# Patient Record
Sex: MALE | Race: AMERICAN INDIAN OR ALASKA NATIVE | NOT HISPANIC OR LATINO | Employment: OTHER | URBAN - METROPOLITAN AREA
[De-identification: names, ages, dates, MRNs, and addresses within clinical notes are randomized per-mention and may not be internally consistent; named-entity substitution may affect disease eponyms.]

---

## 2019-05-15 ENCOUNTER — COMMUNICATION - HEALTHEAST (OUTPATIENT)
Dept: PULMONOLOGY | Facility: OTHER | Age: 64
End: 2019-05-15

## 2019-05-15 ENCOUNTER — AMBULATORY - HEALTHEAST (OUTPATIENT)
Dept: PULMONOLOGY | Facility: OTHER | Age: 64
End: 2019-05-15

## 2019-05-15 DIAGNOSIS — J44.9 COPD (CHRONIC OBSTRUCTIVE PULMONARY DISEASE) (H): ICD-10-CM

## 2019-05-22 ENCOUNTER — OFFICE VISIT - HEALTHEAST (OUTPATIENT)
Dept: FAMILY MEDICINE | Facility: CLINIC | Age: 64
End: 2019-05-22

## 2019-05-22 ENCOUNTER — COMMUNICATION - HEALTHEAST (OUTPATIENT)
Dept: FAMILY MEDICINE | Facility: CLINIC | Age: 64
End: 2019-05-22

## 2019-05-22 DIAGNOSIS — J44.1 COPD EXACERBATION (H): ICD-10-CM

## 2019-05-22 DIAGNOSIS — I10 ESSENTIAL HYPERTENSION: ICD-10-CM

## 2019-05-22 DIAGNOSIS — Z71.6 ENCOUNTER FOR SMOKING CESSATION COUNSELING: ICD-10-CM

## 2019-05-22 DIAGNOSIS — G40.802 OTHER EPILEPSY WITHOUT STATUS EPILEPTICUS, NOT INTRACTABLE (H): ICD-10-CM

## 2019-05-22 DIAGNOSIS — F32.A DEPRESSION: ICD-10-CM

## 2019-05-22 DIAGNOSIS — G62.9 NEUROPATHY: ICD-10-CM

## 2019-05-22 ASSESSMENT — MIFFLIN-ST. JEOR: SCORE: 1889.7

## 2019-06-06 ENCOUNTER — OFFICE VISIT - HEALTHEAST (OUTPATIENT)
Dept: FAMILY MEDICINE | Facility: CLINIC | Age: 64
End: 2019-06-06

## 2019-06-06 DIAGNOSIS — Z72.0 TOBACCO ABUSE: ICD-10-CM

## 2019-06-06 DIAGNOSIS — F32.1 CURRENT MODERATE EPISODE OF MAJOR DEPRESSIVE DISORDER WITHOUT PRIOR EPISODE (H): ICD-10-CM

## 2019-06-06 DIAGNOSIS — G25.81 RESTLESS LEGS SYNDROME (RLS): ICD-10-CM

## 2019-06-06 DIAGNOSIS — J44.1 COPD EXACERBATION (H): ICD-10-CM

## 2019-06-06 DIAGNOSIS — I10 ESSENTIAL HYPERTENSION: ICD-10-CM

## 2019-06-06 DIAGNOSIS — G62.9 NEUROPATHY: ICD-10-CM

## 2019-06-06 ASSESSMENT — MIFFLIN-ST. JEOR: SCORE: 1845.76

## 2019-06-27 ENCOUNTER — COMMUNICATION - HEALTHEAST (OUTPATIENT)
Dept: FAMILY MEDICINE | Facility: CLINIC | Age: 64
End: 2019-06-27

## 2019-06-27 DIAGNOSIS — G62.9 NEUROPATHY: ICD-10-CM

## 2019-06-28 ENCOUNTER — OFFICE VISIT - HEALTHEAST (OUTPATIENT)
Dept: FAMILY MEDICINE | Facility: CLINIC | Age: 64
End: 2019-06-28

## 2019-06-28 DIAGNOSIS — F32.1 CURRENT MODERATE EPISODE OF MAJOR DEPRESSIVE DISORDER WITHOUT PRIOR EPISODE (H): ICD-10-CM

## 2019-06-28 DIAGNOSIS — J44.1 COPD EXACERBATION (H): ICD-10-CM

## 2019-06-28 DIAGNOSIS — Z71.6 ENCOUNTER FOR SMOKING CESSATION COUNSELING: ICD-10-CM

## 2019-06-28 DIAGNOSIS — M51.379 DEGENERATION OF LUMBAR OR LUMBOSACRAL INTERVERTEBRAL DISC: ICD-10-CM

## 2019-06-28 DIAGNOSIS — G62.9 NEUROPATHY: ICD-10-CM

## 2019-06-28 DIAGNOSIS — I10 ESSENTIAL HYPERTENSION: ICD-10-CM

## 2019-06-28 DIAGNOSIS — F32.A DEPRESSION: ICD-10-CM

## 2019-06-28 ASSESSMENT — MIFFLIN-ST. JEOR: SCORE: 1863.05

## 2019-07-01 ENCOUNTER — RECORDS - HEALTHEAST (OUTPATIENT)
Dept: PULMONOLOGY | Facility: OTHER | Age: 64
End: 2019-07-01

## 2019-07-01 ENCOUNTER — OFFICE VISIT - HEALTHEAST (OUTPATIENT)
Dept: PULMONOLOGY | Facility: OTHER | Age: 64
End: 2019-07-01

## 2019-07-01 ENCOUNTER — RECORDS - HEALTHEAST (OUTPATIENT)
Dept: ADMINISTRATIVE | Facility: OTHER | Age: 64
End: 2019-07-01

## 2019-07-01 DIAGNOSIS — J44.9 CHRONIC OBSTRUCTIVE PULMONARY DISEASE, UNSPECIFIED (H): ICD-10-CM

## 2019-07-01 DIAGNOSIS — Z12.2 ENCOUNTER FOR SCREENING FOR LUNG CANCER: ICD-10-CM

## 2019-07-01 DIAGNOSIS — J44.9 COPD, SEVERE (H): ICD-10-CM

## 2019-07-01 DIAGNOSIS — F17.201 TOBACCO DEPENDENCE IN REMISSION: ICD-10-CM

## 2019-07-01 ASSESSMENT — MIFFLIN-ST. JEOR: SCORE: 1866.74

## 2019-07-10 ENCOUNTER — COMMUNICATION - HEALTHEAST (OUTPATIENT)
Dept: FAMILY MEDICINE | Facility: CLINIC | Age: 64
End: 2019-07-10

## 2019-07-11 ENCOUNTER — HOSPITAL ENCOUNTER (OUTPATIENT)
Dept: CT IMAGING | Facility: HOSPITAL | Age: 64
Discharge: HOME | End: 2019-07-11
Attending: INTERNAL MEDICINE

## 2019-07-11 ENCOUNTER — COMMUNICATION - HEALTHEAST (OUTPATIENT)
Dept: PULMONOLOGY | Facility: OTHER | Age: 64
End: 2019-07-11

## 2019-07-11 DIAGNOSIS — F17.201 TOBACCO DEPENDENCE IN REMISSION: ICD-10-CM

## 2019-07-11 DIAGNOSIS — Z12.2 ENCOUNTER FOR SCREENING FOR LUNG CANCER: ICD-10-CM

## 2019-07-11 DIAGNOSIS — J44.9 COPD, SEVERE (H): ICD-10-CM

## 2019-07-13 ENCOUNTER — COMMUNICATION - HEALTHEAST (OUTPATIENT)
Dept: PULMONOLOGY | Facility: OTHER | Age: 64
End: 2019-07-13

## 2019-07-15 ENCOUNTER — COMMUNICATION - HEALTHEAST (OUTPATIENT)
Dept: PULMONOLOGY | Facility: OTHER | Age: 64
End: 2019-07-15

## 2019-07-15 DIAGNOSIS — R91.1 SOLITARY PULMONARY NODULE: ICD-10-CM

## 2019-07-22 ENCOUNTER — COMMUNICATION - HEALTHEAST (OUTPATIENT)
Dept: PULMONOLOGY | Facility: OTHER | Age: 64
End: 2019-07-22

## 2019-07-23 ENCOUNTER — AMBULATORY - HEALTHEAST (OUTPATIENT)
Dept: PULMONOLOGY | Facility: OTHER | Age: 64
End: 2019-07-23

## 2019-07-26 ENCOUNTER — AMBULATORY - HEALTHEAST (OUTPATIENT)
Dept: PULMONOLOGY | Facility: OTHER | Age: 64
End: 2019-07-26

## 2019-07-26 ENCOUNTER — OFFICE VISIT - HEALTHEAST (OUTPATIENT)
Dept: FAMILY MEDICINE | Facility: CLINIC | Age: 64
End: 2019-07-26

## 2019-07-26 ENCOUNTER — COMMUNICATION - HEALTHEAST (OUTPATIENT)
Dept: PULMONOLOGY | Facility: OTHER | Age: 64
End: 2019-07-26

## 2019-07-26 DIAGNOSIS — I10 ESSENTIAL HYPERTENSION: ICD-10-CM

## 2019-07-26 DIAGNOSIS — D18.09 HEMANGIOMA OF OTHER SITES: ICD-10-CM

## 2019-07-26 DIAGNOSIS — I25.10 CARDIOVASCULAR DISEASE: ICD-10-CM

## 2019-07-26 DIAGNOSIS — K76.0 HEPATIC STEATOSIS: ICD-10-CM

## 2019-07-26 DIAGNOSIS — Z72.0 NICOTINE ABUSE: ICD-10-CM

## 2019-07-26 DIAGNOSIS — J44.9 COPD, SEVERE (H): ICD-10-CM

## 2019-07-26 DIAGNOSIS — J44.1 COPD EXACERBATION (H): ICD-10-CM

## 2019-07-26 DIAGNOSIS — F32.1 CURRENT MODERATE EPISODE OF MAJOR DEPRESSIVE DISORDER WITHOUT PRIOR EPISODE (H): ICD-10-CM

## 2019-07-26 DIAGNOSIS — J44.9 COPD (CHRONIC OBSTRUCTIVE PULMONARY DISEASE) (H): ICD-10-CM

## 2019-07-26 DIAGNOSIS — F51.01 PRIMARY INSOMNIA: ICD-10-CM

## 2019-07-26 ASSESSMENT — MIFFLIN-ST. JEOR: SCORE: 1867.59

## 2019-08-01 ENCOUNTER — HOSPITAL ENCOUNTER (OUTPATIENT)
Dept: PET IMAGING | Facility: HOSPITAL | Age: 64
Discharge: HOME | End: 2019-08-01
Attending: INTERNAL MEDICINE

## 2019-08-01 DIAGNOSIS — R91.1 SOLITARY PULMONARY NODULE: ICD-10-CM

## 2019-08-01 LAB — GLUCOSE BLDC GLUCOMTR-MCNC: 99 MG/DL (ref 70–139)

## 2019-08-01 ASSESSMENT — MIFFLIN-ST. JEOR: SCORE: 1884.88

## 2019-08-02 ENCOUNTER — COMMUNICATION - HEALTHEAST (OUTPATIENT)
Dept: PULMONOLOGY | Facility: OTHER | Age: 64
End: 2019-08-02

## 2019-08-02 DIAGNOSIS — R91.1 SOLITARY PULMONARY NODULE: ICD-10-CM

## 2019-08-02 DIAGNOSIS — Z12.11 SPECIAL SCREENING FOR MALIGNANT NEOPLASMS, COLON: ICD-10-CM

## 2019-08-02 DIAGNOSIS — K62.1 RECTAL POLYP: ICD-10-CM

## 2019-08-08 ENCOUNTER — COMMUNICATION - HEALTHEAST (OUTPATIENT)
Dept: PULMONOLOGY | Facility: OTHER | Age: 64
End: 2019-08-08

## 2019-09-04 ENCOUNTER — RECORDS - HEALTHEAST (OUTPATIENT)
Dept: ADMINISTRATIVE | Facility: OTHER | Age: 64
End: 2019-09-04

## 2019-09-05 ENCOUNTER — RECORDS - HEALTHEAST (OUTPATIENT)
Dept: ADMINISTRATIVE | Facility: OTHER | Age: 64
End: 2019-09-05

## 2019-09-06 ENCOUNTER — RECORDS - HEALTHEAST (OUTPATIENT)
Dept: ADMINISTRATIVE | Facility: OTHER | Age: 64
End: 2019-09-06

## 2019-09-25 ENCOUNTER — OFFICE VISIT - HEALTHEAST (OUTPATIENT)
Dept: PULMONOLOGY | Facility: OTHER | Age: 64
End: 2019-09-25

## 2019-09-25 DIAGNOSIS — F17.200 TOBACCO DEPENDENCE SYNDROME: ICD-10-CM

## 2019-09-25 ASSESSMENT — MIFFLIN-ST. JEOR: SCORE: 1893.95

## 2019-10-06 ENCOUNTER — COMMUNICATION - HEALTHEAST (OUTPATIENT)
Dept: FAMILY MEDICINE | Facility: CLINIC | Age: 64
End: 2019-10-06

## 2019-10-06 DIAGNOSIS — Z71.6 ENCOUNTER FOR SMOKING CESSATION COUNSELING: ICD-10-CM

## 2019-10-06 DIAGNOSIS — F32.A DEPRESSION: ICD-10-CM

## 2019-11-06 ENCOUNTER — COMMUNICATION - HEALTHEAST (OUTPATIENT)
Dept: PULMONOLOGY | Facility: OTHER | Age: 64
End: 2019-11-06

## 2019-11-06 ENCOUNTER — COMMUNICATION - HEALTHEAST (OUTPATIENT)
Dept: TELEHEALTH | Facility: CLINIC | Age: 64
End: 2019-11-06

## 2019-11-06 ENCOUNTER — HOSPITAL ENCOUNTER (OUTPATIENT)
Dept: CT IMAGING | Facility: CLINIC | Age: 64
Discharge: HOME | End: 2019-11-06
Attending: INTERNAL MEDICINE

## 2019-11-06 DIAGNOSIS — R91.1 SOLITARY PULMONARY NODULE: ICD-10-CM

## 2019-11-07 ENCOUNTER — COMMUNICATION - HEALTHEAST (OUTPATIENT)
Dept: FAMILY MEDICINE | Facility: CLINIC | Age: 64
End: 2019-11-07

## 2019-11-07 DIAGNOSIS — I10 ESSENTIAL HYPERTENSION: ICD-10-CM

## 2019-11-08 ENCOUNTER — OFFICE VISIT - HEALTHEAST (OUTPATIENT)
Dept: PULMONOLOGY | Facility: OTHER | Age: 64
End: 2019-11-08

## 2019-11-08 DIAGNOSIS — F17.200 TOBACCO DEPENDENCE SYNDROME: ICD-10-CM

## 2019-11-08 DIAGNOSIS — R91.1 SOLITARY PULMONARY NODULE: ICD-10-CM

## 2019-11-08 DIAGNOSIS — J44.9 CHRONIC OBSTRUCTIVE PULMONARY DISEASE, UNSPECIFIED COPD TYPE (H): ICD-10-CM

## 2019-11-08 ASSESSMENT — MIFFLIN-ST. JEOR: SCORE: 1898.49

## 2019-11-15 ENCOUNTER — COMMUNICATION - HEALTHEAST (OUTPATIENT)
Dept: FAMILY MEDICINE | Facility: CLINIC | Age: 64
End: 2019-11-15

## 2019-11-15 DIAGNOSIS — G62.9 NEUROPATHY: ICD-10-CM

## 2019-12-08 ENCOUNTER — COMMUNICATION - HEALTHEAST (OUTPATIENT)
Dept: FAMILY MEDICINE | Facility: CLINIC | Age: 64
End: 2019-12-08

## 2019-12-08 DIAGNOSIS — F32.A DEPRESSION: ICD-10-CM

## 2019-12-11 ENCOUNTER — COMMUNICATION - HEALTHEAST (OUTPATIENT)
Dept: FAMILY MEDICINE | Facility: CLINIC | Age: 64
End: 2019-12-11

## 2019-12-16 ENCOUNTER — COMMUNICATION - HEALTHEAST (OUTPATIENT)
Dept: SCHEDULING | Facility: CLINIC | Age: 64
End: 2019-12-16

## 2019-12-17 ENCOUNTER — DOCUMENTATION ONLY (OUTPATIENT)
Dept: OTHER | Facility: CLINIC | Age: 64
End: 2019-12-17

## 2019-12-17 ENCOUNTER — COMMUNICATION - HEALTHEAST (OUTPATIENT)
Dept: FAMILY MEDICINE | Facility: CLINIC | Age: 64
End: 2019-12-17

## 2019-12-17 ENCOUNTER — AMBULATORY - HEALTHEAST (OUTPATIENT)
Dept: OTHER | Facility: CLINIC | Age: 64
End: 2019-12-17

## 2019-12-19 ENCOUNTER — COMMUNICATION - HEALTHEAST (OUTPATIENT)
Dept: FAMILY MEDICINE | Facility: CLINIC | Age: 64
End: 2019-12-19

## 2019-12-20 ENCOUNTER — COMMUNICATION - HEALTHEAST (OUTPATIENT)
Dept: FAMILY MEDICINE | Facility: CLINIC | Age: 64
End: 2019-12-20

## 2019-12-23 ENCOUNTER — AMBULATORY - HEALTHEAST (OUTPATIENT)
Dept: CARE COORDINATION | Facility: CLINIC | Age: 64
End: 2019-12-23

## 2019-12-23 ENCOUNTER — COMMUNICATION - HEALTHEAST (OUTPATIENT)
Dept: CARE COORDINATION | Facility: CLINIC | Age: 64
End: 2019-12-23

## 2019-12-23 DIAGNOSIS — J44.1 CHRONIC OBSTRUCTIVE PULMONARY DISEASE WITH ACUTE EXACERBATION (H): ICD-10-CM

## 2019-12-23 DIAGNOSIS — I48.91 ATRIAL FIBRILLATION WITH RAPID VENTRICULAR RESPONSE (H): ICD-10-CM

## 2019-12-23 DIAGNOSIS — J96.00 ACUTE RESPIRATORY FAILURE (H): ICD-10-CM

## 2019-12-24 ENCOUNTER — COMMUNICATION - HEALTHEAST (OUTPATIENT)
Dept: NURSING | Facility: CLINIC | Age: 64
End: 2019-12-24

## 2019-12-26 ENCOUNTER — COMMUNICATION - HEALTHEAST (OUTPATIENT)
Dept: NURSING | Facility: CLINIC | Age: 64
End: 2019-12-26

## 2019-12-26 ENCOUNTER — HOME CARE/HOSPICE - HEALTHEAST (OUTPATIENT)
Dept: HOME HEALTH SERVICES | Facility: HOME HEALTH | Age: 64
End: 2019-12-26

## 2019-12-27 ENCOUNTER — COMMUNICATION - HEALTHEAST (OUTPATIENT)
Dept: FAMILY MEDICINE | Facility: CLINIC | Age: 64
End: 2019-12-27

## 2019-12-28 ENCOUNTER — COMMUNICATION - HEALTHEAST (OUTPATIENT)
Dept: RESPIRATORY THERAPY | Facility: CLINIC | Age: 64
End: 2019-12-28

## 2019-12-28 ENCOUNTER — COMMUNICATION - HEALTHEAST (OUTPATIENT)
Dept: FAMILY MEDICINE | Facility: CLINIC | Age: 64
End: 2019-12-28

## 2019-12-28 DIAGNOSIS — J44.1 COPD EXACERBATION (H): ICD-10-CM

## 2019-12-30 ENCOUNTER — OFFICE VISIT - HEALTHEAST (OUTPATIENT)
Dept: CARDIOLOGY | Facility: CLINIC | Age: 64
End: 2019-12-30

## 2019-12-30 ENCOUNTER — COMMUNICATION - HEALTHEAST (OUTPATIENT)
Dept: NURSING | Facility: CLINIC | Age: 64
End: 2019-12-30

## 2019-12-30 DIAGNOSIS — I48.19 PERSISTENT ATRIAL FIBRILLATION WITH RAPID VENTRICULAR RESPONSE (H): ICD-10-CM

## 2019-12-30 DIAGNOSIS — I10 ESSENTIAL HYPERTENSION: ICD-10-CM

## 2019-12-30 DIAGNOSIS — F41.9 ANXIETY: ICD-10-CM

## 2019-12-30 DIAGNOSIS — A49.2 HAEMOPHILUS INFLUENZAE INFECTION: ICD-10-CM

## 2019-12-30 DIAGNOSIS — J44.1 COPD EXACERBATION (H): ICD-10-CM

## 2019-12-30 ASSESSMENT — MIFFLIN-ST. JEOR: SCORE: 1921.17

## 2019-12-31 ENCOUNTER — COMMUNICATION - HEALTHEAST (OUTPATIENT)
Dept: CARE COORDINATION | Facility: CLINIC | Age: 64
End: 2019-12-31

## 2019-12-31 ENCOUNTER — COMMUNICATION - HEALTHEAST (OUTPATIENT)
Dept: CARDIOLOGY | Facility: CLINIC | Age: 64
End: 2019-12-31

## 2019-12-31 ENCOUNTER — AMBULATORY - HEALTHEAST (OUTPATIENT)
Dept: CARDIOLOGY | Facility: CLINIC | Age: 64
End: 2019-12-31

## 2019-12-31 DIAGNOSIS — Z79.899 LONG TERM USE OF DRUG: ICD-10-CM

## 2020-01-02 ENCOUNTER — OFFICE VISIT - HEALTHEAST (OUTPATIENT)
Dept: FAMILY MEDICINE | Facility: CLINIC | Age: 65
End: 2020-01-02

## 2020-01-02 ENCOUNTER — COMMUNICATION - HEALTHEAST (OUTPATIENT)
Dept: FAMILY MEDICINE | Facility: CLINIC | Age: 65
End: 2020-01-02

## 2020-01-02 DIAGNOSIS — E87.5 HYPERKALEMIA: ICD-10-CM

## 2020-01-02 DIAGNOSIS — J96.01 ACUTE RESPIRATORY FAILURE WITH HYPOXIA (H): ICD-10-CM

## 2020-01-02 DIAGNOSIS — F51.01 PRIMARY INSOMNIA: ICD-10-CM

## 2020-01-02 DIAGNOSIS — F32.0 CURRENT MILD EPISODE OF MAJOR DEPRESSIVE DISORDER WITHOUT PRIOR EPISODE (H): ICD-10-CM

## 2020-01-02 DIAGNOSIS — I48.19 PERSISTENT ATRIAL FIBRILLATION WITH RAPID VENTRICULAR RESPONSE (H): ICD-10-CM

## 2020-01-02 DIAGNOSIS — F41.9 ANXIETY: ICD-10-CM

## 2020-01-02 DIAGNOSIS — L30.9 DERMATITIS: ICD-10-CM

## 2020-01-02 DIAGNOSIS — J44.1 CHRONIC OBSTRUCTIVE PULMONARY DISEASE WITH ACUTE EXACERBATION (H): ICD-10-CM

## 2020-01-02 DIAGNOSIS — I10 ESSENTIAL HYPERTENSION: ICD-10-CM

## 2020-01-02 LAB
ANION GAP SERPL CALCULATED.3IONS-SCNC: 11 MMOL/L (ref 5–18)
BUN SERPL-MCNC: 17 MG/DL (ref 8–22)
CALCIUM SERPL-MCNC: 8 MG/DL (ref 8.5–10.5)
CHLORIDE BLD-SCNC: 109 MMOL/L (ref 98–107)
CO2 SERPL-SCNC: 23 MMOL/L (ref 22–31)
CREAT SERPL-MCNC: 1.15 MG/DL (ref 0.7–1.3)
GFR SERPL CREATININE-BSD FRML MDRD: >60 ML/MIN/1.73M2
GLUCOSE BLD-MCNC: 97 MG/DL (ref 70–125)
POTASSIUM BLD-SCNC: 4 MMOL/L (ref 3.5–5)
SODIUM SERPL-SCNC: 143 MMOL/L (ref 136–145)

## 2020-01-02 ASSESSMENT — PATIENT HEALTH QUESTIONNAIRE - PHQ9: SUM OF ALL RESPONSES TO PHQ QUESTIONS 1-9: 14

## 2020-01-02 ASSESSMENT — MIFFLIN-ST. JEOR: SCORE: 1890.68

## 2020-01-03 ENCOUNTER — RECORDS - HEALTHEAST (OUTPATIENT)
Dept: ADMINISTRATIVE | Facility: OTHER | Age: 65
End: 2020-01-03

## 2020-01-04 ENCOUNTER — COMMUNICATION - HEALTHEAST (OUTPATIENT)
Dept: FAMILY MEDICINE | Facility: CLINIC | Age: 65
End: 2020-01-04

## 2020-01-06 ENCOUNTER — AMBULATORY - HEALTHEAST (OUTPATIENT)
Dept: CARDIOLOGY | Facility: CLINIC | Age: 65
End: 2020-01-06

## 2020-01-06 ENCOUNTER — COMMUNICATION - HEALTHEAST (OUTPATIENT)
Dept: FAMILY MEDICINE | Facility: CLINIC | Age: 65
End: 2020-01-06

## 2020-01-07 ENCOUNTER — COMMUNICATION - HEALTHEAST (OUTPATIENT)
Dept: FAMILY MEDICINE | Facility: CLINIC | Age: 65
End: 2020-01-07

## 2020-01-07 ENCOUNTER — COMMUNICATION - HEALTHEAST (OUTPATIENT)
Dept: RESPIRATORY THERAPY | Facility: CLINIC | Age: 65
End: 2020-01-07

## 2020-01-09 ENCOUNTER — RECORDS - HEALTHEAST (OUTPATIENT)
Dept: ADMINISTRATIVE | Facility: OTHER | Age: 65
End: 2020-01-09

## 2020-01-10 ENCOUNTER — HOSPITAL ENCOUNTER (OUTPATIENT)
Dept: CARDIOLOGY | Facility: CLINIC | Age: 65
Discharge: HOME | End: 2020-01-10
Attending: INTERNAL MEDICINE | Admitting: INTERNAL MEDICINE

## 2020-01-10 DIAGNOSIS — I48.19 PERSISTENT ATRIAL FIBRILLATION WITH RAPID VENTRICULAR RESPONSE (H): ICD-10-CM

## 2020-01-10 LAB
ATRIAL RATE - MUSE: 61 BPM
DIASTOLIC BLOOD PRESSURE - MUSE: NORMAL
INTERPRETATION ECG - MUSE: NORMAL
P AXIS - MUSE: 26 DEGREES
PR INTERVAL - MUSE: 128 MS
QRS DURATION - MUSE: 88 MS
QT - MUSE: 448 MS
QTC - MUSE: 450 MS
R AXIS - MUSE: 62 DEGREES
SYSTOLIC BLOOD PRESSURE - MUSE: NORMAL
T AXIS - MUSE: 70 DEGREES
VENTRICULAR RATE- MUSE: 61 BPM

## 2020-01-10 ASSESSMENT — MIFFLIN-ST. JEOR: SCORE: 1868.15

## 2020-01-14 ENCOUNTER — COMMUNICATION - HEALTHEAST (OUTPATIENT)
Dept: FAMILY MEDICINE | Facility: CLINIC | Age: 65
End: 2020-01-14

## 2020-01-15 ENCOUNTER — COMMUNICATION - HEALTHEAST (OUTPATIENT)
Dept: SCHEDULING | Facility: CLINIC | Age: 65
End: 2020-01-15

## 2020-01-18 ENCOUNTER — COMMUNICATION - HEALTHEAST (OUTPATIENT)
Dept: FAMILY MEDICINE | Facility: CLINIC | Age: 65
End: 2020-01-18

## 2020-01-18 DIAGNOSIS — I48.91 ATRIAL FIBRILLATION WITH RAPID VENTRICULAR RESPONSE (H): ICD-10-CM

## 2020-01-23 ENCOUNTER — COMMUNICATION - HEALTHEAST (OUTPATIENT)
Dept: FAMILY MEDICINE | Facility: CLINIC | Age: 65
End: 2020-01-23

## 2020-01-23 ENCOUNTER — OFFICE VISIT - HEALTHEAST (OUTPATIENT)
Dept: FAMILY MEDICINE | Facility: CLINIC | Age: 65
End: 2020-01-23

## 2020-01-23 DIAGNOSIS — J44.1 COPD EXACERBATION (H): ICD-10-CM

## 2020-01-23 DIAGNOSIS — F51.01 PRIMARY INSOMNIA: ICD-10-CM

## 2020-01-23 DIAGNOSIS — I48.91 ATRIAL FIBRILLATION WITH RAPID VENTRICULAR RESPONSE (H): ICD-10-CM

## 2020-01-23 DIAGNOSIS — F41.9 ANXIETY: ICD-10-CM

## 2020-01-23 DIAGNOSIS — G62.9 NEUROPATHY: ICD-10-CM

## 2020-01-23 ASSESSMENT — MIFFLIN-ST. JEOR: SCORE: 1878.49

## 2020-01-29 ENCOUNTER — RECORDS - HEALTHEAST (OUTPATIENT)
Dept: ADMINISTRATIVE | Facility: OTHER | Age: 65
End: 2020-01-29

## 2020-01-30 ENCOUNTER — RECORDS - HEALTHEAST (OUTPATIENT)
Dept: ADMINISTRATIVE | Facility: OTHER | Age: 65
End: 2020-01-30

## 2020-02-05 ENCOUNTER — AMBULATORY - HEALTHEAST (OUTPATIENT)
Dept: CARDIOLOGY | Facility: CLINIC | Age: 65
End: 2020-02-05

## 2020-02-05 ENCOUNTER — OFFICE VISIT - HEALTHEAST (OUTPATIENT)
Dept: CARDIOLOGY | Facility: CLINIC | Age: 65
End: 2020-02-05

## 2020-02-05 DIAGNOSIS — J44.9 CHRONIC OBSTRUCTIVE PULMONARY DISEASE, UNSPECIFIED COPD TYPE (H): ICD-10-CM

## 2020-02-05 DIAGNOSIS — I10 ESSENTIAL HYPERTENSION: ICD-10-CM

## 2020-02-05 DIAGNOSIS — Z79.899 LONG TERM USE OF DRUG: ICD-10-CM

## 2020-02-05 DIAGNOSIS — I48.0 PAROXYSMAL ATRIAL FIBRILLATION (H): ICD-10-CM

## 2020-02-05 ASSESSMENT — MIFFLIN-ST. JEOR: SCORE: 1875.81

## 2020-02-06 ENCOUNTER — COMMUNICATION - HEALTHEAST (OUTPATIENT)
Dept: FAMILY MEDICINE | Facility: CLINIC | Age: 65
End: 2020-02-06

## 2020-02-07 ENCOUNTER — OFFICE VISIT - HEALTHEAST (OUTPATIENT)
Dept: PULMONOLOGY | Facility: OTHER | Age: 65
End: 2020-02-07

## 2020-02-07 ENCOUNTER — COMMUNICATION - HEALTHEAST (OUTPATIENT)
Dept: PULMONOLOGY | Facility: OTHER | Age: 65
End: 2020-02-07

## 2020-02-07 DIAGNOSIS — F17.200 TOBACCO DEPENDENCE SYNDROME: ICD-10-CM

## 2020-02-07 DIAGNOSIS — J44.9 CHRONIC OBSTRUCTIVE PULMONARY DISEASE, UNSPECIFIED COPD TYPE (H): ICD-10-CM

## 2020-02-07 DIAGNOSIS — R91.1 SOLITARY PULMONARY NODULE: ICD-10-CM

## 2020-02-07 ASSESSMENT — MIFFLIN-ST. JEOR: SCORE: 1871.27

## 2020-02-11 ENCOUNTER — RECORDS - HEALTHEAST (OUTPATIENT)
Dept: ADMINISTRATIVE | Facility: OTHER | Age: 65
End: 2020-02-11

## 2020-02-12 ENCOUNTER — COMMUNICATION - HEALTHEAST (OUTPATIENT)
Dept: FAMILY MEDICINE | Facility: CLINIC | Age: 65
End: 2020-02-12

## 2020-02-17 ENCOUNTER — COMMUNICATION - HEALTHEAST (OUTPATIENT)
Dept: PULMONOLOGY | Facility: OTHER | Age: 65
End: 2020-02-17

## 2020-02-20 ENCOUNTER — COMMUNICATION - HEALTHEAST (OUTPATIENT)
Dept: FAMILY MEDICINE | Facility: CLINIC | Age: 65
End: 2020-02-20

## 2020-02-20 DIAGNOSIS — I48.91 ATRIAL FIBRILLATION WITH RAPID VENTRICULAR RESPONSE (H): ICD-10-CM

## 2020-02-24 RX ORDER — DILTIAZEM HYDROCHLORIDE 240 MG/1
240 CAPSULE, COATED, EXTENDED RELEASE ORAL DAILY
Qty: 30 CAPSULE | Refills: 11 | Status: SHIPPED | OUTPATIENT
Start: 2020-02-24 | End: 2021-12-23

## 2020-03-03 ENCOUNTER — COMMUNICATION - HEALTHEAST (OUTPATIENT)
Dept: FAMILY MEDICINE | Facility: CLINIC | Age: 65
End: 2020-03-03

## 2020-03-03 ENCOUNTER — AMBULATORY - HEALTHEAST (OUTPATIENT)
Dept: FAMILY MEDICINE | Facility: CLINIC | Age: 65
End: 2020-03-03

## 2020-03-03 DIAGNOSIS — F51.01 PRIMARY INSOMNIA: ICD-10-CM

## 2020-03-03 DIAGNOSIS — F41.9 ANXIETY: ICD-10-CM

## 2020-03-03 DIAGNOSIS — J44.1 COPD EXACERBATION (H): ICD-10-CM

## 2020-03-03 DIAGNOSIS — J44.9 CHRONIC OBSTRUCTIVE PULMONARY DISEASE, UNSPECIFIED COPD TYPE (H): ICD-10-CM

## 2020-03-03 DIAGNOSIS — G62.9 NEUROPATHY: ICD-10-CM

## 2020-03-04 ENCOUNTER — COMMUNICATION - HEALTHEAST (OUTPATIENT)
Dept: FAMILY MEDICINE | Facility: CLINIC | Age: 65
End: 2020-03-04

## 2020-03-04 DIAGNOSIS — J44.9 CHRONIC OBSTRUCTIVE PULMONARY DISEASE, UNSPECIFIED COPD TYPE (H): ICD-10-CM

## 2020-03-10 ENCOUNTER — AMBULATORY - HEALTHEAST (OUTPATIENT)
Dept: FAMILY MEDICINE | Facility: CLINIC | Age: 65
End: 2020-03-10

## 2020-03-10 ENCOUNTER — COMMUNICATION - HEALTHEAST (OUTPATIENT)
Dept: SCHEDULING | Facility: CLINIC | Age: 65
End: 2020-03-10

## 2020-03-10 DIAGNOSIS — Z20.828 EXPOSURE TO INFLUENZA: ICD-10-CM

## 2020-03-17 ENCOUNTER — COMMUNICATION - HEALTHEAST (OUTPATIENT)
Dept: FAMILY MEDICINE | Facility: CLINIC | Age: 65
End: 2020-03-17

## 2020-03-17 DIAGNOSIS — F32.A DEPRESSION: ICD-10-CM

## 2020-03-19 RX ORDER — CITALOPRAM HYDROBROMIDE 20 MG/1
TABLET ORAL
Qty: 90 TABLET | Refills: 3 | Status: SHIPPED | OUTPATIENT
Start: 2020-03-19 | End: 2021-11-30

## 2020-03-20 ENCOUNTER — COMMUNICATION - HEALTHEAST (OUTPATIENT)
Dept: FAMILY MEDICINE | Facility: CLINIC | Age: 65
End: 2020-03-20

## 2020-03-20 DIAGNOSIS — F41.9 ANXIETY: ICD-10-CM

## 2020-03-20 DIAGNOSIS — F51.01 PRIMARY INSOMNIA: ICD-10-CM

## 2020-03-23 ENCOUNTER — COMMUNICATION - HEALTHEAST (OUTPATIENT)
Dept: PHARMACY | Facility: CLINIC | Age: 65
End: 2020-03-23

## 2020-03-23 ENCOUNTER — COMMUNICATION - HEALTHEAST (OUTPATIENT)
Dept: CARE COORDINATION | Facility: CLINIC | Age: 65
End: 2020-03-23

## 2020-03-24 ENCOUNTER — COMMUNICATION - HEALTHEAST (OUTPATIENT)
Dept: FAMILY MEDICINE | Facility: CLINIC | Age: 65
End: 2020-03-24

## 2020-03-25 ENCOUNTER — COMMUNICATION - HEALTHEAST (OUTPATIENT)
Dept: FAMILY MEDICINE | Facility: CLINIC | Age: 65
End: 2020-03-25

## 2020-03-26 ENCOUNTER — AMBULATORY - HEALTHEAST (OUTPATIENT)
Dept: FAMILY MEDICINE | Facility: CLINIC | Age: 65
End: 2020-03-26

## 2020-03-28 ENCOUNTER — COMMUNICATION - HEALTHEAST (OUTPATIENT)
Dept: EMERGENCY MEDICINE | Facility: CLINIC | Age: 65
End: 2020-03-28

## 2020-04-13 ENCOUNTER — COMMUNICATION - HEALTHEAST (OUTPATIENT)
Dept: FAMILY MEDICINE | Facility: CLINIC | Age: 65
End: 2020-04-13

## 2020-04-14 ENCOUNTER — OFFICE VISIT - HEALTHEAST (OUTPATIENT)
Dept: FAMILY MEDICINE | Facility: CLINIC | Age: 65
End: 2020-04-14

## 2020-04-14 DIAGNOSIS — G89.29 CHRONIC BILATERAL LOW BACK PAIN WITHOUT SCIATICA: ICD-10-CM

## 2020-04-14 DIAGNOSIS — F51.01 PRIMARY INSOMNIA: ICD-10-CM

## 2020-04-14 DIAGNOSIS — I10 ESSENTIAL HYPERTENSION: ICD-10-CM

## 2020-04-14 DIAGNOSIS — G25.81 RESTLESS LEGS SYNDROME (RLS): ICD-10-CM

## 2020-04-14 DIAGNOSIS — G62.9 NEUROPATHY: ICD-10-CM

## 2020-04-14 DIAGNOSIS — M54.50 CHRONIC BILATERAL LOW BACK PAIN WITHOUT SCIATICA: ICD-10-CM

## 2020-04-14 DIAGNOSIS — F32.A DEPRESSION: ICD-10-CM

## 2020-04-14 DIAGNOSIS — Z71.6 ENCOUNTER FOR SMOKING CESSATION COUNSELING: ICD-10-CM

## 2020-04-14 DIAGNOSIS — F41.9 ANXIETY: ICD-10-CM

## 2020-04-14 RX ORDER — BUPROPION HYDROCHLORIDE 300 MG/1
300 TABLET ORAL
Qty: 30 TABLET | Refills: 12 | Status: SHIPPED | OUTPATIENT
Start: 2020-04-14 | End: 2021-11-30

## 2020-04-14 RX ORDER — TIOTROPIUM BROMIDE INHALATION SPRAY 3.12 UG/1
SPRAY, METERED RESPIRATORY (INHALATION)
Status: SHIPPED | COMMUNITY
Start: 2020-03-18 | End: 2021-12-23

## 2020-04-16 ENCOUNTER — COMMUNICATION - HEALTHEAST (OUTPATIENT)
Dept: FAMILY MEDICINE | Facility: CLINIC | Age: 65
End: 2020-04-16

## 2020-04-27 ENCOUNTER — COMMUNICATION - HEALTHEAST (OUTPATIENT)
Dept: FAMILY MEDICINE | Facility: CLINIC | Age: 65
End: 2020-04-27

## 2020-04-30 ENCOUNTER — COMMUNICATION - HEALTHEAST (OUTPATIENT)
Dept: ADMINISTRATIVE | Facility: CLINIC | Age: 65
End: 2020-04-30

## 2020-05-05 ENCOUNTER — COMMUNICATION - HEALTHEAST (OUTPATIENT)
Dept: PULMONOLOGY | Facility: OTHER | Age: 65
End: 2020-05-05

## 2020-05-07 ENCOUNTER — COMMUNICATION - HEALTHEAST (OUTPATIENT)
Dept: PULMONOLOGY | Facility: OTHER | Age: 65
End: 2020-05-07

## 2020-05-07 ENCOUNTER — OFFICE VISIT - HEALTHEAST (OUTPATIENT)
Dept: PULMONOLOGY | Facility: OTHER | Age: 65
End: 2020-05-07

## 2020-05-07 DIAGNOSIS — F17.200 TOBACCO DEPENDENCE SYNDROME: ICD-10-CM

## 2020-05-07 DIAGNOSIS — J44.9 CHRONIC OBSTRUCTIVE PULMONARY DISEASE, UNSPECIFIED COPD TYPE (H): ICD-10-CM

## 2020-05-07 DIAGNOSIS — J44.1 COPD EXACERBATION (H): ICD-10-CM

## 2020-05-07 RX ORDER — AZITHROMYCIN 250 MG/1
TABLET, FILM COATED ORAL
Qty: 12 TABLET | Refills: 11 | Status: SHIPPED | OUTPATIENT
Start: 2020-05-07 | End: 2021-12-23

## 2020-05-07 RX ORDER — DOXYCYCLINE 100 MG/1
CAPSULE ORAL
Qty: 10 CAPSULE | Refills: 11 | Status: SHIPPED | OUTPATIENT
Start: 2020-05-07 | End: 2021-12-23

## 2020-05-07 RX ORDER — LEVALBUTEROL INHALATION SOLUTION 1.25 MG/3ML
1.25 SOLUTION RESPIRATORY (INHALATION) EVERY 6 HOURS PRN
Qty: 360 ML | Refills: 3 | Status: SHIPPED | OUTPATIENT
Start: 2020-05-07 | End: 2021-11-30

## 2020-05-07 RX ORDER — NICOTINE 10 MG/ML
SPRAY, METERED NASAL
Qty: 10 ML | Refills: 11 | Status: SHIPPED | OUTPATIENT
Start: 2020-05-07 | End: 2021-12-23

## 2020-05-07 ASSESSMENT — MIFFLIN-ST. JEOR: SCORE: 1871.27

## 2020-05-11 ENCOUNTER — COMMUNICATION - HEALTHEAST (OUTPATIENT)
Dept: PULMONOLOGY | Facility: OTHER | Age: 65
End: 2020-05-11

## 2020-05-20 ENCOUNTER — COMMUNICATION - HEALTHEAST (OUTPATIENT)
Dept: FAMILY MEDICINE | Facility: CLINIC | Age: 65
End: 2020-05-20

## 2020-05-20 ENCOUNTER — RECORDS - HEALTHEAST (OUTPATIENT)
Dept: ADMINISTRATIVE | Facility: OTHER | Age: 65
End: 2020-05-20

## 2020-05-21 ENCOUNTER — OFFICE VISIT - HEALTHEAST (OUTPATIENT)
Dept: FAMILY MEDICINE | Facility: CLINIC | Age: 65
End: 2020-05-21

## 2020-05-21 ENCOUNTER — COMMUNICATION - HEALTHEAST (OUTPATIENT)
Dept: FAMILY MEDICINE | Facility: CLINIC | Age: 65
End: 2020-05-21

## 2020-05-21 DIAGNOSIS — Z72.0 TOBACCO ABUSE: ICD-10-CM

## 2020-05-21 DIAGNOSIS — R06.02 SOB (SHORTNESS OF BREATH): ICD-10-CM

## 2020-05-21 DIAGNOSIS — J44.1 CHRONIC OBSTRUCTIVE PULMONARY DISEASE WITH ACUTE EXACERBATION (H): ICD-10-CM

## 2020-05-21 DIAGNOSIS — I48.19 PERSISTENT ATRIAL FIBRILLATION (H): ICD-10-CM

## 2020-05-21 DIAGNOSIS — F41.9 ANXIETY: ICD-10-CM

## 2020-05-21 DIAGNOSIS — I10 ESSENTIAL HYPERTENSION: ICD-10-CM

## 2020-05-21 DIAGNOSIS — M51.379 DEGENERATION OF LUMBAR OR LUMBOSACRAL INTERVERTEBRAL DISC: ICD-10-CM

## 2020-05-26 ENCOUNTER — COMMUNICATION - HEALTHEAST (OUTPATIENT)
Dept: PULMONOLOGY | Facility: OTHER | Age: 65
End: 2020-05-26

## 2020-05-27 ENCOUNTER — AMBULATORY - HEALTHEAST (OUTPATIENT)
Dept: CARDIOLOGY | Facility: CLINIC | Age: 65
End: 2020-05-27

## 2020-05-28 ENCOUNTER — COMMUNICATION - HEALTHEAST (OUTPATIENT)
Dept: FAMILY MEDICINE | Facility: CLINIC | Age: 65
End: 2020-05-28

## 2020-05-28 DIAGNOSIS — F32.2 CURRENT SEVERE EPISODE OF MAJOR DEPRESSIVE DISORDER WITHOUT PSYCHOTIC FEATURES WITHOUT PRIOR EPISODE (H): ICD-10-CM

## 2020-06-03 ENCOUNTER — COMMUNICATION - HEALTHEAST (OUTPATIENT)
Dept: FAMILY MEDICINE | Facility: CLINIC | Age: 65
End: 2020-06-03

## 2020-06-03 DIAGNOSIS — F51.01 PRIMARY INSOMNIA: ICD-10-CM

## 2020-06-03 DIAGNOSIS — F41.9 ANXIETY: ICD-10-CM

## 2020-06-10 ENCOUNTER — RECORDS - HEALTHEAST (OUTPATIENT)
Dept: ADMINISTRATIVE | Facility: OTHER | Age: 65
End: 2020-06-10

## 2020-06-25 ENCOUNTER — COMMUNICATION - HEALTHEAST (OUTPATIENT)
Dept: SCHEDULING | Facility: CLINIC | Age: 65
End: 2020-06-25

## 2020-07-10 ENCOUNTER — COMMUNICATION - HEALTHEAST (OUTPATIENT)
Dept: FAMILY MEDICINE | Facility: CLINIC | Age: 65
End: 2020-07-10

## 2020-07-10 DIAGNOSIS — F41.9 ANXIETY: ICD-10-CM

## 2020-07-10 DIAGNOSIS — F51.01 PRIMARY INSOMNIA: ICD-10-CM

## 2020-07-13 ENCOUNTER — COMMUNICATION - HEALTHEAST (OUTPATIENT)
Dept: FAMILY MEDICINE | Facility: CLINIC | Age: 65
End: 2020-07-13

## 2020-07-13 DIAGNOSIS — I25.10 CARDIOVASCULAR DISEASE: ICD-10-CM

## 2020-07-14 ENCOUNTER — RECORDS - HEALTHEAST (OUTPATIENT)
Dept: ADMINISTRATIVE | Facility: OTHER | Age: 65
End: 2020-07-14

## 2020-07-22 ENCOUNTER — OFFICE VISIT - HEALTHEAST (OUTPATIENT)
Dept: FAMILY MEDICINE | Facility: CLINIC | Age: 65
End: 2020-07-22

## 2020-07-22 DIAGNOSIS — J44.1 COPD EXACERBATION (H): ICD-10-CM

## 2020-07-22 DIAGNOSIS — M54.40 CHRONIC LOW BACK PAIN WITH SCIATICA, SCIATICA LATERALITY UNSPECIFIED, UNSPECIFIED BACK PAIN LATERALITY: ICD-10-CM

## 2020-07-22 DIAGNOSIS — F51.01 PRIMARY INSOMNIA: ICD-10-CM

## 2020-07-22 DIAGNOSIS — G25.81 RESTLESS LEGS SYNDROME (RLS): ICD-10-CM

## 2020-07-22 DIAGNOSIS — G89.29 CHRONIC LOW BACK PAIN WITH SCIATICA, SCIATICA LATERALITY UNSPECIFIED, UNSPECIFIED BACK PAIN LATERALITY: ICD-10-CM

## 2020-07-22 DIAGNOSIS — G62.9 NEUROPATHY: ICD-10-CM

## 2020-07-22 DIAGNOSIS — F41.9 ANXIETY: ICD-10-CM

## 2020-07-22 RX ORDER — GABAPENTIN 300 MG/1
CAPSULE ORAL
Qty: 540 CAPSULE | Refills: 2 | Status: SHIPPED | OUTPATIENT
Start: 2020-07-22 | End: 2021-12-23

## 2020-08-05 ENCOUNTER — COMMUNICATION - HEALTHEAST (OUTPATIENT)
Dept: FAMILY MEDICINE | Facility: CLINIC | Age: 65
End: 2020-08-05

## 2020-08-07 ENCOUNTER — HOSPITAL ENCOUNTER (OUTPATIENT)
Dept: CT IMAGING | Facility: CLINIC | Age: 65
Discharge: HOME | End: 2020-08-07
Attending: INTERNAL MEDICINE

## 2020-08-07 DIAGNOSIS — J44.1 COPD EXACERBATION (H): ICD-10-CM

## 2020-08-11 ENCOUNTER — OFFICE VISIT - HEALTHEAST (OUTPATIENT)
Dept: PULMONOLOGY | Facility: OTHER | Age: 65
End: 2020-08-11

## 2020-08-11 DIAGNOSIS — J44.1 COPD WITH ACUTE EXACERBATION (H): ICD-10-CM

## 2020-08-11 ASSESSMENT — MIFFLIN-ST. JEOR: SCORE: 1871.27

## 2020-08-17 ENCOUNTER — AMBULATORY - HEALTHEAST (OUTPATIENT)
Dept: OTHER | Facility: CLINIC | Age: 65
End: 2020-08-17

## 2020-08-21 ENCOUNTER — RECORDS - HEALTHEAST (OUTPATIENT)
Dept: ADMINISTRATIVE | Facility: OTHER | Age: 65
End: 2020-08-21

## 2020-08-24 ENCOUNTER — COMMUNICATION - HEALTHEAST (OUTPATIENT)
Dept: FAMILY MEDICINE | Facility: CLINIC | Age: 65
End: 2020-08-24

## 2020-08-24 DIAGNOSIS — F41.9 ANXIETY: ICD-10-CM

## 2020-08-24 DIAGNOSIS — F51.01 PRIMARY INSOMNIA: ICD-10-CM

## 2020-09-02 ENCOUNTER — RECORDS - HEALTHEAST (OUTPATIENT)
Dept: ADMINISTRATIVE | Facility: OTHER | Age: 65
End: 2020-09-02

## 2020-09-03 ENCOUNTER — COMMUNICATION - HEALTHEAST (OUTPATIENT)
Dept: PULMONOLOGY | Facility: OTHER | Age: 65
End: 2020-09-03

## 2020-09-17 ENCOUNTER — COMMUNICATION - HEALTHEAST (OUTPATIENT)
Dept: PULMONOLOGY | Facility: OTHER | Age: 65
End: 2020-09-17

## 2020-09-17 DIAGNOSIS — J44.9 CHRONIC OBSTRUCTIVE PULMONARY DISEASE, UNSPECIFIED COPD TYPE (H): ICD-10-CM

## 2020-09-17 RX ORDER — TIOTROPIUM BROMIDE AND OLODATEROL 3.124; 2.736 UG/1; UG/1
SPRAY, METERED RESPIRATORY (INHALATION)
Qty: 4 G | Refills: 11 | Status: SHIPPED | OUTPATIENT
Start: 2020-09-17 | End: 2022-02-09

## 2020-09-22 ENCOUNTER — COMMUNICATION - HEALTHEAST (OUTPATIENT)
Dept: CARDIOLOGY | Facility: CLINIC | Age: 65
End: 2020-09-22

## 2020-09-22 DIAGNOSIS — I48.19 PERSISTENT ATRIAL FIBRILLATION WITH RAPID VENTRICULAR RESPONSE (H): ICD-10-CM

## 2020-09-23 RX ORDER — AMIODARONE HYDROCHLORIDE 200 MG/1
TABLET ORAL
Qty: 90 TABLET | Refills: 0 | Status: SHIPPED | OUTPATIENT
Start: 2020-09-23 | End: 2021-12-23

## 2020-09-30 ENCOUNTER — COMMUNICATION - HEALTHEAST (OUTPATIENT)
Dept: FAMILY MEDICINE | Facility: CLINIC | Age: 65
End: 2020-09-30

## 2020-09-30 DIAGNOSIS — F41.9 ANXIETY: ICD-10-CM

## 2020-09-30 DIAGNOSIS — F51.01 PRIMARY INSOMNIA: ICD-10-CM

## 2020-10-02 ENCOUNTER — COMMUNICATION - HEALTHEAST (OUTPATIENT)
Dept: PULMONOLOGY | Facility: OTHER | Age: 65
End: 2020-10-02

## 2020-10-02 ENCOUNTER — OFFICE VISIT - HEALTHEAST (OUTPATIENT)
Dept: PULMONOLOGY | Facility: OTHER | Age: 65
End: 2020-10-02

## 2020-10-02 DIAGNOSIS — J44.1 COPD WITH ACUTE EXACERBATION (H): ICD-10-CM

## 2020-10-02 DIAGNOSIS — J44.1 COPD EXACERBATION (H): ICD-10-CM

## 2020-10-05 ENCOUNTER — COMMUNICATION - HEALTHEAST (OUTPATIENT)
Dept: PULMONOLOGY | Facility: OTHER | Age: 65
End: 2020-10-05

## 2020-10-07 ENCOUNTER — COMMUNICATION - HEALTHEAST (OUTPATIENT)
Dept: PULMONOLOGY | Facility: OTHER | Age: 65
End: 2020-10-07

## 2020-10-07 ENCOUNTER — AMBULATORY - HEALTHEAST (OUTPATIENT)
Dept: PULMONOLOGY | Facility: OTHER | Age: 65
End: 2020-10-07

## 2020-10-07 DIAGNOSIS — J44.1 COPD WITH ACUTE EXACERBATION (H): ICD-10-CM

## 2020-10-07 RX ORDER — BUDESONIDE 180 UG/1
2 AEROSOL, POWDER RESPIRATORY (INHALATION) 2 TIMES DAILY
Qty: 3 INHALER | Refills: 3 | Status: SHIPPED | OUTPATIENT
Start: 2020-10-07 | End: 2021-12-23

## 2020-10-07 RX ORDER — PREDNISONE 10 MG/1
TABLET ORAL
Qty: 40 TABLET | Refills: 5 | Status: SHIPPED | OUTPATIENT
Start: 2020-10-07 | End: 2021-11-24

## 2020-10-08 ENCOUNTER — COMMUNICATION - HEALTHEAST (OUTPATIENT)
Dept: PULMONOLOGY | Facility: OTHER | Age: 65
End: 2020-10-08

## 2020-10-26 ENCOUNTER — COMMUNICATION - HEALTHEAST (OUTPATIENT)
Dept: FAMILY MEDICINE | Facility: CLINIC | Age: 65
End: 2020-10-26

## 2020-10-26 DIAGNOSIS — I25.10 CARDIOVASCULAR DISEASE: ICD-10-CM

## 2020-10-27 ENCOUNTER — OFFICE VISIT - HEALTHEAST (OUTPATIENT)
Dept: PULMONOLOGY | Facility: OTHER | Age: 65
End: 2020-10-27

## 2020-10-27 DIAGNOSIS — J44.1 COPD EXACERBATION (H): ICD-10-CM

## 2020-10-27 DIAGNOSIS — J44.9 CHRONIC OBSTRUCTIVE PULMONARY DISEASE, UNSPECIFIED COPD TYPE (H): ICD-10-CM

## 2020-10-27 DIAGNOSIS — R06.00 DYSPNEA, UNSPECIFIED TYPE: ICD-10-CM

## 2020-10-27 RX ORDER — LISINOPRIL 40 MG/1
TABLET ORAL
Qty: 90 TABLET | Refills: 1 | Status: SHIPPED | OUTPATIENT
Start: 2020-10-27 | End: 2021-11-30

## 2020-10-28 ENCOUNTER — COMMUNICATION - HEALTHEAST (OUTPATIENT)
Dept: PULMONOLOGY | Facility: OTHER | Age: 65
End: 2020-10-28

## 2020-10-28 DIAGNOSIS — J44.9 CHRONIC OBSTRUCTIVE PULMONARY DISEASE, UNSPECIFIED COPD TYPE (H): ICD-10-CM

## 2020-10-28 DIAGNOSIS — R06.00 DYSPNEA, UNSPECIFIED TYPE: ICD-10-CM

## 2020-10-28 RX ORDER — MORPHINE SULFATE 100 MG/5ML
2.5-5 SOLUTION ORAL
Qty: 30 ML | Refills: 0 | Status: SHIPPED | OUTPATIENT
Start: 2020-10-28 | End: 2021-12-23

## 2020-11-05 ENCOUNTER — AMBULATORY - HEALTHEAST (OUTPATIENT)
Dept: OTHER | Facility: CLINIC | Age: 65
End: 2020-11-05

## 2020-11-05 ENCOUNTER — DOCUMENTATION ONLY (OUTPATIENT)
Dept: OTHER | Facility: CLINIC | Age: 65
End: 2020-11-05

## 2020-11-30 ENCOUNTER — AMBULATORY - HEALTHEAST (OUTPATIENT)
Dept: PULMONOLOGY | Facility: OTHER | Age: 65
End: 2020-11-30

## 2020-11-30 ENCOUNTER — COMMUNICATION - HEALTHEAST (OUTPATIENT)
Dept: FAMILY MEDICINE | Facility: CLINIC | Age: 65
End: 2020-11-30

## 2020-11-30 DIAGNOSIS — F51.01 PRIMARY INSOMNIA: ICD-10-CM

## 2020-11-30 DIAGNOSIS — J44.1 COPD EXACERBATION (H): ICD-10-CM

## 2020-11-30 DIAGNOSIS — J96.01 ACUTE RESPIRATORY FAILURE WITH HYPOXIA (H): ICD-10-CM

## 2020-11-30 DIAGNOSIS — F41.9 ANXIETY: ICD-10-CM

## 2020-11-30 RX ORDER — LEVALBUTEROL TARTRATE 45 UG/1
2 AEROSOL, METERED ORAL EVERY 4 HOURS PRN
Qty: 1 INHALER | Refills: 6 | Status: SHIPPED | OUTPATIENT
Start: 2020-11-30 | End: 2021-11-04

## 2020-11-30 RX ORDER — CLONAZEPAM 1 MG/1
1 TABLET ORAL
Qty: 30 TABLET | Refills: 0 | Status: SHIPPED | OUTPATIENT
Start: 2020-11-30 | End: 2021-12-23

## 2020-12-21 ENCOUNTER — AMBULATORY - HEALTHEAST (OUTPATIENT)
Dept: CARDIOLOGY | Facility: CLINIC | Age: 65
End: 2020-12-21

## 2020-12-21 DIAGNOSIS — Z79.899 LONG TERM USE OF DRUG: ICD-10-CM

## 2021-01-18 ENCOUNTER — COMMUNICATION - HEALTHEAST (OUTPATIENT)
Dept: ADMINISTRATIVE | Facility: CLINIC | Age: 66
End: 2021-01-18

## 2021-03-02 ENCOUNTER — AMBULATORY - HEALTHEAST (OUTPATIENT)
Dept: CARDIOLOGY | Facility: CLINIC | Age: 66
End: 2021-03-02

## 2021-05-27 ASSESSMENT — PATIENT HEALTH QUESTIONNAIRE - PHQ9: SUM OF ALL RESPONSES TO PHQ QUESTIONS 1-9: 14

## 2021-05-29 NOTE — PROGRESS NOTES
ASSESSMENT AND PLAN:  1. COPD exacerbation (H)  Patient has significant shortness of breath and exercise intolerance with moderate activity.  Cough is more prevalent at night.  The spacer did not make much of a difference I am transitioning him from Dulera to nebulized corticosteroid.  - budesonide (PULMICORT) 0.5 mg/2 mL nebulizer solution; Take 2 mL (0.5 mg total) by nebulization 2 (two) times a day.  Dispense: 100 mL; Refill: 3    2. Essential hypertension    Blood pressures elevated continue lisinopril add amlodipine to his regimen.  - amLODIPine (NORVASC) 5 MG tablet; Take 1 tablet (5 mg total) by mouth daily.  Dispense: 30 tablet; Refill: 11    3. Neuropathy (H)    Continues to have leg pain with numbness increasing gabapentin to 900 mg/day    4. Restless Legs Syndrome    No nocturnal symptoms noted    5. Current moderate episode of major depressive disorder without prior episode (H)    Continue Wellbutrin with Celexa      7.  Tobacco use    He needed to stop Wellbutrin because of perceived side effects which were not present currently no change in chewing of tobacco yet      No orders of the defined types were placed in this encounter.    There are no discontinued medications.    Return in about 3 weeks (around 6/27/2019).    CHIEF COMPLAINT:  Chief Complaint   Patient presents with     Follow-up     COPD       HISTORY OF PRESENT ILLNESS:  Ki is a 64 y.o. male with ASCVD, COPD, hypertension, depression, epilepsy, lumbar disc degeneration, neuropathy, restless leg syndrome, and chewing tobacco use, who presents to the clinic today for follow up on COPD exacerbation. I last saw him on 05/22/2019 at which time Wellbutrin had been started for depression and tobacco use. Wellbutrin made him very fatigued, and he slept for days. His fatigue improved with stopping Wellbutrin. The patient then restarted it a few days ago as he was starting to feel sad and depressed again, and has not had recurrence of fatigue  since.     In terms of his COPD exacerbation, his breathing has improved though he still has a bit of a rattle. There has not been much of a difference with adding a spacer to his inhalers. Ki is still having dyspnea with minimal exertion such as doing the dishes or walking a short distance. He is still having some productive coughing that interferes with his sleep. His sputum is greenish. The patient is getting 3-4 hours of sleep at night. He is not having much dizziness. Ki is having some anxiety related to his shortness of breath, particularly in the late afternoon and evening. He is unsure if he has any environmental allergies. His next Pulmonology consult is scheduled for .    The patient is currently taking gabapentin 200 mg three times a day, and is wondering if he can increase this dose for better management of his back/joint pain and neuropathy.    Of note, his blood pressure is elevated today.     REVIEW OF SYSTEMS:   General: Significant fatigue after starting Wellbutrin.  Respiratory: Cough and wheezing with green sputum, dyspnea on exertion.   Musculoskeletal: Back and joint pain.  Neuro: Neuropathy. No dizziness.   Psychiatric: Depression and anxiety.   All other systems are negative.    PFSH:  He is . He is accompanied by his daughter today. Reviewed as below.     TOBACCO USE:  Social History     Tobacco Use   Smoking Status Former Smoker     Last attempt to quit: 5/15/2018     Years since quittin.0   Smokeless Tobacco Current User       VITALS:  Vitals:    19 1122   BP: (!) 130/92   Pulse: 83   Resp: 16   Temp: 98.9  F (37.2  C)   TempSrc: Oral   SpO2: 93%   Weight: (!) 224 lb 6 oz (101.8 kg)   Height: 6' (1.829 m)     Wt Readings from Last 3 Encounters:   19 (!) 224 lb 6 oz (101.8 kg)   19 (!) 234 lb 1 oz (106.2 kg)   19 210 lb (95.3 kg)     Body mass index is 30.43 kg/m .      PHYSICAL EXAM:  General: Alert, cooperative, no distress, appears stated  age  HEENT: Normocephalic, without obvious abnormality, atraumatic, moist mucous membranes  Eyes: PERRL, conjunctiva/cornea clear, EOM's intact  Lungs: Bibasilar and biapical expiratory wheezing, respirations unlabored  Heart: Regular rate and rhythm, S1 and S2 normal, no murmur, rub, or gallop  Neurologic:  A & O x 3.  No tremor, no focal findings.  Normal gait.     DATA REVIEWED:  Additional History from Old Records Summarized (2): none  Decision to Obtain Records (1): none  Radiology Tests Summarized or Ordered (1): none  Labs Reviewed or Ordered (1): none  Medicine Test Summarized or Ordered (1): none  Independent Review of EKG or X-RAY(2 each): none      Yessica FUENTES, am scribing for and in the presence of, Dr. Lerma.    IDr. Lerma, personally performed the services described in this documentation, as scribed by Yessica Mckeon in my presence, and it is both accurate and complete.      MEDICATIONS:  Current Outpatient Medications   Medication Sig Dispense Refill     albuterol (PROVENTIL HFA;VENTOLIN HFA) 90 mcg/actuation inhaler Inhale 2 puffs every 6 (six) hours as needed for wheezing.             albuterol (PROVENTIL) 2.5 mg /3 mL (0.083 %) nebulizer solution Take 3 mL (2.5 mg total) by nebulization 4 (four) times a day. Mix with ipratropium 30 vial 4     amLODIPine (NORVASC) 5 MG tablet Take 1 tablet (5 mg total) by mouth daily. 30 tablet 11     budesonide (PULMICORT) 0.5 mg/2 mL nebulizer solution Take 2 mL (0.5 mg total) by nebulization 2 (two) times a day. 100 mL 3     buPROPion (WELLBUTRIN XL) 150 MG 24 hr tablet Take 1 tablet (150 mg total) by mouth every morning. 30 tablet 2     citalopram (CELEXA) 20 MG tablet Take 1 tablet (20 mg total) by mouth daily. 30 tablet 4     cyanocobalamin 1,000 mcg/mL injection Inject 1,000 mcg into the shoulder, thigh, or buttocks every 28 days.       doxycycline (MONODOX) 100 MG capsule Take 1 capsule (100 mg total) by mouth 2 (two) times a day. Avoid sun 12 capsule 0      gabapentin (NEURONTIN) 100 MG capsule Take 200 mg by mouth 3 (three) times a day. 60 capsule 1     ipratropium (ATROVENT) 0.02 % nebulizer solution Take 2.5 mL (0.5 mg total) by nebulization every 6 (six) hours. Mix with albuterol 120 mL 5     lisinopril (PRINIVIL,ZESTRIL) 40 MG tablet Take 40 mg by mouth daily.              mometasone-formoterol (DULERA) 200-5 mcg/actuation HFAA inhaler Inhale 2 puffs 2 (two) times a day. 1 Inhaler 12     predniSONE (DELTASONE) 20 MG tablet Take 20 mg by mouth daily 40mg 2 days 30mg 2 days, 20mg 2 days 10mg 2 days. 10 tablet 0     No current facility-administered medications for this visit.        Total Data Points: 0    Please note that this clinical encounter uses voice recognition software, there may be typographical errors present
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
## 2021-05-29 NOTE — TELEPHONE ENCOUNTER
Needing medical records fax number to fax a GEOFF, pt is establishing care with us. I called but there was no answer so I left a vm for them to call back with the fax number. GEOFF is at [employer]'s desk.

## 2021-05-30 NOTE — TELEPHONE ENCOUNTER
Refill Approved    Rx renewed per Medication Renewal Policy. Medication was last renewed on 6/6/19.    Last office visit 6/6/19    Johnny Francis, [facility] Triage/Med Refill 6/27/2019     Requested Prescriptions   Pending Prescriptions Disp Refills     gabapentin (NEURONTIN) 100 MG capsule [Pharmacy Med Name: Gabapentin Oral Capsule 100 MG] 90 capsule 0     Sig: TAKE THREE CAPSULES BY MOUTH THREE TIMES DAILY       Gabapentin/Levetiracetam/Tiagabine Refill Protocol  Passed - 6/27/2019  4:01 PM        Passed - PCP or prescribing provider visit in past 12 months or next 3 months     Last office visit with prescriber/PCP: 6/6/2019 Ricky Lerma MD OR same dept: 6/6/2019 Ricky Lerma MD OR same specialty: 6/6/2019 Ricky Lerma MD  Last physical: Visit date not found Last MTM visit: Visit date not found   Next visit within 3 mo: Visit date not found  Next physical within 3 mo: Visit date not found  Prescriber OR PCP: Ricky Lerma MD  Last diagnosis associated with med order: 1. Neuropathy  - gabapentin (NEURONTIN) 100 MG capsule [Pharmacy Med Name: Gabapentin Oral Capsule 100 MG]; TAKE THREE CAPSULES BY MOUTH THREE TIMES DAILY   Dispense: 90 capsule; Refill: 0    If protocol passes may refill for 12 months if within 3 months of last provider visit (or a total of 15 months).

## 2021-05-30 NOTE — PROGRESS NOTES
ASSESSMENT AND PLAN:  1. Essential hypertension  Pressure well controlled he is trying to control his use of chewing tobacco    2. Arteriosclerotic Cardiovascular Disease (ASCVD)  Chest pain not noted    3. Hepatic steatosis  Ultrasound report reviewed from [facility] shows hepatic steatosis with early cirrhotic changes.  Patient will return for hepatitis C treatment    4. Hemangioma of other sites    Is a large hemangioma affecting his left hand this is been present since the age of 9 he had numerous corrective procedures his specialist in [facility] suggested that he have the hand amputated.  I will obtain collateral information in case he would like a second opinion    5. Primary insomnia  He sleeps very poorly family history of sleep apnea present sleep study ordered  - Ambulatory referral to Sleep Medicine        6.  COPD exacerbation  Reviewed notes from Dr. Rivera.  He sent the patient for a low-dose CT scan.  On that CT abnormality was noted in the form of a 2 cm opacity in the right middle lobe which could potentially represent an infectious/inflammatory response PET scan has been ordered in 3 months      7.  Nicotine abuse continue Wellbutrin at current dose he is chewing approximately half of what he was chewing before.  He states he is using the medication daily    8.  Depression continue Celexa and Wellbutrin at current doses PHQ 9 reviewed  Orders Placed This Encounter   Procedures     Ambulatory referral to Sleep Medicine     Referral Priority:   Routine     Referral Type:   Consultation     Referral Reason:   Evaluation and Treatment     Requested Specialty:   Sleep Medicine     Number of Visits Requested:   1     There are no discontinued medications.    No follow-ups on file.    CHIEF COMPLAINT:  Chief Complaint   Patient presents with     Depression     COPD       HISTORY OF PRESENT ILLNESS:  Ki is a 64 y.o. male with ASCVD, COPD, hypertension, depression, epilepsy,  lumbar disc degeneration, neuropathy, restless leg syndrome, and chewing tobacco use,who presents to the clinic today with his son for follow up on depression and COPD. He had initial consult with Dr. Rivera in Pulmonology on 07/01/2019, and low dose CT screening for lung cancer was ordered. This showed a 2 cm opacity in the peripheral right mid to lower lung, infectious versus inflammatory process in a region of bronchiolectasis and scarring. Malignancy was not excluded. Recommend follow-up chest CT in 3 months. Dr. Rivera had ordered PET-CT which the patient did not follow through. He has follow up with Pulmonology in September. His breathing has not been bad. Ki still becomes fatigued and short of breath with walking. He continues to chew tobacco though has decreased by a quarter amount since starting Wellbutrin. Pulmonology had discussed risks of being on that medication give his history of seizure disorder. The patient does not think that nicotine gum is helping much.     His sleep continues to be poor. Ki often goes to bed at 5am and wakes up at 9am. His mind tends to race at night. He is a very light sleeper, and does not get much deep sleep. Klonopin worked well in the past. Trazodone made him too groggy. His son notes that he wheezes a lot though does not necessarily snore significantly. Of note, his PHQ-9 score today is 7, stating things are somewhat difficult.    He had ultrasound of his liver on 04/18/2019 at [facility] which showed hepatic steatosis and evidence of early cirrhosis.    Ki reports history of a significant hemangioma in his left hand. He has had several surgeries in the past for this, but it persistently continues to grow and is now burrowing into the bones of his hand. The patient has always had to be careful of placing too much weight on his left hand due to the brittle bones. He follows with a provider in [place] whom he has seen since he was a child. It was recommended  that Ki undergo amputation of his left hand which he is not ready to pursue at this time. His case was presented at a conference with the general consensus to proceed with amputation of his left hand. He also had a second opinion at [place] was basically told they also could not help save his hand. The patient is now experiencing limited range of motion of his left second finger, and it can lock into place requiring manual assistance from his right hand to get it back to normal.     REVIEW OF SYSTEMS:   ENT: No significant snoring.  Respiratory: Wheezing.   Musculoskeletal: Hemangioma of left hand affect range of motion of second left finger.  Neuro: Insomnia.  Psychiatric: Depression.  All other systems are negative.    PFSH:  He is accompanied by his son today. He has cut down a quarter on his chewing tobacco use. He has not smoked in a year. Reviewed as below.     TOBACCO USE:  Social History     Tobacco Use   Smoking Status Former Smoker     Last attempt to quit: 5/15/2018     Years since quittin.1   Smokeless Tobacco Current User       VITALS:  Vitals:    19 0959   BP: 128/88   Patient Site: Right Arm   Patient Position: Sitting   Cuff Size: Adult Regular   Pulse: 84   Temp: 98.4  F (36.9  C)   TempSrc: Oral   SpO2: 93%   Weight: (!) 229 lb 3 oz (104 kg)   Height: 6' (1.829 m)     Wt Readings from Last 3 Encounters:   19 (!) 229 lb 3 oz (104 kg)   19 (!) 229 lb (103.9 kg)   19 (!) 228 lb 3 oz (103.5 kg)     Body mass index is 31.08 kg/m .    QUALITY MEASURES:  The following are part of a depression follow up plan for the patient:  mental health care assessment, mental health care management and patient follow-up to return when and if necessary      PHYSICAL EXAM:  General: Alert, cooperative, no distress, appears stated age  HEENT: Normocephalic, without obvious abnormality, atraumatic, moist mucous membranes  Eyes: PERRL, conjunctiva/cornea clear, EOM's intact  Lungs: Inspiratory  and expiratory wheezing noted in left base. Otherwise clear to auscultation bilaterally, respirations unlabored  Heart: Regular rate and rhythm, S1 and S2 normal, no murmur, rub, or gallop  Abdomen: Soft, non tender, bowel sounds active all four quadrants, no masses, no organomegaly.  Extremities: Massive hemangioma noted at the first web space up to his wrist in his left hand with deformity of first two fingers.   Neurologic:  A & O x 3.  No tremor, no focal findings.  Normal gait.     DATA REVIEWED:  Additional History from Old Records Summarized (2): Reviewed Dr. Rivera's Pulmonology note from 07/01/2019 regarding initial consult for COPD.  Decision to Obtain Records (1): Reviewed 04/18/2019 ultrasound of the liver from [facility] which showed hepatic steatosis and evidence of early cirrhosis.  Radiology Tests Summarized or Ordered (1): 07/01/2019 CT low dose chest lung screening showed a 2 cm opacity in the peripheral right mid to lower lung.   Labs Reviewed or Ordered (1): none  Medicine Test Summarized or Ordered (1): none  Independent Review of EKG or X-RAY(2 each): none      I, Yessica Mckeon, am scribing for and in the presence of, Dr. Lerma.    I, Dr. Lerma, personally performed the services described in this documentation, as scribed by Yessica Mckeon in my presence, and it is both accurate and complete.      MEDICATIONS:  Current Outpatient Medications   Medication Sig Dispense Refill     albuterol (PROAIR HFA;PROVENTIL HFA;VENTOLIN HFA) 90 mcg/actuation inhaler Inhale 2 puffs every 6 (six) hours as needed for wheezing. 1 Inhaler 12     albuterol (PROVENTIL) 2.5 mg /3 mL (0.083 %) nebulizer solution Take 3 mL (2.5 mg total) by nebulization 4 (four) times a day. Mix with ipratropium 30 vial 4     amLODIPine (NORVASC) 5 MG tablet Take 1 tablet (5 mg total) by mouth daily. 30 tablet 11     buPROPion (WELLBUTRIN XL) 150 MG 24 hr tablet Take 1 tablet (150 mg total) by mouth every morning. 30 tablet 2      citalopram (CELEXA) 20 MG tablet Take 1 tablet (20 mg total) by mouth daily. 30 tablet 4     cyanocobalamin 1,000 mcg/mL injection Inject 1,000 mcg into the shoulder, thigh, or buttocks every 28 days.       fluticasone-umeclidinium-vilanterol (TRELEGY ELLIPTA) 100-62.5-25 mcg DsDv inhaler Inhale 1 Inhalation daily. 30 each 11     gabapentin (NEURONTIN) 300 MG capsule Take 1 capsule (300 mg total) by mouth 4 (four) times a day. 120 capsule 4     lisinopril (PRINIVIL,ZESTRIL) 40 MG tablet Take 1 tablet (40 mg total) by mouth daily. 30 tablet 3     nicotine polacrilex (NICORETTE) 2 mg gum Apply 1 each (2 mg total) to the mouth or throat as needed for smoking cessation. 50 each 11     predniSONE (DELTASONE) 10 mg tablet In case of flare-up, take 4 tabs daily x 3 days, then 3 tabs daily x 3 days, then 2 tabs daily x 3 days, then 1 tab daily x 3 days.. 30 tablet 5     azithromycin (ZITHROMAX) 250 MG tablet In case of flare-up, take two tabs on day 1, then one tab daily for 4 days. 6 tablet 5     No current facility-administered medications for this visit.        Total Data Points: 4    Please note that this clinical encounter uses voice recognition software, there may be typographical errors present

## 2021-05-30 NOTE — TELEPHONE ENCOUNTER
Pulmonary Telephone Note    Low-dose screening CT shows a 2-cm opacity in the mid-right lung. Could be inflammatory, though cannot rule out a neoplastic process. Could consider PET-CT vs re-imaging in 3 months. Also some bronchial wall thickening consistent with chronic bronchitis, mild main pulmonary artery enlargement, not surprising in the context of his COPD. Called the patient and reached voicemail. Left a brief message with the clinic phone number and a message that I would try him again at another time.    Luis Rivera MD  Pulmonary and Critical Care Medicine  [facility]  Cell [phone]  Office [phone]  Pager [phone]

## 2021-05-30 NOTE — PROGRESS NOTES
Pulmonary Clinic Outpatient Consultation    Assessment and Plan:   64 year old male former smoker with a history of chewing tobacco dependence, CAD, COPD, TIA, HTN, atrial fibrillation, C-spine surgery, depression, RLS, seizure disorder, lumbar disc disease, and hemangiomas requiring surgical removal, presenting for evaluation.    COPD: GOLD group D. Has had four hospitalizations for COPD, the last in May 2019, and multiple exacerbations requiring steroids and antibiotics. CAT score today is 28 (4,4,5,5,5,3,1,1). FEV1 1.24 L (34%), DLCO 55%. Walking one block leads to dyspnea, and over the last year feels more limited in his ability to stay active. Minimal occasional dry cough. Smoked up to 2 ppd for 40 years, and quit one year ago; continues to use chewing tobacco. Tried varenicline in the past but had bad nightmares; currently on bupropion (though caution needed as he apparently has a seizure disorder). He is interested in nicotine gum. Discussed simplifying his regimen with triple-therapy once daily. Discussed pulmonary rehabilitation. Discussed low-dose CT for lung cancer screening. He is interested in all of the above.    Plan:  - nicotine gum for tobacco craving; encouraged him to quit using chewing tobacco  - start fluticasone furoate-umeclidinium-vilanterol 100-62.5-25 mcg one inhalation daily; rinse/gargle/spit water after use  - albuterol HFA or nebulized as needed  - prednisone 12-day taper and azithromycin 5-day course to have on hand in case of exacerbations; encouraged him to call us if he starts these  - fingerstick test for A1AT level and genotype; I will call him with the results  - referral to pulmonary rehabilitation at [facility]  - low-dose chest CT for lung cancer screening*  - need to clarify PMHx in follow-up, as the EMR has CABG in his PSHx but he has no sternotomy wires on CXR  - follow up in 3 months  - encouraged him to call any time with questions or concerning  symptoms    I appreciate the opportunity to participate in the care of Mr. Pederson. Please feel free to contact me at any time.    CCx: COPD, tobacco dependence    HPI: 64 year old male former smoker with a history of chewing tobacco dependence, CAD, COPD, TIA, HTN, atrial fibrillation, C-spine surgery, depression, RLS, seizure disorder, lumbar disc disease, and hemangiomas requiring surgical removal, presenting for evaluation. GOLD group D. Has had four hospitalizations for COPD, the last in May 2019, and multiple exacerbations requiring steroids and antibiotics. CAT score today is 28 (4,4,5,5,5,3,1,1). FEV1 1.24 L (34%), DLCO 55%. Walking one block leads to dyspnea, and over the last year feels more limited in his ability to stay active. Minimal occasional dry cough. Smoked up to 2 ppd for 40 years, and quit one year ago; continues to use chewing tobacco. Tried varenicline in the past but had bad nightmares; currently on bupropion (though caution needed as he apparently has a seizure disorder). He is interested in nicotine gum. Discussed simplifying his regimen with triple-therapy once daily. Discussed pulmonary rehabilitation. Discussed low-dose CT for lung cancer screening. He is interested in all of the above.    ROS:  A 12-system review was obtained and was negative with the exception of the symptoms endorsed in the history of present illness.    PMH:  Former smoker  Chewing tobacco dependence  COPD  TIA  CAD  HTN  AF  C-spine surgery  MDD  RLS  siezure disorder  Lumbar disc disease  Hemangiomas requiring resection    PSH:  Past Surgical History:   Procedure Laterality Date     CERVICAL FUSION      C6 and C7     CORONARY ARTERY BYPASS GRAFT         Allergies:  Allergies   Allergen Reactions     Wellbutrin [Bupropion Hcl]      Patient states medication makes him feel fatigue       Family HX:  No family history on file.    Social Hx:  Social History     Socioeconomic History     Marital status:       Spouse name: Not on file     Number of children: Not on file     Years of education: Not on file     Highest education level: Not on file   Occupational History     Not on file   Social Needs     Financial resource strain: Not on file     Food insecurity:     Worry: Not on file     Inability: Not on file     Transportation needs:     Medical: Not on file     Non-medical: Not on file   Tobacco Use     Smoking status: Former Smoker     Last attempt to quit: 5/15/2018     Years since quittin.1     Smokeless tobacco: Current User   Substance and Sexual Activity     Alcohol use: Not Currently     Drug use: Not Currently     Sexual activity: Not on file   Lifestyle     Physical activity:     Days per week: Not on file     Minutes per session: Not on file     Stress: Not on file   Relationships     Social connections:     Talks on phone: Not on file     Gets together: Not on file     Attends Jainism service: Not on file     Active member of club or organization: Not on file     Attends meetings of clubs or organizations: Not on file     Relationship status: Not on file     Intimate partner violence:     Fear of current or ex partner: Not on file     Emotionally abused: Not on file     Physically abused: Not on file     Forced sexual activity: Not on file   Other Topics Concern     Not on file   Social History Narrative     Not on file       Current Meds:  Current Outpatient Medications   Medication Sig Dispense Refill     albuterol (PROAIR HFA;PROVENTIL HFA;VENTOLIN HFA) 90 mcg/actuation inhaler Inhale 2 puffs every 6 (six) hours as needed for wheezing. 1 Inhaler 12     albuterol (PROVENTIL) 2.5 mg /3 mL (0.083 %) nebulizer solution Take 3 mL (2.5 mg total) by nebulization 4 (four) times a day. Mix with ipratropium 30 vial 4     amLODIPine (NORVASC) 5 MG tablet Take 1 tablet (5 mg total) by mouth daily. 30 tablet 11     buPROPion (WELLBUTRIN XL) 150 MG 24 hr tablet Take 1 tablet (150 mg total) by mouth every morning.  30 tablet 2     citalopram (CELEXA) 20 MG tablet Take 1 tablet (20 mg total) by mouth daily. 30 tablet 4     cyanocobalamin 1,000 mcg/mL injection Inject 1,000 mcg into the shoulder, thigh, or buttocks every 28 days.       gabapentin (NEURONTIN) 300 MG capsule Take 1 capsule (300 mg total) by mouth 4 (four) times a day. 120 capsule 4     lisinopril (PRINIVIL,ZESTRIL) 40 MG tablet Take 1 tablet (40 mg total) by mouth daily. 30 tablet 3     azithromycin (ZITHROMAX) 250 MG tablet In case of flare-up, take two tabs on day 1, then one tab daily for 4 days. 6 tablet 5     fluticasone-umeclidinium-vilanterol (TRELEGY ELLIPTA) 100-62.5-25 mcg DsDv inhaler Inhale 1 Inhalation daily. 30 each 11     nicotine polacrilex (NICORETTE) 2 mg gum Apply 1 each (2 mg total) to the mouth or throat as needed for smoking cessation. 50 each 11     predniSONE (DELTASONE) 10 mg tablet In case of flare-up, take 4 tabs daily x 3 days, then 3 tabs daily x 3 days, then 2 tabs daily x 3 days, then 1 tab daily x 3 days.. 30 tablet 5     No current facility-administered medications for this visit.        Physical Exam:  /80   Pulse 93   Resp 12   Ht 6' (1.829 m)   Wt (!) 229 lb (103.9 kg)   SpO2 95%   BMI 31.06 kg/m    Gen: alert, oriented, no distress  HEENT: nasal turbinates are unremarkable, no oropharyngeal lesions, no cervical or supraclavicular lymphadenopathy  CV: tachy, regular, no M/G/R  Resp: diminished bilaterally with prolonged expiratory phase  Abd: soft, nontender, no palpable organomegaly  Skin: no apparent rashes  Ext: no cyanosis, clubbing or edema  Neuro: alert, nonfocal    Labs:  reviewed    Imaging studies:  CXR (May 2019):  - images directly reviewed  - clear lungs with likely hyperlucency but no or minimal hyperinflation    PFT's (June 2019):  FEV1/FVC is 0.46 and is reduced.  FEV1 is 1.24 L (34% predicted) and is reduced.  FVC is 2.69 L (57% predicted) and reduced.  There was improvement in spirometry after a  single inhaled dose of bronchodilator.  TLC is 9.11 L (120% predicted) and is normal.  RV is 7.05 L (277% predicted) and is increased.  DLCO is 55% predicted and is reduced when it is corrected for hemoglobin.    Luis Rivera MD  [facility]  Cell [phone]  Office [phone]  Pager [phone]    *Lung Cancer Screening pre-scan counseling Visit    The patient fits the risk profile of patients who benefit from this screening:  -The patient is >55 years old and <80 years old  -The patient has an 80 pack year history (over 30)  -The patient has smoked within the past 15 years  -The patient has no medical comorbidity severe enough that it would cause mortality prior to mortality due to the lung cancer attempting to be detected.    Discussion with patient regarding the harms associated with LDCT screening include false-negative and false-positive results, incidental findings, overdiagnosis, and radiation exposure were reviewed at length.   The patient understands that pursuing this screening test may result in a biopsy that was not necessary. It may also produced added stress over a nodule that is likely not cancer.    Of 100 patients who get screening, 25 will have a positive scan. Of those 25, only 1 will have cancer.  Overdiagnosis is estimated at 10% of patients-- they would not have been detected in the patient's lifetime without screening. Less than 1% of patients likely had death related to radiation exposure increase.   Average low-dose CT associated with 0.61 to 1.5 mSv. Annual background radiation exposure in the [place] averages 2.4 mS; mammogram is 0.7mSv.    The benefits are reduction in risk of death from lung cancer. The number needed to treat is 320 (for every 320 patients who undergo screening, 1 patient will have a benefit in mortality from early detection from the screening).    Undergoing this screening implies willingness to pursue further potentially invasive testing to  discover potential cancer.    All questions were answered.    The patient was counseled regarding smoking cessation and its risk for lung cancer.    The patient s results will be followed in our pulmonary registry and will be recalled based on the findings of the CT scan.

## 2021-05-30 NOTE — TELEPHONE ENCOUNTER
Pulmonary Telephone Note    Called Mr. Pederson and again reached voicemail. I did not leave a repeat voicemail message. Will try him again at another time. Please see my telephone note from 7/11/19 for additional details.    Luis Rivera MD  Pulmonary and Critical Care Medicine  [facility]  Cell [phone]  Office [phone]  Pager [phone]

## 2021-05-30 NOTE — PROGRESS NOTES
Pulmonary Documentation    A1AT genotype is normal (MM).    PET-CT scheduled today not completed. I called radiology at [facility] and the reason is that the patient did not arrive for the test.    I will be seeing him back in follow-up in September.    Luis Rivera MD  Pulmonary and Critical Care Medicine  [facility]  Cell [phone]  Office [phone]  Pager [phone]

## 2021-05-30 NOTE — TELEPHONE ENCOUNTER
Central PA team  [phone]  Pool: HE PA MED ([id number])          PA has been initiated.       PA form completed and faxed insurance via Cover My Meds     Key:  GUSTABO CHRISTOPHER (Vyas: [id number])        Medication:  Trelegy Ellipta 100-62.5-25MCG/INH aerosol powder      Insurance:  [place] [employer]ICA[employer]        Response will be received via fax and may take up to 5-10 business days depending on plan

## 2021-05-30 NOTE — TELEPHONE ENCOUNTER
FYI - Status Update  Who is Calling: Child  Update: Caller stated the patient is out.   Okay to leave a detailed message?:  No return call needed

## 2021-05-30 NOTE — TELEPHONE ENCOUNTER
Called Ki to let him know the new inhaler Dr. Rivera ordered for him ( Trelegy ) could not be approved per his insurance company. Orders placed for Stiolto and Symbicort per Dr. Dodge. Instructed to rinse his mouth well after using. And to call me with any questions or concerns.  Instructions left per a message with a call back number.

## 2021-05-30 NOTE — TELEPHONE ENCOUNTER
Prior Authorization Request  Who s requesting:  Pharmacy  Pharmacy Name and Location:  [facility] [place]  Medication Name: Trelegy ellipta  Insurance Plan: Medicaid  Insurance Member ID Number:  [id number]  Informed patient that prior authorizations can take up to 10 business days for response:   Yes  Okay to leave a detailed message: Yes.  Has tried Spiriva , Pulmicort nebs and Atrovent.

## 2021-05-30 NOTE — TELEPHONE ENCOUNTER
Pulmonary Telephone Note    Reached Mr. Pederson to discuss his screening chest CT result. Given the size of this lesion, we have decided to proceed with PET-CT rather than ongoing surveillance. Will plan PET-CT and I will call him with the result. Encouraged him to call any time with questions or concerning symptoms.    Luis Rivera MD  Pulmonary and Critical Care Medicine  [facility]  Cell [phone]  Office [phone]  Pager [phone]

## 2021-05-30 NOTE — PROGRESS NOTES
ASSESSMENT AND PLAN:  1. Current moderate episode of major depressive disorder without prior episode (H)  Continue current doses of Celexa and Wellbutrin patient is doing well    2. Essential hypertension      - lisinopril (PRINIVIL,ZESTRIL) 40 MG tablet; Take 1 tablet (40 mg total) by mouth daily.  Dispense: 30 tablet; Refill: 3    3. Depression      - citalopram (CELEXA) 20 MG tablet; Take 1 tablet (20 mg total) by mouth daily.  Dispense: 30 tablet; Refill: 4  - buPROPion (WELLBUTRIN XL) 150 MG 24 hr tablet; Take 1 tablet (150 mg total) by mouth every morning.  Dispense: 30 tablet; Refill: 2    4. Encounter for smoking cessation counseling    Patient is no longer smoking  - buPROPion (WELLBUTRIN XL) 150 MG 24 hr tablet; Take 1 tablet (150 mg total) by mouth every morning.  Dispense: 30 tablet; Refill: 2    5. COPD exacerbation (H)    Slight improvement in breathing, patient still has no increase in exercise tolerance seen pulmonology in approximately 3 days.  - budesonide (PULMICORT) 0.5 mg/2 mL nebulizer solution; Take 2 mL (0.5 mg total) by nebulization 2 (two) times a day.  Dispense: 100 mL; Refill: 3    6. Lumbar Disc Degeneration    Reviewed past notes including MRI scan.  Patient has been given gabapentin for chronic pain control previously was on MS Contin and Vicodin increasing gabapentin dose to 1200 mg/day    7. Neuropathy    - gabapentin (NEURONTIN) 300 MG capsule; Take 1 capsule (300 mg total) by mouth 4 (four) times a day.  Dispense: 120 capsule; Refill: 4            No orders of the defined types were placed in this encounter.    Medications Discontinued During This Encounter   Medication Reason     lisinopril (PRINIVIL,ZESTRIL) 40 MG tablet Reorder     citalopram (CELEXA) 20 MG tablet Reorder     buPROPion (WELLBUTRIN XL) 150 MG 24 hr tablet Reorder     budesonide (PULMICORT) 0.5 mg/2 mL nebulizer solution Reorder     gabapentin (NEURONTIN) 100 MG capsule Reorder       Return in about 3 months  (around 9/28/2019).    CHIEF COMPLAINT:  Chief Complaint   Patient presents with     COPD     Depression       HISTORY OF PRESENT ILLNESS:  Ki is a 64 y.o. male with ASCVD, COPD, hypertension, depression, epilepsy, lumbar disc degeneration, neuropathy, restless leg syndrome, and chewing tobacco use, who presents to the clinic today for follow up on COPD and depression. His breathing has improved with adding budesonide nebs twice a day, and is not having to use his albuterol as much. However, he still has a rattling in his chest. The patient is also unable to to walk much of a distance due to not being able to get enough air in. He still has a productive cough. Ki does note some seasonal allergies, typically in the spring and fall. His Pulmonology consult is scheduled in 3 days.    He continues on Wellbutrin with recent addition Celexa, and does feel more relaxed with the new medication.    His low back pain is still bothersome despite taking gabapentin. The patient recently tweaked his back while trying to get a pot out from the cupboard. Going to sitting from standing exacerbates the pain. He does not tolerate sleeping on his back. Ki reports getting only 3-4 hours of sleep per night for the past 20 years secondary to his back pain. He was previously evaluated by [facility] for his back pain, and they were thinking about surgery at the time. MRI of the lumbar spine in 2011 showed disc osteophyte complex causing moderate central canal stenosis without neural foraminal narrowing at L2-L4. At L5-S1, there was disc desiccation with disc space loss, endplate sclerotic changes, facet hypertrophic changes, disc protrusion along with a posterior osteophyte extending to both lateral recesses and having mass effect on the nerve roots, significant central canal stenosis and significant bilateral neural foraminal narrowing.    REVIEW OF SYSTEMS:   Respiratory: Wheezing, productive cough, and dyspnea on exertion.    Musculoskeletal: Low back pain.  Psychiatric: Improved mood.  All other systems are negative.    PFSH:  He is . He is accompanied by his daughter today. Reviewed as below.     TOBACCO USE:  Social History     Tobacco Use   Smoking Status Former Smoker     Last attempt to quit: 5/15/2018     Years since quittin.1   Smokeless Tobacco Current User       VITALS:  Vitals:    19 1157   BP: 124/82   Pulse: 80   Resp: 20   Temp: 98.4  F (36.9  C)   TempSrc: Oral   SpO2: 96%   Weight: (!) 228 lb 3 oz (103.5 kg)   Height: 6' (1.829 m)     Wt Readings from Last 3 Encounters:   19 (!) 228 lb 3 oz (103.5 kg)   19 (!) 224 lb 6 oz (101.8 kg)   19 (!) 234 lb 1 oz (106.2 kg)     Body mass index is 30.95 kg/m .    QUALITY MEASURES:  The following are part of a depression follow up plan for the patient:  mental health care assessment, mental health care management and patient follow-up to return when and if necessary      PHYSICAL EXAM:  General: Alert, cooperative, no distress, appears stated age  HEENT: Normocephalic, without obvious abnormality, atraumatic, moist mucous membranes  Eyes: PERRL, conjunctiva/cornea clear, EOM's intact  Lungs: Inspiratory and expiratory wheezing, air movement noted through pulmonary fields, respirations unlabored  Heart: Regular rate and rhythm, S1 and S2 normal, no murmur, rub, or gallop  Neurologic:  A & O x 3.  No tremor, no focal findings. Normal gait.   Psychiatric: Normal affect. Good eye contact. Adequately groomed.    DATA REVIEWED:  Additional History from Old Records Summarized (2): none  Decision to Obtain Records (1): Reviewed Dr. Levine's Josy's 2012 note regarding management of chronic low back pain with spinal stenosis.  Radiology Tests Summarized or Ordered (1): Reviewed MRI of lumbar spine 2011 which showed disc osteophyte complex causing moderate central canal stenosis without neural foraminal narrowing at L2-L4. At L5-S1, there was  disc desiccation with disc space loss, endplate sclerotic changes, facet hypertrophic changes, disc protrusion along with a posterior osteophyte extending to both lateral recesses and having mass effect on the nerve roots, significant central canal stenosis and significant bilateral neural foraminal narrowing.  Labs Reviewed or Ordered (1): none  Medicine Test Summarized or Ordered (1): none  Independent Review of EKG or X-RAY(2 each): none    The visit lasted a total of 25 minutes face to face with the patient. Over 50% of the time was spent counseling and educating the patient about COPD, depression, and low back pain.     I, Yessica Mckeon, am scribing for and in the presence of, Dr. Lerma.    I, Dr. Lerma, personally performed the services described in this documentation, as scribed by Yessica Mckeon in my presence, and it is both accurate and complete.      MEDICATIONS:  Current Outpatient Medications   Medication Sig Dispense Refill     gabapentin (NEURONTIN) 300 MG capsule Take 1 capsule (300 mg total) by mouth 4 (four) times a day. 120 capsule 4     albuterol (PROAIR HFA;PROVENTIL HFA;VENTOLIN HFA) 90 mcg/actuation inhaler Inhale 2 puffs every 6 (six) hours as needed for wheezing. 1 Inhaler 12     albuterol (PROVENTIL) 2.5 mg /3 mL (0.083 %) nebulizer solution Take 3 mL (2.5 mg total) by nebulization 4 (four) times a day. Mix with ipratropium 30 vial 4     amLODIPine (NORVASC) 5 MG tablet Take 1 tablet (5 mg total) by mouth daily. 30 tablet 11     budesonide (PULMICORT) 0.5 mg/2 mL nebulizer solution Take 2 mL (0.5 mg total) by nebulization 2 (two) times a day. 100 mL 3     buPROPion (WELLBUTRIN XL) 150 MG 24 hr tablet Take 1 tablet (150 mg total) by mouth every morning. 30 tablet 2     citalopram (CELEXA) 20 MG tablet Take 1 tablet (20 mg total) by mouth daily. 30 tablet 4     cyanocobalamin 1,000 mcg/mL injection Inject 1,000 mcg into the shoulder, thigh, or buttocks every 28 days.       doxycycline (MONODOX)  100 MG capsule Take 1 capsule (100 mg total) by mouth 2 (two) times a day. Avoid sun 12 capsule 0     ipratropium (ATROVENT) 0.02 % nebulizer solution Take 2.5 mL (0.5 mg total) by nebulization every 6 (six) hours. Mix with albuterol 120 mL 5     lisinopril (PRINIVIL,ZESTRIL) 40 MG tablet Take 1 tablet (40 mg total) by mouth daily. 30 tablet 3     mometasone-formoterol (DULERA) 200-5 mcg/actuation HFAA inhaler Inhale 2 puffs 2 (two) times a day. 1 Inhaler 12     predniSONE (DELTASONE) 20 MG tablet Take 20 mg by mouth daily 40mg 2 days 30mg 2 days, 20mg 2 days 10mg 2 days. 10 tablet 0     No current facility-administered medications for this visit.        Total Data Points: 2    Please note that this clinical encounter uses voice recognition software, there may be typographical errors present

## 2021-05-30 NOTE — TELEPHONE ENCOUNTER
Pt is overdue since 2007 for colon screening.  Pt has apt with PCP for 7/26/19.  Please have PCP discuss if pt is willing for colon screening.  Pt is having some significant respiratory issues at the present time.     What does PCP think about colon screening?  Thanks.

## 2021-05-30 NOTE — PATIENT INSTRUCTIONS - HE
It was good to see you in clinic today. This is what we discussed:    1. We will start Trelegy one inhalation daily. Rinse, gargle, and spit water after use. This has an inhaled steroid and two long-acting bronchodilators.  2. Use the albuterol inhaler or nebulizer as needed.  3. We will get you into pulmonary rehabilitation in [place].  4. We will get a low-dose chest CT for screening and I will call you with the result.  5. We will check a blood test looking for a rare cause of emphysema called alpha-1 antitrypsin deficiency and I will call you with the result.  6. If you have worsening shortness of breath or cough, take the prednisone and azithromycin as prescribed and give us a call.  7. Use the nicotine gum for tobacco craving.  8. I will see you in about 3 months.  9. Call any time with questions or concerning symptoms.    Luis Rivera MD  Pulmonary and Critical Care Medicine  [facility]  Office [phone]

## 2021-05-31 NOTE — TELEPHONE ENCOUNTER
Pulmonary Telephone Note    Called Mr. Pederson with his PET-CT result. I reviewed the images in detail, as well as the interpretation. The right lung peripheral opacity has decreased in size with mild FDG-avidity. This suggests an inflammatory etiology, and we will plan on repeat surveillance CT in 3 months. There is also a focus of marked FDG avidity in the rectum. He has never had a colonoscopy. I advised that we refer him to [facility] for colonoscopy. He is in agreement with the above plan.    Luis Rivera MD  Pulmonary and Critical Care Medicine  [facility]  Cell [phone]  Office [phone]  Pager [phone]

## 2021-05-31 NOTE — TELEPHONE ENCOUNTER
Daughter called for results of his PET scan. That was done yesterday. Message sent to Dr. Rivera to please call her.

## 2021-06-01 ENCOUNTER — RECORDS - HEALTHEAST (OUTPATIENT)
Dept: ADMINISTRATIVE | Facility: CLINIC | Age: 66
End: 2021-06-01

## 2021-06-01 NOTE — PROGRESS NOTES
Pulmonary Clinic Follow-up Visit    Assessment and Plan:   64 year old male with a history of tobacco smoking in remission, IVDU in remission, ongoing chewing tobacco dependence, hepatitis C without cirrhosis, COPD, TIA, HTN, atrial fibrillation, C-spine surgery, depression, RLS, seizure disorder, lumbar disc disease, and hemangiomas requiring surgical removal, presenting for follow-up.     COPD, pulmonary nodule: GOLD group D. Has had four hospitalizations for COPD, the last in May 2019, and multiple exacerbations requiring steroids and antibiotics. A1AT genotype normal at MM. Started his action plan with prednisone and azithromycin last week for increased cough and dyspnea, improving. Started tiotropium-olodaterol respimat and budesonide flexhaler in July, and he feels significant improvement in his breathing; able to carry his granddaughter which he could not do before. Has not needed to use albuterol HFA or nebulized. CAT score today is 27 (2,2,4,5,3,3,4,4), similar to 28 in early July. Continues to chew tobacco; tried the nicotine 2-mg gum but it is not strong enough for his cravings. No cigarettes for a year. 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 The PET-CT we did for pulmonary nodule showed decreased size of RLL nodule but minimally FDG-avid; planning repeat chest CT in November. PET also showed increased rectal uptake, and he had colonoscopy with multiple adenomas and serrated adenomas; has planned follow-up colonoscopy one year later.     Plan:  - will increase nicotine gum from 2-mg to 4-mg gum; encouraged ongoing efforts to quit chewing tobacco  - continue tiotropium-olodaterol respimat 2.5-2.5 mcg two inhalations daily  - continue budesonide flexhaler 180 mcg one inhalation two times a day; rinse/gargle/spit water after use  - albuterol HFA or nebulized as needed  - action plan: prednisone 12-day taper and azithromycin 5-day course to have on hand in case  of exacerbations; he is completing a course of this now  - repeat chest CT in November and follow-up with me at that time  - administered influenza vaccine today in clinic  - up to date on pneumococcal vaccination  - encouraged him to call any time with questions or concerning symptoms     I appreciate the opportunity to participate in the care of Mr. Pederson. Please feel free to contact me at any time.     CCx: COPD, pulmonary nodule    HPI: 64 year old male with a history of tobacco smoking in remission, IVDU in remission, ongoing chewing tobacco dependence, hepatitis C without cirrhosis, COPD, TIA, HTN, atrial fibrillation, C-spine surgery, depression, RLS, seizure disorder, lumbar disc disease, and hemangiomas requiring surgical removal, presenting for follow-up. GOLD group D. Has had four hospitalizations for COPD, the last in May 2019, and multiple exacerbations requiring steroids and antibiotics. A1AT genotype normal at . Started his action plan with prednisone and azithromycin last week for increased cough and dyspnea, improving. Started tiotropium-olodaterol respimat and budesonide flexhaler in July, and he feels significant improvement in his breathing; able to carry his granddaughter which he could not do before. Has not needed to use albuterol HFA or nebulized. CAT score today is 27 (2,2,4,5,3,3,4,4), similar to 28 in early July. Continues to chew tobacco; tried the nicotine 2-mg gum but it is not strong enough for his cravings. No cigarettes for a year. 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 The PET-CT we did for pulmonary nodule showed decreased size of RLL nodule but minimally FDG-avid; planning repeat chest CT in November. PET also showed increased rectal uptake, and he had colonoscopy with multiple adenomas and serrated adenomas; has planned follow-up colonoscopy one year later.    ROS:  A 12-system review was obtained and was negative with the  exception of the symptoms endorsed in the history of present illness.    PMH:  Former smoker  Hepatitis C without cirrhosis  IVDU in remission  Chewing tobacco dependence  COPD  TIA  HTN  AF  C-spine surgery  MDD  RLS  siezure disorder  Lumbar disc disease  Hemangiomas requiring resection    PSH:  Past Surgical History:   Procedure Laterality Date     CERVICAL FUSION      C6 and C7       Allergies:  No Known Allergies    Family HX:  No family history on file.    Social Hx:  Social History     Socioeconomic History     Marital status:      Spouse name: Not on file     Number of children: Not on file     Years of education: Not on file     Highest education level: Not on file   Occupational History     Not on file   Social Needs     Financial resource strain: Not on file     Food insecurity:     Worry: Not on file     Inability: Not on file     Transportation needs:     Medical: Not on file     Non-medical: Not on file   Tobacco Use     Smoking status: Former Smoker     Packs/day: 0.00     Last attempt to quit: 5/15/2018     Years since quittin.3     Smokeless tobacco: Current User   Substance and Sexual Activity     Alcohol use: Not Currently     Drug use: Not Currently     Sexual activity: Not on file   Lifestyle     Physical activity:     Days per week: Not on file     Minutes per session: Not on file     Stress: Not on file   Relationships     Social connections:     Talks on phone: Not on file     Gets together: Not on file     Attends Nondenominational service: Not on file     Active member of club or organization: Not on file     Attends meetings of clubs or organizations: Not on file     Relationship status: Not on file     Intimate partner violence:     Fear of current or ex partner: Not on file     Emotionally abused: Not on file     Physically abused: Not on file     Forced sexual activity: Not on file   Other Topics Concern     Not on file   Social History Narrative     Not on file       Current  Meds:  Current Outpatient Medications   Medication Sig Dispense Refill     albuterol (PROAIR HFA;PROVENTIL HFA;VENTOLIN HFA) 90 mcg/actuation inhaler Inhale 2 puffs every 6 (six) hours as needed for wheezing. 1 Inhaler 12     albuterol (PROVENTIL) 2.5 mg /3 mL (0.083 %) nebulizer solution Take 3 mL (2.5 mg total) by nebulization 4 (four) times a day. Mix with ipratropium 30 vial 4     amLODIPine (NORVASC) 5 MG tablet Take 1 tablet (5 mg total) by mouth daily. 30 tablet 11     azithromycin (ZITHROMAX) 250 MG tablet In case of flare-up, take two tabs on day 1, then one tab daily for 4 days. 6 tablet 5     buPROPion (WELLBUTRIN XL) 150 MG 24 hr tablet Take 1 tablet (150 mg total) by mouth every morning. 30 tablet 2     citalopram (CELEXA) 20 MG tablet Take 1 tablet (20 mg total) by mouth daily. 30 tablet 4     cyanocobalamin 1,000 mcg/mL injection Inject 1,000 mcg into the shoulder, thigh, or buttocks every 28 days.       gabapentin (NEURONTIN) 300 MG capsule Take 1 capsule (300 mg total) by mouth 4 (four) times a day. 120 capsule 4     lisinopril (PRINIVIL,ZESTRIL) 40 MG tablet Take 1 tablet (40 mg total) by mouth daily. 30 tablet 3     predniSONE (DELTASONE) 10 mg tablet In case of flare-up, take 4 tabs daily x 3 days, then 3 tabs daily x 3 days, then 2 tabs daily x 3 days, then 1 tab daily x 3 days.. 30 tablet 5     budesonide (PULMICORT FLEXHALER) 180 mcg/actuation inhaler Inhale 1 puff 2 (two) times a day. 1 each 6     nicotine polacrilex (NICORETTE) 4 MG gum Apply 1 each (4 mg total) to the mouth or throat as needed for smoking cessation. 100 each 11     tiotropium-olodaterol (STIOLTO RESPIMAT) 2.5-2.5 mcg/actuation Mist inhaler Inhale 2 Inhalation daily. 4 g 6     No current facility-administered medications for this visit.        Physical Exam:  /82   Pulse 87   Resp 12   Ht 6' (1.829 m)   Wt (!) 235 lb (106.6 kg)   SpO2 95%   BMI 31.87 kg/m    Gen: alert, oriented, no distress  HEENT: nasal  turbinates are unremarkable, no oropharyngeal lesions, no cervical or supraclavicular lymphadenopathy  CV: RRR, no M/G/R  Resp: moderately diminished with prolonged expiratory phase  Abd: soft, nontender, no palpable organomegaly  Skin: left hand extensive hemangioma, no other rashes  Ext: no cyanosis, clubbing or edema  Neuro: alert, nonfocal    Labs:  A1AT genotype MM    Imaging studies:  Low-dose screening chest CT (7/11/19):  - images directly reviewed, formal interpretation follows:  FINDINGS:  LUNGS AND PLEURA: There is diffuse bronchial wall thickening. There is debris in the airways. There is no significant bronchiectasis. There is a confluent opacity in the lateral right mid to lower lung centered in the region of the right major fissure.   There is mild architectural distortion in this region. The opacity is 19 mm x 20 mm and is adjacent to an old healed right rib fracture (series 3 image 168).      MEDIASTINUM: The heart is normal in size. Normal esophagus. No thoracic lymphadenopathy. The main pulmonary artery is 32 mm in diameter.      CORONARY ARTERY CALCIFICATION: Mild.     LIMITED UPPER ABDOMEN: Negative.     MUSCULOSKELETAL: There is an old healed fracture of the right lateral seventh rib.      IMPRESSION:   CONCLUSION:  1.  A 2 cm opacity in the peripheral right mid to lower lung could represent an infectious or inflammatory process in a region of bronchiolectasis and scarring. Malignancy is not excluded. Recommend follow-up chest CT in 3 months.  2.  Bronchial wall thickening and mucous plugging can be seen with bronchitis.  3.  The main pulmonary artery is minimally enlarged, which could be seen with pulmonary hypertension.     RADIOLOGIST RECOMMENDATION: Recommend followup chest LDCT in 1 month.     LungRADS CATEGORY: 4B: Suspicious.     SIGNIFICANT INCIDENTAL FINDINGS: Negative.    PET-CT (8/1/19):  - images directly reviewed, formal interpretation follows:  FINDINGS: 1.0 x 0.7 cm markedly FDG  avid (SUVmax 11.4) soft tissue attenuation structure in the left anterolateral wall of the low rectum about 7 cm above the anal verge, significantly exceeding the remainder of the colon, which is essentially non-FDG   avid.     1.8 x 0.9 cm minimally FDG avid (SUVmax 2.2) pulmonary opacity in the right lower lobe lateral basilar segment has decreased from 2.0 x 1.9 cm on 07/11/2019. FDG activity in the remainder of the body is normal.     Mucous in the trachea. Cholelithiasis. Moderate calcified atherosclerosis. Mildly enlarged prostate. Pelvic phleboliths. Sigmoid diverticulosis. C6-C7 interspinous pacer. Mild degenerative change throughout the spine.     IMPRESSION:   CONCLUSION:  1.  Incidental focus of marked FDG activity in the rectum is somewhat nonspecific, but adenomatous polyps and rectal cancer can have this appearance at PET CT. Recommend flexible sigmoidoscopy.  2.  Minimally FDG avid right lower lobe pulmonary opacity has decreased in size slightly, suggesting an infectious or inflammatory etiology, though CT follow-up to complete resolution or scarring is recommended, beginning 2-3 months from now.     PFT's (June 2019):  FEV1/FVC is 0.46 and is reduced.  FEV1 is 1.24 L (34% predicted) and is reduced.  FVC is 2.69 L (57% predicted) and reduced.  There was improvement in spirometry after a single inhaled dose of bronchodilator.  TLC is 9.11 L (120% predicted) and is normal.  RV is 7.05 L (277% predicted) and is increased.  DLCO is 55% predicted and is reduced when it is corrected for hemoglobin.    Luis Rivera MD  Pulmonary and Critical Care Medicine  [facility]  Cell [phone]  Office [phone]  Pager [phone]

## 2021-06-01 NOTE — PATIENT INSTRUCTIONS - HE
It was good to see you in clinic today. This is what we discussed:    1. Continue Stiolto two inhalations daily.  2. Continue Pulmicort one inhalation twice daily. Rinse, gargle, and spit water after use.  3. Use albuterol inhaler if needed.  4. If you have worse cough or shortness of breath, start the prednisone and azithromycin (Z-vannesa).  5. You received your flu shot today in clinic.  6. I will see you in November after your chest CT.  7. Call any time with questions or concerning symptoms.    Luis Rivera MD  Pulmonary and Critical Care Medicine  [facility]  Office [phone]

## 2021-06-02 VITALS — BODY MASS INDEX: 30.39 KG/M2 | HEIGHT: 72 IN | WEIGHT: 224.38 LBS

## 2021-06-02 VITALS — WEIGHT: 234.06 LBS | HEIGHT: 72 IN | BODY MASS INDEX: 31.7 KG/M2

## 2021-06-02 NOTE — TELEPHONE ENCOUNTER
Refill Approved    Rx renewed per Medication Renewal Policy. Medication was last renewed on 6/28/19.    Erika Lo, [facility] Triage/Med Refill 10/8/2019     Requested Prescriptions   Pending Prescriptions Disp Refills     buPROPion (WELLBUTRIN XL) 150 MG 24 hr tablet [Pharmacy Med Name: buPROPion HCl ER (XL) Oral Tablet Extended Release 24 Hour 150 MG] 30 tablet 1     Sig: Take 1 tablet (150 mg total) by mouth every morning.       Tricyclics/Misc Antidepressant/Antianxiety Meds Refill Protocol Passed - 10/6/2019 11:58 AM        Passed - PCP or prescribing provider visit in last year     Last office visit with prescriber/PCP: 7/26/2019 Ricky Lerma MD OR same dept: 7/26/2019 Ricky Lerma MD OR same specialty: 7/26/2019 Ricky Lerma MD  Last physical: Visit date not found Last MTM visit: Visit date not found   Next visit within 3 mo: Visit date not found  Next physical within 3 mo: Visit date not found  Prescriber OR PCP: Ricky Lerma MD  Last diagnosis associated with med order: 1. Depression  - buPROPion (WELLBUTRIN XL) 150 MG 24 hr tablet [Pharmacy Med Name: buPROPion HCl ER (XL) Oral Tablet Extended Release 24 Hour 150 MG]; Take 1 tablet (150 mg total) by mouth every morning.  Dispense: 30 tablet; Refill: 1    2. Encounter for smoking cessation counseling  - buPROPion (WELLBUTRIN XL) 150 MG 24 hr tablet [Pharmacy Med Name: buPROPion HCl ER (XL) Oral Tablet Extended Release 24 Hour 150 MG]; Take 1 tablet (150 mg total) by mouth every morning.  Dispense: 30 tablet; Refill: 1    If protocol passes may refill for 12 months if within 3 months of last provider visit (or a total of 15 months).

## 2021-06-03 VITALS
HEART RATE: 84 BPM | HEIGHT: 72 IN | DIASTOLIC BLOOD PRESSURE: 94 MMHG | SYSTOLIC BLOOD PRESSURE: 118 MMHG | WEIGHT: 241 LBS | BODY MASS INDEX: 32.64 KG/M2 | RESPIRATION RATE: 20 BRPM

## 2021-06-03 VITALS
OXYGEN SATURATION: 97 % | SYSTOLIC BLOOD PRESSURE: 124 MMHG | BODY MASS INDEX: 31.73 KG/M2 | WEIGHT: 234.25 LBS | TEMPERATURE: 98.3 F | HEART RATE: 62 BPM | HEIGHT: 72 IN | DIASTOLIC BLOOD PRESSURE: 64 MMHG

## 2021-06-03 VITALS
HEART RATE: 99 BPM | RESPIRATION RATE: 12 BRPM | OXYGEN SATURATION: 93 % | WEIGHT: 236 LBS | SYSTOLIC BLOOD PRESSURE: 138 MMHG | DIASTOLIC BLOOD PRESSURE: 82 MMHG | HEIGHT: 72 IN | BODY MASS INDEX: 31.97 KG/M2

## 2021-06-03 VITALS — WEIGHT: 229.19 LBS | HEIGHT: 72 IN | BODY MASS INDEX: 31.04 KG/M2

## 2021-06-03 VITALS — HEIGHT: 72 IN | WEIGHT: 233 LBS | BODY MASS INDEX: 31.56 KG/M2

## 2021-06-03 VITALS
HEART RATE: 87 BPM | HEIGHT: 72 IN | OXYGEN SATURATION: 95 % | RESPIRATION RATE: 12 BRPM | SYSTOLIC BLOOD PRESSURE: 132 MMHG | DIASTOLIC BLOOD PRESSURE: 82 MMHG | WEIGHT: 235 LBS | BODY MASS INDEX: 31.83 KG/M2

## 2021-06-03 VITALS — WEIGHT: 228.19 LBS | BODY MASS INDEX: 30.91 KG/M2 | HEIGHT: 72 IN

## 2021-06-03 VITALS — BODY MASS INDEX: 31.02 KG/M2 | WEIGHT: 229 LBS | HEIGHT: 72 IN

## 2021-06-03 NOTE — TELEPHONE ENCOUNTER
Refill Approved    Rx renewed per Medication Renewal Policy. Medication was last renewed on 6/28/19.    Mel Baker, [facility] Triage/Med Refill 11/16/2019     Requested Prescriptions   Pending Prescriptions Disp Refills     gabapentin (NEURONTIN) 300 MG capsule 120 capsule 4     Sig: Take 1 capsule (300 mg total) by mouth 4 (four) times a day.       Gabapentin/Levetiracetam/Tiagabine Refill Protocol  Passed - 11/15/2019 11:31 AM        Passed - PCP or prescribing provider visit in past 12 months or next 3 months     Last office visit with prescriber/PCP: 7/26/2019 Ricky Lerma MD OR same dept: 7/26/2019 Ricky Lerma MD OR same specialty: 7/26/2019 Ricky Lerma MD  Last physical: Visit date not found Last MTM visit: Visit date not found   Next visit within 3 mo: Visit date not found  Next physical within 3 mo: Visit date not found  Prescriber OR PCP: Ricky Lerma MD  Last diagnosis associated with med order: 1. Neuropathy  - gabapentin (NEURONTIN) 300 MG capsule; Take 1 capsule (300 mg total) by mouth 4 (four) times a day.  Dispense: 120 capsule; Refill: 4    If protocol passes may refill for 12 months if within 3 months of last provider visit (or a total of 15 months).

## 2021-06-03 NOTE — TELEPHONE ENCOUNTER
Refill Approved    Rx renewed per Medication Renewal Policy. Medication was last renewed on 6/28/19.    Erika Lo, [facility] Triage/Med Refill 11/8/2019     Requested Prescriptions   Pending Prescriptions Disp Refills     lisinopril (PRINIVIL,ZESTRIL) 40 MG tablet [Pharmacy Med Name: Lisinopril Oral Tablet 40 MG] 30 tablet 2     Sig: Take 1 tablet (40 mg total) by mouth daily.       Ace Inhibitors Refill Protocol Passed - 11/7/2019  1:48 PM        Passed - PCP or prescribing provider visit in past 12 months       Last office visit with prescriber/PCP: 7/26/2019 Ricky Lerma MD OR same dept: 7/26/2019 Ricky Lerma MD OR same specialty: 7/26/2019 Ricky Lerma MD  Last physical: Visit date not found Last MTM visit: Visit date not found   Next visit within 3 mo: Visit date not found  Next physical within 3 mo: Visit date not found  Prescriber OR PCP: Ricky Lerma MD  Last diagnosis associated with med order: 1. Essential hypertension  - lisinopril (PRINIVIL,ZESTRIL) 40 MG tablet [Pharmacy Med Name: Lisinopril Oral Tablet 40 MG]; Take 1 tablet (40 mg total) by mouth daily.  Dispense: 30 tablet; Refill: 2    If protocol passes may refill for 12 months if within 3 months of last provider visit (or a total of 15 months).             Passed - Serum Potassium in past 12 months     Lab Results   Component Value Date    Potassium 4.7 05/15/2019             Passed - Blood pressure filed in past 12 months     BP Readings from Last 1 Encounters:   09/25/19 132/82             Passed - Serum Creatinine in past 12 months     Creatinine   Date Value Ref Range Status   05/15/2019 1.04 0.70 - 1.30 mg/dL Final

## 2021-06-03 NOTE — PATIENT INSTRUCTIONS - HE
It was good to see you in clinic today. This is what we discussed:    1. We will get you into pulmonary rehabilitation in [place].  2. Continue Stiolto two inhalations daily.  3. Continue Pulmicort one inhalation twice daily. Rinse, gargle, and spit water after use.  4. Continue albuterol inhaler or nebulizer as needed.  5. Try the nicotine inhaler and quit the chewing tobacco.  6. If you are not improving over the next 1-2 days, consider going to urgent care or the emergency department.  7. You can repeat the prednisone and Z-vannesa if you needed.  8. I will see you in 3 months or sooner if needed.  9. Call any time with questions or concerning symptoms.    Luis Rivera MD  Pulmonary and Critical Care Medicine  [facility]  Office [phone]

## 2021-06-03 NOTE — PROGRESS NOTES
Pulmonary Clinic Follow-up Visit    Assessment and Plan:   64 year old male with a history of tobacco smoking in remission, IVDU in remission, ongoing chewing tobacco dependence, hepatitis C without cirrhosis, COPD, TIA, HTN, atrial fibrillation, C-spine surgery, depression, RLS, seizure disorder, lumbar disc disease, and hemangiomas requiring surgical removal, presenting for follow-up.     COPD, pulmonary nodule: GOLD group D. FEV1 1.24 L (34% predicted) and DLCO 55% predicted. Has had four hospitalizations for COPD, the last in May 2019, and multiple exacerbations requiring steroids and antibiotics. A1AT genotype normal at . Started his action plan with prednisone and azithromycin yesterday for increasing dyspnea and chest tightness, too early to see improvement. No new muscle aches. Minimal sputum, no fevers or chills. Received influenza vaccine at last visit. Overall has felt clinical improvement with initiation of LAMA-LABA and ICS until this latest exacerbation. He is interested in pursuing pulmonary rehabilitation in [place]. Continues to chew tobacco and the nicotine gum was not helpful; he is interested in trying nicotine inhaler. Chest CT from November 6 shows that the RLL nodular opacity continues to decrease in size; it was minimally FDG-avid in early August 2019; planning repeat chest CT in November. PET also showed increased rectal uptake, and he had colonoscopy with multiple adenomas and serrated adenomas for which he is following up.     Plan:  - stop nicotine gum and start nicotine inhaler; encouraged ongoing efforts to quit chewing tobacco  - continue tiotropium-olodaterol respimat 2.5-2.5 mcg two inhalations daily  - continue budesonide flexhaler 180 mcg one inhalation two times a day; rinse/gargle/spit water after use  - albuterol HFA or nebulized as needed  - action plan: prednisone 12-day taper and azithromycin 5-day course to have on hand in case of exacerbations; he started this  yesterday  - referral to pulmonary rehabilitation at [facility] in [place]  - final repeat chest CT in July 2020  - annaul influenza vaccination; received for this season  - up to date on pneumococcal vaccination  - follow up in 3 months  - encouraged him to call any time with questions or concerning symptoms     I appreciate the opportunity to participate in the care of Mr. Pederson. Please feel free to contact me at any time.     CCx: COPD, pulmonary nodule    HPI: 64 year old male with a history of tobacco smoking in remission, IVDU in remission, ongoing chewing tobacco dependence, hepatitis C without cirrhosis, COPD, TIA, HTN, atrial fibrillation, C-spine surgery, depression, RLS, seizure disorder, lumbar disc disease, and hemangiomas requiring surgical removal, presenting for follow-up. GOLD group D. FEV1 1.24 L (34% predicted) and DLCO 55% predicted. Has had four hospitalizations for COPD, the last in May 2019, and multiple exacerbations requiring steroids and antibiotics. A1AT genotype normal at . Started his action plan with prednisone and azithromycin yesterday for increasing dyspnea and chest tightness, too early to see improvement. No new muscle aches. Minimal sputum, no fevers or chills. Received influenza vaccine at last visit. Overall has felt clinical improvement with initiation of LAMA-LABA and ICS until this latest exacerbation. He is interested in pursuing pulmonary rehabilitation in [place]. Continues to chew tobacco and the nicotine gum was not helpful; he is interested in trying nicotine inhaler. Chest CT from November 6 shows that the RLL nodular opacity continues to decrease in size; it was minimally FDG-avid in early August 2019; planning repeat chest CT in November. PET also showed increased rectal uptake, and he had colonoscopy with multiple adenomas and serrated adenomas for which he is following up.    ROS:  A 12-system review was obtained and was negative with the exception  of the symptoms endorsed in the history of present illness.    PMH:  Former smoker  Hepatitis C without cirrhosis  IVDU in remission  Chewing tobacco dependence  COPD  TIA  HTN  AF  C-spine surgery  MDD  RLS  seizure disorder  Lumbar disc disease  Hemangiomas requiring resection    PSH:  Past Surgical History:   Procedure Laterality Date     CERVICAL FUSION      C6 and C7       Allergies:  No Known Allergies    Family HX:  No family history on file.    Social Hx:  Social History     Socioeconomic History     Marital status:      Spouse name: Not on file     Number of children: Not on file     Years of education: Not on file     Highest education level: Not on file   Occupational History     Not on file   Social Needs     Financial resource strain: Not on file     Food insecurity:     Worry: Not on file     Inability: Not on file     Transportation needs:     Medical: Not on file     Non-medical: Not on file   Tobacco Use     Smoking status: Former Smoker     Packs/day: 0.00     Last attempt to quit: 5/15/2018     Years since quittin.4     Smokeless tobacco: Current User   Substance and Sexual Activity     Alcohol use: Not Currently     Drug use: Not Currently     Sexual activity: Not on file   Lifestyle     Physical activity:     Days per week: Not on file     Minutes per session: Not on file     Stress: Not on file   Relationships     Social connections:     Talks on phone: Not on file     Gets together: Not on file     Attends Orthodox service: Not on file     Active member of club or organization: Not on file     Attends meetings of clubs or organizations: Not on file     Relationship status: Not on file     Intimate partner violence:     Fear of current or ex partner: Not on file     Emotionally abused: Not on file     Physically abused: Not on file     Forced sexual activity: Not on file   Other Topics Concern     Not on file   Social History Narrative     Not on file       Current Meds:  Current  Outpatient Medications   Medication Sig Dispense Refill     albuterol (PROAIR HFA;PROVENTIL HFA;VENTOLIN HFA) 90 mcg/actuation inhaler Inhale 2 puffs every 6 (six) hours as needed for wheezing. 1 Inhaler 12     albuterol (PROVENTIL) 2.5 mg /3 mL (0.083 %) nebulizer solution Take 3 mL (2.5 mg total) by nebulization 4 (four) times a day. Mix with ipratropium 30 vial 4     amLODIPine (NORVASC) 5 MG tablet Take 1 tablet (5 mg total) by mouth daily. 30 tablet 11     azithromycin (ZITHROMAX) 250 MG tablet In case of flare-up, take two tabs on day 1, then one tab daily for 4 days. 6 tablet 5     buPROPion (WELLBUTRIN XL) 150 MG 24 hr tablet Take 1 tablet (150 mg total) by mouth every morning. 90 tablet 2     citalopram (CELEXA) 20 MG tablet Take 1 tablet (20 mg total) by mouth daily. 30 tablet 4     cyanocobalamin 1,000 mcg/mL injection Inject 1,000 mcg into the shoulder, thigh, or buttocks every 28 days.       gabapentin (NEURONTIN) 300 MG capsule Take 1 capsule (300 mg total) by mouth 4 (four) times a day. 120 capsule 4     lisinopril (PRINIVIL,ZESTRIL) 40 MG tablet Take 1 tablet (40 mg total) by mouth daily. 90 tablet 1     predniSONE (DELTASONE) 10 mg tablet In case of flare-up, take 4 tabs daily x 3 days, then 3 tabs daily x 3 days, then 2 tabs daily x 3 days, then 1 tab daily x 3 days.. 30 tablet 5     budesonide (PULMICORT FLEXHALER) 180 mcg/actuation inhaler Inhale 1 puff 2 (two) times a day. 1 each 6     nicotine (NICOTROL) 10 mg inhaler Inhale 1 puff as needed for smoking cessation. 168 Cartridge 11     tiotropium-olodaterol (STIOLTO RESPIMAT) 2.5-2.5 mcg/actuation Mist inhaler Inhale 2 Inhalation daily. 4 g 6     No current facility-administered medications for this visit.        Physical Exam:  /82   Pulse 99   Resp 12   Ht 6' (1.829 m)   Wt (!) 236 lb (107 kg)   SpO2 93%   BMI 32.01 kg/m    Gen: alert, oriented, no distress  HEENT: nasal turbinates are unremarkable, no oropharyngeal lesions, no  cervical or supraclavicular lymphadenopathy  CV: tachy, regular, no M/G/R  Resp: moderately diminished with prolonged expiratory phase  Abd: soft, nontender, no palpable organomegaly  Skin: no apparent rashes  Ext: no cyanosis, clubbing or edema  Neuro: alert, nonfocal    Labs:  A1AT genotype MM     Imaging studies:  Low-dose screening chest CT (7/11/19):  - images directly reviewed, formal interpretation follows:  FINDINGS:  LUNGS AND PLEURA: There is diffuse bronchial wall thickening. There is debris in the airways. There is no significant bronchiectasis. There is a confluent opacity in the lateral right mid to lower lung centered in the region of the right major fissure.   There is mild architectural distortion in this region. The opacity is 19 mm x 20 mm and is adjacent to an old healed right rib fracture (series 3 image 168).      MEDIASTINUM: The heart is normal in size. Normal esophagus. No thoracic lymphadenopathy. The main pulmonary artery is 32 mm in diameter.      CORONARY ARTERY CALCIFICATION: Mild.     LIMITED UPPER ABDOMEN: Negative.     MUSCULOSKELETAL: There is an old healed fracture of the right lateral seventh rib.      IMPRESSION:   CONCLUSION:  1.  A 2 cm opacity in the peripheral right mid to lower lung could represent an infectious or inflammatory process in a region of bronchiolectasis and scarring. Malignancy is not excluded. Recommend follow-up chest CT in 3 months.  2.  Bronchial wall thickening and mucous plugging can be seen with bronchitis.  3.  The main pulmonary artery is minimally enlarged, which could be seen with pulmonary hypertension.     RADIOLOGIST RECOMMENDATION: Recommend followup chest LDCT in 1 month.     LungRADS CATEGORY: 4B: Suspicious.     SIGNIFICANT INCIDENTAL FINDINGS: Negative.     PET-CT (8/1/19):  - images directly reviewed, formal interpretation follows:  FINDINGS: 1.0 x 0.7 cm markedly FDG avid (SUVmax 11.4) soft tissue attenuation structure in the left  anterolateral wall of the low rectum about 7 cm above the anal verge, significantly exceeding the remainder of the colon, which is essentially non-FDG   avid.     1.8 x 0.9 cm minimally FDG avid (SUVmax 2.2) pulmonary opacity in the right lower lobe lateral basilar segment has decreased from 2.0 x 1.9 cm on 07/11/2019. FDG activity in the remainder of the body is normal.     Mucous in the trachea. Cholelithiasis. Moderate calcified atherosclerosis. Mildly enlarged prostate. Pelvic phleboliths. Sigmoid diverticulosis. C6-C7 interspinous pacer. Mild degenerative change throughout the spine.     IMPRESSION:   CONCLUSION:  1.  Incidental focus of marked FDG activity in the rectum is somewhat nonspecific, but adenomatous polyps and rectal cancer can have this appearance at PET CT. Recommend flexible sigmoidoscopy.  2.  Minimally FDG avid right lower lobe pulmonary opacity has decreased in size slightly, suggesting an infectious or inflammatory etiology, though CT follow-up to complete resolution or scarring is recommended, beginning 2-3 months from now.    CT chest (11/6/19):  - images directly reviewed, formal interpretation follows:  FINDINGS:   LUNGS AND PLEURA: There has been slight decrease in both size and the density of the triangular pleural-based opacity lateral right lower lobe on image 111; now measuring 1.5 x 1.5 cm, previously measuring 2 x 1.9 cm and being more solid in appearance.   This appeared inflammatory on the recent PET/CT and inflammatory focus or site of scarring/rounded atelectasis still favored.  Tiny benign triangular nodule along right minor fissure. No new worrisome nodule.     Mild inflammatory bronchial wall thickening present at lung bases similar to previous exam. There is mucus debris layering dependently within the mainstem bronchi and trachea.     MEDIASTINUM/AXILLAE: No lymphadenopathy. No thoracic aortic aneurysm.     UPPER ABDOMEN: Cholelithiasis.     MUSCULOSKELETAL:  Unremarkable.     IMPRESSION:   1.  Slight decrease size and density of the triangular pleural-based opacity lateral right lower lobe most consistent with evolving peripheral scar. Suggest a follow-up exam July 2020 to document long-term stability.  2.  Inflammatory bronchial wall thickening and endoluminal mucous debris present.     PFT's (June 2019):  FEV1/FVC is 0.46 and is reduced.  FEV1 is 1.24 L (34% predicted) and is reduced.  FVC is 2.69 L (57% predicted) and reduced.  There was improvement in spirometry after a single inhaled dose of bronchodilator.  TLC is 9.11 L (120% predicted) and is normal.  RV is 7.05 L (277% predicted) and is increased.  DLCO is 55% predicted and is reduced when it is corrected for hemoglobin.    Luis Rivera MD  Pulmonary and Critical Care Medicine  [facility]  Cell [phone]  Office [phone]  Pager [phone]

## 2021-06-03 NOTE — TELEPHONE ENCOUNTER
Called patient has appointment on 11/5 with Miguel and was suppose to have CT done before. Patient canceled CT. Called to see why and try and get it r/s before appointment on Friday 11/5.  [facility] like CT [facility] Friday. [facility]

## 2021-06-04 VITALS
HEART RATE: 84 BPM | SYSTOLIC BLOOD PRESSURE: 138 MMHG | RESPIRATION RATE: 16 BRPM | WEIGHT: 231 LBS | BODY MASS INDEX: 31.29 KG/M2 | DIASTOLIC BLOOD PRESSURE: 80 MMHG | HEIGHT: 72 IN

## 2021-06-04 VITALS — HEIGHT: 72 IN | BODY MASS INDEX: 31.06 KG/M2 | WEIGHT: 229.31 LBS

## 2021-06-04 VITALS
SYSTOLIC BLOOD PRESSURE: 138 MMHG | HEART RATE: 94 BPM | HEIGHT: 72 IN | BODY MASS INDEX: 31.15 KG/M2 | OXYGEN SATURATION: 91 % | WEIGHT: 230 LBS | DIASTOLIC BLOOD PRESSURE: 88 MMHG

## 2021-06-04 VITALS — HEIGHT: 72 IN | WEIGHT: 230 LBS | BODY MASS INDEX: 31.15 KG/M2

## 2021-06-04 VITALS
HEIGHT: 72 IN | WEIGHT: 231.56 LBS | SYSTOLIC BLOOD PRESSURE: 140 MMHG | OXYGEN SATURATION: 92 % | HEART RATE: 80 BPM | DIASTOLIC BLOOD PRESSURE: 96 MMHG | BODY MASS INDEX: 31.36 KG/M2 | TEMPERATURE: 98.4 F | RESPIRATION RATE: 20 BRPM

## 2021-06-04 VITALS — BODY MASS INDEX: 31.15 KG/M2 | HEIGHT: 72 IN | WEIGHT: 230 LBS

## 2021-06-04 NOTE — PATIENT INSTRUCTIONS - HE
Ki Pederson,    It was a pleasure to see you today at the [facility].     My recommendations after this visit include:    Maria Fernanda will call you with scheduling cardioversion.   [phone]    Take only 25 mg of metoprolol morning of procedure.    BRADY Aguirre [facility]  Electrophysiology  [phone]

## 2021-06-04 NOTE — TELEPHONE ENCOUNTER
Refill Approved    Rx renewed per Medication Renewal Policy. Medication was last renewed on 6/28/19.    Melania Rios, [facility] Triage/Med Refill 12/8/2019     Requested Prescriptions   Pending Prescriptions Disp Refills     citalopram (CELEXA) 20 MG tablet [Pharmacy Med Name: Citalopram Hydrobromide Oral Tablet 20 MG] 30 tablet 3     Sig: Take 1 tablet (20 mg total) by mouth daily.       SSRI Refill Protocol  Passed - 12/8/2019  9:02 AM        Passed - PCP or prescribing provider visit in last year     Last office visit with prescriber/PCP: 7/26/2019 Ricky Lerma MD OR same dept: 7/26/2019 Ricky Lerma MD OR same specialty: 7/26/2019 Ricky Lerma MD  Last physical: Visit date not found Last MTM visit: Visit date not found   Next visit within 3 mo: Visit date not found  Next physical within 3 mo: Visit date not found  Prescriber OR PCP: Ricky Lerma MD  Last diagnosis associated with med order: 1. Depression  - citalopram (CELEXA) 20 MG tablet [Pharmacy Med Name: Citalopram Hydrobromide Oral Tablet 20 MG]; Take 1 tablet (20 mg total) by mouth daily.  Dispense: 30 tablet; Refill: 3    If protocol passes may refill for 12 months if within 3 months of last provider visit (or a total of 15 months).

## 2021-06-04 NOTE — TELEPHONE ENCOUNTER
Return call to patients son, Katlyn.  He would like FMLA paperwork filled out to cover his lost work due to his Father's multiple hospital admissions.  Explained that Primary care would be a more appropriate avenue to fill out paper work.  Pt is following up with PMD on 1-2, requested that he ask PMD to fill out [place] paperwork.  He states understanding.

## 2021-06-04 NOTE — TELEPHONE ENCOUNTER
Who is calling:  Daughter     Reason for Call:   Jean-Pierre called : Relayed msg in regards to approval of home care orders and inhaler needing PA. Daughter would like a call back and or leave more details regarding PA process .   Thank You     Date of last appointment with primary care:   01/02/20    Okay to leave a detailed message: Yes

## 2021-06-04 NOTE — TELEPHONE ENCOUNTER
Tried calling patient about sputum came back with 4+ Haemophilus influenzae. Doxy prescribed on discharge will not cover this. Patient did not answer his phone, I called daughter Ethan instead. She states they did not  doxycycline anyway. I will send Augmentin to his [facility] pharmacy. Daughter agreed to relay the message and will make sure he gets this medication.    Maggy Cardona MD  [facility]

## 2021-06-04 NOTE — PROGRESS NOTES
"TCM DISCHARGE FOLLOW UP CALL    Discharge Date:  12/19/2019  Reason for hospital stay (discharge diagnosis)::  Acute respiratory failure   Are you feeling better, the same or worse since your discharge?:  Patient is feeling the same (Gets very SOB even standing up, \"still struggling a little bit\" but nebs/inhalers help. Having intermittent palpitations. Denies CP. Concerns of forgetfulness, new, and \"cloudy, foggy thinking.\" Questions if med reaction.)  Do you feel like you have a plan in the event of a health emergency?: Yes (Daughter, Ethan, ER, 911)    As part of your discharge plan, were  home care services ordered for you?: No    Did you receive any new medications, or was there a change to your medications?: Yes    Are you taking those medications, or do you have any established regiment?:  Daughter states she manages her father's medications.  Finished the 3 days of amiodarone 200 mg three times a day and started 200 mg two times a day today.  States no change in his bupropion or citalopram other than the time to take it; he had been taking it around 1pm and that worked well for him, they switched him to take both in AM.  States he's been taking his Pulmicort inhaler 1 puff two times a day, Stiolto Respimat 2 puffs daily, Xopenex nebs every 6 hours PRN, and his Symbicort inhaler twice a day PRN.  They are still waiting on the PA to go through on his Xopenex rescue inhaler.  RN advised continuing inhalers as prescribed, and instead of taking Symbicort inhaler PRN, take it scheduled two times a day for the next few days, opposite times from his Pulmicort inhaler which he takes morning & night, to see if that helps control his breathing a little better.  Can also try taking his Xopenex neb before his Pulmicort or Symbicort inhalers.  Call pharmacy to see if PA has gone through yet, or where in the process it's at.  Daughter verbalized understanding and agrees w/plan.  Do you have any follow up visits " scheduled with your PCP or Specialist?:  Yes, with Specialist  Who are you seeing and when is it scheduled?:  [facility] 12/30/19, Cardiologist 2/2/5/19; Pulmonologist 2/7/19  I'm glad to hear you're doing well and we want you to continue to do well. Your PCP would like to see you for a follow-up visit. Can we help set that up for your today?: Yes    (RN) Patient was assisted in making appointment and/or given number to [facility].  If there are immediate concerns, In Basket messge route to the PCP with a summary of concern.:  RN assisted patient in scheduling appt (INF scheduled w/Dr. Lerma 1/2/20)  RN NOTES::  CCC referral placed.      Melania Laguna RN Clinic Care Coordinator

## 2021-06-04 NOTE — PROGRESS NOTES
Hospital discharge follow up call to pt, no answer.  Left VM message reminding pt of appt on 1/2. RN will attempt call back at another time.

## 2021-06-04 NOTE — PROGRESS NOTES
Hospital Follow-up Visit:    Assessment/Plan:     1. Acute respiratory failure with hypoxia (H)  Patient no longer has a rescue inhaler.  He was told not to take albuterol and Xopenex inhalers have been denied will attempt to do a PA he needs an exception waived because he cannot use another medication because of his A. fib.  - levalbuterol (XOPENEX HFA) 45 mcg/actuation inhaler; Inhale 2 puffs every 4 (four) hours as needed for wheezing. Has atrial fibrillation  Cannot take albuterol  Dispense: 1 Inhaler; Refill: 6    2. Persistent atrial fibrillation with rapid ventricular response    On amiodarone still in A. fib scheduled for cardioversion next week    3. Chronic obstructive pulmonary disease with acute exacerbation (H)    On prednisone taper medication is causing him to have difficulty with sleep also his attitude has changed because the medication makes him act irrationally no anger outbursts noted.  - Basic Metabolic Panel    4. Anxiety    Anxiety is increased because she is been unable to sleep I will try Klonopin for only 1 month he is taking the medication at bedtime only  - clonazePAM (KLONOPIN) 0.5 MG tablet; Take 1 tablet (0.5 mg total) by mouth at bedtime as needed for anxiety.  Dispense: 30 tablet; Refill: 0    5. Essential hypertension    Blood pressure well controlled repeating BMP.  - Basic Metabolic Panel    6. Primary insomnia    He has tried trazodone, Seroquel in the past which have had side effects because he had difficulty sleeping because of breathing issues prescribed Klonopin for short-term only I will remove his Xanax from his medication list.  - clonazePAM (KLONOPIN) 0.5 MG tablet; Take 1 tablet (0.5 mg total) by mouth at bedtime as needed for anxiety.  Dispense: 30 tablet; Refill: 0    7. Dermatitis    He developed a rash on his torso which is slowly spreading and is highly pruritic given you a high potency corticosteroid to apply locally to the area will be a drug reaction.  -  clobetasol (TEMOVATE) 0.05 % ointment; Apply to affected area twice daily  Dispense: 30 g; Refill: 3      8.  Hyperkalemia    Potassium of 5.5 noted discharge.  No muscle ache which is increasing noted rechecking a BMP today.   9.  Depression   Patient denies any suicidal ideation he has had multiple medication changes he feels fatigued however he has good family support.  PHQ 9 reviewed.  Subjective:     Ki Pederson is a 64 y.o. male with ASCVD, COPD with acute respiratory failure, newly diagnosed atrial fibrillation with RVR, hypertension, depression, anxiety, insomnia, epilepsy, lumbar disc degeneration, neuropathy, restless leg syndrome, and chewing tobacco use,who presents for a hospital discharge follow up. He had three separate hospitalizations in December. The patient was initially admitted to [facility] from 12/09/2019 to 12/13/2019 for COPD exacerbation and new diagnosis of atrial fibrillation with rapid ventricular response per EKG workup. His breathing improved with oral steroids. His albuterol was changed to Xopenex in the setting of atrial fibrillation with rapid ventricular response. His atrial fibrillation stabilized on diltiazem and metoprolol, and Xarelto was started for anticoagulation. His amlodipine and lisinopril were stopped. Echo showed normal LVEF of 68%.    He was then admitted to [facility] from 12/16/2019 to 12/19/2019 for worsening shortness of breath and increase lower extremity edema. Ki was found to be in acute hypoxic respiratory failure and atrial fibrillation with rapid ventricular response. His respiratory status improved with BiPAP and steroids. His started on short-term amiodarone drip for rate control and transitioned to oral amiodarone. Digoxin was discontinued to avoid QT prolongation. Xarelto was continued. Cardioversion was recommended. His bilateral lower extremity edema was attributed to CHF. Bilateral leg ultrasounds were negative for DVT. Lasix was  continued.    He then presented to [facility] ED via EMS on 12/26/2019 for worsening shortness of breath and was found to be hypoxic. The patient was transferred for admission to [facility] from 12/26/2019 to 12/27/2019. He was placed on steroids and antibiotic course. Xopenex and Stiolto were continued while Symbicort was stopped. Oral steroid taper was started and oral antibiotic course was given on discharge. In regards to his atrial fibrillation with rapid ventricular response, home medications were continued, and cardioversion was again recommended.     Ki reports to be doing fair at this time, and his daughter feels that he is just getting by. His cardioversion scheduled for 01/10/2020. His next visit with Pulmonology is scheduled for 02/07/2020. He continues to have wheezing and shortness of breath. The patient continues on oral steroid taper and oral antibiotic course. He is eating okay. His insurance will not cover his Pulmicort due to the way his prescription was written.     His sleep has been very poor, secondary to hallucinations, steroids, and restless legs. Ki has felt flies and wood ticks on his skin that kept him up all night, and thinks he sees structures moving. He realizes these are hallucinations and notifies his daughter when they occur. His most recent hallucinations were a night or two ago. The patient was having symptoms during his hospitalizations, and reportedly was given Haldol and ativan. Cardiology gave him some Xanax though he was unable to fill as it was XR. [facility] ultimately did fill it though Ki did not  it up as he was feeling particularly distressed that day and got into conflict with the pharmacist regarding insurance coverage. He is on Requip for restless legs which does help with his symptoms though does not help with sleep. His PHQ-9 score today is 14, stating things are somewhat difficult.    The patient is having some issues with balance, and is  "receiving home physical therapy.    He reports onset of itchy rash on his chest that started during his second hospitalization that has since spread. They did mention it at the hospital. Ki and his daughter do not remember exactly what they were told though felt that the hospital staff did not think it was serious.    He reports that his gabapentin does not seem as effective anymore for pain in his neck, down his back, and down his bilateral legs. The patient is taking 300 mg four times a day.     Hospital/Nursing Home/IP Rehab Facility: [facility]  Date of Admission: 12/26/2019   Date of Discharge:12/27/2019  Reason(s) for Admission: COPD exacerbation            Do you have any problems taking your medication regularly?  None       Have you had any changes in your medication since discharge? None       Have you had any difficulty following your discharge or treatment plan?  No    Summary of hospitalization:  Hospital discharge summary reviewed  Diagnostic Tests/Treatments reviewed.  Follow up needed: Cardioversion on 01/10/2020, Cardiology follow up 02/05/2020, Pulmonology   Other Healthcare Providers Involved in Patient's Care: Patient Care Team:  Ricky Lerma MD as PCP - General (Family Medicine)  Ricky Lerma MD as Assigned PCP      Update since discharge: no change   Information from family, SNF, care coordination: Daughter helped provide history today     Post Discharge Medication Reconciliation: discharge medications reconciled, continue medications without change  Plan of care communicated with: patient and family    Objective:     Vitals:    01/02/20 0949   BP: 124/64   Pulse: 62   Temp: 98.3  F (36.8  C)   TempSrc: Oral   SpO2: 97%   Weight: (!) 234 lb 4 oz (106.3 kg)   Height: 6' 0.01\" (1.829 m)         Physical Exam:  General: Alert, cooperative, no distress, appears stated age.  Head: Normocephalic, without obvious abnormality, atraumatic.  Eyes: PERRL, conjunctiva/cornea clear, EOM's " intact  Ears: Normal TM's and external ear canals, both ears.  Nose: Nares normal, septum midline, mucosa normal, no drainage or sinus tenderness.  Throat: Lips, mucosa, and tongue normal; teeth and gums normal.  Neck: Supple, no cervical lymph node enlargement  Lungs: Expiratory biapical wheezing, bibasilar coarse rhonchi, respirations unlabored.  Heart: Regular rate and rhythm, S1 and S2 normal, no murmur, rub, or gallop.  Neurologic:  A & O x 3.  No tremor, no focal findings.  Normal gait.   Psychiatric: Normal affect, good eye contact, well-groomed  Skin: No rash or suspicious lesions noted on exposed skin, non-diaphoretic       Coding guidelines for this visit:  Type of Medical   Decision Making Face-to-Face Visit       within 7 Days of discharge Face-to-Face Visit        within 14 days of discharge   Moderate Complexity 19309 [id number]   High Complexity 59984 25603     DATA REVIEWED:  Additional History from Old Records Summarized (2): Reviewed [facility] 12/27/2019, 12/19/2019, and 12/13/2019 discharge summaries regarding admission for COPD exacerbation with acute respiratory failure with hypoxia and atrial fibrillation with rapid ventricular response. Reviewed [facility] 12/26/2019 note regarding evaluation for COPD exacerbation.  Decision to Obtain Records (1): none  Radiology Tests Summarized or Ordered (1): 12/26/2019 chest x-rays showed heterogenous opacities throughout the bilateral lungs with cardiomediastinal silhouette mildly enlarged.   Labs Reviewed or Ordered (1): Hospital labs reviewed. Labs ordered.  Medicine Test Summarized or Ordered (1): 12/09/2019 EKG showed atrial fibrillation with rapid ventricular response. 12/10/2019 echo showed normal LVEF of 68%.   Independent Review of EKG or X-RAY (2 each): none    Total Points: 5    Total time was 36 minutes, greater than 50% counseling and coordinating care regarding the above issues.    I, Yessica Mckeon, am scribing for and in the presence of,   Florentin.    I, Dr. Lerma, personally performed the services described in this documentation, as scribed by Yessica Mckeon in my presence, and it is both accurate and complete.     Please note that this clinical encounter uses voice recognition software, there may be typographical errors present.    Electronically signed by Chase 01/02/20 1221 pm

## 2021-06-04 NOTE — PROGRESS NOTES
Scheduled Follow Up Call: Attempt 1 Community Health Worker called and left a message for the patient. If the patient is returning my call, please transfer the patient to [facility] at ext. [phone].

## 2021-06-04 NOTE — TELEPHONE ENCOUNTER
Return call from patients son Katlyn, he states that pt's PMD would not fill out paperwork for him and he is not sure what he should do.    Reviewed with Ximena Smalls and she states ok to fill out paperwork for recent hospitalizations and upcoming cardioversion.'    Reviewed fax number with Katlyn and will complete paperwork and return to his employer.

## 2021-06-04 NOTE — PROGRESS NOTES
Second attempt to reach patient for hospital discharge follow up.  No answer.  Left VM message reminding pt of appt today. RN has made two unsuccessful attempts to reach patient.   Encounter closed.

## 2021-06-04 NOTE — TELEPHONE ENCOUNTER
CMT called daughter and explained PA process.       levalbuterol (XOPENEX) 1.25 mg/3 mL nebulizer solution 90 mL 0 12/27/2019  No   Sig - Route: Take 3 mL (1.25 mg total) by nebulization every 6 (six) hours as needed for wheezing. Take every 6 hours for 2-3 days, and when you are feeling better can change back to every 6 hours as needed - Nebulization   Class: Historical Med     Needs PA.

## 2021-06-04 NOTE — TELEPHONE ENCOUNTER
Does PCP understand he does NOT have any 30 minute INF - only can DB for pt to be seen before scheduled apt?  Is this ok, is what CMT is asking.  Thanks.

## 2021-06-04 NOTE — TELEPHONE ENCOUNTER
"Call from daughter       CC:  Hallucinations - not had those before       > Call from daughter     > Reports that dad recently discharged from the hospital     Now at home, he is c/o \"Hallucinations and difficulty breathing\" - visual hallucinations reported        > She also mentions that his legs may be swelling     > He does not want to go to the ED   > Is conversant but reports the visual hallucinations         A/P:   >  Hallucinations are something that would need to be addressed in the ED         Dion Ann RN   Triage and Medication Refills        Reason for Disposition    Seeing or hearing or feeling things that are not there (i.e., auditory, visual, or tactile hallucinations)    Protocols used: CONFUSION - DELIRIUM-A-OH      "

## 2021-06-04 NOTE — TELEPHONE ENCOUNTER
FYI - Status Update  Who is Calling: Child  Update: Patient is in hospital at [facility] for AFIB, was admitted two days ago. Thanks.  Okay to leave a detailed message?:  No return call needed

## 2021-06-04 NOTE — TELEPHONE ENCOUNTER
Received MTM referral from MONICA     Patient was not reachable after several attempts, will route to MTM Pharmacist/Provider as an FYI. Left MTM scheduling information on patients voicemail.    Thank you for the referral,    See [facility] Pharmacy Coordinator

## 2021-06-04 NOTE — TELEPHONE ENCOUNTER
New Appointment Needed  What is the reason for the visit:    Inpatient/ED Follow Up Appt Request  At what hospital or facility were you seen?: [facility]   What is the reason you were seen?: acute Respiratory failure   What date were you admitted?: date: 12.16.19  What date were you discharged?: date: 12.19.19  What was the recommended timeframe for your follow up appointment?: 3 to 5 days   Provider Preference: Any available  How soon do you need to be seen?: within the time frame  Waitlist offered?: No  Okay to leave a detailed message:  Yes

## 2021-06-04 NOTE — PROGRESS NOTES
1955  Home:[phone] (home) Cell:[phone] (mobile)  Emergency Contact: Ethan Pederson     +++Important patient information for CSC/Cath Lab staff : PT IS ON XARELTO,PT HAS COPD, PT NEW ON AMIO+++    [facility] EP Cath Lab Procedure Order     Cardioversion:  Cardioversion     PT IS ON XARELTO AND NO MISSED DOSES    Diagnosis:  AF  Anticipated Case Duration:  Standard  Scheduling Needs/Timeframe:  PT IS SET FOR FRIDAY 1/10/2020 AT 12 WITH KALINA VERA CNP     Current Device: None None  Device Company/Device Rep Needed for Procedure: None    Anesthesia:  General-CV Only  Research Protocol:  No    [facility] EP Cath Lab Prep   Ordering Provider: Ximena Smalls  Ordering Date: 12/30/2019  Orders Status: Intial order placed and Order set placed    H&P:  Compled by XIMENA SMALLS CNP on 12/30/2019 if scheduled within 30 days, pt to schedule with PMD if procedure outside of this timeframe  PCP: Ricky Lerma MD, [phone]    Pre-op Labs: N/A for procedure    Medical Records Pertinent for Procedure:  N/A    Patient Education:    ALL INSTRUCTIONS REVIEWED WITH IVANIA HEREDIA   PT HAS A  FOR PROCEDURE THAT WILL STAY WITH HIM AND BE PRESENT FOR DISCHARGE AND POST CV 24 HE MONITORING   PT INSTRUCTED TO HOLD ANY VITAMINS, MINERALS, CALCIUM, IRON OR SUPPLEMENTS THE MORNING OF CV  PT INSTRUCTED NO GUM CHEWING, MINTS, OR CANDY THE MORNING OF CV  PT INSTRUCTED TO LEAVE JEWELRY AT HOME  PT INSTRUCTED TO BATHE OR SHOWER BEFORE COMING IN  PT INSTRUCTED PER XIMENA SMALLS CNP TO DECREASE HIS METOPROLOL TO 25 MG THE MORNING OF CV  PT HAS COPD  PT IS NEW ON AMIODARONE  PT IS ON XARELTO  PT IS TO HAVE 3 WEEK FOLLOW UP WITH XIMENA AND  HAS BEEN NOTIFIED.          Teach with Patient: Completed via phone on 1/6/2020    Risks Reviewed:     Cardioversion    >90% acute success rate, <10% failure to convert or   reverts shortly after cardioversion.    <1% embolic event of (CVA, pulmonary embolism, or   1. other  site).    75% risk for superficial burn.  Risks associated with general anesthesia will be addressed by the Anesthesiology Department    Pre-Procedure Instructions that were Reviewed with Patient:  NPO after midnight, Remove all jewelry prior to coming in for procedure, Shower prior to arrival, Transportation arrangements needed s/p procedure, Post-procedure follow up process and Sedation plan/orders    Pre-Procedure Medication Instructions:  Instructions given to pt regarding anticoagulants: Xarelto- instructed to continue anticoagulation uninterrupted through their procedure  Instructions given to pt regarding antiarrhythmic medication: Amiodarone; Pt instructed to continue medication prior to procedure  Instructions for medication, other than anticoagulants/antiarrhythmics listed above, given to pt: to take all morning medications with small sips of water, with the exception of OTC supplements and MVI    Allergies   Allergen Reactions     Symbicort [Budesonide-Formoterol] Rash       Current Outpatient Medications:      ALPRAZolam (XANAX XR) 1 MG 24 hr tablet, Take 1 tablet (1 mg total) by mouth 2 (two) times a day as needed., Disp: 10 tablet, Rfl: 0     amiodarone (PACERONE) 200 MG tablet, Take 200 mg by mouth 2 (two) times a day., Disp: , Rfl:      budesonide (PULMICORT FLEXHALER) 180 mcg/actuation inhaler, Inhale 1 puff 2 (two) times a day., Disp: , Rfl:      buPROPion (WELLBUTRIN XL) 150 MG 24 hr tablet, Take 1 tablet (150 mg total) by mouth Daily after lunch., Disp: , Rfl:      citalopram (CELEXA) 20 MG tablet, Take 1 tablet (20 mg total) by mouth Daily after lunch., Disp: , Rfl:      clobetasol (TEMOVATE) 0.05 % ointment, Apply to affected area twice daily, Disp: 30 g, Rfl: 3     clonazePAM (KLONOPIN) 0.5 MG tablet, Take 1 tablet (0.5 mg total) by mouth at bedtime as needed for anxiety., Disp: 30 tablet, Rfl: 0     diltiazem (CARDIZEM CD) 240 MG 24 hr capsule, Take 1 capsule (240 mg total) by mouth daily.,  Disp: 30 capsule, Rfl: 0     gabapentin (NEURONTIN) 300 MG capsule, Take 1 capsule (300 mg total) by mouth 4 (four) times a day., Disp: 360 capsule, Rfl: 2     levalbuterol (XOPENEX HFA) 45 mcg/actuation inhaler, Inhale 2 puffs every 4 (four) hours as needed for wheezing. Has atrial fibrillation  Cannot take albuterol, Disp: 1 Inhaler, Rfl: 6     levalbuterol (XOPENEX) 1.25 mg/3 mL nebulizer solution, Take 3 mL (1.25 mg total) by nebulization every 6 (six) hours as needed for wheezing. Take every 6 hours for 2-3 days, and when you are feeling better can change back to every 6 hours as needed, Disp: 90 mL, Rfl: 0     metoprolol tartrate 75 mg Tab, Take 75 mg by mouth 2 (two) times a day., Disp: 180 tablet, Rfl: 0     predniSONE (DELTASONE) 20 MG tablet, Take 40 mg by mouth daily for 5 days, THEN 20 mg daily for 5 days, THEN 10 mg daily for 5 days., Disp: 17.5 tablet, Rfl: 0     rivaroxaban (XARELTO) 20 mg tablet, Take 1 tablet (20 mg total) by mouth daily with supper., Disp: 30 tablet, Rfl: 0     rOPINIRole (REQUIP) 0.25 MG tablet, Take 1 tablet (0.25 mg total) by mouth at bedtime as needed (for restless legs. take 1-3 hours before bed)., Disp: 30 tablet, Rfl: 0     tiotropium-olodaterol (STIOLTO RESPIMAT) 2.5-2.5 mcg/actuation Mist inhaler, Inhale 2 Inhalation daily., Disp: , Rfl:     Documentation Date:1/6/2020 11:39 AM  Mike Guerra LPN

## 2021-06-04 NOTE — TELEPHONE ENCOUNTER
FYI - Status Update  Who is Calling: Child  Update: calling on status of getting an INF visit set up for her dad/patient. Please send a message through patient's [employer], as daughter checks this daily. Patient can come in anytime, and daughter would like for him to come in sooner within the time frame because he also needs medication refills, unknown which ones.   Okay to leave a detailed message?:  Yes, please message through [employer] as per daughter for some reason her phone number does not accept [employer] phone calls.

## 2021-06-04 NOTE — TELEPHONE ENCOUNTER
----- Message from Mey Heath sent at 12/31/2019 12:36 PM CST -----  Regarding: incoming fax info  General phone call:    Caller: Katlyn (son)    Primary cardiologist: Ximena Smalls    Detailed reason for call: Katlyn states he faxed [facility] papers over to the [phone] fax number, but he forgot to include his name (that is, who fax was from). Please call back if questions.     Best phone number: [phone] (Katlyn)    Best time to contact: Any    Ok to leave a detailed message? Yes    Device? No

## 2021-06-04 NOTE — TELEPHONE ENCOUNTER
Central PA team  [phone]  Pool: HE PA MED ([id number])          PA has been initiated.       PA form completed and faxed insurance via Cover My Meds     Key:  [id number]     Medication:  LEVALBUTEROL INHALER 45MCG/ACT    Insurance:  [employer]        Response will be received via fax and may take up to 5-10 business days depending on plan

## 2021-06-04 NOTE — PATIENT INSTRUCTIONS - HE
COPD with acute hypoxic respiratory failure:     Finish steroid taper and oral antibiotics.    Continue current medications.    Use Xopenex nebs as needed for shortness of breath or wheezing.    Prescription was sent for Xopenex inhaler.     Dr. Lerma will work on prescription for Pulmicort.    Follow up with Pulmonology as planned.    Atrial fibrillation with rapid ventricular response:     Cardioversion is scheduled for 01/10/2020.    Continue current medications.    Follow up with Cardiology as planned.    Anxiety with insomnia:     Start Klonopin 0.5 mg, take 1 tablet at bedtime as needed for anxiety.    Hypertension:     BMP lab ordered.    Hyperkalemia:     Potassium will be rechecked today.     Neck/back/leg pain:     Continue current dose of gabapentin.    Given recent multiple medication changes, we will hold off on any medication changes until his next follow up visit.    Dermatitis:     This is likely an allergic reaction to a medication.     Prescription sent for clobetasol ointment, apply twice a day to affected areas.    Follow up:     Return to see Dr. Lerma in 3 weeks.      Follow up with Pulmonology as scheduled on 02/07/2020.    Follow up with Cardiology as scheduled on 02/05/2020.

## 2021-06-04 NOTE — TELEPHONE ENCOUNTER
Please advise below the levalbuterol neb soln is in Jean-Pierre's PA request but the patient message states the inhaler is what needs a PA.  Submitting PA for Inhaler, not the neb soln.

## 2021-06-04 NOTE — TELEPHONE ENCOUNTER
Pt can come at any time, but per your schedule - you only have a couple of SD and no 30 minutes openings.  Would PCP like pt to be DB for INF 30 minutes?  Thanks.

## 2021-06-04 NOTE — TELEPHONE ENCOUNTER
Orders being requested: Skilled nursing 1 time a week for 4 weeks and 1 time a week every other week for 4 weeks  Reason service is needed/diagnosis: COPD   When are orders needed by: as soon as possible   Where to send Orders: Phone:  [phone]  Okay to leave detailed message?  Yes

## 2021-06-04 NOTE — PROGRESS NOTES
Pt seen by Ximena 12/30 new on amio on Xarelto CV ordered no device  Spoke to son Katlyn pt is pre booked for 1/10 no missed doses start date 12/13  Plan teach 1/6 they are aware and have my direct number for contact

## 2021-06-04 NOTE — PROGRESS NOTES
Potential [facility] Enrollment Outreach Call: Attempt 2    Community Health Worker called and CHW was unable to leave a message due to the phone number not working.  If the patient is returning my call, please transfer the patient to [facility] at ext. [phone].       Patient has been mailed a unreachable letter and was provided with CHW contact information if they are interested in accessing Clinic Care Coordination.      Order for Care Management has been closed, no further outreach will be done at this time and patient can be re-referred.

## 2021-06-04 NOTE — PROGRESS NOTES
Per chart review patient is currently admitted at [facility].CHW will outreach on Monday 12/30/2019.

## 2021-06-05 NOTE — PROGRESS NOTES
Pt is new on amiodarone and is set for CV Friday 1/10/2020  Pt's son Katlyn  was called for CV teach and at that time reviewed amio protocol  Pt has baseline labs, will get next lab draw at post CV follow up  Pt had PFT's in 7/19  Reviewed sunscreen usage   Reviewed to call if any cough, shortness of breath, rash, tremors, visual issues  They have my direct number for contact and understands and agrees to plan

## 2021-06-05 NOTE — PLAN OF CARE
Problem: Irregular heart rhythm  Goal: Heart rate is controlled  or atrial fibrillation is converted to sinus rhythm  Outcome: Progressing     Problem: Potential for hemodynamic instability  Goal: Cardiac output is adequate  Outcome: Progressing     Problem: Risk for clot formation with irregular heart rhythm  Goal: Patient remains free of clots in atrium  Outcome: Progressing   ECG confirmed Sinus rhythm.  Mary RUSSELL NP notified.

## 2021-06-05 NOTE — TELEPHONE ENCOUNTER
Prior Authorization Request  Who s requesting:  Pharmacy  Pharmacy Name and Location: [facility]  Medication Name: Nicotrol NS 10mg/ml solution  Insurance Plan: Medicaid  Insurance Member ID Number:  [id number]  CoverMyMeds Key: [id number]  Informed patient that prior authorizations can take up to 10 business days for response:   No  Okay to leave a detailed message: No

## 2021-06-05 NOTE — PROCEDURES
Procedures    Cardioversion canceled.  Patient arrived in sinus rhythm.  Ki Pederson is a 64-year-old gentleman with a history of COPD, CAD, labile hypertension, and atrial fibrillation.  He had recurrence of atrial fibrillation with rapid ventricular response in December 2019 for which he was started on Xarelto for stroke prophylaxis.  He was hospitalized again at which point he was started on amiodarone.  Was hospitalized the third time and diagnosed with Haemophilus influenza and started on antibiotics.  He has a [id number] score of 4 for CVA, CAD, and hypertension.  Continue Xarelto 20 mg daily with a full meal for stroke prophylaxis.  He presented today for cardioversion in sinus rhythm.  EKG was personally reviewed, shows sinus rhythm at 61 bpm, QT/QTc interval measures 448/450 ms.  Continue amiodarone 200 mg daily, though this is not a good option long-term.  Discontinue metoprolol.  Continue other medications as listed.  Follow-up in clinic with Dr. Boyer on 2/5/2020.

## 2021-06-05 NOTE — TELEPHONE ENCOUNTER
Orders being requested:     1. Skilled Nursing      Increase visits 2 x's a week x's  6 weeks    2.  - Evaul    Reason service is needed/diagnosis: Increase Respiratory difficulties, family is requesting this to keep him out of the hospital.    When are orders needed by:   ASACORTNEY    Where to send Orders:   Phone:  [phone]     Okay to leave detailed message?    Yes    Please call with verbal orders.

## 2021-06-05 NOTE — PROGRESS NOTES
ASSESSMENT AND PLAN:  1. Atrial fibrillation with rapid ventricular response (H)  Patient is in sinus rhythm.  Refilled Xarelto he has not been able to  his diltiazem because of a pharmacy shortage.  Taking amiodarone  - rivaroxaban ANTICOAGULANT (XARELTO) 20 mg tablet; Take 1 tablet (20 mg total) by mouth daily with supper.  Dispense: 30 tablet; Refill: 12    2. Anxiety  He is taking Lexapro, taking Klonopin I like him to titrate the dose of medication down he will take 1 tablet every alternate day.  - clonazePAM (KLONOPIN) 0.5 MG tablet; Take 1 tablet (0.5 mg total) by mouth at bedtime as needed for anxiety.  Dispense: 30 tablet; Refill: 0    3. Primary insomnia    - clonazePAM (KLONOPIN) 0.5 MG tablet; Take 1 tablet (0.5 mg total) by mouth at bedtime as needed for anxiety.  Dispense: 30 tablet; Refill: 0    4. Neuropathy  Continue gabapentin at current dose  - gabapentin (NEURONTIN) 300 MG capsule; Take 1 capsule (300 mg total) by mouth 4 (four) times a day.  Dispense: 360 capsule; Refill: 2    5. COPD exacerbation (H)  He did improve dramatically with the amoxicillin and given a supplemental prescription in case his symptoms recur currently he is wheezing but has no shortness of breath chest tightness or fatigue.  - amoxicillin (AMOXIL) 875 MG tablet; Take 1 tablet (875 mg total) by mouth 2 (two) times a day for 10 days.  Dispense: 20 tablet; Refill: 0            No orders of the defined types were placed in this encounter.    Medications Discontinued During This Encounter   Medication Reason     rivaroxaban ANTICOAGULANT (XARELTO) 20 mg tablet Reorder     clonazePAM (KLONOPIN) 0.5 MG tablet Reorder     gabapentin (NEURONTIN) 300 MG capsule Reorder       Return in about 2 months (around 3/23/2020) for Recheck.    CHIEF COMPLAINT:  Chief Complaint   Patient presents with     COPD       HISTORY OF PRESENT ILLNESS:  Ki is a 64 y.o. male with ASCVD, COPD with acute respiratory failure, newly diagnosed  "atrial fibrillation with RVR, hypertension, depression, anxiety, insomnia, epilepsy, lumbar disc degeneration, neuropathy, restless leg syndrome, and chewing tobacco use,who presents to the clinic today for follow up on COPD. Ki is present with his daughter today. He started having productive cough with shortness of breath recently. The patient took amoxicillin for about a week that his daughter had on hand with significant improvement in his breathing. There has been no abdominal pain, nausea, emesis, or diarrhea.    They are wondering which of his medications are for blood pressure control. His blood pressure is elevated today though Ki notably has been out of diltiazem for several days now. His pharmacy was unable to fill it until tomorrow due to shortage, and they refused to send it to another pharmacy. He sometimes gets shaky but otherwise denies any dizziness. The patient is taking his amiodarone faithfully.    They are wondering how long he needs to stay on his Xarelto. His cardioversion on 2020 was cancelled as Ki arrived in normal sinus rhythm.    REVIEW OF SYSTEMS:   Respiratory: Improved cough and wheezing.  GI: No abdominal pain, nausea, emesis, or diarrhea.  Neuro: Shakiness but no dizziness.   All other 10 point review of systems are negative.    PFSH:  He is accompanied by his daughter today. He has not been smoking. Reviewed as below.     TOBACCO USE:  Social History     Tobacco Use   Smoking Status Former Smoker     Packs/day: 0.00     Last attempt to quit: 5/15/2018     Years since quittin.6   Smokeless Tobacco Current User       VITALS:  Vitals:    20 1047   BP: (!) 148/98   Patient Site: Right Arm   Patient Position: Sitting   Cuff Size: Adult Regular   Pulse: 80   Resp: 20   Temp: 98.4  F (36.9  C)   TempSrc: Oral   SpO2: 92%   Weight: (!) 231 lb 9 oz (105 kg)   Height: 6' 0.01\" (1.829 m)     Wt Readings from Last 3 Encounters:   20 (!) 231 lb 9 oz (105 kg) "   01/10/20 (!) 229 lb 5 oz (104 kg)   01/02/20 (!) 234 lb 4 oz (106.3 kg)     Body mass index is 31.4 kg/m .    PHYSICAL EXAM:  General: Alert, cooperative, no distress, appears stated age  Head: Normocephalic, without obvious abnormality, atraumatic, moist mucous membranes  Eyes: PERRL, conjunctiva/cornea clear, EOM's intact  Neck: Supple, no cervical lymph node enlargement   Lungs: Biapical inspiratory and expiratory wheezing, bases clear to auscultation bilaterally, respirations unlabored  Heart: Regular rate and rhythm, S1 and S2 normal, no murmur, rub, or gallop  Neurologic:  A & O x 3.  No tremor, no focal findings.  Normal gait.   Psychiatric: Normal affect, good eye contact, adequately groomed  Skin: No rash or suspicious lesions noted on exposed skin, non-diaphoretic    DATA REVIEWED:  Additional History from Old Records Summarized (2): none  Decision to Obtain Records (1): none  Radiology Tests Summarized or Ordered (1): none  Labs Reviewed or Ordered (1): none  Medicine Test Summarized or Ordered (1): none  Independent Review of EKG or X-RAY(2 each): none    Total time was 25 minutes, greater than 50% counseling and coordinating care regarding the above issues.     I, Yessica Mckeon, am scribing for and in the presence of, Dr. Lerma.    I, Dr. Lerma, personally performed the services described in this documentation, as scribed by Yessica Mckeon in my presence, and it is both accurate and complete.      MEDICATIONS:  Current Outpatient Medications   Medication Sig Dispense Refill     ALPRAZolam (XANAX XR) 1 MG 24 hr tablet Take 1 tablet (1 mg total) by mouth 2 (two) times a day as needed. 10 tablet 0     amiodarone (PACERONE) 200 MG tablet Take 1 tablet (200 mg total) by mouth daily. 90 tablet 1     amoxicillin (AMOXIL) 875 MG tablet Take 1 tablet (875 mg total) by mouth 2 (two) times a day for 10 days. 20 tablet 0     budesonide (PULMICORT FLEXHALER) 180 mcg/actuation inhaler Inhale 1 puff 2 (two) times a day.        buPROPion (WELLBUTRIN XL) 150 MG 24 hr tablet Take 1 tablet (150 mg total) by mouth Daily after lunch.       citalopram (CELEXA) 20 MG tablet Take 1 tablet (20 mg total) by mouth Daily after lunch.       clobetasol (TEMOVATE) 0.05 % ointment Apply to affected area twice daily 30 g 3     clonazePAM (KLONOPIN) 0.5 MG tablet Take 1 tablet (0.5 mg total) by mouth at bedtime as needed for anxiety. 30 tablet 0     diltiazem (CARDIZEM CD) 240 MG 24 hr capsule Take 1 capsule (240 mg total) by mouth daily. 30 capsule 0     gabapentin (NEURONTIN) 300 MG capsule Take 1 capsule (300 mg total) by mouth 4 (four) times a day. 360 capsule 2     levalbuterol (XOPENEX HFA) 45 mcg/actuation inhaler Inhale 2 puffs every 4 (four) hours as needed for wheezing. Has atrial fibrillation  Cannot take albuterol 1 Inhaler 6     levalbuterol (XOPENEX) 1.25 mg/3 mL nebulizer solution Take 3 mL (1.25 mg total) by nebulization every 6 (six) hours as needed for wheezing. Take every 6 hours for 2-3 days, and when you are feeling better can change back to every 6 hours as needed 90 mL 0     rivaroxaban ANTICOAGULANT (XARELTO) 20 mg tablet Take 1 tablet (20 mg total) by mouth daily with supper. 30 tablet 12     rOPINIRole (REQUIP) 0.25 MG tablet Take 1 tablet (0.25 mg total) by mouth at bedtime as needed (for restless legs. take 1-3 hours before bed). 30 tablet 0     tiotropium-olodaterol (STIOLTO RESPIMAT) 2.5-2.5 mcg/actuation Mist inhaler Inhale 2 Inhalation daily.       No current facility-administered medications for this visit.        Total Data Points: 0    Please note that this clinical encounter uses voice recognition software, there may be typographical errors present

## 2021-06-05 NOTE — PATIENT INSTRUCTIONS - HE
It was good to see you in clinic today. This is what we discussed:    1. Continue Stiolto 2 inhalations once daily.  2. Continue Pulmicort one inhalation twice daily. Rinse, gargle, and spit water after use.  3. Use the levalbuterol (Xopenex) inhaler or nebulized as needed.  4. Let me know if you are interested in pulmonary rehabilitation in [place].  5. We will do a walking test to see if you need oxygen with activity.  6. I will contact [facility] to look into a BiPAP device to help with your breathing.  7. Try the nicotine nasal spray and work on quitting chewing tobacco. You can do it!  8. We will check in with a chest CT in about 3 months.  9. Call any time with questions or concerning symptoms.    Luis Rivera MD  Pulmonary and Critical Care Medicine  [facility]  Office [phone]

## 2021-06-05 NOTE — PATIENT INSTRUCTIONS - HE
COPD with recent acute exacerbation:     Standing prescription given for amoxicillin (AMOXIL) 875 MG tablet; Take 1 tablet (875 mg total) by mouth 2 (two) times a day for 10 days.      Continue current medications.    Avoid exposure to sick individuals.    Follow up with Pulmonology as planned.     Atrial fibrillation with rapid ventricular response, currently in normal sinus rhythm:     Continue current medications.    Reviewed plan to stay on Xarelto for at least 1 year. This was refilled.    Follow up with Cardiology as planned.     Anxiety with insomnia:     Prescription given for Klonopin. Take 1 tablet at bedtime every alternate day.     Hypertension:     Resume diltiazem.      Neck/back/leg pain:     Continue current dose of gabapentin. This was refilled.     Follow up:     Return to see Dr. Lerma in 2 months.      Follow up with Pulmonology as scheduled on 02/07/2020.    Follow up with Cardiology as scheduled on 02/05/2020.

## 2021-06-05 NOTE — TELEPHONE ENCOUNTER
Medication Request  Medication name:   Ordered Dose: 40 mg Route: Intravenous Frequency: DAILY   Administration Dose: 40 mg Stability: 48 Hours     Scheduled Start Date/Time: 12/26/19 0900 End Date/Time: 12/27/19 1324 First Dose: As Scheduled      Order Status: Discontinued   Ordering User: Shoaib Phillips II, MD Ordering Date/Time: Thu Dec 26, 2019 0549   Ordering Provider: Shoaib Phillips II, MD Authorizing Provider: Shoaib Phillips II, MD   D/C User: Discharge Provider, Automatic D/C Date/Time: Fri Dec 27, 2019 1324   D/C Reason: Patient Discharge D/C Verified By: Not Verified         Order part of Order Set:  COPD EXACERBATION ORDERS HE    (Last 24 hours)     [facility]ed [facility]acy: [facility]  Reason for request: Patient is having an exacerbation, a prescription for an antibiotic was called in by not the Prednisone, that the patient takes with the anitbiotic .  Daughter is asking if Dr Lerma would sent in please. This daughter Kenyetta was not on the consent list.  Took information from her and will forward.  When did you use medication last?:  Recently I hospital and was taking  Patient offered appointment:  No  Okay to leave a detailed message: yes

## 2021-06-05 NOTE — PROGRESS NOTES
Oxygen saturation walk test    Patient oxygen saturation on RA at rest is 89%.  Oxygen saturation while ambulating 300ft on RA is 92%.

## 2021-06-05 NOTE — TELEPHONE ENCOUNTER
Pt was to have a cardioversion completed but it was canceled as pt was in NSR.  Pt was placed on Xarelto for a stroke the end of 2019.  Pt has apt with PCP for Thursday.  Will queue up meds in the event covering provider wishes to fill.  Thanks.

## 2021-06-05 NOTE — PROGRESS NOTES
Discharge home.  Verbalized understanding stop Metoprolol and decrease Amiodarone to daily.  No questions.  Follow up with Dr Boyer next month.

## 2021-06-05 NOTE — PROGRESS NOTES
Pulmonary Clinic Follow-up Visit    Assessment and Plan:   65 year old male with a history of tobacco smoking in remission, IVDU in remission, ongoing chewing tobacco dependence, hepatitis C without cirrhosis, COPD, colon polyps, TIA, HTN, atrial fibrillation with RVR, C-spine surgery, depression, RLS, seizure disorder, lumbar disc disease, atrial fibrillation with RVR in the and hemangiomas requiring surgical removal, presenting for follow-up.     COPD, pulmonary nodule, chewing tobacco dependence: GOLD group D. FEV1 1.24 L (34% predicted) and DLCO 55% predicted. A1AT genotype normal at MM. Since I saw him in clinic in November, he was admitted to the hospital 3 times in December for AECOPD and AF with RVR (the latter a new diagnosis), started on rate control and anticoagulation. During one of his hospitalizations he was diagnosed with Haemophilus influenzae pneumonia. He continues to cough occasionally with minimal phlegm. Dyspnea is improving, worsens with exposure to kitchen fumes, for example. He was changed to levalbuterol in the hospital given his new diagnosis and subsequent recurrence of AF with RVR. Unfortunately, though he remains abstinent from smoking cigarettes, he continues to use chewing tobacco, though he is interested in quitting; nicotine patch, gum, and inhaler were ineffective but he wants to try the nicotine nasal spray. Chest CT from November 2019 showed that the RLL nodular opacity continued to decrease in size; it was minimally FDG-avid in early August 2019.     Plan:  - start nicotine nasal spray and quit using chewing tobacco  - continue tiotropium-olodaterol respimat 2.5-2.5 mcg two inhalations daily  - continue budesonide flexhaler 180 mcg one inhalation two times a day; rinse/gargle/spit water after use  - levalbuterol HFA or nebulized as needed  - he is not currently interested in pulmonary rehabilitation but will review the brochure for the [facility]  - action  plan: prednisone 12-day taper and azithromycin 5-day course to have on hand in case of exacerbations  - annaul influenza vaccination; received for this season  - up to date on pneumococcal vaccination  - follow up in 3 months with a final surveillance chest CT  - encouraged him to call any time with questions or concerning symptoms     I appreciate the opportunity to participate in the care of Mr. Pederson. Please feel free to contact me at any time.    Luis Rivera MD  Pulmonary and Critical Care Medicine  [facility]  Cell [phone]  Office [phone]  Pager [phone]    CCx: COPD, pulmonary nodule, chewing tobacco dependence    HPI: 65 year old male with a history of tobacco smoking in remission, IVDU in remission, ongoing chewing tobacco dependence, hepatitis C without cirrhosis, COPD, TIA, HTN, atrial fibrillation with RVR, C-spine surgery, depression, RLS, seizure disorder, lumbar disc disease, atrial fibrillation with RVR in the and hemangiomas requiring surgical removal, presenting for follow-up. 65 year old male with a history of tobacco smoking in remission, IVDU in remission, ongoing chewing tobacco dependence, hepatitis C without cirrhosis, COPD, TIA, HTN, atrial fibrillation with RVR, C-spine surgery, depression, RLS, seizure disorder, lumbar disc disease, atrial fibrillation with RVR in the and hemangiomas requiring surgical removal, presenting for follow-up.     COPD, pulmonary nodule, chewing tobacco dependence: GOLD group D. FEV1 1.24 L (34% predicted) and DLCO 55% predicted. A1AT genotype normal at MM. Since I saw him in clinic in November, he was admitted to the hospital 3 times in December for AECOPD and AF with RVR (the latter a new diagnosis), started on rate control and anticoagulation. During one of his hospitalizations he was diagnosed with Haemophilus influenzae pneumonia. He continues to cough occasionally with minimal phlegm. Dyspnea is improving, worsens with  exposure to kitchen fumes, for example. He was changed to levalbuterol in the hospital given his new diagnosis and subsequent recurrence of AF with RVR. Unfortunately, though he remains abstinent from smoking cigarettes, he continues to use chewing tobacco, though he is interested in quitting; nicotine patch, gum, and inhaler were ineffective but he wants to try the nicotine nasal spray. Chest CT from 2019 showed that the RLL nodular opacity continued to decrease in size; it was minimally FDG-avid in early 2019.    ROS:  A 12-system review was obtained and was negative with the exception of the symptoms endorsed in the history of present illness.    PMH:  Former smoker  Hepatitis C without cirrhosis  IVDU in remission  Chewing tobacco dependence  COPD  TIA  HTN  AF  C-spine surgery  MDD  RLS  seizure disorder  Lumbar disc disease  Hemangiomas requiring resection  Colon polyps  AF with RVR    PSH:  Past Surgical History:   Procedure Laterality Date     CERVICAL FUSION      C6 and C7       Allergies:  Allergies   Allergen Reactions     Symbicort [Budesonide-Formoterol] Rash       Family HX:  No family history on file.    Social Hx:  Social History     Socioeconomic History     Marital status:      Spouse name: Not on file     Number of children: Not on file     Years of education: Not on file     Highest education level: Not on file   Occupational History     Not on file   Social Needs     Financial resource strain: Not on file     Food insecurity:     Worry: Not on file     Inability: Not on file     Transportation needs:     Medical: Not on file     Non-medical: Not on file   Tobacco Use     Smoking status: Former Smoker     Packs/day: 0.00     Last attempt to quit: 5/15/2018     Years since quittin.7     Smokeless tobacco: Current User   Substance and Sexual Activity     Alcohol use: Not Currently     Drug use: Not Currently     Sexual activity: Not on file   Lifestyle     Physical  activity:     Days per week: Not on file     Minutes per session: Not on file     Stress: Not on file   Relationships     Social connections:     Talks on phone: Not on file     Gets together: Not on file     Attends [employer] service: Not on file     Active member of club or organization: Not on file     Attends meetings of clubs or organizations: Not on file     Relationship status: Not on file     Intimate partner violence:     Fear of current or ex partner: Not on file     Emotionally abused: Not on file     Physically abused: Not on file     Forced sexual activity: Not on file   Other Topics Concern     Not on file   Social History Narrative     Not on file       Current Meds:  Current Outpatient Medications   Medication Sig Dispense Refill     ALPRAZolam (XANAX XR) 1 MG 24 hr tablet Take 1 tablet (1 mg total) by mouth 2 (two) times a day as needed. 10 tablet 0     amiodarone (PACERONE) 200 MG tablet Take 1 tablet (200 mg total) by mouth daily. 90 tablet 1     budesonide (PULMICORT FLEXHALER) 180 mcg/actuation inhaler Inhale 1 puff 2 (two) times a day.       buPROPion (WELLBUTRIN XL) 150 MG 24 hr tablet Take 1 tablet (150 mg total) by mouth Daily after lunch.       citalopram (CELEXA) 20 MG tablet Take 1 tablet (20 mg total) by mouth Daily after lunch.       clobetasol (TEMOVATE) 0.05 % ointment Apply to affected area twice daily 30 g 3     clonazePAM (KLONOPIN) 0.5 MG tablet Take 1 tablet (0.5 mg total) by mouth at bedtime as needed for anxiety. 30 tablet 0     diltiazem (CARDIZEM CD) 240 MG 24 hr capsule Take 1 capsule (240 mg total) by mouth daily. 30 capsule 0     gabapentin (NEURONTIN) 300 MG capsule Take 1 capsule (300 mg total) by mouth 4 (four) times a day. 360 capsule 2     levalbuterol (XOPENEX HFA) 45 mcg/actuation inhaler Inhale 2 puffs every 4 (four) hours as needed for wheezing. Has atrial fibrillation  Cannot take albuterol 1 Inhaler 6     levalbuterol (XOPENEX) 1.25 mg/3 mL nebulizer solution  Take 3 mL (1.25 mg total) by nebulization every 6 (six) hours as needed for wheezing. Take every 6 hours for 2-3 days, and when you are feeling better can change back to every 6 hours as needed 90 mL 0     rivaroxaban ANTICOAGULANT (XARELTO) 20 mg tablet Take 1 tablet (20 mg total) by mouth daily with supper. 30 tablet 12     rOPINIRole (REQUIP) 0.25 MG tablet Take 1 tablet (0.25 mg total) by mouth at bedtime as needed (for restless legs. take 1-3 hours before bed). 30 tablet 0     tiotropium-olodaterol (STIOLTO RESPIMAT) 2.5-2.5 mcg/actuation Mist inhaler Inhale 2 Inhalation daily. 4 g 11     azithromycin (ZITHROMAX) 250 MG tablet I case of flare-up, take 2 pills on day 1, then 1 pill daily for 4 days. 6 tablet 11     nicotine (NICOTROL) 10 mg/mL Spry 2 sprays in each nostril every hour as needed for craving 40 mL 11     predniSONE (DELTASONE) 10 mg tablet In case of flare-up, take 4 pills daily x 3 days, then 3 pills daily x 3 days, then 2 pills daily x 3 days, then 1 pill daily x 3 days.. 30 tablet 11     No current facility-administered medications for this visit.        Physical Exam:  /88   Pulse 94   Ht 6' (1.829 m)   Wt (!) 230 lb (104.3 kg)   SpO2 91%   BMI 31.19 kg/m    Gen: alert, oriented, no distress  HEENT: nasal turbinates are unremarkable, no oropharyngeal lesions, no cervical or supraclavicular lymphadenopathy  CV: RRR, no M/G/R  Resp: CTAB, no focal crackles or wheezes  Abd: soft, nontender, no palpable organomegaly  Skin: no apparent rashes  Ext: no cyanosis, clubbing or edema  Neuro: alert, nonfocal    Labs:  reviewed    Labs:  A1AT genotype MM     Imaging studies:  Low-dose screening chest CT (7/11/19):  - images directly reviewed, formal interpretation follows:  FINDINGS:  LUNGS AND PLEURA: There is diffuse bronchial wall thickening. There is debris in the airways. There is no significant bronchiectasis. There is a confluent opacity in the lateral right mid to lower lung centered in  the region of the right major fissure.   There is mild architectural distortion in this region. The opacity is 19 mm x 20 mm and is adjacent to an old healed right rib fracture (series 3 image 168).      MEDIASTINUM: The heart is normal in size. Normal esophagus. No thoracic lymphadenopathy. The main pulmonary artery is 32 mm in diameter.      CORONARY ARTERY CALCIFICATION: Mild.     LIMITED UPPER ABDOMEN: Negative.     MUSCULOSKELETAL: There is an old healed fracture of the right lateral seventh rib.      IMPRESSION:   CONCLUSION:  1.  A 2 cm opacity in the peripheral right mid to lower lung could represent an infectious or inflammatory process in a region of bronchiolectasis and scarring. Malignancy is not excluded. Recommend follow-up chest CT in 3 months.  2.  Bronchial wall thickening and mucous plugging can be seen with bronchitis.  3.  The main pulmonary artery is minimally enlarged, which could be seen with pulmonary hypertension.     RADIOLOGIST RECOMMENDATION: Recommend followup chest LDCT in 1 month.     LungRADS CATEGORY: 4B: Suspicious.     SIGNIFICANT INCIDENTAL FINDINGS: Negative.     PET-CT (8/1/19):  - images directly reviewed, formal interpretation follows:  FINDINGS: 1.0 x 0.7 cm markedly FDG avid (SUVmax 11.4) soft tissue attenuation structure in the left anterolateral wall of the low rectum about 7 cm above the anal verge, significantly exceeding the remainder of the colon, which is essentially non-FDG   avid.     1.8 x 0.9 cm minimally FDG avid (SUVmax 2.2) pulmonary opacity in the right lower lobe lateral basilar segment has decreased from 2.0 x 1.9 cm on 07/11/2019. FDG activity in the remainder of the body is normal.     Mucous in the trachea. Cholelithiasis. Moderate calcified atherosclerosis. Mildly enlarged prostate. Pelvic phleboliths. Sigmoid diverticulosis. C6-C7 interspinous pacer. Mild degenerative change throughout the spine.     IMPRESSION:   CONCLUSION:  1.  Incidental focus of  marked FDG activity in the rectum is somewhat nonspecific, but adenomatous polyps and rectal cancer can have this appearance at PET CT. Recommend flexible sigmoidoscopy.  2.  Minimally FDG avid right lower lobe pulmonary opacity has decreased in size slightly, suggesting an infectious or inflammatory etiology, though CT follow-up to complete resolution or scarring is recommended, beginning 2-3 months from now.     CT chest (11/6/19):  - images directly reviewed, formal interpretation follows:  FINDINGS:   LUNGS AND PLEURA: There has been slight decrease in both size and the density of the triangular pleural-based opacity lateral right lower lobe on image 111; now measuring 1.5 x 1.5 cm, previously measuring 2 x 1.9 cm and being more solid in appearance.   This appeared inflammatory on the recent PET/CT and inflammatory focus or site of scarring/rounded atelectasis still favored.  Tiny benign triangular nodule along right minor fissure. No new worrisome nodule.     Mild inflammatory bronchial wall thickening present at lung bases similar to previous exam. There is mucus debris layering dependently within the mainstem bronchi and trachea.     MEDIASTINUM/AXILLAE: No lymphadenopathy. No thoracic aortic aneurysm.     UPPER ABDOMEN: Cholelithiasis.     MUSCULOSKELETAL: Unremarkable.     IMPRESSION:   1.  Slight decrease size and density of the triangular pleural-based opacity lateral right lower lobe most consistent with evolving peripheral scar. Suggest a follow-up exam July 2020 to document long-term stability.  2.  Inflammatory bronchial wall thickening and endoluminal mucous debris present.    TTE (December 2019):    Technical Quality: limited visualization    Left ventricle ejection fraction is normal. The calculated left ventricular ejection fraction is 68%.    Normal right ventricular size and systolic function.    No hemodynamically significant valvular heart abnormalities.    No previous study for  comparison.     PFT's (June 2019):  FEV1/FVC is 0.46 and is reduced.  FEV1 is 1.24 L (34% predicted) and is reduced.  FVC is 2.69 L (57% predicted) and reduced.  There was improvement in spirometry after a single inhaled dose of bronchodilator.  TLC is 9.11 L (120% predicted) and is normal.  RV is 7.05 L (277% predicted) and is increased.  DLCO is 55% predicted and is reduced when it is corrected for hemoglobin.

## 2021-06-06 NOTE — TELEPHONE ENCOUNTER
MEDICATION APPEAL INITIATED     Faxed over letter of medical necessity, form for reconsideration, chart notes and original denial letter to [employer] [phone].

## 2021-06-06 NOTE — TELEPHONE ENCOUNTER
CMT called and left detailed message to let patient and daughter know per their request that Doxycycline and Levalbuterol was sent to [facility] as requested.

## 2021-06-06 NOTE — TELEPHONE ENCOUNTER
NOEMÍ - Status Update  Who is Calling: KRISTI Gilmore  Update: She would like to inform Dr. Lerma that the patients blood pressure is running very high.  01-30-20   161/104  02-06-20   166/108  02-10-20    172-104  Okay to leave a detailed message?:  Yes

## 2021-06-06 NOTE — TELEPHONE ENCOUNTER
Symptom    Describe your symptoms:   shortness of breath, Hx of COPD    Any pain: no    New/Ongoing: Ongoing     How long have you been having symptoms: many Year(s)    Have you been seen for this:  No     Appointment offered?: Patient declines     Triage offered?: Yes, declined     Home remedies tried: Neb, Meds    Requested Pharmacy:   [id number] [address] [place] [address]     Okay to leave a detailed message?   Yes    Requesting antibiotic.    Spoke with patient daughter who was harsh and demanding.

## 2021-06-06 NOTE — TELEPHONE ENCOUNTER
MELANIE unable to reach the pharmacy.     MELANIE left message for patient to call. When he does, please advise him per Dr. Lerma's note. Thanks.

## 2021-06-06 NOTE — TELEPHONE ENCOUNTER
Controlled Substance Refill Request  Medication Name:   Requested Prescriptions     Pending Prescriptions Disp Refills     clonazePAM (KLONOPIN) 0.5 MG tablet 30 tablet 0     Sig: Take 1 tablet (0.5 mg total) by mouth at bedtime as needed for anxiety.     Date Last Fill: 01/23/2020  Requested Pharmacy: [facility] #[id number] [facility] [address] [place] [address]  Submit electronically to pharmacy  Controlled Substance Agreement on file:   Encounter-Level CSA Scan Date:    There are no encounter-level csa scan date.        Last office visit:  02/07/2020

## 2021-06-06 NOTE — TELEPHONE ENCOUNTER
Central PA team  [phone]  Pool: HE PA MED ([id number])          PA has been initiated.       PA form completed and faxed insurance via Cover My Meds     Key:  CoverMyMeds Key: [id number]      Medication:  Nicotrol NS 10MG/ML solution      Insurance:  [place] MEDICAID        Response will be received via fax and may take up to 5-10 business days depending on plan

## 2021-06-06 NOTE — TELEPHONE ENCOUNTER
Refill Request  Did you contact pharmacy: No  Medication name:   Requested Prescriptions     Pending Prescriptions Disp Refills     predniSONE (DELTASONE) 10 mg tablet 30 tablet 11     Sig: In case of flare-up, take 4 pills daily x 3 days, then 3 pills daily x 3 days, then 2 pills daily x 3 days, then 1 pill daily x 3 days..     Who prescribed the medication: Ricky Lerma MD   Requested Pharmacy: [facility]  Is patient out of medication: n/a  Patient notified refills processed in 3 business days:  no  Okay to leave a detailed message: yes

## 2021-06-06 NOTE — TELEPHONE ENCOUNTER
Called saige Potts, to relay med was sent on 3/10 to [facility] in [place]. Thanks.

## 2021-06-06 NOTE — TELEPHONE ENCOUNTER
Refill Approved    Rx renewed per Medication Renewal Policy. Medication was last renewed on 01/20/20    Tamela Rangel, [facility] Triage/Med Refill 2/24/2020     Requested Prescriptions   Pending Prescriptions Disp Refills     diltiazem (CARDIZEM CD) 240 MG 24 hr capsule 30 capsule 0     Sig: Take 1 capsule (240 mg total) by mouth daily.       Calcium-Channel Blockers Protocol Passed - 2/20/2020  3:04 PM        Passed - PCP or prescribing provider visit in past 12 months or next 3 months     Last office visit with prescriber/PCP: Visit date not found OR same dept: 1/23/2020 Ricky Lerma MD OR same specialty: 1/23/2020 Ricky Lerma MD  Last physical: Visit date not found Last MTM visit: Visit date not found   Next visit within 3 mo: Visit date not found  Next physical within 3 mo: Visit date not found  Prescriber OR PCP: Dipak Otoole MD  Last diagnosis associated with med order: 1. Atrial fibrillation with rapid ventricular response (H)  - diltiazem (CARDIZEM CD) 240 MG 24 hr capsule; Take 1 capsule (240 mg total) by mouth daily.  Dispense: 30 capsule; Refill: 0    If protocol passes may refill for 12 months if within 3 months of last provider visit (or a total of 15 months).             Passed - Blood pressure filed in past 12 months     BP Readings from Last 1 Encounters:   02/07/20 138/88

## 2021-06-06 NOTE — TELEPHONE ENCOUNTER
PCP sent this med yesterday for concerns of shortness of breath. Can PCP speak to question below. Thanks.

## 2021-06-06 NOTE — TELEPHONE ENCOUNTER
Refill Approved    Rx renewed per Medication Renewal Policy. Medication was last renewed on 12/27/19.    Gabapentin filled 1/23/20 360/2    Erika Lo, [facility] Triage/Med Refill 3/3/2020     Requested Prescriptions   Pending Prescriptions Disp Refills     gabapentin (NEURONTIN) 300 MG capsule 360 capsule 2     Sig: Take 1 capsule (300 mg total) by mouth 4 (four) times a day.       Gabapentin/Levetiracetam/Tiagabine Refill Protocol  Passed - 3/3/2020  3:54 PM        Passed - PCP or prescribing provider visit in past 12 months or next 3 months     Last office visit with prescriber/PCP: 1/23/2020 Ricky Lerma MD OR same dept: 1/23/2020 Ricky Lerma MD OR same specialty: 1/23/2020 Ricky Lerma MD  Last physical: Visit date not found Last MTM visit: Visit date not found   Next visit within 3 mo: Visit date not found  Next physical within 3 mo: Visit date not found  Prescriber OR PCP: Ricky Lerma MD  Last diagnosis associated with med order: 1. Neuropathy  - gabapentin (NEURONTIN) 300 MG capsule; Take 1 capsule (300 mg total) by mouth 4 (four) times a day.  Dispense: 360 capsule; Refill: 2    2. COPD exacerbation (H)  - levalbuterol (XOPENEX) 1.25 mg/3 mL nebulizer solution; Take 3 mL (1.25 mg total) by nebulization every 6 (six) hours as needed for wheezing.  Dispense: 90 mL; Refill: 0    If protocol passes may refill for 12 months if within 3 months of last provider visit (or a total of 15 months).             levalbuterol (XOPENEX) 1.25 mg/3 mL nebulizer solution 90 mL 0     Sig: Take 3 mL (1.25 mg total) by nebulization every 6 (six) hours as needed for wheezing.       Albuterol/Levalbuterol Refill Protocol Passed - 3/3/2020  3:54 PM        Passed - PCP or prescribing provider visit in last year     Last office visit with prescriber/PCP: 1/23/2020 Ricky Lerma MD OR same dept: 1/23/2020 Ricky Lerma MD OR same specialty: 1/23/2020 Ricky Lerma MD Last physical: Visit date not found       Next appt within 3  mo: Visit date not found  Next physical within 3 mo: Visit date not found  Prescriber OR PCP: Ricky Lerma MD  Last diagnosis associated with med order: 1. Neuropathy  - gabapentin (NEURONTIN) 300 MG capsule; Take 1 capsule (300 mg total) by mouth 4 (four) times a day.  Dispense: 360 capsule; Refill: 2    2. COPD exacerbation (H)  - levalbuterol (XOPENEX) 1.25 mg/3 mL nebulizer solution; Take 3 mL (1.25 mg total) by nebulization every 6 (six) hours as needed for wheezing.  Dispense: 90 mL; Refill: 0    If protocol passes may refill for 6 months if within 3 months of last provider visit (or a total of 9 months). If patient requesting >1 inhaler per month refill x 6 months and have patient make appointment with provider.

## 2021-06-06 NOTE — TELEPHONE ENCOUNTER
2. Anxiety  He is taking Lexapro, taking Klonopin I like him to titrate the dose of medication down he will take 1 tablet every alternate day.  - clonazePAM (KLONOPIN) 0.5 MG tablet; Take 1 tablet (0.5 mg total) by mouth at bedtime as needed for anxiety.  Dispense: 30 tablet; Refill: 0

## 2021-06-06 NOTE — TELEPHONE ENCOUNTER
Medication Question or Clarification  Who is calling: pharmacy   What medication are you calling about (include dose and sig)?:    Disp Refills Start End    doxycycline (VIBRA-TABS) 100 MG tablet 20 tablet 0 3/3/2020 3/13/2020    Sig - Route: Take 1 tablet (100 mg total) by mouth 2 (two) times a day for 10 days. - Oral    Sent to pharmacy as: doxycycline hyclate 100 mg tablet (VIBRA-TABS)    E-Prescribing Status: Receipt confirmed by pharmacy (3/3/2020  4:03 PM CST)        Who prescribed the medication?: Ricky Lerma MD   What is your question/concern?: caller had questions on regards to the medication. Caller stated that they were expecting amoxicillin. Caller stated they had questions to if patient was suppose to take this with the Zpak as well.     [facility]  Okay to leave a detailed message?: Yes

## 2021-06-06 NOTE — TELEPHONE ENCOUNTER
Central PA team  [phone]  Pool: HE PA MED ([id number])          PA has been initiated.       PA form completed and faxed insurance via Cover My Meds     Key:   JOSE) - MLQ     Medication:  Levalbuterol HCl 1.25MG/3ML nebulizer solution    Insurance:  [place] Medicaid         Response will be received via fax and may take up to 5-10 business days depending on plan

## 2021-06-06 NOTE — TELEPHONE ENCOUNTER
Prior Authorization Request  Who s requesting:  Pharmacy  Pharmacy Name and Location: [facility]  Medication Name:    Disp Refills Start End    levalbuterol (XOPENEX) 1.25 mg/3 mL nebulizer solution 90 mL 3 3/3/2020     Sig - Route: Take 3 mL (1.25 mg total) by nebulization every 6 (six) hours as needed for wheezing. - Nebulization    Sent to pharmacy as: levalbuteroL 1.25 mg/3 mL solution for nebulization (XOPENEX)    E-Prescribing Status: Receipt confirmed by pharmacy (3/3/2020  4:06 PM CST)        Insurance Plan: [facility]   Insurance Member ID Number:  [id number]  CoverMyMeds Key: N/A  Informed patient that prior authorizations can take up to 10 business days for response:   No  Okay to leave a detailed message: Yes

## 2021-06-06 NOTE — TELEPHONE ENCOUNTER
Caller is pt's son (Katlyn).    Calling re pt's definite exposure to influenza.  Pt lives with daughter and grand-daughter.  Granddaughter was diagnosed with Influenza A yesterday.    No upper resp symptoms whatsoever for current pt.    Pt is very high risk.  Numerous medical conditions:  COPD  ASCVC  Hep C  Epilepsy  Pt also spent time in ICU two months ago for bacterial haemophilus influenzae.    Son's REQUEST:  Son requests prophylactic dose of Tamiflu, please, transmitted to pharmacy verified in chart.  Due to pt's severely high risk status, provider review is requested for Tamiflu dosing decision.  Thank you-    Son (Katlyn) requests a callback when Tamiflu Rx has been transmitted.  [phone].    Duyen Delacruz RN  Care Connection Triage     Reason for Disposition    Influenza EXPOSURE within last 72 hours (3 days) and exposed person is HIGH RISK (e.g., age > 64 years, pregnant, HIV+, chronic medical condition)    Protocols used: INFLUENZA EXPOSURE-A-OH

## 2021-06-07 NOTE — TELEPHONE ENCOUNTER
Prior Authorization Request  Who s requesting:  Pharmacy  Pharmacy Name and Location: [id number]  Medication Name: Levalbuterol HCI 1.25 mg/3 ml nebulizer solution  Insurance Plan: [place] Medicaid  Insurance Member ID Number:  [id number]  CoverMyMeds Key: [id number]  Informed patient that prior authorizations can take up to 10 business days for response:   N/A - electronic request   Okay to leave a detailed message: N/A - electronic request

## 2021-06-07 NOTE — PROGRESS NOTES
Second attempt to reach patient for hospital discharge follow up.  No answer.  Left VM message reminding pt of appt today and tele-visit tomorrow. RN has made two unsuccessful attempts to reach patient.   Encounter closed.

## 2021-06-07 NOTE — TELEPHONE ENCOUNTER
Unable to reach patient.  I need to verify which day and time works better for the patient for his appt.  He is scheduled on the 25th and the 26th of March.  Left  so patient could call back and confirm above. Once patient calls back and confirms please route back to me.

## 2021-06-07 NOTE — TELEPHONE ENCOUNTER
Central PA team  [phone]  Pool: HE PA MED ([id number])          PA has been initiated.       PA form completed and faxed insurance via Cover My Meds     Key:  [id number]     Medication:  Levalbuterol HCI 1.25 mg/3 ml nebulizer solution    [place]        Response will be received via fax and may take up to 5-10 business days depending on plan

## 2021-06-07 NOTE — TELEPHONE ENCOUNTER
Received MTM referral from transition of care     Patient was not reachable after several attempts, will route to MTM Pharmacist/Provider as an FYI. Left MTM scheduling information on patients voicemail.    Thank you for the referral,    Liliana Daigle, MTM Coordinator

## 2021-06-07 NOTE — TELEPHONE ENCOUNTER
Controlled Substance Refill Request  Medication Name:   Requested Prescriptions     Pending Prescriptions Disp Refills     clonazePAM (KLONOPIN) 0.5 MG tablet 30 tablet 0     Sig: Take 1 tablet (0.5 mg total) by mouth at bedtime as needed for anxiety.     Date Last Fill: 3/3/20  [facility]ed [facility]acy: [facility]  Submit electronically to pharmacy  Controlled Substance Agreement on file:   Encounter-Level CSA Scan Date:    There are no encounter-level csa scan date.        Last office visit:  1/23/20

## 2021-06-07 NOTE — TELEPHONE ENCOUNTER
Called pt and left message to reschedule telephone apt.  With PCP.     Can PCP write for the Albuterol - is this for both neb solution AND inhaler?  Pt was on neb solution.  Thanks.

## 2021-06-07 NOTE — PROGRESS NOTES
Hospital discharge follow up call to pt, call wouldn't connect after two tries.  RN will attempt call back at another time.

## 2021-06-07 NOTE — TELEPHONE ENCOUNTER
Coronavirus (COVID-19) Notification    Reason for call  Notify of Negative COVID-19 lab result, assess symptoms,  review [facility] recommendations    Lab Result   Lab test 2019-nCoV rRt-PCR  Oropharyngeal AND/OR nasopharyngeal swabs were NEGATIVE for 2019-nCoV RNA    We have been unable to reach Patient by phone at this time to notify of their Negative COVID-19 result.  Unable to leave a voicemail message requesting a call back between 8A to 5P to [facility] at [phone].  (Weekends, this line is available from 10A to 6:30P)       [RN/LPN Name]  Kade Jones RN  Customer [facility] - [facility]  Emergency Dept Lab Result RN  Ph# [phone]'

## 2021-06-07 NOTE — PROGRESS NOTES
"Ki Pederson is a 65 y.o. male who is being evaluated via a billable telephone visit.      The patient has been notified of following:     \"This telephone visit will be conducted via a call between you and your physician/provider. We have found that certain health care needs can be provided without the need for a physical exam.  This service lets us provide the care you need with a short phone conversation.  If a prescription is necessary we can send it directly to your pharmacy.  If lab work is needed we can place an order for that and you can then stop by our lab to have the test done at a later time.    Telephone visits are billed at different rates depending on your insurance coverage. During this emergency period, for some insurers they may be billed the same as an in-person visit.  Please reach out to your insurance provider with any questions.    If during the course of the call the physician/provider feels a telephone visit is not appropriate, you will not be charged for this service.\"    Patient has given verbal consent to a Telephone visit? Yes    Patient would like to receive their AVS by AVS Preference: Mail a copy.    Additional provider notes:   65 year old male with a history of tobacco smoking in remission, IVDU in remission, ongoing chewing tobacco dependence, hepatitis C without cirrhosis, COPD, colon polyps, TIA, HTN, atrial fibrillation with RVR, C-spine surgery, depression, RLS, seizure disorder, lumbar disc disease, atrial fibrillation with RVR in the and     He missed the last telephone visit and has been confined to his home. He was last admitted for copd excerabation about 30 days  Ago.  He reports that he feels that depression  And anxiety are getting worse and he cannot meet with his family. He gets tired if plays with his grandchildren. No chest pain or wheezing noted. His restless leg and chronic back are responding to the gabapentin    Assessment/Plan:    1. Depression  Increasing " to 300 mg daily followup in1 month advised  Continue  celexa  At current dose  - buPROPion (WELLBUTRIN XL) 300 MG 24 hr tablet; Take 1 tablet (300 mg total) by mouth Daily after lunch.  Dispense: 30 tablet; Refill: 12    2. Encounter for smoking cessation counseling    - buPROPion (WELLBUTRIN XL) 300 MG 24 hr tablet; Take 1 tablet (300 mg total) by mouth Daily after lunch.  Dispense: 30 tablet; Refill: 12    3. Neuropathy  Increasing dose to 1500 mg daily    - gabapentin (NEURONTIN) 300 MG capsule; Take 2 capsules  In the morning and night and I in the afternoon  Dispense: 450 capsule; Refill: 2    4. Anxiety  He is  Anxious during the day, increasing dose, addictive potential medication discussed in detail.panic attacks are noted once every 2-3 days.  - clonazePAM (KLONOPIN) 0.5 MG tablet; Take 1 tablet (0.5 mg total) by mouth 2 (two) times a day as needed for anxiety.  Dispense: 60 tablet; Refill: 0    5. Primary insomnia    - clonazePAM (KLONOPIN) 0.5 MG tablet; Take 1 tablet (0.5 mg total) by mouth 2 (two) times a day as needed for anxiety.  Dispense: 60 tablet; Refill: 0    6. Chronic bilateral low back pain without sciatica  Gabapentin dose increased    7. Restless Legs Syndrome  Removed requip from list    8. Essential hypertension  Started on ace , pressures are reducing.    1:30-2.01 pm    Phone call duration:  31 minutes    Ricky Lerma MD

## 2021-06-07 NOTE — PROGRESS NOTES
Called patient's number with no response, left message at son's number. Patient has not called back from prior messages sent earlier in the week to confirm telephone visit.

## 2021-06-07 NOTE — TELEPHONE ENCOUNTER
I called.  His daughter, Nelsy, stated that BP was 154/103 today during nurse visit, and he is feeling OK.  He has appt with Dr. Lerma 5/21/20.  Continue current medications.  marti

## 2021-06-08 NOTE — TELEPHONE ENCOUNTER
Central PA team  [phone]  Pool: HE PA MED ([id number])          PA has been initiated.       PA form completed and faxed insurance via Cover My Meds     Key:  [id number]     Medication:  Nicotine inhaler    Insurance:  [employer]        Response will be received via fax and may take up to 5-10 business days depending on plan

## 2021-06-08 NOTE — TELEPHONE ENCOUNTER
Prior Authorization Request  Who s requesting:  Pharmacy  Pharmacy Name and Location: [facility] pharmacy,  Medication Name: levalbuterol nebulzer solution  Insurance Plan: Medicaid [place]  Insurance Member ID Number:  [id number]  CoverMyMeds Key: [id number]  Informed patient that prior authorizations can take up to 10 business days for response:   No  Okay to leave a detailed message: No         Patient has been on this since Jan 2020 due to afib with RVR.

## 2021-06-08 NOTE — PROGRESS NOTES
"Ki Pederson is a 65 y.o. male who is being evaluated via a billable video visit.      The patient has been notified of following:     \"This video visit will be conducted via a call between you and your physician/provider. We have found that certain health care needs can be provided without the need for an in-person physical exam.  This service lets us provide the care you need with a video conversation.  If a prescription is necessary we can send it directly to your pharmacy.  If lab work is needed we can place an order for that and you can then stop by our lab to have the test done at a later time.    Video visits are billed at different rates depending on your insurance coverage. Please reach out to your insurance provider with any questions.    If during the course of the call the physician/provider feels a video visit is not appropriate, you will not be charged for this service.\"    Patient has given verbal consent to a Video visit? Yes    Patient would like to receive their AVS by AVS Preference: Shelly.    Patient would like the video invitation sent by: Text to cell phone: [phone]      Video Start Time: 1140    Video-Visit Details    Type of service:  Video Visit    Video End Time (time video stopped): 1206  Originating Location (pt. Location): Home    Distant Location (provider location):  [facility] LUNG [place]     Platform used for Video Visit: [facility]    Pulmonary Clinic Virtual Follow-up Visit    Assessment and Plan:   65 year old male with a history of tobacco smoking in remission, IVDU in remission, ongoing chewing tobacco dependence, hepatitis C without cirrhosis, COPD, colon polyps, TIA, HTN, atrial fibrillation with RVR, C-spine surgery, depression, RLS, seizure disorder, lumbar disc disease, atrial fibrillation with RVR in the and hemangiomas requiring surgical removal, presenting for virtual follow-up.     Chronic obstructive pulmonary disease, pulmonary nodule, chewing tobacco " dependence: Picking up granddaughter causes a lot of dyspnea. Walking leads to dyspnea. Previously declined pulmonary rehab in [place] due to transportation issues but now states he will have transportation when pandemic restrictions are lifted. Minimal cough, generally not productive. Needed azithromycin and prednisone about 4 times the last 3 months, including the episode that led to hospitalization for a night in March. Has GOLD group D COPD. FEV1 1.24 L (34% predicted) and DLCO 55% predicted. A1AT genotype normal at MM. Remains abstinent from smoking cigarettes, but continues to chew tobacco. His insurance did not cover the nicotine nasal spray; will try to prescribe again and get prior authorization; nicotine patch, gum, and inhaler were ineffective in the past. Chest CT from November 2019 showed that the RLL nodular opacity continued to decrease in size; it was minimally FDG-avid in early August 2019; we had planned on a surveillance CT prior to this visit, but it was not performed due to pandemic restrictions on non-urgent imaging. With his frequent exacerbations, we discussed the option of starting roflumilast or thirce-weekly azithromycin; after discussing risks and benefits, including the potential for hearing loss, tinnitus, bacterial antibiotic resistance, GI upset, and arrhythmia, he would like to try thrice-weekly azithromycin.    Plan:   - try to get nicotine nasal spray covered by insurance  - advised chewing tobacco cessation  - start azithromycin 250 mg MWF  - continue tiotropium-olodaterol respimat 2.5-2.5 mcg two inhalations daily  - continue budesonide flexhaler 180 mcg one inhalation two times a day; rinse/gargle/spit water after use  - levalbuterol HFA or nebulized as needed  - enroll in pulmonary rehab in [place] when they reopen; referral ordered   - action plan: prednisone 12-day taper and doxycycline 5-day course to have on hand in case of exacerbations  - annual influenza  vaccination; received for this season  - up to date on pneumococcal vaccination  - follow up in 3 months with a final surveillance chest CT (delayed due to pandemic restrictions on non-urgent imaging)  - encouraged him to call any time with questions or concerning symptoms    Luis Rivera MD  Pulmonary and Critical Care Medicine  [facility]  Cell [phone]  Office [phone]  Pager [phone]    CCx: chronic obstructive pulmonary disease, pulmonary nodule, chewing tobacco dependence    HPI: 65 year old male with a history of tobacco smoking in remission, IVDU in remission, ongoing chewing tobacco dependence, hepatitis C without cirrhosis, COPD, colon polyps, TIA, HTN, atrial fibrillation with RVR, C-spine surgery, depression, RLS, seizure disorder, lumbar disc disease, atrial fibrillation with RVR in the and hemangiomas requiring surgical removal, presenting for follow-up. Picking up granddaughter causes a lot of dyspnea. Walking leads to dyspnea. Previously declined pulmonary rehab in [place] due to transportation issues but now states he will have transportation when pandemic restrictions are lifted. Minimal cough, generally not productive. Needed azithromycin and prednisone about 4 times the last 3 months, including the episode that led to hospitalization for a night in March. Has GOLD group D COPD. FEV1 1.24 L (34% predicted) and DLCO 55% predicted. A1AT genotype normal at MM. Remains abstinent from smoking cigarettes, but continues to chew tobacco. His insurance did not cover the nicotine nasal spray; will try to prescribe again and get prior authorization; nicotine patch, gum, and inhaler were ineffective in the past. Chest CT from November 2019 showed that the RLL nodular opacity continued to decrease in size; it was minimally FDG-avid in early August 2019; we had planned on a surveillance CT prior to this visit, but it was not performed due to pandemic restrictions on  non-urgent imaging. With his frequent exacerbations, we discussed the option of starting roflumilast or thirce-weekly azithromycin; after discussing risks and benefits, including the potential for hearing loss, tinnitus, bacterial antibiotic resistance, GI upset, and arrhythmia, he would like to try thrice-weekly azithromycin.    ROS:  A 12-system review was obtained and was negative with the exception of the symptoms endorsed in the history of present illness.    PMH:  Former smoker  Hepatitis C without cirrhosis  IVDU in remission  Chewing tobacco dependence  COPD  TIA  HTN  C-spine surgery  MDD  RLS  seizure disorder  Lumbar disc disease  Hemangiomas requiring resection  Colon polyps  AF with RVR    PSH:  Past Surgical History:   Procedure Laterality Date     CERVICAL FUSION      C6 and C7       Allergies:  Allergies   Allergen Reactions     Symbicort [Budesonide-Formoterol] Rash       Family HX:  No family history on file.    Social Hx:  Social History     Socioeconomic History     Marital status:      Spouse name: Not on file     Number of children: Not on file     Years of education: Not on file     Highest education level: Not on file   Occupational History     Not on file   Social Needs     Financial resource strain: Not on file     Food insecurity     Worry: Not on file     Inability: Not on file     Transportation needs     Medical: Not on file     Non-medical: Not on file   Tobacco Use     Smoking status: Former Smoker     Packs/day: 0.00     Last attempt to quit: 5/15/2018     Years since quittin.9     Smokeless tobacco: Current User   Substance and Sexual Activity     Alcohol use: Not Currently     Drug use: Not Currently     Sexual activity: Not on file   Lifestyle     Physical activity     Days per week: Not on file     Minutes per session: Not on file     Stress: Not on file   Relationships     Social connections     Talks on phone: Not on file     Gets together: Not on file     Attends  Orthodoxy service: Not on file     Active member of club or organization: Not on file     Attends meetings of clubs or organizations: Not on file     Relationship status: Not on file     Intimate partner violence     Fear of current or ex partner: Not on file     Emotionally abused: Not on file     Physically abused: Not on file     Forced sexual activity: Not on file   Other Topics Concern     Not on file   Social History Narrative     Not on file       Current Meds:  Current Outpatient Medications   Medication Sig Dispense Refill     acetaminophen (TYLENOL) 500 MG tablet Take 1-2 tablets (500-1,000 mg total) by mouth every 4 (four) hours as needed.  0     albuterol (PROAIR HFA;PROVENTIL HFA;VENTOLIN HFA) 90 mcg/actuation inhaler Inhale 2 puffs every 6 (six) hours as needed for wheezing.       albuterol (PROVENTIL) 2.5 mg /3 mL (0.083 %) nebulizer solution Take 2.5 mg by nebulization every 6 (six) hours as needed for wheezing.       amiodarone (PACERONE) 200 MG tablet Take 1 tablet (200 mg total) by mouth daily. 90 tablet 1     azithromycin (ZITHROMAX) 250 MG tablet I case of flare-up, take 2 pills on day 1, then 1 pill daily for 4 days. 6 tablet 11     budesonide (PULMICORT FLEXHALER) 180 mcg/actuation inhaler Inhale 1 puff 2 (two) times a day.       buPROPion (WELLBUTRIN XL) 300 MG 24 hr tablet Take 1 tablet (300 mg total) by mouth Daily after lunch. 30 tablet 12     citalopram (CELEXA) 20 MG tablet TAKE 1 TABLET (20 MG TOTAL) BY MOUTH DAILY. 90 tablet 3     clonazePAM (KLONOPIN) 0.5 MG tablet Take 1 tablet (0.5 mg total) by mouth 2 (two) times a day as needed for anxiety. 60 tablet 0     diltiazem (CARDIZEM CD) 240 MG 24 hr capsule Take 1 capsule (240 mg total) by mouth daily. 30 capsule 11     gabapentin (NEURONTIN) 300 MG capsule Take 2 capsules  In the morning and night and I in the afternoon 450 capsule 2     lisinopriL (PRINIVIL,ZESTRIL) 40 MG tablet Take 40 mg by mouth daily.       predniSONE (DELTASONE)  10 mg tablet In case of flare-up, take 4 pills daily x 3 days, then 3 pills daily x 3 days, then 2 pills daily x 3 days, then 1 pill daily x 3 days.       rivaroxaban ANTICOAGULANT (XARELTO) 20 mg tablet Take 1 tablet (20 mg total) by mouth daily with supper. 30 tablet 12     SPIRIVA RESPIMAT 2.5 mcg/actuation Mist INHALE TWO PUFFS BY MOUTH DAILY       tiotropium-olodaterol (STIOLTO RESPIMAT) 2.5-2.5 mcg/actuation Mist inhaler Inhale 2 Inhalation daily. 4 g 11     doxycycline (MONODOX) 100 MG capsule        levalbuterol (XOPENEX HFA) 45 mcg/actuation inhaler Inhale 2 puffs every 4 (four) hours as needed for wheezing. Has atrial fibrillation  Cannot take albuterol 1 Inhaler 6     No current facility-administered medications for this visit.        Physical Exam:  No exam due to telephone visit    Labs:  A1AT genotype MM     Imaging studies:  Low-dose screening chest CT (July 2019):  - images directly reviewed, formal interpretation follows:  FINDINGS:  LUNGS AND PLEURA: There is diffuse bronchial wall thickening. There is debris in the airways. There is no significant bronchiectasis. There is a confluent opacity in the lateral right mid to lower lung centered in the region of the right major fissure.   There is mild architectural distortion in this region. The opacity is 19 mm x 20 mm and is adjacent to an old healed right rib fracture (series 3 image 168).      MEDIASTINUM: The heart is normal in size. Normal esophagus. No thoracic lymphadenopathy. The main pulmonary artery is 32 mm in diameter.      CORONARY ARTERY CALCIFICATION: Mild.     LIMITED UPPER ABDOMEN: Negative.     MUSCULOSKELETAL: There is an old healed fracture of the right lateral seventh rib.      IMPRESSION:   CONCLUSION:  1.  A 2 cm opacity in the peripheral right mid to lower lung could represent an infectious or inflammatory process in a region of bronchiolectasis and scarring. Malignancy is not excluded. Recommend follow-up chest CT in 3  months.  2.  Bronchial wall thickening and mucous plugging can be seen with bronchitis.  3.  The main pulmonary artery is minimally enlarged, which could be seen with pulmonary hypertension.     RADIOLOGIST RECOMMENDATION: Recommend followup chest LDCT in 1 month.     LungRADS CATEGORY: 4B: Suspicious.     SIGNIFICANT INCIDENTAL FINDINGS: Negative.     PET-CT (August 2019):  - images directly reviewed, formal interpretation follows:  FINDINGS: 1.0 x 0.7 cm markedly FDG avid (SUVmax 11.4) soft tissue attenuation structure in the left anterolateral wall of the low rectum about 7 cm above the anal verge, significantly exceeding the remainder of the colon, which is essentially non-FDG   avid.     1.8 x 0.9 cm minimally FDG avid (SUVmax 2.2) pulmonary opacity in the right lower lobe lateral basilar segment has decreased from 2.0 x 1.9 cm on 07/11/2019. FDG activity in the remainder of the body is normal.     Mucous in the trachea. Cholelithiasis. Moderate calcified atherosclerosis. Mildly enlarged prostate. Pelvic phleboliths. Sigmoid diverticulosis. C6-C7 interspinous pacer. Mild degenerative change throughout the spine.     IMPRESSION:   CONCLUSION:  1.  Incidental focus of marked FDG activity in the rectum is somewhat nonspecific, but adenomatous polyps and rectal cancer can have this appearance at PET CT. Recommend flexible sigmoidoscopy.  2.  Minimally FDG avid right lower lobe pulmonary opacity has decreased in size slightly, suggesting an infectious or inflammatory etiology, though CT follow-up to complete resolution or scarring is recommended, beginning 2-3 months from now.     CT chest (November 2019):  - images directly reviewed, formal interpretation follows:  FINDINGS:   LUNGS AND PLEURA: There has been slight decrease in both size and the density of the triangular pleural-based opacity lateral right lower lobe on image 111; now measuring 1.5 x 1.5 cm, previously measuring 2 x 1.9 cm and being more solid in  appearance.   This appeared inflammatory on the recent PET/CT and inflammatory focus or site of scarring/rounded atelectasis still favored.  Tiny benign triangular nodule along right minor fissure. No new worrisome nodule.     Mild inflammatory bronchial wall thickening present at lung bases similar to previous exam. There is mucus debris layering dependently within the mainstem bronchi and trachea.     MEDIASTINUM/AXILLAE: No lymphadenopathy. No thoracic aortic aneurysm.     UPPER ABDOMEN: Cholelithiasis.     MUSCULOSKELETAL: Unremarkable.     IMPRESSION:   1.  Slight decrease size and density of the triangular pleural-based opacity lateral right lower lobe most consistent with evolving peripheral scar. Suggest a follow-up exam July 2020 to document long-term stability.  2.  Inflammatory bronchial wall thickening and endoluminal mucous debris present.     TTE (December 2019):    Technical Quality: limited visualization    Left ventricle ejection fraction is normal. The calculated left ventricular ejection fraction is 68%.    Normal right ventricular size and systolic function.    No hemodynamically significant valvular heart abnormalities.    No previous study for comparison.     CXR (March 2020):  - images directly reviewed, formal interpretation follows:  IMPRESSION:   No change. Slight interstitial prominence stable, no new pneumonia. Heart size and pulmonary vessels normal.    PFT's (July 2019):  FEV1/FVC is 0.46 and is reduced.  FEV1 is 1.24 L (34% predicted) and is reduced.  FVC is 2.69 L (57% predicted) and reduced.  There was improvement in spirometry after a single inhaled dose of bronchodilator.  TLC is 9.11 L (120% predicted) and is normal.  RV is 7.05 L (277% predicted) and is increased.  DLCO is 55% predicted and is reduced when it is corrected for hemoglobin.

## 2021-06-08 NOTE — PROGRESS NOTES
Pt is on amiodarone letter sent for labs no response  Pt was called and phone numbers listed are discontinued

## 2021-06-08 NOTE — TELEPHONE ENCOUNTER
Referral Request  Type of referral: Psychology   Who s requesting: Homecare: Percy Nurse from [facility]   Why the request: Per caller patient expressed sundeep concerns about his mental illness.   Have you been seen for this request: N/A  Does patient have a preference on a group/provider? No  Okay to leave a detailed message?  No

## 2021-06-08 NOTE — TELEPHONE ENCOUNTER
Patient has video visit scheduled for 5/21.  Need to verify that he has [employer] Touch[employer] jeyson installed on phone or will be at computer with audio and video capabilities.    Left voice message requesting call back to clinic at [phone].

## 2021-06-08 NOTE — PROGRESS NOTES
"Ki Pederson is a 65 y.o. male who is being evaluated via a billable video visit.      The patient has been notified of following:     \"This video visit will be conducted via a call between you and your physician/provider. We have found that certain health care needs can be provided without the need for an in-person physical exam.  This service lets us provide the care you need with a video conversation.  If a prescription is necessary we can send it directly to your pharmacy.  If lab work is needed we can place an order for that and you can then stop by our lab to have the test done at a later time.    Video visits are billed at different rates depending on your insurance coverage. Please reach out to your insurance provider with any questions.    If during the course of the call the physician/provider feels a video visit is not appropriate, you will not be charged for this service.\"    Patient has given verbal consent to a Video visit? Yes    Patient would like to receive their AVS by AVS Preference: Mail a copy.    Patient would like the video invitation sent by: Text to cell phone: [phone]    Will anyone else be joining your video visit? No        Video Start Time: 950    Additional provider notes:     65-year-old male with history of atrial fibrillation with RVR, anxiety, COPD, hypertension, lumbar disc degeneration, nicotine abuse, restless leg syndrome, major depression, here today to follow-up with the video visit.    Patient reports his been doing better since finishing a course of prednisone prescribed by Dr. Rivera his pulmonologist.  He now understands that he can request medications from the pharmacy in the form of antibiotics if his breathing becomes worse.  He notes increased exercise tolerance he still wheezing at night but feels no shortness of breath at this time.    Currently patient denies any headache or weakness.  His sleep is been well maintained, his appetite is improved.  He feels " that the lisinopril has helped with his blood pressure.  He did receive a notice to have labs drawn because he takes amiodarone and will need to come into the clinic to have his TSH and liver function tests evaluated.  He states that the Klonopin is helping with his anxiety and restless leg and has been sleeping well.  He notes no wheezing at home.  He is noting no chest pain or dizziness at home.      1. SOB (shortness of breath) breathing has improved with the use of prednisone.    2. Persistent atrial fibrillation on amiodarone will come into the clinic to have his lab test no chest pain or palpitations noted    3. Tobacco abuse he has stopped chewing tobacco.    4. Lumbar Disc Degeneration back pain is still present however he notes no new falls.    5. Essential hypertension currently with the diltiazem and the lisinopril his blood pressures been well controlled at home his systolic blood pressures run in the 120s to 140s.    6. Anxiety continue Klonopin at current dose sleeping well no nightmares.    7. Chronic obstructive pulmonary disease with acute exacerbation (H) reviewed notes by pulmonology.  He will be following up with Dr. Rivera in approximately 3 months.          Video-Visit Details    Type of service:  Video Visit    Video End Time (time video stopped):   Originating Location (pt. Location): Home    Distant Location (provider location):  [facility] MEDICINE/OB      Platform used for Video Visit: Doximity    : Time spent today's visit 25 minutes  Ricky Lerma MD

## 2021-06-08 NOTE — TELEPHONE ENCOUNTER
Prior Authorization Request  Who s requesting:  Pharmacy  Pharmacy Name and Location: [facility]  Medication Name: nicotrol 10mg inhaler  Insurance Plan: Medicaid  Insurance Member ID Number:  [id number]  CoverMyMeds Key: [id number]  Informed patient that prior authorizations can take up to 10 business days for response:   No  Okay to leave a detailed message: No

## 2021-06-08 NOTE — TELEPHONE ENCOUNTER
Central PA team  [phone]  Pool: HE PA MED ([id number])          PA has been initiated.       PA form completed and faxed insurance via Cover My Meds     Key:  [id number]    Medication:  LEVALBUTEROL NEB SOL    Insurance:  [employer]        Response will be received via fax and may take up to 5-10 business days depending on plan

## 2021-06-09 NOTE — PROGRESS NOTES
"Ki Pederson is a 65 y.o. male who is being evaluated via a billable telephone visit.      The patient has been notified of following:     \"This telephone visit will be conducted via a call between you and your physician/provider. We have found that certain health care needs can be provided without the need for a physical exam.  This service lets us provide the care you need with a short phone conversation.  If a prescription is necessary we can send it directly to your pharmacy.  If lab work is needed we can place an order for that and you can then stop by our lab to have the test done at a later time.    Telephone visits are billed at different rates depending on your insurance coverage. During this emergency period, for some insurers they may be billed the same as an in-person visit.  Please reach out to your insurance provider with any questions.    If during the course of the call the physician/provider feels a telephone visit is not appropriate, you will not be charged for this service.\"    Patient has given verbal consent to a Telephone visit? Yes    What phone number would you like to be contacted at? [phone]    Patient would like to receive their AVS by AVS Preference: Mail a copy.    Additional provider notes:   65-year-old male following up today for COPD, chronic lower back pain, anxiety, restless leg syndrome, neuropathy and insomnia.  Just finished his course of prednisone.  He reports that he is feeling short of breath again but no chest tightness or wheezing is been noted.  He states that he would like to increase the dose of his gabapentin because it helps his restless leg syndrome and this will help his back pain he says is been difficult for him to move around.  He also says because of increased family pressure he has been having difficulty sleeping his son was recently released from the hospital on his still very sick he has been unable to help family members because of his fatigue " because of poor sleep.  He has been taking all his medications faithfully.  He states he has not been going outside and trying to avoid any exposure to coronavirus    Assessment/Plan:  1. COPD exacerbation (H)  He just finished a course of prednisone I will not renew this medication I will see him back in clinic in approximately 3 weeks.    2. Anxiety    I have not placed him on Ativan as he wishes however I have increased his Klonopin dose to 1 mg from 0.5 mg  - clonazePAM (KLONOPIN) 1 MG tablet; Take 1 tablet (1 mg total) by mouth at bedtime as needed for anxiety.  Dispense: 30 tablet; Refill: 0    3. Chronic low back pain with sciatica, sciatica laterality unspecified, unspecified back pain laterality  I will increase his gabapentin dose he is tolerated the medication without any side effects    4. Restless Legs Syndrome  Symptoms are controlled at this time.    5. Primary insomnia  I will reevaluate him in clinic and see how his sleep is after the medications been altered  - clonazePAM (KLONOPIN) 1 MG tablet; Take 1 tablet (1 mg total) by mouth at bedtime as needed for anxiety.  Dispense: 30 tablet; Refill: 0    6. Neuropathy  Side effects discussed in detail with the patient  - gabapentin (NEURONTIN) 300 MG capsule; Take 2 capsules  In the morning and night and 2 in the afternoon  Dispense: 540 capsule; Refill: 2    Phone start time 3:01 PM  Phone and time 3:26 PM    Phone call duration:  25 minutes    Chase NARAYAN

## 2021-06-09 NOTE — TELEPHONE ENCOUNTER
Assessment/Plan:     1. Depression  Increasing to 300 mg daily followup in1 month advised  Continue  celexa  At current dose  - buPROPion (WELLBUTRIN XL) 300 MG 24 hr tablet; Take 1 tablet (300 mg total) by mouth Daily after lunch.  Dispense: 30 tablet; Refill: 12     2. Encounter for smoking cessation counseling     - buPROPion (WELLBUTRIN XL) 300 MG 24 hr tablet; Take 1 tablet (300 mg total) by mouth Daily after lunch.  Dispense: 30 tablet; Refill: 12     3. Neuropathy  Increasing dose to 1500 mg daily     - gabapentin (NEURONTIN) 300 MG capsule; Take 2 capsules  In the morning and night and I in the afternoon  Dispense: 450 capsule; Refill: 2     4. Anxiety  He is  Anxious during the day, increasing dose, addictive potential medication discussed in detail.panic attacks are noted once every 2-3 days.  - clonazePAM (KLONOPIN) 0.5 MG tablet; Take 1 tablet (0.5 mg total) by mouth 2 (two) times a day as needed for anxiety.  Dispense: 60 tablet; Refill: 0     5. Primary insomnia     - clonazePAM (KLONOPIN) 0.5 MG tablet; Take 1 tablet (0.5 mg total) by mouth 2 (two) times a day as needed for anxiety.  Dispense: 60 tablet; Refill: 0     6. Chronic bilateral low back pain without sciatica  Gabapentin dose increased     7. Restless Legs Syndrome  Removed requip from list     8. Essential hypertension  Started on ace , pressures are reducing.

## 2021-06-09 NOTE — TELEPHONE ENCOUNTER
FYI - Status Update  Who is Calling: Home Care  Update: Patient is discharged from services effective 6/22/20, as patient has missed multiple visits, and staff feels that patient has met his goals.  The patient is traveling up to his cabin, and is out and about.  Okay to leave a detailed message?:  No return call needed

## 2021-06-09 NOTE — TELEPHONE ENCOUNTER
Controlled Substance Refill Request  Medication Name:   Requested Prescriptions     Pending Prescriptions Disp Refills     clonazePAM (KLONOPIN) 0.5 MG tablet [Pharmacy Med Name: clonazePAM Oral Tablet 0.5 MG] 60 tablet 0     Sig: TAKE ONE TABLET BY MOUTH TWICE DAILY AS NEEDED FOR ANXIETY     Date Last Fill: 6/4/20  [facility]ed [facility]acy: [facility]  Submit electronically to pharmacy  Controlled Substance Agreement on file:   Encounter-Level CSA Scan Date:    There are no encounter-level csa scan date.        Last office visit:  5/21/20

## 2021-06-10 NOTE — PROGRESS NOTES
NIV referral:    Received NIV referral from Dr Luis Rivera. Made phone call to pt multiple times with the phone numbers listed on file, but it could not be completed and phone line isn't going through either. Routed this issue to Dr Rivera because pt's current insurance information ([employer]) listed in Epic is showing no eligibility. Will need to get most current insurance information from pt first in regards to the NIV device prior to moving forward with this NIV referral. Per my conversation with Dr Rivera, he will review this with the pt during his next appointment visit.           Gee Boothe RRT  [facility]  Office: [phone]  Direct: [phone]  Fax: [phone]

## 2021-06-10 NOTE — PROGRESS NOTES
"Ki Pederson is a 65 y.o. male who is being evaluated via a billable video visit.      The patient has been notified of following:     \"This video visit will be conducted via a call between you and your physician/provider. We have found that certain health care needs can be provided without the need for an in-person physical exam.  This service lets us provide the care you need with a video conversation.  If a prescription is necessary we can send it directly to your pharmacy.  If lab work is needed we can place an order for that and you can then stop by our lab to have the test done at a later time.    Video visits are billed at different rates depending on your insurance coverage. Please reach out to your insurance provider with any questions.    If during the course of the call the physician/provider feels a video visit is not appropriate, you will not be charged for this service.\"    Patient has given verbal consent to a Video visit? Yes  How would you like to obtain your AVS? AVS Preference: MyChart.  If dropped by the video visit, the video invitation should be sent to: Text to cell phone: [phone]  Will anyone else be joining your video visit? Sheyanne - daughter    Video Start Time: 1420    Video-Visit Details    Type of service:  Video Visit    Video End Time (time video stopped): 1440  Originating Location (pt. Location): Home    Distant Location (provider location):  [facility]     Platform used for Video Visit: [facility]    Pulmonary Clinic Follow-up Visit    Assessment and Plan:   65 year old male with a history of tobacco smoking in remission, IVDU in remission, ongoing chewing tobacco dependence, hepatitis C without cirrhosis, COPD, colon polyps, TIA, HTN, atrial fibrillation with RVR, C-spine surgery, depression, RLS, seizure disorder, lumbar disc disease, atrial fibrillation with RVR in the and hemangiomas requiring surgical removal, presenting for follow-up.     Chronic obstructive " pulmonary disease, pulmonary nodule, chewing tobacco dependence:   Over the last couple of days, back of throat feels dry, coughing a lot. Dry cough. Some sweats last night, did not check temperature. Harder to catch his breath. Does not want pulmonary rehab due to pandemic risk, which is reasonable. Napping a lot during the day. Used prednisone and doxycycline just recently; going to try it again. Went out of town for a weekend; everyone is negative. 100 pack-years of smoking but quit smoking. Still chewing tobacco; struggling to quit. Nicotine replacement was not covered by insurance. Bad experience with varenicline. He is on bupropion. Multiple hospitalizations for respiratory failure in recent years. I feel he would benefit from noninvasive ventilation at night and as needed during the day; the patient is also quite interested in this. He previously had significant hypercapnia, especially when he was living in [place]. At baseline, has GOLD group D COPD. FEV1 1.24 L (34% predicted) and DLCO 55% predicted. A1AT genotype normal at MM. Chest CT 8/7 showed stable RLL triangular opacity; likely scar tissue. With his frequent exacerbations, we discussed the option of starting roflumilast or thirce-weekly azithromycin and decided to start this at last visit.    Plan:  - I contacted respiratory therapy at [facility] to qualify him for noninvasive ventilation at night and as needed during the day  - again advised him to quit chewing tobacco  - continue tiotropium-olodaterol respimat 2.5-2.5 mcg two inhalations daily  - continue budesonide flexhaler 180 mcg one inhalation two times a day; rinse/gargle/spit water after use  - continue azithromycin 250 mg MWF  - levalbuterol HFA or nebulized as needed  - he is understandably reluctant to pursue pulmonary rehab at this time in the midst of the pandemic  - action plan: prednisone 12-day taper and doxycycline 5-day course to have on hand in case of  exacerbations; he is starting this again now  - annual influenza vaccination  - up to date on pneumococcal vaccination  - with stable nodule on chest CT, will revert back to annual low-dose screening CTs starting August 2021  - follow up in 6 weeks  - encouraged him to call any time with questions or concerning symptoms    Luis Rivera MD  Pulmonary and Critical Care Medicine  [facility]  Cell [phone]  Office [phone]  Pager [phone]    CCx: chronic obstructive pulmonary disease, pulmonary nodule, chewing tobacco dependence    HPI: 65 year old male with a history of tobacco smoking in remission, IVDU in remission, ongoing chewing tobacco dependence, hepatitis C without cirrhosis, COPD, colon polyps, TIA, HTN, atrial fibrillation with RVR, C-spine surgery, depression, RLS, seizure disorder, lumbar disc disease, atrial fibrillation with RVR in the and hemangiomas requiring surgical removal, presenting for follow-up. Over the last couple of days, back of throat feels dry, coughing a lot. Dry cough. Some sweats last night, did not check temperature. Harder to catch his breath. Does not want pulmonary rehab due to pandemic risk, which is reasonable. Napping a lot during the day. Used prednisone and doxycycline just recently; going to try it again. Went out of town for a weekend; everyone is negative. 100 pack-years of smoking but quit smoking. Still chewing tobacco; struggling to quit. Nicotine replacement was not covered by insurance. Bad experience with varenicline. He is on bupropion. Multiple hospitalizations for respiratory failure in recent years. I feel he would benefit from noninvasive ventilation at night and as needed during the day; the patient is also quite interested in this. He previously had significant hypercapnia, especially when he was living in [place]. At baseline, has GOLD group D COPD. FEV1 1.24 L (34% predicted) and DLCO 55% predicted. A1AT genotype normal at  MM. Chest CT  showed stable RLL triangular opacity; likely scar tissue. With his frequent exacerbations, we discussed the option of starting roflumilast or thirce-weekly azithromycin and decided to start this at last visit.    ROS:  A 12-system review was obtained and was negative with the exception of the symptoms endorsed in the history of present illness.    PMH:  Former smoker  Hepatitis C without cirrhosis  IVDU in remission  Chewing tobacco dependence  COPD  TIA  HTN  AF  C-spine surgery  MDD  RLS  seizure disorder  Lumbar disc disease  Hemangiomas requiring resection  Colon polyps  AF with RVR    PSH:  Past Surgical History:   Procedure Laterality Date     CERVICAL FUSION      C6 and C7       Allergies:  Allergies   Allergen Reactions     Symbicort [Budesonide-Formoterol] Rash       Family HX:  No family history on file.    Social Hx:  Social History     Socioeconomic History     Marital status:      Spouse name: Not on file     Number of children: Not on file     Years of education: Not on file     Highest education level: Not on file   Occupational History     Not on file   Social Needs     Financial resource strain: Not on file     Food insecurity     Worry: Not on file     Inability: Not on file     Transportation needs     Medical: Not on file     Non-medical: Not on file   Tobacco Use     Smoking status: Former Smoker     Packs/day: 0.00     Last attempt to quit: 5/15/2018     Years since quittin.2     Smokeless tobacco: Current User     Tobacco comment: No passive exposure   Substance and Sexual Activity     Alcohol use: Not Currently     Drug use: Not Currently     Sexual activity: Not on file   Lifestyle     Physical activity     Days per week: Not on file     Minutes per session: Not on file     Stress: Not on file   Relationships     Social connections     Talks on phone: Not on file     Gets together: Not on file     Attends Sabianist service: Not on file     Active member of club  or organization: Not on file     Attends meetings of clubs or organizations: Not on file     Relationship status: Not on file     Intimate partner violence     Fear of current or ex partner: Not on file     Emotionally abused: Not on file     Physically abused: Not on file     Forced sexual activity: Not on file   Other Topics Concern     Not on file   Social History Narrative     Not on file       Current Meds:  Current Outpatient Medications   Medication Sig Dispense Refill     acetaminophen (TYLENOL) 500 MG tablet Take 1-2 tablets (500-1,000 mg total) by mouth every 4 (four) hours as needed.  0     albuterol (PROAIR HFA;PROVENTIL HFA;VENTOLIN HFA) 90 mcg/actuation inhaler Inhale 2 puffs every 6 (six) hours as needed for wheezing.       amiodarone (PACERONE) 200 MG tablet Take 1 tablet (200 mg total) by mouth daily. 90 tablet 1     azithromycin (ZITHROMAX) 250 MG tablet Take one tablet on Monday, Wednesday, and Friday. 12 tablet 11     budesonide (PULMICORT FLEXHALER) 180 mcg/actuation inhaler Inhale 1 puff 2 (two) times a day.       buPROPion (WELLBUTRIN XL) 300 MG 24 hr tablet Take 1 tablet (300 mg total) by mouth Daily after lunch. 30 tablet 12     citalopram (CELEXA) 20 MG tablet TAKE 1 TABLET (20 MG TOTAL) BY MOUTH DAILY. 90 tablet 3     clonazePAM (KLONOPIN) 1 MG tablet Take 1 tablet (1 mg total) by mouth at bedtime as needed for anxiety. 30 tablet 0     diltiazem (CARDIZEM CD) 240 MG 24 hr capsule Take 1 capsule (240 mg total) by mouth daily. 30 capsule 11     doxycycline (MONODOX) 100 MG capsule In case of flare-up, take one capsule twice daily for 5 days. 10 capsule 11     gabapentin (NEURONTIN) 300 MG capsule Take 2 capsules  In the morning and night and 2 in the afternoon 540 capsule 2     levalbuterol (XOPENEX HFA) 45 mcg/actuation inhaler Inhale 2 puffs every 4 (four) hours as needed for wheezing. Has atrial fibrillation  Cannot take albuterol 1 Inhaler 6     levalbuterol (XOPENEX) 1.25 mg/3 mL  nebulizer solution Take 3 mL (1.25 mg total) by nebulization every 6 (six) hours as needed for wheezing. 360 mL 3     lisinopriL (PRINIVIL,ZESTRIL) 40 MG tablet TAKE 1 TABLET BY MOUTH DAILY. 90 tablet 0     nicotine (NICOTROL NS) 10 mg/mL Spry One spray in each nostril as needed for tobacco craving 10 mL 11     predniSONE (DELTASONE) 10 mg tablet In case of flare-up, take 4 pills daily x 3 days, then 3 pills daily x 3 days, then 2 pills daily x 3 days, then 1 pill daily x 3 days.       rivaroxaban ANTICOAGULANT (XARELTO) 20 mg tablet Take 1 tablet (20 mg total) by mouth daily with supper. 30 tablet 12     SPIRIVA RESPIMAT 2.5 mcg/actuation Mist INHALE TWO PUFFS BY MOUTH DAILY       tiotropium-olodaterol (STIOLTO RESPIMAT) 2.5-2.5 mcg/actuation Mist inhaler Inhale 2 Inhalation daily. 4 g 11     No current facility-administered medications for this visit.        Physical Exam:  no exam due to virtual visit    Labs:  A1AT genotype MM     Imaging studies:  Low-dose screening chest CT (July 2019):  - images directly reviewed, formal interpretation follows:  FINDINGS:  LUNGS AND PLEURA: There is diffuse bronchial wall thickening. There is debris in the airways. There is no significant bronchiectasis. There is a confluent opacity in the lateral right mid to lower lung centered in the region of the right major fissure.   There is mild architectural distortion in this region. The opacity is 19 mm x 20 mm and is adjacent to an old healed right rib fracture (series 3 image 168).      MEDIASTINUM: The heart is normal in size. Normal esophagus. No thoracic lymphadenopathy. The main pulmonary artery is 32 mm in diameter.      CORONARY ARTERY CALCIFICATION: Mild.     LIMITED UPPER ABDOMEN: Negative.     MUSCULOSKELETAL: There is an old healed fracture of the right lateral seventh rib.      IMPRESSION:   CONCLUSION:  1.  A 2 cm opacity in the peripheral right mid to lower lung could represent an infectious or inflammatory process  in a region of bronchiolectasis and scarring. Malignancy is not excluded. Recommend follow-up chest CT in 3 months.  2.  Bronchial wall thickening and mucous plugging can be seen with bronchitis.  3.  The main pulmonary artery is minimally enlarged, which could be seen with pulmonary hypertension.     RADIOLOGIST RECOMMENDATION: Recommend followup chest LDCT in 1 month.     LungRADS CATEGORY: 4B: Suspicious.     SIGNIFICANT INCIDENTAL FINDINGS: Negative.     PET-CT (August 2019):  - images directly reviewed, formal interpretation follows:  FINDINGS: 1.0 x 0.7 cm markedly FDG avid (SUVmax 11.4) soft tissue attenuation structure in the left anterolateral wall of the low rectum about 7 cm above the anal verge, significantly exceeding the remainder of the colon, which is essentially non-FDG   avid.     1.8 x 0.9 cm minimally FDG avid (SUVmax 2.2) pulmonary opacity in the right lower lobe lateral basilar segment has decreased from 2.0 x 1.9 cm on 07/11/2019. FDG activity in the remainder of the body is normal.     Mucous in the trachea. Cholelithiasis. Moderate calcified atherosclerosis. Mildly enlarged prostate. Pelvic phleboliths. Sigmoid diverticulosis. C6-C7 interspinous pacer. Mild degenerative change throughout the spine.     IMPRESSION:   CONCLUSION:  1.  Incidental focus of marked FDG activity in the rectum is somewhat nonspecific, but adenomatous polyps and rectal cancer can have this appearance at PET CT. Recommend flexible sigmoidoscopy.  2.  Minimally FDG avid right lower lobe pulmonary opacity has decreased in size slightly, suggesting an infectious or inflammatory etiology, though CT follow-up to complete resolution or scarring is recommended, beginning 2-3 months from now.     CT chest (November 2019):  - images directly reviewed, formal interpretation follows:  FINDINGS:   LUNGS AND PLEURA: There has been slight decrease in both size and the density of the triangular pleural-based opacity lateral right  lower lobe on image 111; now measuring 1.5 x 1.5 cm, previously measuring 2 x 1.9 cm and being more solid in appearance.   This appeared inflammatory on the recent PET/CT and inflammatory focus or site of scarring/rounded atelectasis still favored.  Tiny benign triangular nodule along right minor fissure. No new worrisome nodule.     Mild inflammatory bronchial wall thickening present at lung bases similar to previous exam. There is mucus debris layering dependently within the mainstem bronchi and trachea.     MEDIASTINUM/AXILLAE: No lymphadenopathy. No thoracic aortic aneurysm.     UPPER ABDOMEN: Cholelithiasis.     MUSCULOSKELETAL: Unremarkable.     IMPRESSION:   1.  Slight decrease size and density of the triangular pleural-based opacity lateral right lower lobe most consistent with evolving peripheral scar. Suggest a follow-up exam July 2020 to document long-term stability.  2.  Inflammatory bronchial wall thickening and endoluminal mucous debris present.    CT chest (August 2020):  - images directly reviewed, formal interpretation follows:  FINDINGS:   LUNGS AND PLEURA: There is diffuse bronchial wall thickening. No significant bronchiectasis. There is a small amount of debris in the airways. A triangular groundglass and more confluent opacity with architectural distortion in the lateral basilar right   lower lobe adjacent to the major fissure has not changed in configuration or size at approximately 1.5 cm x 1.5 cm. There is mild centrilobular emphysema. A 2 mm right upper lobe pulmonary nodule has not changed in retrospect (series 3 image 77).      MEDIASTINUM/AXILLAE: Normal size of the heart. No lymphadenopathy. There is mild coronary artery calcification.      UPPER ABDOMEN: Hepatic steatosis.     MUSCULOSKELETAL: Unremarkable.     IMPRESSION:   1.  A triangular opacity in the right midlung has not changed from the prior study and has the appearance of scarring. No dedicated follow-up is required.   2.   Hepatic steatosis.     TTE (December 2019):    Technical Quality: limited visualization    Left ventricle ejection fraction is normal. The calculated left ventricular ejection fraction is 68%.    Normal right ventricular size and systolic function.    No hemodynamically significant valvular heart abnormalities.    No previous study for comparison.     PFT's (July 2019):  FEV1/FVC is 0.46 and is reduced.  FEV1 is 1.24 L (34% predicted) and is reduced.  FVC is 2.69 L (57% predicted) and reduced.  There was improvement in spirometry after a single inhaled dose of bronchodilator.  TLC is 9.11 L (120% predicted) and is normal.  RV is 7.05 L (277% predicted) and is increased.  DLCO is 55% predicted and is reduced when it is corrected for hemoglobin.

## 2021-06-10 NOTE — TELEPHONE ENCOUNTER
RN cannot approve Refill Request    RN can NOT refill this medication med is not covered by policy/route to provider. Last office visit: 1/23/2020 Ricky Lerma MD Last Physical: Visit date not found Last MTM visit: Visit date not found Last visit same specialty: 1/23/2020 Ricky Lerma MD.  Next visit within 3 mo: Visit date not found  Next physical within 3 mo: Visit date not found      Tamela Rangel, [facility] Triage/Med Refill 8/26/2020    Requested Prescriptions   Pending Prescriptions Disp Refills     clonazePAM (KLONOPIN) 1 MG tablet [Pharmacy Med Name: clonazePAM Oral Tablet 1 MG] 30 tablet 0     Sig: Take 1 tablet (1 mg total) by mouth at bedtime as needed for anxiety.       Controlled Substances Refill Protocol Failed - 8/24/2020  1:01 PM        Failed - Route all Controlled Substance Requests to Provider        Failed - Patient has controlled substance agreement in past 12 months     Encounter-Level CSA Scan Date:    There are no encounter-level csa scan date.               Passed - Visit with PCP or prescribing provider visit in past 12 months      Last office visit with prescriber/PCP: 1/23/2020 Ricky Lerma MD OR same dept: 1/23/2020 Ricky Lerma MD OR same specialty: 1/23/2020 Ricky Lerma MD Last physical: Visit date not found Last MTM visit: Visit date not found    Next visit within 3 mo: Visit date not found  Next physical within 3 mo: Visit date not found  Prescriber OR PCP: Ricky Lerma MD  Last diagnosis associated with med order: 1. Anxiety  - clonazePAM (KLONOPIN) 1 MG tablet [Pharmacy Med Name: clonazePAM Oral Tablet 1 MG]; Take 1 tablet (1 mg total) by mouth at bedtime as needed for anxiety.  Dispense: 30 tablet; Refill: 0    2. Primary insomnia  - clonazePAM (KLONOPIN) 1 MG tablet [Pharmacy Med Name: clonazePAM Oral Tablet 1 MG]; Take 1 tablet (1 mg total) by mouth at bedtime as needed for anxiety.  Dispense: 30 tablet; Refill: 0

## 2021-06-11 NOTE — TELEPHONE ENCOUNTER
RN cannot approve Refill Request    RN can NOT refill this medication Protocol failed and NO refill given. Last office visit: Visit date not found Last Physical: Visit date not found Last MTM visit: Visit date not found Last visit same specialty: 2/5/2020 Lakshmi Boyer MD.  Next visit within 3 mo: Visit date not found  Next physical within 3 mo: Visit date not found      Mary Lou Vallejo, [facility] Triage/Med Refill 9/23/2020    Requested Prescriptions   Pending Prescriptions Disp Refills     amiodarone (PACERONE) 200 MG tablet [Pharmacy Med Name: Amiodarone HCl Oral Tablet 200 MG] 90 tablet 0     Sig: TAKE ONE TABLET BY MOUTH ONE TIME DAILY       There is no refill protocol information for this order

## 2021-06-12 NOTE — TELEPHONE ENCOUNTER
Pulmonary Telephone Note    Ki called with questions. He has severe air hunger and anxiety. Prednisone not helping like it used to. Cough with phlegm throughout the day. He is asking about medication for anxiety, stress, air hunger.    1. Planning to have him see pulmonary palliative care later this month.  2. Increase prednisone taper from 12 to 16 days for exacerbations.  3. Increase budesonide 180 mcg from one inhalation two times a day to two inhalations two times a day; rinse/gargle/spit water after use.  4. Continue other medications.  5. I will see him in mid-November  6. Depending on his symptoms and goals of care, could consider readdressing NIV, possible morphine for air hunger, possible benzodiazepines (if he is really wanting a comfort focus, would need to discuss whether he wants hospitalization, whether he would benefit from hospice enrollment).    Luis Rivera MD  Pulmonary and Critical Care Medicine  [facility]  Cell [phone]  Office [phone]  Pager [phone]

## 2021-06-12 NOTE — PROGRESS NOTES
"Ki Pederson is a 65 y.o. male who is being evaluated via a billable video visit.      The patient has been notified of following:     \"This video visit will be conducted via a call between you and your physician/provider. We have found that certain health care needs can be provided without the need for an in-person physical exam.  This service lets us provide the care you need with a video conversation.  If a prescription is necessary we can send it directly to your pharmacy.  If lab work is needed we can place an order for that and you can then stop by our lab to have the test done at a later time.    Video visits are billed at different rates depending on your insurance coverage. Please reach out to your insurance provider with any questions.    If during the course of the call the physician/provider feels a video visit is not appropriate, you will not be charged for this service.\"    Patient has given verbal consent to a Video visit? Yes  How would you like to obtain your AVS? AVS Preference: MyChart.  If dropped by the video visit, the video invitation should be sent to:  Email:  [email or web address]  Will anyone else be joining your video visit? Yes daughter        Video Start Time: 1310    Video-Visit Details    Type of service:  Video Visit    Video End Time (time video stopped): 1330  Originating Location (pt. Location): Home    Distant Location (provider location):  [facility]   Platform used for Video Visit: [facility]    Pulmonary Clinic Follow-up Visit    Assessment and Plan:   65 year old male with a history of tobacco smoking in remission, IVDU in remission, ongoing chewing tobacco dependence, hepatitis C without cirrhosis, COPD, colon polyps, TIA, HTN, atrial fibrillation with RVR, C-spine surgery, depression, RLS, seizure disorder, lumbar disc disease, atrial fibrillation with RVR in the and hemangiomas requiring surgical removal, presenting for follow-up.     Chronic " obstructive pulmonary disease, pulmonary nodule, chewing tobacco dependence:   He is having more shortness of breath and cough with phlegm production. Started prednisone and doxycycline. Having significant anxiety from shortness of breath. Mostly staying at home, though a 1-year-old and 2-year-old in the house are keeping him busy with activity. We had discussed nocturnal NIV given the severity of his COPD and previous history of recurrent hypercapnic respiratory failure, especially when he was living in [place], but there are issues with the phone numbers on file and the RT from [facility] was unable to reach him; we have also had problems in the pulmonary clinic as use of an office phone leads to problems reaching the contact numbers. We are having to use a cell phone to get through.    Plan:  - [facility] to discuss [facility]IV, but the only way I have been able to get through to the contact numbers listed is with a cell phone  - referral to pulmonary palliative care to address significant dyspnea-associated anxiety  - chewing tobacco cessation very important; did not readdress this today  - continue tiotropium-olodaterol respimat 2.5-2.5 mcg two inhalations daily  - continue budesonide flexhaler 180 mcg one inhalation two times a day; rinse/gargle/spit water after use  - continue azithromycin 250 mg MWF  - levalbuterol HFA or nebulized as needed  - consider pulmonary rehab in the near future  - action plan: prednisone 12-day taper and doxycycline 5-day course to have on hand in case of exacerbations; he has needed this quite frequently  - annual influenza vaccination; he plans to receive this soon  - up to date on pneumococcal vaccination  - with stable nodule on chest CT, will revert back to annual low-dose screening CTs starting August 2021  - follow up in 3 months  - encouraged him to call any time with questions or concerning symptoms    Luis Rivera MD  Pulmonary and  Critical Care Medicine  [facility]  Cell [phone]  Office [phone]  Pager [phone]    CCx: chronic obstructive pulmonary disease, pulmonary nodule, chewing tobacco dependence    HPI: 65 year old male with a history of tobacco smoking in remission, IVDU in remission, ongoing chewing tobacco dependence, hepatitis C without cirrhosis, COPD, colon polyps, TIA, HTN, atrial fibrillation with RVR, C-spine surgery, depression, RLS, seizure disorder, lumbar disc disease, atrial fibrillation with RVR in the and hemangiomas requiring surgical removal, presenting for follow-up. He is having more shortness of breath and cough with phlegm production. Started prednisone and doxycycline. Having significant anxiety from shortness of breath. Mostly staying at home, though a 1-year-old and 2-year-old in the house are keeping him busy with activity. We had discussed nocturnal NIV given the severity of his COPD and previous history of recurrent hypercapnic respiratory failure, especially when he was living in [place], but there are issues with the phone numbers on file and the RT from [facility] was unable to reach him; we have also had problems in the pulmonary clinic as use of an office phone leads to problems reaching the contact numbers. We are having to use a cell phone to get through.    ROS:  A 12-system review was obtained and was negative with the exception of the symptoms endorsed in the history of present illness.    PMH:  Former smoker  Hepatitis C without cirrhosis  IVDU in remission  Chewing tobacco dependence  COPD  TIA  HTN  AF  C-spine surgery  MDD  RLS  seizure disorder  Lumbar disc disease  Hemangiomas requiring resection  Colon polyps  AF with RVR    PSH:  Past Surgical History:   Procedure Laterality Date     CERVICAL FUSION      C6 and C7       Allergies:  Allergies   Allergen Reactions     Symbicort [Budesonide-Formoterol] Rash       Family HX:  No family history on  file.    Social Hx:  Social History     Socioeconomic History     Marital status:      Spouse name: Not on file     Number of children: Not on file     Years of education: Not on file     Highest education level: Not on file   Occupational History     Not on file   Social Needs     Financial resource strain: Not on file     Food insecurity     Worry: Not on file     Inability: Not on file     Transportation needs     Medical: Not on file     Non-medical: Not on file   Tobacco Use     Smoking status: Former Smoker     Packs/day: 0.00     Quit date: 5/15/2018     Years since quittin.3     Smokeless tobacco: Current User     Tobacco comment: No passive exposure   Substance and Sexual Activity     Alcohol use: Not Currently     Drug use: Not Currently     Sexual activity: Not on file   Lifestyle     Physical activity     Days per week: Not on file     Minutes per session: Not on file     Stress: Not on file   Relationships     Social connections     Talks on phone: Not on file     Gets together: Not on file     Attends [employer] service: Not on file     Active member of club or organization: Not on file     Attends meetings of clubs or organizations: Not on file     Relationship status: Not on file     Intimate partner violence     Fear of current or ex partner: Not on file     Emotionally abused: Not on file     Physically abused: Not on file     Forced sexual activity: Not on file   Other Topics Concern     Not on file   Social History Narrative     Not on file       Current Meds:  Current Outpatient Medications   Medication Sig Dispense Refill     acetaminophen (TYLENOL) 500 MG tablet Take 1-2 tablets (500-1,000 mg total) by mouth every 4 (four) hours as needed.  0     albuterol (PROAIR HFA;PROVENTIL HFA;VENTOLIN HFA) 90 mcg/actuation inhaler Inhale 2 puffs every 6 (six) hours as needed for wheezing.       amiodarone (PACERONE) 200 MG tablet TAKE ONE TABLET BY MOUTH ONE TIME DAILY  90 tablet 0      azithromycin (ZITHROMAX) 250 MG tablet Take one tablet on Monday, Wednesday, and Friday. 12 tablet 11     budesonide (PULMICORT FLEXHALER) 180 mcg/actuation inhaler Inhale 1 puff 2 (two) times a day.       buPROPion (WELLBUTRIN XL) 300 MG 24 hr tablet Take 1 tablet (300 mg total) by mouth Daily after lunch. 30 tablet 12     citalopram (CELEXA) 20 MG tablet TAKE 1 TABLET (20 MG TOTAL) BY MOUTH DAILY. 90 tablet 3     clonazePAM (KLONOPIN) 1 MG tablet Take 1 tablet (1 mg total) by mouth at bedtime as needed for anxiety. 30 tablet 0     diltiazem (CARDIZEM CD) 240 MG 24 hr capsule Take 1 capsule (240 mg total) by mouth daily. 30 capsule 11     doxycycline (MONODOX) 100 MG capsule In case of flare-up, take one capsule twice daily for 5 days. 10 capsule 11     doxycycline (VIBRAMYCIN) 100 MG capsule Take 1 capsule (100 mg total) by mouth 2 (two) times a day for 5 days. 10 capsule 0     gabapentin (NEURONTIN) 300 MG capsule Take 2 capsules  In the morning and night and 2 in the afternoon 540 capsule 2     levalbuterol (XOPENEX HFA) 45 mcg/actuation inhaler Inhale 2 puffs every 4 (four) hours as needed for wheezing. Has atrial fibrillation  Cannot take albuterol 1 Inhaler 6     levalbuterol (XOPENEX) 1.25 mg/3 mL nebulizer solution Take 3 mL (1.25 mg total) by nebulization every 6 (six) hours as needed for wheezing. 360 mL 3     lisinopriL (PRINIVIL,ZESTRIL) 40 MG tablet TAKE 1 TABLET BY MOUTH DAILY. 90 tablet 0     nicotine (NICOTROL NS) 10 mg/mL Spry One spray in each nostril as needed for tobacco craving 10 mL 11     predniSONE (DELTASONE) 10 mg tablet In case of flare-up, take 4 pills daily x 3 days, then 3 pills daily x 3 days, then 2 pills daily x 3 days, then 1 pill daily x 3 days.       predniSONE (DELTASONE) 10 mg tablet Take 4 tabs daily x 3 days, THEN 3 tabs daily x 3 days, THEN 2 tabs daily x 3 days, THEN 1 tab daily x 3 days. 30 tablet 0     rivaroxaban ANTICOAGULANT (XARELTO) 20 mg tablet Take 1 tablet (20 mg  total) by mouth daily with supper. 30 tablet 12     SPIRIVA RESPIMAT 2.5 mcg/actuation Mist INHALE TWO PUFFS BY MOUTH DAILY       STIOLTO RESPIMAT 2.5-2.5 mcg/actuation Mist inhaler INHALE 2 INHALATIONS DAILY. 4 g 11     No current facility-administered medications for this visit.        Physical Exam:  no exam due to virtual visit    Labs:  A1AT genotype MM     Imaging studies:  Low-dose screening chest CT (July 2019):  - images directly reviewed, formal interpretation follows:  FINDINGS:  LUNGS AND PLEURA: There is diffuse bronchial wall thickening. There is debris in the airways. There is no significant bronchiectasis. There is a confluent opacity in the lateral right mid to lower lung centered in the region of the right major fissure.   There is mild architectural distortion in this region. The opacity is 19 mm x 20 mm and is adjacent to an old healed right rib fracture (series 3 image 168).      MEDIASTINUM: The heart is normal in size. Normal esophagus. No thoracic lymphadenopathy. The main pulmonary artery is 32 mm in diameter.      CORONARY ARTERY CALCIFICATION: Mild.     LIMITED UPPER ABDOMEN: Negative.     MUSCULOSKELETAL: There is an old healed fracture of the right lateral seventh rib.      IMPRESSION:   CONCLUSION:  1.  A 2 cm opacity in the peripheral right mid to lower lung could represent an infectious or inflammatory process in a region of bronchiolectasis and scarring. Malignancy is not excluded. Recommend follow-up chest CT in 3 months.  2.  Bronchial wall thickening and mucous plugging can be seen with bronchitis.  3.  The main pulmonary artery is minimally enlarged, which could be seen with pulmonary hypertension.     RADIOLOGIST RECOMMENDATION: Recommend followup chest LDCT in 1 month.     LungRADS CATEGORY: 4B: Suspicious.     SIGNIFICANT INCIDENTAL FINDINGS: Negative.     PET-CT (August 2019):  - images directly reviewed, formal interpretation follows:  FINDINGS: 1.0 x 0.7 cm markedly FDG  avid (SUVmax 11.4) soft tissue attenuation structure in the left anterolateral wall of the low rectum about 7 cm above the anal verge, significantly exceeding the remainder of the colon, which is essentially non-FDG   avid.     1.8 x 0.9 cm minimally FDG avid (SUVmax 2.2) pulmonary opacity in the right lower lobe lateral basilar segment has decreased from 2.0 x 1.9 cm on 07/11/2019. FDG activity in the remainder of the body is normal.     Mucous in the trachea. Cholelithiasis. Moderate calcified atherosclerosis. Mildly enlarged prostate. Pelvic phleboliths. Sigmoid diverticulosis. C6-C7 interspinous pacer. Mild degenerative change throughout the spine.     IMPRESSION:   CONCLUSION:  1.  Incidental focus of marked FDG activity in the rectum is somewhat nonspecific, but adenomatous polyps and rectal cancer can have this appearance at PET CT. Recommend flexible sigmoidoscopy.  2.  Minimally FDG avid right lower lobe pulmonary opacity has decreased in size slightly, suggesting an infectious or inflammatory etiology, though CT follow-up to complete resolution or scarring is recommended, beginning 2-3 months from now.     CT chest (November 2019):  - images directly reviewed, formal interpretation follows:  FINDINGS:   LUNGS AND PLEURA: There has been slight decrease in both size and the density of the triangular pleural-based opacity lateral right lower lobe on image 111; now measuring 1.5 x 1.5 cm, previously measuring 2 x 1.9 cm and being more solid in appearance.   This appeared inflammatory on the recent PET/CT and inflammatory focus or site of scarring/rounded atelectasis still favored.  Tiny benign triangular nodule along right minor fissure. No new worrisome nodule.     Mild inflammatory bronchial wall thickening present at lung bases similar to previous exam. There is mucus debris layering dependently within the mainstem bronchi and trachea.     MEDIASTINUM/AXILLAE: No lymphadenopathy. No thoracic aortic  aneurysm.     UPPER ABDOMEN: Cholelithiasis.     MUSCULOSKELETAL: Unremarkable.     IMPRESSION:   1.  Slight decrease size and density of the triangular pleural-based opacity lateral right lower lobe most consistent with evolving peripheral scar. Suggest a follow-up exam July 2020 to document long-term stability.  2.  Inflammatory bronchial wall thickening and endoluminal mucous debris present.     CT chest (August 2020):  - images directly reviewed, formal interpretation follows:  FINDINGS:   LUNGS AND PLEURA: There is diffuse bronchial wall thickening. No significant bronchiectasis. There is a small amount of debris in the airways. A triangular groundglass and more confluent opacity with architectural distortion in the lateral basilar right   lower lobe adjacent to the major fissure has not changed in configuration or size at approximately 1.5 cm x 1.5 cm. There is mild centrilobular emphysema. A 2 mm right upper lobe pulmonary nodule has not changed in retrospect (series 3 image 77).      MEDIASTINUM/AXILLAE: Normal size of the heart. No lymphadenopathy. There is mild coronary artery calcification.      UPPER ABDOMEN: Hepatic steatosis.     MUSCULOSKELETAL: Unremarkable.     IMPRESSION:   1.  A triangular opacity in the right midlung has not changed from the prior study and has the appearance of scarring. No dedicated follow-up is required.   2.  Hepatic steatosis.      TTE (December 2019):  - Technical Quality: limited visualization  - Left ventricle ejection fraction is normal. The calculated left ventricular ejection fraction is 68%.  - Normal right ventricular size and systolic function.  - No hemodynamically significant valvular heart abnormalities.  - No previous study for comparison.     PFT (July 2019):  FEV1/FVC is 0.46 and is reduced.  FEV1 is 1.24 L (34% predicted) and is reduced.  FVC is 2.69 L (57% predicted) and reduced.  There was improvement in spirometry after a single inhaled dose of  bronchodilator.  TLC is 9.11 L (120% predicted) and is normal.  RV is 7.05 L (277% predicted) and is increased.  DLCO is 55% predicted and is reduced when it is corrected for hemoglobin.

## 2021-06-12 NOTE — TELEPHONE ENCOUNTER
Pt daughter calling stating that [facility] would not fill the morphine for the pt. I called the pharmacy, they wanted to confirm the dose and also said that they do not keep the medication in stock. The pharmacy staff said that they would not be able to get the medication in until Monday, however informed me to try [facility] as they can order same day. I called [facility], they do not have the medication in stock, however can get it in tomorrow. I will have MENDY Yi send a new RX then contact the pt daughter.   Yes

## 2021-06-12 NOTE — PROGRESS NOTES
"Palliative Care Outpatient Clinic   Ki Pederson is a 65 y.o. male who is being evaluated via a billable video visit.      The patient has been notified of following:     \"This video visit will be conducted via a call between you and your physician/provider. We have found that certain health care needs can be provided without the need for an in-person physical exam.  This service lets us provide the care you need with a video conversation.  If a prescription is necessary we can send it directly to your pharmacy.  If lab work is needed we can place an order for that and you can then stop by our lab to have the test done at a later time.    Video visits are billed at different rates depending on your insurance coverage. Please reach out to your insurance provider with any questions.    If during the course of the call the physician/provider feels a video visit is not appropriate, you will not be charged for this service.\"    Patient has given verbal consent to a Video visit? Yes    Video Start Time: 1100      Video-Visit Details    Type of service:  Video Visit    Video End Time (time video stopped): 1135  Originating Location (pt. Location): Home    Distant Location (provider location):  [facility]     Platform used for Video Visit: [employer]    (This note was transcribed using voice recognition software. While I review and edit the transcription, I may miss errors, and the software sometimes does unexpected capitalizations and formatting that I miss. Please let me know of any serious mistranscriptions and I will addend this note.)     History of Present Illness: 65 year old male with a history of tobacco smoking in remission, IVDU in remission, ongoing chewing tobacco dependence, hepatitis C without cirrhosis, COPD, colon polyps, TIA, HTN, atrial fibrillation with RVR, C-spine surgery, depression, RLS, seizure disorder, lumbar disc disease, atrial fibrillation with RVR  and hemangiomas " requiring surgical removal. Presents to palliative care clinic today for initial consult. Referred by his pulmonologist Dr. Benedicto Rivera.   CC: dyspnea  Subjective: Dyspnea- severe, at rest he is shortness of breath and with exertion he is gasping for air. Denies feeling like anything-nebs or prednisone is really helpful. Hasn't ever tried opioids for air hunger. Did try his pm klonopin during day to see if it would help but only put him to sleep so he isn't taking beyond bedtime dose. Open to trying morphine to help with dsypnea.  ROS: Remainder of 12 pt ROS is negative except as above  PE: There were no vitals filed for this visit.   Wt Readings from Last 3 Encounters:   08/11/20 (!) 230 lb (104.3 kg)   05/07/20 (!) 230 lb (104.3 kg)   03/19/20 (!) 232 lb 8 oz (105.5 kg)      General: NAD, sitting up in chair with dtr Ethan at side.   Respiratory: RA, some mildly labored breathing at rest with talking  Neuro: AO x 3  Psych: calm, cooperative    Data reviewed:   I reviewed recent labs and imaging, my comments:   Lab Results   Component Value Date    CREATININE 0.99 03/19/2020     SH: Lives with dtr Ethan in her home. Ethan has 2 children (1 and 2 years old). Son Katlyn lives in [facility]. 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 . Enjoys wood working and making canes. Retired [employer]. Quit smoking 2017.     No family history on file.   Past Medical History:   Diagnosis Date     Anxiety      Atrial fibrillation (H)      COPD (chronic obstructive pulmonary disease) (H)      Hemangioma     massive hemangioma; left hand     Hypertension      Myocardial infarction (H)      TIA (transient ischemic attack)       Past Surgical History:   Procedure Laterality Date     CERVICAL FUSION      C6 and C7      Allergies   Allergen Reactions     Symbicort [Budesonide-Formoterol] Rash     Medications: I have reviewed the patient's medication profile.   Impression & Recommendations:   Advance care planning: Has HCD. DNR/DNI. Ethan is  primary agent and Katlyn is secondary. States he is at a place where is just wanting to be comfortable and for his breathing to feel better. Feels like he is breathing out of a straw all day long. Hopeful to not need O2 24/7. States due to his dyspnea it has been hard to even be able to hold his grandchildren and due to the wood dust hard to do his wood work. Has recently completed home care with OT/PT and nursing without improvement. Feels Dr. Rivera (pulmonary) is limited with treatment options for his advanced  COPD. States he just finished a steroid burst which use to help, but this last round no longer was effective. Talked about pulmonary rehab which he is not wanting to do. Educated about hospice as well for which him and [facility] are both receptive to. State they would like to have [facility] hospice if possible. Informed them of merge to [facility]. Referral placed. Advised if they don't get call in 24 hours to notify writer.     Dyspnea- Will start morphine 2.5-5 mg Q 3 hours prn. Recommend O2 in home as well to have prn. Will defer to hospice for ongoing management.     Anxiety-secondary to dyspnea. C/w klonpin q hs. Advised not to take during day with morphine. Treat dyspnea to help with anxiety. Cont to monitor.     Total face to face time 35 minutes. Total time spent on charting and meeting with patient was 60minutes. Greater than 50% was spent in counseling with the patient on discussions of understanding of illness, goals of care, and review of their pertinent symptoms. Dtr Ethan present as well.     F/u in prn, will not need to follow if enrolled in hospice which is the plan    Thank you for involving us in the patient's care.     Kassidy Earl NP-C, [facility] / Palliative Medicine / Text me via [facility] - search for 'Rajat'- then click the pager icon  or call Ananth Perez (Provider ) Monday-Friday [phone].

## 2021-06-12 NOTE — TELEPHONE ENCOUNTER
----- Message from Luis Rivera MD sent at 10/7/2020 11:03 AM CDT -----  Please have this patient see me in follow-up (virtual or in-person) in mid-late November.    Thanks,  Benedicto

## 2021-06-12 NOTE — TELEPHONE ENCOUNTER
I called and informed the pt daughter that Anna will have the medication in late morning tomorrow and that the RX has been sent.

## 2021-06-12 NOTE — PROGRESS NOTES
Palliative Care did a video visit with pt and dtr.SOB is the biggest struggle..  SOB is effecting pt's ability to live quality life; play with his grandchildren and do his woodworking.  Pt has not done pulm rehab and today is not interested In dong it. Pt has had  HC;  OT, PT  Post hospital stays. Pt understands per his MD his condition has changed and they are limited in the options available.  Pt lives with his dtr and she manages pts meds, meals and care for the home/ apts..   Pt's son/ Katlyn lives in [place] is also very involved.  Both his dtr and son are in the medical field.  Education on Hospice was provided  Both pt and dtr. Pt is in agreement to try Hospice.  Pt has  A HCD in place his dtr and son are his agents. pt is a DNR.  Pt appears alert and orientated able to voice his own needs. Good family support in place.  Pt is realistic on his condition and his goal is quality of life.   Pt an dtr have Palliative number if questions arise.    Anitra TRIPP

## 2021-06-12 NOTE — TELEPHONE ENCOUNTER
Refill Approved    Rx renewed per Medication Renewal Policy. Medication was last renewed on 7/14/20.    Erika Lo, [facility] Triage/Med Refill 10/27/2020     Requested Prescriptions   Pending Prescriptions Disp Refills     lisinopriL (PRINIVIL,ZESTRIL) 40 MG tablet [Pharmacy Med Name: Lisinopril Oral Tablet 40 MG] 90 tablet 0     Sig: TAKE 1 TABLET BY MOUTH DAILY.       Ace Inhibitors Refill Protocol Passed - 10/26/2020  5:15 PM        Passed - PCP or prescribing provider visit in past 12 months       Last office visit with prescriber/PCP: 1/23/2020 Ricky Lerma MD OR same dept: 1/23/2020 Ricky Lerma MD OR same specialty: 1/23/2020 Ricky Lerma MD  Last physical: Visit date not found Last MTM visit: Visit date not found   Next visit within 3 mo: Visit date not found  Next physical within 3 mo: Visit date not found  Prescriber OR PCP: Ricky Lerma MD  Last diagnosis associated with med order: 1. Arteriosclerotic Cardiovascular Disease (ASCVD)  - lisinopriL (PRINIVIL,ZESTRIL) 40 MG tablet [Pharmacy Med Name: Lisinopril Oral Tablet 40 MG]; TAKE 1 TABLET BY MOUTH DAILY.  Dispense: 90 tablet; Refill: 0    If protocol passes may refill for 12 months if within 3 months of last provider visit (or a total of 15 months).             Passed - Serum Potassium in past 12 months     Lab Results   Component Value Date    Potassium 4.3 03/19/2020             Passed - Blood pressure filed in past 12 months     BP Readings from Last 1 Encounters:   03/19/20 125/79             Passed - Serum Creatinine in past 12 months     Creatinine   Date Value Ref Range Status   03/19/2020 0.99 0.70 - 1.30 mg/dL Final

## 2021-06-12 NOTE — TELEPHONE ENCOUNTER
Controlled Substance Refill Request  Medication Name:   Requested Prescriptions     Pending Prescriptions Disp Refills     clonazePAM (KLONOPIN) 1 MG tablet [Pharmacy Med Name: clonazePAM Oral Tablet 1 MG] 30 tablet 0     Sig: Take 1 tablet by mouth at bedtime as needed for anxiety.     Date Last Fill: 8/27/20  [facility]ed [facility]acy: [facility]  Submit electronically to pharmacy  Controlled Substance Agreement on file:   Encounter-Level CSA Scan Date:    There are no encounter-level csa scan date.        Last office visit:  Virtual visit 7/22/20

## 2021-06-13 NOTE — TELEPHONE ENCOUNTER
Refill Request  Did you contact pharmacy: No  Medication name:   Requested Prescriptions     Pending Prescriptions Disp Refills     clonazePAM (KLONOPIN) 1 MG tablet 30 tablet 0     Sig: Take 1 tablet (1 mg total) by mouth at bedtime as needed for anxiety.     Who prescribed the medication: Ricky Lerma MD    [facility]ed [facility]acy[facility]  Is patient out of medication: Yes  Patient notified refills processed in 3 business days:  yes  Okay to leave a detailed message: yes

## 2021-06-15 NOTE — PROGRESS NOTES
Pt is on our amio list and letters have been sent for labs no response  Pt is getting care elsewhere  Pt taken off our database

## 2021-06-16 PROBLEM — F41.9 ANXIETY: Status: ACTIVE | Noted: 2019-12-30

## 2021-06-16 PROBLEM — A49.2 HAEMOPHILUS INFLUENZAE INFECTION: Status: ACTIVE | Noted: 2019-12-30

## 2021-06-16 PROBLEM — J10.1 INFLUENZA A: Status: ACTIVE | Noted: 2020-03-19

## 2021-06-16 PROBLEM — R06.02 SOB (SHORTNESS OF BREATH): Status: ACTIVE | Noted: 2019-12-16

## 2021-06-16 PROBLEM — J18.9 PNEUMONIA DUE TO INFECTIOUS ORGANISM: Status: ACTIVE | Noted: 2017-02-09

## 2021-06-16 PROBLEM — J96.00 ACUTE RESPIRATORY FAILURE (H): Status: ACTIVE | Noted: 2019-12-16

## 2021-06-16 PROBLEM — J44.1 CHRONIC OBSTRUCTIVE PULMONARY DISEASE WITH ACUTE EXACERBATION (H): Status: ACTIVE | Noted: 2019-05-15

## 2021-06-16 PROBLEM — Z72.0 NICOTINE ABUSE: Status: ACTIVE | Noted: 2019-07-26

## 2021-06-16 PROBLEM — B18.2 CHRONIC HEPATITIS C WITHOUT HEPATIC COMA (H): Status: ACTIVE | Noted: 2017-02-08

## 2021-06-16 NOTE — TELEPHONE ENCOUNTER
Telephone Encounter by Jean-Pierre Caban CMA at 3/4/2020  9:46 AM     Author: Jean-Pierre Caban CMA Service: -- Author Type: Certified Medical Assistant    Filed: 3/4/2020  9:49 AM Encounter Date: 3/4/2020 Status: Signed    : Jean-Pierre Caban CMA (Certified Medical Assistant)       Ricky Lerma MD Kumar, Anil Care Team Pool 29 minutes ago (9:17 AM)      Please take the doxycycline as directed, this should be the only antibiotic he should take this twice daily for 10 days thank you    Routing comment

## 2021-06-16 NOTE — TELEPHONE ENCOUNTER
Telephone Encounter by Jacinda Parker at 2/24/2020  3:24 PM     Author: Jacinda Parker Service: -- Author Type: --    Filed: 2/24/2020  3:49 PM Encounter Date: 2/7/2020 Status: Addendum    : Jacinda Parker    Related Notes: Original Note by Jacinda Parker filed at 2/24/2020  3:48 PM       MEDICATION APPEAL  DENIED    Denial Rational: Reconsideration denied. Nicotine gum and patches have been noted, but are the same chemical structure. Bupropion has been noted. There are no paid claims for Chantix. Submit pharmacy claims records indicating fill history of Chantix.      Appeal Information: The second level appeals are handled by the provider and clinic. Please let [facility] PA MED ([id number]) if you need any of the determination letters if the provider chooses to pursue a second level appeal. The Central PA Team just hands the initial Prior Authorization and First Level Appeals.

## 2021-06-16 NOTE — TELEPHONE ENCOUNTER
Telephone Encounter by Ba Boothe CMA at 1/16/2020  1:44 PM     Author: Ba Boothe CMA Service: -- Author Type: Certified Medical Assistant    Filed: 1/16/2020  1:49 PM Encounter Date: 1/15/2020 Status: Signed    : Ba Boothe CMA (Certified Medical Assistant)       Who is calling:  Ethan patient's daughter  Reason for Call:  Caller stated she wants a call back from Ricky Lerma MD to talk about the patient's symptoms. Caller stated Ewelina is not her sister but her mother, and she does not want the mother calling about her father. Caller was made aware that Ewelina called [facility] and [facility] did not call her. Caller stated she wants to talk to Ricky Lerma MD about the patient's symptoms. Caller declined to state anything further. Caller was informed of Ricky Lerma MD's routing message below. Message below was routed to [facility] but [facility] does not do outbound calls. Please have Ricky Lerma MD call the daughter back.    Caller was transferred to scheduling.  Date of last appointment with primary care: 1/2/20  Okay to leave a detailed message: No  [phone]      Ricky Lerma MD   to Prema EVANS/Radha GRANT LPN            1/15/20 2:18 PM   Is a patient having breathing problems?  Another antibiotic may cause side effects the patient did not tolerate the prednisone is he wheezing?

## 2021-06-16 NOTE — TELEPHONE ENCOUNTER
Telephone Encounter by Jacinda Parker at 2/17/2020  1:15 PM     Author: Jacinda Parker Service: -- Author Type: --    Filed: 2/17/2020  1:19 PM Encounter Date: 2/7/2020 Status: Signed    : Jacinda Parker       PRIOR AUTHORIZATION DENIED    Denial Rational: Requires trial of at least two preferred chemically unique drugs within the same drugs class.              Appeal Information: If the provider would like to appeal this denial, please provide a letter of medical necessity and once it has been completed and placed in the patient's chart, notify the Central PA Team ([facility] MED [id number]) and the appeal can be initiated on behalf of the patient and provider.  Please also include any therapies that the patient has tried and their outcomes.

## 2021-06-16 NOTE — TELEPHONE ENCOUNTER
Telephone Encounter by Yaz Reynoso at 1/6/2020  2:06 PM     Author: Yaz Reynoso Service: -- Author Type: --    Filed: 1/6/2020  2:07 PM Encounter Date: 1/6/2020 Status: Signed    : Yaz Reynoso APPROVED:    Approval start date: 1/1/2020  Approval end date:  12/25/2020    Pharmacy has been notified of approval and will contact patient when medication is ready for pickup.

## 2021-06-16 NOTE — TELEPHONE ENCOUNTER
Telephone Encounter by Jeannette Levin at 3/9/2020 12:27 PM     Author: Jeannette Levin Service: -- Author Type: --    Filed: 3/9/2020 12:31 PM Encounter Date: 3/4/2020 Status: Signed    : Jeannette Levin       PRIOR AUTHORIZATION DENIED    Denial Rational: Patient must try and fail METAPROTERENOL SYRUP (ORAL) OR SEREVENT (INHALATION).  History of intolerance and contraindication to albuterol was noted on PA request.               Appeal Information: If provider would like to appeal please provide a letter of medical necessity stating why formulary alternatives would not be clinically appropriate for the patient and route back to the PA team.

## 2021-06-16 NOTE — TELEPHONE ENCOUNTER
Telephone Encounter by Jacinda Parker at 7/25/2019 11:22 AM     Author: Jcainda Parker Service: -- Author Type: --    Filed: 7/25/2019 11:26 AM Encounter Date: 7/22/2019 Status: Signed    : Jacinda Parker       PRIOR AUTHORIZATION DENIED    Denial Rational: Approval requires that the patient's needs to have tried and failed 2 preferred dual combination therapies. The preferred therapies are: Stiolto, Advair, Dulera, Symbicort.          Appeal Information: If the provider would like to appeal this denial, please provide a letter of medical necessity and once it has been completed and placed in the patient's chart, notify the Central PA Team ([facility] MED [id number]) and the appeal can be initiated on behalf of the patient and provider.  Please also include any therapies that the patient has tried and their outcomes.

## 2021-06-16 NOTE — TELEPHONE ENCOUNTER
Telephone Encounter by Jacinda Parker at 2/20/2020 10:53 AM     Author: Jacinda Parker Service: -- Author Type: --    Filed: 2/20/2020 10:54 AM Encounter Date: 2/7/2020 Status: Signed    : Jacinda Parker       Sent in additional information [place] Medicaid after fax was received:        Sent in additional notes about other therapies tried. Will await determination.

## 2021-06-16 NOTE — TELEPHONE ENCOUNTER
Telephone Encounter by Jacinda Parker at 3/30/2020  6:10 AM     Author: Jacinda Parker Service: -- Author Type: --    Filed: 3/30/2020  6:13 AM Encounter Date: 3/25/2020 Status: Signed    : Jacinda Parker       PRIOR AUTHORIZATION DENIED    Denial Rational: The patient needs to have tried and failed 2 chemically unique drugs on the preferred drug list.            Appeal Information: If the provider would like to appeal this denial, please provide a letter of medical necessity and once it has been completed and placed in the patient's chart, notify the Central PA Team ([facility] MED [id number]) and the appeal can be initiated on behalf of the patient and provider.  Please also include any therapies that the patient has tried and their outcomes.

## 2021-06-17 NOTE — TELEPHONE ENCOUNTER
Telephone Encounter by Yaz Reynoso at 5/11/2020 10:32 AM     Author: Yaz Reynoso Service: -- Author Type: --    Filed: 5/11/2020 10:32 AM Encounter Date: 5/11/2020 Status: Signed    : Yaz Reynoso APPROVED:    Approval start date: 5/1/2020  Approval end date:  4/25/2021    Pharmacy has been notified of approval and will contact patient when medication is ready for pickup.

## 2021-06-17 NOTE — TELEPHONE ENCOUNTER
Telephone Encounter by Yaz Reynoso at 5/26/2020  4:38 PM     Author: aYz Reynoso Service: -- Author Type: --    Filed: 5/26/2020  4:39 PM Encounter Date: 5/26/2020 Status: Signed    : Yaz Reynoso       Additional information sent to insurance, re-iterating patient has already tried failed Chantix.  Sent notes again.

## 2021-06-17 NOTE — TELEPHONE ENCOUNTER
Telephone Encounter by Yaz Reynoso at 5/27/2020  4:03 PM     Author: Yaz Reynoso Service: -- Author Type: --    Filed: 5/27/2020  4:13 PM Encounter Date: 5/26/2020 Status: Addendum    : Yaz Reynoso    Related Notes: Original Note by Yaz Reynoso filed at 5/27/2020  4:11 PM       PRIOR AUTHORIZATION DENIED    Denial Rational: Insurance cannot find a pharmacy paid claim for Chantix which is required. Clinic notes were sent in stating patient has tried Chantix but was still denied.         Appeal Information: This medication was denied. If physician would like to appeal because patient has contraindication or allergy to covered medication please write letter of medical necessity and route back to PA team to initiate.  If no further action is needed please close encounter thank you.

## 2021-06-19 ENCOUNTER — HEALTH MAINTENANCE LETTER (OUTPATIENT)
Age: 66
End: 2021-06-19

## 2021-06-19 NOTE — LETTER
Letter by Luis Rivera MD at      Author: Luis Rivera MD Service: -- Author Type: --    Filed:  Encounter Date: 11/8/2019 Status: Signed         Ricky Lerma MD  [address] [place] [address]                                  November 8, 2019    Patient: Ki Pederson   MR Number: [id number]   YOB: 1955   Date of Visit: 11/8/2019     Dear Dr. Florentin MD:    I saw Ki Pederson today at the [facility]. Below are the relevant portions of my assessment and plan of care.    If you have questions, please do not hesitate to call me. I look forward to following Ki along with you.    Sincerely,        Luis Rivera MD          CC  No Recipients  Luis Rivera MD  11/8/2019  4:29 PM  Incomplete  Pulmonary Clinic Follow-up Visit    Assessment and Plan:   64 year old male with a history of tobacco smoking in remission, IVDU in remission, ongoing chewing tobacco dependence, hepatitis C without cirrhosis, COPD, TIA, HTN, atrial fibrillation, C-spine surgery, depression, RLS, seizure disorder, lumbar disc disease, and hemangiomas requiring surgical removal, presenting for follow-up.     COPD, pulmonary nodule: GOLD group D. FEV1 1.24 L (34% predicted) and DLCO 55% predicted. Has had four hospitalizations for COPD, the last in May 2019, and multiple exacerbations requiring steroids and antibiotics. A1AT genotype normal at . Started his action plan with prednisone and azithromycin yesterday for increasing dyspnea and chest tightness, too early to see improvement. No new muscle aches. Minimal sputum, no fevers or chills. Received influenza vaccine at last visit. Overall has felt clinical improvement with initiation of LAMA-LABA and ICS until this latest exacerbation. He is interested in pursuing pulmonary rehabilitation in [place]. Continues to chew tobacco and the nicotine gum was not helpful; he is interested in trying nicotine inhaler. Chest CT from  November 6 shows that the RLL nodular opacity continues to decrease in size; it was minimally FDG-avid in early August 2019; planning repeat chest CT in November. PET also showed increased rectal uptake, and he had colonoscopy with multiple adenomas and serrated adenomas for which he is following up.     Plan:  -  stop nicotine gum and start nicotine inhaler; encouraged ongoing efforts to quit chewing tobacco  - continue tiotropium-olodaterol respimat 2.5-2.5 mcg two inhalations daily  - continue budesonide flexhaler 180 mcg one inhalation two times a day; rinse/gargle/spit water after use  - albuterol HFA or nebulized as needed  - action plan: prednisone 12-day taper and azithromycin 5-day course to have on hand in case of exacerbations; he started this yesterday  - referral to pulmonary rehabilitation at [facility]  - final repeat chest CT in July 2020  - annaul influenza vaccination; received for this season  - up to date on pneumococcal vaccination  - follow up in 3 months  - encouraged him to call any time with questions or concerning symptoms     I appreciate the opportunity to participate in the care of Mr. Pederson. Please feel free to contact me at any time.     CCx: COPD, pulmonary nodule    HPI: 64 year old male with a history of tobacco smoking in remission, IVDU in remission, ongoing chewing tobacco dependence, hepatitis C without cirrhosis, COPD, TIA, HTN, atrial fibrillation, C-spine surgery, depression, RLS, seizure disorder, lumbar disc disease, and hemangiomas requiring surgical removal, presenting for follow-up. GOLD group D. FEV1 1.24 L (34% predicted) and DLCO 55% predicted. Has had four hospitalizations for COPD, the last in May 2019, and multiple exacerbations requiring steroids and antibiotics. A1AT genotype normal at . Started his action plan with prednisone and azithromycin yesterday for increasing dyspnea and chest tightness, too early to see improvement. No new  muscle aches. Minimal sputum, no fevers or chills. Received influenza vaccine at last visit. Overall has felt clinical improvement with initiation of LAMA-LABA and ICS until this latest exacerbation. He is interested in pursuing pulmonary rehabilitation in [place]. Continues to chew tobacco and the nicotine gum was not helpful; he is interested in trying nicotine inhaler. Chest CT from  shows that the RLL nodular opacity continues to decrease in size; it was minimally FDG-avid in early 2019; planning repeat chest CT in November. PET also showed increased rectal uptake, and he had colonoscopy with multiple adenomas and serrated adenomas for which he is following up.    ROS:  A 12-system review was obtained and was negative with the exception of the symptoms endorsed in the history of present illness.    PMH:  Former smoker  Hepatitis C without cirrhosis  IVDU in remission  Chewing tobacco dependence  COPD  TIA  HTN  AF  C-spine surgery  MDD  RLS  seizure disorder  Lumbar disc disease  Hemangiomas requiring resection    PSH:  Past Surgical History:   Procedure Laterality Date   ? CERVICAL FUSION      C6 and C7       Allergies:  No Known Allergies    Family HX:  No family history on file.    Social Hx:  Social History     Socioeconomic History   ? Marital status:      Spouse name: Not on file   ? Number of children: Not on file   ? Years of education: Not on file   ? Highest education level: Not on file   Occupational History   ? Not on file   Social Needs   ? Financial resource strain: Not on file   ? Food insecurity:     Worry: Not on file     Inability: Not on file   ? Transportation needs:     Medical: Not on file     Non-medical: Not on file   Tobacco Use   ? Smoking status: Former Smoker     Packs/day: 0.00     Last attempt to quit: 5/15/2018     Years since quittin.4   ? Smokeless tobacco: Current User   Substance and Sexual Activity   ? Alcohol use: Not Currently   ? Drug use:  Not Currently   ? Sexual activity: Not on file   Lifestyle   ? Physical activity:     Days per week: Not on file     Minutes per session: Not on file   ? Stress: Not on file   Relationships   ? Social connections:     Talks on phone: Not on file     Gets together: Not on file     Attends [employer] service: Not on file     Active member of club or organization: Not on file     Attends meetings of clubs or organizations: Not on file     Relationship status: Not on file   ? Intimate partner violence:     Fear of current or ex partner: Not on file     Emotionally abused: Not on file     Physically abused: Not on file     Forced sexual activity: Not on file   Other Topics Concern   ? Not on file   Social History Narrative   ? Not on file       Current Meds:  Current Outpatient Medications   Medication Sig Dispense Refill   ? albuterol (PROAIR HFA;PROVENTIL HFA;VENTOLIN HFA) 90 mcg/actuation inhaler Inhale 2 puffs every 6 (six) hours as needed for wheezing. 1 Inhaler 12   ? albuterol (PROVENTIL) 2.5 mg /3 mL (0.083 %) nebulizer solution Take 3 mL (2.5 mg total) by nebulization 4 (four) times a day. Mix with ipratropium 30 vial 4   ? amLODIPine (NORVASC) 5 MG tablet Take 1 tablet (5 mg total) by mouth daily. 30 tablet 11   ? azithromycin (ZITHROMAX) 250 MG tablet In case of flare-up, take two tabs on day 1, then one tab daily for 4 days. 6 tablet 5   ? buPROPion (WELLBUTRIN XL) 150 MG 24 hr tablet Take 1 tablet (150 mg total) by mouth every morning. 90 tablet 2   ? citalopram (CELEXA) 20 MG tablet Take 1 tablet (20 mg total) by mouth daily. 30 tablet 4   ? cyanocobalamin 1,000 mcg/mL injection Inject 1,000 mcg into the shoulder, thigh, or buttocks every 28 days.     ? gabapentin (NEURONTIN) 300 MG capsule Take 1 capsule (300 mg total) by mouth 4 (four) times a day. 120 capsule 4   ? lisinopril (PRINIVIL,ZESTRIL) 40 MG tablet Take 1 tablet (40 mg total) by mouth daily. 90 tablet 1   ? predniSONE (DELTASONE) 10 mg tablet In  case of flare-up, take 4 tabs daily x 3 days, then 3 tabs daily x 3 days, then 2 tabs daily x 3 days, then 1 tab daily x 3 days.. 30 tablet 5   ? budesonide (PULMICORT FLEXHALER) 180 mcg/actuation inhaler Inhale 1 puff 2 (two) times a day. 1 each 6   ? nicotine (NICOTROL) 10 mg inhaler Inhale 1 puff as needed for smoking cessation. 168 Cartridge 11   ? tiotropium-olodaterol (STIOLTO RESPIMAT) 2.5-2.5 mcg/actuation Mist inhaler Inhale 2 Inhalation daily. 4 g 6     No current facility-administered medications for this visit.        Physical Exam:  /82   Pulse 99   Resp 12   Ht 6' (1.829 m)   Wt (!) 236 lb (107 kg)   SpO2 93%   BMI 32.01 kg/m     Gen: alert, oriented, no distress  HEENT: nasal turbinates are unremarkable, no oropharyngeal lesions, no cervical or supraclavicular lymphadenopathy  CV: tachy, regular, no M/G/R  Resp: moderately diminished with prolonged expiratory phase  Abd: soft, nontender, no palpable organomegaly  Skin: no apparent rashes  Ext: no cyanosis, clubbing or edema  Neuro: alert, nonfocal    Labs:  A1AT genotype MM     Imaging studies:  Low-dose screening chest CT (7/11/19):  - images directly reviewed, formal interpretation follows:  FINDINGS:  LUNGS AND PLEURA: There is diffuse bronchial wall thickening. There is debris in the airways. There is no significant bronchiectasis. There is a confluent opacity in the lateral right mid to lower lung centered in the region of the right major fissure.   There is mild architectural distortion in this region. The opacity is 19 mm x 20 mm and is adjacent to an old healed right rib fracture (series 3 image 168).      MEDIASTINUM: The heart is normal in size. Normal esophagus. No thoracic lymphadenopathy. The main pulmonary artery is 32 mm in diameter.      CORONARY ARTERY CALCIFICATION: Mild.     LIMITED UPPER ABDOMEN: Negative.     MUSCULOSKELETAL: There is an old healed fracture of the right lateral seventh rib.      IMPRESSION:    CONCLUSION:  1.  A 2 cm opacity in the peripheral right mid to lower lung could represent an infectious or inflammatory process in a region of bronchiolectasis and scarring. Malignancy is not excluded. Recommend follow-up chest CT in 3 months.  2.  Bronchial wall thickening and mucous plugging can be seen with bronchitis.  3.  The main pulmonary artery is minimally enlarged, which could be seen with pulmonary hypertension.     RADIOLOGIST RECOMMENDATION: Recommend followup chest LDCT in 1 month.     LungRADS CATEGORY: 4B: Suspicious.     SIGNIFICANT INCIDENTAL FINDINGS: Negative.     PET-CT (8/1/19):  - images directly reviewed, formal interpretation follows:  FINDINGS: 1.0 x 0.7 cm markedly FDG avid (SUVmax 11.4) soft tissue attenuation structure in the left anterolateral wall of the low rectum about 7 cm above the anal verge, significantly exceeding the remainder of the colon, which is essentially non-FDG   avid.     1.8 x 0.9 cm minimally FDG avid (SUVmax 2.2) pulmonary opacity in the right lower lobe lateral basilar segment has decreased from 2.0 x 1.9 cm on 07/11/2019. FDG activity in the remainder of the body is normal.     Mucous in the trachea. Cholelithiasis. Moderate calcified atherosclerosis. Mildly enlarged prostate. Pelvic phleboliths. Sigmoid diverticulosis. C6-C7 interspinous pacer. Mild degenerative change throughout the spine.     IMPRESSION:   CONCLUSION:  1.  Incidental focus of marked FDG activity in the rectum is somewhat nonspecific, but adenomatous polyps and rectal cancer can have this appearance at PET CT. Recommend flexible sigmoidoscopy.  2.  Minimally FDG avid right lower lobe pulmonary opacity has decreased in size slightly, suggesting an infectious or inflammatory etiology, though CT follow-up to complete resolution or scarring is recommended, beginning 2-3 months from now.    CT chest (11/6/19):  - images directly reviewed, formal interpretation follows:  FINDINGS:   LUNGS AND  PLEURA: There has been slight decrease in both size and the density of the triangular pleural-based opacity lateral right lower lobe on image 111; now measuring 1.5 x 1.5 cm, previously measuring 2 x 1.9 cm and being more solid in appearance.   This appeared inflammatory on the recent PET/CT and inflammatory focus or site of scarring/rounded atelectasis still favored.  Tiny benign triangular nodule along right minor fissure. No new worrisome nodule.     Mild inflammatory bronchial wall thickening present at lung bases similar to previous exam. There is mucus debris layering dependently within the mainstem bronchi and trachea.     MEDIASTINUM/AXILLAE: No lymphadenopathy. No thoracic aortic aneurysm.     UPPER ABDOMEN: Cholelithiasis.     MUSCULOSKELETAL: Unremarkable.     IMPRESSION:   1.  Slight decrease size and density of the triangular pleural-based opacity lateral right lower lobe most consistent with evolving peripheral scar. Suggest a follow-up exam July 2020 to document long-term stability.  2.  Inflammatory bronchial wall thickening and endoluminal mucous debris present.     PFT's (June 2019):  FEV1/FVC is 0.46 and is reduced.  FEV1 is 1.24 L (34% predicted) and is reduced.  FVC is 2.69 L (57% predicted) and reduced.  There was improvement in spirometry after a single inhaled dose of bronchodilator.  TLC is 9.11 L (120% predicted) and is normal.  RV is 7.05 L (277% predicted) and is increased.  DLCO is 55% predicted and is reduced when it is corrected for hemoglobin.    Luis Rivera MD  Pulmonary and Critical Care Medicine  [facility]  Cell [phone]  Office [phone]  Pager [phone]

## 2021-06-19 NOTE — LETTER
Letter by Luis Rivera MD at      Author: Luis Rivera MD Service: -- Author Type: --    Filed:  Encounter Date: 2019 Status: (Other)       2019    Dear Dr. Lerma,    See below for documentation of a telephone conversation that I had today with Ki Pederson ( 1955).  Please feel free to call me at any time if needed.    Pulmonary Telephone Note    Called Mr. Pederson with his PET-CT result. I reviewed the images in detail, as well as the interpretation. The right lung peripheral opacity has decreased in size with mild FDG-avidity. This suggests an inflammatory etiology, and we will plan on repeat surveillance CT in 3 months. There is also a focus of marked FDG avidity in the rectum. He has never had a colonoscopy. I advised that we refer him to [facility] for colonoscopy. He is in agreement with the above plan.    Sincerely,    Luis Rivera MD  Pulmonary and Critical Care Medicine  [facility]  Cell [phone]  Office [phone]  Pager [phone]

## 2021-06-19 NOTE — LETTER
Letter by Luis Rivera MD at      Author: Luis Rivera MD Service: -- Author Type: --    Filed:  Encounter Date: 7/1/2019 Status: (Other)         Ricky Lerma MD  [address] [place] [address]                                  July 2, 2019    Patient: Ki Pederson   MR Number: [id number]   YOB: 1955   Date of Visit: 7/1/2019     Dear Dr. Florentin MD:    I saw Ki Pederson on the above date at the [facility]. Below are the relevant portions of my assessment and plan of care.    If you have questions, please do not hesitate to call me. I look forward to following iK along with you.    Sincerely,        Luis Rivera MD          CC  No Recipients  Luis Rivera MD  7/2/2019  2:18 PM  Sign at close encounter  Pulmonary Clinic Outpatient Consultation    Assessment and Plan:   64 year old male former smoker with a history of chewing tobacco dependence, CAD, COPD, TIA, HTN, atrial fibrillation, C-spine surgery, depression, RLS, seizure disorder, lumbar disc disease, and hemangiomas requiring surgical removal, presenting for evaluation.    COPD: GOLD group D. Has had four hospitalizations for COPD, the last in May 2019, and multiple exacerbations requiring steroids and antibiotics. CAT score today is 28 (4,4,5,5,5,3,1,1). FEV1 1.24 L (34%), DLCO 55%. Walking one block leads to dyspnea, and over the last year feels more limited in his ability to stay active. Minimal occasional dry cough. Smoked up to 2 ppd for 40 years, and quit one year ago; continues to use chewing tobacco. Tried varenicline in the past but had bad nightmares; currently on bupropion (though caution needed as he apparently has a seizure disorder). He is interested in nicotine gum. Discussed simplifying his regimen with triple-therapy once daily. Discussed pulmonary rehabilitation. Discussed low-dose CT for lung cancer screening. He is interested in all of the above.    Plan:  - nicotine gum for  tobacco craving; encouraged him to quit using chewing tobacco  - start fluticasone furoate-umeclidinium-vilanterol 100-62.5-25 mcg one inhalation daily; rinse/gargle/spit water after use  - albuterol HFA or nebulized as needed  - prednisone 12-day taper and azithromycin 5-day course to have on hand in case of exacerbations; encouraged him to call us if he starts these  - fingerstick test for A1AT level and genotype; I will call him with the results  - referral to pulmonary rehabilitation at [facility]  - low-dose chest CT for lung cancer screening*  - need to clarify PMHx in follow-up, as the EMR has CABG in his PSHx but he has no sternotomy wires on CXR  - follow up in 3 months  - encouraged him to call any time with questions or concerning symptoms    I appreciate the opportunity to participate in the care of Mr. Pederson. Please feel free to contact me at any time.    CCx: COPD, tobacco dependence    HPI: 64 year old male former smoker with a history of chewing tobacco dependence, CAD, COPD, TIA, HTN, atrial fibrillation, C-spine surgery, depression, RLS, seizure disorder, lumbar disc disease, and hemangiomas requiring surgical removal, presenting for evaluation. GOLD group D. Has had four hospitalizations for COPD, the last in May 2019, and multiple exacerbations requiring steroids and antibiotics. CAT score today is 28 (4,4,5,5,5,3,1,1). FEV1 1.24 L (34%), DLCO 55%. Walking one block leads to dyspnea, and over the last year feels more limited in his ability to stay active. Minimal occasional dry cough. Smoked up to 2 ppd for 40 years, and quit one year ago; continues to use chewing tobacco. Tried varenicline in the past but had bad nightmares; currently on bupropion (though caution needed as he apparently has a seizure disorder). He is interested in nicotine gum. Discussed simplifying his regimen with triple-therapy once daily. Discussed pulmonary rehabilitation. Discussed low-dose CT for  lung cancer screening. He is interested in all of the above.    ROS:  A 12-system review was obtained and was negative with the exception of the symptoms endorsed in the history of present illness.    PMH:  Former smoker  Chewing tobacco dependence  COPD  TIA  CAD  HTN  AF  C-spine surgery  MDD  RLS  siezure disorder  Lumbar disc disease  Hemangiomas requiring resection    PSH:  Past Surgical History:   Procedure Laterality Date   ? CERVICAL FUSION      C6 and C7   ? CORONARY ARTERY BYPASS GRAFT         Allergies:  Allergies   Allergen Reactions   ? Wellbutrin [Bupropion Hcl]      Patient states medication makes him feel fatigue       Family HX:  No family history on file.    Social Hx:  Social History     Socioeconomic History   ? Marital status:      Spouse name: Not on file   ? Number of children: Not on file   ? Years of education: Not on file   ? Highest education level: Not on file   Occupational History   ? Not on file   Social Needs   ? Financial resource strain: Not on file   ? Food insecurity:     Worry: Not on file     Inability: Not on file   ? Transportation needs:     Medical: Not on file     Non-medical: Not on file   Tobacco Use   ? Smoking status: Former Smoker     Last attempt to quit: 5/15/2018     Years since quittin.1   ? Smokeless tobacco: Current User   Substance and Sexual Activity   ? Alcohol use: Not Currently   ? Drug use: Not Currently   ? Sexual activity: Not on file   Lifestyle   ? Physical activity:     Days per week: Not on file     Minutes per session: Not on file   ? Stress: Not on file   Relationships   ? Social connections:     Talks on phone: Not on file     Gets together: Not on file     Attends Zoroastrianism service: Not on file     Active member of club or organization: Not on file     Attends meetings of clubs or organizations: Not on file     Relationship status: Not on file   ? Intimate partner violence:     Fear of current or ex partner: Not on file     Emotionally  abused: Not on file     Physically abused: Not on file     Forced sexual activity: Not on file   Other Topics Concern   ? Not on file   Social History Narrative   ? Not on file       Current Meds:  Current Outpatient Medications   Medication Sig Dispense Refill   ? albuterol (PROAIR HFA;PROVENTIL HFA;VENTOLIN HFA) 90 mcg/actuation inhaler Inhale 2 puffs every 6 (six) hours as needed for wheezing. 1 Inhaler 12   ? albuterol (PROVENTIL) 2.5 mg /3 mL (0.083 %) nebulizer solution Take 3 mL (2.5 mg total) by nebulization 4 (four) times a day. Mix with ipratropium 30 vial 4   ? amLODIPine (NORVASC) 5 MG tablet Take 1 tablet (5 mg total) by mouth daily. 30 tablet 11   ? buPROPion (WELLBUTRIN XL) 150 MG 24 hr tablet Take 1 tablet (150 mg total) by mouth every morning. 30 tablet 2   ? citalopram (CELEXA) 20 MG tablet Take 1 tablet (20 mg total) by mouth daily. 30 tablet 4   ? cyanocobalamin 1,000 mcg/mL injection Inject 1,000 mcg into the shoulder, thigh, or buttocks every 28 days.     ? gabapentin (NEURONTIN) 300 MG capsule Take 1 capsule (300 mg total) by mouth 4 (four) times a day. 120 capsule 4   ? lisinopril (PRINIVIL,ZESTRIL) 40 MG tablet Take 1 tablet (40 mg total) by mouth daily. 30 tablet 3   ? azithromycin (ZITHROMAX) 250 MG tablet In case of flare-up, take two tabs on day 1, then one tab daily for 4 days. 6 tablet 5   ? fluticasone-umeclidinium-vilanterol (TRELEGY ELLIPTA) 100-62.5-25 mcg DsDv inhaler Inhale 1 Inhalation daily. 30 each 11   ? nicotine polacrilex (NICORETTE) 2 mg gum Apply 1 each (2 mg total) to the mouth or throat as needed for smoking cessation. 50 each 11   ? predniSONE (DELTASONE) 10 mg tablet In case of flare-up, take 4 tabs daily x 3 days, then 3 tabs daily x 3 days, then 2 tabs daily x 3 days, then 1 tab daily x 3 days.. 30 tablet 5     No current facility-administered medications for this visit.        Physical Exam:  /80   Pulse 93   Resp 12   Ht 6' (1.829 m)   Wt (!) 229 lb  (103.9 kg)   SpO2 95%   BMI 31.06 kg/m     Gen: alert, oriented, no distress  HEENT: nasal turbinates are unremarkable, no oropharyngeal lesions, no cervical or supraclavicular lymphadenopathy  CV: tachy, regular, no M/G/R  Resp: diminished bilaterally with prolonged expiratory phase  Abd: soft, nontender, no palpable organomegaly  Skin: no apparent rashes  Ext: no cyanosis, clubbing or edema  Neuro: alert, nonfocal    Labs:  reviewed    Imaging studies:  CXR (May 2019):  - images directly reviewed  - clear lungs with likely hyperlucency but no or minimal hyperinflation    PFT's (June 2019):  FEV1/FVC is 0.46 and is reduced.  FEV1 is 1.24 L (34% predicted) and is reduced.  FVC is 2.69 L (57% predicted) and reduced.  There was improvement in spirometry after a single inhaled dose of bronchodilator.  TLC is 9.11 L (120% predicted) and is normal.  RV is 7.05 L (277% predicted) and is increased.  DLCO is 55% predicted and is reduced when it is corrected for hemoglobin.    Luis Rivera MD  [facility]  Cell [phone]  Office [phone]  Pager [phone]    *Lung Cancer Screening pre-scan counseling Visit    The patient fits the risk profile of patients who benefit from this screening:  -The patient is >55 years old and <80 years old  -The patient has an 80 pack year history (over 30)  -The patient has smoked within the past 15 years  -The patient has no medical comorbidity severe enough that it would cause mortality prior to mortality due to the lung cancer attempting to be detected.    Discussion with patient regarding the harms associated with LDCT screening include false-negative and false-positive results, incidental findings, overdiagnosis, and radiation exposure were reviewed at length.   The patient understands that pursuing this screening test may result in a biopsy that was not necessary. It may also produced added stress over a nodule that is likely not cancer.    Of 100 patients who get  screening, 25 will have a positive scan. Of those 25, only 1 will have cancer.  Overdiagnosis is estimated at 10% of patients-- they would not have been detected in the patient's lifetime without screening. Less than 1% of patients likely had death related to radiation exposure increase.   Average low-dose CT associated with 0.61 to 1.5 mSv. Annual background radiation exposure in the [place] averages 2.4 mS; mammogram is 0.7mSv.    The benefits are reduction in risk of death from lung cancer. The number needed to treat is 320 (for every 320 patients who undergo screening, 1 patient will have a benefit in mortality from early detection from the screening).    Undergoing this screening implies willingness to pursue further potentially invasive testing to discover potential cancer.    All questions were answered.    The patient was counseled regarding smoking cessation and its risk for lung cancer.    The patients results will be followed in our pulmonary registry and will be recalled based on the findings of the CT scan.

## 2021-06-19 NOTE — LETTER
Letter by Luis Rivera MD at      Author: Luis Rivera MD Service: -- Author Type: --    Filed:  Encounter Date: 7/15/2019 Status: (Other)       15 July 2019    Dear Dr. Lerma,    See below for documentation of a telephone conversation that I had today with Ki Pederson ( 1955).  Please feel free to call me at any time if needed.    Pulmonary Telephone Note    Reached Mr. Pederson to discuss his screening chest CT result. Given the size of this lesion, we have decided to proceed with PET-CT rather than ongoing surveillance. Will plan PET-CT and I will call him with the result. Encouraged him to call any time with questions or concerning symptoms.    Sincerely,    Luis Rivera MD  Pulmonary and Critical Care Medicine  [facility]  Cell [phone]  Office [phone]  Pager [phone]

## 2021-06-19 NOTE — LETTER
Letter by Luis Rivera MD at      Author: Luis Rivera MD Service: -- Author Type: --    Filed:  Encounter Date: 9/25/2019 Status: Signed         Ricky Lerma MD  [address] [place] [address]                                  September 25, 2019    Patient: Ki Pederson   MR Number: [id number]   YOB: 1955   Date of Visit: 9/25/2019     Dear Dr. Florentin MD:    I saw Ki Pederson today at the [facility]. Below are the relevant portions of my assessment and plan of care.    If you have questions, please do not hesitate to call me. I look forward to following Ki along with you.    Sincerely,        Luis Rivera MD          CC  No Recipients  Luis Rivera MD  9/25/2019  3:59 PM  Sign when Signing Visit  Pulmonary Clinic Follow-up Visit    Assessment and Plan:   64 year old male with a history of tobacco smoking in remission, IVDU in remission, ongoing chewing tobacco dependence, hepatitis C without cirrhosis, COPD, TIA, HTN, atrial fibrillation, C-spine surgery, depression, RLS, seizure disorder, lumbar disc disease, and hemangiomas requiring surgical removal, presenting for follow-up.     COPD, pulmonary nodule: GOLD group D. Has had four hospitalizations for COPD, the last in May 2019, and multiple exacerbations requiring steroids and antibiotics. A1AT genotype normal at . Started his action plan with prednisone and azithromycin last week for increased cough and dyspnea, improving. Started tiotropium-olodaterol respimat and budesonide flexhaler in July, and he feels significant improvement in his breathing; able to carry his granddaughter which he could not do before. Has not needed to use albuterol HFA or nebulized. CAT score today is 27 (2,2,4,5,3,3,4,4), similar to 28 in early July. Continues to chew tobacco; tried the nicotine 2-mg gum but it is not strong enough for his cravings. No cigarettes for a year. 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 The PET-CT we did for pulmonary nodule showed decreased size of RLL nodule but minimally FDG-avid; planning repeat chest CT in November. PET also showed increased rectal uptake, and he had colonoscopy with multiple adenomas and serrated adenomas; has planned follow-up colonoscopy one year later.     Plan:  - will increase nicotine gum from 2-mg to 4-mg gum; encouraged ongoing efforts to quit chewing tobacco  - continue tiotropium-olodaterol respimat 2.5-2.5 mcg two inhalations daily  - continue budesonide flexhaler 180 mcg one inhalation two times a day; rinse/gargle/spit water after use  - albuterol HFA or nebulized as needed  - action plan: prednisone 12-day taper and azithromycin 5-day course to have on hand in case of exacerbations; he is completing a course of this now  - repeat chest CT in November and follow-up with me at that time  - administered influenza vaccine today in clinic  - up to date on pneumococcal vaccination  - encouraged him to call any time with questions or concerning symptoms     I appreciate the opportunity to participate in the care of Mr. Pederson. Please feel free to contact me at any time.     CCx: COPD,  pulmonary nodule    HPI: 64 year old male with a history of tobacco smoking in remission, IVDU in remission, ongoing chewing tobacco dependence, hepatitis C without cirrhosis, COPD, TIA, HTN, atrial fibrillation, C-spine surgery, depression, RLS, seizure disorder, lumbar disc disease, and hemangiomas requiring surgical removal, presenting for follow-up. GOLD group D. Has had four hospitalizations for COPD, the last in May 2019, and multiple exacerbations requiring steroids and antibiotics. A1AT genotype normal at MM. Started his action plan with prednisone and azithromycin last week for increased cough and dyspnea, improving. Started tiotropium-olodaterol respimat and budesonide flexhaler in July, and he feels significant improvement in his  breathing; able to carry his granddaughter which he could not do before. Has not needed to use albuterol HFA or nebulized. CAT score today is 27 (2,2,4,5,3,3,4,4), similar to 28 in early July. Continues to chew tobacco; tried the nicotine 2-mg gum but it is not strong enough for his cravings. No cigarettes for a year. 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 The PET-CT we did for pulmonary nodule showed decreased size of RLL nodule but minimally FDG-avid; planning repeat chest CT in November. PET also showed increased rectal uptake, and he had colonoscopy with multiple adenomas and serrated adenomas; has planned follow-up colonoscopy one year later.    ROS:  A 12-system review was obtained and was negative with the exception of the symptoms endorsed in the history of present illness.    PMH:  Former smoker  Hepatitis C without cirrhosis  IVDU in remission  Chewing tobacco dependence  COPD  TIA  HTN  AF  C-spine surgery  MDD  RLS  siezure disorder  Lumbar disc disease  Hemangiomas requiring resection    PSH:  Past Surgical History:   Procedure Laterality Date   ? CERVICAL FUSION      C6 and C7       Allergies:  No Known Allergies    Family HX:  No family history on file.    Social Hx:  Social History     Socioeconomic History   ? Marital status:      Spouse name: Not on file   ? Number of children: Not on file   ? Years of education: Not on file   ? Highest education level: Not on file   Occupational History   ? Not on file   Social Needs   ? Financial resource strain: Not on file   ? Food insecurity:     Worry: Not on file     Inability: Not on file   ? Transportation needs:     Medical: Not on file     Non-medical: Not on file   Tobacco Use   ? Smoking status: Former Smoker     Packs/day: 0.00     Last attempt to quit: 5/15/2018     Years since quittin.3   ? Smokeless tobacco: Current User   Substance and Sexual Activity   ? Alcohol use: Not Currently   ?  Drug use: Not Currently   ? Sexual activity: Not on file   Lifestyle   ? Physical activity:     Days per week: Not on file     Minutes per session: Not on file   ? Stress: Not on file   Relationships   ? Social connections:     Talks on phone: Not on file     Gets together: Not on file     Attends Jewish service: Not on file     Active member of club or organization: Not on file     Attends meetings of clubs or organizations: Not on file     Relationship status: Not on file   ? Intimate partner violence:     Fear of current or ex partner: Not on file     Emotionally abused: Not on file     Physically abused: Not on file     Forced sexual activity: Not on file   Other Topics Concern   ? Not on file   Social History Narrative   ? Not on file       Current Meds:  Current Outpatient Medications   Medication Sig Dispense Refill   ? albuterol (PROAIR HFA;PROVENTIL HFA;VENTOLIN HFA) 90 mcg/actuation inhaler Inhale 2 puffs every 6 (six) hours as needed for wheezing. 1 Inhaler 12   ? albuterol (PROVENTIL) 2.5 mg /3 mL (0.083 %) nebulizer solution Take 3 mL (2.5 mg total) by nebulization 4 (four) times a day. Mix with ipratropium 30 vial 4   ? amLODIPine (NORVASC) 5 MG tablet Take 1 tablet (5 mg total) by mouth daily. 30 tablet 11   ? azithromycin (ZITHROMAX) 250 MG tablet In case of flare-up, take two tabs on day 1, then one tab daily for 4 days. 6 tablet 5   ? buPROPion (WELLBUTRIN XL) 150 MG 24 hr tablet Take 1 tablet (150 mg total) by mouth every morning. 30 tablet 2   ? citalopram (CELEXA) 20 MG tablet Take 1 tablet (20 mg total) by mouth daily. 30 tablet 4   ? cyanocobalamin 1,000 mcg/mL injection Inject 1,000 mcg into the shoulder, thigh, or buttocks every 28 days.     ? gabapentin (NEURONTIN) 300 MG capsule Take 1 capsule (300 mg total) by mouth 4 (four) times a day. 120 capsule 4   ? lisinopril (PRINIVIL,ZESTRIL) 40 MG tablet Take 1 tablet (40 mg total) by mouth daily. 30 tablet 3   ? predniSONE (DELTASONE) 10 mg  tablet In case of flare-up, take 4 tabs daily x 3 days, then 3 tabs daily x 3 days, then 2 tabs daily x 3 days, then 1 tab daily x 3 days.. 30 tablet 5   ? budesonide (PULMICORT FLEXHALER) 180 mcg/actuation inhaler Inhale 1 puff 2 (two) times a day. 1 each 6   ? nicotine polacrilex (NICORETTE) 4 MG gum Apply 1 each (4 mg total) to the mouth or throat as needed for smoking cessation. 100 each 11   ? tiotropium-olodaterol (STIOLTO RESPIMAT) 2.5-2.5 mcg/actuation Mist inhaler Inhale 2 Inhalation daily. 4 g 6     No current facility-administered medications for this visit.        Physical Exam:  /82   Pulse 87   Resp 12   Ht 6' (1.829 m)   Wt (!) 235 lb (106.6 kg)   SpO2 95%   BMI 31.87 kg/m     Gen: alert, oriented, no distress  HEENT: nasal turbinates are unremarkable, no oropharyngeal lesions, no cervical or supraclavicular lymphadenopathy  CV: RRR, no M/G/R  Resp: moderately diminished with prolonged expiratory phase  Abd: soft, nontender, no palpable organomegaly  Skin: left hand extensive hemangioma, no other rashes  Ext: no cyanosis, clubbing or edema  Neuro: alert, nonfocal    Labs:  A1AT genotype MM    Imaging studies:  Low-dose screening chest CT (7/11/19):  - images directly reviewed, formal interpretation follows:  FINDINGS:  LUNGS AND PLEURA: There is diffuse bronchial wall thickening. There is debris in the airways. There is no significant bronchiectasis. There is a confluent opacity in the lateral right mid to lower lung centered in the region of the right major fissure.   There is mild architectural distortion in this region. The opacity is 19 mm x 20 mm and is adjacent to an old healed right rib fracture (series 3 image 168).      MEDIASTINUM: The heart is normal in size. Normal esophagus. No thoracic lymphadenopathy. The main pulmonary artery is 32 mm in diameter.      CORONARY ARTERY CALCIFICATION: Mild.     LIMITED UPPER ABDOMEN: Negative.     MUSCULOSKELETAL: There is an old healed  fracture of the right lateral seventh rib.      IMPRESSION:   CONCLUSION:  1.  A 2 cm opacity in the peripheral right mid to lower lung could represent an infectious or inflammatory process in a region of bronchiolectasis and scarring. Malignancy is not excluded. Recommend follow-up chest CT in 3 months.  2.  Bronchial wall thickening and mucous plugging can be seen with bronchitis.  3.  The main pulmonary artery is minimally enlarged, which could be seen with pulmonary hypertension.     RADIOLOGIST RECOMMENDATION: Recommend followup chest LDCT in 1 month.     LungRADS CATEGORY: 4B: Suspicious.     SIGNIFICANT INCIDENTAL FINDINGS: Negative.    PET-CT (8/1/19):  - images directly reviewed, formal interpretation follows:  FINDINGS: 1.0 x 0.7 cm markedly FDG avid (SUVmax 11.4) soft tissue attenuation structure in the left anterolateral wall of the low rectum about 7 cm above the anal verge, significantly exceeding the remainder of the colon, which is essentially non-FDG   avid.     1.8 x 0.9 cm minimally FDG avid (SUVmax 2.2) pulmonary opacity in the right lower lobe lateral basilar segment has decreased from 2.0 x 1.9 cm on 07/11/2019. FDG activity in the remainder of the body is normal.     Mucous in the trachea. Cholelithiasis. Moderate calcified atherosclerosis. Mildly enlarged prostate. Pelvic phleboliths. Sigmoid diverticulosis. C6-C7 interspinous pacer. Mild degenerative change throughout the spine.     IMPRESSION:   CONCLUSION:  1.  Incidental focus of marked FDG activity in the rectum is somewhat nonspecific, but adenomatous polyps and rectal cancer can have this appearance at PET CT. Recommend flexible sigmoidoscopy.  2.  Minimally FDG avid right lower lobe pulmonary opacity has decreased in size slightly, suggesting an infectious or inflammatory etiology, though CT follow-up to complete resolution or scarring is recommended, beginning 2-3 months from now.     PFT's (June 2019):  FEV1/FVC is 0.46 and is  reduced.  FEV1 is 1.24 L (34% predicted) and is reduced.  FVC is 2.69 L (57% predicted) and reduced.  There was improvement in spirometry after a single inhaled dose of bronchodilator.  TLC is 9.11 L (120% predicted) and is normal.  RV is 7.05 L (277% predicted) and is increased.  DLCO is 55% predicted and is reduced when it is corrected for hemoglobin.    Luis Rivera MD  Pulmonary and Critical Care Medicine  [facility]  Cell [phone]  Office [phone]  Pager [phone]

## 2021-06-19 NOTE — LETTER
Letter by Luis Rivera MD at      Author: Luis Rivera MD Service: -- Author Type: --    Filed:  Encounter Date: 5/15/2019 Status: (Other)         Ki Pederson  [address] [place] [address]    May 15, 2019    Dear Mr. Pederson,    Welcome to [facility]! Your appointment information is below.   Please bring the following to your appointment:    Insurance Card, so we may scan it for our records    Drivers license or valid ID, so we may scan it for our records    Co-pay (as applicable per your insurance plan)    A current list of your medications including over the counter products such as vitamins and supplements    Your medical records including copies of X-Ray films if you are transferring your care from another clinic.  If you do not have your records, please fill out the release of information form and we will request those records.     Provider: Luis Rivera MD  Appointment Date:  Monday, July 1, 2019  Arrival Time:  3:00 PFT,  4:00 dr appt.    Location: [address]        [facility], [address]    **Please allow adequate time for your commute and parking. If you are more than 10 minutes late, you may be asked to reschedule.     If you need to cancel or reschedule your appointment, please notify us at least 24 hours prior to your appointment time so we are able to make this time available for another patient.    Thank you for choosing the [facility] for your health care needs. If you have any questions, please do not hesitate to contact us at any time at   [phone]. We look forward to caring for you.     Sincerely,     [facility] staff        40 y/o f with pmhx / surg hx appendectomy, endometriosis, and adhesions which complicated her recent preg of child born c section on April 24th at 33 weeks, with placenta previa. today patient presents with worsening abd pain, drainage from surgical wound and passing clots vaginally. she called her ob and was recommended to come to ED for eval. symptoms associated with vomiting/chills and foul smelling vaginal discharge. Denies urinary symptoms, change in bowel movements, cp, sob, headaches, vision changes, no other complaints.  new born is currently in NICU and patient is breastfeeding.   Gen.  mild discomfort from pain. no acute resp distress  HEENT:  perrl eomi   Lungs:  b/l BS. no retractions  CVS: S1S2   Abd;  soft, no guarding no distention. + ttp diffusely, greater over surgical scar. dressing with yellow / brown discharge in dressing. no active bleeding.   Ext: no pitting edema  Neuro: aaox3  MSK:  strength 5/5 b/l upper and lower ext.

## 2021-06-20 NOTE — LETTER
Letter by Ricky Lerma MD at      Author: Ricky Lerma MD Service: -- Author Type: --    Filed:  Encounter Date: 1/2/2020 Status: Signed       My COPD Action Plan     Name: Ki Pederson    YOB: 1955   Date: 1/2/2020    My doctor: Ricky Lerma MD   My clinic: [facility]     [address] [place] [id number]  [phone]  My Controller Medicine: Levalbuterol (Xopenex)   Dose:      My Rescue Medicine: Levalbuterol (Xopenex) inhaler   Dose:      My Flare Up Medicine: Prednisone   Dose:   My COPD Severity: Severe = FeV1 < 30%-49%      Use of Oxygen: Oxygen Not Prescribed      Make sure you've had your pneumonia   vaccines.          GREEN ZONE       Doing well today      Usual level of activity and exercise    Usual amount of cough and mucus    No shortness of breath    Usual level of health (thinking clearly, sleeping well, feel like eating) Actions:      Take daily medicines    Use oxygen as prescribed    Follow regular exercise and diet plan    Avoid cigarette smoke and other irritants that harm the lungs              YELLOW ZONE             Having a bad day or flare up      Short of breath more than usual    A lot more sputum (mucus) than usual    Sputum looks yellow, green, tan, brown or bloody    More coughing or wheezing    Fever or chills    Less energy; trouble completing activities    Trouble thinking or focusing    Using quick relief inhaler or nebulizer more often    Poor sleep; symptoms wake me up    Do not feel like eating Actions:      Get plenty of rest    Take daily medicines    Use quick relief inhaler every 4 hours    If you use oxygen, call you doctor to see if you should adjust your oxygen    Do breathing exercises or other things to help you relax    Let a loved one, friend or neighbor know you are feeling worse    Call your care team if you have 2 or more symptoms.  Start taking steroids or antibiotics if directed by your care team              RED  ZONE           Need medical care now      Severe shortness of breath (feel you can't breathe)    Fever, chills    Not enough breath to do any activity    Trouble coughing up mucus, walking or talking    Blood in mucus    Frequent coughing   Rescue medicines are not working    Not able to sleep because of breathing    Feel confused or drowsy    Chest pain    Actions:      Call your health care team.  If you cannot reach your care team, call 911 or go to the emergency room.           Annual Reminders:  Meet with Care Team, Flu Shot every Fall  Pharmacy:   [id number] [address] [place] [id number]  Phone: [phone] Fax: [phone]

## 2021-06-20 NOTE — LETTER
Letter by Thais Mccurdy CHW at      Author: Thais Mccurdy CHW Service: -- Author Type: --    Filed:  Encounter Date: 12/30/2019 Status: Signed       Dear Ki,                                                                         You were recently referred to the [facility]'s Clinic Care Coordination service.  This is a service offered through your Primary Care Clinic which can help you access resources, services in regard to your health and well-being goals. The clinic Community Health Worker has placed two calls to you to discuss the nature of this service and to offer enrollment.  If you are interested in learning more about Clinic Care Coordination, please call your Primary Care Clinic's Community Health Worker, Thais, at [phone].                                                    Sincerely,                                                                              VIRGIL Dubon                                                                                          Clinic Care Coordination                                                  [facility]

## 2021-06-20 NOTE — LETTER
Letter by Ricky Lerma MD at      Author: Ricky Lerma MD Service: -- Author Type: --    Filed:  Encounter Date: 1/2/2020 Status: Signed                    My Depression Action Plan  Name: Ki Pederson   Date of Birth 1955  Date: 1/2/2020    My Doctor: Ricky Lerma MD   My Clinic: [facility]   [address] [place] [id number]  [phone]          GREEN    ZONE   Good Control    What it looks like:     Things are going generally well. You have normal ups and downs. You may even feel depressed from time to time, but bad moods usually last less than a day.   What you need to do:  1. Continue to care for yourself (see self care plan)  2. Check your depression survival kit and update it as needed  3. Follow your physicians recommendations including any medication.  4. Do not stop taking medication unless you consult with your physician first.           YELLOW         ZONE Getting Worse    What it looks like:     Depression is starting to interfere with your life.     It may be hard to get out of bed; you may be starting to isolate yourself from others.    Symptoms of depression are starting to last most all day and this has happened for several days.     You may have suicidal thoughts but they are not constant.   What you need to do:     1. Call your care team, your response to treatment will improve if you keep your care team informed of your progress. Yellow periods are signs an adjustment may need to be made.     2. Continue your self-care, even if you have to fake it!    3. Talk to someone in your support network    4. Open up your depression survival kit           RED    ZONE Medical Alert - Get Help    What it looks like:     Depression is seriously interfering with your life.     You may experience these or other symptoms: You cant get out of bed most days, cant work or engage in other necessary activities, you have trouble taking care of basic hygiene, or basic  responsibilities, thoughts of suicide or death that will not go away, self-injurious behavior.     What you need to do:  1. Call your care team and request a same-day appointment. If they are not available (weekends or after hours) call your local crisis line, emergency room or 911.            Depression Self Care Plan / Survival Kit    Self-Care for Depression  Heres the deal. Your body and mind are really not as separate as most people think.  What you do and think affects how you feel and how you feel influences what you do and think. This means if you do things that people who feel good do, it will help you feel better.  Sometimes this is all it takes.  There is also a place for medication and therapy depending on how severe your depression is, so be sure to consult with your medical provider and/ or Behavioral Health Consultant if your symptoms are worsening or not improving.     In order to better manage my stress, I will:    Exercise  Get some form of exercise, every day. This will help reduce pain and release endorphins, the feel good chemicals in your brain. This is almost as good as taking antidepressants!  This is not the same as joining a gym and then never going! (they count on that by the way?) It can be as simple as just going for a walk or doing some gardening, anything that will get you moving.      Hygiene   Maintain good hygiene (Get out of bed in the morning, Make your bed, Brush your teeth, Take a shower, and Get dressed like you were going to work, even if you are unemployed).  If your clothes don't fit try to get ones that do.    Diet  I will strive to eat foods that are good for me, drink plenty of water, and avoid excessive sugar, caffeine, alcohol, and other mood-altering substances.  Some foods that are helpful in depression are: complex carbohydrates, B vitamins, flaxseed, fish or fish oil, fresh fruits and vegetables.    Psychotherapy  I agree to participate in Individual Therapy (if  recommended).    Medication  If prescribed medications, I agree to take them.  Missing doses can result in serious side effects.  I understand that drinking alcohol, or other illicit drug use, may cause potential side effects.  I will not stop my medication abruptly without first discussing it with my provider.    Staying Connected With Others  I will stay in touch with my friends, family members, and my primary care provider/team.    Use your imagination  Be creative.  We all have a creative side; it doesnt matter if its oil painting, sand castles, or mud pies! This will also kick up the endorphins.    Witness Beauty  (AKA stop and smell the roses) Take a look outside, even in mid-winter. Notice colors, textures. Watch the squirrels and birds.     Service to others  Be of service to others.  There is always someone else in need.  By helping others we can get out of ourselves and remember the really important things.  This also provides opportunities for practicing all the other parts of the program.    Humor  Laugh and be silly!  Adjust your TV habits for less news and crime-drama and more comedy.    Control your stress  Try breathing deep, massage therapy, biofeedback, and meditation. Find time to relax each day.     My support system    Clinic Contact:  Phone number:    Contact 1:  Phone number:    Contact 2:  Phone number:    [place]/:  Phone number:    Therapist:  Phone number:    [facility] crisis center:    Phone number:    Other community support:  Phone number:

## 2021-06-20 NOTE — LETTER
Letter by Luis Rivera MD at      Author: Luis Rivera MD Service: -- Author Type: --    Filed:  Encounter Date: 2/7/2020 Status: (Other)         Ricky Lerma MD  [address] [place] [address]                                  February 8, 2020    Patient: Ki Pederson   MR Number: [id number]   YOB: 1955   Date of Visit: 2/7/2020     Dear Dr. Florentin MD:    I saw Ki Pederson on the above date at the [facility]. Below are the relevant portions of my assessment and plan of care.    If you have questions, please do not hesitate to call me. I look forward to following Ki along with you.    Sincerely,        Luis Rivera MD          CC  No Recipients  Luis Rivera MD  2/8/2020  8:29 PM  Sign when Signing Visit  Pulmonary Clinic Follow-up Visit    Assessment and Plan:   65 year old male with a history of tobacco smoking in remission, IVDU in remission, ongoing chewing tobacco dependence, hepatitis C without cirrhosis, COPD, colon polyps, TIA, HTN, atrial fibrillation with RVR, C-spine surgery, depression, RLS, seizure disorder, lumbar disc disease, atrial fibrillation with RVR in the and hemangiomas requiring surgical removal, presenting for follow-up.     COPD, pulmonary nodule, chewing tobacco dependence: GOLD group D. FEV1 1.24 L (34% predicted) and DLCO 55% predicted. A1AT genotype normal at MM. Since I saw him in clinic in November, he was admitted to the hospital 3 times in December for AECOPD and AF with RVR (the latter a new diagnosis), started on rate control and anticoagulation. During one of his hospitalizations he was diagnosed with Haemophilus influenzae pneumonia. He continues to cough occasionally with minimal phlegm. Dyspnea is improving, worsens with exposure to kitchen fumes, for example. He was changed to levalbuterol in the hospital given his new diagnosis and subsequent recurrence of AF with RVR. Unfortunately, though he remains  abstinent from smoking cigarettes, he continues to use chewing tobacco, though he is interested in quitting; nicotine patch, gum, and inhaler were ineffective but he wants to try the nicotine nasal spray. Chest CT from November 2019 showed that the RLL nodular opacity continued to decrease in size; it was minimally FDG-avid in early August 2019.     Plan:  - start nicotine nasal spray and quit using chewing tobacco  - continue tiotropium-olodaterol respimat 2.5-2.5 mcg two inhalations daily  - continue budesonide flexhaler 180 mcg one inhalation two times a day; rinse/gargle/spit water after use  - levalbuterol HFA or nebulized as needed  - he is not currently interested in pulmonary rehabilitation but will review the brochure for the [place] program  - action plan: prednisone 12-day taper and azithromycin 5-day course to have on hand in case of exacerbations  - annaul influenza vaccination; received for this season  - up to date on pneumococcal vaccination  - follow up in 3 months with a final surveillance chest CT  - encouraged him to call any time with questions or concerning symptoms     I appreciate the opportunity to participate in the care of Mr. Pederson. Please feel free to contact me at any time.    Luis Rivera MD  Pulmonary and Critical Care Medicine  [facility]  Cell [phone]  Office [phone]  Pager [phone]    CCx: COPD, pulmonary nodule, chewing tobacco dependence    HPI: 65 year old male with a history of tobacco smoking in remission, IVDU in remission, ongoing chewing tobacco dependence, hepatitis C without cirrhosis, COPD, TIA, HTN, atrial fibrillation with RVR, C-spine surgery, depression, RLS, seizure disorder, lumbar disc disease, atrial fibrillation with RVR in the and hemangiomas requiring surgical removal, presenting for follow-up. 65 year old male with a history of tobacco smoking in remission, IVDU in remission, ongoing chewing tobacco  dependence, hepatitis C without cirrhosis, COPD, TIA, HTN, atrial fibrillation with RVR, C-spine surgery, depression, RLS, seizure disorder, lumbar disc disease, atrial fibrillation with RVR in the and hemangiomas requiring surgical removal, presenting for follow-up.     COPD, pulmonary nodule, chewing tobacco dependence: GOLD group D. FEV1 1.24 L (34% predicted) and DLCO 55% predicted. A1AT genotype normal at MM. Since I saw him in clinic in November, he was admitted to the hospital 3 times in December for AECOPD and AF with RVR (the latter a new diagnosis), started on rate control and anticoagulation. During one of his hospitalizations he was diagnosed with Haemophilus influenzae pneumonia. He continues to cough occasionally with minimal phlegm. Dyspnea is improving, worsens with exposure to kitchen fumes, for example. He was changed to levalbuterol in the hospital given his new diagnosis and subsequent recurrence of AF with RVR. Unfortunately, though he remains abstinent from smoking cigarettes, he continues to use chewing tobacco, though he is interested in quitting; nicotine patch, gum, and inhaler were ineffective but he wants to try the nicotine nasal spray. Chest CT from November 2019 showed that the RLL nodular opacity continued to decrease in size; it was minimally FDG-avid in early August 2019.    ROS:  A 12-system review was obtained and was negative with the exception of the symptoms endorsed in the history of present illness.    PMH:  Former smoker  Hepatitis C without cirrhosis  IVDU in remission  Chewing tobacco dependence  COPD  TIA  HTN  AF  C-spine surgery  MDD  RLS  seizure disorder  Lumbar disc disease  Hemangiomas requiring resection  Colon polyps  AF with RVR    PSH:  Past Surgical History:   Procedure Laterality Date   ? CERVICAL FUSION      C6 and C7       Allergies:  Allergies   Allergen Reactions   ? Symbicort [Budesonide-Formoterol] Rash       Family HX:  No family history on  file.    Social Hx:  Social History     Socioeconomic History   ? Marital status:      Spouse name: Not on file   ? Number of children: Not on file   ? Years of education: Not on file   ? Highest education level: Not on file   Occupational History   ? Not on file   Social Needs   ? Financial resource strain: Not on file   ? Food insecurity:     Worry: Not on file     Inability: Not on file   ? Transportation needs:     Medical: Not on file     Non-medical: Not on file   Tobacco Use   ? Smoking status: Former Smoker     Packs/day: 0.00     Last attempt to quit: 5/15/2018     Years since quittin.7   ? Smokeless tobacco: Current User   Substance and Sexual Activity   ? Alcohol use: Not Currently   ? Drug use: Not Currently   ? Sexual activity: Not on file   Lifestyle   ? Physical activity:     Days per week: Not on file     Minutes per session: Not on file   ? Stress: Not on file   Relationships   ? Social connections:     Talks on phone: Not on file     Gets together: Not on file     Attends [employer] service: Not on file     Active member of club or organization: Not on file     Attends meetings of clubs or organizations: Not on file     Relationship status: Not on file   ? Intimate partner violence:     Fear of current or ex partner: Not on file     Emotionally abused: Not on file     Physically abused: Not on file     Forced sexual activity: Not on file   Other Topics Concern   ? Not on file   Social History Narrative   ? Not on file       Current Meds:  Current Outpatient Medications   Medication Sig Dispense Refill   ? ALPRAZolam (XANAX XR) 1 MG 24 hr tablet Take 1 tablet (1 mg total) by mouth 2 (two) times a day as needed. 10 tablet 0   ? amiodarone (PACERONE) 200 MG tablet Take 1 tablet (200 mg total) by mouth daily. 90 tablet 1   ? budesonide (PULMICORT FLEXHALER) 180 mcg/actuation inhaler Inhale 1 puff 2 (two) times a day.     ? buPROPion (WELLBUTRIN XL) 150 MG 24 hr tablet Take 1 tablet (150 mg  total) by mouth Daily after lunch.     ? citalopram (CELEXA) 20 MG tablet Take 1 tablet (20 mg total) by mouth Daily after lunch.     ? clobetasol (TEMOVATE) 0.05 % ointment Apply to affected area twice daily 30 g 3   ? clonazePAM (KLONOPIN) 0.5 MG tablet Take 1 tablet (0.5 mg total) by mouth at bedtime as needed for anxiety. 30 tablet 0   ? diltiazem (CARDIZEM CD) 240 MG 24 hr capsule Take 1 capsule (240 mg total) by mouth daily. 30 capsule 0   ? gabapentin (NEURONTIN) 300 MG capsule Take 1 capsule (300 mg total) by mouth 4 (four) times a day. 360 capsule 2   ? levalbuterol (XOPENEX HFA) 45 mcg/actuation inhaler Inhale 2 puffs every 4 (four) hours as needed for wheezing. Has atrial fibrillation  Cannot take albuterol 1 Inhaler 6   ? levalbuterol (XOPENEX) 1.25 mg/3 mL nebulizer solution Take 3 mL (1.25 mg total) by nebulization every 6 (six) hours as needed for wheezing. Take every 6 hours for 2-3 days, and when you are feeling better can change back to every 6 hours as needed 90 mL 0   ? rivaroxaban ANTICOAGULANT (XARELTO) 20 mg tablet Take 1 tablet (20 mg total) by mouth daily with supper. 30 tablet 12   ? rOPINIRole (REQUIP) 0.25 MG tablet Take 1 tablet (0.25 mg total) by mouth at bedtime as needed (for restless legs. take 1-3 hours before bed). 30 tablet 0   ? tiotropium-olodaterol (STIOLTO RESPIMAT) 2.5-2.5 mcg/actuation Mist inhaler Inhale 2 Inhalation daily. 4 g 11   ? azithromycin (ZITHROMAX) 250 MG tablet I case of flare-up, take 2 pills on day 1, then 1 pill daily for 4 days. 6 tablet 11   ? nicotine (NICOTROL) 10 mg/mL Spry 2 sprays in each nostril every hour as needed for craving 40 mL 11   ? predniSONE (DELTASONE) 10 mg tablet In case of flare-up, take 4 pills daily x 3 days, then 3 pills daily x 3 days, then 2 pills daily x 3 days, then 1 pill daily x 3 days.. 30 tablet 11     No current facility-administered medications for this visit.        Physical Exam:  /88   Pulse 94   Ht 6' (1.829 m)    Wt (!) 230 lb (104.3 kg)   SpO2 91%   BMI 31.19 kg/m     Gen: alert, oriented, no distress  HEENT: nasal turbinates are unremarkable, no oropharyngeal lesions, no cervical or supraclavicular lymphadenopathy  CV: RRR, no M/G/R  Resp: CTAB, no focal crackles or wheezes  Abd: soft, nontender, no palpable organomegaly  Skin: no apparent rashes  Ext: no cyanosis, clubbing or edema  Neuro: alert, nonfocal    Labs:  reviewed    Labs:  A1AT genotype MM     Imaging studies:  Low-dose screening chest CT (7/11/19):  - images directly reviewed, formal interpretation follows:  FINDINGS:  LUNGS AND PLEURA: There is diffuse bronchial wall thickening. There is debris in the airways. There is no significant bronchiectasis. There is a confluent opacity in the lateral right mid to lower lung centered in the region of the right major fissure.   There is mild architectural distortion in this region. The opacity is 19 mm x 20 mm and is adjacent to an old healed right rib fracture (series 3 image 168).      MEDIASTINUM: The heart is normal in size. Normal esophagus. No thoracic lymphadenopathy. The main pulmonary artery is 32 mm in diameter.      CORONARY ARTERY CALCIFICATION: Mild.     LIMITED UPPER ABDOMEN: Negative.     MUSCULOSKELETAL: There is an old healed fracture of the right lateral seventh rib.      IMPRESSION:   CONCLUSION:  1.  A 2 cm opacity in the peripheral right mid to lower lung could represent an infectious or inflammatory process in a region of bronchiolectasis and scarring. Malignancy is not excluded. Recommend follow-up chest CT in 3 months.  2.  Bronchial wall thickening and mucous plugging can be seen with bronchitis.  3.  The main pulmonary artery is minimally enlarged, which could be seen with pulmonary hypertension.     RADIOLOGIST RECOMMENDATION: Recommend followup chest LDCT in 1 month.     LungRADS CATEGORY: 4B: Suspicious.     SIGNIFICANT INCIDENTAL FINDINGS: Negative.     PET-CT (8/1/19):  - images  directly reviewed, formal interpretation follows:  FINDINGS: 1.0 x 0.7 cm markedly FDG avid (SUVmax 11.4) soft tissue attenuation structure in the left anterolateral wall of the low rectum about 7 cm above the anal verge, significantly exceeding the remainder of the colon, which is essentially non-FDG   avid.     1.8 x 0.9 cm minimally FDG avid (SUVmax 2.2) pulmonary opacity in the right lower lobe lateral basilar segment has decreased from 2.0 x 1.9 cm on 07/11/2019. FDG activity in the remainder of the body is normal.     Mucous in the trachea. Cholelithiasis. Moderate calcified atherosclerosis. Mildly enlarged prostate. Pelvic phleboliths. Sigmoid diverticulosis. C6-C7 interspinous pacer. Mild degenerative change throughout the spine.     IMPRESSION:   CONCLUSION:  1.  Incidental focus of marked FDG activity in the rectum is somewhat nonspecific, but adenomatous polyps and rectal cancer can have this appearance at PET CT. Recommend flexible sigmoidoscopy.  2.  Minimally FDG avid right lower lobe pulmonary opacity has decreased in size slightly, suggesting an infectious or inflammatory etiology, though CT follow-up to complete resolution or scarring is recommended, beginning 2-3 months from now.     CT chest (11/6/19):  - images directly reviewed, formal interpretation follows:  FINDINGS:   LUNGS AND PLEURA: There has been slight decrease in both size and the density of the triangular pleural-based opacity lateral right lower lobe on image 111; now measuring 1.5 x 1.5 cm, previously measuring 2 x 1.9 cm and being more solid in appearance.   This appeared inflammatory on the recent PET/CT and inflammatory focus or site of scarring/rounded atelectasis still favored.  Tiny benign triangular nodule along right minor fissure. No new worrisome nodule.     Mild inflammatory bronchial wall thickening present at lung bases similar to previous exam. There is mucus debris layering dependently within the mainstem bronchi and  trachea.     MEDIASTINUM/AXILLAE: No lymphadenopathy. No thoracic aortic aneurysm.     UPPER ABDOMEN: Cholelithiasis.     MUSCULOSKELETAL: Unremarkable.     IMPRESSION:   1.  Slight decrease size and density of the triangular pleural-based opacity lateral right lower lobe most consistent with evolving peripheral scar. Suggest a follow-up exam July 2020 to document long-term stability.  2.  Inflammatory bronchial wall thickening and endoluminal mucous debris present.    TTE (December 2019):    Technical Quality: limited visualization    Left ventricle ejection fraction is normal. The calculated left ventricular ejection fraction is 68%.    Normal right ventricular size and systolic function.    No hemodynamically significant valvular heart abnormalities.    No previous study for comparison.     PFT's (June 2019):  FEV1/FVC is 0.46 and is reduced.  FEV1 is 1.24 L (34% predicted) and is reduced.  FVC is 2.69 L (57% predicted) and reduced.  There was improvement in spirometry after a single inhaled dose of bronchodilator.  TLC is 9.11 L (120% predicted) and is normal.  RV is 7.05 L (277% predicted) and is increased.  DLCO is 55% predicted and is reduced when it is corrected for hemoglobin.      Sophia Culp, [facility]  2/7/2020  2:23 PM  Sign when Signing Visit  Oxygen saturation walk test    Patient oxygen saturation on RA at rest is 89%.  Oxygen saturation while ambulating 300ft on RA is 92%.

## 2021-06-20 NOTE — LETTER
"Letter by Luis Rivera MD at      Author: Luis Rivera MD Service: -- Author Type: --    Filed:  Encounter Date: 2020 Status: (Other)       2020    Dear Dr. Lerma,    See below for documentation of a virtual visit that I had with Ki Pederson ( 1955). Please feel free to call me at any time if needed.    Sincerely,    Luis Rivera MD  Pulmonary and Critical Care Medicine  [facility]  Cell [phone]  Office [phone]  Pager [phone]    Ki Pederson is a 65 y.o. male who is being evaluated via a billable video visit.      The patient has been notified of following:     \"This video visit will be conducted via a call between you and your physician/provider. We have found that certain health care needs can be provided without the need for an in-person physical exam.  This service lets us provide the care you need with a video conversation.  If a prescription is necessary we can send it directly to your pharmacy.  If lab work is needed we can place an order for that and you can then stop by our lab to have the test done at a later time.    Video visits are billed at different rates depending on your insurance coverage. Please reach out to your insurance provider with any questions.    If during the course of the call the physician/provider feels a video visit is not appropriate, you will not be charged for this service.\"    Patient has given verbal consent to a Video visit? Yes  How would you like to obtain your AVS? AVS Preference: MyChart.  If dropped by the video visit, the video invitation should be sent to: Text to cell phone: [phone]  Will anyone else be joining your video visit? Sheyanne - daughter    Video Start Time: 1420    Video-Visit Details    Type of service:  Video Visit    Video End Time (time video stopped): 1440  Originating Location (pt. Location): Home    Distant Location (provider location):  [facility]     Platform " used for Video Visit: [facility]    Pulmonary Clinic Follow-up Visit    Assessment and Plan:   65 year old male with a history of tobacco smoking in remission, IVDU in remission, ongoing chewing tobacco dependence, hepatitis C without cirrhosis, COPD, colon polyps, TIA, HTN, atrial fibrillation with RVR, C-spine surgery, depression, RLS, seizure disorder, lumbar disc disease, atrial fibrillation with RVR in the and hemangiomas requiring surgical removal, presenting for follow-up.     Chronic obstructive pulmonary disease, pulmonary nodule, chewing tobacco dependence:   Over the last couple of days, back of throat feels dry, coughing a lot. Dry cough. Some sweats last night, did not check temperature. Harder to catch his breath. Does not want pulmonary rehab due to pandemic risk, which is reasonable. Napping a lot during the day. Used prednisone and doxycycline just recently; going to try it again. Went out of town for a weekend; everyone is negative. 100 pack-years of smoking but quit smoking. Still chewing tobacco; struggling to quit. Nicotine replacement was not covered by insurance. Bad experience with varenicline. He is on bupropion. Multiple hospitalizations for respiratory failure in recent years. I feel he would benefit from noninvasive ventilation at night and as needed during the day; the patient is also quite interested in this. He previously had significant hypercapnia, especially when he was living in [place]. At baseline, has GOLD group D COPD. FEV1 1.24 L (34% predicted) and DLCO 55% predicted. A1AT genotype normal at . Chest CT 8/7 showed stable RLL triangular opacity; likely scar tissue. With his frequent exacerbations, we discussed the option of starting roflumilast or thirce-weekly azithromycin and decided to start this at last visit.    Plan:  - I contacted respiratory therapy at [facility] to qualify him for noninvasive ventilation at night and as needed during the day  - again  advised him to quit chewing tobacco  - continue tiotropium-olodaterol respimat 2.5-2.5 mcg two inhalations daily  - continue budesonide flexhaler 180 mcg one inhalation two times a day; rinse/gargle/spit water after use  - continue azithromycin 250 mg MWF  - levalbuterol HFA or nebulized as needed  - he is understandably reluctant to pursue pulmonary rehab at this time in the midst of the pandemic  - action plan: prednisone 12-day taper and doxycycline 5-day course to have on hand in case of exacerbations; he is starting this again now  - annual influenza vaccination  - up to date on pneumococcal vaccination  - with stable nodule on chest CT, will revert back to annual low-dose screening CTs starting August 2021  - follow up in 6 weeks  - encouraged him to call any time with questions or concerning symptoms    Luis Rivera MD  Pulmonary and Critical Care Medicine  [facility]  Cell [phone]  Office [phone]  Pager [phone]    CCx: chronic obstructive pulmonary disease, pulmonary nodule, chewing tobacco dependence    HPI: 65 year old male with a history of tobacco smoking in remission, IVDU in remission, ongoing chewing tobacco dependence, hepatitis C without cirrhosis, COPD, colon polyps, TIA, HTN, atrial fibrillation with RVR, C-spine surgery, depression, RLS, seizure disorder, lumbar disc disease, atrial fibrillation with RVR in the and hemangiomas requiring surgical removal, presenting for follow-up. Over the last couple of days, back of throat feels dry, coughing a lot. Dry cough. Some sweats last night, did not check temperature. Harder to catch his breath. Does not want pulmonary rehab due to pandemic risk, which is reasonable. Napping a lot during the day. Used prednisone and doxycycline just recently; going to try it again. Went out of town for a weekend; everyone is negative. 100 pack-years of smoking but quit smoking. Still chewing tobacco; struggling to quit. Nicotine  replacement was not covered by insurance. Bad experience with varenicline. He is on bupropion. Multiple hospitalizations for respiratory failure in recent years. I feel he would benefit from noninvasive ventilation at night and as needed during the day; the patient is also quite interested in this. He previously had significant hypercapnia, especially when he was living in [place]. At baseline, has GOLD group D COPD. FEV1 1.24 L (34% predicted) and DLCO 55% predicted. A1AT genotype normal at . Chest CT 8/7 showed stable RLL triangular opacity; likely scar tissue. With his frequent exacerbations, we discussed the option of starting roflumilast or thirce-weekly azithromycin and decided to start this at last visit.    ROS:  A 12-system review was obtained and was negative with the exception of the symptoms endorsed in the history of present illness.    PMH:  Former smoker  Hepatitis C without cirrhosis  IVDU in remission  Chewing tobacco dependence  COPD  TIA  HTN  AF  C-spine surgery  MDD  RLS  seizure disorder  Lumbar disc disease  Hemangiomas requiring resection  Colon polyps  AF with RVR    PSH:  Past Surgical History:   Procedure Laterality Date   ? CERVICAL FUSION      C6 and C7       Allergies:  Allergies   Allergen Reactions   ? Symbicort [Budesonide-Formoterol] Rash       Family HX:  No family history on file.    Social Hx:  Social History     Socioeconomic History   ? Marital status:      Spouse name: Not on file   ? Number of children: Not on file   ? Years of education: Not on file   ? Highest education level: Not on file   Occupational History   ? Not on file   Social Needs   ? Financial resource strain: Not on file   ? Food insecurity     Worry: Not on file     Inability: Not on file   ? Transportation needs     Medical: Not on file     Non-medical: Not on file   Tobacco Use   ? Smoking status: Former Smoker     Packs/day: 0.00     Last attempt to quit: 5/15/2018     Years since quitting:  2.2   ? Smokeless tobacco: Current User   ? Tobacco comment: No passive exposure   Substance and Sexual Activity   ? Alcohol use: Not Currently   ? Drug use: Not Currently   ? Sexual activity: Not on file   Lifestyle   ? Physical activity     Days per week: Not on file     Minutes per session: Not on file   ? Stress: Not on file   Relationships   ? Social connections     Talks on phone: Not on file     Gets together: Not on file     Attends Mandaeism service: Not on file     Active member of club or organization: Not on file     Attends meetings of clubs or organizations: Not on file     Relationship status: Not on file   ? Intimate partner violence     Fear of current or ex partner: Not on file     Emotionally abused: Not on file     Physically abused: Not on file     Forced sexual activity: Not on file   Other Topics Concern   ? Not on file   Social History Narrative   ? Not on file       Current Meds:  Current Outpatient Medications   Medication Sig Dispense Refill   ? acetaminophen (TYLENOL) 500 MG tablet Take 1-2 tablets (500-1,000 mg total) by mouth every 4 (four) hours as needed.  0   ? albuterol (PROAIR HFA;PROVENTIL HFA;VENTOLIN HFA) 90 mcg/actuation inhaler Inhale 2 puffs every 6 (six) hours as needed for wheezing.     ? amiodarone (PACERONE) 200 MG tablet Take 1 tablet (200 mg total) by mouth daily. 90 tablet 1   ? azithromycin (ZITHROMAX) 250 MG tablet Take one tablet on Monday, Wednesday, and Friday. 12 tablet 11   ? budesonide (PULMICORT FLEXHALER) 180 mcg/actuation inhaler Inhale 1 puff 2 (two) times a day.     ? buPROPion (WELLBUTRIN XL) 300 MG 24 hr tablet Take 1 tablet (300 mg total) by mouth Daily after lunch. 30 tablet 12   ? citalopram (CELEXA) 20 MG tablet TAKE 1 TABLET (20 MG TOTAL) BY MOUTH DAILY. 90 tablet 3   ? clonazePAM (KLONOPIN) 1 MG tablet Take 1 tablet (1 mg total) by mouth at bedtime as needed for anxiety. 30 tablet 0   ? diltiazem (CARDIZEM CD) 240 MG 24 hr capsule Take 1 capsule  (240 mg total) by mouth daily. 30 capsule 11   ? doxycycline (MONODOX) 100 MG capsule In case of flare-up, take one capsule twice daily for 5 days. 10 capsule 11   ? gabapentin (NEURONTIN) 300 MG capsule Take 2 capsules  In the morning and night and 2 in the afternoon 540 capsule 2   ? levalbuterol (XOPENEX HFA) 45 mcg/actuation inhaler Inhale 2 puffs every 4 (four) hours as needed for wheezing. Has atrial fibrillation  Cannot take albuterol 1 Inhaler 6   ? levalbuterol (XOPENEX) 1.25 mg/3 mL nebulizer solution Take 3 mL (1.25 mg total) by nebulization every 6 (six) hours as needed for wheezing. 360 mL 3   ? lisinopriL (PRINIVIL,ZESTRIL) 40 MG tablet TAKE 1 TABLET BY MOUTH DAILY. 90 tablet 0   ? nicotine (NICOTROL NS) 10 mg/mL Spry One spray in each nostril as needed for tobacco craving 10 mL 11   ? predniSONE (DELTASONE) 10 mg tablet In case of flare-up, take 4 pills daily x 3 days, then 3 pills daily x 3 days, then 2 pills daily x 3 days, then 1 pill daily x 3 days.     ? rivaroxaban ANTICOAGULANT (XARELTO) 20 mg tablet Take 1 tablet (20 mg total) by mouth daily with supper. 30 tablet 12   ? SPIRIVA RESPIMAT 2.5 mcg/actuation Mist INHALE TWO PUFFS BY MOUTH DAILY     ? tiotropium-olodaterol (STIOLTO RESPIMAT) 2.5-2.5 mcg/actuation Mist inhaler Inhale 2 Inhalation daily. 4 g 11     No current facility-administered medications for this visit.        Physical Exam:  no exam due to virtual visit    Labs:  A1AT genotype MM     Imaging studies:  Low-dose screening chest CT (July 2019):  - images directly reviewed, formal interpretation follows:  FINDINGS:  LUNGS AND PLEURA: There is diffuse bronchial wall thickening. There is debris in the airways. There is no significant bronchiectasis. There is a confluent opacity in the lateral right mid to lower lung centered in the region of the right major fissure.   There is mild architectural distortion in this region. The opacity is 19 mm x 20 mm and is adjacent to an old healed  right rib fracture (series 3 image 168).      MEDIASTINUM: The heart is normal in size. Normal esophagus. No thoracic lymphadenopathy. The main pulmonary artery is 32 mm in diameter.      CORONARY ARTERY CALCIFICATION: Mild.     LIMITED UPPER ABDOMEN: Negative.     MUSCULOSKELETAL: There is an old healed fracture of the right lateral seventh rib.      IMPRESSION:   CONCLUSION:  1.  A 2 cm opacity in the peripheral right mid to lower lung could represent an infectious or inflammatory process in a region of bronchiolectasis and scarring. Malignancy is not excluded. Recommend follow-up chest CT in 3 months.  2.  Bronchial wall thickening and mucous plugging can be seen with bronchitis.  3.  The main pulmonary artery is minimally enlarged, which could be seen with pulmonary hypertension.     RADIOLOGIST RECOMMENDATION: Recommend followup chest LDCT in 1 month.     LungRADS CATEGORY: 4B: Suspicious.     SIGNIFICANT INCIDENTAL FINDINGS: Negative.     PET-CT (August 2019):  - images directly reviewed, formal interpretation follows:  FINDINGS: 1.0 x 0.7 cm markedly FDG avid (SUVmax 11.4) soft tissue attenuation structure in the left anterolateral wall of the low rectum about 7 cm above the anal verge, significantly exceeding the remainder of the colon, which is essentially non-FDG   avid.     1.8 x 0.9 cm minimally FDG avid (SUVmax 2.2) pulmonary opacity in the right lower lobe lateral basilar segment has decreased from 2.0 x 1.9 cm on 07/11/2019. FDG activity in the remainder of the body is normal.     Mucous in the trachea. Cholelithiasis. Moderate calcified atherosclerosis. Mildly enlarged prostate. Pelvic phleboliths. Sigmoid diverticulosis. C6-C7 interspinous pacer. Mild degenerative change throughout the spine.     IMPRESSION:   CONCLUSION:  1.  Incidental focus of marked FDG activity in the rectum is somewhat nonspecific, but adenomatous polyps and rectal cancer can have this appearance at PET CT. Recommend flexible  sigmoidoscopy.  2.  Minimally FDG avid right lower lobe pulmonary opacity has decreased in size slightly, suggesting an infectious or inflammatory etiology, though CT follow-up to complete resolution or scarring is recommended, beginning 2-3 months from now.     CT chest (November 2019):  - images directly reviewed, formal interpretation follows:  FINDINGS:   LUNGS AND PLEURA: There has been slight decrease in both size and the density of the triangular pleural-based opacity lateral right lower lobe on image 111; now measuring 1.5 x 1.5 cm, previously measuring 2 x 1.9 cm and being more solid in appearance.   This appeared inflammatory on the recent PET/CT and inflammatory focus or site of scarring/rounded atelectasis still favored.  Tiny benign triangular nodule along right minor fissure. No new worrisome nodule.     Mild inflammatory bronchial wall thickening present at lung bases similar to previous exam. There is mucus debris layering dependently within the mainstem bronchi and trachea.     MEDIASTINUM/AXILLAE: No lymphadenopathy. No thoracic aortic aneurysm.     UPPER ABDOMEN: Cholelithiasis.     MUSCULOSKELETAL: Unremarkable.     IMPRESSION:   1.  Slight decrease size and density of the triangular pleural-based opacity lateral right lower lobe most consistent with evolving peripheral scar. Suggest a follow-up exam July 2020 to document long-term stability.  2.  Inflammatory bronchial wall thickening and endoluminal mucous debris present.    CT chest (August 2020):  - images directly reviewed, formal interpretation follows:  FINDINGS:   LUNGS AND PLEURA: There is diffuse bronchial wall thickening. No significant bronchiectasis. There is a small amount of debris in the airways. A triangular groundglass and more confluent opacity with architectural distortion in the lateral basilar right   lower lobe adjacent to the major fissure has not changed in configuration or size at approximately 1.5 cm x 1.5 cm. There is  mild centrilobular emphysema. A 2 mm right upper lobe pulmonary nodule has not changed in retrospect (series 3 image 77).      MEDIASTINUM/AXILLAE: Normal size of the heart. No lymphadenopathy. There is mild coronary artery calcification.      UPPER ABDOMEN: Hepatic steatosis.     MUSCULOSKELETAL: Unremarkable.     IMPRESSION:   1.  A triangular opacity in the right midlung has not changed from the prior study and has the appearance of scarring. No dedicated follow-up is required.   2.  Hepatic steatosis.     TTE (December 2019):    Technical Quality: limited visualization    Left ventricle ejection fraction is normal. The calculated left ventricular ejection fraction is 68%.    Normal right ventricular size and systolic function.    No hemodynamically significant valvular heart abnormalities.    No previous study for comparison.     PFT's (July 2019):  FEV1/FVC is 0.46 and is reduced.  FEV1 is 1.24 L (34% predicted) and is reduced.  FVC is 2.69 L (57% predicted) and reduced.  There was improvement in spirometry after a single inhaled dose of bronchodilator.  TLC is 9.11 L (120% predicted) and is normal.  RV is 7.05 L (277% predicted) and is increased.  DLCO is 55% predicted and is reduced when it is corrected for hemoglobin.

## 2021-06-20 NOTE — LETTER
Letter by Ricky Lerma MD at      Author: Ricky Lerma MD Service: -- Author Type: --    Filed:  Encounter Date: 1/23/2020 Status: (Other)       My COPD Action Plan     Name: Ki Pederson    YOB: 1955   Date: 1/23/2020    My doctor: Ricky Lerma MD   My clinic: [facility]/[address] [address]  [phone]  My Controller Medicine: Olodaterol/tiotropium (Stiolto Respimat)   Dose: 1 puff     My Rescue Medicine: Levalbuterol (Xopenex) inhaler   Dose: 2 puffs     My Flare Up Medicine: amoxicillin   Dose: 500 mg  My COPD Severity: Severe = FeV1 < 30%-49%      Use of Oxygen: Oxygen Not Prescribed      Make sure you've had your pneumonia   vaccines.          GREEN ZONE       Doing well today      Usual level of activity and exercise    Usual amount of cough and mucus    No shortness of breath    Usual level of health (thinking clearly, sleeping well, feel like eating) Actions:      Take daily medicines    Use oxygen as prescribed    Follow regular exercise and diet plan    Avoid cigarette smoke and other irritants that harm the lungs              YELLOW ZONE             Having a bad day or flare up      Short of breath more than usual    A lot more sputum (mucus) than usual    Sputum looks yellow, green, tan, brown or bloody    More coughing or wheezing    Fever or chills    Less energy; trouble completing activities    Trouble thinking or focusing    Using quick relief inhaler or nebulizer more often    Poor sleep; symptoms wake me up    Do not feel like eating Actions:      Get plenty of rest    Take daily medicines    Use quick relief inhaler every 4 hours    If you use oxygen, call you doctor to see if you should adjust your oxygen    Do breathing exercises or other things to help you relax    Let a loved one, friend or neighbor know you are feeling worse    Call your care team if you have 2 or more symptoms.  Start taking steroids or antibiotics if  directed by your care team              RED ZONE           Need medical care now      Severe shortness of breath (feel you can't breathe)    Fever, chills    Not enough breath to do any activity    Trouble coughing up mucus, walking or talking    Blood in mucus    Frequent coughing   Rescue medicines are not working    Not able to sleep because of breathing    Feel confused or drowsy    Chest pain    Actions:      Call your health care team.  If you cannot reach your care team, call 911 or go to the emergency room.           Annual Reminders:  Meet with Care Team, Flu Shot every Fall  Pharmacy:   [id number] [place], [place] - [address] [place] [id number]  Phone: [phone] Fax: [phone]

## 2021-06-20 NOTE — LETTER
Letter by Ricky Lerma MD at      Author: Ricky Lerma MD Service: -- Author Type: --    Filed:  Encounter Date: 1/2/2020 Status: Signed         January 2, 2020     Patient: Ki Pederson   YOB: 1955   Date of Visit: 1/2/2020       To Whom it May Concern:    Ki Pederson was seen in my clinic on 1/2/2020.    If you have any questions or concerns, please don't hesitate to call.    Sincerely,         Electronically signed by Ricky Lerma MD

## 2021-06-20 NOTE — LETTER
Letter by Luis Rivera MD at      Author: Luis Rivera MD Service: -- Author Type: --    Filed:  Encounter Date: 2020 Status: (Other)       2020      To whom it may concern:    Ki Pederson ( 1955) is a patient under my care at the [facility]. Mr. Pederson has tobacco dependence, which has contributed to significant impairment in his health and quality of life, and which poses substantial risk for future decline in health, stroke, heart disease, cancer, and other ailments, as well as risk of hospitalization and death. He has is taking bupropion. He had adverse effects from varenicline. He has tried nicotine patch, inhaler, gum, and lozenges without success. Therefore, nicotine nasal spray is necessary for treatment of his tobacco dependence.    Sincerely,      Luis Rivera MD  Pulmonary and Critical Care Medicine  [facility]  Office [phone]

## 2021-06-20 NOTE — LETTER
"Letter by Luis Rivera MD at      Author: Luis Rivera MD Service: -- Author Type: --    Filed:  Encounter Date: 10/2/2020 Status: (Other)       2020    Dear Dr. Lerma,    See below for documentation of a virtual visit that I had with Ki Pederson ( 1955). Please feel free to call me at any time if needed.    Sincerely,    Luis Rivera MD  Pulmonary and Critical Care Medicine  [facility]  Cell [phone]  Office [phone]  Pager [phone]    Ki Pederson is a 65 y.o. male who is being evaluated via a billable video visit.      The patient has been notified of following:     \"This video visit will be conducted via a call between you and your physician/provider. We have found that certain health care needs can be provided without the need for an in-person physical exam.  This service lets us provide the care you need with a video conversation.  If a prescription is necessary we can send it directly to your pharmacy.  If lab work is needed we can place an order for that and you can then stop by our lab to have the test done at a later time.    Video visits are billed at different rates depending on your insurance coverage. Please reach out to your insurance provider with any questions.    If during the course of the call the physician/provider feels a video visit is not appropriate, you will not be charged for this service.\"    Patient has given verbal consent to a Video visit? Yes  How would you like to obtain your AVS? AVS Preference: MyChart.  If dropped by the video visit, the video invitation should be sent to:  Email:  [email or web address]  Will anyone else be joining your video visit? Yes daughter        Video Start Time: 1310    Video-Visit Details    Type of service:  Video Visit    Video End Time (time video stopped): 1330  Originating Location (pt. Location): Home    Distant Location (provider location):  [facility] "   Platform used for Video Visit: [facility]    Pulmonary Clinic Follow-up Visit    Assessment and Plan:   65 year old male with a history of tobacco smoking in remission, IVDU in remission, ongoing chewing tobacco dependence, hepatitis C without cirrhosis, COPD, colon polyps, TIA, HTN, atrial fibrillation with RVR, C-spine surgery, depression, RLS, seizure disorder, lumbar disc disease, atrial fibrillation with RVR in the and hemangiomas requiring surgical removal, presenting for follow-up.     Chronic obstructive pulmonary disease, pulmonary nodule, chewing tobacco dependence:   He is having more shortness of breath and cough with phlegm production. Started prednisone and doxycycline. Having significant anxiety from shortness of breath. Mostly staying at home, though a 1-year-old and 2-year-old in the house are keeping him busy with activity. We had discussed nocturnal NIV given the severity of his COPD and previous history of recurrent hypercapnic respiratory failure, especially when he was living in [place], but there are issues with the phone numbers on file and the RT from [facility] was unable to reach him; we have also had problems in the pulmonary clinic as use of an office phone leads to problems reaching the contact numbers. We are having to use a cell phone to get through.    Plan:  - will see if RT at [facility] can reach him to discuss NIV, but the only way I have been able to get through to the contact numbers listed is with a cell phone  - referral to pulmonary palliative care to address significant dyspnea-associated anxiety  - chewing tobacco cessation very important; did not readdress this today  - continue tiotropium-olodaterol respimat 2.5-2.5 mcg two inhalations daily  - continue budesonide flexhaler 180 mcg one inhalation two times a day; rinse/gargle/spit water after use  - continue azithromycin 250 mg MWF  - levalbuterol HFA or nebulized as needed  - consider pulmonary rehab in the near  future  - action plan: prednisone 12-day taper and doxycycline 5-day course to have on hand in case of exacerbations; he has needed this quite frequently  - annual influenza vaccination; he plans to receive this soon  - up to date on pneumococcal vaccination  - with stable nodule on chest CT, will revert back to annual low-dose screening CTs starting August 2021  - follow up in 3 months  - encouraged him to call any time with questions or concerning symptoms    Luis Rivera MD  Pulmonary and Critical Care Medicine  [facility]  Cell [phone]  Office [phone]  Pager [phone]    CCx: chronic obstructive pulmonary disease, pulmonary nodule, chewing tobacco dependence    HPI: 65 year old male with a history of tobacco smoking in remission, IVDU in remission, ongoing chewing tobacco dependence, hepatitis C without cirrhosis, COPD, colon polyps, TIA, HTN, atrial fibrillation with RVR, C-spine surgery, depression, RLS, seizure disorder, lumbar disc disease, atrial fibrillation with RVR in the and hemangiomas requiring surgical removal, presenting for follow-up. He is having more shortness of breath and cough with phlegm production. Started prednisone and doxycycline. Having significant anxiety from shortness of breath. Mostly staying at home, though a 1-year-old and 2-year-old in the house are keeping him busy with activity. We had discussed nocturnal NIV given the severity of his COPD and previous history of recurrent hypercapnic respiratory failure, especially when he was living in [place], but there are issues with the phone numbers on file and the RT from [facility] was unable to reach him; we have also had problems in the pulmonary clinic as use of an office phone leads to problems reaching the contact numbers. We are having to use a cell phone to get through.    ROS:  A 12-system review was obtained and was negative with the exception of the symptoms endorsed in the  history of present illness.    PMH:  Former smoker  Hepatitis C without cirrhosis  IVDU in remission  Chewing tobacco dependence  COPD  TIA  HTN  AF  C-spine surgery  MDD  RLS  seizure disorder  Lumbar disc disease  Hemangiomas requiring resection  Colon polyps  AF with RVR    PSH:  Past Surgical History:   Procedure Laterality Date   ? CERVICAL FUSION      C6 and C7       Allergies:  Allergies   Allergen Reactions   ? Symbicort [Budesonide-Formoterol] Rash       Family HX:  No family history on file.    Social Hx:  Social History     Socioeconomic History   ? Marital status:      Spouse name: Not on file   ? Number of children: Not on file   ? Years of education: Not on file   ? Highest education level: Not on file   Occupational History   ? Not on file   Social Needs   ? Financial resource strain: Not on file   ? Food insecurity     Worry: Not on file     Inability: Not on file   ? Transportation needs     Medical: Not on file     Non-medical: Not on file   Tobacco Use   ? Smoking status: Former Smoker     Packs/day: 0.00     Quit date: 5/15/2018     Years since quittin.3   ? Smokeless tobacco: Current User   ? Tobacco comment: No passive exposure   Substance and Sexual Activity   ? Alcohol use: Not Currently   ? Drug use: Not Currently   ? Sexual activity: Not on file   Lifestyle   ? Physical activity     Days per week: Not on file     Minutes per session: Not on file   ? Stress: Not on file   Relationships   ? Social connections     Talks on phone: Not on file     Gets together: Not on file     Attends [employer] service: Not on file     Active member of club or organization: Not on file     Attends meetings of clubs or organizations: Not on file     Relationship status: Not on file   ? Intimate partner violence     Fear of current or ex partner: Not on file     Emotionally abused: Not on file     Physically abused: Not on file     Forced sexual activity: Not on file   Other Topics Concern   ? Not on  file   Social History Narrative   ? Not on file       Current Meds:  Current Outpatient Medications   Medication Sig Dispense Refill   ? acetaminophen (TYLENOL) 500 MG tablet Take 1-2 tablets (500-1,000 mg total) by mouth every 4 (four) hours as needed.  0   ? albuterol (PROAIR HFA;PROVENTIL HFA;VENTOLIN HFA) 90 mcg/actuation inhaler Inhale 2 puffs every 6 (six) hours as needed for wheezing.     ? amiodarone (PACERONE) 200 MG tablet TAKE ONE TABLET BY MOUTH ONE TIME DAILY  90 tablet 0   ? azithromycin (ZITHROMAX) 250 MG tablet Take one tablet on Monday, Wednesday, and Friday. 12 tablet 11   ? budesonide (PULMICORT FLEXHALER) 180 mcg/actuation inhaler Inhale 1 puff 2 (two) times a day.     ? buPROPion (WELLBUTRIN XL) 300 MG 24 hr tablet Take 1 tablet (300 mg total) by mouth Daily after lunch. 30 tablet 12   ? citalopram (CELEXA) 20 MG tablet TAKE 1 TABLET (20 MG TOTAL) BY MOUTH DAILY. 90 tablet 3   ? clonazePAM (KLONOPIN) 1 MG tablet Take 1 tablet (1 mg total) by mouth at bedtime as needed for anxiety. 30 tablet 0   ? diltiazem (CARDIZEM CD) 240 MG 24 hr capsule Take 1 capsule (240 mg total) by mouth daily. 30 capsule 11   ? doxycycline (MONODOX) 100 MG capsule In case of flare-up, take one capsule twice daily for 5 days. 10 capsule 11   ? doxycycline (VIBRAMYCIN) 100 MG capsule Take 1 capsule (100 mg total) by mouth 2 (two) times a day for 5 days. 10 capsule 0   ? gabapentin (NEURONTIN) 300 MG capsule Take 2 capsules  In the morning and night and 2 in the afternoon 540 capsule 2   ? levalbuterol (XOPENEX HFA) 45 mcg/actuation inhaler Inhale 2 puffs every 4 (four) hours as needed for wheezing. Has atrial fibrillation  Cannot take albuterol 1 Inhaler 6   ? levalbuterol (XOPENEX) 1.25 mg/3 mL nebulizer solution Take 3 mL (1.25 mg total) by nebulization every 6 (six) hours as needed for wheezing. 360 mL 3   ? lisinopriL (PRINIVIL,ZESTRIL) 40 MG tablet TAKE 1 TABLET BY MOUTH DAILY. 90 tablet 0   ? nicotine (NICOTROL NS)  10 mg/mL Spry One spray in each nostril as needed for tobacco craving 10 mL 11   ? predniSONE (DELTASONE) 10 mg tablet In case of flare-up, take 4 pills daily x 3 days, then 3 pills daily x 3 days, then 2 pills daily x 3 days, then 1 pill daily x 3 days.     ? predniSONE (DELTASONE) 10 mg tablet Take 4 tabs daily x 3 days, THEN 3 tabs daily x 3 days, THEN 2 tabs daily x 3 days, THEN 1 tab daily x 3 days. 30 tablet 0   ? rivaroxaban ANTICOAGULANT (XARELTO) 20 mg tablet Take 1 tablet (20 mg total) by mouth daily with supper. 30 tablet 12   ? SPIRIVA RESPIMAT 2.5 mcg/actuation Mist INHALE TWO PUFFS BY MOUTH DAILY     ? STIOLTO RESPIMAT 2.5-2.5 mcg/actuation Mist inhaler INHALE 2 INHALATIONS DAILY. 4 g 11     No current facility-administered medications for this visit.        Physical Exam:  no exam due to virtual visit    Labs:  A1AT genotype MM     Imaging studies:  Low-dose screening chest CT (July 2019):  - images directly reviewed, formal interpretation follows:  FINDINGS:  LUNGS AND PLEURA: There is diffuse bronchial wall thickening. There is debris in the airways. There is no significant bronchiectasis. There is a confluent opacity in the lateral right mid to lower lung centered in the region of the right major fissure.   There is mild architectural distortion in this region. The opacity is 19 mm x 20 mm and is adjacent to an old healed right rib fracture (series 3 image 168).      MEDIASTINUM: The heart is normal in size. Normal esophagus. No thoracic lymphadenopathy. The main pulmonary artery is 32 mm in diameter.      CORONARY ARTERY CALCIFICATION: Mild.     LIMITED UPPER ABDOMEN: Negative.     MUSCULOSKELETAL: There is an old healed fracture of the right lateral seventh rib.      IMPRESSION:   CONCLUSION:  1.  A 2 cm opacity in the peripheral right mid to lower lung could represent an infectious or inflammatory process in a region of bronchiolectasis and scarring. Malignancy is not excluded. Recommend  follow-up chest CT in 3 months.  2.  Bronchial wall thickening and mucous plugging can be seen with bronchitis.  3.  The main pulmonary artery is minimally enlarged, which could be seen with pulmonary hypertension.     RADIOLOGIST RECOMMENDATION: Recommend followup chest LDCT in 1 month.     LungRADS CATEGORY: 4B: Suspicious.     SIGNIFICANT INCIDENTAL FINDINGS: Negative.     PET-CT (August 2019):  - images directly reviewed, formal interpretation follows:  FINDINGS: 1.0 x 0.7 cm markedly FDG avid (SUVmax 11.4) soft tissue attenuation structure in the left anterolateral wall of the low rectum about 7 cm above the anal verge, significantly exceeding the remainder of the colon, which is essentially non-FDG   avid.     1.8 x 0.9 cm minimally FDG avid (SUVmax 2.2) pulmonary opacity in the right lower lobe lateral basilar segment has decreased from 2.0 x 1.9 cm on 07/11/2019. FDG activity in the remainder of the body is normal.     Mucous in the trachea. Cholelithiasis. Moderate calcified atherosclerosis. Mildly enlarged prostate. Pelvic phleboliths. Sigmoid diverticulosis. C6-C7 interspinous pacer. Mild degenerative change throughout the spine.     IMPRESSION:   CONCLUSION:  1.  Incidental focus of marked FDG activity in the rectum is somewhat nonspecific, but adenomatous polyps and rectal cancer can have this appearance at PET CT. Recommend flexible sigmoidoscopy.  2.  Minimally FDG avid right lower lobe pulmonary opacity has decreased in size slightly, suggesting an infectious or inflammatory etiology, though CT follow-up to complete resolution or scarring is recommended, beginning 2-3 months from now.     CT chest (November 2019):  - images directly reviewed, formal interpretation follows:  FINDINGS:   LUNGS AND PLEURA: There has been slight decrease in both size and the density of the triangular pleural-based opacity lateral right lower lobe on image 111; now measuring 1.5 x 1.5 cm, previously measuring 2 x 1.9 cm  and being more solid in appearance.   This appeared inflammatory on the recent PET/CT and inflammatory focus or site of scarring/rounded atelectasis still favored.  Tiny benign triangular nodule along right minor fissure. No new worrisome nodule.     Mild inflammatory bronchial wall thickening present at lung bases similar to previous exam. There is mucus debris layering dependently within the mainstem bronchi and trachea.     MEDIASTINUM/AXILLAE: No lymphadenopathy. No thoracic aortic aneurysm.     UPPER ABDOMEN: Cholelithiasis.     MUSCULOSKELETAL: Unremarkable.     IMPRESSION:   1.  Slight decrease size and density of the triangular pleural-based opacity lateral right lower lobe most consistent with evolving peripheral scar. Suggest a follow-up exam July 2020 to document long-term stability.  2.  Inflammatory bronchial wall thickening and endoluminal mucous debris present.     CT chest (August 2020):  - images directly reviewed, formal interpretation follows:  FINDINGS:   LUNGS AND PLEURA: There is diffuse bronchial wall thickening. No significant bronchiectasis. There is a small amount of debris in the airways. A triangular groundglass and more confluent opacity with architectural distortion in the lateral basilar right   lower lobe adjacent to the major fissure has not changed in configuration or size at approximately 1.5 cm x 1.5 cm. There is mild centrilobular emphysema. A 2 mm right upper lobe pulmonary nodule has not changed in retrospect (series 3 image 77).      MEDIASTINUM/AXILLAE: Normal size of the heart. No lymphadenopathy. There is mild coronary artery calcification.      UPPER ABDOMEN: Hepatic steatosis.     MUSCULOSKELETAL: Unremarkable.     IMPRESSION:   1.  A triangular opacity in the right midlung has not changed from the prior study and has the appearance of scarring. No dedicated follow-up is required.   2.  Hepatic steatosis.      TTE (December 2019):  - Technical Quality: limited  visualization  - Left ventricle ejection fraction is normal. The calculated left ventricular ejection fraction is 68%.  - Normal right ventricular size and systolic function.  - No hemodynamically significant valvular heart abnormalities.  - No previous study for comparison.     PFT (July 2019):  FEV1/FVC is 0.46 and is reduced.  FEV1 is 1.24 L (34% predicted) and is reduced.  FVC is 2.69 L (57% predicted) and reduced.  There was improvement in spirometry after a single inhaled dose of bronchodilator.  TLC is 9.11 L (120% predicted) and is normal.  RV is 7.05 L (277% predicted) and is increased.  DLCO is 55% predicted and is reduced when it is corrected for hemoglobin.

## 2021-06-20 NOTE — LETTER
"Letter by Luis Rivera MD at      Author: Luis Rivera MD Service: -- Author Type: --    Filed:  Encounter Date: 2020 Status: (Other)       7 May 2020    Dear Dr. Lerma,    See below for documentation of a telephone visit that I had today with Ki Pederson ( 1955). Please feel free to call me at any time if needed.    Sincerely,    Luis Rivera MD  Pulmonary and Critical Care Medicine  [facility]  Cell [phone]  Office [phone]  Pager [phone]    Ki Pederson is a 65 y.o. male who is being evaluated via a billable video visit.      The patient has been notified of following:     \"This video visit will be conducted via a call between you and your physician/provider. We have found that certain health care needs can be provided without the need for an in-person physical exam.  This service lets us provide the care you need with a video conversation.  If a prescription is necessary we can send it directly to your pharmacy.  If lab work is needed we can place an order for that and you can then stop by our lab to have the test done at a later time.    Video visits are billed at different rates depending on your insurance coverage. Please reach out to your insurance provider with any questions.    If during the course of the call the physician/provider feels a video visit is not appropriate, you will not be charged for this service.\"    Patient has given verbal consent to a Video visit? Yes    Patient would like to receive their AVS by AVS Preference: Shelly.    Patient would like the video invitation sent by: Text to cell phone: [phone]      Video Start Time: 1140    Video-Visit Details    Type of service:  Video Visit    Video End Time (time video stopped): 1206  Originating Location (pt. Location): Home    Distant Location (provider location):  [facility] [place]     Platform used for Video Visit: [facility]    Pulmonary Clinic Virtual Follow-up " Visit    Assessment and Plan:   65 year old male with a history of tobacco smoking in remission, IVDU in remission, ongoing chewing tobacco dependence, hepatitis C without cirrhosis, COPD, colon polyps, TIA, HTN, atrial fibrillation with RVR, C-spine surgery, depression, RLS, seizure disorder, lumbar disc disease, atrial fibrillation with RVR in the and hemangiomas requiring surgical removal, presenting for virtual follow-up.     Chronic obstructive pulmonary disease, pulmonary nodule, chewing tobacco dependence: Picking up granddaughter causes a lot of dyspnea. Walking leads to dyspnea. Previously declined pulmonary rehab in [place] due to transportation issues but now states he will have transportation when pandemic restrictions are lifted. Minimal cough, generally not productive. Needed azithromycin and prednisone about 4 times the last 3 months, including the episode that led to hospitalization for a night in March. Has GOLD group D COPD. FEV1 1.24 L (34% predicted) and DLCO 55% predicted. A1AT genotype normal at MM. Remains abstinent from smoking cigarettes, but continues to chew tobacco. His insurance did not cover the nicotine nasal spray; will try to prescribe again and get prior authorization; nicotine patch, gum, and inhaler were ineffective in the past. Chest CT from November 2019 showed that the RLL nodular opacity continued to decrease in size; it was minimally FDG-avid in early August 2019; we had planned on a surveillance CT prior to this visit, but it was not performed due to pandemic restrictions on non-urgent imaging. With his frequent exacerbations, we discussed the option of starting roflumilast or thirce-weekly azithromycin; after discussing risks and benefits, including the potential for hearing loss, tinnitus, bacterial antibiotic resistance, GI upset, and arrhythmia, he would like to try thrice-weekly azithromycin.    Plan:   - try to get nicotine nasal spray covered by insurance  -  advised chewing tobacco cessation  - start azithromycin 250 mg MWF  - continue tiotropium-olodaterol respimat 2.5-2.5 mcg two inhalations daily  - continue budesonide flexhaler 180 mcg one inhalation two times a day; rinse/gargle/spit water after use  - levalbuterol HFA or nebulized as needed  - enroll in pulmonary rehab in [place] when they reopen; referral ordered   - action plan: prednisone 12-day taper and doxycycline 5-day course to have on hand in case of exacerbations  - annual influenza vaccination; received for this season  - up to date on pneumococcal vaccination  - follow up in 3 months with a final surveillance chest CT (delayed due to pandemic restrictions on non-urgent imaging)  - encouraged him to call any time with questions or concerning symptoms    Luis Rivera MD  Pulmonary and Critical Care Medicine  [facility]  Cell [phone]  Office [phone]  Pager [phone]    CCx: chronic obstructive pulmonary disease, pulmonary nodule, chewing tobacco dependence    HPI: 65 year old male with a history of tobacco smoking in remission, IVDU in remission, ongoing chewing tobacco dependence, hepatitis C without cirrhosis, COPD, colon polyps, TIA, HTN, atrial fibrillation with RVR, C-spine surgery, depression, RLS, seizure disorder, lumbar disc disease, atrial fibrillation with RVR in the and hemangiomas requiring surgical removal, presenting for follow-up. Picking up granddaughter causes a lot of dyspnea. Walking leads to dyspnea. Previously declined pulmonary rehab in [place] due to transportation issues but now states he will have transportation when pandemic restrictions are lifted. Minimal cough, generally not productive. Needed azithromycin and prednisone about 4 times the last 3 months, including the episode that led to hospitalization for a night in March. Has GOLD group D COPD. FEV1 1.24 L (34% predicted) and DLCO 55% predicted. A1AT genotype normal at . Remains  abstinent from smoking cigarettes, but continues to chew tobacco. His insurance did not cover the nicotine nasal spray; will try to prescribe again and get prior authorization; nicotine patch, gum, and inhaler were ineffective in the past. Chest CT from November 2019 showed that the RLL nodular opacity continued to decrease in size; it was minimally FDG-avid in early August 2019; we had planned on a surveillance CT prior to this visit, but it was not performed due to pandemic restrictions on non-urgent imaging. With his frequent exacerbations, we discussed the option of starting roflumilast or thirce-weekly azithromycin; after discussing risks and benefits, including the potential for hearing loss, tinnitus, bacterial antibiotic resistance, GI upset, and arrhythmia, he would like to try thrice-weekly azithromycin.    ROS:  A 12-system review was obtained and was negative with the exception of the symptoms endorsed in the history of present illness.    PMH:  Former smoker  Hepatitis C without cirrhosis  IVDU in remission  Chewing tobacco dependence  COPD  TIA  HTN  C-spine surgery  MDD  RLS  seizure disorder  Lumbar disc disease  Hemangiomas requiring resection  Colon polyps  AF with RVR    PSH:  Past Surgical History:   Procedure Laterality Date   ? CERVICAL FUSION      C6 and C7       Allergies:  Allergies   Allergen Reactions   ? Symbicort [Budesonide-Formoterol] Rash       Family HX:  No family history on file.    Social Hx:  Social History     Socioeconomic History   ? Marital status:      Spouse name: Not on file   ? Number of children: Not on file   ? Years of education: Not on file   ? Highest education level: Not on file   Occupational History   ? Not on file   Social Needs   ? Financial resource strain: Not on file   ? Food insecurity     Worry: Not on file     Inability: Not on file   ? Transportation needs     Medical: Not on file     Non-medical: Not on file   Tobacco Use   ? Smoking status:  Former Smoker     Packs/day: 0.00     Last attempt to quit: 5/15/2018     Years since quittin.9   ? Smokeless tobacco: Current User   Substance and Sexual Activity   ? Alcohol use: Not Currently   ? Drug use: Not Currently   ? Sexual activity: Not on file   Lifestyle   ? Physical activity     Days per week: Not on file     Minutes per session: Not on file   ? Stress: Not on file   Relationships   ? Social connections     Talks on phone: Not on file     Gets together: Not on file     Attends [employer] service: Not on file     Active member of club or organization: Not on file     Attends meetings of clubs or organizations: Not on file     Relationship status: Not on file   ? Intimate partner violence     Fear of current or ex partner: Not on file     Emotionally abused: Not on file     Physically abused: Not on file     Forced sexual activity: Not on file   Other Topics Concern   ? Not on file   Social History Narrative   ? Not on file       Current Meds:  Current Outpatient Medications   Medication Sig Dispense Refill   ? acetaminophen (TYLENOL) 500 MG tablet Take 1-2 tablets (500-1,000 mg total) by mouth every 4 (four) hours as needed.  0   ? albuterol (PROAIR HFA;PROVENTIL HFA;VENTOLIN HFA) 90 mcg/actuation inhaler Inhale 2 puffs every 6 (six) hours as needed for wheezing.     ? albuterol (PROVENTIL) 2.5 mg /3 mL (0.083 %) nebulizer solution Take 2.5 mg by nebulization every 6 (six) hours as needed for wheezing.     ? amiodarone (PACERONE) 200 MG tablet Take 1 tablet (200 mg total) by mouth daily. 90 tablet 1   ? azithromycin (ZITHROMAX) 250 MG tablet I case of flare-up, take 2 pills on day 1, then 1 pill daily for 4 days. 6 tablet 11   ? budesonide (PULMICORT FLEXHALER) 180 mcg/actuation inhaler Inhale 1 puff 2 (two) times a day.     ? buPROPion (WELLBUTRIN XL) 300 MG 24 hr tablet Take 1 tablet (300 mg total) by mouth Daily after lunch. 30 tablet 12   ? citalopram (CELEXA) 20 MG tablet TAKE 1 TABLET (20 MG  TOTAL) BY MOUTH DAILY. 90 tablet 3   ? clonazePAM (KLONOPIN) 0.5 MG tablet Take 1 tablet (0.5 mg total) by mouth 2 (two) times a day as needed for anxiety. 60 tablet 0   ? diltiazem (CARDIZEM CD) 240 MG 24 hr capsule Take 1 capsule (240 mg total) by mouth daily. 30 capsule 11   ? gabapentin (NEURONTIN) 300 MG capsule Take 2 capsules  In the morning and night and I in the afternoon 450 capsule 2   ? lisinopriL (PRINIVIL,ZESTRIL) 40 MG tablet Take 40 mg by mouth daily.     ? predniSONE (DELTASONE) 10 mg tablet In case of flare-up, take 4 pills daily x 3 days, then 3 pills daily x 3 days, then 2 pills daily x 3 days, then 1 pill daily x 3 days.     ? rivaroxaban ANTICOAGULANT (XARELTO) 20 mg tablet Take 1 tablet (20 mg total) by mouth daily with supper. 30 tablet 12   ? SPIRIVA RESPIMAT 2.5 mcg/actuation Mist INHALE TWO PUFFS BY MOUTH DAILY     ? tiotropium-olodaterol (STIOLTO RESPIMAT) 2.5-2.5 mcg/actuation Mist inhaler Inhale 2 Inhalation daily. 4 g 11   ? doxycycline (MONODOX) 100 MG capsule      ? levalbuterol (XOPENEX HFA) 45 mcg/actuation inhaler Inhale 2 puffs every 4 (four) hours as needed for wheezing. Has atrial fibrillation  Cannot take albuterol 1 Inhaler 6     No current facility-administered medications for this visit.        Physical Exam:  No exam due to telephone visit    Labs:  A1AT genotype MM     Imaging studies:  Low-dose screening chest CT (July 2019):  - images directly reviewed, formal interpretation follows:  FINDINGS:  LUNGS AND PLEURA: There is diffuse bronchial wall thickening. There is debris in the airways. There is no significant bronchiectasis. There is a confluent opacity in the lateral right mid to lower lung centered in the region of the right major fissure.   There is mild architectural distortion in this region. The opacity is 19 mm x 20 mm and is adjacent to an old healed right rib fracture (series 3 image 168).      MEDIASTINUM: The heart is normal in size. Normal esophagus. No  thoracic lymphadenopathy. The main pulmonary artery is 32 mm in diameter.      CORONARY ARTERY CALCIFICATION: Mild.     LIMITED UPPER ABDOMEN: Negative.     MUSCULOSKELETAL: There is an old healed fracture of the right lateral seventh rib.      IMPRESSION:   CONCLUSION:  1.  A 2 cm opacity in the peripheral right mid to lower lung could represent an infectious or inflammatory process in a region of bronchiolectasis and scarring. Malignancy is not excluded. Recommend follow-up chest CT in 3 months.  2.  Bronchial wall thickening and mucous plugging can be seen with bronchitis.  3.  The main pulmonary artery is minimally enlarged, which could be seen with pulmonary hypertension.     RADIOLOGIST RECOMMENDATION: Recommend followup chest LDCT in 1 month.     LungRADS CATEGORY: 4B: Suspicious.     SIGNIFICANT INCIDENTAL FINDINGS: Negative.     PET-CT (August 2019):  - images directly reviewed, formal interpretation follows:  FINDINGS: 1.0 x 0.7 cm markedly FDG avid (SUVmax 11.4) soft tissue attenuation structure in the left anterolateral wall of the low rectum about 7 cm above the anal verge, significantly exceeding the remainder of the colon, which is essentially non-FDG   avid.     1.8 x 0.9 cm minimally FDG avid (SUVmax 2.2) pulmonary opacity in the right lower lobe lateral basilar segment has decreased from 2.0 x 1.9 cm on 07/11/2019. FDG activity in the remainder of the body is normal.     Mucous in the trachea. Cholelithiasis. Moderate calcified atherosclerosis. Mildly enlarged prostate. Pelvic phleboliths. Sigmoid diverticulosis. C6-C7 interspinous pacer. Mild degenerative change throughout the spine.     IMPRESSION:   CONCLUSION:  1.  Incidental focus of marked FDG activity in the rectum is somewhat nonspecific, but adenomatous polyps and rectal cancer can have this appearance at PET CT. Recommend flexible sigmoidoscopy.  2.  Minimally FDG avid right lower lobe pulmonary opacity has decreased in size slightly,  suggesting an infectious or inflammatory etiology, though CT follow-up to complete resolution or scarring is recommended, beginning 2-3 months from now.     CT chest (November 2019):  - images directly reviewed, formal interpretation follows:  FINDINGS:   LUNGS AND PLEURA: There has been slight decrease in both size and the density of the triangular pleural-based opacity lateral right lower lobe on image 111; now measuring 1.5 x 1.5 cm, previously measuring 2 x 1.9 cm and being more solid in appearance.   This appeared inflammatory on the recent PET/CT and inflammatory focus or site of scarring/rounded atelectasis still favored.  Tiny benign triangular nodule along right minor fissure. No new worrisome nodule.     Mild inflammatory bronchial wall thickening present at lung bases similar to previous exam. There is mucus debris layering dependently within the mainstem bronchi and trachea.     MEDIASTINUM/AXILLAE: No lymphadenopathy. No thoracic aortic aneurysm.     UPPER ABDOMEN: Cholelithiasis.     MUSCULOSKELETAL: Unremarkable.     IMPRESSION:   1.  Slight decrease size and density of the triangular pleural-based opacity lateral right lower lobe most consistent with evolving peripheral scar. Suggest a follow-up exam July 2020 to document long-term stability.  2.  Inflammatory bronchial wall thickening and endoluminal mucous debris present.     TTE (December 2019):    Technical Quality: limited visualization    Left ventricle ejection fraction is normal. The calculated left ventricular ejection fraction is 68%.    Normal right ventricular size and systolic function.    No hemodynamically significant valvular heart abnormalities.    No previous study for comparison.     CXR (March 2020):  - images directly reviewed, formal interpretation follows:  IMPRESSION:   No change. Slight interstitial prominence stable, no new pneumonia. Heart size and pulmonary vessels normal.    PFT's (July 2019):  FEV1/FVC is 0.46 and is  reduced.  FEV1 is 1.24 L (34% predicted) and is reduced.  FVC is 2.69 L (57% predicted) and reduced.  There was improvement in spirometry after a single inhaled dose of bronchodilator.  TLC is 9.11 L (120% predicted) and is normal.  RV is 7.05 L (277% predicted) and is increased.  DLCO is 55% predicted and is reduced when it is corrected for hemoglobin.

## 2021-06-21 NOTE — LETTER
Letter by Lakshmi Boyer MD at      Author: Lakshmi Boyer MD Service: -- Author Type: --    Filed:  Encounter Date: 1/18/2021 Status: (Other)         Ki Pederson  [address] [facility] [address]      January 18, 2021      Dear Ki,    This letter is to remind you that you will be due for your follow up appointment with Dr. Lakshmi Boyer in January, 2021. To help ensure you are in the best health possible, a regular follow-up with your cardiologist is essential.     Please call our Patient Scheduling Line at [phone] to schedule your appointment at your earliest convenience.  If you have recently scheduled an appointment, please disregard this letter.    We look forward to seeing you again. As always, we are available at the number  above for any questions or concerns you may have.      Sincerely,     The Physicians and Staff of [facility]

## 2021-06-28 NOTE — PROGRESS NOTES
Progress Notes by Ximena Smalls CNP at 12/30/2019  2:50 PM     Author: Ximena Smalls CNP Service: -- Author Type: Nurse Practitioner    Filed: 12/30/2019  3:38 PM Encounter Date: 12/30/2019 Status: Signed    : Ximena Smalls CNP (Nurse Practitioner)         Thank you, Dr. Lerma, for asking the [facility] team to see . Ki Pederson.      Assessment/Recommendations   Assessment:    Diagnoses and all orders for this visit:    Persistent atrial fibrillation with rapid ventricular response  Given the severity of his possible H. influenzae bacterial infection I would like him to be treated for this with his Augmentin for 1 week.  He still has a significant cough.  The likelihood that he will go back to A. fib is high.  He has presumably been on Xarelto without a missed dose since 12/10/2019.  I did discuss the importance of this.  He does note compliance.  We will confirm with inpatient med rec.     Ki is on amiodarone, that is not a good long-term choice for him.  However, he would be better off in normal rhythm.  Therefore I think it is reasonable to keep him on the amiodarone to get him through his current infection.  After that, we need to discuss AV node ablation with pacemaker versus Tikosyn.  I would prefer to avoid beta-blockers, I would like to get him off of the metoprolol.  Therefore I would avoid sotalol.    Haemophilus influenzae infection  Unfortunately, there was poor communication.  His phone is broken, so he is communicating through his daughter Nelsy.  He did  the Augmentin.  He did not understand the high communication rate through droplet.  Therefore, his granddaughter is exhibiting a cough, she is only 4 months old.  Advised them to contact their pediatrician immediately.  He continues his Augmentin.  He will stay isolated while he has a productive sounding cough.    COPD exacerbation (H)  He does have severe COPD, so ideally we  would avoid beta-blockers.  We will try to wean him off of the metoprolol once he is cardioverted.  For now, he will need the metoprolol for rate control.  Continue diltiazem.  He sees his pulmonologist tomorrow.  I do have concerns about his current lung sounds.  He has inhalers at home that he will continue as well.    Anxiety  He has been given enough alprazolam to last him through seeing his primary care provider.  It is reasonable to treat the anxiety given his severe COPD and need for oxygen.  However, discussed we will not be prescribing long-term.    Essential hypertension  Continue current medication regimen with the metoprolol and the diltiazem.        [id number] score of 4 for CVA, CAD, and hypertension and on Xarelto.  Follow up in 3 to 4 weeks after cardioversion.    Plan:  1.  Use droplet precaution when he is out in public for the H. Influenzae.  2.  Continue amiodarone 200 mg twice daily.  On day of cardioversion which will occur next week take only 25 mg of metoprolol.  Long-term would like to avoid beta blocking therapy     History of Present Illness/Subjective    Mr. Ki Pederson is a 64 y.o. male with past medical history significant for COPD, coronary artery disease, labile hypertension, and atrial fibrillation.  Patient states in regards to his atrial fibrillation he had an episode approximately at age 42 years, that he underwent cardioversion which was successful.  He has also had COPD for years, and has worsened in the past 2 years.  He states these episodes of shortness of breath are closer together than prior, although he does not feel it has overall worsened in regards to his COPD.    Patient was admitted 12/9/2019 through 12/13/2019.  He presented with significant wheezing, along with A. fib with RVR.  This was the first recurrence of A. fib.  He was placed on Xarelto for anticoagulation.  Appears the Xarelto was started on 12/10/2019.    Ki was readmitted 12/16/2019 through  12/19/2019.  He again presented with shortness of breath and chest pain.  He improved on BiPAP and with steroids.  He was continued on metoprolol and diltiazem.  He was also transitioned at this point to amiodarone to attempt to get him back into normal rhythm.    His last admission was 12/26/2019 through 12/27/2019.  Once again he presented with severe dyspnea on exertion and with A. fib with RVR.  He was not prescribed home O2 at discharge as he was saturating normally on room air.  He was started on steroids and given Xopenex.  After discharge, he was called with positive H. influenzae sputum results.  He did  Augmentin, and started yesterday.  He is on day 2 of therapy.  He continues to have significant cough, and shortness of breath.  He reports part of his shortness of breath is worsening his anxiety as he feels he cannot breathe.  Celexa is not helping.    He  denies chest pain, palpitations, shortness of breath, paroxysmal nocturnal dyspnea, orthopnea, lightheadedness, dizziness, pre-syncope, or syncope.    ECG: Personally reviewed 12/16/19. atrial fibrillation, rate 170.    ECHOCARDIOGRAM 12/10/19:     Technical Quality: limited visualization    Left ventricle ejection fraction is normal. The calculated left ventricular ejection fraction is 68%.    Normal right ventricular size and systolic function.    No hemodynamically significant valvular heart abnormalities.    No previous study for comparison.       Physical Examination Review of Systems   Vitals:    12/30/19 1455   BP: (!) 118/94   Pulse: 84   Resp: 20     Body mass index is 32.69 kg/m .  Wt Readings from Last 3 Encounters:   12/30/19 (!) 241 lb (109.3 kg)   12/27/19 (!) 241 lb 12.8 oz (109.7 kg)   12/19/19 (!) 231 lb 11.2 oz (105.1 kg)       General Appearance:   No acute distress, normal body habitus   ENT/Mouth: membranes moist, no oral lesions or bleeding gums.      EYES:  no scleral icterus, normal conjunctivae   Neck: no carotid bruits  or thyromegaly   Chest/Lungs:   Lung sounds are coarse throughout, exp wheezes in the upper lobes bilaterally. Productive sounding cough.    Cardiovascular:   Irregularly irregular Rhythm. Normal first and second heart sounds with no murmurs, rubs, or gallops; the radial and posterior tibial pulses are intact, no JVD, no edema bilaterally    Abdomen:  no organomegaly, masses, bruits, or tenderness; bowel sounds are present   Extremities: no cyanosis or clubbing   Skin: Dry, warm, intact.    Neurologic: normal  bilateral, no tremors     Psychiatric: alert and oriented x3, calm     General: Fever  Eyes: Visual Distubance  Ears/Nose/Throat: WNL  Lungs: Cough, Shortness of Breath, Wheezing  Heart: Chest Pain, Shortness of Breath with activity, Irregular Heartbeat, Fainting  Stomach: WNL  Bladder: WNL  Muscle/Joints: Joint Pain, Muscle Weakness, Muscle Pain  Skin: Rash  Nervous System: Daytime Sleepiness, Dizziness, Loss of Balance, Falls  Mental Health: Confusion, Anxiety, Depression     Blood: Easy Bruising     Medical History  Surgical History Family History Social History   Past Medical History:   Diagnosis Date   ? COPD (chronic obstructive pulmonary disease) (H)    ? Hemangioma     massive hemangioma; left hand   ? Hypertension    ? Myocardial infarction (H)    ? TIA (transient ischemic attack)     Past Surgical History:   Procedure Laterality Date   ? CERVICAL FUSION      C6 and C7    No family history on file. Social History     Socioeconomic History   ? Marital status:      Spouse name: Not on file   ? Number of children: Not on file   ? Years of education: Not on file   ? Highest education level: Not on file   Occupational History   ? Not on file   Social Needs   ? Financial resource strain: Not on file   ? Food insecurity:     Worry: Not on file     Inability: Not on file   ? Transportation needs:     Medical: Not on file     Non-medical: Not on file   Tobacco Use   ? Smoking status: Former Smoker      Packs/day: 0.00     Last attempt to quit: 5/15/2018     Years since quittin.6   ? Smokeless tobacco: Current User   Substance and Sexual Activity   ? Alcohol use: Not Currently   ? Drug use: Not Currently   ? Sexual activity: Not on file   Lifestyle   ? Physical activity:     Days per week: Not on file     Minutes per session: Not on file   ? Stress: Not on file   Relationships   ? Social connections:     Talks on phone: Not on file     Gets together: Not on file     Attends Je[employer] witness service: Not on file     Active member of club or organization: Not on file     Attends meetings of clubs or organizations: Not on file     Relationship status: Not on file   ? Intimate partner violence:     Fear of current or ex partner: Not on file     Emotionally abused: Not on file     Physically abused: Not on file     Forced sexual activity: Not on file   Other Topics Concern   ? Not on file   Social History Narrative   ? Not on file          Medications  Allergies   Current Outpatient Medications   Medication Sig Dispense Refill   ? amiodarone (PACERONE) 200 MG tablet Take 200 mg by mouth 2 (two) times a day.     ? amoxicillin-clavulanate (AUGMENTIN) 875-125 mg per tablet Take 1 tablet by mouth 2 (two) times a day for 5 days. 10 tablet 0   ? budesonide (PULMICORT FLEXHALER) 180 mcg/actuation inhaler Inhale 1 puff 2 (two) times a day.     ? buPROPion (WELLBUTRIN XL) 150 MG 24 hr tablet Take 1 tablet (150 mg total) by mouth Daily after lunch.     ? citalopram (CELEXA) 20 MG tablet Take 1 tablet (20 mg total) by mouth Daily after lunch.     ? diltiazem (CARDIZEM CD) 240 MG 24 hr capsule Take 1 capsule (240 mg total) by mouth daily. 30 capsule 0   ? gabapentin (NEURONTIN) 300 MG capsule Take 1 capsule (300 mg total) by mouth 4 (four) times a day. 360 capsule 2   ? levalbuterol (XOPENEX) 1.25 mg/3 mL nebulizer solution Take 3 mL (1.25 mg total) by nebulization every 6 (six) hours as needed for wheezing. Take every 6 hours for  2-3 days, and when you are feeling better can change back to every 6 hours as needed 90 mL 0   ? metoprolol tartrate 75 mg Tab Take 75 mg by mouth 2 (two) times a day. 180 tablet 0   ? predniSONE (DELTASONE) 20 MG tablet Take 40 mg by mouth daily for 5 days, THEN 20 mg daily for 5 days, THEN 10 mg daily for 5 days. 17.5 tablet 0   ? rivaroxaban (XARELTO) 20 mg tablet Take 1 tablet (20 mg total) by mouth daily with supper. 30 tablet 0   ? rOPINIRole (REQUIP) 0.25 MG tablet Take 1 tablet (0.25 mg total) by mouth at bedtime as needed (for restless legs. take 1-3 hours before bed). 30 tablet 0   ? tiotropium-olodaterol (STIOLTO RESPIMAT) 2.5-2.5 mcg/actuation Mist inhaler Inhale 2 Inhalation daily.     ? ALPRAZolam (XANAX XR) 1 MG 24 hr tablet Take 1 tablet (1 mg total) by mouth 2 (two) times a day as needed. 10 tablet 0     No current facility-administered medications for this visit.     Allergies   Allergen Reactions   ? Symbicort [Budesonide-Formoterol] Rash         Lab Results    Chemistry/lipid CBC Cardiac Enzymes/BNP/TSH/INR   Lab Results   Component Value Date    CREATININE 1.05 12/27/2019    BUN 23 (H) 12/27/2019    K 5.5 (H) 12/27/2019     12/27/2019     12/27/2019    CO2 27 12/27/2019    Lab Results   Component Value Date    WBC 15.4 (H) 12/27/2019    HGB 12.1 (L) 12/27/2019    HCT 39.2 (L) 12/27/2019    MCV 96 12/27/2019     12/27/2019    Lab Results   Component Value Date    TROPONINI <0.01 12/16/2019     (H) 12/16/2019    TSH 0.09 (L) 12/11/2019    INR 1.05 12/26/2019

## 2021-06-28 NOTE — PROGRESS NOTES
Progress Notes by Lakshmi Boyer MD at 2/5/2020 11:10 AM     Author: Lakshmi Boyer MD Service: -- Author Type: Physician    Filed: 2/5/2020 12:02 PM Encounter Date: 2/5/2020 Status: Signed    : Lakshmi Boyer MD (Physician)           Click to link to [facility]     [facility] NOTE       Assessment/Plan:   1.  Paroxysmal atrial fibrillation: The patient is in normal sinus rhythm.  His heart rate is well controlled.  Continue amiodarone 200 mg daily, diltiazem 240 mg daily.  Continue Xarelto 20 mg daily with supper.  We will check his amiodarone level and TSH and CRX at next visit.    2.  Essential hypertension: His blood pressure is controlled with a diltiazem  mg daily.    3.  COPD: Stable.  No change in his home medication today.    Thank you for the opportunity to be involved in the care of Ki Pederson. If you have any questions, please feel free to contact me.  I will see the patient again in 6 months and as needed.    Much or all of the text in this note was generated through the use of Dragon Dictate voice-to-text software. Errors in spelling or words which seem out of context are unintentional.   Sound alike errors, in particular, may have escaped editing.       History of Present Illness:   It is my pleasure to see Ki Pederson at the [facility] clinic for evaluation of Follow-up.  Ki Pederson is a 65 y.o. male with a medical history of paroxysmal atrial fibrillation, COPD, essential hypertension.    The patient states that he has been doing quite well over last 3 months.  He had no chest pain, palpitations, dizziness, orthopnea, PND or leg edema.  His shortness of breath secondary to COPD has been stable.  He has no side effects from his current medications.  His blood pressure and heart rate are controlled.    Past Medical History:     Patient Active Problem List   Diagnosis   ? Hemangioma   ? Major depression   ? Restless Legs Syndrome   ? Epilepsy And  Recurrent Seizures   ? Essential hypertension   ? Arteriosclerotic Cardiovascular Disease (ASCVD)   ? Lumbar Disc Degeneration   ? Chronic obstructive pulmonary disease with acute exacerbation (H)   ? Nicotine abuse   ? Chest pain due to myocardial ischemia, unspecified ischemic chest pain type   ? Chronic pain disorder   ? Chronic hepatitis C without hepatic coma (H)   ? Chronic back pain   ? Tobacco abuse   ? Acute respiratory failure (H)   ? SOB (shortness of breath)   ? Persistent atrial fibrillation   ? At risk for delirium   ? Cognitive and behavioral changes   ? Episodes of formed visual hallucinations   ? COPD exacerbation (H)   ? Haemophilus influenzae infection   ? Anxiety       Past Surgical History:     Past Surgical History:   Procedure Laterality Date   ? CERVICAL FUSION      C6 and C7       Family History:   History reviewed. No pertinent family history.     Social History:    reports that he quit smoking about 20 months ago. He smoked 0.00 packs per day. He uses smokeless tobacco. He reports previous alcohol use. He reports previous drug use.    Review of Systems:   General: WNL  Eyes: WNL  Ears/Nose/Throat: WNL  Lungs: Shortness of Breath, Wheezing  Heart: WNL  Stomach: WNL  Bladder: WNL  Muscle/Joints: WNL  Skin: WNL  Nervous System: WNL  Mental Health: WNL     Blood: WNL    Meds:     Current Outpatient Medications:   ?  amiodarone (PACERONE) 200 MG tablet, Take 1 tablet (200 mg total) by mouth daily., Disp: 90 tablet, Rfl: 1  ?  budesonide (PULMICORT FLEXHALER) 180 mcg/actuation inhaler, Inhale 1 puff 2 (two) times a day., Disp: , Rfl:   ?  buPROPion (WELLBUTRIN XL) 150 MG 24 hr tablet, Take 1 tablet (150 mg total) by mouth Daily after lunch., Disp: , Rfl:   ?  citalopram (CELEXA) 20 MG tablet, Take 1 tablet (20 mg total) by mouth Daily after lunch., Disp: , Rfl:   ?  clonazePAM (KLONOPIN) 0.5 MG tablet, Take 1 tablet (0.5 mg total) by mouth at bedtime as needed for anxiety., Disp: 30 tablet, Rfl:  0  ?  diltiazem (CARDIZEM CD) 240 MG 24 hr capsule, Take 1 capsule (240 mg total) by mouth daily., Disp: 30 capsule, Rfl: 0  ?  gabapentin (NEURONTIN) 300 MG capsule, Take 1 capsule (300 mg total) by mouth 4 (four) times a day., Disp: 360 capsule, Rfl: 2  ?  levalbuterol (XOPENEX HFA) 45 mcg/actuation inhaler, Inhale 2 puffs every 4 (four) hours as needed for wheezing. Has atrial fibrillation  Cannot take albuterol, Disp: 1 Inhaler, Rfl: 6  ?  levalbuterol (XOPENEX) 1.25 mg/3 mL nebulizer solution, Take 3 mL (1.25 mg total) by nebulization every 6 (six) hours as needed for wheezing. Take every 6 hours for 2-3 days, and when you are feeling better can change back to every 6 hours as needed, Disp: 90 mL, Rfl: 0  ?  rivaroxaban ANTICOAGULANT (XARELTO) 20 mg tablet, Take 1 tablet (20 mg total) by mouth daily with supper., Disp: 30 tablet, Rfl: 12  ?  tiotropium-olodaterol (STIOLTO RESPIMAT) 2.5-2.5 mcg/actuation Mist inhaler, Inhale 2 Inhalation daily., Disp: , Rfl:   ?  ALPRAZolam (XANAX XR) 1 MG 24 hr tablet, Take 1 tablet (1 mg total) by mouth 2 (two) times a day as needed., Disp: 10 tablet, Rfl: 0  ?  clobetasol (TEMOVATE) 0.05 % ointment, Apply to affected area twice daily, Disp: 30 g, Rfl: 3  ?  rOPINIRole (REQUIP) 0.25 MG tablet, Take 1 tablet (0.25 mg total) by mouth at bedtime as needed (for restless legs. take 1-3 hours before bed)., Disp: 30 tablet, Rfl: 0    Allergies:   Symbicort [budesonide-formoterol]      Objective:      Physical Exam  (!) 231 lb (104.8 kg)  6' (1.829 m)  Body mass index is 31.33 kg/m .  /80 (Patient Site: Left Arm, Patient Position: Sitting, Cuff Size: Adult Regular)   Pulse 84   Resp 16   Ht 6' (1.829 m)   Wt (!) 231 lb (104.8 kg)   BMI 31.33 kg/m      General Appearance:   Awake, Alert, No acute distress.   HEENT:  Pupil equal and reactive to light. No scleral icterus; the mucous membranes were moist.   Neck: No cervical bruits. No JVD. No thyromegaly.     Chest: The spine  was straight. The chest was symmetric.   Lungs:   Diminished breathing sounds. No crackles. No wheezing.   Cardiovascular:   Regular rhythm and rate, normal first and second heart sounds with no murmurs. No rubs or gallops.    Abdomen:  Soft. No tenderness. Non-distended. Bowels sounds are present   Extremities: Equal tibial pulses. No leg edema.   Skin: No rashes or ulcers. Warm, Dry.   Musculoskeletal: No tenderness. No deformity.   Neurologic: Mood and affect are appropriate. No focal deficits.         EKG: Personally reviewed  Normal sinus rhythm   Normal ECG   When compared with ECG of 16-DEC-2019 11:55,   Sinus rhythm has replaced Atrial fibrillation   Vent. rate has decreased  BPM   ST no longer depressed    Cardiac Imaging Studies  ECHO on 12-:    Technical Quality: limited visualization    Left ventricle ejection fraction is normal. The calculated left ventricular ejection fraction is 68%.    Normal right ventricular size and systolic function.    No hemodynamically significant valvular heart abnormalities.    No previous study for comparison.    Lab Review   Lab Results   Component Value Date     01/02/2020    K 4.0 01/02/2020     (H) 01/02/2020    CO2 23 01/02/2020    BUN 17 01/02/2020    CREATININE 1.15 01/02/2020    CALCIUM 8.0 (L) 01/02/2020     Lab Results   Component Value Date    WBC 15.4 (H) 12/27/2019    HGB 12.1 (L) 12/27/2019    HCT 39.2 (L) 12/27/2019    MCV 96 12/27/2019     12/27/2019     Lab Results   Component Value Date    TROPONINI <0.01 12/16/2019     Lab Results   Component Value Date     (H) 12/16/2019     Lab Results   Component Value Date    TSH 0.09 (L) 12/11/2019

## 2021-07-03 NOTE — ADDENDUM NOTE
Addendum Note by Rabia Argueta RN at 10/27/2020 11:00 AM     Author: Rabia Argueta RN Service: -- Author Type: Registered Nurse    Filed: 10/29/2020 11:06 AM Encounter Date: 10/27/2020 Status: Signed    : Rabia Argueta RN (Registered Nurse)    Addended by: RABIA ARGUETA on: 10/29/2020 11:06 AM        Modules accepted: Orders

## 2021-10-09 ENCOUNTER — HEALTH MAINTENANCE LETTER (OUTPATIENT)
Age: 66
End: 2021-10-09

## 2021-11-04 ENCOUNTER — OFFICE VISIT (OUTPATIENT)
Dept: FAMILY MEDICINE | Facility: CLINIC | Age: 66
End: 2021-11-04
Payer: COMMERCIAL

## 2021-11-04 VITALS
OXYGEN SATURATION: 90 % | HEIGHT: 72 IN | TEMPERATURE: 97.9 F | SYSTOLIC BLOOD PRESSURE: 130 MMHG | WEIGHT: 214.25 LBS | BODY MASS INDEX: 29.02 KG/M2 | HEART RATE: 107 BPM | DIASTOLIC BLOOD PRESSURE: 86 MMHG

## 2021-11-04 DIAGNOSIS — J96.01 ACUTE RESPIRATORY FAILURE WITH HYPOXIA (H): ICD-10-CM

## 2021-11-04 DIAGNOSIS — R06.02 SOB (SHORTNESS OF BREATH): ICD-10-CM

## 2021-11-04 DIAGNOSIS — I10 ESSENTIAL HYPERTENSION: ICD-10-CM

## 2021-11-04 DIAGNOSIS — F32.1 CURRENT MODERATE EPISODE OF MAJOR DEPRESSIVE DISORDER WITHOUT PRIOR EPISODE (H): ICD-10-CM

## 2021-11-04 DIAGNOSIS — F41.9 ANXIETY: ICD-10-CM

## 2021-11-04 DIAGNOSIS — G25.81 RESTLESS LEGS SYNDROME (RLS): ICD-10-CM

## 2021-11-04 DIAGNOSIS — J18.9 PNEUMONIA DUE TO INFECTIOUS ORGANISM, UNSPECIFIED LATERALITY, UNSPECIFIED PART OF LUNG: ICD-10-CM

## 2021-11-04 DIAGNOSIS — M51.379 DEGENERATION OF LUMBAR OR LUMBOSACRAL INTERVERTEBRAL DISC: ICD-10-CM

## 2021-11-04 DIAGNOSIS — R46.89 COGNITIVE AND BEHAVIORAL CHANGES: ICD-10-CM

## 2021-11-04 DIAGNOSIS — R41.89 COGNITIVE AND BEHAVIORAL CHANGES: ICD-10-CM

## 2021-11-04 DIAGNOSIS — I25.10 CARDIOVASCULAR DISEASE: ICD-10-CM

## 2021-11-04 DIAGNOSIS — J44.1 COPD EXACERBATION (H): Primary | ICD-10-CM

## 2021-11-04 PROCEDURE — 99215 OFFICE O/P EST HI 40 MIN: CPT | Performed by: FAMILY MEDICINE

## 2021-11-04 RX ORDER — CLONAZEPAM 2 MG/1
2 TABLET ORAL AT BEDTIME
Qty: 30 TABLET | Refills: 0 | Status: SHIPPED | OUTPATIENT
Start: 2021-11-04 | End: 2021-11-30

## 2021-11-04 RX ORDER — BUSPIRONE HYDROCHLORIDE 10 MG/1
10 TABLET ORAL 2 TIMES DAILY
Qty: 60 TABLET | Refills: 3 | Status: SHIPPED | OUTPATIENT
Start: 2021-11-04 | End: 2021-11-30

## 2021-11-04 RX ORDER — LEVALBUTEROL TARTRATE 45 UG/1
2 AEROSOL, METERED ORAL EVERY 4 HOURS PRN
Qty: 15 G | Refills: 4 | Status: SHIPPED | OUTPATIENT
Start: 2021-11-04 | End: 2021-11-30

## 2021-11-04 RX ORDER — PREGABALIN 75 MG/1
75 CAPSULE ORAL 3 TIMES DAILY
Qty: 90 CAPSULE | Refills: 3 | Status: SHIPPED | OUTPATIENT
Start: 2021-11-04 | End: 2021-11-10

## 2021-11-04 ASSESSMENT — MIFFLIN-ST. JEOR: SCORE: 1799.83

## 2021-11-04 NOTE — PATIENT INSTRUCTIONS
It was a pleasure to see you today      Please note I have started you on BuSpar or buspirone for the anxiety take this medication twice a day    Please continue taking your Celexa 40 mg daily    I have refilled your Klonopin you can take this at bedtime only our nursing staff will help you sign paperwork so he can get a prescription every month       For your back pain I have started you on Lyrica please take this on a scheduled basis 3 times per day    Please continue using your oxygen I have signed a new order that is available from [facility].    I will see you back in 2 months    Please see Dr. Rivera for your appointment

## 2021-11-04 NOTE — PROGRESS NOTES
ASSESSMENT and plan   1. COPD exacerbation (H)  Patient continuously is using oxygen he is currently out of his oxygen and I am providing portable oxygen and will set him up with [facility] for this. He understands that he needs to use oxygen at all times.  - Oxygen Order    2. Pneumonia due to infectious organism, unspecified laterality, unspecified part of lung  Recovered from recent pneumonia secondary to COVID-19 finished antibiotic course no fever no chills. He is not producing any sputum at the present time  - Oxygen Order    3. Essential hypertension    Blood pressure is well controlled no dizzy spells noted    4. Arteriosclerotic Cardiovascular Disease (ASCVD)    Patient's been taking Xarelto regularly.    5. SOB (shortness of breath)    Patient is acutely short of breath with minimal exertion walking causes him to be short of breath he gets short of breath and anxious when he goes to the bathroom  - Oxygen Order    6. Restless legs syndrome (RLS)    He has difficulty sleeping his restless leg syndrome is partially treated with Klonopin I am starting him on Lyrica side effects this medication discussed in detail with the patient  - pregabalin (LYRICA) 75 MG capsule; Take 1 capsule (75 mg) by mouth 3 times daily  Dispense: 90 capsule; Refill: 3  - clonazePAM (KLONOPIN) 2 MG tablet; Take 1 tablet (2 mg) by mouth At Bedtime  Dispense: 30 tablet; Refill: 0    7. Anxiety  Longstanding history of anxiety refilling Klonopin controlled substance agreement signed BuSpar started he understands that this is a daily medication  - busPIRone (BUSPAR) 10 MG tablet; Take 1 tablet (10 mg) by mouth 2 times daily  Dispense: 60 tablet; Refill: 3  - clonazePAM (KLONOPIN) 2 MG tablet; Take 1 tablet (2 mg) by mouth At Bedtime  Dispense: 30 tablet; Refill: 0    8. Cognitive and behavioral changes  Patient notes that he feels that his memory is worsening he has had this discussion with me in the past he believes because he cannot get  "enough air he is always tired and his affect his mentation. He is currently living with his daughter and is seeking to living in assisted living in the near future    9. Lumbar Disc Degeneration    Longstanding back pain leading to issues with opioid use which we discussed today. I have not prescribed any opioids will start with Lyrica and add an anti-inflammatory if needed.  - pregabalin (LYRICA) 75 MG capsule; Take 1 capsule (75 mg) by mouth 3 times daily  Dispense: 90 capsule; Refill: 3    10. Current moderate episode of major depressive disorder without prior episode (H)  Patient is nonsuicidal on Celexa. Sometimes he feels that he is \"\" dying. He was previously on hospice for respiratory issues currently off hospice.    11. Acute respiratory failure with hypoxia (H)      - levalbuterol (XOPENEX HFA) 45 MCG/ACT inhaler; Inhale 2 puffs into the lungs every 4 hours as needed for wheezing  Dispense: 15 g; Refill: 4      Patient Instructions   It was a pleasure to see you today      Please note I have started you on BuSpar or buspirone for the anxiety take this medication twice a day    Please continue taking your Celexa 40 mg daily    I have refilled your Klonopin you can take this at bedtime only our nursing staff will help you sign paperwork so he can get a prescription every month       For your back pain I have started you on Lyrica please take this on a scheduled basis 3 times per day    Please continue using your oxygen I have signed a new order that is available from [facility].    I will see you back in 2 months    Please see Dr. Rivera for your appointment        Orders Placed This Encounter   Procedures     Oxygen Order     Medications Discontinued During This Encounter   Medication Reason     levalbuterol (XOPENEX HFA) 45 mcg/actuation inhaler Reorder       Return in about 2 months (around 1/4/2022) for depression.    CHIEF COMPLAINT:  chief complaint    HISTORY OF PRESENT ILLNESS:  Ki is a 66 year old " male who is following up at the clinic after more than 15 months. [place] but found that the healthcare was insufficient to meet his daily needs. He reported getting sick with a COVID-19 infection [place]. He recovered from the COVID-19 pneumonia and is here for a follow-up he is established his care at our clinic and is living with his daughter currently he plans to move to a group home when he recovers. He says he is very anxious at home and stays away from all family members including his grandchildren. He does not  Have access to his oxygen and needs a refill for that he also is almost over the Klonopin. He is wondering whether he should use medication for his back pain and if there is something else to be given for his anxiety. He is scheduled to see Dr. Rivera his pulmonologist in about 3 to 4 weeks. He was previously admitted at  on 10/12/2029 and discharged on 10/21/2021. This was for acute hypoxic respiratory failure caused by pneumonia secondary to a COVID-19 infection. He has had 2 doses of COVID-19 vaccinations in is wondering when he should have his third dose administered  REVIEW OF SYSTEMS:   General positive for fatigue  Pulmonary positive for wheezing shortness of breath positive for chest tightness  CVS occasional chest pain noted  GI no abdominal pain noted decreased appetite noted  Psych depressed mood feels anxious at night has difficulty sleeping  Musculoskeletal back pain continuously present he has bilateral hip pain and leg pain  Neuro numbness noted in both legs especially at night and in his lower spine  12 point review of  All other systems are negative.    PFSH:  Living with daughter    TOBACCO USE:  History   Smoking Status     Former Smoker     Packs/day: 0.00     Quit date: 5/15/2018   Smokeless Tobacco     Current User     Comment: No passive exposure       VITALS:  Vitals:    11/04/21  0856 11/04/21 1005   BP: 130/86    Pulse: 92 107   Temp: 97.9  F (36.6  C)    TempSrc: Oral    SpO2: 94% 90%   Weight: 97.2 kg (214 lb 4 oz)    Height: 1.829 m (6')      Wt Readings from Last 3 Encounters:   11/04/21 97.2 kg (214 lb 4 oz)   08/11/20 104.3 kg (230 lb)   05/07/20 104.3 kg (230 lb)       PHYSICAL EXAM:  Quiet elderly appearing male sitting in exam room no acute distress looks fatigued  HEENT neck supple mucous members moist oral cavity shows no exudate no erythema  Respiratory system clear bases expiratory rhonchi present no use of accessory muscles or respiration  CVS is tachycardic no murmurs rubs gallops appreciated no pedal edema  Psych oriented x3 not agitated grooming is adequate  Neuro cranial nerves II to XII intact motor tone the upper and lower extremities is normal  Abdomen soft there is no focal tenderness. No hepatosplenomegaly bowel sounds are not present.      DATA REVIEWED:  Additional History from Old Records Summarized (2): 0  Decision to Obtain Records (1): 0  Radiology Tests Summarized or Ordered (1): 0  Labs Reviewed or Ordered (1): 0  Medicine Test Summarized or Ordered (1): 0  Independent Review of EKG or X-RAY(2 each): 0    The visit lasted a total of 58 minutes this included time spent discussing the patient's discharge summary. Reviewing all his medications coming up with a treatment plan for his chronic back pain and anxiety.    MEDICATIONS:  Current Outpatient Medications   Medication Sig Dispense Refill     busPIRone (BUSPAR) 10 MG tablet Take 1 tablet (10 mg) by mouth 2 times daily 60 tablet 3     clonazePAM (KLONOPIN) 2 MG tablet Take 1 tablet (2 mg) by mouth At Bedtime 30 tablet 0     levalbuterol (XOPENEX HFA) 45 MCG/ACT inhaler Inhale 2 puffs into the lungs every 4 hours as needed for wheezing 15 g 4     pregabalin (LYRICA) 75 MG capsule Take 1 capsule (75 mg) by mouth 3 times daily 90 capsule 3     acetaminophen (TYLENOL) 500 MG tablet [ACETAMINOPHEN (TYLENOL) 500 MG  TABLET] Take 1-2 tablets (500-1,000 mg total) by mouth every 4 (four) hours as needed.  0     albuterol (PROAIR HFA;PROVENTIL HFA;VENTOLIN HFA) 90 mcg/actuation inhaler [ALBUTEROL (PROAIR HFA;PROVENTIL HFA;VENTOLIN HFA) 90 MCG/ACTUATION INHALER] Inhale 2 puffs every 6 (six) hours as needed for wheezing.       amiodarone (PACERONE) 200 MG tablet [AMIODARONE (PACERONE) 200 MG TABLET] TAKE ONE TABLET BY MOUTH ONE TIME DAILY  90 tablet 0     azithromycin (ZITHROMAX) 250 MG tablet [AZITHROMYCIN (ZITHROMAX) 250 MG TABLET] Take one tablet on Monday, Wednesday, and Friday. 12 tablet 11     budesonide (PULMICORT FLEXHALER) 180 mcg/actuation inhaler [BUDESONIDE (PULMICORT FLEXHALER) 180 MCG/ACTUATION INHALER] Inhale 2 puffs 2 (two) times a day. 3 Inhaler 3     buPROPion (WELLBUTRIN XL) 300 MG 24 hr tablet [BUPROPION (WELLBUTRIN XL) 300 MG 24 HR TABLET] Take 1 tablet (300 mg total) by mouth Daily after lunch. 30 tablet 12     citalopram (CELEXA) 20 MG tablet [CITALOPRAM (CELEXA) 20 MG TABLET] TAKE 1 TABLET (20 MG TOTAL) BY MOUTH DAILY. 90 tablet 3     clonazePAM (KLONOPIN) 1 MG tablet [CLONAZEPAM (KLONOPIN) 1 MG TABLET] Take 1 tablet (1 mg total) by mouth at bedtime as needed for anxiety. 30 tablet 0     diltiazem (CARDIZEM CD) 240 MG 24 hr capsule [DILTIAZEM (CARDIZEM CD) 240 MG 24 HR CAPSULE] Take 1 capsule (240 mg total) by mouth daily. 30 capsule 11     doxycycline (MONODOX) 100 MG capsule [DOXYCYCLINE (MONODOX) 100 MG CAPSULE] In case of flare-up, take one capsule twice daily for 5 days. 10 capsule 11     gabapentin (NEURONTIN) 300 MG capsule [GABAPENTIN (NEURONTIN) 300 MG CAPSULE] Take 2 capsules  In the morning and night and 2 in the afternoon 540 capsule 2     levalbuterol (XOPENEX) 1.25 mg/3 mL nebulizer solution [LEVALBUTEROL (XOPENEX) 1.25 MG/3 ML NEBULIZER SOLUTION] Take 3 mL (1.25 mg total) by nebulization every 6 (six) hours as needed for wheezing. 360 mL 3     lisinopriL (PRINIVIL,ZESTRIL) 40 MG tablet  [LISINOPRIL (PRINIVIL,ZESTRIL) 40 MG TABLET] TAKE 1 TABLET BY MOUTH DAILY. 90 tablet 1     morphine sulfate (MORPHINE CONCENTRATE) 20 mg/mL Syrg [MORPHINE SULFATE (MORPHINE CONCENTRATE) 20 MG/ML SYRG] Take 2.5-5 mg by mouth every 3 (three) hours as needed (dyspnea). 30 mL 0     nicotine (NICOTROL NS) 10 mg/mL Spry [NICOTINE (NICOTROL NS) 10 MG/ML SPRY] One spray in each nostril as needed for tobacco craving 10 mL 11     predniSONE (DELTASONE) 10 mg tablet [PREDNISONE (DELTASONE) 10 MG TABLET] In case of flare-up, take 4 tabs daily x 4 days, then 3 tabs daily x 4 days, then 2 tabs daily x 4 days, then 1 tab daily x 4 days.. 40 tablet 5     rivaroxaban ANTICOAGULANT (XARELTO) 20 mg tablet [RIVAROXABAN ANTICOAGULANT (XARELTO) 20 MG TABLET] Take 1 tablet (20 mg total) by mouth daily with supper. 30 tablet 12     SPIRIVA RESPIMAT 2.5 mcg/actuation Mist [SPIRIVA RESPIMAT 2.5 MCG/ACTUATION MIST] INHALE TWO PUFFS BY MOUTH DAILY       STIOLTO RESPIMAT 2.5-2.5 mcg/actuation Mist inhaler [STIOLTO RESPIMAT 2.5-2.5 MCG/ACTUATION MIST INHALER] INHALE 2 INHALATIONS DAILY. 4 g 11       DME (Durable Medical Equipment) Orders and Documentation  Orders Placed This Encounter   Procedures     Oxygen Order      The patient was assessed and it was determined the patient is in need of the following listed DME Supplies/Equipment. Please complete supporting documentation below to demonstrate medical necessity.      Oxygen Use Documentation  I certify that this patient, Ki Pederson has been under my care and that I, or a nurse practitioner or physician's assistant working with me, had a face-to-face encounter that meets face-to-face encounter requirements with this patient on November 4, 2021.    Ki Pederson is now in a chronic stable state and continues to require supplemental oxygen due to continued oxygen desaturation.  This patient has been treated in part, or in whole for the following medical condition(s):  Chronic Respiratory  Failure with Hypoxia J93.11  Treatments tried and failed or ruled out to treat hypoxemia include .  If portability is ordered, is the patient mobile within the home? yes

## 2021-11-04 NOTE — LETTER
Opioid / Opioid Plus Controlled Substance Agreement    This is an agreement between you and your provider about the safe and appropriate use of controlled substance/opioids prescribed by your care team. Controlled substances are medicines that can cause physical and mental dependence (abuse).    There are strict laws about having and using these medicines. We here at [facility] are committing to working with you in your efforts to get better. To support you in this work, we ll help you schedule regular office appointments for medicine refills. If we must cancel or change your appointment for any reason, we ll make sure you have enough medicine to last until your next appointment.     As a Provider, I will:    Listen carefully to your concerns and treat you with respect.     Recommend a treatment plan that I believe is in your best interest. This plan may involve therapies other than opioid pain medication.     Talk with you often about the possible benefits, and the risk of harm of any medicine that we prescribe for you.     Provide a plan on how to taper (discontinue or go off) using this medicine if the decision is made to stop its use.    As a Patient, I understand that opioid(s):     Are a controlled substance prescribed by my care team to help me function or work and manage my condition(s).     Are strong medicines and can cause serious side effects such as:    Drowsiness, which can seriously affect my driving ability    A lower breathing rate, enough to cause death    Harm to my thinking ability     Depression     Abuse of and addiction to this medicine    Need to be taken exactly as prescribed. Combining opioids with certain medicines or chemicals (such as illegal drugs, sedatives, sleeping pills, and benzodiazepines) can be dangerous or even fatal. If I stop opioids suddenly, I may have severe withdrawal symptoms.    Do not work for all types of pain nor for all patients. If they re not helpful, I may  be asked to stop them.    I am also being prescribed a benzodiazepine (tranquilizer) controlled substance: I understand this type of medicine is sedating and can increase the risk of death when taken together with opioids. I have talked to my care team about having prescription Narcan available for reversing the opioid medicine and have been instructed in its use. I will be very careful to take my medicines only as directed    The risks, benefits and side effects of these medicine(s) were explained to me. I agree that:  1. I will take part in other treatments as advised by my care team. This may be psychiatry or counseling, physical therapy, behavioral therapy, group treatment or a referral to a specialist.     2. I will keep all my appointments. I understand that this is part of the monitoring of opioids. My care team may require an office visit for EVERY opioid/controlled substance refill. If I miss appointments or don t follow instructions, my care team may stop my medicine.    3. I will take my medicines as prescribed. I will not change the dose or schedule unless my care team tells me to. There will be no refills if I run out early.     4. I may be asked to come to the clinic and complete a urine drug test or complete a pill count at any time. If I don t give a urine sample or participate in a pill count, the care team may stop my medicine.    5. I will only receive prescriptions from this clinic for chronic pain. If I am treated by another provider for acute pain issues, I will tell them that I am taking opioid pain medication for chronic pain and that I have a treatment agreement with this provider. I will inform my [facility] care team within one business day if I am given a prescription for any pain medication by another healthcare provider. My [facility] care team can contact other providers and pharmacists about my use of any medicines.    6. It is up to me to make sure that I don t run out  of my medicines on weekends or holidays. If my care team is willing to refill my opioid prescription without a visit, I must request refills only during office hours. Refills may take up to 3 business days to process. I will use one pharmacy to fill all my opioid and other controlled substance prescriptions. I will notify the clinic about any changes to my insurance or medication availability.    7. I am responsible for my prescriptions. If the medicine/prescription is lost, stolen or destroyed, it will not be replaced. I also agree not to share controlled substance medicines with anyone.    8. I am aware I should not use any illegal or recreational drugs. I agree not to drink alcohol unless my care team says I can.       9. If I enroll in the [place] [facility] program, I will tell my care team prior to my next refill.     10. I will tell my care team right away if I become pregnant, have a new medical problem treated outside of my regular clinic, or have a change in my medications.    11. I understand that this medicine can affect my thinking, judgment and reaction time. Alcohol and drugs affect the brain and body, which can affect the safety of my driving. Being under the influence of alcohol or drugs can affect my decision-making, behaviors, personal safety, and the safety of others. Driving while impaired (DWI) can occur if a person is driving, operating, or in physical control of a car, motorcycle, boat, snowmobile, ATV, motorbike, off-road vehicle, or any other motor vehicle ([place] Statute 169A.20). I understand the risk if I choose to drive or operate any vehicle or machinery.    I understand that if I do not follow any of the conditions above, my prescriptions or treatment may be stopped or changed.          Opioids  What You Need to Know    What are opioids?   Opioids are pain medicines that must be prescribed by a doctor. They are also known as narcotics.     Examples are:   1. morphine (MS Contin,  Mine)  2. oxycodone (Oxycontin)  3. oxycodone and acetaminophen (Percocet)  4. hydrocodone and acetaminophen (Vicodin, Norco)   5. fentanyl patch (Duragesic)   6. hydromorphone (Dilaudid)   7. methadone  8. codeine (Tylenol #3)     What do opioids do well?   Opioids are best for severe short-term pain such as after a surgery or injury. They may work well for cancer pain. They may help some people with long-lasting (chronic) pain.     What do opioids NOT do well?   Opioids never get rid of pain entirely, and they don t work well for most patients with chronic pain. Opioids don t reduce swelling, one of the causes of pain.                                    Other ways to manage chronic pain and improve function include:       Treat the health problem that may be causing pain    Anti-inflammation medicines, which reduce swelling and tenderness, such as ibuprofen (Advil, Motrin) or naproxen (Aleve)    Acetaminophen (Tylenol)    Antidepressants and anti-seizure medicines, especially for nerve pain    Topical treatments such as patches or creams    Injections or nerve blocks    Chiropractic or osteopathic treatment    Acupuncture, massage, deep breathing, meditation, visual imagery, aromatherapy    Use heat or ice at the pain site    Physical therapy     Exercise    Stop smoking    Take part in therapy       Risks and side effects     Talk to your doctor before you start or decide to keep taking opioids. Possible side effects include:      Lowering your breathing rate enough to cause death    Overdose, including death, especially if taking higher than prescribed doses    Worse depression symptoms; less pleasure in things you usually enjoy    Feeling tired or sluggish    Slower thoughts or cloudy thinking    Being more sensitive to pain over time; pain is harder to control    Trouble sleeping or restless sleep    Changes in hormone levels (for example, less testosterone)    Changes in sex drive or ability to have  sex    Constipation    Unsafe driving    Itching and sweating    Dizziness    Nausea, throwing up and dry mouth    What else should I know about opioids?    Opioids may lead to dependence, tolerance, or addiction.      Dependence means that if you stop or reduce the medicine too quickly, you will have withdrawal symptoms. These include loose poop (diarrhea), jitters, flu-like symptoms, nervousness and tremors. Dependence is not the same as addiction.                       Tolerance means needing higher doses over time to get the same effect. This may increase the chance of serious side effects.      Addiction is when people improperly use a substance that harms their body, their mind or their relations with others. Use of opiates can cause a relapse of addiction if you have a history of drug or alcohol abuse.      People who have used opioids for a long time may have a lower quality of life, worse depression, higher levels of pain and more visits to doctors.    You can overdose on opioids. Take these steps to lower your risk of overdose:    1. Recognize the signs:  Signs of overdose include decrease or loss of consciousness (blackout), slowed breathing, trouble waking up and blue lips. If someone is worried about overdose, they should call 911.    2. Talk to your doctor about Narcan (naloxone).   If you are at risk for overdose, you may be given a prescription for Narcan. This medicine very quickly reverses the effects of opioids.   If you overdose, a friend or family member can give you Narcan while waiting for the ambulance. They need to know the signs of overdose and how to give Narcan.     3. Don't use alcohol or street drugs.   Taking them with opioids can cause death.    4. Do not take any of these medicines unless your doctor says it s OK. Taking these with opioids can cause death:    Benzodiazepines, such as lorazepam (Ativan), alprazolam (Xanax) or diazepam (Valium)    Muscle relaxers, such as  cyclobenzaprine (Flexeril)    Sleeping pills like zolpidem (Ambien)     Other opioids      How to keep you and other people safe while taking opioids:    1. Never share your opioids with others.  Opioid medicines are regulated by the Drug Enforcement Agency (ZAIDA). Selling or sharing medications is a criminal act.    2. Be sure to store opioids in a secure place, locked up if possible. Young children can easily swallow them and overdose.    3. When you are traveling with your medicines, keep them in the original bottles. If you use a pill box, be sure you also carry a copy of your medicine list from your clinic or pharmacy.    4. Safe disposal of opioids    Most pharmacies have places to get rid of medicine, called disposal kiosks. Medicine disposal options are also available in every [facility]. Search your county and  medication disposal  to find more options. You can find more details at:  [email or web address]://[email or web address]-green/[email or web address]-medications     I agree that my provider, clinic care team, and pharmacy may work with any city, state or federal law enforcement agency that investigates the misuse, sale, or other diversion of my controlled medicine. I will allow my provider to discuss my care with, or share a copy of, this agreement with any other treating provider, pharmacy or emergency room where I receive care.    I have read this agreement and have asked questions about anything I did not understand.    _______________________________________________________  Patient Signature - Ki Pederson _____________________                   Date     _______________________________________________________  Provider Signature - Ricky Lerma MD   _____________________                   Date     _______________________________________________________  Witness Signature (required if provider not present while patient signing)   _____________________                   Date

## 2021-11-10 ENCOUNTER — OFFICE VISIT (OUTPATIENT)
Dept: PHYSICAL MEDICINE AND REHAB | Facility: CLINIC | Age: 66
End: 2021-11-10
Payer: COMMERCIAL

## 2021-11-10 ENCOUNTER — OFFICE VISIT (OUTPATIENT)
Dept: FAMILY MEDICINE | Facility: CLINIC | Age: 66
End: 2021-11-10
Payer: COMMERCIAL

## 2021-11-10 VITALS — DIASTOLIC BLOOD PRESSURE: 104 MMHG | HEART RATE: 110 BPM | SYSTOLIC BLOOD PRESSURE: 155 MMHG

## 2021-11-10 VITALS
BODY MASS INDEX: 30.25 KG/M2 | HEART RATE: 78 BPM | SYSTOLIC BLOOD PRESSURE: 120 MMHG | HEIGHT: 72 IN | RESPIRATION RATE: 18 BRPM | WEIGHT: 223.3 LBS | OXYGEN SATURATION: 93 % | TEMPERATURE: 98.4 F | DIASTOLIC BLOOD PRESSURE: 78 MMHG

## 2021-11-10 DIAGNOSIS — G89.29 CHRONIC BILATERAL LOW BACK PAIN WITHOUT SCIATICA: Primary | ICD-10-CM

## 2021-11-10 DIAGNOSIS — G89.29 CHRONIC LOW BACK PAIN WITH SCIATICA, SCIATICA LATERALITY UNSPECIFIED, UNSPECIFIED BACK PAIN LATERALITY: ICD-10-CM

## 2021-11-10 DIAGNOSIS — F41.9 ANXIETY: ICD-10-CM

## 2021-11-10 DIAGNOSIS — J44.1 COPD EXACERBATION (H): ICD-10-CM

## 2021-11-10 DIAGNOSIS — M54.2 CERVICAL SPINE PAIN: ICD-10-CM

## 2021-11-10 DIAGNOSIS — M54.50 CHRONIC BILATERAL LOW BACK PAIN WITHOUT SCIATICA: Primary | ICD-10-CM

## 2021-11-10 DIAGNOSIS — M54.40 CHRONIC LOW BACK PAIN WITH SCIATICA, SCIATICA LATERALITY UNSPECIFIED, UNSPECIFIED BACK PAIN LATERALITY: ICD-10-CM

## 2021-11-10 DIAGNOSIS — Z98.1 S/P CERVICAL SPINAL FUSION: ICD-10-CM

## 2021-11-10 DIAGNOSIS — G89.4 CHRONIC PAIN DISORDER: ICD-10-CM

## 2021-11-10 DIAGNOSIS — M54.50 LUMBAR SPINE PAIN: Primary | ICD-10-CM

## 2021-11-10 DIAGNOSIS — M51.379 DEGENERATION OF LUMBAR OR LUMBOSACRAL INTERVERTEBRAL DISC: ICD-10-CM

## 2021-11-10 DIAGNOSIS — G25.81 RESTLESS LEGS SYNDROME (RLS): ICD-10-CM

## 2021-11-10 DIAGNOSIS — R20.0 LEG NUMBNESS: ICD-10-CM

## 2021-11-10 PROCEDURE — 99215 OFFICE O/P EST HI 40 MIN: CPT | Performed by: FAMILY MEDICINE

## 2021-11-10 PROCEDURE — 99204 OFFICE O/P NEW MOD 45 MIN: CPT | Performed by: PHYSICAL MEDICINE & REHABILITATION

## 2021-11-10 RX ORDER — PREGABALIN 150 MG/1
150 CAPSULE ORAL 3 TIMES DAILY
Qty: 90 CAPSULE | Refills: 0 | Status: SHIPPED | OUTPATIENT
Start: 2021-11-10 | End: 2021-12-23

## 2021-11-10 ASSESSMENT — MIFFLIN-ST. JEOR: SCORE: 1841.01

## 2021-11-10 ASSESSMENT — PAIN SCALES - GENERAL: PAINLEVEL: SEVERE PAIN (7)

## 2021-11-10 NOTE — PATIENT INSTRUCTIONS
1. An MRI and xrays were ordered for you today.  You will be contacted by scheduling within 3 days.    If you are not contacted, please call Radiology at [phone].

## 2021-11-10 NOTE — PROGRESS NOTES
ASSESSMENT and plan   1. Chronic bilateral low back pain without sciatica  Patient has longstanding back pain.  He reports that his pain has been improved with Lyrica but he has constant pain and is difficult for him to stand up he would like to see the spine clinic or orthopedics.  - Spine Referral; Future    2. Chronic low back pain with sciatica, sciatica laterality unspecified, unspecified back pain laterality    Discussed his back pain which is chronic methods for improvement of the discomfort    3. COPD exacerbation (H)    Currently his COPD is stable however he did not get portable oxygen tanks and will call [facility] [employer] to find out why they did not deliver the oxygen are received the order    4. Anxiety    Klonopin is working for his anxiety at night however the BuSpar made him excessively somnolent we will decrease the dose to 1 tablet at bedtime    5. Restless legs syndrome (RLS)    Restless leg syndrome is being controlled with the Lyrica  - pregabalin (LYRICA) 150 MG capsule; Take 1 capsule (150 mg) by mouth 3 times daily  Dispense: 90 capsule; Refill: 0    6. Lumbar Disc Degeneration    He has chronic lumbar disc disease and degeneration causing his sciatica and lower back pain I have increased his Lyrica to a maximal dose of 150 mg 3 times a day  - pregabalin (LYRICA) 150 MG capsule; Take 1 capsule (150 mg) by mouth 3 times daily  Dispense: 90 capsule; Refill: 0  - Spine Referral; Future    7. Chronic pain disorder      - Spine Referral; Future        Patient Instructions   It was a pleasure to see you today      We have made some changes to your medications      Please take the BuSpar only at night      Please increase the Lyrica dose to 150 mg per dose this means that your current existing medication should be taken as follows    2 capsules in the morning, 2 capsules in the afternoon and 2 capsules at bedtime    The new prescription we waiting for you and that is 150 mg strength which will be  taken 1 capsule 3 times a day      I have made a referral to the spine clinic for you the number will be provided    I would like to see you again in approximately 8 weeks      We will also contact the oxygen supply company      Orders Placed This Encounter   Procedures     Spine Referral     Medications Discontinued During This Encounter   Medication Reason     pregabalin (LYRICA) 75 MG capsule Reorder       Return in about 2 months (around 1/10/2022).    CHIEF COMPLAINT:  chief complaint    HISTORY OF PRESENT ILLNESS:  Ki is a 66 year old male who is following up today, his daughter is also present.  He reports that he did get the new medications and they have been trying the Lyrica which seems to be helping with his back pain however he still has radiating pain down his hips and legs and says that his back pain is worrisome because he feels like his legs are weak and shaky he had chronic back pain for multiple years.  He states he would like to try something stronger or is wondering if he is to go to the pain clinic orthopedics.  His daughter says suggested that he could go to the spine clinic and perhaps try injections he is agreeable to this plan.  He reports that the BuSpar works for his anxiety but it made him too sleepy was typically sleeping after breakfast for 3 hours and during the afternoon.  He was taking the medication 3 times a day he has been taking the Klonopin.  He did not receive his oxygen and he still has a supply at home but no portable oxygen is present.  He notes no shortness of breath.  He states he has been going to the bathroom regularly.  He has not noticed any chest pain.    REVIEW OF SYSTEMS:     Musculoskeletal positive for chronic back pain chronic hip pain chronic leg pain  Neuro positive for numbness and tingling in his left leg and left foot  Psych denies symptoms today  10 point review of  All other systems are negative.    PFSH:    Social history reviewed    TOBACCO  "USE:  History   Smoking Status     Former Smoker     Packs/day: 0.00     Quit date: 5/15/2018   Smokeless Tobacco     Current User     Comment: No passive exposure       VITALS:  Vitals:    11/10/21 0701   BP: 120/78   Pulse: 78   Resp: 18   Temp: 98.4  F (36.9  C)   TempSrc: Oral   SpO2: 93%   Weight: 101.3 kg (223 lb 4.8 oz)   Height: 1.829 m (6' 0.01\")     Wt Readings from Last 3 Encounters:   11/10/21 101.3 kg (223 lb 4.8 oz)   11/04/21 97.2 kg (214 lb 4 oz)   08/11/20 104.3 kg (230 lb)       PHYSICAL EXAM:    Interactive elderly appearing male sitting in exam room no acute distress  HEENT neck supple mucous members slightly dry oral cavity shows no exudate erythema  Respiratory system clear to auscultation good breath sounds no wheeze no crackles  Musculoskeletal system tenderness elicited over the lumbar segment of his spine from L2-L4.  Forward flexion of his lumbar spine beyond 30 degrees causes him discomfort he has bilateral hip pain.  No discomfort noted over the left quadriceps or right quadriceps.  Neuro power in his quadriceps is 5 out of 5 on his right side 4-5 out of the left side.  Psych oriented x3 not agitated grooming is adequate speech is soft maintains eye contact    DATA REVIEWED:  Additional History from Old Records Summarized (2): 0  Decision to Obtain Records (1): 0  Radiology Tests Summarized or Ordered (1): 0  Labs Reviewed or Ordered (1): 0  Medicine Test Summarized or Ordered (1): 0  Independent Review of EKG or X-RAY(2 each): 0    The visit lasted a total of 40 minutes .    MEDICATIONS:  Current Outpatient Medications   Medication Sig Dispense Refill     acetaminophen (TYLENOL) 500 MG tablet [ACETAMINOPHEN (TYLENOL) 500 MG TABLET] Take 1-2 tablets (500-1,000 mg total) by mouth every 4 (four) hours as needed.  0     albuterol (PROAIR HFA;PROVENTIL HFA;VENTOLIN HFA) 90 mcg/actuation inhaler [ALBUTEROL (PROAIR HFA;PROVENTIL HFA;VENTOLIN HFA) 90 MCG/ACTUATION INHALER] Inhale 2 puffs every " 6 (six) hours as needed for wheezing.       amiodarone (PACERONE) 200 MG tablet [AMIODARONE (PACERONE) 200 MG TABLET] TAKE ONE TABLET BY MOUTH ONE TIME DAILY  90 tablet 0     azithromycin (ZITHROMAX) 250 MG tablet [AZITHROMYCIN (ZITHROMAX) 250 MG TABLET] Take one tablet on Monday, Wednesday, and Friday. 12 tablet 11     budesonide (PULMICORT FLEXHALER) 180 mcg/actuation inhaler [BUDESONIDE (PULMICORT FLEXHALER) 180 MCG/ACTUATION INHALER] Inhale 2 puffs 2 (two) times a day. 3 Inhaler 3     buPROPion (WELLBUTRIN XL) 300 MG 24 hr tablet [BUPROPION (WELLBUTRIN XL) 300 MG 24 HR TABLET] Take 1 tablet (300 mg total) by mouth Daily after lunch. 30 tablet 12     busPIRone (BUSPAR) 10 MG tablet Take 1 tablet (10 mg) by mouth 2 times daily 60 tablet 3     citalopram (CELEXA) 20 MG tablet [CITALOPRAM (CELEXA) 20 MG TABLET] TAKE 1 TABLET (20 MG TOTAL) BY MOUTH DAILY. 90 tablet 3     clonazePAM (KLONOPIN) 1 MG tablet [CLONAZEPAM (KLONOPIN) 1 MG TABLET] Take 1 tablet (1 mg total) by mouth at bedtime as needed for anxiety. 30 tablet 0     clonazePAM (KLONOPIN) 2 MG tablet Take 1 tablet (2 mg) by mouth At Bedtime 30 tablet 0     diltiazem (CARDIZEM CD) 240 MG 24 hr capsule [DILTIAZEM (CARDIZEM CD) 240 MG 24 HR CAPSULE] Take 1 capsule (240 mg total) by mouth daily. 30 capsule 11     doxycycline (MONODOX) 100 MG capsule [DOXYCYCLINE (MONODOX) 100 MG CAPSULE] In case of flare-up, take one capsule twice daily for 5 days. 10 capsule 11     gabapentin (NEURONTIN) 300 MG capsule [GABAPENTIN (NEURONTIN) 300 MG CAPSULE] Take 2 capsules  In the morning and night and 2 in the afternoon 540 capsule 2     levalbuterol (XOPENEX HFA) 45 MCG/ACT inhaler Inhale 2 puffs into the lungs every 4 hours as needed for wheezing 15 g 4     levalbuterol (XOPENEX) 1.25 mg/3 mL nebulizer solution [LEVALBUTEROL (XOPENEX) 1.25 MG/3 ML NEBULIZER SOLUTION] Take 3 mL (1.25 mg total) by nebulization every 6 (six) hours as needed for wheezing. 360 mL 3     lisinopriL  (PRINIVIL,ZESTRIL) 40 MG tablet [LISINOPRIL (PRINIVIL,ZESTRIL) 40 MG TABLET] TAKE 1 TABLET BY MOUTH DAILY. 90 tablet 1     morphine sulfate (MORPHINE CONCENTRATE) 20 mg/mL Syrg [MORPHINE SULFATE (MORPHINE CONCENTRATE) 20 MG/ML SYRG] Take 2.5-5 mg by mouth every 3 (three) hours as needed (dyspnea). 30 mL 0     nicotine (NICOTROL NS) 10 mg/mL Spry [NICOTINE (NICOTROL NS) 10 MG/ML SPRY] One spray in each nostril as needed for tobacco craving 10 mL 11     predniSONE (DELTASONE) 10 mg tablet [PREDNISONE (DELTASONE) 10 MG TABLET] In case of flare-up, take 4 tabs daily x 4 days, then 3 tabs daily x 4 days, then 2 tabs daily x 4 days, then 1 tab daily x 4 days.. 40 tablet 5     pregabalin (LYRICA) 150 MG capsule Take 1 capsule (150 mg) by mouth 3 times daily 90 capsule 0     rivaroxaban ANTICOAGULANT (XARELTO) 20 mg tablet [RIVAROXABAN ANTICOAGULANT (XARELTO) 20 MG TABLET] Take 1 tablet (20 mg total) by mouth daily with supper. 30 tablet 12     SPIRIVA RESPIMAT 2.5 mcg/actuation Mist [SPIRIVA RESPIMAT 2.5 MCG/ACTUATION MIST] INHALE TWO PUFFS BY MOUTH DAILY       STIOLTO RESPIMAT 2.5-2.5 mcg/actuation Mist inhaler [STIOLTO RESPIMAT 2.5-2.5 MCG/ACTUATION MIST INHALER] INHALE 2 INHALATIONS DAILY. 4 g 11
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
## 2021-11-10 NOTE — PATIENT INSTRUCTIONS
It was a pleasure to see you today      We have made some changes to your medications      Please take the BuSpar only at night      Please increase the Lyrica dose to 150 mg per dose this means that your current existing medication should be taken as follows    2 capsules in the morning, 2 capsules in the afternoon and 2 capsules at bedtime    The new prescription we waiting for you and that is 150 mg strength which will be taken 1 capsule 3 times a day      I have made a referral to the spine clinic for you the number will be provided    I would like to see you again in approximately 8 weeks      We will also contact the oxygen supply company
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
## 2021-11-24 ENCOUNTER — HOSPITAL ENCOUNTER (OUTPATIENT)
Dept: RADIOLOGY | Facility: CLINIC | Age: 66
End: 2021-11-24
Attending: PHYSICAL MEDICINE & REHABILITATION
Payer: COMMERCIAL

## 2021-11-24 ENCOUNTER — HOSPITAL ENCOUNTER (OUTPATIENT)
Dept: MRI IMAGING | Facility: CLINIC | Age: 66
End: 2021-11-24
Attending: PHYSICAL MEDICINE & REHABILITATION
Payer: COMMERCIAL

## 2021-11-24 ENCOUNTER — NURSE TRIAGE (OUTPATIENT)
Dept: NURSING | Facility: CLINIC | Age: 66
End: 2021-11-24
Payer: COMMERCIAL

## 2021-11-24 ENCOUNTER — TELEPHONE (OUTPATIENT)
Dept: PHYSICAL MEDICINE AND REHAB | Facility: CLINIC | Age: 66
End: 2021-11-24
Payer: COMMERCIAL

## 2021-11-24 ENCOUNTER — TELEPHONE (OUTPATIENT)
Dept: FAMILY MEDICINE | Facility: CLINIC | Age: 66
End: 2021-11-24
Payer: COMMERCIAL

## 2021-11-24 ENCOUNTER — MYC REFILL (OUTPATIENT)
Dept: PULMONOLOGY | Facility: OTHER | Age: 66
End: 2021-11-24
Payer: COMMERCIAL

## 2021-11-24 DIAGNOSIS — Z98.1 S/P CERVICAL SPINAL FUSION: ICD-10-CM

## 2021-11-24 DIAGNOSIS — M54.50 LUMBAR SPINE PAIN: ICD-10-CM

## 2021-11-24 DIAGNOSIS — R20.0 LEG NUMBNESS: ICD-10-CM

## 2021-11-24 DIAGNOSIS — M54.2 CERVICAL SPINE PAIN: ICD-10-CM

## 2021-11-24 DIAGNOSIS — J44.1 COPD WITH ACUTE EXACERBATION (H): ICD-10-CM

## 2021-11-24 DIAGNOSIS — Z76.0 ENCOUNTER FOR MEDICATION REFILL: Primary | ICD-10-CM

## 2021-11-24 PROCEDURE — 72141 MRI NECK SPINE W/O DYE: CPT

## 2021-11-24 PROCEDURE — 72040 X-RAY EXAM NECK SPINE 2-3 VW: CPT

## 2021-11-24 PROCEDURE — 72148 MRI LUMBAR SPINE W/O DYE: CPT

## 2021-11-24 NOTE — TELEPHONE ENCOUNTER
said the call disconnected so unable to transfer.  I tried calling her back but the phone went directly to voicemail so I didn't leave a message.  said he's having difficulty breathing, COPD. Wanted an appointment but none available.  Samaria Briggs RN  [facility]    Reason for Disposition    General information question, no triage required and triager able to answer question    Additional Information    Negative: [1] Caller is not with the adult (patient) AND [2] reporting urgent symptoms    Negative: Lab result questions    Negative: Medication questions    Negative: Caller can't be reached by phone    Negative: Caller has already spoken to PCP or another triager    Negative: RN needs further essential information from caller in order to complete triage    Negative: Requesting regular office appointment    Negative: [1] Caller requesting NON-URGENT health information AND [2] PCP's office is the best resource    Negative: Health Information question, no triage required and triager able to answer question    Protocols used: INFORMATION ONLY CALL - NO TRIAGE-A-

## 2021-11-24 NOTE — TELEPHONE ENCOUNTER
Reason for call:  Other   Patient called regarding (reason for call): prescription  Additional comments: Daughter called back again regarding the Prednisone refill.  She has not heard from anyone and needs it as soon as possible.     Phone number to reach patient:  Other phone number:  [phone]    Best Time:  anytime    Can we leave a detailed message on this number?  NO    Travel screening: Not Applicable

## 2021-11-24 NOTE — TELEPHONE ENCOUNTER
----- Message from Jose M Walker, DO sent at 11/24/2021 12:49 PM CST -----  Please call the patient and let them know that I have reviewed MRI of his cervical and lumbar spine as well as x-ray of cervical spine and Dr. Garg absence.  It does show narrowing around the spinal cord or stenosis in the cervical and lumbar spine.  This could be because of pain.  I recommend that he keep follow-up appointment with Dr. Garg.  There are no fractures.

## 2021-11-24 NOTE — TELEPHONE ENCOUNTER
Daughter called to get refill on Prednisone. Pt is out of Meds, please send to [facility] in [place] on [address]. Caller is aware that [facility] not in clinic, caller is asking covering Provider to please fiill today if possible.

## 2021-11-24 NOTE — TELEPHONE ENCOUNTER
Phone call to patient to review results and provider's recommendations. Spoke with patient's daughter Nelsy (signed CTC in chart). Results given and explained. Discussed having patient return for the recommended 2 week follow up to the last appointment. Stated understanding. Transferred to scheduling to make appointment.

## 2021-11-26 RX ORDER — PREDNISONE 10 MG/1
10 TABLET ORAL SEE ADMIN INSTRUCTIONS
Qty: 40 TABLET | Refills: 5 | Status: SHIPPED | OUTPATIENT
Start: 2021-11-26 | End: 2021-11-30

## 2021-11-30 DIAGNOSIS — I25.10 CARDIOVASCULAR DISEASE: ICD-10-CM

## 2021-11-30 DIAGNOSIS — G25.81 RESTLESS LEGS SYNDROME (RLS): ICD-10-CM

## 2021-11-30 DIAGNOSIS — F32.A DEPRESSION: ICD-10-CM

## 2021-11-30 DIAGNOSIS — J96.01 ACUTE RESPIRATORY FAILURE WITH HYPOXIA (H): ICD-10-CM

## 2021-11-30 DIAGNOSIS — J44.1 COPD EXACERBATION (H): ICD-10-CM

## 2021-11-30 DIAGNOSIS — I48.91 ATRIAL FIBRILLATION WITH RAPID VENTRICULAR RESPONSE (H): ICD-10-CM

## 2021-11-30 DIAGNOSIS — J10.1 INFLUENZA A: ICD-10-CM

## 2021-11-30 DIAGNOSIS — F41.9 ANXIETY: ICD-10-CM

## 2021-11-30 DIAGNOSIS — Z71.6 ENCOUNTER FOR SMOKING CESSATION COUNSELING: ICD-10-CM

## 2021-11-30 DIAGNOSIS — J44.1 COPD WITH ACUTE EXACERBATION (H): ICD-10-CM

## 2021-11-30 DIAGNOSIS — F51.01 PRIMARY INSOMNIA: ICD-10-CM

## 2021-11-30 DIAGNOSIS — Z76.0 ENCOUNTER FOR MEDICATION REFILL: Primary | ICD-10-CM

## 2021-11-30 RX ORDER — LISINOPRIL 40 MG/1
TABLET ORAL
Qty: 90 TABLET | Refills: 1 | Status: ON HOLD | OUTPATIENT
Start: 2021-11-30 | End: 2022-01-01

## 2021-11-30 RX ORDER — LEVALBUTEROL TARTRATE 45 UG/1
2 AEROSOL, METERED ORAL EVERY 4 HOURS PRN
Qty: 15 G | Refills: 4 | Status: SHIPPED | OUTPATIENT
Start: 2021-11-30 | End: 2021-12-23

## 2021-11-30 RX ORDER — CITALOPRAM HYDROBROMIDE 20 MG/1
TABLET ORAL
Qty: 90 TABLET | Refills: 3 | Status: ON HOLD | OUTPATIENT
Start: 2021-11-30 | End: 2022-01-01

## 2021-11-30 RX ORDER — CLONAZEPAM 2 MG/1
2 TABLET ORAL AT BEDTIME
Qty: 30 TABLET | Refills: 0 | Status: SHIPPED | OUTPATIENT
Start: 2021-11-30 | End: 2021-12-23

## 2021-11-30 RX ORDER — BUSPIRONE HYDROCHLORIDE 10 MG/1
10 TABLET ORAL 2 TIMES DAILY
Qty: 60 TABLET | Refills: 3 | Status: SHIPPED | OUTPATIENT
Start: 2021-11-30 | End: 2021-12-23

## 2021-11-30 RX ORDER — LEVALBUTEROL INHALATION SOLUTION 1.25 MG/3ML
1.25 SOLUTION RESPIRATORY (INHALATION) EVERY 6 HOURS PRN
Qty: 360 ML | Refills: 3 | Status: SHIPPED | OUTPATIENT
Start: 2021-11-30 | End: 2022-02-09 | Stop reason: ALTCHOICE

## 2021-11-30 RX ORDER — CLONAZEPAM 1 MG/1
1 TABLET ORAL
Qty: 30 TABLET | Refills: 0 | OUTPATIENT
Start: 2021-11-30

## 2021-11-30 RX ORDER — PREDNISONE 10 MG/1
10 TABLET ORAL SEE ADMIN INSTRUCTIONS
Qty: 40 TABLET | Refills: 5 | Status: SHIPPED | OUTPATIENT
Start: 2021-11-30 | End: 2022-02-09

## 2021-11-30 RX ORDER — ACETAMINOPHEN 500 MG
500-1000 TABLET ORAL EVERY 4 HOURS PRN
Qty: 100 TABLET | Refills: 0 | Status: SHIPPED | OUTPATIENT
Start: 2021-11-30 | End: 2022-01-01

## 2021-11-30 RX ORDER — BUPROPION HYDROCHLORIDE 300 MG/1
300 TABLET ORAL
Qty: 30 TABLET | Refills: 12 | Status: SHIPPED | OUTPATIENT
Start: 2021-11-30 | End: 2022-04-09

## 2021-11-30 NOTE — TELEPHONE ENCOUNTER
The patient finished the Prednisone he had today. He started at 60 mg and titrated down to 40 mg. He needs more to finish his regimen. Patient was supposed to start at 80 mg but had to start at 60 due to not having enough.     Ethan was advised per MD note below. He is only taking for flare ups. Should patient quit now or will MD send more?

## 2021-11-30 NOTE — TELEPHONE ENCOUNTER
Has the patient finished the prednisone?  He should not be taking this medication continuously as it does have interaction with other medications she uses emergency medication only and not for long periods of time

## 2021-11-30 NOTE — TELEPHONE ENCOUNTER
Reason for Call:  Medication Refill    Do you use a [facility]?  Name of the pharmacy and phone number for the current request:  [facility] in [place] on  [address]     Name of the medication requested: Per caller, needs a refill on EVERYTHING except albuterol and pregabilin.    Other request: Needs asap, pt is out of some meds.    Can we leave a detailed message on this number? YES    Phone number patient can be reached at: Other phone number:  [phone]    Best Time: ANY    Call taken on 11/30/2021 at 10:11 AM by Joshua Mcconnell

## 2021-12-02 ENCOUNTER — OFFICE VISIT (OUTPATIENT)
Dept: PHYSICAL MEDICINE AND REHAB | Facility: CLINIC | Age: 66
End: 2021-12-02
Payer: MEDICAID

## 2021-12-02 VITALS — SYSTOLIC BLOOD PRESSURE: 138 MMHG | DIASTOLIC BLOOD PRESSURE: 78 MMHG | HEART RATE: 111 BPM

## 2021-12-02 DIAGNOSIS — M54.2 CERVICAL SPINE PAIN: ICD-10-CM

## 2021-12-02 DIAGNOSIS — M48.062 SPINAL STENOSIS OF LUMBAR REGION WITH NEUROGENIC CLAUDICATION: ICD-10-CM

## 2021-12-02 DIAGNOSIS — M79.18 MYOFASCIAL PAIN: ICD-10-CM

## 2021-12-02 DIAGNOSIS — M54.50 LUMBAR SPINE PAIN: Primary | ICD-10-CM

## 2021-12-02 PROCEDURE — 99214 OFFICE O/P EST MOD 30 MIN: CPT | Performed by: PHYSICAL MEDICINE & REHABILITATION

## 2021-12-02 ASSESSMENT — PAIN SCALES - GENERAL: PAINLEVEL: SEVERE PAIN (6)
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
## 2021-12-02 NOTE — PATIENT INSTRUCTIONS
1. A physical therapy order was provided for you today.  You   will be contacted by physical therapy.  If nobody contacts you within 3 to 5 days, please contact the clinic at [phone].  It will be very important for you to do your physical therapy exercises on a regular basis to decrease your pain and prevent future pain flares.    2. A Lumbar epidural has been ordered today. Please schedule this injection at least  2 weeks from now to allow time for insurance prior authorization. On the day of your injection, you cannot be sick or taking antibiotics. If you become sick and are prescribed, please call the clinic so your injection can be rescheduled for once you have completed your antibiotics. You will need to bring a  with you for your injection. If you have any questions or concerns prior to your injection, please do not hesitate to call the nurse navigation line at [phone].

## 2021-12-23 ENCOUNTER — OFFICE VISIT (OUTPATIENT)
Dept: FAMILY MEDICINE | Facility: CLINIC | Age: 66
End: 2021-12-23
Payer: MEDICAID

## 2021-12-23 VITALS
SYSTOLIC BLOOD PRESSURE: 134 MMHG | TEMPERATURE: 98 F | BODY MASS INDEX: 32.64 KG/M2 | HEIGHT: 72 IN | DIASTOLIC BLOOD PRESSURE: 80 MMHG | RESPIRATION RATE: 16 BRPM | HEART RATE: 83 BPM | OXYGEN SATURATION: 98 % | WEIGHT: 241 LBS

## 2021-12-23 DIAGNOSIS — B18.2 CHRONIC HEPATITIS C WITHOUT HEPATIC COMA (H): ICD-10-CM

## 2021-12-23 DIAGNOSIS — G89.4 CHRONIC PAIN DISORDER: ICD-10-CM

## 2021-12-23 DIAGNOSIS — Z76.0 ENCOUNTER FOR MEDICATION REFILL: ICD-10-CM

## 2021-12-23 DIAGNOSIS — J44.9 CHRONIC OBSTRUCTIVE PULMONARY DISEASE, UNSPECIFIED COPD TYPE (H): ICD-10-CM

## 2021-12-23 DIAGNOSIS — I48.19 PERSISTENT ATRIAL FIBRILLATION WITH RAPID VENTRICULAR RESPONSE (H): ICD-10-CM

## 2021-12-23 DIAGNOSIS — Z76.89 ENCOUNTER TO ESTABLISH CARE: Primary | ICD-10-CM

## 2021-12-23 DIAGNOSIS — M48.062 SPINAL STENOSIS OF LUMBAR REGION WITH NEUROGENIC CLAUDICATION: ICD-10-CM

## 2021-12-23 DIAGNOSIS — F19.10 POLYSUBSTANCE ABUSE (H): ICD-10-CM

## 2021-12-23 DIAGNOSIS — F41.9 ANXIETY: ICD-10-CM

## 2021-12-23 DIAGNOSIS — G62.9 NEUROPATHY: ICD-10-CM

## 2021-12-23 DIAGNOSIS — Z23 HIGH PRIORITY FOR COVID-19 VACCINATION: ICD-10-CM

## 2021-12-23 PROBLEM — J18.9 PNEUMONIA DUE TO INFECTIOUS ORGANISM: Status: RESOLVED | Noted: 2017-02-09 | Resolved: 2021-12-23

## 2021-12-23 PROBLEM — R06.02 SOB (SHORTNESS OF BREATH): Status: RESOLVED | Noted: 2019-12-16 | Resolved: 2021-12-23

## 2021-12-23 PROBLEM — J10.1 INFLUENZA A: Status: RESOLVED | Noted: 2020-03-19 | Resolved: 2021-12-23

## 2021-12-23 LAB
ALBUMIN SERPL-MCNC: 3.5 G/DL (ref 3.5–5)
ALP SERPL-CCNC: 53 U/L (ref 45–120)
ALT SERPL W P-5'-P-CCNC: 55 U/L (ref 0–45)
AST SERPL W P-5'-P-CCNC: 40 U/L (ref 0–40)
BILIRUB DIRECT SERPL-MCNC: 0.1 MG/DL
BILIRUB SERPL-MCNC: 0.3 MG/DL (ref 0–1)
PROT SERPL-MCNC: 6.7 G/DL (ref 6–8)

## 2021-12-23 PROCEDURE — 80347 BENZODIAZEPINES 13 OR MORE: CPT | Mod: 90 | Performed by: FAMILY MEDICINE

## 2021-12-23 PROCEDURE — 99215 OFFICE O/P EST HI 40 MIN: CPT | Mod: 25 | Performed by: FAMILY MEDICINE

## 2021-12-23 PROCEDURE — 87522 HEPATITIS C REVRS TRNSCRPJ: CPT | Performed by: FAMILY MEDICINE

## 2021-12-23 PROCEDURE — 80307 DRUG TEST PRSMV CHEM ANLYZR: CPT | Mod: 90 | Performed by: FAMILY MEDICINE

## 2021-12-23 PROCEDURE — 87902 NFCT AGT GNTYP ALYS HEP C: CPT | Mod: 90 | Performed by: FAMILY MEDICINE

## 2021-12-23 PROCEDURE — 0064A COVID-19,PF,MODERNA (18+ YRS BOOSTER .25ML): CPT | Performed by: FAMILY MEDICINE

## 2021-12-23 PROCEDURE — 36415 COLL VENOUS BLD VENIPUNCTURE: CPT | Performed by: FAMILY MEDICINE

## 2021-12-23 PROCEDURE — 99000 SPECIMEN HANDLING OFFICE-LAB: CPT | Performed by: FAMILY MEDICINE

## 2021-12-23 PROCEDURE — 80076 HEPATIC FUNCTION PANEL: CPT | Performed by: FAMILY MEDICINE

## 2021-12-23 PROCEDURE — 91306 COVID-19,PF,MODERNA (18+ YRS BOOSTER .25ML): CPT | Performed by: FAMILY MEDICINE

## 2021-12-23 RX ORDER — DOXYCYCLINE 100 MG/1
100 CAPSULE ORAL 2 TIMES DAILY
Qty: 10 CAPSULE | Refills: 11 | Status: SHIPPED | OUTPATIENT
Start: 2021-12-23 | End: 2022-02-09

## 2021-12-23 RX ORDER — LEVALBUTEROL TARTRATE 45 UG/1
2 AEROSOL, METERED ORAL EVERY 4 HOURS PRN
Qty: 15 G | Refills: 4 | Status: SHIPPED | OUTPATIENT
Start: 2021-12-23 | End: 2022-02-09 | Stop reason: ALTCHOICE

## 2021-12-23 RX ORDER — GABAPENTIN 300 MG/1
600 CAPSULE ORAL 3 TIMES DAILY
Qty: 540 CAPSULE | Refills: 3 | Status: ON HOLD | OUTPATIENT
Start: 2021-12-23 | End: 2023-01-01

## 2021-12-23 RX ORDER — AZITHROMYCIN 250 MG/1
250 TABLET, FILM COATED ORAL
Qty: 36 TABLET | Refills: 3 | Status: SHIPPED | OUTPATIENT
Start: 2021-12-24 | End: 2022-02-09

## 2021-12-23 RX ORDER — BUDESONIDE 1 MG/2ML
1 INHALANT ORAL 2 TIMES DAILY
Qty: 2 ML | Refills: 3 | Status: SHIPPED | OUTPATIENT
Start: 2021-12-23 | End: 2022-03-23

## 2021-12-23 RX ORDER — OXYCODONE AND ACETAMINOPHEN 5; 325 MG/1; MG/1
1 TABLET ORAL DAILY PRN
Qty: 30 TABLET | Refills: 0 | Status: SHIPPED | OUTPATIENT
Start: 2021-12-23 | End: 2022-02-09

## 2021-12-23 ASSESSMENT — MIFFLIN-ST. JEOR: SCORE: 1921.33

## 2021-12-23 ASSESSMENT — PATIENT HEALTH QUESTIONNAIRE - PHQ9: SUM OF ALL RESPONSES TO PHQ QUESTIONS 1-9: 11

## 2021-12-23 NOTE — LETTER
Opioid / Opioid Plus Controlled Substance Agreement    This is an agreement between you and your provider about the safe and appropriate use of controlled substance/opioids prescribed by your care team. Controlled substances are medicines that can cause physical and mental dependence (abuse).    There are strict laws about having and using these medicines. We here at [facility] are committing to working with you in your efforts to get better. To support you in this work, we ll help you schedule regular office appointments for medicine refills. If we must cancel or change your appointment for any reason, we ll make sure you have enough medicine to last until your next appointment.     As a Provider, I will:    Listen carefully to your concerns and treat you with respect.     Recommend a treatment plan that I believe is in your best interest. This plan may involve therapies other than opioid pain medication.     Talk with you often about the possible benefits, and the risk of harm of any medicine that we prescribe for you.     Provide a plan on how to taper (discontinue or go off) using this medicine if the decision is made to stop its use.    As a Patient, I understand that opioid(s):     Are a controlled substance prescribed by my care team to help me function or work and manage my condition(s).     Are strong medicines and can cause serious side effects such as:    Drowsiness, which can seriously affect my driving ability    A lower breathing rate, enough to cause death    Harm to my thinking ability     Depression     Abuse of and addiction to this medicine    Need to be taken exactly as prescribed. Combining opioids with certain medicines or chemicals (such as illegal drugs, sedatives, sleeping pills, and benzodiazepines) can be dangerous or even fatal. If I stop opioids suddenly, I may have severe withdrawal symptoms.    Do not work for all types of pain nor for all patients. If they re not helpful, I may  be asked to stop them.        The risks, benefits and side effects of these medicine(s) were explained to me. I agree that:  1. I will take part in other treatments as advised by my care team. This may be psychiatry or counseling, physical therapy, behavioral therapy, group treatment or a referral to a specialist.     2. I will keep all my appointments. I understand that this is part of the monitoring of opioids. My care team may require an office visit for EVERY opioid/controlled substance refill. If I miss appointments or don t follow instructions, my care team may stop my medicine.    3. I will take my medicines as prescribed. I will not change the dose or schedule unless my care team tells me to. There will be no refills if I run out early.     4. I may be asked to come to the clinic and complete a urine drug test or complete a pill count at any time. If I don t give a urine sample or participate in a pill count, the care team may stop my medicine.    5. I will only receive prescriptions from this clinic for chronic pain. If I am treated by another provider for acute pain issues, I will tell them that I am taking opioid pain medication for chronic pain and that I have a treatment agreement with this provider. I will inform my [facility] care team within one business day if I am given a prescription for any pain medication by another healthcare provider. My [facility] care team can contact other providers and pharmacists about my use of any medicines.    6. It is up to me to make sure that I don t run out of my medicines on weekends or holidays. If my care team is willing to refill my opioid prescription without a visit, I must request refills only during office hours. Refills may take up to 3 business days to process. I will use one pharmacy to fill all my opioid and other controlled substance prescriptions. I will notify the clinic about any changes to my insurance or medication  availability.    7. I am responsible for my prescriptions. If the medicine/prescription is lost, stolen or destroyed, it will not be replaced. I also agree not to share controlled substance medicines with anyone.    8. I am aware I should not use any illegal or recreational drugs. I agree not to drink alcohol unless my care team says I can.       9. If I enroll in the [place] Medical Cannabis program, I will tell my care team prior to my next refill.     10. I will tell my care team right away if I become pregnant, have a new medical problem treated outside of my regular clinic, or have a change in my medications.    11. I understand that this medicine can affect my thinking, judgment and reaction time. Alcohol and drugs affect the brain and body, which can affect the safety of my driving. Being under the influence of alcohol or drugs can affect my decision-making, behaviors, personal safety, and the safety of others. Driving while impaired (DWI) can occur if a person is driving, operating, or in physical control of a car, motorcycle, boat, snowmobile, ATV, motorbike, off-road vehicle, or any other motor vehicle ([place] Statute 169A.20). I understand the risk if I choose to drive or operate any vehicle or machinery.    I understand that if I do not follow any of the conditions above, my prescriptions or treatment may be stopped or changed.          Opioids  What You Need to Know    What are opioids?   Opioids are pain medicines that must be prescribed by a doctor. They are also known as narcotics.     Examples are:   1. morphine (MS Contin, Mine)  2. oxycodone (Oxycontin)  3. oxycodone and acetaminophen (Percocet)  4. hydrocodone and acetaminophen (Vicodin, Norco)   5. fentanyl patch (Duragesic)   6. hydromorphone (Dilaudid)   7. methadone  8. codeine (Tylenol #3)     What do opioids do well?   Opioids are best for severe short-term pain such as after a surgery or injury. They may work well for cancer pain. They may  help some people with long-lasting (chronic) pain.     What do opioids NOT do well?   Opioids never get rid of pain entirely, and they don t work well for most patients with chronic pain. Opioids don t reduce swelling, one of the causes of pain.                                    Other ways to manage chronic pain and improve function include:       Treat the health problem that may be causing pain    Anti-inflammation medicines, which reduce swelling and tenderness, such as ibuprofen (Advil, Motrin) or naproxen (Aleve)    Acetaminophen (Tylenol)    Antidepressants and anti-seizure medicines, especially for nerve pain    Topical treatments such as patches or creams    Injections or nerve blocks    Chiropractic or osteopathic treatment    Acupuncture, massage, deep breathing, meditation, visual imagery, aromatherapy    Use heat or ice at the pain site    Physical therapy     Exercise    Stop smoking    Take part in therapy       Risks and side effects     Talk to your doctor before you start or decide to keep taking opioids. Possible side effects include:      Lowering your breathing rate enough to cause death    Overdose, including death, especially if taking higher than prescribed doses    Worse depression symptoms; less pleasure in things you usually enjoy    Feeling tired or sluggish    Slower thoughts or cloudy thinking    Being more sensitive to pain over time; pain is harder to control    Trouble sleeping or restless sleep    Changes in hormone levels (for example, less testosterone)    Changes in sex drive or ability to have sex    Constipation    Unsafe driving    Itching and sweating    Dizziness    Nausea, throwing up and dry mouth    What else should I know about opioids?    Opioids may lead to dependence, tolerance, or addiction.      Dependence means that if you stop or reduce the medicine too quickly, you will have withdrawal symptoms. These include loose poop (diarrhea), jitters, flu-like symptoms,  nervousness and tremors. Dependence is not the same as addiction.                       Tolerance means needing higher doses over time to get the same effect. This may increase the chance of serious side effects.      Addiction is when people improperly use a substance that harms their body, their mind or their relations with others. Use of opiates can cause a relapse of addiction if you have a history of drug or alcohol abuse.      People who have used opioids for a long time may have a lower quality of life, worse depression, higher levels of pain and more visits to doctors.    You can overdose on opioids. Take these steps to lower your risk of overdose:    1. Recognize the signs:  Signs of overdose include decrease or loss of consciousness (blackout), slowed breathing, trouble waking up and blue lips. If someone is worried about overdose, they should call 911.    2. Talk to your doctor about Narcan (naloxone).   If you are at risk for overdose, you may be given a prescription for Narcan. This medicine very quickly reverses the effects of opioids.   If you overdose, a friend or family member can give you Narcan while waiting for the ambulance. They need to know the signs of overdose and how to give Narcan.     3. Don't use alcohol or street drugs.   Taking them with opioids can cause death.    4. Do not take any of these medicines unless your doctor says it s OK. Taking these with opioids can cause death:    Benzodiazepines, such as lorazepam (Ativan), alprazolam (Xanax) or diazepam (Valium)    Muscle relaxers, such as cyclobenzaprine (Flexeril)    Sleeping pills like zolpidem (Ambien)     Other opioids      How to keep you and other people safe while taking opioids:    1. Never share your opioids with others.  Opioid medicines are regulated by the Drug Enforcement Agency (ZAIDA). Selling or sharing medications is a criminal act.    2. Be sure to store opioids in a secure place, locked up if possible. Young children  can easily swallow them and overdose.    3. When you are traveling with your medicines, keep them in the original bottles. If you use a pill box, be sure you also carry a copy of your medicine list from your clinic or pharmacy.    4. Safe disposal of opioids    Most pharmacies have places to get rid of medicine, called disposal kiosks. Medicine disposal options are also available in every [facility]. Search your county and  medication disposal  to find more options. You can find more details at:  [email or web address]     I agree that my provider, clinic care team, and pharmacy may work with any city, state or federal law enforcement agency that investigates the misuse, sale, or other diversion of my controlled medicine. I will allow my provider to discuss my care with, or share a copy of, this agreement with any other treating provider, pharmacy or emergency room where I receive care.    I have read this agreement and have asked questions about anything I did not understand.    _______________________________________________________  Patient Signature - Ki Pederson _____________________                   Date     _______________________________________________________  Provider Signature - Gloria Sebastian MD   _____________________                   Date     _______________________________________________________  Witness Signature (required if provider not present while patient signing)   _____________________                   Date
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
## 2021-12-26 LAB
HCV RNA SERPL NAA+PROBE-ACNC: ABNORMAL IU/ML
HCV RNA SERPL NAA+PROBE-LOG IU: 5.1 {LOG_IU}/ML

## 2021-12-29 LAB — HCV GENTYP SERPL NAA+PROBE: NORMAL

## 2022-01-01 ENCOUNTER — NURSE TRIAGE (OUTPATIENT)
Dept: NURSING | Facility: CLINIC | Age: 67
End: 2022-01-01

## 2022-01-01 ENCOUNTER — MEDICAL CORRESPONDENCE (OUTPATIENT)
Dept: HEALTH INFORMATION MANAGEMENT | Facility: CLINIC | Age: 67
End: 2022-01-01

## 2022-01-01 ENCOUNTER — TELEPHONE (OUTPATIENT)
Dept: FAMILY MEDICINE | Facility: CLINIC | Age: 67
End: 2022-01-01

## 2022-01-01 ENCOUNTER — APPOINTMENT (OUTPATIENT)
Dept: RADIOLOGY | Facility: HOSPITAL | Age: 67
DRG: 189 | End: 2022-01-01
Payer: MEDICARE

## 2022-01-01 ENCOUNTER — HEALTH MAINTENANCE LETTER (OUTPATIENT)
Age: 67
End: 2022-01-01

## 2022-01-01 ENCOUNTER — APPOINTMENT (OUTPATIENT)
Dept: RADIOLOGY | Facility: HOSPITAL | Age: 67
DRG: 189 | End: 2022-01-01
Attending: EMERGENCY MEDICINE
Payer: MEDICARE

## 2022-01-01 ENCOUNTER — APPOINTMENT (OUTPATIENT)
Dept: RADIOLOGY | Facility: HOSPITAL | Age: 67
DRG: 871 | End: 2022-01-01
Attending: EMERGENCY MEDICINE
Payer: MEDICARE

## 2022-01-01 ENCOUNTER — APPOINTMENT (OUTPATIENT)
Dept: CT IMAGING | Facility: HOSPITAL | Age: 67
DRG: 871 | End: 2022-01-01
Attending: EMERGENCY MEDICINE
Payer: MEDICARE

## 2022-01-01 ENCOUNTER — OFFICE VISIT (OUTPATIENT)
Dept: INTERNAL MEDICINE | Facility: CLINIC | Age: 67
End: 2022-01-01
Payer: MEDICARE

## 2022-01-01 ENCOUNTER — MYC MEDICAL ADVICE (OUTPATIENT)
Dept: FAMILY MEDICINE | Facility: CLINIC | Age: 67
End: 2022-01-01
Payer: MEDICARE

## 2022-01-01 ENCOUNTER — APPOINTMENT (OUTPATIENT)
Dept: RADIOLOGY | Facility: CLINIC | Age: 67
DRG: 871 | End: 2022-01-01
Attending: EMERGENCY MEDICINE
Payer: MEDICARE

## 2022-01-01 ENCOUNTER — PATIENT OUTREACH (OUTPATIENT)
Dept: GERIATRIC MEDICINE | Facility: CLINIC | Age: 67
End: 2022-01-01

## 2022-01-01 ENCOUNTER — MYC REFILL (OUTPATIENT)
Dept: FAMILY MEDICINE | Facility: CLINIC | Age: 67
End: 2022-01-01

## 2022-01-01 ENCOUNTER — APPOINTMENT (OUTPATIENT)
Dept: PHYSICAL THERAPY | Facility: HOSPITAL | Age: 67
DRG: 189 | End: 2022-01-01
Attending: INTERNAL MEDICINE
Payer: MEDICARE

## 2022-01-01 ENCOUNTER — PATIENT OUTREACH (OUTPATIENT)
Dept: CARE COORDINATION | Facility: CLINIC | Age: 67
End: 2022-01-01
Payer: COMMERCIAL

## 2022-01-01 ENCOUNTER — APPOINTMENT (OUTPATIENT)
Dept: RADIOLOGY | Facility: HOSPITAL | Age: 67
End: 2022-01-01
Attending: EMERGENCY MEDICINE
Payer: MEDICARE

## 2022-01-01 ENCOUNTER — APPOINTMENT (OUTPATIENT)
Dept: RADIOLOGY | Facility: CLINIC | Age: 67
DRG: 871 | End: 2022-01-01
Attending: FAMILY MEDICINE
Payer: MEDICARE

## 2022-01-01 ENCOUNTER — TELEPHONE (OUTPATIENT)
Dept: CARDIOLOGY | Facility: CLINIC | Age: 67
End: 2022-01-01

## 2022-01-01 ENCOUNTER — APPOINTMENT (OUTPATIENT)
Dept: OCCUPATIONAL THERAPY | Facility: HOSPITAL | Age: 67
DRG: 189 | End: 2022-01-01
Payer: MEDICARE

## 2022-01-01 ENCOUNTER — APPOINTMENT (OUTPATIENT)
Dept: CT IMAGING | Facility: HOSPITAL | Age: 67
DRG: 189 | End: 2022-01-01
Attending: FAMILY MEDICINE
Payer: MEDICARE

## 2022-01-01 ENCOUNTER — MYC REFILL (OUTPATIENT)
Dept: INTERNAL MEDICINE | Facility: CLINIC | Age: 67
End: 2022-01-01

## 2022-01-01 ENCOUNTER — APPOINTMENT (OUTPATIENT)
Dept: OCCUPATIONAL THERAPY | Facility: HOSPITAL | Age: 67
DRG: 189 | End: 2022-01-01
Attending: INTERNAL MEDICINE
Payer: MEDICARE

## 2022-01-01 ENCOUNTER — APPOINTMENT (OUTPATIENT)
Dept: PHYSICAL THERAPY | Facility: HOSPITAL | Age: 67
DRG: 189 | End: 2022-01-01
Payer: MEDICARE

## 2022-01-01 ENCOUNTER — HOSPITAL ENCOUNTER (EMERGENCY)
Facility: CLINIC | Age: 67
End: 2022-01-01
Payer: MEDICARE

## 2022-01-01 ENCOUNTER — HOSPITAL ENCOUNTER (EMERGENCY)
Facility: HOSPITAL | Age: 67
Discharge: HOME | End: 2022-12-18
Attending: EMERGENCY MEDICINE | Admitting: EMERGENCY MEDICINE
Payer: MEDICARE

## 2022-01-01 ENCOUNTER — APPOINTMENT (OUTPATIENT)
Dept: CT IMAGING | Facility: HOSPITAL | Age: 67
DRG: 189 | End: 2022-01-01
Attending: EMERGENCY MEDICINE
Payer: MEDICARE

## 2022-01-01 ENCOUNTER — APPOINTMENT (OUTPATIENT)
Dept: CT IMAGING | Facility: HOSPITAL | Age: 67
DRG: 871 | End: 2022-01-01
Attending: HOSPITALIST
Payer: MEDICARE

## 2022-01-01 ENCOUNTER — TELEPHONE (OUTPATIENT)
Dept: FAMILY MEDICINE | Facility: CLINIC | Age: 67
End: 2022-01-01
Payer: MEDICARE

## 2022-01-01 ENCOUNTER — TELEPHONE (OUTPATIENT)
Dept: NURSING | Facility: CLINIC | Age: 67
End: 2022-01-01

## 2022-01-01 ENCOUNTER — HOSPITAL ENCOUNTER (INPATIENT)
Facility: CLINIC | Age: 67
LOS: 4 days | Discharge: OTHER INSTITUTION | DRG: 871 | End: 2022-09-25
Attending: EMERGENCY MEDICINE | Admitting: FAMILY MEDICINE
Payer: MEDICARE

## 2022-01-01 ENCOUNTER — APPOINTMENT (OUTPATIENT)
Dept: CARDIOLOGY | Facility: CLINIC | Age: 67
DRG: 871 | End: 2022-01-01
Attending: FAMILY MEDICINE
Payer: MEDICARE

## 2022-01-01 ENCOUNTER — TELEPHONE (OUTPATIENT)
Dept: FAMILY MEDICINE | Facility: CLINIC | Age: 67
End: 2022-01-01
Payer: COMMERCIAL

## 2022-01-01 ENCOUNTER — PATIENT OUTREACH (OUTPATIENT)
Dept: CARE COORDINATION | Facility: CLINIC | Age: 67
End: 2022-01-01

## 2022-01-01 ENCOUNTER — TELEPHONE (OUTPATIENT)
Dept: INTERNAL MEDICINE | Facility: CLINIC | Age: 67
End: 2022-01-01

## 2022-01-01 ENCOUNTER — OFFICE VISIT (OUTPATIENT)
Dept: FAMILY MEDICINE | Facility: CLINIC | Age: 67
End: 2022-01-01
Payer: MEDICARE

## 2022-01-01 ENCOUNTER — APPOINTMENT (OUTPATIENT)
Dept: CT IMAGING | Facility: HOSPITAL | Age: 67
DRG: 189 | End: 2022-01-01
Payer: MEDICARE

## 2022-01-01 ENCOUNTER — HOSPITAL ENCOUNTER (INPATIENT)
Facility: HOSPITAL | Age: 67
LOS: 9 days | Discharge: HOME HEALTH | DRG: 189 | End: 2023-01-06
Attending: EMERGENCY MEDICINE | Admitting: INTERNAL MEDICINE
Payer: MEDICARE

## 2022-01-01 ENCOUNTER — HOSPITAL ENCOUNTER (INPATIENT)
Facility: HOSPITAL | Age: 67
LOS: 3 days | Discharge: HOME HEALTH | DRG: 871 | End: 2022-10-27
Attending: EMERGENCY MEDICINE | Admitting: HOSPITALIST
Payer: MEDICARE

## 2022-01-01 ENCOUNTER — APPOINTMENT (OUTPATIENT)
Dept: ULTRASOUND IMAGING | Facility: CLINIC | Age: 67
DRG: 871 | End: 2022-01-01
Attending: FAMILY MEDICINE
Payer: MEDICARE

## 2022-01-01 ENCOUNTER — HOSPITAL ENCOUNTER (EMERGENCY)
Facility: CLINIC | Age: 67
Discharge: HOME | End: 2022-11-21
Attending: STUDENT IN AN ORGANIZED HEALTH CARE EDUCATION/TRAINING PROGRAM | Admitting: STUDENT IN AN ORGANIZED HEALTH CARE EDUCATION/TRAINING PROGRAM
Payer: MEDICARE

## 2022-01-01 ENCOUNTER — HOSPITAL ENCOUNTER (INPATIENT)
Facility: HOSPITAL | Age: 67
LOS: 3 days | Discharge: HOSPICE | DRG: 308 | End: 2022-09-28
Attending: INTERNAL MEDICINE | Admitting: INTERNAL MEDICINE
Payer: MEDICARE

## 2022-01-01 ENCOUNTER — APPOINTMENT (OUTPATIENT)
Dept: CARDIOLOGY | Facility: HOSPITAL | Age: 67
DRG: 189 | End: 2022-01-01
Attending: EMERGENCY MEDICINE
Payer: MEDICARE

## 2022-01-01 ENCOUNTER — MYC MEDICAL ADVICE (OUTPATIENT)
Dept: FAMILY MEDICINE | Facility: CLINIC | Age: 67
End: 2022-01-01

## 2022-01-01 ENCOUNTER — MYC MEDICAL ADVICE (OUTPATIENT)
Dept: INTERNAL MEDICINE | Facility: CLINIC | Age: 67
End: 2022-01-01

## 2022-01-01 VITALS
WEIGHT: 265 LBS | OXYGEN SATURATION: 91 % | SYSTOLIC BLOOD PRESSURE: 105 MMHG | TEMPERATURE: 98.3 F | HEIGHT: 73 IN | HEART RATE: 65 BPM | RESPIRATION RATE: 18 BRPM | BODY MASS INDEX: 35.12 KG/M2 | DIASTOLIC BLOOD PRESSURE: 67 MMHG

## 2022-01-01 VITALS
OXYGEN SATURATION: 99 % | HEIGHT: 73 IN | HEART RATE: 56 BPM | BODY MASS INDEX: 29.42 KG/M2 | DIASTOLIC BLOOD PRESSURE: 68 MMHG | WEIGHT: 222 LBS | SYSTOLIC BLOOD PRESSURE: 94 MMHG | RESPIRATION RATE: 18 BRPM

## 2022-01-01 VITALS
HEART RATE: 77 BPM | BODY MASS INDEX: 33.2 KG/M2 | WEIGHT: 244.8 LBS | OXYGEN SATURATION: 94 % | DIASTOLIC BLOOD PRESSURE: 83 MMHG | RESPIRATION RATE: 18 BRPM | TEMPERATURE: 97.9 F | SYSTOLIC BLOOD PRESSURE: 109 MMHG

## 2022-01-01 VITALS
WEIGHT: 225 LBS | DIASTOLIC BLOOD PRESSURE: 75 MMHG | OXYGEN SATURATION: 96 % | HEART RATE: 81 BPM | RESPIRATION RATE: 16 BRPM | TEMPERATURE: 97.9 F | SYSTOLIC BLOOD PRESSURE: 122 MMHG | BODY MASS INDEX: 29.69 KG/M2

## 2022-01-01 VITALS
BODY MASS INDEX: 31.28 KG/M2 | OXYGEN SATURATION: 96 % | WEIGHT: 236 LBS | SYSTOLIC BLOOD PRESSURE: 151 MMHG | HEART RATE: 57 BPM | HEIGHT: 73 IN | TEMPERATURE: 98.9 F | DIASTOLIC BLOOD PRESSURE: 86 MMHG

## 2022-01-01 VITALS
HEART RATE: 76 BPM | DIASTOLIC BLOOD PRESSURE: 78 MMHG | SYSTOLIC BLOOD PRESSURE: 123 MMHG | HEIGHT: 72 IN | WEIGHT: 220 LBS | BODY MASS INDEX: 29.8 KG/M2 | OXYGEN SATURATION: 96 % | TEMPERATURE: 97.6 F | RESPIRATION RATE: 18 BRPM

## 2022-01-01 VITALS
HEART RATE: 63 BPM | DIASTOLIC BLOOD PRESSURE: 58 MMHG | BODY MASS INDEX: 29.98 KG/M2 | HEIGHT: 73 IN | OXYGEN SATURATION: 96 % | WEIGHT: 226.2 LBS | SYSTOLIC BLOOD PRESSURE: 104 MMHG | RESPIRATION RATE: 20 BRPM | TEMPERATURE: 98.4 F

## 2022-01-01 VITALS
TEMPERATURE: 97.9 F | BODY MASS INDEX: 32.66 KG/M2 | DIASTOLIC BLOOD PRESSURE: 71 MMHG | SYSTOLIC BLOOD PRESSURE: 112 MMHG | HEART RATE: 110 BPM | OXYGEN SATURATION: 95 % | RESPIRATION RATE: 16 BRPM | HEIGHT: 72 IN | WEIGHT: 241.1 LBS

## 2022-01-01 DIAGNOSIS — G25.81 RESTLESS LEGS SYNDROME (RLS): ICD-10-CM

## 2022-01-01 DIAGNOSIS — I48.91 ATRIAL FIBRILLATION WITH RVR (H): ICD-10-CM

## 2022-01-01 DIAGNOSIS — Z23 COVID-19 VACCINE ADMINISTERED: ICD-10-CM

## 2022-01-01 DIAGNOSIS — J96.20 ACUTE AND CHR RESP FAILURE, UNSP W HYPOXIA OR HYPERCAPNIA (H): ICD-10-CM

## 2022-01-01 DIAGNOSIS — J44.1 COPD EXACERBATION (H): ICD-10-CM

## 2022-01-01 DIAGNOSIS — M48.062 SPINAL STENOSIS OF LUMBAR REGION WITH NEUROGENIC CLAUDICATION: ICD-10-CM

## 2022-01-01 DIAGNOSIS — J44.0 CHRONIC OBSTRUCTIVE PULMONARY DISEASE WITH ACUTE LOWER RESPIRATORY INFECTION (H): ICD-10-CM

## 2022-01-01 DIAGNOSIS — F17.200 TOBACCO USE DISORDER: ICD-10-CM

## 2022-01-01 DIAGNOSIS — I25.10 CARDIOVASCULAR DISEASE: ICD-10-CM

## 2022-01-01 DIAGNOSIS — J44.1 CHRONIC OBSTRUCTIVE PULMONARY DISEASE WITH ACUTE EXACERBATION (H): ICD-10-CM

## 2022-01-01 DIAGNOSIS — I10 ESSENTIAL HYPERTENSION: ICD-10-CM

## 2022-01-01 DIAGNOSIS — T50.901A ACCIDENTAL OVERDOSE, INITIAL ENCOUNTER: ICD-10-CM

## 2022-01-01 DIAGNOSIS — I48.19 PERSISTENT ATRIAL FIBRILLATION (H): ICD-10-CM

## 2022-01-01 DIAGNOSIS — R13.10 DYSPHAGIA, UNSPECIFIED TYPE: ICD-10-CM

## 2022-01-01 DIAGNOSIS — I48.91 ATRIAL FIBRILLATION, UNSPECIFIED TYPE (H): ICD-10-CM

## 2022-01-01 DIAGNOSIS — G93.41 ACUTE METABOLIC ENCEPHALOPATHY: Primary | ICD-10-CM

## 2022-01-01 DIAGNOSIS — R60.0 EDEMA OF BOTH LEGS: ICD-10-CM

## 2022-01-01 DIAGNOSIS — G62.9 PERIPHERAL POLYNEUROPATHY: ICD-10-CM

## 2022-01-01 DIAGNOSIS — Z66 DNR (DO NOT RESUSCITATE): ICD-10-CM

## 2022-01-01 DIAGNOSIS — E87.5 HYPERKALEMIA: ICD-10-CM

## 2022-01-01 DIAGNOSIS — I50.23 ACUTE ON CHRONIC SYSTOLIC HEART FAILURE (H): Primary | ICD-10-CM

## 2022-01-01 DIAGNOSIS — R65.10 SIRS (SYSTEMIC INFLAMMATORY RESPONSE SYNDROME) (H): ICD-10-CM

## 2022-01-01 DIAGNOSIS — F51.01 PRIMARY INSOMNIA: ICD-10-CM

## 2022-01-01 DIAGNOSIS — I48.91 ATRIAL FIBRILLATION WITH RVR (H): Primary | ICD-10-CM

## 2022-01-01 DIAGNOSIS — M51.379 DEGENERATION OF LUMBAR OR LUMBOSACRAL INTERVERTEBRAL DISC: ICD-10-CM

## 2022-01-01 DIAGNOSIS — K59.03 DRUG-INDUCED CONSTIPATION: ICD-10-CM

## 2022-01-01 DIAGNOSIS — F19.10 POLYSUBSTANCE ABUSE (H): ICD-10-CM

## 2022-01-01 DIAGNOSIS — F33.41 RECURRENT MAJOR DEPRESSIVE DISORDER, IN PARTIAL REMISSION (H): ICD-10-CM

## 2022-01-01 DIAGNOSIS — R62.7 ADULT FAILURE TO THRIVE: ICD-10-CM

## 2022-01-01 DIAGNOSIS — F11.90 CHRONIC, CONTINUOUS USE OF OPIOIDS: ICD-10-CM

## 2022-01-01 DIAGNOSIS — G25.81 RESTLESS LEGS SYNDROME (RLS): Primary | ICD-10-CM

## 2022-01-01 DIAGNOSIS — B18.2 CHRONIC HEPATITIS C WITHOUT HEPATIC COMA (H): ICD-10-CM

## 2022-01-01 DIAGNOSIS — Z79.899 HIGH RISK MEDICATION USE: ICD-10-CM

## 2022-01-01 DIAGNOSIS — J96.21 ACUTE ON CHRONIC RESPIRATORY FAILURE WITH HYPOXIA AND HYPERCAPNIA (H): ICD-10-CM

## 2022-01-01 DIAGNOSIS — J96.22 ACUTE ON CHRONIC RESPIRATORY FAILURE WITH HYPOXIA AND HYPERCAPNIA (H): ICD-10-CM

## 2022-01-01 DIAGNOSIS — J96.01 ACUTE RESPIRATORY FAILURE WITH HYPOXIA AND HYPERCARBIA (H): ICD-10-CM

## 2022-01-01 DIAGNOSIS — J43.2 CENTRILOBULAR EMPHYSEMA (H): Primary | ICD-10-CM

## 2022-01-01 DIAGNOSIS — F41.9 ANXIETY: ICD-10-CM

## 2022-01-01 DIAGNOSIS — M54.50 CHRONIC BILATERAL LOW BACK PAIN WITHOUT SCIATICA: ICD-10-CM

## 2022-01-01 DIAGNOSIS — G89.29 OTHER CHRONIC PAIN: ICD-10-CM

## 2022-01-01 DIAGNOSIS — I48.91 ATRIAL FIBRILLATION WITH RAPID VENTRICULAR RESPONSE (H): ICD-10-CM

## 2022-01-01 DIAGNOSIS — G89.4 CHRONIC PAIN DISORDER: ICD-10-CM

## 2022-01-01 DIAGNOSIS — I50.22 CHRONIC SYSTOLIC HEART FAILURE (H): ICD-10-CM

## 2022-01-01 DIAGNOSIS — B18.2 CHRONIC HEPATITIS C WITHOUT HEPATIC COMA (H): Primary | ICD-10-CM

## 2022-01-01 DIAGNOSIS — J44.1 COPD EXACERBATION (H): Primary | ICD-10-CM

## 2022-01-01 DIAGNOSIS — R11.0 NAUSEA: ICD-10-CM

## 2022-01-01 DIAGNOSIS — J44.9 END STAGE COPD (H): ICD-10-CM

## 2022-01-01 DIAGNOSIS — Z23 PNEUMOCOCCAL VACCINATION ADMINISTERED AT CURRENT VISIT: ICD-10-CM

## 2022-01-01 DIAGNOSIS — E53.8 FOLATE DEFICIENCY: ICD-10-CM

## 2022-01-01 DIAGNOSIS — Z79.899 ENCOUNTER FOR LONG-TERM (CURRENT) USE OF MEDICATIONS: ICD-10-CM

## 2022-01-01 DIAGNOSIS — I48.0 PAROXYSMAL ATRIAL FIBRILLATION (H): ICD-10-CM

## 2022-01-01 DIAGNOSIS — G89.29 CHRONIC BILATERAL LOW BACK PAIN WITHOUT SCIATICA: ICD-10-CM

## 2022-01-01 DIAGNOSIS — Z00.00 ENCOUNTER FOR MEDICARE ANNUAL WELLNESS EXAM: ICD-10-CM

## 2022-01-01 DIAGNOSIS — R41.82 ALTERED MENTAL STATUS, UNSPECIFIED ALTERED MENTAL STATUS TYPE: ICD-10-CM

## 2022-01-01 DIAGNOSIS — I50.23 ACUTE ON CHRONIC SYSTOLIC HEART FAILURE (H): ICD-10-CM

## 2022-01-01 DIAGNOSIS — G47.33 OSA (OBSTRUCTIVE SLEEP APNEA): ICD-10-CM

## 2022-01-01 DIAGNOSIS — I10 ESSENTIAL HYPERTENSION: Primary | ICD-10-CM

## 2022-01-01 DIAGNOSIS — M48.062 SPINAL STENOSIS OF LUMBAR REGION WITH NEUROGENIC CLAUDICATION: Primary | ICD-10-CM

## 2022-01-01 DIAGNOSIS — R79.89 ELEVATED BRAIN NATRIURETIC PEPTIDE (BNP) LEVEL: ICD-10-CM

## 2022-01-01 DIAGNOSIS — Z23 INFLUENZA VACCINATION ADMINISTERED AT CURRENT VISIT: ICD-10-CM

## 2022-01-01 DIAGNOSIS — I50.22 CHRONIC HFREF (HEART FAILURE WITH REDUCED EJECTION FRACTION) (H): ICD-10-CM

## 2022-01-01 DIAGNOSIS — R00.1 BRADYCARDIA: Primary | ICD-10-CM

## 2022-01-01 DIAGNOSIS — F32.1 CURRENT MODERATE EPISODE OF MAJOR DEPRESSIVE DISORDER WITHOUT PRIOR EPISODE (H): ICD-10-CM

## 2022-01-01 DIAGNOSIS — I48.91 ATRIAL FIBRILLATION WITH RAPID VENTRICULAR RESPONSE (H): Primary | ICD-10-CM

## 2022-01-01 DIAGNOSIS — J43.2 CENTRILOBULAR EMPHYSEMA (H): ICD-10-CM

## 2022-01-01 DIAGNOSIS — J96.02 ACUTE RESPIRATORY FAILURE WITH HYPOXIA AND HYPERCARBIA (H): ICD-10-CM

## 2022-01-01 DIAGNOSIS — R55 SYNCOPE, UNSPECIFIED SYNCOPE TYPE: ICD-10-CM

## 2022-01-01 LAB
7AMINOCLONAZEPAM UR QL CFM: PRESENT
ALBUMIN SERPL BCG-MCNC: 4.2 G/DL (ref 3.5–5.2)
ALBUMIN SERPL BCG-MCNC: 4.2 G/DL (ref 3.5–5.2)
ALBUMIN UR-MCNC: 20 MG/DL
ALBUMIN UR-MCNC: NEGATIVE MG/DL
ALBUMIN UR-MCNC: NEGATIVE MG/DL
ALP SERPL-CCNC: 47 U/L (ref 40–129)
ALP SERPL-CCNC: 58 U/L (ref 40–129)
ALT SERPL W P-5'-P-CCNC: 24 U/L (ref 10–50)
ALT SERPL W P-5'-P-CCNC: 28 U/L (ref 10–50)
AMMONIA PLAS-SCNC: 27 UMOL/L (ref 16–60)
AMPHETAMINES UR QL SCN: ABNORMAL
ANION GAP SERPL CALCULATED.3IONS-SCNC: 11 MMOL/L (ref 7–15)
ANION GAP SERPL CALCULATED.3IONS-SCNC: 11 MMOL/L (ref 7–15)
ANION GAP SERPL CALCULATED.3IONS-SCNC: 12 MMOL/L (ref 7–15)
ANION GAP SERPL CALCULATED.3IONS-SCNC: 13 MMOL/L (ref 7–15)
ANION GAP SERPL CALCULATED.3IONS-SCNC: 13 MMOL/L (ref 7–15)
ANION GAP SERPL CALCULATED.3IONS-SCNC: 14 MMOL/L (ref 7–15)
ANION GAP SERPL CALCULATED.3IONS-SCNC: 4 MMOL/L (ref 5–18)
ANION GAP SERPL CALCULATED.3IONS-SCNC: 5 MMOL/L (ref 7–15)
ANION GAP SERPL CALCULATED.3IONS-SCNC: 6 MMOL/L (ref 7–15)
ANION GAP SERPL CALCULATED.3IONS-SCNC: 7 MMOL/L (ref 5–18)
ANION GAP SERPL CALCULATED.3IONS-SCNC: 7 MMOL/L (ref 5–18)
ANION GAP SERPL CALCULATED.3IONS-SCNC: 7 MMOL/L (ref 7–15)
ANION GAP SERPL CALCULATED.3IONS-SCNC: 8 MMOL/L (ref 5–18)
ANION GAP SERPL CALCULATED.3IONS-SCNC: 8 MMOL/L (ref 7–15)
ANION GAP SERPL CALCULATED.3IONS-SCNC: 9 MMOL/L (ref 5–18)
ANION GAP SERPL CALCULATED.3IONS-SCNC: 9 MMOL/L (ref 7–15)
APPEARANCE UR: CLEAR
APTT PPP: 24 SECONDS (ref 22–38)
APTT PPP: 27 SECONDS (ref 22–38)
AST SERPL W P-5'-P-CCNC: 26 U/L (ref 10–50)
AST SERPL W P-5'-P-CCNC: 35 U/L (ref 10–50)
ATRIAL RATE - MUSE: 85 BPM
BACTERIA #/AREA URNS HPF: ABNORMAL /HPF
BACTERIA #/AREA URNS HPF: ABNORMAL /HPF
BACTERIA BLD CULT: NO GROWTH
BARBITURATES UR QL SCN: ABNORMAL
BASE EXCESS BLDA CALC-SCNC: 0.2 MMOL/L
BASE EXCESS BLDA CALC-SCNC: 3.2 MMOL/L
BASE EXCESS BLDV CALC-SCNC: 2.3 MMOL/L
BASE EXCESS BLDV CALC-SCNC: 2.9 MMOL/L
BASE EXCESS BLDV CALC-SCNC: 3.8 MMOL/L
BASE EXCESS BLDV CALC-SCNC: 4.2 MMOL/L
BASOPHILS # BLD AUTO: 0 10E3/UL (ref 0–0.2)
BASOPHILS NFR BLD AUTO: 0 %
BENZODIAZ UR QL SCN: ABNORMAL
BILIRUB DIRECT SERPL-MCNC: <0.2 MG/DL (ref 0–0.3)
BILIRUB SERPL-MCNC: 0.3 MG/DL
BILIRUB SERPL-MCNC: 0.5 MG/DL
BILIRUB UR QL STRIP: NEGATIVE
BNP SERPL-MCNC: 272 PG/ML (ref 0–59)
BUN SERPL-MCNC: 14.1 MG/DL (ref 8–23)
BUN SERPL-MCNC: 15 MG/DL (ref 8–22)
BUN SERPL-MCNC: 15.1 MG/DL (ref 8–23)
BUN SERPL-MCNC: 15.2 MG/DL (ref 8–23)
BUN SERPL-MCNC: 15.3 MG/DL (ref 8–23)
BUN SERPL-MCNC: 16.5 MG/DL (ref 8–23)
BUN SERPL-MCNC: 18.9 MG/DL (ref 8–23)
BUN SERPL-MCNC: 21 MG/DL (ref 8–22)
BUN SERPL-MCNC: 22.2 MG/DL (ref 8–23)
BUN SERPL-MCNC: 22.6 MG/DL (ref 8–23)
BUN SERPL-MCNC: 27.2 MG/DL (ref 8–23)
BUN SERPL-MCNC: 30.9 MG/DL (ref 8–23)
BUN SERPL-MCNC: 31 MG/DL (ref 8–22)
BUN SERPL-MCNC: 39.6 MG/DL (ref 8–23)
BUN SERPL-MCNC: 45 MG/DL (ref 8–22)
BUN SERPL-MCNC: 46 MG/DL (ref 8–22)
BUN SERPL-MCNC: 48.3 MG/DL (ref 8–23)
BUN SERPL-MCNC: 61.5 MG/DL (ref 8–23)
BZE UR QL SCN: ABNORMAL
CALCIUM SERPL-MCNC: 7 MG/DL (ref 8.8–10.2)
CALCIUM SERPL-MCNC: 7.9 MG/DL (ref 8.8–10.2)
CALCIUM SERPL-MCNC: 8 MG/DL (ref 8.8–10.2)
CALCIUM SERPL-MCNC: 8 MG/DL (ref 8.8–10.2)
CALCIUM SERPL-MCNC: 8.2 MG/DL (ref 8.5–10.5)
CALCIUM SERPL-MCNC: 8.2 MG/DL (ref 8.8–10.2)
CALCIUM SERPL-MCNC: 8.3 MG/DL (ref 8.5–10.5)
CALCIUM SERPL-MCNC: 8.4 MG/DL (ref 8.5–10.5)
CALCIUM SERPL-MCNC: 8.4 MG/DL (ref 8.8–10.2)
CALCIUM SERPL-MCNC: 8.5 MG/DL (ref 8.5–10.5)
CALCIUM SERPL-MCNC: 8.5 MG/DL (ref 8.8–10.2)
CALCIUM SERPL-MCNC: 8.5 MG/DL (ref 8.8–10.2)
CALCIUM SERPL-MCNC: 8.6 MG/DL (ref 8.8–10.2)
CALCIUM SERPL-MCNC: 8.6 MG/DL (ref 8.8–10.2)
CALCIUM SERPL-MCNC: 8.8 MG/DL (ref 8.5–10.5)
CALCIUM SERPL-MCNC: 9.3 MG/DL (ref 8.8–10.2)
CANNABINOIDS UR QL SCN: ABNORMAL
CANNABINOIDS UR QL SCN: NORMAL
CHLORIDE BLD-SCNC: 102 MMOL/L (ref 98–107)
CHLORIDE BLD-SCNC: 103 MMOL/L (ref 98–107)
CHLORIDE BLD-SCNC: 104 MMOL/L (ref 98–107)
CHLORIDE SERPL-SCNC: 100 MMOL/L (ref 98–107)
CHLORIDE SERPL-SCNC: 101 MMOL/L (ref 98–107)
CHLORIDE SERPL-SCNC: 101 MMOL/L (ref 98–107)
CHLORIDE SERPL-SCNC: 104 MMOL/L (ref 98–107)
CHLORIDE SERPL-SCNC: 104 MMOL/L (ref 98–107)
CHLORIDE SERPL-SCNC: 105 MMOL/L (ref 98–107)
CHLORIDE SERPL-SCNC: 110 MMOL/L (ref 98–107)
CHLORIDE SERPL-SCNC: 95 MMOL/L (ref 98–107)
CHLORIDE SERPL-SCNC: 96 MMOL/L (ref 98–107)
CHLORIDE SERPL-SCNC: 99 MMOL/L (ref 98–107)
CHLORIDE SERPL-SCNC: 99 MMOL/L (ref 98–107)
CO2 SERPL-SCNC: 26 MMOL/L (ref 22–31)
CO2 SERPL-SCNC: 27 MMOL/L (ref 22–31)
CO2 SERPL-SCNC: 29 MMOL/L (ref 22–31)
CO2 SERPL-SCNC: 30 MMOL/L (ref 22–31)
CO2 SERPL-SCNC: 31 MMOL/L (ref 22–31)
COHGB MFR BLD: 98.6 % (ref 96–97)
COHGB MFR BLD: 98.9 % (ref 96–97)
COLOR UR AUTO: ABNORMAL
COLOR UR AUTO: ABNORMAL
COLOR UR AUTO: COLORLESS
CREAT SERPL-MCNC: 0.96 MG/DL (ref 0.67–1.17)
CREAT SERPL-MCNC: 1.03 MG/DL (ref 0.67–1.17)
CREAT SERPL-MCNC: 1.05 MG/DL (ref 0.67–1.17)
CREAT SERPL-MCNC: 1.07 MG/DL (ref 0.7–1.3)
CREAT SERPL-MCNC: 1.07 MG/DL (ref 0.7–1.3)
CREAT SERPL-MCNC: 1.1 MG/DL (ref 0.67–1.17)
CREAT SERPL-MCNC: 1.13 MG/DL (ref 0.67–1.17)
CREAT SERPL-MCNC: 1.14 MG/DL (ref 0.7–1.3)
CREAT SERPL-MCNC: 1.22 MG/DL (ref 0.67–1.17)
CREAT SERPL-MCNC: 1.3 MG/DL (ref 0.67–1.17)
CREAT SERPL-MCNC: 1.31 MG/DL (ref 0.67–1.17)
CREAT SERPL-MCNC: 1.35 MG/DL (ref 0.67–1.17)
CREAT SERPL-MCNC: 1.37 MG/DL (ref 0.67–1.17)
CREAT SERPL-MCNC: 1.48 MG/DL (ref 0.7–1.3)
CREAT SERPL-MCNC: 1.5 MG/DL (ref 0.67–1.17)
CREAT SERPL-MCNC: 1.51 MG/DL (ref 0.67–1.17)
CREAT SERPL-MCNC: 1.66 MG/DL (ref 0.7–1.3)
CREAT SERPL-MCNC: 2.07 MG/DL (ref 0.67–1.17)
CREAT UR-MCNC: 29 MG/DL
D DIMER PPP FEU-MCNC: 0.47 UG/ML FEU (ref 0–0.5)
D DIMER PPP FEU-MCNC: <=0.27 UG/ML FEU (ref 0–0.5)
DEPRECATED HCO3 PLAS-SCNC: 26 MMOL/L (ref 22–29)
DEPRECATED HCO3 PLAS-SCNC: 27 MMOL/L (ref 22–29)
DEPRECATED HCO3 PLAS-SCNC: 28 MMOL/L (ref 22–29)
DEPRECATED HCO3 PLAS-SCNC: 30 MMOL/L (ref 22–29)
DEPRECATED HCO3 PLAS-SCNC: 30 MMOL/L (ref 22–29)
DEPRECATED HCO3 PLAS-SCNC: 31 MMOL/L (ref 22–29)
DEPRECATED HCO3 PLAS-SCNC: 32 MMOL/L (ref 22–29)
DIASTOLIC BLOOD PRESSURE - MUSE: 76 MMHG
EOSINOPHIL # BLD AUTO: 0 10E3/UL (ref 0–0.7)
EOSINOPHIL # BLD AUTO: 0 10E3/UL (ref 0–0.7)
EOSINOPHIL # BLD AUTO: 0.3 10E3/UL (ref 0–0.7)
EOSINOPHIL NFR BLD AUTO: 0 %
EOSINOPHIL NFR BLD AUTO: 0 %
EOSINOPHIL NFR BLD AUTO: 3 %
ERYTHROCYTE [DISTWIDTH] IN BLOOD BY AUTOMATED COUNT: 13.3 % (ref 10–15)
ERYTHROCYTE [DISTWIDTH] IN BLOOD BY AUTOMATED COUNT: 13.4 % (ref 10–15)
ERYTHROCYTE [DISTWIDTH] IN BLOOD BY AUTOMATED COUNT: 13.4 % (ref 10–15)
ERYTHROCYTE [DISTWIDTH] IN BLOOD BY AUTOMATED COUNT: 13.5 % (ref 10–15)
ERYTHROCYTE [DISTWIDTH] IN BLOOD BY AUTOMATED COUNT: 13.6 % (ref 10–15)
ERYTHROCYTE [DISTWIDTH] IN BLOOD BY AUTOMATED COUNT: 13.6 % (ref 10–15)
ERYTHROCYTE [DISTWIDTH] IN BLOOD BY AUTOMATED COUNT: 13.7 % (ref 10–15)
ERYTHROCYTE [DISTWIDTH] IN BLOOD BY AUTOMATED COUNT: 13.7 % (ref 10–15)
ERYTHROCYTE [DISTWIDTH] IN BLOOD BY AUTOMATED COUNT: 13.8 % (ref 10–15)
ERYTHROCYTE [DISTWIDTH] IN BLOOD BY AUTOMATED COUNT: 14 % (ref 10–15)
ERYTHROCYTE [DISTWIDTH] IN BLOOD BY AUTOMATED COUNT: 14.1 % (ref 10–15)
ERYTHROCYTE [DISTWIDTH] IN BLOOD BY AUTOMATED COUNT: 14.1 % (ref 10–15)
ERYTHROCYTE [DISTWIDTH] IN BLOOD BY AUTOMATED COUNT: 14.2 % (ref 10–15)
ETHANOL SERPL-MCNC: <0.01 G/DL
FLUAV RNA SPEC QL NAA+PROBE: NEGATIVE
FLUBV RNA RESP QL NAA+PROBE: NEGATIVE
GABAPENTIN UR QL CFM: PRESENT
GFR SERPL CREATININE-BSD FRML MDRD: 35 ML/MIN/1.73M2
GFR SERPL CREATININE-BSD FRML MDRD: 45 ML/MIN/1.73M2
GFR SERPL CREATININE-BSD FRML MDRD: 51 ML/MIN/1.73M2
GFR SERPL CREATININE-BSD FRML MDRD: 51 ML/MIN/1.73M2
GFR SERPL CREATININE-BSD FRML MDRD: 52 ML/MIN/1.73M2
GFR SERPL CREATININE-BSD FRML MDRD: 57 ML/MIN/1.73M2
GFR SERPL CREATININE-BSD FRML MDRD: 58 ML/MIN/1.73M2
GFR SERPL CREATININE-BSD FRML MDRD: 60 ML/MIN/1.73M2
GFR SERPL CREATININE-BSD FRML MDRD: 61 ML/MIN/1.73M2
GFR SERPL CREATININE-BSD FRML MDRD: 66 ML/MIN/1.73M2
GFR SERPL CREATININE-BSD FRML MDRD: 71 ML/MIN/1.73M2
GFR SERPL CREATININE-BSD FRML MDRD: 72 ML/MIN/1.73M2
GFR SERPL CREATININE-BSD FRML MDRD: 74 ML/MIN/1.73M2
GFR SERPL CREATININE-BSD FRML MDRD: 77 ML/MIN/1.73M2
GFR SERPL CREATININE-BSD FRML MDRD: 77 ML/MIN/1.73M2
GFR SERPL CREATININE-BSD FRML MDRD: 79 ML/MIN/1.73M2
GFR SERPL CREATININE-BSD FRML MDRD: 81 ML/MIN/1.73M2
GFR SERPL CREATININE-BSD FRML MDRD: 88 ML/MIN/1.73M2
GLUCOSE BLD-MCNC: 116 MG/DL (ref 70–125)
GLUCOSE BLD-MCNC: 156 MG/DL (ref 70–125)
GLUCOSE BLD-MCNC: 79 MG/DL (ref 70–125)
GLUCOSE BLD-MCNC: 85 MG/DL (ref 70–125)
GLUCOSE BLD-MCNC: 92 MG/DL (ref 70–125)
GLUCOSE BLDC GLUCOMTR-MCNC: 100 MG/DL (ref 70–99)
GLUCOSE BLDC GLUCOMTR-MCNC: 101 MG/DL (ref 70–99)
GLUCOSE BLDC GLUCOMTR-MCNC: 107 MG/DL (ref 70–99)
GLUCOSE BLDC GLUCOMTR-MCNC: 107 MG/DL (ref 70–99)
GLUCOSE BLDC GLUCOMTR-MCNC: 113 MG/DL (ref 70–99)
GLUCOSE BLDC GLUCOMTR-MCNC: 114 MG/DL (ref 70–99)
GLUCOSE BLDC GLUCOMTR-MCNC: 118 MG/DL (ref 70–99)
GLUCOSE BLDC GLUCOMTR-MCNC: 121 MG/DL (ref 70–99)
GLUCOSE BLDC GLUCOMTR-MCNC: 121 MG/DL (ref 70–99)
GLUCOSE BLDC GLUCOMTR-MCNC: 122 MG/DL (ref 70–99)
GLUCOSE BLDC GLUCOMTR-MCNC: 127 MG/DL (ref 70–99)
GLUCOSE BLDC GLUCOMTR-MCNC: 127 MG/DL (ref 70–99)
GLUCOSE BLDC GLUCOMTR-MCNC: 129 MG/DL (ref 70–99)
GLUCOSE BLDC GLUCOMTR-MCNC: 132 MG/DL (ref 70–99)
GLUCOSE BLDC GLUCOMTR-MCNC: 132 MG/DL (ref 70–99)
GLUCOSE BLDC GLUCOMTR-MCNC: 133 MG/DL (ref 70–99)
GLUCOSE BLDC GLUCOMTR-MCNC: 139 MG/DL (ref 70–99)
GLUCOSE BLDC GLUCOMTR-MCNC: 142 MG/DL (ref 70–99)
GLUCOSE BLDC GLUCOMTR-MCNC: 142 MG/DL (ref 70–99)
GLUCOSE BLDC GLUCOMTR-MCNC: 143 MG/DL (ref 70–99)
GLUCOSE BLDC GLUCOMTR-MCNC: 145 MG/DL (ref 70–99)
GLUCOSE BLDC GLUCOMTR-MCNC: 155 MG/DL (ref 70–99)
GLUCOSE BLDC GLUCOMTR-MCNC: 157 MG/DL (ref 70–99)
GLUCOSE BLDC GLUCOMTR-MCNC: 160 MG/DL (ref 70–99)
GLUCOSE BLDC GLUCOMTR-MCNC: 161 MG/DL (ref 70–99)
GLUCOSE BLDC GLUCOMTR-MCNC: 161 MG/DL (ref 70–99)
GLUCOSE BLDC GLUCOMTR-MCNC: 163 MG/DL (ref 70–99)
GLUCOSE BLDC GLUCOMTR-MCNC: 164 MG/DL (ref 70–99)
GLUCOSE BLDC GLUCOMTR-MCNC: 164 MG/DL (ref 70–99)
GLUCOSE BLDC GLUCOMTR-MCNC: 171 MG/DL (ref 70–99)
GLUCOSE BLDC GLUCOMTR-MCNC: 174 MG/DL (ref 70–99)
GLUCOSE BLDC GLUCOMTR-MCNC: 174 MG/DL (ref 70–99)
GLUCOSE BLDC GLUCOMTR-MCNC: 175 MG/DL (ref 70–99)
GLUCOSE BLDC GLUCOMTR-MCNC: 175 MG/DL (ref 70–99)
GLUCOSE BLDC GLUCOMTR-MCNC: 177 MG/DL (ref 70–99)
GLUCOSE BLDC GLUCOMTR-MCNC: 177 MG/DL (ref 70–99)
GLUCOSE BLDC GLUCOMTR-MCNC: 180 MG/DL (ref 70–99)
GLUCOSE BLDC GLUCOMTR-MCNC: 189 MG/DL (ref 70–99)
GLUCOSE BLDC GLUCOMTR-MCNC: 196 MG/DL (ref 70–99)
GLUCOSE BLDC GLUCOMTR-MCNC: 197 MG/DL (ref 70–99)
GLUCOSE BLDC GLUCOMTR-MCNC: 211 MG/DL (ref 70–99)
GLUCOSE BLDC GLUCOMTR-MCNC: 216 MG/DL (ref 70–99)
GLUCOSE BLDC GLUCOMTR-MCNC: 217 MG/DL (ref 70–99)
GLUCOSE BLDC GLUCOMTR-MCNC: 226 MG/DL (ref 70–99)
GLUCOSE BLDC GLUCOMTR-MCNC: 246 MG/DL (ref 70–99)
GLUCOSE BLDC GLUCOMTR-MCNC: 254 MG/DL (ref 70–99)
GLUCOSE BLDC GLUCOMTR-MCNC: 256 MG/DL (ref 70–99)
GLUCOSE BLDC GLUCOMTR-MCNC: 261 MG/DL (ref 70–99)
GLUCOSE BLDC GLUCOMTR-MCNC: 329 MG/DL (ref 70–99)
GLUCOSE BLDC GLUCOMTR-MCNC: 63 MG/DL (ref 70–99)
GLUCOSE BLDC GLUCOMTR-MCNC: 70 MG/DL (ref 70–99)
GLUCOSE BLDC GLUCOMTR-MCNC: 77 MG/DL (ref 70–99)
GLUCOSE BLDC GLUCOMTR-MCNC: 91 MG/DL (ref 70–99)
GLUCOSE BLDC GLUCOMTR-MCNC: 92 MG/DL (ref 70–99)
GLUCOSE BLDC GLUCOMTR-MCNC: 95 MG/DL (ref 70–99)
GLUCOSE SERPL-MCNC: 118 MG/DL (ref 70–99)
GLUCOSE SERPL-MCNC: 122 MG/DL (ref 70–99)
GLUCOSE SERPL-MCNC: 128 MG/DL (ref 70–99)
GLUCOSE SERPL-MCNC: 141 MG/DL (ref 70–99)
GLUCOSE SERPL-MCNC: 158 MG/DL (ref 70–99)
GLUCOSE SERPL-MCNC: 163 MG/DL (ref 70–99)
GLUCOSE SERPL-MCNC: 186 MG/DL (ref 70–99)
GLUCOSE SERPL-MCNC: 189 MG/DL (ref 70–99)
GLUCOSE SERPL-MCNC: 194 MG/DL (ref 70–99)
GLUCOSE SERPL-MCNC: 80 MG/DL (ref 70–99)
GLUCOSE SERPL-MCNC: 87 MG/DL (ref 70–99)
GLUCOSE SERPL-MCNC: 93 MG/DL (ref 70–99)
GLUCOSE SERPL-MCNC: 97 MG/DL (ref 70–99)
GLUCOSE UR STRIP-MCNC: 70 MG/DL
GLUCOSE UR STRIP-MCNC: NEGATIVE MG/DL
GLUCOSE UR STRIP-MCNC: NEGATIVE MG/DL
HBA1C MFR BLD: 6.3 %
HCO3 BLD-SCNC: 24 MMOL/L (ref 23–29)
HCO3 BLD-SCNC: 30 MMOL/L (ref 23–29)
HCO3 BLDV-SCNC: 24 MMOL/L (ref 24–30)
HCO3 BLDV-SCNC: 26 MMOL/L (ref 24–30)
HCO3 BLDV-SCNC: 26 MMOL/L (ref 24–30)
HCO3 BLDV-SCNC: 29 MMOL/L (ref 24–30)
HCT VFR BLD AUTO: 29.2 % (ref 40–53)
HCT VFR BLD AUTO: 32.6 % (ref 40–53)
HCT VFR BLD AUTO: 32.9 % (ref 40–53)
HCT VFR BLD AUTO: 33.7 % (ref 40–53)
HCT VFR BLD AUTO: 34.3 % (ref 40–53)
HCT VFR BLD AUTO: 34.7 % (ref 40–53)
HCT VFR BLD AUTO: 34.8 % (ref 40–53)
HCT VFR BLD AUTO: 36.7 % (ref 40–53)
HCT VFR BLD AUTO: 36.9 % (ref 40–53)
HCT VFR BLD AUTO: 37.4 % (ref 40–53)
HCT VFR BLD AUTO: 37.7 % (ref 40–53)
HCT VFR BLD AUTO: 39.5 % (ref 40–53)
HCT VFR BLD AUTO: 40.1 % (ref 40–53)
HCT VFR BLD AUTO: 40.3 % (ref 40–53)
HCT VFR BLD AUTO: 40.3 % (ref 40–53)
HCT VFR BLD AUTO: 42.4 % (ref 40–53)
HCT VFR BLD AUTO: 43.2 % (ref 40–53)
HGB BLD-MCNC: 10.1 G/DL (ref 13.3–17.7)
HGB BLD-MCNC: 10.3 G/DL (ref 13.3–17.7)
HGB BLD-MCNC: 10.4 G/DL (ref 13.3–17.7)
HGB BLD-MCNC: 10.4 G/DL (ref 13.3–17.7)
HGB BLD-MCNC: 10.8 G/DL (ref 13.3–17.7)
HGB BLD-MCNC: 10.9 G/DL (ref 13.3–17.7)
HGB BLD-MCNC: 11 G/DL (ref 13.3–17.7)
HGB BLD-MCNC: 11.3 G/DL (ref 13.3–17.7)
HGB BLD-MCNC: 11.3 G/DL (ref 13.3–17.7)
HGB BLD-MCNC: 11.7 G/DL (ref 13.3–17.7)
HGB BLD-MCNC: 12 G/DL (ref 13.3–17.7)
HGB BLD-MCNC: 12 G/DL (ref 13.3–17.7)
HGB BLD-MCNC: 12.2 G/DL (ref 13.3–17.7)
HGB BLD-MCNC: 12.9 G/DL (ref 13.3–17.7)
HGB BLD-MCNC: 13.1 G/DL (ref 13.3–17.7)
HGB BLD-MCNC: 13.1 G/DL (ref 13.3–17.7)
HGB BLD-MCNC: 9 G/DL (ref 13.3–17.7)
HGB UR QL STRIP: ABNORMAL
HGB UR QL STRIP: NEGATIVE
HGB UR QL STRIP: NEGATIVE
HOLD SPECIMEN: NORMAL
HYALINE CASTS: 1 /LPF
HYALINE CASTS: 3 /LPF
IMM GRANULOCYTES # BLD: 0 10E3/UL
IMM GRANULOCYTES # BLD: 0.1 10E3/UL
IMM GRANULOCYTES # BLD: 0.1 10E3/UL
IMM GRANULOCYTES NFR BLD: 0 %
IMM GRANULOCYTES NFR BLD: 1 %
IMM GRANULOCYTES NFR BLD: 1 %
INR PPP: 0.87 (ref 0.85–1.15)
INR PPP: 1.14 (ref 0.85–1.15)
INTERPRETATION ECG - MUSE: NORMAL
KETONES UR STRIP-MCNC: NEGATIVE MG/DL
LACTATE SERPL-SCNC: 0.5 MMOL/L (ref 0.7–2)
LACTATE SERPL-SCNC: 0.6 MMOL/L (ref 0.7–2)
LACTATE SERPL-SCNC: 0.7 MMOL/L (ref 0.7–2)
LACTATE SERPL-SCNC: 1 MMOL/L (ref 0.7–2)
LACTATE SERPL-SCNC: 1.4 MMOL/L (ref 0.7–2)
LACTATE SERPL-SCNC: 2.1 MMOL/L (ref 0.7–2)
LEUKOCYTE ESTERASE UR QL STRIP: ABNORMAL
LEUKOCYTE ESTERASE UR QL STRIP: NEGATIVE
LEUKOCYTE ESTERASE UR QL STRIP: NEGATIVE
LVEF ECHO: NORMAL
LYMPHOCYTES # BLD AUTO: 0.3 10E3/UL (ref 0.8–5.3)
LYMPHOCYTES # BLD AUTO: 0.4 10E3/UL (ref 0.8–5.3)
LYMPHOCYTES # BLD AUTO: 1.9 10E3/UL (ref 0.8–5.3)
LYMPHOCYTES NFR BLD AUTO: 18 %
LYMPHOCYTES NFR BLD AUTO: 4 %
LYMPHOCYTES NFR BLD AUTO: 4 %
MAGNESIUM SERPL-MCNC: 2.1 MG/DL (ref 1.7–2.3)
MAGNESIUM SERPL-MCNC: 2.4 MG/DL (ref 1.7–2.3)
MAGNESIUM SERPL-MCNC: 2.4 MG/DL (ref 1.8–2.6)
MAGNESIUM SERPL-MCNC: 2.5 MG/DL (ref 1.7–2.3)
MAGNESIUM SERPL-MCNC: 2.6 MG/DL (ref 1.7–2.3)
MCH RBC QN AUTO: 28.6 PG (ref 26.5–33)
MCH RBC QN AUTO: 28.7 PG (ref 26.5–33)
MCH RBC QN AUTO: 28.9 PG (ref 26.5–33)
MCH RBC QN AUTO: 29 PG (ref 26.5–33)
MCH RBC QN AUTO: 29.3 PG (ref 26.5–33)
MCH RBC QN AUTO: 29.4 PG (ref 26.5–33)
MCH RBC QN AUTO: 29.6 PG (ref 26.5–33)
MCH RBC QN AUTO: 29.9 PG (ref 26.5–33)
MCH RBC QN AUTO: 30 PG (ref 26.5–33)
MCH RBC QN AUTO: 30.1 PG (ref 26.5–33)
MCH RBC QN AUTO: 30.1 PG (ref 26.5–33)
MCH RBC QN AUTO: 30.3 PG (ref 26.5–33)
MCH RBC QN AUTO: 30.3 PG (ref 26.5–33)
MCH RBC QN AUTO: 30.4 PG (ref 26.5–33)
MCH RBC QN AUTO: 30.4 PG (ref 26.5–33)
MCHC RBC AUTO-ENTMCNC: 29.8 G/DL (ref 31.5–36.5)
MCHC RBC AUTO-ENTMCNC: 29.8 G/DL (ref 31.5–36.5)
MCHC RBC AUTO-ENTMCNC: 29.9 G/DL (ref 31.5–36.5)
MCHC RBC AUTO-ENTMCNC: 30 G/DL (ref 31.5–36.5)
MCHC RBC AUTO-ENTMCNC: 30 G/DL (ref 31.5–36.5)
MCHC RBC AUTO-ENTMCNC: 30.3 G/DL (ref 31.5–36.5)
MCHC RBC AUTO-ENTMCNC: 30.4 G/DL (ref 31.5–36.5)
MCHC RBC AUTO-ENTMCNC: 30.4 G/DL (ref 31.5–36.5)
MCHC RBC AUTO-ENTMCNC: 30.8 G/DL (ref 31.5–36.5)
MCHC RBC AUTO-ENTMCNC: 30.8 G/DL (ref 31.5–36.5)
MCHC RBC AUTO-ENTMCNC: 30.9 G/DL (ref 31.5–36.5)
MCHC RBC AUTO-ENTMCNC: 31.3 G/DL (ref 31.5–36.5)
MCHC RBC AUTO-ENTMCNC: 31.4 G/DL (ref 31.5–36.5)
MCHC RBC AUTO-ENTMCNC: 31.5 G/DL (ref 31.5–36.5)
MCHC RBC AUTO-ENTMCNC: 31.6 G/DL (ref 31.5–36.5)
MCHC RBC AUTO-ENTMCNC: 31.6 G/DL (ref 31.5–36.5)
MCHC RBC AUTO-ENTMCNC: 32 G/DL (ref 31.5–36.5)
MCV RBC AUTO: 100 FL (ref 78–100)
MCV RBC AUTO: 94 FL (ref 78–100)
MCV RBC AUTO: 95 FL (ref 78–100)
MCV RBC AUTO: 96 FL (ref 78–100)
MCV RBC AUTO: 97 FL (ref 78–100)
MCV RBC AUTO: 98 FL (ref 78–100)
MCV RBC AUTO: 98 FL (ref 78–100)
MCV RBC AUTO: 99 FL (ref 78–100)
MONOCYTES # BLD AUTO: 0.2 10E3/UL (ref 0–1.3)
MONOCYTES # BLD AUTO: 0.6 10E3/UL (ref 0–1.3)
MONOCYTES # BLD AUTO: 1.2 10E3/UL (ref 0–1.3)
MONOCYTES NFR BLD AUTO: 11 %
MONOCYTES NFR BLD AUTO: 2 %
MONOCYTES NFR BLD AUTO: 7 %
MRSA DNA SPEC QL NAA+PROBE: NEGATIVE
MUCOUS THREADS #/AREA URNS LPF: PRESENT /LPF
MUCOUS THREADS #/AREA URNS LPF: PRESENT /LPF
NEUTROPHILS # BLD AUTO: 7.1 10E3/UL (ref 1.6–8.3)
NEUTROPHILS # BLD AUTO: 8 10E3/UL (ref 1.6–8.3)
NEUTROPHILS # BLD AUTO: 8.3 10E3/UL (ref 1.6–8.3)
NEUTROPHILS NFR BLD AUTO: 67 %
NEUTROPHILS NFR BLD AUTO: 88 %
NEUTROPHILS NFR BLD AUTO: 94 %
NITRATE UR QL: NEGATIVE
NRBC # BLD AUTO: 0 10E3/UL
NRBC BLD AUTO-RTO: 0 /100
NT-PROBNP SERPL-MCNC: 699 PG/ML (ref 0–900)
NT-PROBNP SERPL-MCNC: 949 PG/ML (ref 0–900)
O2/TOTAL GAS SETTING VFR VENT: 40 %
OPIATES UR QL SCN: ABNORMAL
OXYCODONE UR CFM-MCNC: 771 NG/ML
OXYCODONE/CREAT UR: 2659 NG/MG {CREAT}
OXYHGB MFR BLD: 97.1 % (ref 96–97)
OXYHGB MFR BLD: 97.2 % (ref 96–97)
OXYHGB MFR BLDV: 47.1 % (ref 70–75)
OXYHGB MFR BLDV: 68.1 % (ref 70–75)
OXYHGB MFR BLDV: 75.2 % (ref 70–75)
OXYHGB MFR BLDV: 91.4 % (ref 70–75)
OXYMORPHONE UR CFM-MCNC: 1520 NG/ML
OXYMORPHONE/CREAT UR: 5241 NG/MG {CREAT}
P AXIS - MUSE: 56 DEGREES
PCO2 BLD: 62 MM HG (ref 35–45)
PCO2 BLD: 63 MM HG (ref 35–45)
PCO2 BLDV: 50 MM HG (ref 35–50)
PCO2 BLDV: 56 MM HG (ref 35–50)
PCO2 BLDV: 63 MM HG (ref 35–50)
PCO2 BLDV: 73 MM HG (ref 35–50)
PCP QUAL URINE (ROCHE): ABNORMAL
PH BLD: 7.25 [PH] (ref 7.37–7.44)
PH BLD: 7.28 [PH] (ref 7.37–7.44)
PH BLDV: 7.22 [PH] (ref 7.35–7.45)
PH BLDV: 7.29 [PH] (ref 7.35–7.45)
PH BLDV: 7.33 [PH] (ref 7.35–7.45)
PH BLDV: 7.38 [PH] (ref 7.35–7.45)
PH UR STRIP: 5 [PH] (ref 5–7)
PHOSPHATE SERPL-MCNC: 6.6 MG/DL (ref 2.5–4.5)
PLATELET # BLD AUTO: 151 10E3/UL (ref 150–450)
PLATELET # BLD AUTO: 160 10E3/UL (ref 150–450)
PLATELET # BLD AUTO: 167 10E3/UL (ref 150–450)
PLATELET # BLD AUTO: 171 10E3/UL (ref 150–450)
PLATELET # BLD AUTO: 175 10E3/UL (ref 150–450)
PLATELET # BLD AUTO: 176 10E3/UL (ref 150–450)
PLATELET # BLD AUTO: 183 10E3/UL (ref 150–450)
PLATELET # BLD AUTO: 191 10E3/UL (ref 150–450)
PLATELET # BLD AUTO: 191 10E3/UL (ref 150–450)
PLATELET # BLD AUTO: 202 10E3/UL (ref 150–450)
PLATELET # BLD AUTO: 211 10E3/UL (ref 150–450)
PLATELET # BLD AUTO: 212 10E3/UL (ref 150–450)
PLATELET # BLD AUTO: 215 10E3/UL (ref 150–450)
PLATELET # BLD AUTO: 229 10E3/UL (ref 150–450)
PLATELET # BLD AUTO: 253 10E3/UL (ref 150–450)
PLATELET # BLD AUTO: 283 10E3/UL (ref 150–450)
PLATELET # BLD AUTO: 298 10E3/UL (ref 150–450)
PO2 BLD: 119 MM HG (ref 80–90)
PO2 BLD: 120 MM HG (ref 80–90)
PO2 BLDV: 28 MM HG (ref 25–47)
PO2 BLDV: 38 MM HG (ref 25–47)
PO2 BLDV: 43 MM HG (ref 25–47)
PO2 BLDV: 66 MM HG (ref 25–47)
POTASSIUM BLD-SCNC: 4.2 MMOL/L (ref 3.5–5)
POTASSIUM BLD-SCNC: 4.2 MMOL/L (ref 3.5–5)
POTASSIUM BLD-SCNC: 4.5 MMOL/L (ref 3.5–5)
POTASSIUM BLD-SCNC: 5.1 MMOL/L (ref 3.5–5)
POTASSIUM BLD-SCNC: 5.7 MMOL/L (ref 3.5–5)
POTASSIUM BLD-SCNC: 5.8 MMOL/L (ref 3.5–5)
POTASSIUM BLD-SCNC: 6.2 MMOL/L (ref 3.5–5)
POTASSIUM SERPL-SCNC: 3.3 MMOL/L (ref 3.4–5.3)
POTASSIUM SERPL-SCNC: 3.6 MMOL/L (ref 3.4–5.3)
POTASSIUM SERPL-SCNC: 3.6 MMOL/L (ref 3.4–5.3)
POTASSIUM SERPL-SCNC: 3.9 MMOL/L (ref 3.4–5.3)
POTASSIUM SERPL-SCNC: 3.9 MMOL/L (ref 3.4–5.3)
POTASSIUM SERPL-SCNC: 4.6 MMOL/L (ref 3.4–5.3)
POTASSIUM SERPL-SCNC: 4.8 MMOL/L (ref 3.4–5.3)
POTASSIUM SERPL-SCNC: 4.8 MMOL/L (ref 3.4–5.3)
POTASSIUM SERPL-SCNC: 5 MMOL/L (ref 3.4–5.3)
POTASSIUM SERPL-SCNC: 5.3 MMOL/L (ref 3.4–5.3)
POTASSIUM SERPL-SCNC: 5.5 MMOL/L (ref 3.4–5.3)
PR INTERVAL - MUSE: 158 MS
PROCALCITONIN SERPL IA-MCNC: 0.21 NG/ML
PROCALCITONIN SERPL IA-MCNC: 0.22 NG/ML
PROCALCITONIN SERPL-MCNC: 0.03 NG/ML (ref 0–0.49)
PROT SERPL-MCNC: 6.7 G/DL (ref 6.4–8.3)
PROT SERPL-MCNC: 7 G/DL (ref 6.4–8.3)
QRS DURATION - MUSE: 92 MS
QT - MUSE: 418 MS
QTC - MUSE: 497 MS
R AXIS - MUSE: 73 DEGREES
RBC # BLD AUTO: 3 10E6/UL (ref 4.4–5.9)
RBC # BLD AUTO: 3.39 10E6/UL (ref 4.4–5.9)
RBC # BLD AUTO: 3.43 10E6/UL (ref 4.4–5.9)
RBC # BLD AUTO: 3.53 10E6/UL (ref 4.4–5.9)
RBC # BLD AUTO: 3.55 10E6/UL (ref 4.4–5.9)
RBC # BLD AUTO: 3.59 10E6/UL (ref 4.4–5.9)
RBC # BLD AUTO: 3.59 10E6/UL (ref 4.4–5.9)
RBC # BLD AUTO: 3.82 10E6/UL (ref 4.4–5.9)
RBC # BLD AUTO: 3.83 10E6/UL (ref 4.4–5.9)
RBC # BLD AUTO: 3.89 10E6/UL (ref 4.4–5.9)
RBC # BLD AUTO: 3.9 10E6/UL (ref 4.4–5.9)
RBC # BLD AUTO: 4.02 10E6/UL (ref 4.4–5.9)
RBC # BLD AUTO: 4.05 10E6/UL (ref 4.4–5.9)
RBC # BLD AUTO: 4.15 10E6/UL (ref 4.4–5.9)
RBC # BLD AUTO: 4.3 10E6/UL (ref 4.4–5.9)
RBC # BLD AUTO: 4.33 10E6/UL (ref 4.4–5.9)
RBC # BLD AUTO: 4.45 10E6/UL (ref 4.4–5.9)
RBC URINE: 0 /HPF
RBC URINE: 21 /HPF
RBC URINE: <1 /HPF
RSV RNA SPEC NAA+PROBE: NEGATIVE
SA TARGET DNA: NEGATIVE
SAO2 % BLDV: 48.1 % (ref 70–75)
SAO2 % BLDV: 69.1 % (ref 70–75)
SAO2 % BLDV: 77 % (ref 70–75)
SAO2 % BLDV: 93.9 % (ref 70–75)
SARS-COV-2 RNA RESP QL NAA+PROBE: NEGATIVE
SODIUM SERPL-SCNC: 134 MMOL/L (ref 136–145)
SODIUM SERPL-SCNC: 134 MMOL/L (ref 136–145)
SODIUM SERPL-SCNC: 136 MMOL/L (ref 136–145)
SODIUM SERPL-SCNC: 138 MMOL/L (ref 136–145)
SODIUM SERPL-SCNC: 139 MMOL/L (ref 136–145)
SODIUM SERPL-SCNC: 140 MMOL/L (ref 136–145)
SODIUM SERPL-SCNC: 141 MMOL/L (ref 136–145)
SODIUM SERPL-SCNC: 141 MMOL/L (ref 136–145)
SODIUM SERPL-SCNC: 142 MMOL/L (ref 136–145)
SODIUM SERPL-SCNC: 143 MMOL/L (ref 136–145)
SP GR UR STRIP: 1.01 (ref 1–1.03)
SP GR UR STRIP: 1.01 (ref 1–1.03)
SP GR UR STRIP: 1.03 (ref 1–1.03)
SQUAMOUS EPITHELIAL: <1 /HPF
SYSTOLIC BLOOD PRESSURE - MUSE: 131 MMHG
T AXIS - MUSE: 74 DEGREES
TEMPERATURE: 37 DEGREES C
TEMPERATURE: 37 DEGREES C
TROPONIN I SERPL-MCNC: 0.01 NG/ML (ref 0–0.29)
TROPONIN I SERPL-MCNC: 0.02 NG/ML (ref 0–0.29)
TROPONIN I SERPL-MCNC: 0.03 NG/ML (ref 0–0.29)
TROPONIN T SERPL HS-MCNC: 12 NG/L
TROPONIN T SERPL HS-MCNC: 120 NG/L
TROPONIN T SERPL HS-MCNC: 129 NG/L
TROPONIN T SERPL HS-MCNC: 20 NG/L
UROBILINOGEN UR STRIP-MCNC: <2 MG/DL
VANCOMYCIN SERPL-MCNC: 7.9 UG/ML
VENTRICULAR RATE- MUSE: 85 BPM
WBC # BLD AUTO: 10.6 10E3/UL (ref 4–11)
WBC # BLD AUTO: 10.8 10E3/UL (ref 4–11)
WBC # BLD AUTO: 11 10E3/UL (ref 4–11)
WBC # BLD AUTO: 11.3 10E3/UL (ref 4–11)
WBC # BLD AUTO: 11.7 10E3/UL (ref 4–11)
WBC # BLD AUTO: 11.8 10E3/UL (ref 4–11)
WBC # BLD AUTO: 6.7 10E3/UL (ref 4–11)
WBC # BLD AUTO: 6.9 10E3/UL (ref 4–11)
WBC # BLD AUTO: 7.5 10E3/UL (ref 4–11)
WBC # BLD AUTO: 8.5 10E3/UL (ref 4–11)
WBC # BLD AUTO: 8.6 10E3/UL (ref 4–11)
WBC # BLD AUTO: 9.2 10E3/UL (ref 4–11)
WBC # BLD AUTO: 9.2 10E3/UL (ref 4–11)
WBC # BLD AUTO: 9.3 10E3/UL (ref 4–11)
WBC # BLD AUTO: 9.3 10E3/UL (ref 4–11)
WBC # BLD AUTO: 9.4 10E3/UL (ref 4–11)
WBC # BLD AUTO: 9.8 10E3/UL (ref 4–11)
WBC URINE: 0 /HPF
WBC URINE: 6 /HPF
WBC URINE: <1 /HPF

## 2022-01-01 PROCEDURE — 85027 COMPLETE CBC AUTOMATED: CPT | Performed by: INTERNAL MEDICINE

## 2022-01-01 PROCEDURE — 999N000157 HC STATISTIC RCP TIME EA 10 MIN

## 2022-01-01 PROCEDURE — 71045 X-RAY EXAM CHEST 1 VIEW: CPT

## 2022-01-01 PROCEDURE — 84484 ASSAY OF TROPONIN QUANT: CPT | Performed by: EMERGENCY MEDICINE

## 2022-01-01 PROCEDURE — 87040 BLOOD CULTURE FOR BACTERIA: CPT | Performed by: EMERGENCY MEDICINE

## 2022-01-01 PROCEDURE — 250N000012 HC RX MED GY IP 250 OP 636 PS 637: Performed by: INTERNAL MEDICINE

## 2022-01-01 PROCEDURE — 96367 TX/PROPH/DG ADDL SEQ IV INF: CPT

## 2022-01-01 PROCEDURE — 80307 DRUG TEST PRSMV CHEM ANLYZR: CPT | Performed by: EMERGENCY MEDICINE

## 2022-01-01 PROCEDURE — 120N000013 HC R&B IMCU

## 2022-01-01 PROCEDURE — 250N000013 HC RX MED GY IP 250 OP 250 PS 637: Performed by: FAMILY MEDICINE

## 2022-01-01 PROCEDURE — 91312 COVID-19 VACCINE BIVALENT BOOSTER 12+ (PFIZER): CPT | Performed by: INTERNAL MEDICINE

## 2022-01-01 PROCEDURE — 85379 FIBRIN DEGRADATION QUANT: CPT | Performed by: FAMILY MEDICINE

## 2022-01-01 PROCEDURE — 5A09357 ASSISTANCE WITH RESPIRATORY VENTILATION, LESS THAN 24 CONSECUTIVE HOURS, CONTINUOUS POSITIVE AIRWAY PRESSURE: ICD-10-PCS | Performed by: FAMILY MEDICINE

## 2022-01-01 PROCEDURE — 250N000013 HC RX MED GY IP 250 OP 250 PS 637: Performed by: INTERNAL MEDICINE

## 2022-01-01 PROCEDURE — 93005 ELECTROCARDIOGRAM TRACING: CPT | Performed by: EMERGENCY MEDICINE

## 2022-01-01 PROCEDURE — 250N000013 HC RX MED GY IP 250 OP 250 PS 637: Performed by: CLINICAL NURSE SPECIALIST

## 2022-01-01 PROCEDURE — 5A09457 ASSISTANCE WITH RESPIRATORY VENTILATION, 24-96 CONSECUTIVE HOURS, CONTINUOUS POSITIVE AIRWAY PRESSURE: ICD-10-PCS | Performed by: EMERGENCY MEDICINE

## 2022-01-01 PROCEDURE — 83605 ASSAY OF LACTIC ACID: CPT | Performed by: EMERGENCY MEDICINE

## 2022-01-01 PROCEDURE — C9113 INJ PANTOPRAZOLE SODIUM, VIA: HCPCS | Performed by: INTERNAL MEDICINE

## 2022-01-01 PROCEDURE — 81001 URINALYSIS AUTO W/SCOPE: CPT | Performed by: EMERGENCY MEDICINE

## 2022-01-01 PROCEDURE — 250N000011 HC RX IP 250 OP 636: Performed by: INTERNAL MEDICINE

## 2022-01-01 PROCEDURE — 210N000001 HC R&B IMCU HEART CARE

## 2022-01-01 PROCEDURE — 97166 OT EVAL MOD COMPLEX 45 MIN: CPT | Mod: GO

## 2022-01-01 PROCEDURE — 84145 PROCALCITONIN (PCT): CPT | Performed by: HOSPITALIST

## 2022-01-01 PROCEDURE — 80048 BASIC METABOLIC PNL TOTAL CA: CPT | Performed by: INTERNAL MEDICINE

## 2022-01-01 PROCEDURE — G0378 HOSPITAL OBSERVATION PER HR: HCPCS

## 2022-01-01 PROCEDURE — 250N000011 HC RX IP 250 OP 636: Performed by: EMERGENCY MEDICINE

## 2022-01-01 PROCEDURE — 93306 TTE W/DOPPLER COMPLETE: CPT | Mod: 26 | Performed by: INTERNAL MEDICINE

## 2022-01-01 PROCEDURE — 258N000003 HC RX IP 258 OP 636: Performed by: INTERNAL MEDICINE

## 2022-01-01 PROCEDURE — 250N000009 HC RX 250: Performed by: EMERGENCY MEDICINE

## 2022-01-01 PROCEDURE — 87637 SARSCOV2&INF A&B&RSV AMP PRB: CPT | Performed by: EMERGENCY MEDICINE

## 2022-01-01 PROCEDURE — 94640 AIRWAY INHALATION TREATMENT: CPT

## 2022-01-01 PROCEDURE — 97116 GAIT TRAINING THERAPY: CPT | Mod: GP

## 2022-01-01 PROCEDURE — 250N000013 HC RX MED GY IP 250 OP 250 PS 637: Performed by: EMERGENCY MEDICINE

## 2022-01-01 PROCEDURE — 80053 COMPREHEN METABOLIC PANEL: CPT | Performed by: EMERGENCY MEDICINE

## 2022-01-01 PROCEDURE — 99283 EMERGENCY DEPT VISIT LOW MDM: CPT

## 2022-01-01 PROCEDURE — 999N000065 XR CHEST PORT 1 VIEW

## 2022-01-01 PROCEDURE — 258N000003 HC RX IP 258 OP 636: Performed by: FAMILY MEDICINE

## 2022-01-01 PROCEDURE — 250N000011 HC RX IP 250 OP 636: Performed by: FAMILY MEDICINE

## 2022-01-01 PROCEDURE — 97110 THERAPEUTIC EXERCISES: CPT | Mod: GP

## 2022-01-01 PROCEDURE — 99153 MOD SED SAME PHYS/QHP EA: CPT

## 2022-01-01 PROCEDURE — 96375 TX/PRO/DX INJ NEW DRUG ADDON: CPT

## 2022-01-01 PROCEDURE — 99232 SBSQ HOSP IP/OBS MODERATE 35: CPT | Performed by: NURSE PRACTITIONER

## 2022-01-01 PROCEDURE — 258N000003 HC RX IP 258 OP 636: Performed by: EMERGENCY MEDICINE

## 2022-01-01 PROCEDURE — 83735 ASSAY OF MAGNESIUM: CPT | Performed by: INTERNAL MEDICINE

## 2022-01-01 PROCEDURE — 999N000208 ECHOCARDIOGRAM COMPLETE

## 2022-01-01 PROCEDURE — 99233 SBSQ HOSP IP/OBS HIGH 50: CPT | Performed by: GENERAL ACUTE CARE HOSPITAL

## 2022-01-01 PROCEDURE — 255N000002 HC RX 255 OP 636: Performed by: EMERGENCY MEDICINE

## 2022-01-01 PROCEDURE — 99285 EMERGENCY DEPT VISIT HI MDM: CPT | Mod: 25

## 2022-01-01 PROCEDURE — 82805 BLOOD GASES W/O2 SATURATION: CPT | Performed by: EMERGENCY MEDICINE

## 2022-01-01 PROCEDURE — 82310 ASSAY OF CALCIUM: CPT | Performed by: INTERNAL MEDICINE

## 2022-01-01 PROCEDURE — 97535 SELF CARE MNGMENT TRAINING: CPT | Mod: GO

## 2022-01-01 PROCEDURE — 250N000012 HC RX MED GY IP 250 OP 636 PS 637: Performed by: EMERGENCY MEDICINE

## 2022-01-01 PROCEDURE — 99285 EMERGENCY DEPT VISIT HI MDM: CPT | Mod: CS,25

## 2022-01-01 PROCEDURE — 74177 CT ABD & PELVIS W/CONTRAST: CPT | Mod: MG

## 2022-01-01 PROCEDURE — 99207 PR NOT IN PERSON INPATIENT CONSULT STATISTICAL MARKER: CPT | Performed by: INTERNAL MEDICINE

## 2022-01-01 PROCEDURE — 200N000001 HC R&B ICU

## 2022-01-01 PROCEDURE — 99223 1ST HOSP IP/OBS HIGH 75: CPT | Mod: AI | Performed by: FAMILY MEDICINE

## 2022-01-01 PROCEDURE — 85018 HEMOGLOBIN: CPT | Performed by: EMERGENCY MEDICINE

## 2022-01-01 PROCEDURE — C9113 INJ PANTOPRAZOLE SODIUM, VIA: HCPCS | Performed by: FAMILY MEDICINE

## 2022-01-01 PROCEDURE — 97162 PT EVAL MOD COMPLEX 30 MIN: CPT | Mod: GP

## 2022-01-01 PROCEDURE — 94640 AIRWAY INHALATION TREATMENT: CPT | Mod: 76

## 2022-01-01 PROCEDURE — 99233 SBSQ HOSP IP/OBS HIGH 50: CPT | Performed by: EMERGENCY MEDICINE

## 2022-01-01 PROCEDURE — C9803 HOPD COVID-19 SPEC COLLECT: HCPCS

## 2022-01-01 PROCEDURE — 99239 HOSP IP/OBS DSCHRG MGMT >30: CPT | Performed by: INTERNAL MEDICINE

## 2022-01-01 PROCEDURE — 99232 SBSQ HOSP IP/OBS MODERATE 35: CPT | Performed by: INTERNAL MEDICINE

## 2022-01-01 PROCEDURE — 80202 ASSAY OF VANCOMYCIN: CPT | Performed by: EMERGENCY MEDICINE

## 2022-01-01 PROCEDURE — 84484 ASSAY OF TROPONIN QUANT: CPT | Performed by: HOSPITALIST

## 2022-01-01 PROCEDURE — 80048 BASIC METABOLIC PNL TOTAL CA: CPT | Performed by: FAMILY MEDICINE

## 2022-01-01 PROCEDURE — 99213 OFFICE O/P EST LOW 20 MIN: CPT | Performed by: INTERNAL MEDICINE

## 2022-01-01 PROCEDURE — 87641 MR-STAPH DNA AMP PROBE: CPT | Performed by: EMERGENCY MEDICINE

## 2022-01-01 PROCEDURE — 36415 COLL VENOUS BLD VENIPUNCTURE: CPT | Performed by: EMERGENCY MEDICINE

## 2022-01-01 PROCEDURE — 36600 WITHDRAWAL OF ARTERIAL BLOOD: CPT

## 2022-01-01 PROCEDURE — 96376 TX/PRO/DX INJ SAME DRUG ADON: CPT

## 2022-01-01 PROCEDURE — G1010 CDSM STANSON: HCPCS

## 2022-01-01 PROCEDURE — 99152 MOD SED SAME PHYS/QHP 5/>YRS: CPT

## 2022-01-01 PROCEDURE — 85014 HEMATOCRIT: CPT | Performed by: INTERNAL MEDICINE

## 2022-01-01 PROCEDURE — 85610 PROTHROMBIN TIME: CPT | Performed by: EMERGENCY MEDICINE

## 2022-01-01 PROCEDURE — 84100 ASSAY OF PHOSPHORUS: CPT | Performed by: FAMILY MEDICINE

## 2022-01-01 PROCEDURE — 84132 ASSAY OF SERUM POTASSIUM: CPT | Performed by: EMERGENCY MEDICINE

## 2022-01-01 PROCEDURE — 82140 ASSAY OF AMMONIA: CPT | Performed by: EMERGENCY MEDICINE

## 2022-01-01 PROCEDURE — 97530 THERAPEUTIC ACTIVITIES: CPT | Mod: GO

## 2022-01-01 PROCEDURE — 82435 ASSAY OF BLOOD CHLORIDE: CPT | Performed by: EMERGENCY MEDICINE

## 2022-01-01 PROCEDURE — 99204 OFFICE O/P NEW MOD 45 MIN: CPT | Performed by: NURSE PRACTITIONER

## 2022-01-01 PROCEDURE — 94660 CPAP INITIATION&MGMT: CPT

## 2022-01-01 PROCEDURE — 99233 SBSQ HOSP IP/OBS HIGH 50: CPT | Performed by: INTERNAL MEDICINE

## 2022-01-01 PROCEDURE — 73502 X-RAY EXAM HIP UNI 2-3 VIEWS: CPT

## 2022-01-01 PROCEDURE — 99397 PER PM REEVAL EST PAT 65+ YR: CPT | Performed by: FAMILY MEDICINE

## 2022-01-01 PROCEDURE — 272N000452 HC KIT SHRLOCK 5FR POWER PICC TRIPLE LUMEN

## 2022-01-01 PROCEDURE — 36415 COLL VENOUS BLD VENIPUNCTURE: CPT | Performed by: INTERNAL MEDICINE

## 2022-01-01 PROCEDURE — 250N000009 HC RX 250: Performed by: FAMILY MEDICINE

## 2022-01-01 PROCEDURE — 250N000009 HC RX 250: Performed by: HOSPITALIST

## 2022-01-01 PROCEDURE — 92960 CARDIOVERSION ELECTRIC EXT: CPT | Performed by: GENERAL ACUTE CARE HOSPITAL

## 2022-01-01 PROCEDURE — 99220 PR INITIAL OBSERVATION CARE,LEVEL III: CPT | Performed by: INTERNAL MEDICINE

## 2022-01-01 PROCEDURE — 85027 COMPLETE CBC AUTOMATED: CPT | Performed by: FAMILY MEDICINE

## 2022-01-01 PROCEDURE — 250N000009 HC RX 250: Performed by: STUDENT IN AN ORGANIZED HEALTH CARE EDUCATION/TRAINING PROGRAM

## 2022-01-01 PROCEDURE — 250N000009 HC RX 250: Performed by: INTERNAL MEDICINE

## 2022-01-01 PROCEDURE — 84484 ASSAY OF TROPONIN QUANT: CPT | Performed by: FAMILY MEDICINE

## 2022-01-01 PROCEDURE — 99223 1ST HOSP IP/OBS HIGH 75: CPT | Performed by: NURSE PRACTITIONER

## 2022-01-01 PROCEDURE — 85027 COMPLETE CBC AUTOMATED: CPT | Performed by: EMERGENCY MEDICINE

## 2022-01-01 PROCEDURE — 0124A COVID-19 VACCINE BIVALENT BOOSTER 12+ (PFIZER): CPT | Performed by: INTERNAL MEDICINE

## 2022-01-01 PROCEDURE — 96366 THER/PROPH/DIAG IV INF ADDON: CPT

## 2022-01-01 PROCEDURE — 258N000001 HC RX 258: Performed by: EMERGENCY MEDICINE

## 2022-01-01 PROCEDURE — 97530 THERAPEUTIC ACTIVITIES: CPT | Mod: GP

## 2022-01-01 PROCEDURE — 258N000003 HC RX IP 258 OP 636: Performed by: GENERAL ACUTE CARE HOSPITAL

## 2022-01-01 PROCEDURE — 81001 URINALYSIS AUTO W/SCOPE: CPT | Performed by: INTERNAL MEDICINE

## 2022-01-01 PROCEDURE — 250N000013 HC RX MED GY IP 250 OP 250 PS 637

## 2022-01-01 PROCEDURE — 99222 1ST HOSP IP/OBS MODERATE 55: CPT | Performed by: INTERNAL MEDICINE

## 2022-01-01 PROCEDURE — 250N000013 HC RX MED GY IP 250 OP 250 PS 637: Performed by: HOSPITALIST

## 2022-01-01 PROCEDURE — 83880 ASSAY OF NATRIURETIC PEPTIDE: CPT | Performed by: EMERGENCY MEDICINE

## 2022-01-01 PROCEDURE — 99214 OFFICE O/P EST MOD 30 MIN: CPT | Mod: 25 | Performed by: INTERNAL MEDICINE

## 2022-01-01 PROCEDURE — 99233 SBSQ HOSP IP/OBS HIGH 50: CPT | Performed by: FAMILY MEDICINE

## 2022-01-01 PROCEDURE — 84145 PROCALCITONIN (PCT): CPT | Performed by: EMERGENCY MEDICINE

## 2022-01-01 PROCEDURE — 83735 ASSAY OF MAGNESIUM: CPT | Performed by: EMERGENCY MEDICINE

## 2022-01-01 PROCEDURE — 85014 HEMATOCRIT: CPT | Performed by: EMERGENCY MEDICINE

## 2022-01-01 PROCEDURE — G0009 ADMIN PNEUMOCOCCAL VACCINE: HCPCS | Performed by: INTERNAL MEDICINE

## 2022-01-01 PROCEDURE — HZ2ZZZZ DETOXIFICATION SERVICES FOR SUBSTANCE ABUSE TREATMENT: ICD-10-PCS | Performed by: FAMILY MEDICINE

## 2022-01-01 PROCEDURE — 85018 HEMOGLOBIN: CPT | Performed by: INTERNAL MEDICINE

## 2022-01-01 PROCEDURE — 36415 COLL VENOUS BLD VENIPUNCTURE: CPT | Performed by: HOSPITALIST

## 2022-01-01 PROCEDURE — 99418 PROLNG IP/OBS E/M EA 15 MIN: CPT | Performed by: NURSE PRACTITIONER

## 2022-01-01 PROCEDURE — 250N000009 HC RX 250: Performed by: NURSE PRACTITIONER

## 2022-01-01 PROCEDURE — 250N000011 HC RX IP 250 OP 636: Performed by: GENERAL ACUTE CARE HOSPITAL

## 2022-01-01 PROCEDURE — 250N000011 HC RX IP 250 OP 636: Performed by: HOSPITALIST

## 2022-01-01 PROCEDURE — 99233 SBSQ HOSP IP/OBS HIGH 50: CPT | Performed by: NURSE PRACTITIONER

## 2022-01-01 PROCEDURE — 82310 ASSAY OF CALCIUM: CPT | Performed by: EMERGENCY MEDICINE

## 2022-01-01 PROCEDURE — 85730 THROMBOPLASTIN TIME PARTIAL: CPT | Performed by: EMERGENCY MEDICINE

## 2022-01-01 PROCEDURE — 36591 DRAW BLOOD OFF VENOUS DEVICE: CPT | Performed by: FAMILY MEDICINE

## 2022-01-01 PROCEDURE — 80307 DRUG TEST PRSMV CHEM ANLYZR: CPT | Performed by: INTERNAL MEDICINE

## 2022-01-01 PROCEDURE — 99223 1ST HOSP IP/OBS HIGH 75: CPT | Mod: GC | Performed by: INTERNAL MEDICINE

## 2022-01-01 PROCEDURE — 250N000013 HC RX MED GY IP 250 OP 250 PS 637: Performed by: GENERAL ACUTE CARE HOSPITAL

## 2022-01-01 PROCEDURE — 90677 PCV20 VACCINE IM: CPT | Performed by: INTERNAL MEDICINE

## 2022-01-01 PROCEDURE — G0008 ADMIN INFLUENZA VIRUS VAC: HCPCS | Performed by: INTERNAL MEDICINE

## 2022-01-01 PROCEDURE — 99239 HOSP IP/OBS DSCHRG MGMT >30: CPT | Performed by: HOSPITALIST

## 2022-01-01 PROCEDURE — 36569 INSJ PICC 5 YR+ W/O IMAGING: CPT

## 2022-01-01 PROCEDURE — 250N000013 HC RX MED GY IP 250 OP 250 PS 637: Performed by: NURSE PRACTITIONER

## 2022-01-01 PROCEDURE — 82248 BILIRUBIN DIRECT: CPT | Performed by: EMERGENCY MEDICINE

## 2022-01-01 PROCEDURE — 5A2204Z RESTORATION OF CARDIAC RHYTHM, SINGLE: ICD-10-PCS | Performed by: GENERAL ACUTE CARE HOSPITAL

## 2022-01-01 PROCEDURE — 999N000185 HC STATISTIC TRANSPORT TIME EA 15 MIN

## 2022-01-01 PROCEDURE — 99207 PR NO BILLABLE SERVICE THIS VISIT: CPT | Performed by: INTERNAL MEDICINE

## 2022-01-01 PROCEDURE — 84145 PROCALCITONIN (PCT): CPT | Performed by: FAMILY MEDICINE

## 2022-01-01 PROCEDURE — 85379 FIBRIN DEGRADATION QUANT: CPT | Performed by: EMERGENCY MEDICINE

## 2022-01-01 PROCEDURE — 82962 GLUCOSE BLOOD TEST: CPT

## 2022-01-01 PROCEDURE — 99225 PR SUBSEQUENT OBSERVATION CARE,LEVEL II: CPT | Performed by: FAMILY MEDICINE

## 2022-01-01 PROCEDURE — 83605 ASSAY OF LACTIC ACID: CPT | Performed by: HOSPITALIST

## 2022-01-01 PROCEDURE — 96365 THER/PROPH/DIAG IV INF INIT: CPT | Mod: 59

## 2022-01-01 PROCEDURE — 99222 1ST HOSP IP/OBS MODERATE 55: CPT | Performed by: CLINICAL NURSE SPECIALIST

## 2022-01-01 PROCEDURE — 82805 BLOOD GASES W/O2 SATURATION: CPT | Performed by: HOSPITALIST

## 2022-01-01 PROCEDURE — 96365 THER/PROPH/DIAG IV INF INIT: CPT

## 2022-01-01 PROCEDURE — 999N000206 HC STATISTICAL VASC ACCESS NURSE TIME, 61+ MINUTES

## 2022-01-01 PROCEDURE — 83735 ASSAY OF MAGNESIUM: CPT | Performed by: FAMILY MEDICINE

## 2022-01-01 PROCEDURE — 36415 COLL VENOUS BLD VENIPUNCTURE: CPT | Performed by: FAMILY MEDICINE

## 2022-01-01 PROCEDURE — 99223 1ST HOSP IP/OBS HIGH 75: CPT | Mod: 25 | Performed by: GENERAL ACUTE CARE HOSPITAL

## 2022-01-01 PROCEDURE — 99232 SBSQ HOSP IP/OBS MODERATE 35: CPT | Performed by: CLINICAL NURSE SPECIALIST

## 2022-01-01 PROCEDURE — 80048 BASIC METABOLIC PNL TOTAL CA: CPT | Performed by: HOSPITALIST

## 2022-01-01 PROCEDURE — 99291 CRITICAL CARE FIRST HOUR: CPT | Performed by: INTERNAL MEDICINE

## 2022-01-01 PROCEDURE — 99213 OFFICE O/P EST LOW 20 MIN: CPT | Mod: 25 | Performed by: FAMILY MEDICINE

## 2022-01-01 PROCEDURE — U0003 INFECTIOUS AGENT DETECTION BY NUCLEIC ACID (DNA OR RNA); SEVERE ACUTE RESPIRATORY SYNDROME CORONAVIRUS 2 (SARS-COV-2) (CORONAVIRUS DISEASE [COVID-19]), AMPLIFIED PROBE TECHNIQUE, MAKING USE OF HIGH THROUGHPUT TECHNOLOGIES AS DESCRIBED BY CMS-2020-01-R: HCPCS | Performed by: EMERGENCY MEDICINE

## 2022-01-01 PROCEDURE — 99233 SBSQ HOSP IP/OBS HIGH 50: CPT | Mod: 25 | Performed by: GENERAL ACUTE CARE HOSPITAL

## 2022-01-01 PROCEDURE — 99223 1ST HOSP IP/OBS HIGH 75: CPT | Mod: AI | Performed by: HOSPITALIST

## 2022-01-01 PROCEDURE — 93306 TTE W/DOPPLER COMPLETE: CPT | Mod: 26 | Performed by: GENERAL ACUTE CARE HOSPITAL

## 2022-01-01 PROCEDURE — 85025 COMPLETE CBC W/AUTO DIFF WBC: CPT | Performed by: HOSPITALIST

## 2022-01-01 PROCEDURE — 82374 ASSAY BLOOD CARBON DIOXIDE: CPT | Performed by: EMERGENCY MEDICINE

## 2022-01-01 PROCEDURE — 96361 HYDRATE IV INFUSION ADD-ON: CPT

## 2022-01-01 PROCEDURE — 96374 THER/PROPH/DIAG INJ IV PUSH: CPT

## 2022-01-01 PROCEDURE — 96368 THER/DIAG CONCURRENT INF: CPT

## 2022-01-01 PROCEDURE — 99233 SBSQ HOSP IP/OBS HIGH 50: CPT | Performed by: PAIN MEDICINE

## 2022-01-01 PROCEDURE — 80048 BASIC METABOLIC PNL TOTAL CA: CPT | Performed by: EMERGENCY MEDICINE

## 2022-01-01 PROCEDURE — G0463 HOSPITAL OUTPT CLINIC VISIT: HCPCS

## 2022-01-01 PROCEDURE — 82077 ASSAY SPEC XCP UR&BREATH IA: CPT | Performed by: EMERGENCY MEDICINE

## 2022-01-01 PROCEDURE — 99207 PR NOT IN PERSON INPATIENT CONSULT STATISTICAL MARKER: CPT

## 2022-01-01 PROCEDURE — 90662 IIV NO PRSV INCREASED AG IM: CPT | Performed by: INTERNAL MEDICINE

## 2022-01-01 PROCEDURE — 83036 HEMOGLOBIN GLYCOSYLATED A1C: CPT | Performed by: EMERGENCY MEDICINE

## 2022-01-01 PROCEDURE — 93970 EXTREMITY STUDY: CPT

## 2022-01-01 PROCEDURE — 99222 1ST HOSP IP/OBS MODERATE 55: CPT | Performed by: NURSE PRACTITIONER

## 2022-01-01 PROCEDURE — 250N000013 HC RX MED GY IP 250 OP 250 PS 637: Performed by: PAIN MEDICINE

## 2022-01-01 PROCEDURE — 36415 COLL VENOUS BLD VENIPUNCTURE: CPT | Performed by: STUDENT IN AN ORGANIZED HEALTH CARE EDUCATION/TRAINING PROGRAM

## 2022-01-01 RX ORDER — AMIODARONE HYDROCHLORIDE 200 MG/1
400 TABLET ORAL 2 TIMES DAILY
Status: DISCONTINUED | OUTPATIENT
Start: 2022-01-01 | End: 2022-01-01

## 2022-01-01 RX ORDER — OXYCODONE HYDROCHLORIDE 20 MG/1
20 TABLET ORAL
Qty: 150 TABLET | Refills: 0 | Status: SHIPPED | OUTPATIENT
Start: 2022-01-01 | End: 2022-01-01

## 2022-01-01 RX ORDER — OXYCODONE HYDROCHLORIDE 20 MG/1
TABLET ORAL
Qty: 150 TABLET | Refills: 0 | OUTPATIENT
Start: 2022-01-01

## 2022-01-01 RX ORDER — POTASSIUM CHLORIDE 1500 MG/1
20 TABLET, EXTENDED RELEASE ORAL DAILY
Status: ON HOLD | COMMUNITY
End: 2022-01-01

## 2022-01-01 RX ORDER — FAMOTIDINE 20 MG/1
20 TABLET, FILM COATED ORAL DAILY
Qty: 60 TABLET | Refills: 0 | OUTPATIENT
Start: 2022-01-01

## 2022-01-01 RX ORDER — ONDANSETRON 4 MG/1
4 TABLET, ORALLY DISINTEGRATING ORAL EVERY 6 HOURS PRN
Status: CANCELLED | OUTPATIENT
Start: 2022-01-01

## 2022-01-01 RX ORDER — NALOXONE HYDROCHLORIDE 0.4 MG/ML
0.2 INJECTION, SOLUTION INTRAMUSCULAR; INTRAVENOUS; SUBCUTANEOUS
Status: DISCONTINUED | OUTPATIENT
Start: 2022-01-01 | End: 2023-01-01 | Stop reason: HOSPADM

## 2022-01-01 RX ORDER — GABAPENTIN 300 MG/1
300 CAPSULE ORAL 3 TIMES DAILY
Status: DISCONTINUED | OUTPATIENT
Start: 2022-01-01 | End: 2022-01-01

## 2022-01-01 RX ORDER — FUROSEMIDE 10 MG/ML
40 INJECTION INTRAMUSCULAR; INTRAVENOUS DAILY
Status: DISCONTINUED | OUTPATIENT
Start: 2022-01-01 | End: 2022-01-01 | Stop reason: HOSPADM

## 2022-01-01 RX ORDER — AMLODIPINE BESYLATE 5 MG/1
5 TABLET ORAL DAILY
Status: DISCONTINUED | OUTPATIENT
Start: 2022-01-01 | End: 2022-01-01 | Stop reason: HOSPADM

## 2022-01-01 RX ORDER — PROCHLORPERAZINE 25 MG
12.5 SUPPOSITORY, RECTAL RECTAL EVERY 12 HOURS PRN
Status: DISCONTINUED | OUTPATIENT
Start: 2022-01-01 | End: 2022-01-01 | Stop reason: HOSPADM

## 2022-01-01 RX ORDER — IOPAMIDOL 755 MG/ML
100 INJECTION, SOLUTION INTRAVASCULAR ONCE
Status: COMPLETED | OUTPATIENT
Start: 2022-01-01 | End: 2022-01-01

## 2022-01-01 RX ORDER — DILTIAZEM HYDROCHLORIDE 240 MG/1
240 CAPSULE, COATED, EXTENDED RELEASE ORAL DAILY
Qty: 30 CAPSULE | Refills: 0 | Status: SHIPPED | OUTPATIENT
Start: 2022-01-01 | End: 2022-01-01

## 2022-01-01 RX ORDER — GABAPENTIN 300 MG/1
600 CAPSULE ORAL 3 TIMES DAILY
Status: DISCONTINUED | OUTPATIENT
Start: 2022-01-01 | End: 2022-01-01

## 2022-01-01 RX ORDER — FUROSEMIDE 20 MG
40 TABLET ORAL DAILY
Qty: 30 TABLET | Refills: 11 | Status: SHIPPED | OUTPATIENT
Start: 2022-01-01 | End: 2022-01-01

## 2022-01-01 RX ORDER — PIPERACILLIN SODIUM, TAZOBACTAM SODIUM 3; .375 G/15ML; G/15ML
3.38 INJECTION, POWDER, LYOPHILIZED, FOR SOLUTION INTRAVENOUS EVERY 8 HOURS
Status: DISCONTINUED | OUTPATIENT
Start: 2022-01-01 | End: 2022-01-01

## 2022-01-01 RX ORDER — CLONAZEPAM 2 MG/1
2 TABLET ORAL
COMMUNITY
End: 2022-01-01

## 2022-01-01 RX ORDER — PIPERACILLIN SODIUM, TAZOBACTAM SODIUM 3; .375 G/15ML; G/15ML
3.38 INJECTION, POWDER, LYOPHILIZED, FOR SOLUTION INTRAVENOUS ONCE
Status: DISCONTINUED | OUTPATIENT
Start: 2022-01-01 | End: 2022-01-01

## 2022-01-01 RX ORDER — CITALOPRAM HYDROBROMIDE 10 MG/1
40 TABLET ORAL DAILY
Status: DISCONTINUED | OUTPATIENT
Start: 2022-01-01 | End: 2022-01-01 | Stop reason: HOSPADM

## 2022-01-01 RX ORDER — AMLODIPINE BESYLATE 5 MG/1
5 TABLET ORAL DAILY
Status: DISCONTINUED | OUTPATIENT
Start: 2022-01-01 | End: 2022-01-01

## 2022-01-01 RX ORDER — ACETAMINOPHEN 325 MG/1
650 TABLET ORAL EVERY 4 HOURS PRN
Status: DISCONTINUED | OUTPATIENT
Start: 2022-01-01 | End: 2022-01-01

## 2022-01-01 RX ORDER — AMIODARONE HYDROCHLORIDE 200 MG/1
200 TABLET ORAL 2 TIMES DAILY
Status: DISCONTINUED | OUTPATIENT
Start: 2022-01-01 | End: 2022-01-01

## 2022-01-01 RX ORDER — GABAPENTIN 300 MG/1
300 CAPSULE ORAL 3 TIMES DAILY
Qty: 90 CAPSULE | Refills: 0 | Status: SHIPPED | OUTPATIENT
Start: 2022-01-01 | End: 2022-01-01

## 2022-01-01 RX ORDER — FUROSEMIDE 10 MG/ML
20 INJECTION INTRAMUSCULAR; INTRAVENOUS EVERY 12 HOURS
Status: DISCONTINUED | OUTPATIENT
Start: 2022-01-01 | End: 2023-01-01

## 2022-01-01 RX ORDER — CLONAZEPAM 1 MG/1
TABLET ORAL
Qty: 60 TABLET | Refills: 0 | OUTPATIENT
Start: 2022-01-01

## 2022-01-01 RX ORDER — AMIODARONE HYDROCHLORIDE 400 MG/1
400 TABLET ORAL 2 TIMES DAILY
Qty: 28 TABLET | Refills: 0 | Status: SHIPPED | OUTPATIENT
Start: 2022-01-01 | End: 2022-01-01

## 2022-01-01 RX ORDER — METOPROLOL TARTRATE 25 MG/1
100 TABLET, FILM COATED ORAL 2 TIMES DAILY
Status: DISCONTINUED | OUTPATIENT
Start: 2022-01-01 | End: 2022-01-01 | Stop reason: HOSPADM

## 2022-01-01 RX ORDER — ONDANSETRON 2 MG/ML
4 INJECTION INTRAMUSCULAR; INTRAVENOUS EVERY 6 HOURS PRN
Status: DISCONTINUED | OUTPATIENT
Start: 2022-01-01 | End: 2022-01-01 | Stop reason: HOSPADM

## 2022-01-01 RX ORDER — PREDNISONE 20 MG/1
20 TABLET ORAL DAILY
Status: DISCONTINUED | OUTPATIENT
Start: 2022-01-01 | End: 2022-01-01 | Stop reason: HOSPADM

## 2022-01-01 RX ORDER — METOPROLOL TARTRATE 100 MG
50 TABLET ORAL 2 TIMES DAILY
Qty: 60 TABLET | Refills: 11 | Status: ON HOLD | OUTPATIENT
Start: 2022-01-01 | End: 2023-01-01

## 2022-01-01 RX ORDER — AMIODARONE HYDROCHLORIDE 200 MG/1
200 TABLET ORAL DAILY
Status: CANCELLED | OUTPATIENT
Start: 2022-01-01

## 2022-01-01 RX ORDER — CITALOPRAM HYDROBROMIDE 40 MG/1
40 TABLET ORAL DAILY
Status: ON HOLD | COMMUNITY
End: 2022-01-01

## 2022-01-01 RX ORDER — FUROSEMIDE 10 MG/ML
40 INJECTION INTRAMUSCULAR; INTRAVENOUS ONCE
Status: COMPLETED | OUTPATIENT
Start: 2022-01-01 | End: 2022-01-01

## 2022-01-01 RX ORDER — GUAIFENESIN 600 MG/1
1200 TABLET, EXTENDED RELEASE ORAL DAILY
Status: DISCONTINUED | OUTPATIENT
Start: 2022-01-01 | End: 2022-01-01 | Stop reason: HOSPADM

## 2022-01-01 RX ORDER — PIPERACILLIN SODIUM, TAZOBACTAM SODIUM 3; .375 G/15ML; G/15ML
3.38 INJECTION, POWDER, LYOPHILIZED, FOR SOLUTION INTRAVENOUS ONCE
Status: COMPLETED | OUTPATIENT
Start: 2022-01-01 | End: 2022-01-01

## 2022-01-01 RX ORDER — BISACODYL 10 MG
10 SUPPOSITORY, RECTAL RECTAL DAILY PRN
Status: DISCONTINUED | OUTPATIENT
Start: 2022-01-01 | End: 2023-01-01 | Stop reason: HOSPADM

## 2022-01-01 RX ORDER — LIDOCAINE 50 MG/G
OINTMENT TOPICAL 4 TIMES DAILY
Status: DISCONTINUED | OUTPATIENT
Start: 2022-01-01 | End: 2023-01-01 | Stop reason: HOSPADM

## 2022-01-01 RX ORDER — NALOXONE HYDROCHLORIDE 0.4 MG/ML
0.4 INJECTION, SOLUTION INTRAMUSCULAR; INTRAVENOUS; SUBCUTANEOUS
Status: DISCONTINUED | OUTPATIENT
Start: 2022-01-01 | End: 2022-01-01 | Stop reason: HOSPADM

## 2022-01-01 RX ORDER — HALOPERIDOL 5 MG/ML
2 INJECTION INTRAMUSCULAR EVERY 6 HOURS PRN
Status: DISCONTINUED | OUTPATIENT
Start: 2022-01-01 | End: 2023-01-01

## 2022-01-01 RX ORDER — DILTIAZEM HYDROCHLORIDE 120 MG/1
120 CAPSULE, COATED, EXTENDED RELEASE ORAL DAILY
Status: DISCONTINUED | OUTPATIENT
Start: 2022-01-01 | End: 2023-01-01

## 2022-01-01 RX ORDER — NALOXONE HYDROCHLORIDE 0.4 MG/ML
0.2 INJECTION, SOLUTION INTRAMUSCULAR; INTRAVENOUS; SUBCUTANEOUS
Status: DISCONTINUED | OUTPATIENT
Start: 2022-01-01 | End: 2022-01-01 | Stop reason: HOSPADM

## 2022-01-01 RX ORDER — LISINOPRIL 10 MG/1
40 TABLET ORAL DAILY
Status: DISCONTINUED | OUTPATIENT
Start: 2022-01-01 | End: 2022-01-01 | Stop reason: HOSPADM

## 2022-01-01 RX ORDER — DILTIAZEM HYDROCHLORIDE 5 MG/ML
25 INJECTION INTRAVENOUS ONCE
Status: COMPLETED | OUTPATIENT
Start: 2022-01-01 | End: 2022-01-01

## 2022-01-01 RX ORDER — OXYCODONE AND ACETAMINOPHEN 5; 325 MG/1; MG/1
2 TABLET ORAL ONCE
Status: COMPLETED | OUTPATIENT
Start: 2022-01-01 | End: 2022-01-01

## 2022-01-01 RX ORDER — MAGNESIUM SULFATE HEPTAHYDRATE 40 MG/ML
2 INJECTION, SOLUTION INTRAVENOUS
Status: DISCONTINUED | OUTPATIENT
Start: 2022-01-01 | End: 2022-01-01 | Stop reason: HOSPADM

## 2022-01-01 RX ORDER — AZITHROMYCIN 250 MG/1
250 TABLET, FILM COATED ORAL
Status: DISCONTINUED | OUTPATIENT
Start: 2022-01-01 | End: 2023-01-01 | Stop reason: HOSPADM

## 2022-01-01 RX ORDER — AMIODARONE HYDROCHLORIDE 200 MG/1
200 TABLET ORAL DAILY
Qty: 30 TABLET | Refills: 11 | Status: SHIPPED | OUTPATIENT
Start: 2022-01-01

## 2022-01-01 RX ORDER — SENNOSIDES 8.6 MG
8.6 TABLET ORAL 2 TIMES DAILY PRN
Status: DISCONTINUED | OUTPATIENT
Start: 2022-01-01 | End: 2022-01-01 | Stop reason: HOSPADM

## 2022-01-01 RX ORDER — LIDOCAINE 40 MG/G
CREAM TOPICAL
Status: ACTIVE | OUTPATIENT
Start: 2022-01-01 | End: 2022-01-01

## 2022-01-01 RX ORDER — BISACODYL 10 MG
10 SUPPOSITORY, RECTAL RECTAL DAILY PRN
Qty: 2 SUPPOSITORY | Refills: 0 | Status: SHIPPED | OUTPATIENT
Start: 2022-01-01 | End: 2022-01-01

## 2022-01-01 RX ORDER — PIPERACILLIN SODIUM, TAZOBACTAM SODIUM 3; .375 G/15ML; G/15ML
3.38 INJECTION, POWDER, LYOPHILIZED, FOR SOLUTION INTRAVENOUS EVERY 8 HOURS
Status: COMPLETED | OUTPATIENT
Start: 2022-01-01 | End: 2023-01-01

## 2022-01-01 RX ORDER — FUROSEMIDE 20 MG
40 TABLET ORAL DAILY
Refills: 0
Start: 2022-01-01 | End: 2022-01-01

## 2022-01-01 RX ORDER — LIDOCAINE 40 MG/G
CREAM TOPICAL
Status: DISCONTINUED | OUTPATIENT
Start: 2022-01-01 | End: 2022-01-01 | Stop reason: HOSPADM

## 2022-01-01 RX ORDER — ONDANSETRON 4 MG/1
4 TABLET, ORALLY DISINTEGRATING ORAL EVERY 6 HOURS PRN
Status: DISCONTINUED | OUTPATIENT
Start: 2022-01-01 | End: 2022-01-01 | Stop reason: HOSPADM

## 2022-01-01 RX ORDER — IPRATROPIUM BROMIDE AND ALBUTEROL SULFATE 2.5; .5 MG/3ML; MG/3ML
3 SOLUTION RESPIRATORY (INHALATION)
Status: DISCONTINUED | OUTPATIENT
Start: 2022-01-01 | End: 2022-01-01 | Stop reason: HOSPADM

## 2022-01-01 RX ORDER — OXYCODONE HYDROCHLORIDE 5 MG/1
5-10 TABLET ORAL EVERY 4 HOURS PRN
Status: DISCONTINUED | OUTPATIENT
Start: 2022-01-01 | End: 2022-01-01

## 2022-01-01 RX ORDER — CLONAZEPAM 0.5 MG/1
1 TABLET ORAL AT BEDTIME
Status: DISCONTINUED | OUTPATIENT
Start: 2022-01-01 | End: 2023-01-01

## 2022-01-01 RX ORDER — PREDNISONE 20 MG/1
20 TABLET ORAL DAILY
Status: CANCELLED | OUTPATIENT
Start: 2022-01-01

## 2022-01-01 RX ORDER — METHYLPREDNISOLONE SODIUM SUCCINATE 40 MG/ML
40 INJECTION, POWDER, LYOPHILIZED, FOR SOLUTION INTRAMUSCULAR; INTRAVENOUS EVERY 12 HOURS
Status: DISCONTINUED | OUTPATIENT
Start: 2022-01-01 | End: 2022-01-01 | Stop reason: HOSPADM

## 2022-01-01 RX ORDER — METHYLPREDNISOLONE SODIUM SUCCINATE 125 MG/2ML
125 INJECTION, POWDER, LYOPHILIZED, FOR SOLUTION INTRAMUSCULAR; INTRAVENOUS ONCE
Status: COMPLETED | OUTPATIENT
Start: 2022-01-01 | End: 2022-01-01

## 2022-01-01 RX ORDER — GABAPENTIN 300 MG/1
600 CAPSULE ORAL ONCE
Status: COMPLETED | OUTPATIENT
Start: 2022-01-01 | End: 2022-01-01

## 2022-01-01 RX ORDER — AMIODARONE HYDROCHLORIDE 200 MG/1
200 TABLET ORAL DAILY
Status: DISCONTINUED | OUTPATIENT
Start: 2022-01-01 | End: 2022-01-01 | Stop reason: HOSPADM

## 2022-01-01 RX ORDER — GABAPENTIN 300 MG/1
300 CAPSULE ORAL 3 TIMES DAILY
Status: CANCELLED | OUTPATIENT
Start: 2022-01-01

## 2022-01-01 RX ORDER — GUAIFENESIN 600 MG/1
1200 TABLET, EXTENDED RELEASE ORAL DAILY
COMMUNITY
End: 2022-01-01

## 2022-01-01 RX ORDER — AMIODARONE HYDROCHLORIDE 200 MG/1
200 TABLET ORAL DAILY
Status: DISCONTINUED | OUTPATIENT
Start: 2022-01-01 | End: 2023-01-01 | Stop reason: HOSPADM

## 2022-01-01 RX ORDER — DILTIAZEM HYDROCHLORIDE 120 MG/1
240 CAPSULE, COATED, EXTENDED RELEASE ORAL DAILY
Status: DISCONTINUED | OUTPATIENT
Start: 2022-01-01 | End: 2022-01-01 | Stop reason: HOSPADM

## 2022-01-01 RX ORDER — OXYCODONE HYDROCHLORIDE 5 MG/1
10 TABLET ORAL EVERY 4 HOURS PRN
Status: DISCONTINUED | OUTPATIENT
Start: 2022-01-01 | End: 2022-01-01

## 2022-01-01 RX ORDER — CITALOPRAM HYDROBROMIDE 40 MG/1
40 TABLET ORAL DAILY
Qty: 60 TABLET | Refills: 0 | Status: SHIPPED | OUTPATIENT
Start: 2022-01-01 | End: 2022-01-01

## 2022-01-01 RX ORDER — ACETAMINOPHEN 650 MG/1
650 SUPPOSITORY RECTAL EVERY 4 HOURS PRN
Status: DISCONTINUED | OUTPATIENT
Start: 2022-01-01 | End: 2022-01-01

## 2022-01-01 RX ORDER — HEPARIN SODIUM 5000 [USP'U]/.5ML
5000 INJECTION, SOLUTION INTRAVENOUS; SUBCUTANEOUS EVERY 8 HOURS
Status: DISCONTINUED | OUTPATIENT
Start: 2022-01-01 | End: 2022-01-01

## 2022-01-01 RX ORDER — FAMOTIDINE 20 MG/1
20 TABLET, FILM COATED ORAL DAILY
COMMUNITY
End: 2022-01-01

## 2022-01-01 RX ORDER — PIPERACILLIN SODIUM, TAZOBACTAM SODIUM 3; .375 G/15ML; G/15ML
3.38 INJECTION, POWDER, LYOPHILIZED, FOR SOLUTION INTRAVENOUS EVERY 8 HOURS
Status: DISCONTINUED | OUTPATIENT
Start: 2022-01-01 | End: 2022-01-01 | Stop reason: HOSPADM

## 2022-01-01 RX ORDER — FENTANYL CITRATE 50 UG/ML
INJECTION, SOLUTION INTRAMUSCULAR; INTRAVENOUS PRN
Status: COMPLETED | OUTPATIENT
Start: 2022-01-01 | End: 2022-01-01

## 2022-01-01 RX ORDER — NICOTINE POLACRILEX 4 MG
15-30 LOZENGE BUCCAL
Status: DISCONTINUED | OUTPATIENT
Start: 2022-01-01 | End: 2022-01-01 | Stop reason: HOSPADM

## 2022-01-01 RX ORDER — FAMOTIDINE 20 MG/1
20 TABLET, FILM COATED ORAL DAILY
Qty: 60 TABLET | Refills: 0 | Status: SHIPPED | OUTPATIENT
Start: 2022-01-01 | End: 2023-01-01

## 2022-01-01 RX ORDER — IPRATROPIUM BROMIDE AND ALBUTEROL SULFATE 2.5; .5 MG/3ML; MG/3ML
1 SOLUTION RESPIRATORY (INHALATION) 4 TIMES DAILY
Status: DISCONTINUED | OUTPATIENT
Start: 2022-01-01 | End: 2022-01-01

## 2022-01-01 RX ORDER — CLONAZEPAM 1 MG/1
1-2 TABLET ORAL
COMMUNITY
Start: 2022-01-01 | End: 2022-01-01

## 2022-01-01 RX ORDER — AZITHROMYCIN 500 MG/5ML
500 INJECTION, POWDER, LYOPHILIZED, FOR SOLUTION INTRAVENOUS ONCE
Status: COMPLETED | OUTPATIENT
Start: 2022-01-01 | End: 2022-01-01

## 2022-01-01 RX ORDER — AMOXICILLIN 250 MG
1 CAPSULE ORAL 2 TIMES DAILY PRN
Status: DISCONTINUED | OUTPATIENT
Start: 2022-01-01 | End: 2023-01-01 | Stop reason: HOSPADM

## 2022-01-01 RX ORDER — HALOPERIDOL 2 MG/ML
.5-1 SOLUTION ORAL EVERY 6 HOURS PRN
Qty: 1 ML | Refills: 0 | Status: SHIPPED | OUTPATIENT
Start: 2022-01-01 | End: 2022-01-01

## 2022-01-01 RX ORDER — OXYCODONE HYDROCHLORIDE 5 MG/1
10 TABLET ORAL
Status: DISCONTINUED | OUTPATIENT
Start: 2022-01-01 | End: 2022-01-01

## 2022-01-01 RX ORDER — AMIODARONE HYDROCHLORIDE 200 MG/1
400 TABLET ORAL 2 TIMES DAILY
Status: COMPLETED | OUTPATIENT
Start: 2022-01-01 | End: 2022-01-01

## 2022-01-01 RX ORDER — NALOXONE HYDROCHLORIDE 0.4 MG/ML
0.4 INJECTION, SOLUTION INTRAMUSCULAR; INTRAVENOUS; SUBCUTANEOUS
Status: DISCONTINUED | OUTPATIENT
Start: 2022-01-01 | End: 2023-01-01 | Stop reason: HOSPADM

## 2022-01-01 RX ORDER — DEXTROSE MONOHYDRATE 100 MG/ML
300 INJECTION, SOLUTION INTRAVENOUS ONCE
Status: COMPLETED | OUTPATIENT
Start: 2022-01-01 | End: 2022-01-01

## 2022-01-01 RX ORDER — PREDNISONE 20 MG/1
20 TABLET ORAL DAILY
COMMUNITY
End: 2022-01-01

## 2022-01-01 RX ORDER — CITALOPRAM HYDROBROMIDE 40 MG/1
40 TABLET ORAL DAILY
COMMUNITY
End: 2022-01-01

## 2022-01-01 RX ORDER — METOPROLOL TARTRATE 25 MG/1
50 TABLET, FILM COATED ORAL EVERY 6 HOURS
Status: DISCONTINUED | OUTPATIENT
Start: 2022-01-01 | End: 2022-01-01

## 2022-01-01 RX ORDER — FUROSEMIDE 20 MG
20 TABLET ORAL DAILY
Status: DISCONTINUED | OUTPATIENT
Start: 2022-01-01 | End: 2022-01-01

## 2022-01-01 RX ORDER — OLANZAPINE 5 MG/1
5-10 TABLET, ORALLY DISINTEGRATING ORAL EVERY 6 HOURS PRN
Status: DISCONTINUED | OUTPATIENT
Start: 2022-01-01 | End: 2023-01-01 | Stop reason: HOSPADM

## 2022-01-01 RX ORDER — NICOTINE POLACRILEX 4 MG
15-30 LOZENGE BUCCAL
Status: DISCONTINUED | OUTPATIENT
Start: 2022-01-01 | End: 2023-01-01 | Stop reason: HOSPADM

## 2022-01-01 RX ORDER — METHOCARBAMOL 500 MG/1
250 TABLET ORAL EVERY 6 HOURS PRN
Status: DISCONTINUED | OUTPATIENT
Start: 2022-01-01 | End: 2023-01-01

## 2022-01-01 RX ORDER — ALBUTEROL SULFATE 90 UG/1
2 AEROSOL, METERED RESPIRATORY (INHALATION)
Qty: 18 G | Refills: 11 | Status: CANCELLED | OUTPATIENT
Start: 2022-01-01

## 2022-01-01 RX ORDER — POTASSIUM CHLORIDE 1500 MG/1
20 TABLET, EXTENDED RELEASE ORAL DAILY
Status: DISCONTINUED | OUTPATIENT
Start: 2022-01-01 | End: 2022-01-01

## 2022-01-01 RX ORDER — AMLODIPINE BESYLATE 5 MG/1
5 TABLET ORAL DAILY
Status: ON HOLD | COMMUNITY
End: 2022-01-01

## 2022-01-01 RX ORDER — SENNOSIDES 8.6 MG
8.6 TABLET ORAL 2 TIMES DAILY PRN
Status: CANCELLED | OUTPATIENT
Start: 2022-01-01

## 2022-01-01 RX ORDER — NALOXONE HYDROCHLORIDE 0.4 MG/ML
0.2 INJECTION, SOLUTION INTRAMUSCULAR; INTRAVENOUS; SUBCUTANEOUS
Status: CANCELLED | OUTPATIENT
Start: 2022-01-01

## 2022-01-01 RX ORDER — FLUMAZENIL 0.1 MG/ML
0.2 INJECTION, SOLUTION INTRAVENOUS
Status: DISCONTINUED | OUTPATIENT
Start: 2022-01-01 | End: 2023-01-01 | Stop reason: HOSPADM

## 2022-01-01 RX ORDER — QUETIAPINE FUMARATE 25 MG/1
25 TABLET, FILM COATED ORAL EVERY 6 HOURS PRN
Status: CANCELLED | OUTPATIENT
Start: 2022-01-01

## 2022-01-01 RX ORDER — PIPERACILLIN SODIUM, TAZOBACTAM SODIUM 3; .375 G/15ML; G/15ML
3.38 INJECTION, POWDER, LYOPHILIZED, FOR SOLUTION INTRAVENOUS EVERY 8 HOURS
Status: CANCELLED | OUTPATIENT
Start: 2022-01-01 | End: 2022-01-01

## 2022-01-01 RX ORDER — CITALOPRAM HYDROBROMIDE 40 MG/1
40 TABLET ORAL DAILY
Qty: 60 TABLET | Refills: 0 | OUTPATIENT
Start: 2022-01-01

## 2022-01-01 RX ORDER — DILTIAZEM HYDROCHLORIDE 300 MG/1
300 CAPSULE, COATED, EXTENDED RELEASE ORAL DAILY
Status: DISCONTINUED | OUTPATIENT
Start: 2022-01-01 | End: 2022-01-01 | Stop reason: HOSPADM

## 2022-01-01 RX ORDER — GLECAPREVIR AND PIBRENTASVIR 40; 100 MG/1; MG/1
3 TABLET, FILM COATED ORAL
Qty: 168 TABLET | Refills: 0 | Status: SHIPPED | OUTPATIENT
Start: 2022-01-01 | End: 2022-01-01

## 2022-01-01 RX ORDER — DILTIAZEM HYDROCHLORIDE 30 MG/1
30 TABLET, FILM COATED ORAL EVERY 6 HOURS SCHEDULED
Status: DISCONTINUED | OUTPATIENT
Start: 2022-01-01 | End: 2022-01-01

## 2022-01-01 RX ORDER — IPRATROPIUM BROMIDE AND ALBUTEROL SULFATE 2.5; .5 MG/3ML; MG/3ML
3 SOLUTION RESPIRATORY (INHALATION) 3 TIMES DAILY
Status: CANCELLED | OUTPATIENT
Start: 2022-01-01

## 2022-01-01 RX ORDER — LORAZEPAM 1 MG/1
1-2 TABLET ORAL EVERY 30 MIN PRN
Status: DISCONTINUED | OUTPATIENT
Start: 2022-01-01 | End: 2022-01-01

## 2022-01-01 RX ORDER — FUROSEMIDE 40 MG
40 TABLET ORAL DAILY
Status: ON HOLD | COMMUNITY
End: 2022-01-01

## 2022-01-01 RX ORDER — ERYTHROMYCIN 5 MG/G
OINTMENT OPHTHALMIC EVERY 6 HOURS SCHEDULED
Status: DISCONTINUED | OUTPATIENT
Start: 2022-01-01 | End: 2022-01-01 | Stop reason: HOSPADM

## 2022-01-01 RX ORDER — PREDNISONE 20 MG/1
20 TABLET ORAL DAILY
Status: DISCONTINUED | OUTPATIENT
Start: 2022-01-01 | End: 2023-01-01

## 2022-01-01 RX ORDER — OXYCODONE HYDROCHLORIDE 20 MG/1
20 TABLET ORAL
Qty: 150 TABLET | Refills: 0 | Status: ON HOLD | OUTPATIENT
Start: 2022-01-01 | End: 2023-01-01

## 2022-01-01 RX ORDER — CEFAZOLIN SODIUM 1 G/50ML
2000 SOLUTION INTRAVENOUS ONCE
Status: COMPLETED | OUTPATIENT
Start: 2022-01-01 | End: 2022-01-01

## 2022-01-01 RX ORDER — CALCIUM GLUCONATE 20 MG/ML
1 INJECTION, SOLUTION INTRAVENOUS ONCE
Status: COMPLETED | OUTPATIENT
Start: 2022-01-01 | End: 2022-01-01

## 2022-01-01 RX ORDER — METOPROLOL TARTRATE 100 MG
100 TABLET ORAL 2 TIMES DAILY
Qty: 60 TABLET | Refills: 11 | Status: SHIPPED | OUTPATIENT
Start: 2022-01-01 | End: 2022-01-01

## 2022-01-01 RX ORDER — LISINOPRIL 40 MG/1
40 TABLET ORAL DAILY
Status: ON HOLD | COMMUNITY
End: 2022-01-01

## 2022-01-01 RX ORDER — CLONAZEPAM 2 MG/1
2 TABLET ORAL AT BEDTIME
Status: ON HOLD | COMMUNITY
End: 2023-01-01

## 2022-01-01 RX ORDER — CLONAZEPAM 0.5 MG/1
2 TABLET ORAL
Status: DISCONTINUED | OUTPATIENT
Start: 2022-01-01 | End: 2022-01-01 | Stop reason: HOSPADM

## 2022-01-01 RX ORDER — CLONAZEPAM 1 MG/1
1-2 TABLET ORAL
Qty: 60 TABLET | Refills: 0 | Status: ON HOLD | OUTPATIENT
Start: 2022-01-01 | End: 2023-01-01

## 2022-01-01 RX ORDER — LISINOPRIL 40 MG/1
40 TABLET ORAL DAILY
Status: CANCELLED | OUTPATIENT
Start: 2022-01-01

## 2022-01-01 RX ORDER — CEFAZOLIN SODIUM 1 G/50ML
1250 SOLUTION INTRAVENOUS EVERY 12 HOURS
Status: DISCONTINUED | OUTPATIENT
Start: 2022-01-01 | End: 2023-01-01

## 2022-01-01 RX ORDER — ACETAMINOPHEN 325 MG/1
650 TABLET ORAL ONCE
Status: COMPLETED | OUTPATIENT
Start: 2022-01-01 | End: 2022-01-01

## 2022-01-01 RX ORDER — CITALOPRAM HYDROBROMIDE 20 MG/1
40 TABLET ORAL DAILY
Status: CANCELLED | OUTPATIENT
Start: 2022-01-01

## 2022-01-01 RX ORDER — AMIODARONE HYDROCHLORIDE 200 MG/1
400 TABLET ORAL 2 TIMES DAILY
Status: CANCELLED | OUTPATIENT
Start: 2022-01-01 | End: 2022-01-01

## 2022-01-01 RX ORDER — HYDROMORPHONE HYDROCHLORIDE 1 MG/ML
1 SOLUTION ORAL
Qty: 2 ML | Refills: 0 | Status: ON HOLD | OUTPATIENT
Start: 2022-01-01 | End: 2022-01-01

## 2022-01-01 RX ORDER — FUROSEMIDE 10 MG/ML
40 INJECTION INTRAMUSCULAR; INTRAVENOUS EVERY 12 HOURS
Status: DISCONTINUED | OUTPATIENT
Start: 2022-01-01 | End: 2022-01-01

## 2022-01-01 RX ORDER — HALOPERIDOL 2 MG/ML
.5-1 SOLUTION ORAL EVERY 6 HOURS PRN
Status: DISCONTINUED | OUTPATIENT
Start: 2022-01-01 | End: 2022-01-01 | Stop reason: HOSPADM

## 2022-01-01 RX ORDER — CITALOPRAM HYDROBROMIDE 40 MG/1
40 TABLET ORAL DAILY
Qty: 60 TABLET | Refills: 3 | Status: SHIPPED | OUTPATIENT
Start: 2022-01-01

## 2022-01-01 RX ORDER — DEXTROSE MONOHYDRATE 25 G/50ML
25-50 INJECTION, SOLUTION INTRAVENOUS
Status: DISCONTINUED | OUTPATIENT
Start: 2022-01-01 | End: 2023-01-01 | Stop reason: HOSPADM

## 2022-01-01 RX ORDER — METOPROLOL TARTRATE 25 MG/1
25 TABLET, FILM COATED ORAL EVERY 6 HOURS
Status: DISCONTINUED | OUTPATIENT
Start: 2022-01-01 | End: 2022-01-01

## 2022-01-01 RX ORDER — DEXTROSE MONOHYDRATE 25 G/50ML
25-50 INJECTION, SOLUTION INTRAVENOUS
Status: DISCONTINUED | OUTPATIENT
Start: 2022-01-01 | End: 2022-01-01

## 2022-01-01 RX ORDER — ACETAMINOPHEN 650 MG/1
650 SUPPOSITORY RECTAL EVERY 6 HOURS PRN
Status: DISCONTINUED | OUTPATIENT
Start: 2022-01-01 | End: 2022-01-01 | Stop reason: HOSPADM

## 2022-01-01 RX ORDER — OXYCODONE HYDROCHLORIDE 5 MG/1
5-10 TABLET ORAL EVERY 12 HOURS PRN
Status: DISCONTINUED | OUTPATIENT
Start: 2022-01-01 | End: 2022-01-01

## 2022-01-01 RX ORDER — GUAIFENESIN 600 MG/1
1200 TABLET, EXTENDED RELEASE ORAL DAILY
Qty: 120 TABLET | Refills: 0 | Status: SHIPPED | OUTPATIENT
Start: 2022-01-01 | End: 2023-01-01

## 2022-01-01 RX ORDER — DEXTROSE MONOHYDRATE 25 G/50ML
25-50 INJECTION, SOLUTION INTRAVENOUS
Status: CANCELLED | OUTPATIENT
Start: 2022-01-01

## 2022-01-01 RX ORDER — MAGNESIUM HYDROXIDE/ALUMINUM HYDROXICE/SIMETHICONE 120; 1200; 1200 MG/30ML; MG/30ML; MG/30ML
30 SUSPENSION ORAL EVERY 4 HOURS PRN
Status: DISCONTINUED | OUTPATIENT
Start: 2022-01-01 | End: 2023-01-01 | Stop reason: HOSPADM

## 2022-01-01 RX ORDER — METOPROLOL TARTRATE 50 MG
50 TABLET ORAL EVERY 6 HOURS
Status: CANCELLED | OUTPATIENT
Start: 2022-01-01

## 2022-01-01 RX ORDER — GLECAPREVIR AND PIBRENTASVIR 40; 100 MG/1; MG/1
3 TABLET, FILM COATED ORAL
Qty: 168 TABLET | Refills: 0 | Status: CANCELLED | OUTPATIENT
Start: 2022-01-01

## 2022-01-01 RX ORDER — OXYCODONE HYDROCHLORIDE 20 MG/1
20 TABLET ORAL PRN
COMMUNITY
End: 2022-01-01

## 2022-01-01 RX ORDER — HEPARIN SODIUM 10000 [USP'U]/100ML
0-5000 INJECTION, SOLUTION INTRAVENOUS CONTINUOUS
Status: DISCONTINUED | OUTPATIENT
Start: 2022-01-01 | End: 2022-01-01

## 2022-01-01 RX ORDER — OXYCODONE HYDROCHLORIDE 5 MG/1
5-10 TABLET ORAL EVERY 6 HOURS PRN
Status: DISCONTINUED | OUTPATIENT
Start: 2022-01-01 | End: 2022-01-01

## 2022-01-01 RX ORDER — IPRATROPIUM BROMIDE AND ALBUTEROL SULFATE 2.5; .5 MG/3ML; MG/3ML
3 SOLUTION RESPIRATORY (INHALATION) ONCE
Status: COMPLETED | OUTPATIENT
Start: 2022-01-01 | End: 2022-01-01

## 2022-01-01 RX ORDER — AMIODARONE HYDROCHLORIDE 200 MG/1
400 TABLET ORAL 2 TIMES DAILY
Status: DISCONTINUED | OUTPATIENT
Start: 2022-01-01 | End: 2022-01-01 | Stop reason: HOSPADM

## 2022-01-01 RX ORDER — METOPROLOL TARTRATE 50 MG
50 TABLET ORAL EVERY 6 HOURS
Status: DISCONTINUED | OUTPATIENT
Start: 2022-01-01 | End: 2022-01-01 | Stop reason: HOSPADM

## 2022-01-01 RX ORDER — SODIUM CHLORIDE 9 MG/ML
INJECTION, SOLUTION INTRAVENOUS CONTINUOUS
Status: DISCONTINUED | OUTPATIENT
Start: 2022-01-01 | End: 2022-01-01

## 2022-01-01 RX ORDER — ALBUTEROL SULFATE 0.83 MG/ML
5 SOLUTION RESPIRATORY (INHALATION) ONCE
Status: COMPLETED | OUTPATIENT
Start: 2022-01-01 | End: 2022-01-01

## 2022-01-01 RX ORDER — SODIUM CHLORIDE 9 MG/ML
INJECTION, SOLUTION INTRAVENOUS CONTINUOUS PRN
Status: COMPLETED | OUTPATIENT
Start: 2022-01-01 | End: 2022-01-01

## 2022-01-01 RX ORDER — FUROSEMIDE 10 MG/ML
20 INJECTION INTRAMUSCULAR; INTRAVENOUS EVERY 12 HOURS
Status: CANCELLED | OUTPATIENT
Start: 2022-01-01

## 2022-01-01 RX ORDER — BISACODYL 5 MG
5 TABLET, DELAYED RELEASE (ENTERIC COATED) ORAL DAILY PRN
Status: DISCONTINUED | OUTPATIENT
Start: 2022-01-01 | End: 2023-01-01 | Stop reason: HOSPADM

## 2022-01-01 RX ORDER — FENTANYL CITRATE 50 UG/ML
INJECTION, SOLUTION INTRAMUSCULAR; INTRAVENOUS
Status: DISPENSED
Start: 2022-01-01 | End: 2022-01-01

## 2022-01-01 RX ORDER — LANOLIN ALCOHOL/MO/W.PET/CERES
3 CREAM (GRAM) TOPICAL
Status: DISCONTINUED | OUTPATIENT
Start: 2022-01-01 | End: 2023-01-01

## 2022-01-01 RX ORDER — IPRATROPIUM BROMIDE AND ALBUTEROL SULFATE 2.5; .5 MG/3ML; MG/3ML
3 SOLUTION RESPIRATORY (INHALATION) ONCE
Status: DISCONTINUED | OUTPATIENT
Start: 2022-01-01 | End: 2022-01-01

## 2022-01-01 RX ORDER — CLONIDINE HYDROCHLORIDE 0.1 MG/1
0.1 TABLET ORAL EVERY 8 HOURS
Status: DISCONTINUED | OUTPATIENT
Start: 2022-01-01 | End: 2023-01-01

## 2022-01-01 RX ORDER — IPRATROPIUM BROMIDE AND ALBUTEROL SULFATE 2.5; .5 MG/3ML; MG/3ML
3 SOLUTION RESPIRATORY (INHALATION)
Status: DISCONTINUED | OUTPATIENT
Start: 2022-01-01 | End: 2022-01-01

## 2022-01-01 RX ORDER — OXYCODONE HYDROCHLORIDE 20 MG/1
20 TABLET ORAL EVERY 4 HOURS PRN
Status: ON HOLD | COMMUNITY
End: 2022-01-01

## 2022-01-01 RX ORDER — OXYCODONE AND ACETAMINOPHEN 5; 325 MG/1; MG/1
1 TABLET ORAL EVERY 4 HOURS PRN
Status: CANCELLED | OUTPATIENT
Start: 2022-01-01

## 2022-01-01 RX ORDER — ACETAMINOPHEN 325 MG/1
650 TABLET ORAL EVERY 6 HOURS PRN
Status: DISCONTINUED | OUTPATIENT
Start: 2022-01-01 | End: 2022-01-01 | Stop reason: HOSPADM

## 2022-01-01 RX ORDER — DOXYCYCLINE HYCLATE 100 MG
100 TABLET ORAL DAILY
Status: ON HOLD | COMMUNITY
End: 2022-01-01

## 2022-01-01 RX ORDER — AMLODIPINE BESYLATE 5 MG/1
5 TABLET ORAL DAILY
Status: CANCELLED | OUTPATIENT
Start: 2022-01-01

## 2022-01-01 RX ORDER — PIPERACILLIN SODIUM, TAZOBACTAM SODIUM 3; .375 G/15ML; G/15ML
3.38 INJECTION, POWDER, LYOPHILIZED, FOR SOLUTION INTRAVENOUS EVERY 8 HOURS
Status: COMPLETED | OUTPATIENT
Start: 2022-01-01 | End: 2022-01-01

## 2022-01-01 RX ORDER — METOPROLOL TARTRATE 25 MG/1
25 TABLET, FILM COATED ORAL ONCE
Status: COMPLETED | OUTPATIENT
Start: 2022-01-01 | End: 2022-01-01

## 2022-01-01 RX ORDER — AZITHROMYCIN 500 MG/5ML
500 INJECTION, POWDER, LYOPHILIZED, FOR SOLUTION INTRAVENOUS EVERY 24 HOURS
Status: DISCONTINUED | OUTPATIENT
Start: 2022-01-01 | End: 2022-01-01

## 2022-01-01 RX ORDER — AMIODARONE HYDROCHLORIDE 200 MG/1
200 TABLET ORAL DAILY
Status: DISCONTINUED | OUTPATIENT
Start: 2022-01-01 | End: 2022-01-01

## 2022-01-01 RX ORDER — FUROSEMIDE 20 MG
40 TABLET ORAL DAILY
Status: DISCONTINUED | OUTPATIENT
Start: 2022-01-01 | End: 2022-01-01

## 2022-01-01 RX ORDER — MULTIPLE VITAMINS W/ MINERALS TAB 9MG-400MCG
1 TAB ORAL DAILY
Status: DISCONTINUED | OUTPATIENT
Start: 2022-01-01 | End: 2023-01-01 | Stop reason: HOSPADM

## 2022-01-01 RX ORDER — DILTIAZEM HYDROCHLORIDE 300 MG/1
300 CAPSULE, COATED, EXTENDED RELEASE ORAL DAILY
Qty: 30 CAPSULE | Refills: 3 | Status: SHIPPED | OUTPATIENT
Start: 2022-01-01 | End: 2022-01-01

## 2022-01-01 RX ORDER — OXYCODONE HYDROCHLORIDE 5 MG/1
15 TABLET ORAL
Status: DISCONTINUED | OUTPATIENT
Start: 2022-01-01 | End: 2022-01-01

## 2022-01-01 RX ORDER — PIPERACILLIN SODIUM, TAZOBACTAM SODIUM 3; .375 G/15ML; G/15ML
3.38 INJECTION, POWDER, LYOPHILIZED, FOR SOLUTION INTRAVENOUS ONCE
Status: DISCONTINUED | OUTPATIENT
Start: 2022-01-01 | End: 2023-01-01

## 2022-01-01 RX ORDER — ALBUTEROL SULFATE 90 UG/1
2 AEROSOL, METERED RESPIRATORY (INHALATION) EVERY 6 HOURS
Qty: 18 G | Refills: 11 | Status: SHIPPED | OUTPATIENT
Start: 2022-01-01 | End: 2022-01-01

## 2022-01-01 RX ORDER — OXYCODONE HYDROCHLORIDE 20 MG/1
20 TABLET ORAL
Qty: 150 TABLET | Refills: 0 | OUTPATIENT
Start: 2022-01-01

## 2022-01-01 RX ORDER — PREDNISONE 20 MG/1
20 TABLET ORAL DAILY
Qty: 30 TABLET | Refills: 3 | Status: ON HOLD | OUTPATIENT
Start: 2022-01-01 | End: 2023-01-01

## 2022-01-01 RX ORDER — CITALOPRAM HYDROBROMIDE 10 MG/1
20 TABLET ORAL ONCE
Status: COMPLETED | OUTPATIENT
Start: 2022-01-01 | End: 2022-01-01

## 2022-01-01 RX ORDER — GUAIFENESIN 600 MG/1
1200 TABLET, EXTENDED RELEASE ORAL DAILY
Status: DISCONTINUED | OUTPATIENT
Start: 2022-01-01 | End: 2023-01-01 | Stop reason: HOSPADM

## 2022-01-01 RX ORDER — MAGNESIUM SULFATE HEPTAHYDRATE 40 MG/ML
2 INJECTION, SOLUTION INTRAVENOUS
Status: CANCELLED | OUTPATIENT
Start: 2022-01-01

## 2022-01-01 RX ORDER — POTASSIUM CHLORIDE 1500 MG/1
20 TABLET, EXTENDED RELEASE ORAL
Status: ACTIVE | OUTPATIENT
Start: 2022-01-01 | End: 2022-01-01

## 2022-01-01 RX ORDER — PIPERACILLIN SODIUM, TAZOBACTAM SODIUM 3; .375 G/15ML; G/15ML
3.38 INJECTION, POWDER, LYOPHILIZED, FOR SOLUTION INTRAVENOUS EVERY 6 HOURS
Status: DISCONTINUED | OUTPATIENT
Start: 2022-01-01 | End: 2022-01-01

## 2022-01-01 RX ORDER — LEVOFLOXACIN 750 MG/1
750 TABLET, FILM COATED ORAL DAILY
Qty: 5 TABLET | Refills: 0 | Status: SHIPPED | OUTPATIENT
Start: 2022-01-01 | End: 2022-01-01

## 2022-01-01 RX ORDER — LORAZEPAM 2 MG/ML
1-2 INJECTION INTRAMUSCULAR EVERY 30 MIN PRN
Status: DISCONTINUED | OUTPATIENT
Start: 2022-01-01 | End: 2022-01-01

## 2022-01-01 RX ORDER — POTASSIUM CHLORIDE 1500 MG/1
20 TABLET, EXTENDED RELEASE ORAL DAILY
Status: CANCELLED | OUTPATIENT
Start: 2022-01-01

## 2022-01-01 RX ORDER — METOPROLOL TARTRATE 100 MG
100 TABLET ORAL 2 TIMES DAILY
Qty: 60 TABLET | Refills: 0 | Status: SHIPPED | OUTPATIENT
Start: 2022-01-01 | End: 2022-01-01

## 2022-01-01 RX ORDER — NICOTINE POLACRILEX 4 MG
15-30 LOZENGE BUCCAL
Status: DISCONTINUED | OUTPATIENT
Start: 2022-01-01 | End: 2022-01-01

## 2022-01-01 RX ORDER — FUROSEMIDE 40 MG
40 TABLET ORAL DAILY
Status: CANCELLED | OUTPATIENT
Start: 2022-01-01

## 2022-01-01 RX ORDER — GABAPENTIN 300 MG/1
600 CAPSULE ORAL 3 TIMES DAILY
Status: ON HOLD | COMMUNITY
End: 2022-01-01

## 2022-01-01 RX ORDER — DEXTROSE MONOHYDRATE 25 G/50ML
25-50 INJECTION, SOLUTION INTRAVENOUS
Status: DISCONTINUED | OUTPATIENT
Start: 2022-01-01 | End: 2022-01-01 | Stop reason: HOSPADM

## 2022-01-01 RX ORDER — IPRATROPIUM BROMIDE AND ALBUTEROL SULFATE 2.5; .5 MG/3ML; MG/3ML
SOLUTION RESPIRATORY (INHALATION)
Qty: 90 ML | Refills: 3 | Status: SHIPPED | OUTPATIENT
Start: 2022-01-01

## 2022-01-01 RX ORDER — FOLIC ACID 1 MG/1
1 TABLET ORAL DAILY
Status: DISCONTINUED | OUTPATIENT
Start: 2022-01-01 | End: 2023-01-01 | Stop reason: HOSPADM

## 2022-01-01 RX ORDER — ALBUTEROL SULFATE 90 UG/1
2 AEROSOL, METERED RESPIRATORY (INHALATION)
Qty: 18 G | Refills: 11 | Status: SHIPPED | OUTPATIENT
Start: 2022-01-01

## 2022-01-01 RX ORDER — BUDESONIDE 1 MG/2ML
1 INHALANT ORAL 2 TIMES DAILY
Qty: 360 ML | Refills: 1 | Status: SHIPPED | OUTPATIENT
Start: 2022-01-01 | End: 2022-01-01

## 2022-01-01 RX ORDER — PROCHLORPERAZINE MALEATE 5 MG
5 TABLET ORAL EVERY 6 HOURS PRN
Status: DISCONTINUED | OUTPATIENT
Start: 2022-01-01 | End: 2022-01-01 | Stop reason: HOSPADM

## 2022-01-01 RX ORDER — ONDANSETRON 2 MG/ML
4 INJECTION INTRAMUSCULAR; INTRAVENOUS EVERY 6 HOURS PRN
Status: CANCELLED | OUTPATIENT
Start: 2022-01-01

## 2022-01-01 RX ORDER — ALBUTEROL SULFATE 90 UG/1
2 AEROSOL, METERED RESPIRATORY (INHALATION)
Status: DISCONTINUED | OUTPATIENT
Start: 2022-01-01 | End: 2023-01-01

## 2022-01-01 RX ORDER — DEXTROSE MONOHYDRATE 25 G/50ML
25 INJECTION, SOLUTION INTRAVENOUS ONCE
Status: COMPLETED | OUTPATIENT
Start: 2022-01-01 | End: 2022-01-01

## 2022-01-01 RX ORDER — DILTIAZEM HCL 60 MG
60 TABLET ORAL EVERY 6 HOURS SCHEDULED
Status: DISCONTINUED | OUTPATIENT
Start: 2022-01-01 | End: 2022-01-01

## 2022-01-01 RX ORDER — ALBUTEROL SULFATE 0.83 MG/ML
5 SOLUTION RESPIRATORY (INHALATION) EVERY 4 HOURS PRN
Status: DISCONTINUED | OUTPATIENT
Start: 2022-01-01 | End: 2022-01-01 | Stop reason: HOSPADM

## 2022-01-01 RX ORDER — QUETIAPINE FUMARATE 25 MG/1
25 TABLET, FILM COATED ORAL EVERY 6 HOURS PRN
Status: DISCONTINUED | OUTPATIENT
Start: 2022-01-01 | End: 2022-01-01 | Stop reason: HOSPADM

## 2022-01-01 RX ORDER — DEXAMETHASONE SODIUM PHOSPHATE 10 MG/ML
10 INJECTION, SOLUTION INTRAMUSCULAR; INTRAVENOUS ONCE
Status: COMPLETED | OUTPATIENT
Start: 2022-01-01 | End: 2022-01-01

## 2022-01-01 RX ORDER — NALOXONE HYDROCHLORIDE 0.4 MG/ML
0.4 INJECTION, SOLUTION INTRAMUSCULAR; INTRAVENOUS; SUBCUTANEOUS
Status: CANCELLED | OUTPATIENT
Start: 2022-01-01

## 2022-01-01 RX ORDER — OXYCODONE HYDROCHLORIDE 5 MG/1
5 TABLET ORAL EVERY 12 HOURS PRN
Status: DISCONTINUED | OUTPATIENT
Start: 2022-01-01 | End: 2023-01-01

## 2022-01-01 RX ORDER — CLONAZEPAM 1 MG/1
2 TABLET ORAL
Status: DISCONTINUED | OUTPATIENT
Start: 2022-01-01 | End: 2022-01-01 | Stop reason: HOSPADM

## 2022-01-01 RX ORDER — CLONAZEPAM 0.5 MG/1
2 TABLET ORAL
Status: CANCELLED | OUTPATIENT
Start: 2022-01-01

## 2022-01-01 RX ORDER — ALBUTEROL SULFATE 0.83 MG/ML
5 SOLUTION RESPIRATORY (INHALATION) EVERY 4 HOURS PRN
Status: CANCELLED | OUTPATIENT
Start: 2022-01-01

## 2022-01-01 RX ORDER — CITALOPRAM HYDROBROMIDE 10 MG/1
20 TABLET ORAL DAILY
Status: DISCONTINUED | OUTPATIENT
Start: 2022-01-01 | End: 2022-01-01

## 2022-01-01 RX ORDER — AMIODARONE HYDROCHLORIDE 200 MG/1
200 TABLET ORAL DAILY
Qty: 30 TABLET | Refills: 1 | Status: SHIPPED | OUTPATIENT
Start: 2022-01-01 | End: 2022-01-01

## 2022-01-01 RX ORDER — GABAPENTIN 300 MG/1
300 CAPSULE ORAL 3 TIMES DAILY
Status: DISCONTINUED | OUTPATIENT
Start: 2022-01-01 | End: 2022-01-01 | Stop reason: HOSPADM

## 2022-01-01 RX ORDER — OXYCODONE AND ACETAMINOPHEN 5; 325 MG/1; MG/1
1 TABLET ORAL EVERY 4 HOURS PRN
Status: DISCONTINUED | OUTPATIENT
Start: 2022-01-01 | End: 2022-01-01 | Stop reason: HOSPADM

## 2022-01-01 RX ORDER — PREDNISONE 20 MG/1
TABLET ORAL
Qty: 10 TABLET | Refills: 0 | Status: SHIPPED | OUTPATIENT
Start: 2022-01-01 | End: 2022-01-01

## 2022-01-01 RX ORDER — OXYCODONE HYDROCHLORIDE 5 MG/1
5 TABLET ORAL ONCE
Status: DISCONTINUED | OUTPATIENT
Start: 2022-01-01 | End: 2022-01-01

## 2022-01-01 RX ORDER — NICOTINE POLACRILEX 4 MG
15-30 LOZENGE BUCCAL
Status: CANCELLED | OUTPATIENT
Start: 2022-01-01

## 2022-01-01 RX ORDER — DILTIAZEM HYDROCHLORIDE 300 MG/1
300 CAPSULE, COATED, EXTENDED RELEASE ORAL DAILY
Qty: 30 CAPSULE | Refills: 3 | Status: ON HOLD | OUTPATIENT
Start: 2022-01-01 | End: 2023-01-01

## 2022-01-01 RX ORDER — OXYCODONE AND ACETAMINOPHEN 5; 325 MG/1; MG/1
1 TABLET ORAL EVERY 6 HOURS PRN
COMMUNITY
End: 2022-01-01

## 2022-01-01 RX ORDER — LISINOPRIL 20 MG/1
40 TABLET ORAL DAILY
Status: DISCONTINUED | OUTPATIENT
Start: 2022-01-01 | End: 2022-01-01

## 2022-01-01 RX ORDER — OXYCODONE HYDROCHLORIDE 20 MG/1
20 TABLET ORAL
Refills: 0 | Status: CANCELLED | OUTPATIENT
Start: 2022-01-01

## 2022-01-01 RX ORDER — FAMOTIDINE 20 MG/1
20 TABLET, FILM COATED ORAL DAILY
Status: DISCONTINUED | OUTPATIENT
Start: 2022-01-01 | End: 2023-01-01 | Stop reason: HOSPADM

## 2022-01-01 RX ORDER — OXYCODONE HYDROCHLORIDE 5 MG/1
10 TABLET ORAL EVERY 4 HOURS PRN
Status: COMPLETED | OUTPATIENT
Start: 2022-01-01 | End: 2022-01-01

## 2022-01-01 RX ORDER — METOPROLOL TARTRATE 25 MG/1
25 TABLET, FILM COATED ORAL 2 TIMES DAILY
Status: DISCONTINUED | OUTPATIENT
Start: 2022-01-01 | End: 2022-01-01

## 2022-01-01 RX ORDER — LORAZEPAM 2 MG/ML
.25-.5 CONCENTRATE ORAL EVERY 4 HOURS PRN
Qty: 1 ML | Refills: 0 | Status: ON HOLD | OUTPATIENT
Start: 2022-01-01 | End: 2022-01-01

## 2022-01-01 RX ORDER — FUROSEMIDE 20 MG
40 TABLET ORAL DAILY
Status: DISCONTINUED | OUTPATIENT
Start: 2022-01-01 | End: 2022-01-01 | Stop reason: HOSPADM

## 2022-01-01 RX ORDER — CLONAZEPAM 0.5 MG/1
1 TABLET ORAL
Status: DISCONTINUED | OUTPATIENT
Start: 2022-01-01 | End: 2022-01-01

## 2022-01-01 RX ORDER — OXYCODONE HYDROCHLORIDE 5 MG/1
5 TABLET ORAL EVERY 6 HOURS PRN
Status: DISCONTINUED | OUTPATIENT
Start: 2022-01-01 | End: 2022-01-01

## 2022-01-01 RX ORDER — FUROSEMIDE 40 MG
40 TABLET ORAL DAILY
Qty: 90 TABLET | Refills: 3 | Status: SHIPPED | OUTPATIENT
Start: 2022-01-01

## 2022-01-01 RX ORDER — DILTIAZEM HCL IN NACL,ISO-OSM 125 MG/125
5-15 PLASTIC BAG, INJECTION (ML) INTRAVENOUS CONTINUOUS
Status: DISCONTINUED | OUTPATIENT
Start: 2022-01-01 | End: 2022-01-01

## 2022-01-01 RX ORDER — ALBUTEROL SULFATE 90 UG/1
2 AEROSOL, METERED RESPIRATORY (INHALATION) EVERY 6 HOURS
COMMUNITY
End: 2022-01-01

## 2022-01-01 RX ORDER — POTASSIUM CHLORIDE 1500 MG/1
20 TABLET, EXTENDED RELEASE ORAL DAILY
Status: DISCONTINUED | OUTPATIENT
Start: 2022-01-01 | End: 2022-01-01 | Stop reason: HOSPADM

## 2022-01-01 RX ORDER — HALOPERIDOL 5 MG/ML
2.5-5 INJECTION INTRAMUSCULAR EVERY 6 HOURS PRN
Status: DISCONTINUED | OUTPATIENT
Start: 2022-01-01 | End: 2023-01-01 | Stop reason: HOSPADM

## 2022-01-01 RX ORDER — GUAIFENESIN 600 MG/1
1200 TABLET, EXTENDED RELEASE ORAL DAILY
Status: CANCELLED | OUTPATIENT
Start: 2022-01-01

## 2022-01-01 RX ORDER — ACETAMINOPHEN 325 MG/1
650 TABLET ORAL EVERY 8 HOURS SCHEDULED
Status: DISCONTINUED | OUTPATIENT
Start: 2022-01-01 | End: 2023-01-01

## 2022-01-01 RX ORDER — IPRATROPIUM BROMIDE AND ALBUTEROL SULFATE 2.5; .5 MG/3ML; MG/3ML
1 SOLUTION RESPIRATORY (INHALATION) EVERY 6 HOURS PRN
COMMUNITY
End: 2022-01-01

## 2022-01-01 RX ORDER — CITALOPRAM HYDROBROMIDE 40 MG/1
40 TABLET ORAL DAILY
Status: DISCONTINUED | OUTPATIENT
Start: 2022-01-01 | End: 2023-01-01 | Stop reason: HOSPADM

## 2022-01-01 RX ORDER — PHENYLEPHRINE HCL IN 0.9% NACL 50MG/250ML
.5-1.25 PLASTIC BAG, INJECTION (ML) INTRAVENOUS CONTINUOUS
Status: DISCONTINUED | OUTPATIENT
Start: 2022-01-01 | End: 2022-01-01

## 2022-01-01 RX ORDER — IPRATROPIUM BROMIDE AND ALBUTEROL SULFATE 2.5; .5 MG/3ML; MG/3ML
3 SOLUTION RESPIRATORY (INHALATION) 3 TIMES DAILY
Status: DISCONTINUED | OUTPATIENT
Start: 2022-01-01 | End: 2022-01-01 | Stop reason: HOSPADM

## 2022-01-01 RX ORDER — IPRATROPIUM BROMIDE AND ALBUTEROL SULFATE 2.5; .5 MG/3ML; MG/3ML
3 SOLUTION RESPIRATORY (INHALATION)
Status: DISCONTINUED | OUTPATIENT
Start: 2022-01-01 | End: 2023-01-01 | Stop reason: HOSPADM

## 2022-01-01 RX ADMIN — IPRATROPIUM BROMIDE AND ALBUTEROL SULFATE 3 ML: 2.5; .5 SOLUTION RESPIRATORY (INHALATION) at 15:17

## 2022-01-01 RX ADMIN — GABAPENTIN 400 MG: 300 CAPSULE ORAL at 21:24

## 2022-01-01 RX ADMIN — RIVAROXABAN 20 MG: 10 TABLET, FILM COATED ORAL at 19:43

## 2022-01-01 RX ADMIN — ALBUTEROL SULFATE 2 PUFF: 90 AEROSOL, METERED RESPIRATORY (INHALATION) at 03:21

## 2022-01-01 RX ADMIN — DILTIAZEM HYDROCHLORIDE 300 MG: 300 CAPSULE, COATED, EXTENDED RELEASE ORAL at 08:17

## 2022-01-01 RX ADMIN — MIDAZOLAM 1 MG: 1 INJECTION INTRAMUSCULAR; INTRAVENOUS at 16:06

## 2022-01-01 RX ADMIN — METOPROLOL TARTRATE 100 MG: 25 TABLET, FILM COATED ORAL at 08:16

## 2022-01-01 RX ADMIN — PIPERACILLIN AND TAZOBACTAM 3.38 G: 3; .375 INJECTION, POWDER, LYOPHILIZED, FOR SOLUTION INTRAVENOUS at 08:44

## 2022-01-01 RX ADMIN — PANTOPRAZOLE SODIUM 40 MG: 40 INJECTION, POWDER, FOR SOLUTION INTRAVENOUS at 08:05

## 2022-01-01 RX ADMIN — OXYCODONE HYDROCHLORIDE AND ACETAMINOPHEN 1 TABLET: 5; 325 TABLET ORAL at 07:48

## 2022-01-01 RX ADMIN — CITALOPRAM HYDROBROMIDE 40 MG: 10 TABLET, FILM COATED ORAL at 07:49

## 2022-01-01 RX ADMIN — PIPERACILLIN AND TAZOBACTAM 3.38 G: 3; .375 INJECTION, POWDER, LYOPHILIZED, FOR SOLUTION INTRAVENOUS at 18:56

## 2022-01-01 RX ADMIN — ALBUTEROL SULFATE 2 PUFF: 90 AEROSOL, METERED RESPIRATORY (INHALATION) at 02:24

## 2022-01-01 RX ADMIN — DEXTROSE 15 G: 15 GEL ORAL at 08:00

## 2022-01-01 RX ADMIN — IPRATROPIUM BROMIDE AND ALBUTEROL SULFATE 3 ML: .5; 3 SOLUTION RESPIRATORY (INHALATION) at 08:14

## 2022-01-01 RX ADMIN — AMIODARONE HYDROCHLORIDE 150 MG: 1.5 INJECTION, SOLUTION INTRAVENOUS at 08:05

## 2022-01-01 RX ADMIN — OXYCODONE HYDROCHLORIDE 10 MG: 5 TABLET ORAL at 22:31

## 2022-01-01 RX ADMIN — PIPERACILLIN AND TAZOBACTAM 3.38 G: 3; .375 INJECTION, POWDER, FOR SOLUTION INTRAVENOUS at 20:56

## 2022-01-01 RX ADMIN — INSULIN ASPART 1 UNITS: 100 INJECTION, SOLUTION INTRAVENOUS; SUBCUTANEOUS at 07:56

## 2022-01-01 RX ADMIN — CITALOPRAM HYDROBROMIDE 40 MG: 40 TABLET ORAL at 08:38

## 2022-01-01 RX ADMIN — PIPERACILLIN AND TAZOBACTAM 3.38 G: 3; .375 INJECTION, POWDER, LYOPHILIZED, FOR SOLUTION INTRAVENOUS at 21:34

## 2022-01-01 RX ADMIN — FAMOTIDINE 20 MG: 20 TABLET ORAL at 08:37

## 2022-01-01 RX ADMIN — Medication 5 MG: at 20:26

## 2022-01-01 RX ADMIN — IPRATROPIUM BROMIDE AND ALBUTEROL SULFATE 3 ML: 2.5; .5 SOLUTION RESPIRATORY (INHALATION) at 14:40

## 2022-01-01 RX ADMIN — FAMOTIDINE 20 MG: 20 TABLET ORAL at 08:19

## 2022-01-01 RX ADMIN — IPRATROPIUM BROMIDE AND ALBUTEROL SULFATE 3 ML: .5; 3 SOLUTION RESPIRATORY (INHALATION) at 07:28

## 2022-01-01 RX ADMIN — FENTANYL CITRATE 50 MCG: 50 INJECTION, SOLUTION INTRAMUSCULAR; INTRAVENOUS at 16:00

## 2022-01-01 RX ADMIN — METOPROLOL TARTRATE 50 MG: 25 TABLET, FILM COATED ORAL at 09:13

## 2022-01-01 RX ADMIN — ACETAMINOPHEN 650 MG: 325 TABLET, FILM COATED ORAL at 16:24

## 2022-01-01 RX ADMIN — ALBUTEROL SULFATE 2 PUFF: 90 AEROSOL, METERED RESPIRATORY (INHALATION) at 23:43

## 2022-01-01 RX ADMIN — PREDNISONE 20 MG: 20 TABLET ORAL at 08:37

## 2022-01-01 RX ADMIN — INSULIN ASPART 5 UNITS: 100 INJECTION, SOLUTION INTRAVENOUS; SUBCUTANEOUS at 16:31

## 2022-01-01 RX ADMIN — OXYCODONE HYDROCHLORIDE AND ACETAMINOPHEN 1 TABLET: 5; 325 TABLET ORAL at 09:17

## 2022-01-01 RX ADMIN — CLONIDINE HYDROCHLORIDE 0.1 MG: 0.1 TABLET ORAL at 21:58

## 2022-01-01 RX ADMIN — IPRATROPIUM BROMIDE AND ALBUTEROL SULFATE 3 ML: 2.5; .5 SOLUTION RESPIRATORY (INHALATION) at 08:46

## 2022-01-01 RX ADMIN — INSULIN ASPART 2 UNITS: 100 INJECTION, SOLUTION INTRAVENOUS; SUBCUTANEOUS at 16:31

## 2022-01-01 RX ADMIN — DILTIAZEM HYDROCHLORIDE 60 MG: 60 TABLET, FILM COATED ORAL at 23:43

## 2022-01-01 RX ADMIN — AMIODARONE HYDROCHLORIDE 1 MG/MIN: 1.8 INJECTION, SOLUTION INTRAVENOUS at 08:18

## 2022-01-01 RX ADMIN — PIPERACILLIN AND TAZOBACTAM 3.38 G: 3; .375 INJECTION, POWDER, LYOPHILIZED, FOR SOLUTION INTRAVENOUS at 15:06

## 2022-01-01 RX ADMIN — PIPERACILLIN AND TAZOBACTAM 3.38 G: 3; .375 INJECTION, POWDER, FOR SOLUTION INTRAVENOUS at 13:22

## 2022-01-01 RX ADMIN — GABAPENTIN 300 MG: 300 CAPSULE ORAL at 21:12

## 2022-01-01 RX ADMIN — ALBUTEROL SULFATE 2 PUFF: 90 AEROSOL, METERED RESPIRATORY (INHALATION) at 20:36

## 2022-01-01 RX ADMIN — PIPERACILLIN AND TAZOBACTAM 3.38 G: 3; .375 INJECTION, POWDER, LYOPHILIZED, FOR SOLUTION INTRAVENOUS at 06:17

## 2022-01-01 RX ADMIN — INSULIN ASPART 1 UNITS: 100 INJECTION, SOLUTION INTRAVENOUS; SUBCUTANEOUS at 08:02

## 2022-01-01 RX ADMIN — INSULIN ASPART 1 UNITS: 100 INJECTION, SOLUTION INTRAVENOUS; SUBCUTANEOUS at 21:13

## 2022-01-01 RX ADMIN — CLONAZEPAM 2 MG: 0.5 TABLET ORAL at 21:49

## 2022-01-01 RX ADMIN — OXYCODONE HYDROCHLORIDE 10 MG: 5 TABLET ORAL at 06:34

## 2022-01-01 RX ADMIN — PIPERACILLIN AND TAZOBACTAM 3.38 G: 3; .375 INJECTION, POWDER, FOR SOLUTION INTRAVENOUS at 21:10

## 2022-01-01 RX ADMIN — AMIODARONE HYDROCHLORIDE 400 MG: 200 TABLET ORAL at 06:24

## 2022-01-01 RX ADMIN — FUROSEMIDE 40 MG: 10 INJECTION, SOLUTION INTRAMUSCULAR; INTRAVENOUS at 07:55

## 2022-01-01 RX ADMIN — AMIODARONE HYDROCHLORIDE 400 MG: 200 TABLET ORAL at 17:53

## 2022-01-01 RX ADMIN — PREDNISONE 20 MG: 20 TABLET ORAL at 08:17

## 2022-01-01 RX ADMIN — GABAPENTIN 400 MG: 300 CAPSULE ORAL at 19:49

## 2022-01-01 RX ADMIN — ERYTHROMYCIN 1 G: 5 OINTMENT OPHTHALMIC at 18:36

## 2022-01-01 RX ADMIN — OXYCODONE HYDROCHLORIDE AND ACETAMINOPHEN 1 TABLET: 5; 325 TABLET ORAL at 08:28

## 2022-01-01 RX ADMIN — INSULIN ASPART 1 UNITS: 100 INJECTION, SOLUTION INTRAVENOUS; SUBCUTANEOUS at 00:24

## 2022-01-01 RX ADMIN — AZITHROMYCIN MONOHYDRATE 250 MG: 250 TABLET ORAL at 09:14

## 2022-01-01 RX ADMIN — AMIODARONE HYDROCHLORIDE 0.5 MG/MIN: 1.8 INJECTION, SOLUTION INTRAVENOUS at 00:17

## 2022-01-01 RX ADMIN — IPRATROPIUM BROMIDE AND ALBUTEROL SULFATE 3 ML: 2.5; .5 SOLUTION RESPIRATORY (INHALATION) at 20:43

## 2022-01-01 RX ADMIN — DILTIAZEM HYDROCHLORIDE 60 MG: 60 TABLET, FILM COATED ORAL at 14:34

## 2022-01-01 RX ADMIN — DEXTROSE MONOHYDRATE 25 G: 25 INJECTION, SOLUTION INTRAVENOUS at 06:27

## 2022-01-01 RX ADMIN — FENTANYL CITRATE 50 MCG: 50 INJECTION, SOLUTION INTRAMUSCULAR; INTRAVENOUS at 16:02

## 2022-01-01 RX ADMIN — FUROSEMIDE 40 MG: 20 TABLET ORAL at 08:19

## 2022-01-01 RX ADMIN — SODIUM CHLORIDE: 9 INJECTION, SOLUTION INTRAVENOUS at 12:01

## 2022-01-01 RX ADMIN — GABAPENTIN 300 MG: 300 CAPSULE ORAL at 08:28

## 2022-01-01 RX ADMIN — DILTIAZEM HYDROCHLORIDE 60 MG: 60 TABLET, FILM COATED ORAL at 05:36

## 2022-01-01 RX ADMIN — FUROSEMIDE 40 MG: 10 INJECTION, SOLUTION INTRAMUSCULAR; INTRAVENOUS at 07:27

## 2022-01-01 RX ADMIN — FOLIC ACID 1 MG: 1 TABLET ORAL at 08:37

## 2022-01-01 RX ADMIN — OXYCODONE HYDROCHLORIDE 15 MG: 5 TABLET ORAL at 05:40

## 2022-01-01 RX ADMIN — CITALOPRAM HYDROBROMIDE 40 MG: 10 TABLET, FILM COATED ORAL at 08:08

## 2022-01-01 RX ADMIN — ERYTHROMYCIN 1 G: 5 OINTMENT OPHTHALMIC at 05:02

## 2022-01-01 RX ADMIN — METHYLPREDNISOLONE SODIUM SUCCINATE 125 MG: 125 INJECTION, POWDER, FOR SOLUTION INTRAMUSCULAR; INTRAVENOUS at 15:22

## 2022-01-01 RX ADMIN — INSULIN ASPART 3 UNITS: 100 INJECTION, SOLUTION INTRAVENOUS; SUBCUTANEOUS at 12:59

## 2022-01-01 RX ADMIN — ALBUTEROL SULFATE 5 MG: 2.5 SOLUTION RESPIRATORY (INHALATION) at 07:32

## 2022-01-01 RX ADMIN — PIPERACILLIN AND TAZOBACTAM 3.38 G: 3; .375 INJECTION, POWDER, LYOPHILIZED, FOR SOLUTION INTRAVENOUS at 21:29

## 2022-01-01 RX ADMIN — ALBUTEROL SULFATE 2 PUFF: 90 AEROSOL, METERED RESPIRATORY (INHALATION) at 12:01

## 2022-01-01 RX ADMIN — OXYCODONE HYDROCHLORIDE AND ACETAMINOPHEN 1 TABLET: 5; 325 TABLET ORAL at 04:35

## 2022-01-01 RX ADMIN — OXYCODONE HYDROCHLORIDE AND ACETAMINOPHEN 2 TABLET: 5; 325 TABLET ORAL at 00:12

## 2022-01-01 RX ADMIN — CLONIDINE HYDROCHLORIDE 0.1 MG: 0.1 TABLET ORAL at 05:23

## 2022-01-01 RX ADMIN — IPRATROPIUM BROMIDE AND ALBUTEROL SULFATE 3 ML: .5; 3 SOLUTION RESPIRATORY (INHALATION) at 13:57

## 2022-01-01 RX ADMIN — RIVAROXABAN 20 MG: 10 TABLET, FILM COATED ORAL at 10:53

## 2022-01-01 RX ADMIN — INSULIN ASPART 1 UNITS: 100 INJECTION, SOLUTION INTRAVENOUS; SUBCUTANEOUS at 12:02

## 2022-01-01 RX ADMIN — METOPROLOL TARTRATE 50 MG: 25 TABLET, FILM COATED ORAL at 01:45

## 2022-01-01 RX ADMIN — PANTOPRAZOLE SODIUM 40 MG: 40 INJECTION, POWDER, FOR SOLUTION INTRAVENOUS at 09:13

## 2022-01-01 RX ADMIN — ERYTHROMYCIN 1 G: 5 OINTMENT OPHTHALMIC at 23:14

## 2022-01-01 RX ADMIN — IPRATROPIUM BROMIDE AND ALBUTEROL SULFATE 3 ML: 2.5; .5 SOLUTION RESPIRATORY (INHALATION) at 08:05

## 2022-01-01 RX ADMIN — METOPROLOL TARTRATE 25 MG: 25 TABLET, FILM COATED ORAL at 08:17

## 2022-01-01 RX ADMIN — GABAPENTIN 300 MG: 300 CAPSULE ORAL at 09:13

## 2022-01-01 RX ADMIN — RIVAROXABAN 20 MG: 10 TABLET, FILM COATED ORAL at 17:41

## 2022-01-01 RX ADMIN — PREDNISONE 20 MG: 20 TABLET ORAL at 09:13

## 2022-01-01 RX ADMIN — AZITHROMYCIN MONOHYDRATE 250 MG: 500 INJECTION, POWDER, LYOPHILIZED, FOR SOLUTION INTRAVENOUS at 06:23

## 2022-01-01 RX ADMIN — PIPERACILLIN AND TAZOBACTAM 3.38 G: 3; .375 INJECTION, POWDER, LYOPHILIZED, FOR SOLUTION INTRAVENOUS at 14:18

## 2022-01-01 RX ADMIN — OXYCODONE HYDROCHLORIDE 15 MG: 5 TABLET ORAL at 18:04

## 2022-01-01 RX ADMIN — VANCOMYCIN HYDROCHLORIDE 2000 MG: 5 INJECTION, POWDER, LYOPHILIZED, FOR SOLUTION INTRAVENOUS at 20:32

## 2022-01-01 RX ADMIN — CITALOPRAM HYDROBROMIDE 40 MG: 10 TABLET, FILM COATED ORAL at 12:28

## 2022-01-01 RX ADMIN — METOPROLOL TARTRATE 100 MG: 25 TABLET, FILM COATED ORAL at 09:07

## 2022-01-01 RX ADMIN — OXYCODONE HYDROCHLORIDE AND ACETAMINOPHEN 1 TABLET: 5; 325 TABLET ORAL at 09:21

## 2022-01-01 RX ADMIN — METOPROLOL TARTRATE 50 MG: 25 TABLET, FILM COATED ORAL at 21:12

## 2022-01-01 RX ADMIN — OXYCODONE HYDROCHLORIDE AND ACETAMINOPHEN 1 TABLET: 5; 325 TABLET ORAL at 01:41

## 2022-01-01 RX ADMIN — FUROSEMIDE 20 MG: 10 INJECTION, SOLUTION INTRAMUSCULAR; INTRAVENOUS at 20:26

## 2022-01-01 RX ADMIN — GABAPENTIN 400 MG: 300 CAPSULE ORAL at 09:14

## 2022-01-01 RX ADMIN — ALBUTEROL SULFATE 2 PUFF: 90 AEROSOL, METERED RESPIRATORY (INHALATION) at 08:40

## 2022-01-01 RX ADMIN — ALBUTEROL SULFATE 2 PUFF: 90 AEROSOL, METERED RESPIRATORY (INHALATION) at 14:48

## 2022-01-01 RX ADMIN — CLONAZEPAM 1 MG: 0.5 TABLET ORAL at 21:25

## 2022-01-01 RX ADMIN — FENTANYL CITRATE 25 MCG: 50 INJECTION, SOLUTION INTRAMUSCULAR; INTRAVENOUS at 16:04

## 2022-01-01 RX ADMIN — AMIODARONE HYDROCHLORIDE 200 MG: 200 TABLET ORAL at 08:37

## 2022-01-01 RX ADMIN — AMIODARONE HYDROCHLORIDE 200 MG: 200 TABLET ORAL at 09:14

## 2022-01-01 RX ADMIN — RIVAROXABAN 20 MG: 20 TABLET, FILM COATED ORAL at 08:08

## 2022-01-01 RX ADMIN — INSULIN ASPART 2 UNITS: 100 INJECTION, SOLUTION INTRAVENOUS; SUBCUTANEOUS at 21:01

## 2022-01-01 RX ADMIN — METOPROLOL TARTRATE 100 MG: 25 TABLET, FILM COATED ORAL at 21:28

## 2022-01-01 RX ADMIN — FOLIC ACID 1 MG: 1 TABLET ORAL at 08:38

## 2022-01-01 RX ADMIN — RIVAROXABAN 20 MG: 10 TABLET, FILM COATED ORAL at 16:29

## 2022-01-01 RX ADMIN — FAMOTIDINE 20 MG: 20 TABLET ORAL at 08:39

## 2022-01-01 RX ADMIN — MIDAZOLAM 2 MG: 1 INJECTION INTRAMUSCULAR; INTRAVENOUS at 16:00

## 2022-01-01 RX ADMIN — NICOTINE 1 PATCH: 7 PATCH, EXTENDED RELEASE TRANSDERMAL at 09:43

## 2022-01-01 RX ADMIN — IPRATROPIUM BROMIDE AND ALBUTEROL SULFATE 3 ML: 2.5; .5 SOLUTION RESPIRATORY (INHALATION) at 15:15

## 2022-01-01 RX ADMIN — OXYCODONE HYDROCHLORIDE AND ACETAMINOPHEN 1 TABLET: 5; 325 TABLET ORAL at 17:55

## 2022-01-01 RX ADMIN — METHYLPREDNISOLONE SODIUM SUCCINATE 40 MG: 40 INJECTION, POWDER, FOR SOLUTION INTRAMUSCULAR; INTRAVENOUS at 02:37

## 2022-01-01 RX ADMIN — METOPROLOL TARTRATE 50 MG: 25 TABLET, FILM COATED ORAL at 08:28

## 2022-01-01 RX ADMIN — GABAPENTIN 300 MG: 300 CAPSULE ORAL at 09:07

## 2022-01-01 RX ADMIN — CITALOPRAM HYDROBROMIDE 20 MG: 10 TABLET ORAL at 09:07

## 2022-01-01 RX ADMIN — LIDOCAINE: 50 OINTMENT TOPICAL at 20:35

## 2022-01-01 RX ADMIN — FUROSEMIDE 20 MG: 10 INJECTION, SOLUTION INTRAMUSCULAR; INTRAVENOUS at 14:14

## 2022-01-01 RX ADMIN — IPRATROPIUM BROMIDE AND ALBUTEROL SULFATE 3 ML: 2.5; .5 SOLUTION RESPIRATORY (INHALATION) at 07:59

## 2022-01-01 RX ADMIN — OXYCODONE HYDROCHLORIDE AND ACETAMINOPHEN 1 TABLET: 5; 325 TABLET ORAL at 16:30

## 2022-01-01 RX ADMIN — PREDNISONE 20 MG: 20 TABLET ORAL at 08:28

## 2022-01-01 RX ADMIN — METOPROLOL TARTRATE 100 MG: 25 TABLET, FILM COATED ORAL at 21:09

## 2022-01-01 RX ADMIN — DICLOFENAC 2 G: 10 GEL TOPICAL at 11:06

## 2022-01-01 RX ADMIN — CITALOPRAM HYDROBROMIDE 40 MG: 10 TABLET ORAL at 09:13

## 2022-01-01 RX ADMIN — PIPERACILLIN AND TAZOBACTAM 3.38 G: 3; .375 INJECTION, POWDER, FOR SOLUTION INTRAVENOUS at 21:08

## 2022-01-01 RX ADMIN — CLONIDINE HYDROCHLORIDE 0.1 MG: 0.1 TABLET ORAL at 06:37

## 2022-01-01 RX ADMIN — ALBUTEROL SULFATE 2 PUFF: 90 AEROSOL, METERED RESPIRATORY (INHALATION) at 05:40

## 2022-01-01 RX ADMIN — AMIODARONE HYDROCHLORIDE 200 MG: 200 TABLET ORAL at 08:16

## 2022-01-01 RX ADMIN — CLONAZEPAM 2 MG: 0.5 TABLET ORAL at 00:52

## 2022-01-01 RX ADMIN — PIPERACILLIN AND TAZOBACTAM 3.38 G: 3; .375 INJECTION, POWDER, LYOPHILIZED, FOR SOLUTION INTRAVENOUS at 00:27

## 2022-01-01 RX ADMIN — INSULIN ASPART 5 UNITS: 100 INJECTION, SOLUTION INTRAVENOUS; SUBCUTANEOUS at 20:53

## 2022-01-01 RX ADMIN — GABAPENTIN 400 MG: 300 CAPSULE ORAL at 22:12

## 2022-01-01 RX ADMIN — OXYCODONE HYDROCHLORIDE AND ACETAMINOPHEN 1 TABLET: 5; 325 TABLET ORAL at 16:26

## 2022-01-01 RX ADMIN — PREDNISONE 20 MG: 20 TABLET ORAL at 08:19

## 2022-01-01 RX ADMIN — CLONAZEPAM 2 MG: 1 TABLET ORAL at 22:05

## 2022-01-01 RX ADMIN — DILTIAZEM HYDROCHLORIDE 60 MG: 60 TABLET, FILM COATED ORAL at 12:01

## 2022-01-01 RX ADMIN — IPRATROPIUM BROMIDE AND ALBUTEROL SULFATE 3 ML: 2.5; .5 SOLUTION RESPIRATORY (INHALATION) at 12:04

## 2022-01-01 RX ADMIN — DILTIAZEM HYDROCHLORIDE 240 MG: 120 CAPSULE, COATED, EXTENDED RELEASE ORAL at 09:07

## 2022-01-01 RX ADMIN — INSULIN ASPART 2 UNITS: 100 INJECTION, SOLUTION INTRAVENOUS; SUBCUTANEOUS at 16:47

## 2022-01-01 RX ADMIN — OXYCODONE HYDROCHLORIDE AND ACETAMINOPHEN 1 TABLET: 5; 325 TABLET ORAL at 21:37

## 2022-01-01 RX ADMIN — METOPROLOL TARTRATE 50 MG: 50 TABLET, FILM COATED ORAL at 02:25

## 2022-01-01 RX ADMIN — PIPERACILLIN AND TAZOBACTAM 3.38 G: 3; .375 INJECTION, POWDER, LYOPHILIZED, FOR SOLUTION INTRAVENOUS at 08:43

## 2022-01-01 RX ADMIN — PIPERACILLIN AND TAZOBACTAM 3.38 G: 3; .375 INJECTION, POWDER, LYOPHILIZED, FOR SOLUTION INTRAVENOUS at 06:23

## 2022-01-01 RX ADMIN — RIVAROXABAN 20 MG: 20 TABLET, FILM COATED ORAL at 10:10

## 2022-01-01 RX ADMIN — PIPERACILLIN AND TAZOBACTAM 3.38 G: 3; .375 INJECTION, POWDER, LYOPHILIZED, FOR SOLUTION INTRAVENOUS at 00:35

## 2022-01-01 RX ADMIN — METHYLPREDNISOLONE SODIUM SUCCINATE 125 MG: 125 INJECTION, POWDER, FOR SOLUTION INTRAMUSCULAR; INTRAVENOUS at 05:19

## 2022-01-01 RX ADMIN — SODIUM CHLORIDE 500 ML: 9 INJECTION, SOLUTION INTRAVENOUS at 15:22

## 2022-01-01 RX ADMIN — DILTIAZEM HYDROCHLORIDE 240 MG: 120 CAPSULE, COATED, EXTENDED RELEASE ORAL at 14:25

## 2022-01-01 RX ADMIN — RIVAROXABAN 20 MG: 20 TABLET, FILM COATED ORAL at 08:17

## 2022-01-01 RX ADMIN — AMIODARONE HYDROCHLORIDE 400 MG: 200 TABLET ORAL at 17:55

## 2022-01-01 RX ADMIN — MULTIPLE VITAMINS W/ MINERALS TAB 1 TABLET: TAB at 08:38

## 2022-01-01 RX ADMIN — SODIUM CHLORIDE 1000 ML: 9 INJECTION, SOLUTION INTRAVENOUS at 22:37

## 2022-01-01 RX ADMIN — METOPROLOL TARTRATE 50 MG: 25 TABLET, FILM COATED ORAL at 02:47

## 2022-01-01 RX ADMIN — AMIODARONE HYDROCHLORIDE 400 MG: 200 TABLET ORAL at 05:39

## 2022-01-01 RX ADMIN — OXYCODONE HYDROCHLORIDE 10 MG: 5 TABLET ORAL at 21:57

## 2022-01-01 RX ADMIN — GABAPENTIN 300 MG: 300 CAPSULE ORAL at 13:37

## 2022-01-01 RX ADMIN — SODIUM CHLORIDE 50 ML/HR: 9 INJECTION, SOLUTION INTRAVENOUS at 15:58

## 2022-01-01 RX ADMIN — CITALOPRAM HYDROBROMIDE 40 MG: 10 TABLET ORAL at 09:07

## 2022-01-01 RX ADMIN — DILTIAZEM HYDROCHLORIDE 30 MG: 30 TABLET, FILM COATED ORAL at 11:14

## 2022-01-01 RX ADMIN — IPRATROPIUM BROMIDE AND ALBUTEROL SULFATE 3 ML: .5; 3 SOLUTION RESPIRATORY (INHALATION) at 11:43

## 2022-01-01 RX ADMIN — CLONAZEPAM 1 MG: 0.5 TABLET ORAL at 21:57

## 2022-01-01 RX ADMIN — ACETAMINOPHEN 650 MG: 325 TABLET ORAL at 22:12

## 2022-01-01 RX ADMIN — INSULIN ASPART 2 UNITS: 100 INJECTION, SOLUTION INTRAVENOUS; SUBCUTANEOUS at 12:08

## 2022-01-01 RX ADMIN — PIPERACILLIN AND TAZOBACTAM 3.38 G: 3; .375 INJECTION, POWDER, FOR SOLUTION INTRAVENOUS at 04:11

## 2022-01-01 RX ADMIN — METOPROLOL TARTRATE 50 MG: 50 TABLET, FILM COATED ORAL at 13:41

## 2022-01-01 RX ADMIN — GUAIFENESIN 1200 MG: 600 TABLET ORAL at 08:37

## 2022-01-01 RX ADMIN — METOPROLOL TARTRATE 50 MG: 50 TABLET, FILM COATED ORAL at 14:06

## 2022-01-01 RX ADMIN — CLONAZEPAM 1 MG: 0.5 TABLET ORAL at 01:36

## 2022-01-01 RX ADMIN — OXYCODONE HYDROCHLORIDE AND ACETAMINOPHEN 1 TABLET: 5; 325 TABLET ORAL at 20:52

## 2022-01-01 RX ADMIN — IPRATROPIUM BROMIDE AND ALBUTEROL SULFATE 3 ML: 2.5; .5 SOLUTION RESPIRATORY (INHALATION) at 19:52

## 2022-01-01 RX ADMIN — AMIODARONE HYDROCHLORIDE 0.5 MG/MIN: 1.8 INJECTION, SOLUTION INTRAVENOUS at 01:37

## 2022-01-01 RX ADMIN — IPRATROPIUM BROMIDE AND ALBUTEROL SULFATE 3 ML: .5; 3 SOLUTION RESPIRATORY (INHALATION) at 01:26

## 2022-01-01 RX ADMIN — IPRATROPIUM BROMIDE AND ALBUTEROL SULFATE 3 ML: .5; 3 SOLUTION RESPIRATORY (INHALATION) at 20:00

## 2022-01-01 RX ADMIN — IPRATROPIUM BROMIDE AND ALBUTEROL SULFATE 3 ML: 2.5; .5 SOLUTION RESPIRATORY (INHALATION) at 15:24

## 2022-01-01 RX ADMIN — RIVAROXABAN 20 MG: 10 TABLET, FILM COATED ORAL at 21:28

## 2022-01-01 RX ADMIN — CITALOPRAM HYDROBROMIDE 40 MG: 40 TABLET ORAL at 08:19

## 2022-01-01 RX ADMIN — DILTIAZEM HYDROCHLORIDE 25 MG: 5 INJECTION, SOLUTION INTRAVENOUS at 05:13

## 2022-01-01 RX ADMIN — GABAPENTIN 300 MG: 300 CAPSULE ORAL at 19:55

## 2022-01-01 RX ADMIN — GABAPENTIN 400 MG: 300 CAPSULE ORAL at 08:36

## 2022-01-01 RX ADMIN — ERYTHROMYCIN 1 G: 5 OINTMENT OPHTHALMIC at 05:46

## 2022-01-01 RX ADMIN — ACETAMINOPHEN 650 MG: 650 SUPPOSITORY RECTAL at 18:44

## 2022-01-01 RX ADMIN — INSULIN ASPART 2 UNITS: 100 INJECTION, SOLUTION INTRAVENOUS; SUBCUTANEOUS at 04:08

## 2022-01-01 RX ADMIN — METOPROLOL TARTRATE 50 MG: 25 TABLET, FILM COATED ORAL at 22:06

## 2022-01-01 RX ADMIN — OXYCODONE HYDROCHLORIDE AND ACETAMINOPHEN 1 TABLET: 5; 325 TABLET ORAL at 18:34

## 2022-01-01 RX ADMIN — CITALOPRAM HYDROBROMIDE 40 MG: 40 TABLET ORAL at 09:14

## 2022-01-01 RX ADMIN — ACETAMINOPHEN 650 MG: 325 TABLET ORAL at 08:57

## 2022-01-01 RX ADMIN — OXYCODONE HYDROCHLORIDE 10 MG: 5 TABLET ORAL at 10:57

## 2022-01-01 RX ADMIN — FUROSEMIDE 40 MG: 10 INJECTION, SOLUTION INTRAVENOUS at 06:38

## 2022-01-01 RX ADMIN — OXYCODONE HYDROCHLORIDE AND ACETAMINOPHEN 1 TABLET: 5; 325 TABLET ORAL at 17:53

## 2022-01-01 RX ADMIN — DILTIAZEM HYDROCHLORIDE 60 MG: 60 TABLET, FILM COATED ORAL at 17:58

## 2022-01-01 RX ADMIN — INSULIN ASPART 1 UNITS: 100 INJECTION, SOLUTION INTRAVENOUS; SUBCUTANEOUS at 07:54

## 2022-01-01 RX ADMIN — ACETAMINOPHEN 650 MG: 325 TABLET ORAL at 16:29

## 2022-01-01 RX ADMIN — SODIUM BICARBONATE 50 MEQ: 84 INJECTION, SOLUTION INTRAVENOUS at 06:37

## 2022-01-01 RX ADMIN — GABAPENTIN 600 MG: 300 CAPSULE ORAL at 00:12

## 2022-01-01 RX ADMIN — OXYCODONE HYDROCHLORIDE AND ACETAMINOPHEN 1 TABLET: 5; 325 TABLET ORAL at 00:52

## 2022-01-01 RX ADMIN — GUAIFENESIN 1200 MG: 600 TABLET ORAL at 09:14

## 2022-01-01 RX ADMIN — CITALOPRAM HYDROBROMIDE 40 MG: 10 TABLET ORAL at 08:16

## 2022-01-01 RX ADMIN — LIDOCAINE: 50 OINTMENT TOPICAL at 12:04

## 2022-01-01 RX ADMIN — ERYTHROMYCIN 1 G: 5 OINTMENT OPHTHALMIC at 12:02

## 2022-01-01 RX ADMIN — PIPERACILLIN AND TAZOBACTAM 3.38 G: 3; .375 INJECTION, POWDER, FOR SOLUTION INTRAVENOUS at 14:09

## 2022-01-01 RX ADMIN — RIVAROXABAN 20 MG: 10 TABLET, FILM COATED ORAL at 09:07

## 2022-01-01 RX ADMIN — OXYCODONE HYDROCHLORIDE 10 MG: 5 TABLET ORAL at 12:01

## 2022-01-01 RX ADMIN — SODIUM CHLORIDE 500 ML: 9 INJECTION, SOLUTION INTRAVENOUS at 16:28

## 2022-01-01 RX ADMIN — CLONIDINE HYDROCHLORIDE 0.1 MG: 0.1 TABLET ORAL at 13:11

## 2022-01-01 RX ADMIN — INSULIN ASPART 1 UNITS: 100 INJECTION, SOLUTION INTRAVENOUS; SUBCUTANEOUS at 17:24

## 2022-01-01 RX ADMIN — FUROSEMIDE 40 MG: 10 INJECTION, SOLUTION INTRAMUSCULAR; INTRAVENOUS at 12:30

## 2022-01-01 RX ADMIN — Medication 15 MG/HR: at 07:08

## 2022-01-01 RX ADMIN — AMIODARONE HYDROCHLORIDE 400 MG: 200 TABLET ORAL at 05:40

## 2022-01-01 RX ADMIN — OXYCODONE HYDROCHLORIDE 10 MG: 5 TABLET ORAL at 00:27

## 2022-01-01 RX ADMIN — IOPAMIDOL 100 ML: 755 INJECTION, SOLUTION INTRAVENOUS at 23:41

## 2022-01-01 RX ADMIN — CITALOPRAM HYDROBROMIDE 40 MG: 10 TABLET ORAL at 08:27

## 2022-01-01 RX ADMIN — AMIODARONE HYDROCHLORIDE 0.5 MG/MIN: 1.8 INJECTION, SOLUTION INTRAVENOUS at 13:27

## 2022-01-01 RX ADMIN — DILTIAZEM HYDROCHLORIDE 300 MG: 300 CAPSULE, COATED, EXTENDED RELEASE ORAL at 21:34

## 2022-01-01 RX ADMIN — PIPERACILLIN AND TAZOBACTAM 3.38 G: 3; .375 INJECTION, POWDER, LYOPHILIZED, FOR SOLUTION INTRAVENOUS at 22:06

## 2022-01-01 RX ADMIN — AZITHROMYCIN MONOHYDRATE 500 MG: 500 INJECTION, POWDER, LYOPHILIZED, FOR SOLUTION INTRAVENOUS at 06:03

## 2022-01-01 RX ADMIN — OXYCODONE HYDROCHLORIDE AND ACETAMINOPHEN 1 TABLET: 5; 325 TABLET ORAL at 02:25

## 2022-01-01 RX ADMIN — AMIODARONE HYDROCHLORIDE 200 MG: 200 TABLET ORAL at 21:09

## 2022-01-01 RX ADMIN — IPRATROPIUM BROMIDE AND ALBUTEROL SULFATE 3 ML: .5; 3 SOLUTION RESPIRATORY (INHALATION) at 15:35

## 2022-01-01 RX ADMIN — IPRATROPIUM BROMIDE AND ALBUTEROL SULFATE 3 ML: 2.5; .5 SOLUTION RESPIRATORY (INHALATION) at 07:55

## 2022-01-01 RX ADMIN — IPRATROPIUM BROMIDE AND ALBUTEROL SULFATE 3 ML: .5; 3 SOLUTION RESPIRATORY (INHALATION) at 07:39

## 2022-01-01 RX ADMIN — IPRATROPIUM BROMIDE AND ALBUTEROL SULFATE 3 ML: 2.5; .5 SOLUTION RESPIRATORY (INHALATION) at 07:36

## 2022-01-01 RX ADMIN — OXYCODONE HYDROCHLORIDE 5 MG: 5 TABLET ORAL at 08:05

## 2022-01-01 RX ADMIN — METOPROLOL TARTRATE 50 MG: 50 TABLET, FILM COATED ORAL at 20:06

## 2022-01-01 RX ADMIN — PANTOPRAZOLE SODIUM 40 MG: 40 INJECTION, POWDER, FOR SOLUTION INTRAVENOUS at 07:49

## 2022-01-01 RX ADMIN — GUAIFENESIN 1200 MG: 600 TABLET ORAL at 08:19

## 2022-01-01 RX ADMIN — OXYCODONE HYDROCHLORIDE AND ACETAMINOPHEN 1 TABLET: 5; 325 TABLET ORAL at 04:20

## 2022-01-01 RX ADMIN — METOPROLOL TARTRATE 25 MG: 25 TABLET, FILM COATED ORAL at 10:10

## 2022-01-01 RX ADMIN — GUAIFENESIN 1200 MG: 600 TABLET, EXTENDED RELEASE ORAL at 09:12

## 2022-01-01 RX ADMIN — AMIODARONE HYDROCHLORIDE 200 MG: 200 TABLET ORAL at 09:07

## 2022-01-01 RX ADMIN — AMIODARONE HYDROCHLORIDE 200 MG: 200 TABLET ORAL at 08:03

## 2022-01-01 RX ADMIN — IPRATROPIUM BROMIDE AND ALBUTEROL SULFATE 3 ML: .5; 3 SOLUTION RESPIRATORY (INHALATION) at 11:16

## 2022-01-01 RX ADMIN — PIPERACILLIN AND TAZOBACTAM 3.38 G: 3; .375 INJECTION, POWDER, FOR SOLUTION INTRAVENOUS at 12:00

## 2022-01-01 RX ADMIN — ALBUTEROL SULFATE 2 PUFF: 90 AEROSOL, METERED RESPIRATORY (INHALATION) at 00:27

## 2022-01-01 RX ADMIN — PREDNISONE 20 MG: 20 TABLET ORAL at 08:08

## 2022-01-01 RX ADMIN — FUROSEMIDE 20 MG: 10 INJECTION, SOLUTION INTRAMUSCULAR; INTRAVENOUS at 10:39

## 2022-01-01 RX ADMIN — PANTOPRAZOLE SODIUM 40 MG: 40 INJECTION, POWDER, FOR SOLUTION INTRAVENOUS at 08:17

## 2022-01-01 RX ADMIN — IPRATROPIUM BROMIDE AND ALBUTEROL SULFATE 3 ML: 2.5; .5 SOLUTION RESPIRATORY (INHALATION) at 20:28

## 2022-01-01 RX ADMIN — PERFLUTREN 2 ML: 6.52 INJECTION, SUSPENSION INTRAVENOUS at 10:05

## 2022-01-01 RX ADMIN — DEXTROSE MONOHYDRATE 300 ML: 100 INJECTION, SOLUTION INTRAVENOUS at 06:29

## 2022-01-01 RX ADMIN — METHYLPREDNISOLONE SODIUM SUCCINATE 40 MG: 40 INJECTION, POWDER, FOR SOLUTION INTRAMUSCULAR; INTRAVENOUS at 02:05

## 2022-01-01 RX ADMIN — AZITHROMYCIN MONOHYDRATE 250 MG: 500 INJECTION, POWDER, LYOPHILIZED, FOR SOLUTION INTRAVENOUS at 06:13

## 2022-01-01 RX ADMIN — PIPERACILLIN AND TAZOBACTAM 3.38 G: 3; .375 INJECTION, POWDER, FOR SOLUTION INTRAVENOUS at 07:12

## 2022-01-01 RX ADMIN — GUAIFENESIN 1200 MG: 600 TABLET, EXTENDED RELEASE ORAL at 09:06

## 2022-01-01 RX ADMIN — IPRATROPIUM BROMIDE AND ALBUTEROL SULFATE 3 ML: 2.5; .5 SOLUTION RESPIRATORY (INHALATION) at 12:46

## 2022-01-01 RX ADMIN — VANCOMYCIN HYDROCHLORIDE 1250 MG: 5 INJECTION, POWDER, LYOPHILIZED, FOR SOLUTION INTRAVENOUS at 14:44

## 2022-01-01 RX ADMIN — AMIODARONE HYDROCHLORIDE 200 MG: 200 TABLET ORAL at 20:52

## 2022-01-01 RX ADMIN — DILTIAZEM HYDROCHLORIDE 120 MG: 120 CAPSULE, COATED, EXTENDED RELEASE ORAL at 13:12

## 2022-01-01 RX ADMIN — AMIODARONE HYDROCHLORIDE 200 MG: 200 TABLET ORAL at 21:28

## 2022-01-01 RX ADMIN — IPRATROPIUM BROMIDE AND ALBUTEROL SULFATE 3 ML: .5; 3 SOLUTION RESPIRATORY (INHALATION) at 22:59

## 2022-01-01 RX ADMIN — ALBUTEROL SULFATE 2 PUFF: 90 AEROSOL, METERED RESPIRATORY (INHALATION) at 21:27

## 2022-01-01 RX ADMIN — CITALOPRAM HYDROBROMIDE 20 MG: 10 TABLET ORAL at 21:28

## 2022-01-01 RX ADMIN — PIPERACILLIN AND TAZOBACTAM 3.38 G: 3; .375 INJECTION, POWDER, FOR SOLUTION INTRAVENOUS at 14:06

## 2022-01-01 RX ADMIN — IPRATROPIUM BROMIDE AND ALBUTEROL SULFATE 3 ML: 2.5; .5 SOLUTION RESPIRATORY (INHALATION) at 07:31

## 2022-01-01 RX ADMIN — NICOTINE 1 PATCH: 7 PATCH, EXTENDED RELEASE TRANSDERMAL at 08:51

## 2022-01-01 RX ADMIN — FAMOTIDINE 20 MG: 20 TABLET ORAL at 09:14

## 2022-01-01 RX ADMIN — OXYCODONE HYDROCHLORIDE AND ACETAMINOPHEN 1 TABLET: 5; 325 TABLET ORAL at 20:08

## 2022-01-01 RX ADMIN — OXYCODONE HYDROCHLORIDE 10 MG: 5 TABLET ORAL at 13:11

## 2022-01-01 RX ADMIN — DEXTROSE 15 G: 15 GEL ORAL at 07:37

## 2022-01-01 RX ADMIN — AMIODARONE HYDROCHLORIDE 400 MG: 200 TABLET ORAL at 05:37

## 2022-01-01 RX ADMIN — THIAMINE HCL TAB 100 MG 100 MG: 100 TAB at 08:37

## 2022-01-01 RX ADMIN — ALBUTEROL SULFATE 2 PUFF: 90 AEROSOL, METERED RESPIRATORY (INHALATION) at 22:18

## 2022-01-01 RX ADMIN — FUROSEMIDE 20 MG: 10 INJECTION, SOLUTION INTRAMUSCULAR; INTRAVENOUS at 00:38

## 2022-01-01 RX ADMIN — OXYCODONE HYDROCHLORIDE 10 MG: 5 TABLET ORAL at 02:23

## 2022-01-01 RX ADMIN — OXYCODONE HYDROCHLORIDE 10 MG: 5 TABLET ORAL at 06:55

## 2022-01-01 RX ADMIN — IPRATROPIUM BROMIDE AND ALBUTEROL SULFATE 3 ML: 2.5; .5 SOLUTION RESPIRATORY (INHALATION) at 11:38

## 2022-01-01 RX ADMIN — IPRATROPIUM BROMIDE AND ALBUTEROL SULFATE 3 ML: 2.5; .5 SOLUTION RESPIRATORY (INHALATION) at 08:38

## 2022-01-01 RX ADMIN — CLONAZEPAM 2 MG: 0.5 TABLET ORAL at 20:08

## 2022-01-01 RX ADMIN — INSULIN ASPART 1 UNITS: 100 INJECTION, SOLUTION INTRAVENOUS; SUBCUTANEOUS at 12:55

## 2022-01-01 RX ADMIN — ERYTHROMYCIN 1 G: 5 OINTMENT OPHTHALMIC at 14:18

## 2022-01-01 RX ADMIN — GABAPENTIN 400 MG: 300 CAPSULE ORAL at 14:23

## 2022-01-01 RX ADMIN — FAMOTIDINE 20 MG: 10 INJECTION INTRAVENOUS at 14:18

## 2022-01-01 RX ADMIN — CLONAZEPAM 1 MG: 0.5 TABLET ORAL at 22:32

## 2022-01-01 RX ADMIN — IPRATROPIUM BROMIDE AND ALBUTEROL SULFATE 3 ML: 2.5; .5 SOLUTION RESPIRATORY (INHALATION) at 15:51

## 2022-01-01 RX ADMIN — IPRATROPIUM BROMIDE AND ALBUTEROL SULFATE 3 ML: 2.5; .5 SOLUTION RESPIRATORY (INHALATION) at 19:08

## 2022-01-01 RX ADMIN — AMIODARONE HYDROCHLORIDE 0.5 MG/MIN: 1.8 INJECTION, SOLUTION INTRAVENOUS at 14:26

## 2022-01-01 RX ADMIN — OXYCODONE HYDROCHLORIDE AND ACETAMINOPHEN 1 TABLET: 5; 325 TABLET ORAL at 11:56

## 2022-01-01 RX ADMIN — LIDOCAINE: 50 OINTMENT TOPICAL at 16:48

## 2022-01-01 RX ADMIN — IPRATROPIUM BROMIDE AND ALBUTEROL SULFATE 3 ML: 2.5; .5 SOLUTION RESPIRATORY (INHALATION) at 20:04

## 2022-01-01 RX ADMIN — OXYCODONE HYDROCHLORIDE 10 MG: 5 TABLET ORAL at 03:56

## 2022-01-01 RX ADMIN — NICOTINE 1 PATCH: 7 PATCH, EXTENDED RELEASE TRANSDERMAL at 09:13

## 2022-01-01 RX ADMIN — METOPROLOL TARTRATE 50 MG: 25 TABLET, FILM COATED ORAL at 13:36

## 2022-01-01 RX ADMIN — VANCOMYCIN HYDROCHLORIDE 1500 MG: 5 INJECTION, POWDER, LYOPHILIZED, FOR SOLUTION INTRAVENOUS at 20:57

## 2022-01-01 RX ADMIN — GABAPENTIN 400 MG: 300 CAPSULE ORAL at 20:26

## 2022-01-01 RX ADMIN — CLONIDINE HYDROCHLORIDE 0.1 MG: 0.1 TABLET ORAL at 22:13

## 2022-01-01 RX ADMIN — CLONAZEPAM 1 MG: 0.5 TABLET ORAL at 03:48

## 2022-01-01 RX ADMIN — DEXAMETHASONE SODIUM PHOSPHATE 10 MG: 10 INJECTION, SOLUTION INTRAMUSCULAR; INTRAVENOUS at 22:57

## 2022-01-01 RX ADMIN — SODIUM CHLORIDE 500 ML: 9 INJECTION, SOLUTION INTRAVENOUS at 10:08

## 2022-01-01 RX ADMIN — ALBUTEROL SULFATE 2.5 MG: 2.5 SOLUTION RESPIRATORY (INHALATION) at 08:12

## 2022-01-01 RX ADMIN — DICLOFENAC 2 G: 10 GEL TOPICAL at 18:29

## 2022-01-01 RX ADMIN — IPRATROPIUM BROMIDE AND ALBUTEROL SULFATE 3 ML: 2.5; .5 SOLUTION RESPIRATORY (INHALATION) at 20:40

## 2022-01-01 RX ADMIN — PANTOPRAZOLE SODIUM 40 MG: 40 INJECTION, POWDER, FOR SOLUTION INTRAVENOUS at 09:07

## 2022-01-01 RX ADMIN — LIDOCAINE HYDROCHLORIDE 4 ML: 10 INJECTION, SOLUTION EPIDURAL; INFILTRATION; INTRACAUDAL; PERINEURAL at 09:30

## 2022-01-01 RX ADMIN — OXYCODONE HYDROCHLORIDE AND ACETAMINOPHEN 1 TABLET: 5; 325 TABLET ORAL at 06:22

## 2022-01-01 RX ADMIN — METOPROLOL TARTRATE 50 MG: 50 TABLET, FILM COATED ORAL at 08:08

## 2022-01-01 RX ADMIN — ALBUTEROL SULFATE 2 PUFF: 90 AEROSOL, METERED RESPIRATORY (INHALATION) at 09:19

## 2022-01-01 RX ADMIN — NICOTINE 1 PATCH: 7 PATCH, EXTENDED RELEASE TRANSDERMAL at 21:24

## 2022-01-01 RX ADMIN — CLONAZEPAM 1 MG: 0.5 TABLET ORAL at 22:14

## 2022-01-01 RX ADMIN — PANTOPRAZOLE SODIUM 40 MG: 40 INJECTION, POWDER, FOR SOLUTION INTRAVENOUS at 08:08

## 2022-01-01 RX ADMIN — DICLOFENAC 2 G: 10 GEL TOPICAL at 14:47

## 2022-01-01 RX ADMIN — GABAPENTIN 400 MG: 300 CAPSULE ORAL at 13:45

## 2022-01-01 RX ADMIN — AMIODARONE HYDROCHLORIDE 400 MG: 200 TABLET ORAL at 17:48

## 2022-01-01 RX ADMIN — RIVAROXABAN 20 MG: 10 TABLET, FILM COATED ORAL at 16:24

## 2022-01-01 RX ADMIN — PREDNISONE 20 MG: 20 TABLET ORAL at 07:48

## 2022-01-01 RX ADMIN — RIVAROXABAN 20 MG: 20 TABLET, FILM COATED ORAL at 07:48

## 2022-01-01 RX ADMIN — OXYCODONE HYDROCHLORIDE 5 MG: 5 TABLET ORAL at 20:26

## 2022-01-01 RX ADMIN — RIVAROXABAN 20 MG: 10 TABLET, FILM COATED ORAL at 16:46

## 2022-01-01 RX ADMIN — ACETAMINOPHEN 650 MG: 325 TABLET, FILM COATED ORAL at 22:42

## 2022-01-01 RX ADMIN — GABAPENTIN 400 MG: 300 CAPSULE ORAL at 20:35

## 2022-01-01 RX ADMIN — AMIODARONE HYDROCHLORIDE 0.5 MG/MIN: 1.8 INJECTION, SOLUTION INTRAVENOUS at 11:56

## 2022-01-01 RX ADMIN — FUROSEMIDE 40 MG: 10 INJECTION, SOLUTION INTRAMUSCULAR; INTRAVENOUS at 08:16

## 2022-01-01 RX ADMIN — AMIODARONE HYDROCHLORIDE 200 MG: 200 TABLET ORAL at 08:38

## 2022-01-01 RX ADMIN — Medication 0.5 MCG/KG/MIN: at 08:57

## 2022-01-01 RX ADMIN — IPRATROPIUM BROMIDE AND ALBUTEROL SULFATE 3 ML: 2.5; .5 SOLUTION RESPIRATORY (INHALATION) at 20:50

## 2022-01-01 RX ADMIN — IPRATROPIUM BROMIDE AND ALBUTEROL SULFATE 3 ML: .5; 3 SOLUTION RESPIRATORY (INHALATION) at 20:10

## 2022-01-01 RX ADMIN — DILTIAZEM HYDROCHLORIDE 300 MG: 300 CAPSULE, COATED, EXTENDED RELEASE ORAL at 09:07

## 2022-01-01 RX ADMIN — ALBUTEROL SULFATE 2 PUFF: 90 AEROSOL, METERED RESPIRATORY (INHALATION) at 17:26

## 2022-01-01 RX ADMIN — AZITHROMYCIN MONOHYDRATE 250 MG: 500 INJECTION, POWDER, LYOPHILIZED, FOR SOLUTION INTRAVENOUS at 06:54

## 2022-01-01 RX ADMIN — PREDNISONE 20 MG: 20 TABLET ORAL at 09:07

## 2022-01-01 RX ADMIN — METOPROLOL TARTRATE 25 MG: 25 TABLET, FILM COATED ORAL at 10:04

## 2022-01-01 RX ADMIN — CITALOPRAM HYDROBROMIDE 20 MG: 10 TABLET ORAL at 12:02

## 2022-01-01 RX ADMIN — THIAMINE HCL TAB 100 MG 100 MG: 100 TAB at 08:38

## 2022-01-01 RX ADMIN — OXYCODONE HYDROCHLORIDE AND ACETAMINOPHEN 1 TABLET: 5; 325 TABLET ORAL at 23:43

## 2022-01-01 RX ADMIN — PIPERACILLIN AND TAZOBACTAM 3.38 G: 3; .375 INJECTION, POWDER, LYOPHILIZED, FOR SOLUTION INTRAVENOUS at 16:48

## 2022-01-01 RX ADMIN — PIPERACILLIN AND TAZOBACTAM 3.38 G: 3; .375 INJECTION, POWDER, FOR SOLUTION INTRAVENOUS at 05:15

## 2022-01-01 RX ADMIN — GABAPENTIN 400 MG: 300 CAPSULE ORAL at 08:38

## 2022-01-01 RX ADMIN — METOPROLOL TARTRATE 100 MG: 25 TABLET, FILM COATED ORAL at 09:06

## 2022-01-01 RX ADMIN — PREDNISONE 20 MG: 20 TABLET ORAL at 08:38

## 2022-01-01 RX ADMIN — FAMOTIDINE 20 MG: 10 INJECTION INTRAVENOUS at 02:37

## 2022-01-01 RX ADMIN — METOPROLOL TARTRATE 25 MG: 25 TABLET, FILM COATED ORAL at 21:09

## 2022-01-01 RX ADMIN — OXYCODONE HYDROCHLORIDE AND ACETAMINOPHEN 1 TABLET: 5; 325 TABLET ORAL at 13:37

## 2022-01-01 RX ADMIN — IPRATROPIUM BROMIDE AND ALBUTEROL SULFATE 3 ML: 2.5; .5 SOLUTION RESPIRATORY (INHALATION) at 14:45

## 2022-01-01 RX ADMIN — INSULIN ASPART 1 UNITS: 100 INJECTION, SOLUTION INTRAVENOUS; SUBCUTANEOUS at 23:46

## 2022-01-01 RX ADMIN — PIPERACILLIN AND TAZOBACTAM 3.38 G: 3; .375 INJECTION, POWDER, LYOPHILIZED, FOR SOLUTION INTRAVENOUS at 16:27

## 2022-01-01 RX ADMIN — AMIODARONE HYDROCHLORIDE 400 MG: 200 TABLET ORAL at 06:52

## 2022-01-01 RX ADMIN — ALBUTEROL SULFATE 5 MG: 2.5 SOLUTION RESPIRATORY (INHALATION) at 05:10

## 2022-01-01 RX ADMIN — METHYLPREDNISOLONE SODIUM SUCCINATE 40 MG: 40 INJECTION, POWDER, FOR SOLUTION INTRAMUSCULAR; INTRAVENOUS at 14:18

## 2022-01-01 RX ADMIN — AMIODARONE HYDROCHLORIDE 0.5 MG/MIN: 1.8 INJECTION, SOLUTION INTRAVENOUS at 00:32

## 2022-01-01 RX ADMIN — MIDAZOLAM 2 MG: 1 INJECTION INTRAMUSCULAR; INTRAVENOUS at 16:02

## 2022-01-01 RX ADMIN — FAMOTIDINE 20 MG: 10 INJECTION INTRAVENOUS at 02:05

## 2022-01-01 RX ADMIN — AZITHROMYCIN MONOHYDRATE 250 MG: 250 TABLET ORAL at 08:36

## 2022-01-01 RX ADMIN — IPRATROPIUM BROMIDE AND ALBUTEROL SULFATE 3 ML: .5; 3 SOLUTION RESPIRATORY (INHALATION) at 16:56

## 2022-01-01 RX ADMIN — GABAPENTIN 300 MG: 300 CAPSULE ORAL at 13:41

## 2022-01-01 RX ADMIN — OXYCODONE HYDROCHLORIDE AND ACETAMINOPHEN 1 TABLET: 5; 325 TABLET ORAL at 04:01

## 2022-01-01 RX ADMIN — OXYCODONE HYDROCHLORIDE 10 MG: 5 TABLET ORAL at 18:29

## 2022-01-01 RX ADMIN — GABAPENTIN 400 MG: 300 CAPSULE ORAL at 13:16

## 2022-01-01 RX ADMIN — ALBUTEROL SULFATE 2 PUFF: 90 AEROSOL, METERED RESPIRATORY (INHALATION) at 06:34

## 2022-01-01 RX ADMIN — CLONAZEPAM 2 MG: 1 TABLET ORAL at 21:58

## 2022-01-01 RX ADMIN — OXYCODONE HYDROCHLORIDE 10 MG: 5 TABLET ORAL at 05:23

## 2022-01-01 RX ADMIN — INSULIN ASPART 5 UNITS: 100 INJECTION, SOLUTION INTRAVENOUS; SUBCUTANEOUS at 12:40

## 2022-01-01 RX ADMIN — FUROSEMIDE 40 MG: 20 TABLET ORAL at 09:13

## 2022-01-01 RX ADMIN — OXYCODONE HYDROCHLORIDE 10 MG: 5 TABLET ORAL at 18:22

## 2022-01-01 RX ADMIN — SODIUM CHLORIDE 10 UNITS: 9 INJECTION, SOLUTION INTRAVENOUS at 06:25

## 2022-01-01 RX ADMIN — GABAPENTIN 300 MG: 300 CAPSULE ORAL at 14:27

## 2022-01-01 RX ADMIN — GUAIFENESIN 1200 MG: 600 TABLET, EXTENDED RELEASE ORAL at 08:28

## 2022-01-01 RX ADMIN — CLONAZEPAM 1 MG: 0.5 TABLET ORAL at 21:59

## 2022-01-01 RX ADMIN — PIPERACILLIN AND TAZOBACTAM 3.38 G: 3; .375 INJECTION, POWDER, LYOPHILIZED, FOR SOLUTION INTRAVENOUS at 06:30

## 2022-01-01 RX ADMIN — INSULIN ASPART 1 UNITS: 100 INJECTION, SOLUTION INTRAVENOUS; SUBCUTANEOUS at 16:30

## 2022-01-01 RX ADMIN — RIVAROXABAN 20 MG: 10 TABLET, FILM COATED ORAL at 18:05

## 2022-01-01 RX ADMIN — CITALOPRAM HYDROBROMIDE 40 MG: 10 TABLET, FILM COATED ORAL at 08:03

## 2022-01-01 RX ADMIN — OXYCODONE HYDROCHLORIDE 15 MG: 5 TABLET ORAL at 14:22

## 2022-01-01 RX ADMIN — IPRATROPIUM BROMIDE AND ALBUTEROL SULFATE 3 ML: 2.5; .5 SOLUTION RESPIRATORY (INHALATION) at 08:59

## 2022-01-01 RX ADMIN — FAMOTIDINE 20 MG: 10 INJECTION INTRAVENOUS at 01:27

## 2022-01-01 RX ADMIN — METOPROLOL TARTRATE 25 MG: 25 TABLET, FILM COATED ORAL at 22:04

## 2022-01-01 RX ADMIN — AMIODARONE HYDROCHLORIDE 200 MG: 200 TABLET ORAL at 20:53

## 2022-01-01 RX ADMIN — IPRATROPIUM BROMIDE AND ALBUTEROL SULFATE 3 ML: 2.5; .5 SOLUTION RESPIRATORY (INHALATION) at 20:11

## 2022-01-01 RX ADMIN — GABAPENTIN 300 MG: 300 CAPSULE ORAL at 21:10

## 2022-01-01 RX ADMIN — AZITHROMYCIN MONOHYDRATE 250 MG: 500 INJECTION, POWDER, LYOPHILIZED, FOR SOLUTION INTRAVENOUS at 05:56

## 2022-01-01 RX ADMIN — PIPERACILLIN AND TAZOBACTAM 3.38 G: 3; .375 INJECTION, POWDER, LYOPHILIZED, FOR SOLUTION INTRAVENOUS at 05:41

## 2022-01-01 RX ADMIN — Medication 5 MG/HR: at 06:14

## 2022-01-01 RX ADMIN — CLONAZEPAM 2 MG: 1 TABLET ORAL at 23:43

## 2022-01-01 RX ADMIN — OXYCODONE HYDROCHLORIDE 15 MG: 5 TABLET ORAL at 21:25

## 2022-01-01 RX ADMIN — PIPERACILLIN AND TAZOBACTAM 3.38 G: 3; .375 INJECTION, POWDER, FOR SOLUTION INTRAVENOUS at 12:31

## 2022-01-01 RX ADMIN — IPRATROPIUM BROMIDE AND ALBUTEROL SULFATE 3 ML: 2.5; .5 SOLUTION RESPIRATORY (INHALATION) at 12:15

## 2022-01-01 RX ADMIN — IPRATROPIUM BROMIDE AND ALBUTEROL SULFATE 3 ML: 2.5; .5 SOLUTION RESPIRATORY (INHALATION) at 19:58

## 2022-01-01 RX ADMIN — OXYCODONE HYDROCHLORIDE AND ACETAMINOPHEN 1 TABLET: 5; 325 TABLET ORAL at 14:16

## 2022-01-01 RX ADMIN — GABAPENTIN 400 MG: 300 CAPSULE ORAL at 13:12

## 2022-01-01 RX ADMIN — ALBUTEROL SULFATE 2 PUFF: 90 AEROSOL, METERED RESPIRATORY (INHALATION) at 03:37

## 2022-01-01 RX ADMIN — METHYLPREDNISOLONE SODIUM SUCCINATE 40 MG: 40 INJECTION, POWDER, FOR SOLUTION INTRAMUSCULAR; INTRAVENOUS at 15:06

## 2022-01-01 RX ADMIN — OXYCODONE HYDROCHLORIDE AND ACETAMINOPHEN 1 TABLET: 5; 325 TABLET ORAL at 08:25

## 2022-01-01 RX ADMIN — CITALOPRAM HYDROBROMIDE 40 MG: 10 TABLET, FILM COATED ORAL at 08:17

## 2022-01-01 RX ADMIN — IPRATROPIUM BROMIDE AND ALBUTEROL SULFATE 3 ML: 2.5; .5 SOLUTION RESPIRATORY (INHALATION) at 14:24

## 2022-01-01 RX ADMIN — OXYCODONE HYDROCHLORIDE 10 MG: 5 TABLET ORAL at 17:57

## 2022-01-01 RX ADMIN — CALCIUM GLUCONATE 1 G: 20 INJECTION, SOLUTION INTRAVENOUS at 05:54

## 2022-01-01 RX ADMIN — ALBUTEROL SULFATE 2 PUFF: 90 AEROSOL, METERED RESPIRATORY (INHALATION) at 08:39

## 2022-01-01 RX ADMIN — ERYTHROMYCIN 1 G: 5 OINTMENT OPHTHALMIC at 18:21

## 2022-01-01 RX ADMIN — IPRATROPIUM BROMIDE AND ALBUTEROL SULFATE 3 ML: 2.5; .5 SOLUTION RESPIRATORY (INHALATION) at 19:29

## 2022-01-01 RX ADMIN — OXYCODONE HYDROCHLORIDE 10 MG: 5 TABLET ORAL at 11:51

## 2022-01-01 RX ADMIN — OXYCODONE HYDROCHLORIDE AND ACETAMINOPHEN 1 TABLET: 5; 325 TABLET ORAL at 12:44

## 2022-01-01 RX ADMIN — PANTOPRAZOLE SODIUM 40 MG: 40 INJECTION, POWDER, FOR SOLUTION INTRAVENOUS at 08:29

## 2022-01-01 RX ADMIN — PIPERACILLIN AND TAZOBACTAM 3.38 G: 3; .375 INJECTION, POWDER, FOR SOLUTION INTRAVENOUS at 05:40

## 2022-01-01 RX ADMIN — FAMOTIDINE 20 MG: 10 INJECTION INTRAVENOUS at 15:06

## 2022-01-01 RX ADMIN — PIPERACILLIN AND TAZOBACTAM 3.38 G: 3; .375 INJECTION, POWDER, LYOPHILIZED, FOR SOLUTION INTRAVENOUS at 23:19

## 2022-01-01 RX ADMIN — PIPERACILLIN AND TAZOBACTAM 3.38 G: 3; .375 INJECTION, POWDER, FOR SOLUTION INTRAVENOUS at 04:31

## 2022-01-01 RX ADMIN — GUAIFENESIN 1200 MG: 600 TABLET ORAL at 08:38

## 2022-01-01 RX ADMIN — METHYLPREDNISOLONE SODIUM SUCCINATE 40 MG: 40 INJECTION, POWDER, FOR SOLUTION INTRAMUSCULAR; INTRAVENOUS at 01:27

## 2022-01-01 RX ADMIN — GABAPENTIN 300 MG: 300 CAPSULE ORAL at 22:06

## 2022-01-01 RX ADMIN — PREDNISONE 20 MG: 20 TABLET ORAL at 08:03

## 2022-01-01 RX ADMIN — IPRATROPIUM BROMIDE AND ALBUTEROL SULFATE 3 ML: 2.5; .5 SOLUTION RESPIRATORY (INHALATION) at 11:42

## 2022-01-01 RX ADMIN — MULTIPLE VITAMINS W/ MINERALS TAB 1 TABLET: TAB at 08:37

## 2022-01-01 RX ADMIN — IPRATROPIUM BROMIDE AND ALBUTEROL SULFATE 3 ML: 2.5; .5 SOLUTION RESPIRATORY (INHALATION) at 08:33

## 2022-01-01 RX ADMIN — AMIODARONE HYDROCHLORIDE 400 MG: 200 TABLET ORAL at 17:07

## 2022-01-01 RX ADMIN — QUETIAPINE FUMARATE 25 MG: 25 TABLET ORAL at 08:05

## 2022-01-01 ASSESSMENT — ACTIVITIES OF DAILY LIVING (ADL)
ADLS_ACUITY_SCORE: 32
ADLS_ACUITY_SCORE: 39
WALKING_OR_CLIMBING_STAIRS_DIFFICULTY: YES
FALL_HISTORY_WITHIN_LAST_SIX_MONTHS: YES
ADLS_ACUITY_SCORE: 35
ADLS_ACUITY_SCORE: 35
ADLS_ACUITY_SCORE: 32
BATHING: 1-->ASSISTANCE NEEDED
ADLS_ACUITY_SCORE: 35
FALL_HISTORY_WITHIN_LAST_SIX_MONTHS: YES
ADLS_ACUITY_SCORE: 35
WALKING_OR_CLIMBING_STAIRS_DIFFICULTY: YES
ADLS_ACUITY_SCORE: 28
ADLS_ACUITY_SCORE: 32
ADLS_ACUITY_SCORE: 39
ADLS_ACUITY_SCORE: 40
TRANSFERRING: 1-->ASSISTANCE (EQUIPMENT/PERSON) NEEDED
EQUIPMENT_CURRENTLY_USED_AT_HOME: WALKER, ROLLING
ADLS_ACUITY_SCORE: 38
ADLS_ACUITY_SCORE: 35
ADLS_ACUITY_SCORE: 21
ADLS_ACUITY_SCORE: 35
ADLS_ACUITY_SCORE: 31
DRESSING/BATHING_DIFFICULTY: YES
ADLS_ACUITY_SCORE: 21
DRESS: 0-->INDEPENDENT
ADLS_ACUITY_SCORE: 32
ADLS_ACUITY_SCORE: 40
DOING_ERRANDS_INDEPENDENTLY_DIFFICULTY: YES
ADLS_ACUITY_SCORE: 33
ADLS_ACUITY_SCORE: 35
ADLS_ACUITY_SCORE: 35
ADLS_ACUITY_SCORE: 32
WALKING_OR_CLIMBING_STAIRS_DIFFICULTY: YES
ADLS_ACUITY_SCORE: 35
ADLS_ACUITY_SCORE: 21
ADLS_ACUITY_SCORE: 39
CHANGE_IN_FUNCTIONAL_STATUS_SINCE_ONSET_OF_CURRENT_ILLNESS/INJURY: YES
ADLS_ACUITY_SCORE: 35
CHANGE_IN_FUNCTIONAL_STATUS_SINCE_ONSET_OF_CURRENT_ILLNESS/INJURY: YES
ADLS_ACUITY_SCORE: 40
TOILETING_ISSUES: NO
ADLS_ACUITY_SCORE: 40
ADLS_ACUITY_SCORE: 21
ADLS_ACUITY_SCORE: 32
ADLS_ACUITY_SCORE: 35
ADLS_ACUITY_SCORE: 35
ADLS_ACUITY_SCORE: 39
TRANSFERRING: 0-->INDEPENDENT
DOING_ERRANDS_INDEPENDENTLY_DIFFICULTY: YES
HEARING_DIFFICULTY_OR_DEAF: NO
ADLS_ACUITY_SCORE: 35
ADLS_ACUITY_SCORE: 21
EQUIPMENT_CURRENTLY_USED_AT_HOME: WALKER, ROLLING
ADLS_ACUITY_SCORE: 40
ADLS_ACUITY_SCORE: 21
ADLS_ACUITY_SCORE: 35
ADLS_ACUITY_SCORE: 33
ADLS_ACUITY_SCORE: 35
ADLS_ACUITY_SCORE: 40
TOILETING_ISSUES: NO
ADLS_ACUITY_SCORE: 35
ADLS_ACUITY_SCORE: 35
ADLS_ACUITY_SCORE: 21
ADLS_ACUITY_SCORE: 40
ADLS_ACUITY_SCORE: 39
EQUIPMENT_CURRENTLY_USED_AT_HOME: WALKER, ROLLING
ADLS_ACUITY_SCORE: 35
ADLS_ACUITY_SCORE: 28
ADLS_ACUITY_SCORE: 35
ADLS_ACUITY_SCORE: 32
DEPENDENT_IADLS:: CLEANING;COOKING;LAUNDRY;SHOPPING;MEAL PREPARATION;MEDICATION MANAGEMENT
ADLS_ACUITY_SCORE: 35
ADLS_ACUITY_SCORE: 40
ADLS_ACUITY_SCORE: 35
ADLS_ACUITY_SCORE: 35
ADLS_ACUITY_SCORE: 32
ADLS_ACUITY_SCORE: 35
ADLS_ACUITY_SCORE: 40
DEPENDENT_IADLS:: CLEANING;COOKING;LAUNDRY;MEAL PREPARATION;MEDICATION MANAGEMENT;TRANSPORTATION
WALKING_OR_CLIMBING_STAIRS_DIFFICULTY: YES
DRESSING/BATHING: BATHING DIFFICULTY, ASSISTANCE 1 PERSON
ADLS_ACUITY_SCORE: 40
ADLS_ACUITY_SCORE: 39
ADLS_ACUITY_SCORE: 35
ADLS_ACUITY_SCORE: 21
ADLS_ACUITY_SCORE: 35
ADLS_ACUITY_SCORE: 32
ADLS_ACUITY_SCORE: 35
ADLS_ACUITY_SCORE: 39
HEARING_DIFFICULTY_OR_DEAF: NO
ADLS_ACUITY_SCORE: 21
CONCENTRATING,_REMEMBERING_OR_MAKING_DECISIONS_DIFFICULTY: NO
ADLS_ACUITY_SCORE: 35
DRESS: 0-->ASSISTANCE NEEDED (DEVELOPMENTALLY APPROPRIATE)
TRANSFERRING: 1-->ASSISTANCE (EQUIPMENT/PERSON) NEEDED (NOT DEVELOPMENTALLY APPROPRIATE)
ADLS_ACUITY_SCORE: 39
ADLS_ACUITY_SCORE: 21
TRANSFERRING: 0-->ASSISTANCE NEEDED (DEVELOPMENTALLY APPROPRIATE)
WEAR_GLASSES_OR_BLIND: YES
ADLS_ACUITY_SCORE: 21
ADLS_ACUITY_SCORE: 35
ADLS_ACUITY_SCORE: 32
ADLS_ACUITY_SCORE: 32
ADLS_ACUITY_SCORE: 35
ADLS_ACUITY_SCORE: 40
ADLS_ACUITY_SCORE: 35
DRESSING/BATHING_DIFFICULTY: NO
DIFFICULTY_COMMUNICATING: OTHER (SEE COMMENTS)
VISION_MANAGEMENT: GLASSES
ADLS_ACUITY_SCORE: 21
DIFFICULTY_COMMUNICATING: NO
ADLS_ACUITY_SCORE: 35
ADLS_ACUITY_SCORE: 38
ADLS_ACUITY_SCORE: 35
ADLS_ACUITY_SCORE: 35
ADLS_ACUITY_SCORE: 21
ADLS_ACUITY_SCORE: 35
DRESS: 1-->ASSISTANCE (EQUIPMENT/PERSON) NEEDED
ADLS_ACUITY_SCORE: 35
TRANSFERRING: 1-->ASSISTANCE (EQUIPMENT/PERSON) NEEDED
ADLS_ACUITY_SCORE: 38
ADLS_ACUITY_SCORE: 35
ADLS_ACUITY_SCORE: 40
ADLS_ACUITY_SCORE: 28
ADLS_ACUITY_SCORE: 39
ADLS_ACUITY_SCORE: 35
ADLS_ACUITY_SCORE: 21
ADLS_ACUITY_SCORE: 35
ADLS_ACUITY_SCORE: 40
ADLS_ACUITY_SCORE: 21
ADLS_ACUITY_SCORE: 35
ADLS_ACUITY_SCORE: 39
ADLS_ACUITY_SCORE: 21
ADLS_ACUITY_SCORE: 35
ADLS_ACUITY_SCORE: 21
ADLS_ACUITY_SCORE: 35
WEAR_GLASSES_OR_BLIND: YES
CHANGE_IN_FUNCTIONAL_STATUS_SINCE_ONSET_OF_CURRENT_ILLNESS/INJURY: YES
WALKING_OR_CLIMBING_STAIRS: AMBULATION DIFFICULTY, REQUIRES EQUIPMENT
WEAR_GLASSES_OR_BLIND: YES
ADLS_ACUITY_SCORE: 31
WEAR_GLASSES_OR_BLIND: YES
ADLS_ACUITY_SCORE: 21
VISION_MANAGEMENT: GLASSES
DRESSING/BATHING_DIFFICULTY: NO
ADLS_ACUITY_SCORE: 28
ADLS_ACUITY_SCORE: 21
ADLS_ACUITY_SCORE: 35
ADLS_ACUITY_SCORE: 35
ADLS_ACUITY_SCORE: 31
ADLS_ACUITY_SCORE: 33
ADLS_ACUITY_SCORE: 21
ADLS_ACUITY_SCORE: 39
ADLS_ACUITY_SCORE: 35
ADLS_ACUITY_SCORE: 21
ADLS_ACUITY_SCORE: 39
DIFFICULTY_EATING/SWALLOWING: NO
CONCENTRATING,_REMEMBERING_OR_MAKING_DECISIONS_DIFFICULTY: NO
ADLS_ACUITY_SCORE: 39
ADLS_ACUITY_SCORE: 35
CONCENTRATING,_REMEMBERING_OR_MAKING_DECISIONS_DIFFICULTY: NO
BATHING: 0-->INDEPENDENT
ADLS_ACUITY_SCORE: 35
ADLS_ACUITY_SCORE: 28
ADLS_ACUITY_SCORE: 35
ADLS_ACUITY_SCORE: 28
ADLS_ACUITY_SCORE: 38
DOING_ERRANDS_INDEPENDENTLY_DIFFICULTY: YES
DOING_ERRANDS_INDEPENDENTLY_DIFFICULTY: YES
ADLS_ACUITY_SCORE: 35
TRANSFERRING: 0-->INDEPENDENT
NUMBER_OF_TIMES_PATIENT_HAS_FALLEN_WITHIN_LAST_SIX_MONTHS: 1
ADLS_ACUITY_SCORE: 35
DRESS: 0-->INDEPENDENT
DIFFICULTY_EATING/SWALLOWING: NO
ADLS_ACUITY_SCORE: 35
ADLS_ACUITY_SCORE: 21
ADLS_ACUITY_SCORE: 35
ADLS_ACUITY_SCORE: 35
ADLS_ACUITY_SCORE: 40
FALL_HISTORY_WITHIN_LAST_SIX_MONTHS: NO
TOILETING_ISSUES: NO
VISION_MANAGEMENT: GLASSES
ADLS_ACUITY_SCORE: 39
ADLS_ACUITY_SCORE: 35
ADLS_ACUITY_SCORE: 35
ADLS_ACUITY_SCORE: 40
ADLS_ACUITY_SCORE: 40
ADLS_ACUITY_SCORE: 35
ADLS_ACUITY_SCORE: 32
ADLS_ACUITY_SCORE: 35
DRESSING/BATHING_DIFFICULTY: NO
WALKING_OR_CLIMBING_STAIRS: OTHER (SEE COMMENTS)
ADLS_ACUITY_SCORE: 35
ADLS_ACUITY_SCORE: 21
ADLS_ACUITY_SCORE: 39
ADLS_ACUITY_SCORE: 28
ADLS_ACUITY_SCORE: 35
ADLS_ACUITY_SCORE: 33
ADLS_ACUITY_SCORE: 35
ADLS_ACUITY_SCORE: 40
ADLS_ACUITY_SCORE: 21
ADLS_ACUITY_SCORE: 35
DIFFICULTY_EATING/SWALLOWING: NO
ADLS_ACUITY_SCORE: 35
ADLS_ACUITY_SCORE: 35
ADLS_ACUITY_SCORE: 21
DIFFICULTY_EATING/SWALLOWING: NO
ADLS_ACUITY_SCORE: 38
WALKING_OR_CLIMBING_STAIRS: AMBULATION DIFFICULTY, REQUIRES EQUIPMENT
ADLS_ACUITY_SCORE: 38
ADLS_ACUITY_SCORE: 35
ADLS_ACUITY_SCORE: 32
ADLS_ACUITY_SCORE: 38
CONCENTRATING,_REMEMBERING_OR_MAKING_DECISIONS_DIFFICULTY: NO
ADLS_ACUITY_SCORE: 21
DIFFICULTY_COMMUNICATING: NO
ADLS_ACUITY_SCORE: 35
ADLS_ACUITY_SCORE: 32
ADLS_ACUITY_SCORE: 21
ADLS_ACUITY_SCORE: 39
TOILETING_ISSUES: NO
ADLS_ACUITY_SCORE: 38
ADLS_ACUITY_SCORE: 21
ADLS_ACUITY_SCORE: 21
ADLS_ACUITY_SCORE: 39
ADLS_ACUITY_SCORE: 35
NUMBER_OF_TIMES_PATIENT_HAS_FALLEN_WITHIN_LAST_SIX_MONTHS: 5
ADLS_ACUITY_SCORE: 31
ADLS_ACUITY_SCORE: 21
ADLS_ACUITY_SCORE: 35
CHANGE_IN_FUNCTIONAL_STATUS_SINCE_ONSET_OF_CURRENT_ILLNESS/INJURY: NO
ADLS_ACUITY_SCORE: 33
ADLS_ACUITY_SCORE: 35
ADLS_ACUITY_SCORE: 39
ADLS_ACUITY_SCORE: 32
ADLS_ACUITY_SCORE: 21
ADLS_ACUITY_SCORE: 21
ADLS_ACUITY_SCORE: 35
ADLS_ACUITY_SCORE: 35
ADLS_ACUITY_SCORE: 39
ADLS_ACUITY_SCORE: 40
ADLS_ACUITY_SCORE: 32

## 2022-01-01 ASSESSMENT — ENCOUNTER SYMPTOMS
VOMITING: 0
LIGHT-HEADEDNESS: 0
FEVER: 1
SHORTNESS OF BREATH: 1
DIFFICULTY URINATING: 0
FEVER: 0
SHORTNESS OF BREATH: 1
CONSTIPATION: 0
DIARRHEA: 0
COUGH: 1
MYALGIAS: 1
HEMATURIA: 0
WHEEZING: 1
ARTHRALGIAS: 1
DYSURIA: 0
FEVER: 1
HEADACHES: 0
WEAKNESS: 0
NUMBNESS: 0
NAUSEA: 0
BLOOD IN STOOL: 0

## 2022-01-01 ASSESSMENT — ANXIETY QUESTIONNAIRES
3. WORRYING TOO MUCH ABOUT DIFFERENT THINGS: NOT AT ALL
8. IF YOU CHECKED OFF ANY PROBLEMS, HOW DIFFICULT HAVE THESE MADE IT FOR YOU TO DO YOUR WORK, TAKE CARE OF THINGS AT HOME, OR GET ALONG WITH OTHER PEOPLE?: NOT DIFFICULT AT ALL
GAD7 TOTAL SCORE: 0
7. FEELING AFRAID AS IF SOMETHING AWFUL MIGHT HAPPEN: NOT AT ALL
GAD7 TOTAL SCORE: 0
5. BEING SO RESTLESS THAT IT IS HARD TO SIT STILL: NOT AT ALL
6. BECOMING EASILY ANNOYED OR IRRITABLE: NOT AT ALL
GAD7 TOTAL SCORE: 0
1. FEELING NERVOUS, ANXIOUS, OR ON EDGE: NOT AT ALL
4. TROUBLE RELAXING: NOT AT ALL
7. FEELING AFRAID AS IF SOMETHING AWFUL MIGHT HAPPEN: NOT AT ALL
IF YOU CHECKED OFF ANY PROBLEMS ON THIS QUESTIONNAIRE, HOW DIFFICULT HAVE THESE PROBLEMS MADE IT FOR YOU TO DO YOUR WORK, TAKE CARE OF THINGS AT HOME, OR GET ALONG WITH OTHER PEOPLE: NOT DIFFICULT AT ALL
2. NOT BEING ABLE TO STOP OR CONTROL WORRYING: NOT AT ALL

## 2022-01-01 ASSESSMENT — PATIENT HEALTH QUESTIONNAIRE - PHQ9
SUM OF ALL RESPONSES TO PHQ QUESTIONS 1-9: 6
SUM OF ALL RESPONSES TO PHQ QUESTIONS 1-9: 6
10. IF YOU CHECKED OFF ANY PROBLEMS, HOW DIFFICULT HAVE THESE PROBLEMS MADE IT FOR YOU TO DO YOUR WORK, TAKE CARE OF THINGS AT HOME, OR GET ALONG WITH OTHER PEOPLE: NOT DIFFICULT AT ALL
10. IF YOU CHECKED OFF ANY PROBLEMS, HOW DIFFICULT HAVE THESE PROBLEMS MADE IT FOR YOU TO DO YOUR WORK, TAKE CARE OF THINGS AT HOME, OR GET ALONG WITH OTHER PEOPLE: NOT DIFFICULT AT ALL
SUM OF ALL RESPONSES TO PHQ QUESTIONS 1-9: 2
SUM OF ALL RESPONSES TO PHQ QUESTIONS 1-9: 2

## 2022-01-01 ASSESSMENT — PAIN SCALES - GENERAL: PAINLEVEL: MODERATE PAIN (4)

## 2022-01-06 ENCOUNTER — TELEPHONE (OUTPATIENT)
Dept: PHYSICAL THERAPY | Facility: REHABILITATION | Age: 67
End: 2022-01-06

## 2022-01-16 ENCOUNTER — MYC MEDICAL ADVICE (OUTPATIENT)
Dept: FAMILY MEDICINE | Facility: CLINIC | Age: 67
End: 2022-01-16
Payer: MEDICARE

## 2022-01-16 DIAGNOSIS — J44.1 CHRONIC OBSTRUCTIVE PULMONARY DISEASE WITH ACUTE EXACERBATION (H): Primary | ICD-10-CM

## 2022-01-17 RX ORDER — FLUTICASONE PROPIONATE AND SALMETEROL XINAFOATE 230; 21 UG/1; UG/1
2 AEROSOL, METERED RESPIRATORY (INHALATION) 2 TIMES DAILY
Qty: 12 G | Refills: 11 | Status: SHIPPED | OUTPATIENT
Start: 2022-01-17 | End: 2022-02-09

## 2022-01-17 RX ORDER — ALBUTEROL SULFATE 90 UG/1
2 AEROSOL, METERED RESPIRATORY (INHALATION) EVERY 6 HOURS
Qty: 18 G | Refills: 11 | Status: SHIPPED | OUTPATIENT
Start: 2022-01-17 | End: 2022-03-23

## 2022-01-17 NOTE — TELEPHONE ENCOUNTER
I ordered him albuterol and Advair inhalers to the [facility] in [place]. Albuterol is most similar to levalbuterol but he may find that the advair works better. I would recommend getting a spacer to use, which will help the medication work better.     Please let me know if they have any questions about this. I am sorry they are struggling so much with this.     Gloria Sebastian MD

## 2022-01-17 NOTE — TELEPHONE ENCOUNTER
Author spoke to patient's daughter, Ethan, who has consent to communicate on file.     [place] thinks [place] has [place], and they are refusing to cover the inhaler.    Patient's insurance coverage is Medicare Part A, Part B and Medicaid.    Prior Authorization was already filled out for patient to get his inhaler. Pharmacy can't release the medication because it is not getting paid for by [place].     Patient's daughter called the [facility] worker, who can't figure out why his medication is being held. County worker is in [facility], but she is trying to get ahold of his [facility] worker to assist patient and patient's family.     Patient's daughter is requesting a prescription for something similar to the Levelalbuterol that she can  today for patient. Patient's daughter is willing to pay out of pocket.     Reva HEMPHILL RN

## 2022-01-17 NOTE — TELEPHONE ENCOUNTER
Called daughter, CTC on file, and left  notifying that prescriptions were sent in from Dr. Sebastian and they should be available for  later today.    If any questions or needed anything from the clinic, gave clinic phone number for them to call back.    Reva HEMPHILL RN

## 2022-01-26 PROBLEM — F32.1 CURRENT MODERATE EPISODE OF MAJOR DEPRESSIVE DISORDER WITHOUT PRIOR EPISODE (H): Status: ACTIVE | Noted: 2022-01-26

## 2022-02-09 ENCOUNTER — TELEPHONE (OUTPATIENT)
Dept: FAMILY MEDICINE | Facility: CLINIC | Age: 67
End: 2022-02-09

## 2022-02-09 ENCOUNTER — OFFICE VISIT (OUTPATIENT)
Dept: FAMILY MEDICINE | Facility: CLINIC | Age: 67
End: 2022-02-09
Payer: MEDICARE

## 2022-02-09 ENCOUNTER — MYC MEDICAL ADVICE (OUTPATIENT)
Dept: FAMILY MEDICINE | Facility: CLINIC | Age: 67
End: 2022-02-09

## 2022-02-09 VITALS
OXYGEN SATURATION: 94 % | DIASTOLIC BLOOD PRESSURE: 95 MMHG | WEIGHT: 244.25 LBS | HEIGHT: 72 IN | RESPIRATION RATE: 24 BRPM | TEMPERATURE: 98.1 F | SYSTOLIC BLOOD PRESSURE: 150 MMHG | BODY MASS INDEX: 33.08 KG/M2 | HEART RATE: 100 BPM

## 2022-02-09 DIAGNOSIS — G89.4 CHRONIC PAIN DISORDER: ICD-10-CM

## 2022-02-09 DIAGNOSIS — B18.2 CHRONIC HEPATITIS C WITHOUT HEPATIC COMA (H): ICD-10-CM

## 2022-02-09 DIAGNOSIS — F11.90 CHRONIC, CONTINUOUS USE OF OPIOIDS: Primary | ICD-10-CM

## 2022-02-09 DIAGNOSIS — I25.10 CARDIOVASCULAR DISEASE: ICD-10-CM

## 2022-02-09 DIAGNOSIS — J44.9 CHRONIC OBSTRUCTIVE PULMONARY DISEASE, UNSPECIFIED COPD TYPE (H): ICD-10-CM

## 2022-02-09 DIAGNOSIS — J44.1 CHRONIC OBSTRUCTIVE PULMONARY DISEASE WITH ACUTE EXACERBATION (H): ICD-10-CM

## 2022-02-09 DIAGNOSIS — Z76.0 ENCOUNTER FOR MEDICATION REFILL: ICD-10-CM

## 2022-02-09 DIAGNOSIS — I48.91 ATRIAL FIBRILLATION WITH RAPID VENTRICULAR RESPONSE (H): ICD-10-CM

## 2022-02-09 DIAGNOSIS — I10 ESSENTIAL HYPERTENSION: ICD-10-CM

## 2022-02-09 LAB
AMPHETAMINE-CLINIC: ABNORMAL
BARBITURATES-CLINIC: ABNORMAL
BENZODIAZEPINES-CLINIC: ABNORMAL
BUPRENORPHINE-CLINIC: ABNORMAL
COCAINE-CLINIC: ABNORMAL
ECSTASY-CLINIC: ABNORMAL
MARIJUANA-CLINIC: ABNORMAL
METHADONE METABOLITE-CLINIC: ABNORMAL
METHAMPHETAMINE-CLINIC: ABNORMAL
OPIATES-CLINIC: ABNORMAL
OXIDANTS: NORMAL
OXYCODONE-CLINIC: ABNORMAL
PCP 25-CLINIC: ABNORMAL
PH-CLINIC: 4 (ref 5–8)
SP GR UR STRIP: 1.02 (ref 1–1.03)

## 2022-02-09 PROCEDURE — 80305 DRUG TEST PRSMV DIR OPT OBS: CPT | Performed by: FAMILY MEDICINE

## 2022-02-09 PROCEDURE — 99215 OFFICE O/P EST HI 40 MIN: CPT | Performed by: FAMILY MEDICINE

## 2022-02-09 RX ORDER — FUROSEMIDE 20 MG
20 TABLET ORAL DAILY
Qty: 60 TABLET | Refills: 0 | Status: SHIPPED | OUTPATIENT
Start: 2022-02-09 | End: 2022-01-01

## 2022-02-09 RX ORDER — GLECAPREVIR AND PIBRENTASVIR 40; 100 MG/1; MG/1
3 TABLET, FILM COATED ORAL
Qty: 90 TABLET | Refills: 1 | Status: SHIPPED | OUTPATIENT
Start: 2022-02-09 | End: 2022-02-23

## 2022-02-09 RX ORDER — DOXYCYCLINE 100 MG/1
100 CAPSULE ORAL 2 TIMES DAILY
Qty: 10 CAPSULE | Refills: 11 | Status: SHIPPED | OUTPATIENT
Start: 2022-02-09 | End: 2022-04-09

## 2022-02-09 RX ORDER — PREDNISONE 10 MG/1
10 TABLET ORAL SEE ADMIN INSTRUCTIONS
Qty: 40 TABLET | Refills: 5 | Status: ON HOLD | OUTPATIENT
Start: 2022-02-09 | End: 2022-01-01

## 2022-02-09 RX ORDER — IPRATROPIUM BROMIDE AND ALBUTEROL SULFATE 2.5; .5 MG/3ML; MG/3ML
1 SOLUTION RESPIRATORY (INHALATION) EVERY 6 HOURS PRN
Qty: 90 ML | Refills: 3 | Status: ON HOLD | OUTPATIENT
Start: 2022-02-09 | End: 2022-01-01

## 2022-02-09 RX ORDER — OXYCODONE AND ACETAMINOPHEN 5; 325 MG/1; MG/1
1 TABLET ORAL 2 TIMES DAILY PRN
Qty: 60 TABLET | Refills: 0 | Status: SHIPPED | OUTPATIENT
Start: 2022-02-09 | End: 2022-02-11

## 2022-02-09 ASSESSMENT — MIFFLIN-ST. JEOR: SCORE: 1930.91

## 2022-02-09 NOTE — PROGRESS NOTES
"S  Ki Pederson is a 67 year old male here for difficulty breathing. Feels like he is having an ongoing COPD exacerbation. Getting winded very easily.  Using pulmicort and albuterol nebs but they don't seem to be working. He maintains that he can't use powdered inhalers. Tried advair and \"it didn't work.\"     Feels that he can't carry the portable oxygen on his back. AT home, he's on 4 liters all the time, but he doesn't carry his portable oxygen.     Hep c- would like to start treatment.     Percocet helped his pain but it only lasted a couple of hours. He is wondering if he can increase the dose. He has not gotten started on medical cannabis yet- daughter is completing the application tonight.     He thinks he is retaining a lot of water. Used to be on Lasix and isn't sure why it was stopped. Ankles aren't swollen but he thinks his body is holding on to extra water.     O  BP (!) 150/95   Pulse 100   Temp 98.1  F (36.7  C) (Oral)   Resp 24   Ht 1.829 m (6')   Wt 110.8 kg (244 lb 4 oz)   SpO2 94%   BMI 33.13 kg/m     Vitals reviewed. Nursing note reviewed.  General Appearance: Pleasant and alert  HEENT: mucous membranes mildly dry  CV: RRR, no murmur, rubs, gallops  Resp: Breathless while talking, audible wheezing. Lungs with faint wheezing bilaterally. Faint crackles in lower lobes.   Ext: No peripheral edema, good distal perfusion  Skin: warm, dry, intact, no rash noted  Neuro: no focal deficits, CNs II-XII normal.   Psych: mood and affect are normal.    A/P  Ki was seen today for recheck and medication request.    Diagnoses and all orders for this visit:    Chronic, continuous use of opioids: urine drug screen was negative for opioids but this is appropriate, as he ran out of these since he had to miss his last appointment. Positive for marijuana. I prescribed narcan for him to use in the case of accidental overdose, especially given his COPD which could contribute to respiratory depression.   -   "   Drugs of abuse, urine (CLINIC ONLY); Future  -     Drugs of abuse, urine (CLINIC ONLY)  -     naloxone (NARCAN) 4 MG/0.1ML nasal spray; Spray 1 spray (4 mg) into one nostril alternating nostrils once as needed for opioid reversal every 2-3 minutes until assistance arrives    Chronic pain disorder  -     oxyCODONE-acetaminophen (PERCOCET) 5-325 MG tablet; Take 1 tablet by mouth 2 times daily as needed for severe pain    Chronic obstructive pulmonary disease with acute exacerbation (H): will try duonebs since he feels he cannot use inhalers, in order to get an anti-muscarinic on board.   -     ipratropium - albuterol 0.5 mg/2.5 mg/3 mL (DUONEB) 0.5-2.5 (3) MG/3ML neb solution; Take 1 vial (3 mLs) by nebulization every 6 hours as needed for shortness of breath / dyspnea or wheezing    Encounter for medication refill: refilled prednisone and doxycycline to use in the case of exacerbations.   -     predniSONE (DELTASONE) 10 MG tablet; Take 1 tablet (10 mg) by mouth See Admin Instructions take 4 tabs daily x 4 days, then 3 tabs daily x 4 days, then 2 tabs daily x 4 days, then 1 tab daily x 4 days..    Chronic obstructive pulmonary disease, unspecified COPD type (H)  -     doxycycline monohydrate (MONODOX) 100 MG capsule; Take 1 capsule (100 mg) by mouth 2 times daily    Chronic hepatitis C without hepatic coma (H): Will prescribe Mavyret. Will likely need prior auth. Once started, recheck viral load and LFTs in 8 weeks.   -     glecaprevir-pibrentasvir (MAVYRET) 100-40 MG per tablet; Take 3 tablets by mouth daily (with breakfast)    Arteriosclerotic Cardiovascular Disease (ASCVD): reviewed that he has gained 20 lbs since November. Not sure why Lasix was stopped but will restart today. He likely is carrying extra water weight which is contributing to his shortness of breath. Also getting Echo. May need to increase Lasix dose as he was previously on 40 mg/day, per chart review.   -     furosemide (LASIX) 20 MG tablet;  Take 1 tablet (20 mg) by mouth daily  -     Echocardiogram Complete; Future    Essential Hypertension: BP is elevated today, likely also due to some extra water weight. Start Lasix 20 mg/day and recheck BP in 2 weeks. Also on lisinopril.    Atrial fibrillation with rapid ventricular response (H)  -     Echocardiogram Complete; Future     spent 60 minutes on chart review, patient visit, and documentation.     No follow-ups on file.    Options for treatment and follow-up care were reviewed with the patient and/or guardian. Ki Pederson and/or guardian engaged in the decision making process and verbalized understanding of the options discussed and agreed with the final plan.    Gloria Sebastian MD      DME (Durable Medical Equipment) Orders and Documentation  Orders Placed This Encounter   Procedures     Oxygen Order      The patient was assessed and it was determined the patient is in need of the following listed DME Supplies/Equipment. Please complete supporting documentation below to demonstrate medical necessity.      Oxygen Use Documentation  I certify that this patient, Ki Pederson has been under my care and that I, or a nurse practitioner or physician's assistant working with me, had a face-to-face encounter that meets face-to-face encounter requirements with this patient on February 9, 2022.    Ki Pederson is now in a chronic stable state and continues to require supplemental oxygen due to continued oxygen desaturation.  This patient has been treated in part, or in whole for the following medical condition(s):  Chronic Respiratory Failure with Hypoxia J93.11  Emphysema J43.9  Treatments tried and failed or ruled out to treat hypoxemia include inhalers and nebulizers.  If portability is ordered, is the patient mobile within the home? yes

## 2022-02-09 NOTE — PATIENT INSTRUCTIONS
I am ADDING Duonebs. Use this at least twice a day, more if you need it.     Starting Mavyret for Hepatitis C. This is going to need an insurance appeal first.     Starting Lasix again- the water pill.     Schedule your Echo for your heart- this is an ultrasound.    Please come back in 2 weeks for a blood pressure check.

## 2022-02-09 NOTE — TELEPHONE ENCOUNTER
Created new telephone encounter with a request for a letter stating medical necessity for the Albuterol and the Xopenex.    Closing this encounter.    Reva HEMPHILL RN

## 2022-02-09 NOTE — TELEPHONE ENCOUNTER
2 inhalers:  Albuterol  Xopenex - getting a rejection    Pharmacist states that they may be able to fill both if they have a statement from provider stating that they are both medically needed or that one is needed for x and one is needed for y. Insurance will not cover both without this letter. Pharmacist says that if they have medical backing (a letter from provider) they can try to override insurance to cover both medications.     If able to write a letter stating the need for both medications, please fax letter to [phone]. Please attention it to either pharmacist or Prakash, a pharmacist.    Reva HEMPHILL, RN

## 2022-02-09 NOTE — TELEPHONE ENCOUNTER
Called and let pharmacy know that the medication is being taken off the of the list per EP message below. Pharmacy will advise Brooklyn that if she has questions about medication being discontinued to contact the care team here at [facility].    Reva HEMPHILL RN

## 2022-02-10 ENCOUNTER — TELEPHONE (OUTPATIENT)
Dept: FAMILY MEDICINE | Facility: CLINIC | Age: 67
End: 2022-02-10
Payer: COMMERCIAL

## 2022-02-10 DIAGNOSIS — G89.4 CHRONIC PAIN DISORDER: ICD-10-CM

## 2022-02-10 NOTE — TELEPHONE ENCOUNTER
Central Prior Authorization Team   Phone: [phone]    PA Initiation    Medication: oxyCODONE-Acetaminophen 5-325MG tablets  Insurance Company: [employer] - Phone [phone] Fax [phone]  Pharmacy Filling the Rx: [id number] [place], [place] - [address] [place] [address]  Filling Pharmacy Phone: [phone]  Filling Pharmacy Fax:    Start Date: 2/10/2022  Manually faxed forms

## 2022-02-10 NOTE — TELEPHONE ENCOUNTER
Prior Authorization Retail Medication Request    Medication/Dose: oxycodone-acetaminophen 5-325 mg  One tab twice a day for severe pain as needed    ICD code (if different than what is on RX):  Same    Previously Tried and Failed:  unknown  Rationale:  Severe pain    Insurance Name:  [employer]  Insurance ID:  [id number]    COVERMYMEDS KEY:  [id number]      Pharmacy Information (if different than what is on RX)  Name:  [facility]  Phone:  [phone]    CALL TAKEN on 2/10/22 at 230 pm by Duyen Jeong CMA CMT

## 2022-02-11 RX ORDER — OXYCODONE AND ACETAMINOPHEN 5; 325 MG/1; MG/1
1 TABLET ORAL DAILY PRN
Qty: 30 TABLET | Refills: 0 | Status: SHIPPED | OUTPATIENT
Start: 2022-02-11 | End: 2022-03-02

## 2022-02-11 RX ORDER — OXYCODONE AND ACETAMINOPHEN 5; 325 MG/1; MG/1
1 TABLET ORAL 2 TIMES DAILY PRN
Qty: 30 TABLET | Refills: 0 | Status: SHIPPED | OUTPATIENT
Start: 2022-02-11 | End: 2022-02-11

## 2022-02-11 NOTE — TELEPHONE ENCOUNTER
CMA has not been able to get through the pharmacy line to pharmacist or tech despite multiple attempts.     CMA tried to reach patient - left message to call back. When he does, update him per MD note below that insurance will only cover pain med once daily so MD had to change back to only once daily.

## 2022-02-11 NOTE — TELEPHONE ENCOUNTER
Please call pharmacy and explain I rewrote the Rx to be for just ONCE per day, as it was before.     Please call patient and explain insurance will not pay for more than 1 tab per day.     Gloria Sebastian MD

## 2022-02-11 NOTE — TELEPHONE ENCOUNTER
Provider will need to answer ALL of the following questions:    If patient is currently in hospice and this medication is related to their hospice admission, the PA team will not complete the authorization.         Does CMS show active Hospice?    Is the patient currently enrolled in hospice?    If yes, please provide hospice admit date:    If NO, please provide the following information: Date of hospice termination:    Please provide the following information: Hospice name; Hospice address and phone number; Hospice secure fax number, Contact Name.    Is the drug requested being used for a condition related to the terminal illness or related conditions?    If NO, please provide an explanation of why the condition being treated is NOT related to the terminal illness or related conditions and therefore is not covered under hospice benefit and may be covered under Medicare Part D.    Is the prescriber of the drug affiliated with the hospice provider?    If NO, has the hospice provider confirmed that the requested drug is NOT related to the terminal illness or related conditions?

## 2022-02-11 NOTE — TELEPHONE ENCOUNTER
Thank you for catching this- it should read ONCE daily. I rewrote the script.     Thank you!   Gloria Sebastian MD

## 2022-02-11 NOTE — TELEPHONE ENCOUNTER
[facility] will send to clarify. Is this the dose MD wanted (2 tabs total daily)?     oxyCODONE-acetaminophen (PERCOCET) 5-325 MG tablet 30 tablet 0 2/11/2022  No     Take 1 tablet by mouth 2 times daily as needed for severe pain

## 2022-02-16 ENCOUNTER — HOSPITAL ENCOUNTER (OUTPATIENT)
Dept: CARDIOLOGY | Facility: CLINIC | Age: 67
Discharge: HOME | End: 2022-02-16
Attending: FAMILY MEDICINE | Admitting: FAMILY MEDICINE
Payer: MEDICARE

## 2022-02-16 DIAGNOSIS — I48.91 ATRIAL FIBRILLATION WITH RAPID VENTRICULAR RESPONSE (H): ICD-10-CM

## 2022-02-16 DIAGNOSIS — I25.10 CARDIOVASCULAR DISEASE: ICD-10-CM

## 2022-02-16 PROCEDURE — 93306 TTE W/DOPPLER COMPLETE: CPT

## 2022-02-16 PROCEDURE — 93306 TTE W/DOPPLER COMPLETE: CPT | Mod: 26 | Performed by: INTERNAL MEDICINE

## 2022-02-23 ENCOUNTER — OFFICE VISIT (OUTPATIENT)
Dept: FAMILY MEDICINE | Facility: CLINIC | Age: 67
End: 2022-02-23
Payer: MEDICARE

## 2022-02-23 VITALS
RESPIRATION RATE: 24 BRPM | WEIGHT: 242.75 LBS | DIASTOLIC BLOOD PRESSURE: 80 MMHG | BODY MASS INDEX: 32.88 KG/M2 | TEMPERATURE: 98 F | OXYGEN SATURATION: 95 % | SYSTOLIC BLOOD PRESSURE: 136 MMHG | HEIGHT: 72 IN | HEART RATE: 120 BPM

## 2022-02-23 DIAGNOSIS — B18.2 CHRONIC HEPATITIS C WITHOUT HEPATIC COMA (H): ICD-10-CM

## 2022-02-23 DIAGNOSIS — G89.4 CHRONIC PAIN DISORDER: ICD-10-CM

## 2022-02-23 DIAGNOSIS — R13.10 DYSPHAGIA, UNSPECIFIED TYPE: ICD-10-CM

## 2022-02-23 DIAGNOSIS — I10 ESSENTIAL HYPERTENSION: Primary | ICD-10-CM

## 2022-02-23 DIAGNOSIS — R25.2 LEG CRAMPS: ICD-10-CM

## 2022-02-23 DIAGNOSIS — J44.1 CHRONIC OBSTRUCTIVE PULMONARY DISEASE WITH ACUTE EXACERBATION (H): ICD-10-CM

## 2022-02-23 PROCEDURE — 99214 OFFICE O/P EST MOD 30 MIN: CPT | Performed by: FAMILY MEDICINE

## 2022-02-23 RX ORDER — FLUTICASONE PROPIONATE AND SALMETEROL XINAFOATE 230; 21 UG/1; UG/1
2 AEROSOL, METERED RESPIRATORY (INHALATION) 2 TIMES DAILY
Qty: 12 G | Refills: 11 | Status: SHIPPED | OUTPATIENT
Start: 2022-02-23 | End: 2022-04-09

## 2022-02-23 RX ORDER — AMLODIPINE BESYLATE 5 MG/1
5 TABLET ORAL DAILY
Qty: 60 TABLET | Refills: 1 | Status: ON HOLD | OUTPATIENT
Start: 2022-02-23 | End: 2022-01-01

## 2022-02-23 RX ORDER — MULTIVITAMIN WITH IRON
1 TABLET ORAL AT BEDTIME
Qty: 90 TABLET | Refills: 3 | Status: SHIPPED | OUTPATIENT
Start: 2022-02-23

## 2022-02-23 RX ORDER — GLECAPREVIR AND PIBRENTASVIR 40; 100 MG/1; MG/1
3 TABLET, FILM COATED ORAL
Qty: 90 TABLET | Refills: 1 | Status: SHIPPED | OUTPATIENT
Start: 2022-02-23 | End: 2022-01-01

## 2022-02-23 NOTE — PROGRESS NOTES
"S  Ki Pederson is a 67 year old male here with his daughter Ethan for follow up on several concerns:     has been having full body cramps at times, the kind that \"curl your toes\" at night. These got worse after starting Lasix after our last visit. The cramps are in his feet, back of legs, back, stomach, everywhere.     Needs oxygen re-ordered. He is \"running out of inhalers\" frequently. He maintains that he cannot tolerate inhalers with powder like Spiriva.     Choking- one time he passed out from this. Food gets stuck and he can't swallow it. This started a couple months ago.     Medical cannabis- Ethan doesn't have the email anymore so they are wondering if he can be recertified.     Hep c meds (Mavyret) can't be filled by [facility], so wondering if it can be sent to [facility].     Insurance wouldn't cover more than 1 pill of Percocet per day.   ?  O  /80   Pulse 104   Temp 98  F (36.7  C) (Oral)   Resp 24   Ht 1.829 m (6')   Wt 110.1 kg (242 lb 12 oz)   SpO2 95%   BMI 32.92 kg/m     Vitals reviewed. Nursing note reviewed.  General Appearance: Pleasant and alert, in no acute distress  HEENT: mucous membranes moist  Resp: Some increased work of breathing. Wheezing.   Ext: No peripheral edema, good distal perfusion  Skin: warm, dry, intact, no rash noted  Neuro: no focal deficits, CNs II-XII normal.   Psych: mood and affect are normal.    A/P  Ki was seen today for blood pressure check and forms.    Diagnoses and all orders for this visit:    Essential hypertension: will STOP Lasix due to his increased leg cramps and since his recent Echo showed he does NOT have heart failure contributing to his shortness of breath. Will start amlodipine instead and check BP again in 2 weeks.   -     amLODIPine (NORVASC) 5 MG tablet; Take 1 tablet (5 mg) by mouth daily    Leg cramps  -     magnesium 250 MG tablet; Take 1 tablet (250 mg) by mouth At Bedtime    Chronic obstructive pulmonary " disease with acute exacerbation (H): Will try Advair HFA since he uses an HFA inhaler (albuterol) to see if this offers more benefit.   -     fluticasone-salmeterol (ADVAIR-HFA) 230-21 MCG/ACT inhaler; Inhale 2 puffs into the lungs 2 times daily  -     Oxygen Order    Dysphagia, unspecified type:   -     XR Video Swallow with SLP or OT - Order with Speech Therapy Referral; Future    Chronic hepatitis C without hepatic coma (H): reordered Mavyret via [facility]  -     glecaprevir-pibrentasvir (MAVYRET) 100-40 MG per tablet; Take 3 tablets by mouth daily (with breakfast)    Chronic pain disorder: discussed that I am willing to prescribe #2 tabs per day of Percocet. He was informed that his insurance will only cover one week's worth at a time, so he will have to pick it up weekly. Otherwise, they will cover 30 days' worth at #1 pill per day.     I will recertify him for medical cannabis to see if this provides some extra benefit.          No follow-ups on file.    Options for treatment and follow-up care were reviewed with the patient and/or guardian. Ki Pederson and/or guardian engaged in the decision making process and verbalized understanding of the options discussed and agreed with the final plan.    Gloria Sebastian MD        DME (Durable Medical Equipment) Orders and Documentation  Orders Placed This Encounter   Procedures     Oxygen Order      The patient was assessed and it was determined the patient is in need of the following listed DME Supplies/Equipment. Please complete supporting documentation below to demonstrate medical necessity.      Oxygen Use Documentation  I certify that this patient, Ki Pederson has been under my care and that I, or a nurse practitioner or physician's assistant working with me, had a face-to-face encounter that meets face-to-face encounter requirements with this patient on February 23, 2022.    Ki Pederson is now in a chronic stable state and continues to  require supplemental oxygen due to continued oxygen desaturation.  This patient has been treated in part, or in whole for the following medical condition(s):  Chronic Respiratory Failure with Hypoxia J93.11  COPD J44.9  Treatments tried and failed or ruled out to treat hypoxemia include anticholinergic, LABA and steroid inhalers..  If portability is ordered, is the patient mobile within the home? yes

## 2022-02-23 NOTE — PATIENT INSTRUCTIONS
Blood Pressure:   STOP Lasix because of your cramps.   START amlodipine 5 mg/day.   Start Magnesium 1 tablet at night time.     [place] [facility] pharmacy for hep C medication:     [phone]

## 2022-02-24 ENCOUNTER — MYC MEDICAL ADVICE (OUTPATIENT)
Dept: FAMILY MEDICINE | Facility: CLINIC | Age: 67
End: 2022-02-24
Payer: COMMERCIAL

## 2022-02-24 DIAGNOSIS — G89.4 CHRONIC PAIN DISORDER: ICD-10-CM

## 2022-02-25 NOTE — TELEPHONE ENCOUNTER
PCP please review/advise: [facility] does not carry/can not order Mavyret      RN message to patient stating per Carlotta at [facility] -2/11/22 oxy prescription was filled 2/15/22 and has been picked up    The Mavyret is outstanding: [facility] does not carry/can not get. -RN will route to PCP for alternative if appropriate   -advised PCP in clinic 2/28/22    Request more info if this does not address issue    Routing to PCP    Yissel Almonte RN on 2/25/2022 at 3:20 PM

## 2022-02-25 NOTE — TELEPHONE ENCOUNTER
PDMP Review       Value Time User    State PDMP site checked  Yes 2/25/2022  2:30 PM Carol Cantrell MD             Fill Date ID   Written Drug Qty Days Prescriber Rx # Pharmacy Refill   Daily Dose* Pymt Type      02/15/2022  3   02/11/2022  Oxycodone-Acetaminophen 5-325    30.00  30 Ev Pes   [id number]   Sup (3686)   0/0  7.50 MME  Medicare   [place]   02/09/2022  3   02/09/2022  Oxycodone-Acetaminophen 5-325    14.00  7 Ev Pes   [id number]   Sup (3686)   0/0  15.00 MME  [place]   01/10/2022  3   12/23/2021  Oxycodone-Acetaminophen 5-325    7.00  7 Ev Pes   [id number]   Sup (3686)   0/0  7.50 MME  [place]   12/31/2021  3   12/23/2021  Oxycodone-Acetaminophen 5-325    7.00  7 Ev Pes   [id number]   Sup (3686)   0/0  7.50 MME  [place]   12/23/2021  3   12/23/2021  Oxycodone-Acetaminophen 5-325    7.00  7 Ev Pes   [id number]   Sup (3686)   0/0  7.50 MME  [place]icaid   [place]

## 2022-02-25 NOTE — TELEPHONE ENCOUNTER
Can you please call the pharmacy and see exactly what's going on?  Per PDMP, he's had 44 tabs already filled this month. Are there other prescriptions out there that haven't been filled?

## 2022-03-02 ENCOUNTER — TELEPHONE (OUTPATIENT)
Dept: FAMILY MEDICINE | Facility: CLINIC | Age: 67
End: 2022-03-02
Payer: COMMERCIAL

## 2022-03-02 DIAGNOSIS — B18.2 CHRONIC HEPATITIS C WITHOUT HEPATIC COMA (H): Primary | ICD-10-CM

## 2022-03-02 DIAGNOSIS — R25.2 MUSCLE CRAMP, NOCTURNAL: Primary | ICD-10-CM

## 2022-03-02 RX ORDER — OXYCODONE AND ACETAMINOPHEN 5; 325 MG/1; MG/1
1 TABLET ORAL 2 TIMES DAILY PRN
Qty: 60 TABLET | Refills: 0 | Status: SHIPPED | OUTPATIENT
Start: 2022-03-02 | End: 2022-03-23

## 2022-03-02 NOTE — TELEPHONE ENCOUNTER
Called Ethan (daughter). Her dad is having full body cramps and little energy. He feels kind of dehydrated. She is wondering if he's due for any basic labs to make sure he's doing ok.   Will order blood count, and CMP. They will come in for a lab only check     Gloria Sebastian MD

## 2022-03-03 ENCOUNTER — TELEPHONE (OUTPATIENT)
Dept: FAMILY MEDICINE | Facility: CLINIC | Age: 67
End: 2022-03-03

## 2022-03-03 ENCOUNTER — LAB (OUTPATIENT)
Dept: LAB | Facility: CLINIC | Age: 67
End: 2022-03-03
Payer: MEDICARE

## 2022-03-03 DIAGNOSIS — Z13.220 SCREENING FOR HYPERLIPIDEMIA: ICD-10-CM

## 2022-03-03 DIAGNOSIS — R25.2 MUSCLE CRAMP, NOCTURNAL: ICD-10-CM

## 2022-03-03 DIAGNOSIS — Z11.4 SCREENING FOR HIV (HUMAN IMMUNODEFICIENCY VIRUS): ICD-10-CM

## 2022-03-03 LAB
ALBUMIN SERPL-MCNC: 4 G/DL (ref 3.5–5)
ALP SERPL-CCNC: 56 U/L (ref 45–120)
ALT SERPL W P-5'-P-CCNC: 30 U/L (ref 0–45)
ANION GAP SERPL CALCULATED.3IONS-SCNC: 14 MMOL/L (ref 5–18)
AST SERPL W P-5'-P-CCNC: 36 U/L (ref 0–40)
BILIRUB SERPL-MCNC: 0.6 MG/DL (ref 0–1)
BUN SERPL-MCNC: 14 MG/DL (ref 8–22)
CALCIUM SERPL-MCNC: 8.9 MG/DL (ref 8.5–10.5)
CHLORIDE BLD-SCNC: 105 MMOL/L (ref 98–107)
CHOLEST SERPL-MCNC: 241 MG/DL
CO2 SERPL-SCNC: 22 MMOL/L (ref 22–31)
CREAT SERPL-MCNC: 0.89 MG/DL (ref 0.7–1.3)
ERYTHROCYTE [DISTWIDTH] IN BLOOD BY AUTOMATED COUNT: 13.4 % (ref 10–15)
FASTING STATUS PATIENT QL REPORTED: NO
GFR SERPL CREATININE-BSD FRML MDRD: >90 ML/MIN/1.73M2
GLUCOSE BLD-MCNC: 89 MG/DL (ref 70–125)
HCT VFR BLD AUTO: 42.9 % (ref 40–53)
HDLC SERPL-MCNC: 82 MG/DL
HGB BLD-MCNC: 13.9 G/DL (ref 13.3–17.7)
HIV 1+2 AB+HIV1 P24 AG SERPL QL IA: NEGATIVE
LDLC SERPL CALC-MCNC: 139 MG/DL
MAGNESIUM SERPL-MCNC: 2.1 MG/DL (ref 1.8–2.6)
MCH RBC QN AUTO: 29.6 PG (ref 26.5–33)
MCHC RBC AUTO-ENTMCNC: 32.4 G/DL (ref 31.5–36.5)
MCV RBC AUTO: 92 FL (ref 78–100)
PLATELET # BLD AUTO: 222 10E3/UL (ref 150–450)
POTASSIUM BLD-SCNC: 4.5 MMOL/L (ref 3.5–5)
PROT SERPL-MCNC: 6.9 G/DL (ref 6–8)
RBC # BLD AUTO: 4.69 10E6/UL (ref 4.4–5.9)
SODIUM SERPL-SCNC: 141 MMOL/L (ref 136–145)
TRIGL SERPL-MCNC: 101 MG/DL
WBC # BLD AUTO: 5.9 10E3/UL (ref 4–11)

## 2022-03-03 PROCEDURE — 85027 COMPLETE CBC AUTOMATED: CPT

## 2022-03-03 PROCEDURE — 36415 COLL VENOUS BLD VENIPUNCTURE: CPT

## 2022-03-03 PROCEDURE — 83735 ASSAY OF MAGNESIUM: CPT

## 2022-03-03 PROCEDURE — 80053 COMPREHEN METABOLIC PANEL: CPT

## 2022-03-03 PROCEDURE — 87389 HIV-1 AG W/HIV-1&-2 AB AG IA: CPT

## 2022-03-03 NOTE — TELEPHONE ENCOUNTER
Prior Authorization Request  Who's requesting:  PHARMACY   Pharmacy Name and Location: [id number] [address] [place] [address]  Medication Name: oxyCODONE-acetaminophen (PERCOCET) 5-325 MG tablet  Insurance Plan: MEDICARE  Insurance Member ID Number:  [id number]   CoverMyMeds Key: [id number]   Informed patient that prior authorizations can take up to 10 business days for response:   Yes  Okay to leave a detailed message: NO

## 2022-03-03 NOTE — TELEPHONE ENCOUNTER
Called pt daughter and relayed PCP message.  Understood.  Daughter to call for apt for US.  thanks

## 2022-03-03 NOTE — TELEPHONE ENCOUNTER
Prior Authorization Not Needed per Insurance    Medication:   Insurance Company: [employer] - Phone [phone] Fax [phone]  Expected CoPay:      Pharmacy Filling the Rx: [id number] [address] [place] [address]  Pharmacy Notified: Yes  Patient Notified: Yes    Called [facility] pharmacy to clarify insurance information.  Per pharmacist this medication did go through insurance, they have a paid claim. Patient already .  Closing encounter.

## 2022-03-03 NOTE — TELEPHONE ENCOUNTER
Please call Ki (via daughter Ethan- she is his ), and explain Davy told me he needs an ultrasound to prove he does not have liver cirrhosis (which is liver failure) before he can get started on Hep C treatment.     I am ordering this ultrasound. The number for them to schedule is [phone]. After this, we can get him started on the Mavyret.     Gloria Sebastian MD

## 2022-03-20 ASSESSMENT — PATIENT HEALTH QUESTIONNAIRE - PHQ9
SUM OF ALL RESPONSES TO PHQ QUESTIONS 1-9: 10
SUM OF ALL RESPONSES TO PHQ QUESTIONS 1-9: 10
10. IF YOU CHECKED OFF ANY PROBLEMS, HOW DIFFICULT HAVE THESE PROBLEMS MADE IT FOR YOU TO DO YOUR WORK, TAKE CARE OF THINGS AT HOME, OR GET ALONG WITH OTHER PEOPLE: VERY DIFFICULT

## 2022-03-20 ASSESSMENT — ANXIETY QUESTIONNAIRES
GAD7 TOTAL SCORE: 8
7. FEELING AFRAID AS IF SOMETHING AWFUL MIGHT HAPPEN: SEVERAL DAYS
3. WORRYING TOO MUCH ABOUT DIFFERENT THINGS: SEVERAL DAYS
1. FEELING NERVOUS, ANXIOUS, OR ON EDGE: SEVERAL DAYS
GAD7 TOTAL SCORE: 8
GAD7 TOTAL SCORE: 8
7. FEELING AFRAID AS IF SOMETHING AWFUL MIGHT HAPPEN: SEVERAL DAYS
4. TROUBLE RELAXING: SEVERAL DAYS
5. BEING SO RESTLESS THAT IT IS HARD TO SIT STILL: MORE THAN HALF THE DAYS
6. BECOMING EASILY ANNOYED OR IRRITABLE: SEVERAL DAYS
2. NOT BEING ABLE TO STOP OR CONTROL WORRYING: SEVERAL DAYS

## 2022-03-21 ASSESSMENT — ANXIETY QUESTIONNAIRES: GAD7 TOTAL SCORE: 8

## 2022-03-21 ASSESSMENT — PATIENT HEALTH QUESTIONNAIRE - PHQ9: SUM OF ALL RESPONSES TO PHQ QUESTIONS 1-9: 10

## 2022-03-23 ENCOUNTER — OFFICE VISIT (OUTPATIENT)
Dept: FAMILY MEDICINE | Facility: CLINIC | Age: 67
End: 2022-03-23
Payer: MEDICARE

## 2022-03-23 VITALS
DIASTOLIC BLOOD PRESSURE: 68 MMHG | SYSTOLIC BLOOD PRESSURE: 120 MMHG | HEIGHT: 72 IN | HEART RATE: 96 BPM | OXYGEN SATURATION: 94 % | TEMPERATURE: 98.2 F | RESPIRATION RATE: 24 BRPM | BODY MASS INDEX: 33.46 KG/M2 | WEIGHT: 247 LBS

## 2022-03-23 DIAGNOSIS — G89.4 CHRONIC PAIN DISORDER: Primary | ICD-10-CM

## 2022-03-23 DIAGNOSIS — Z23 HIGH PRIORITY FOR 2019 NOVEL CORONAVIRUS VACCINATION: ICD-10-CM

## 2022-03-23 DIAGNOSIS — B18.2 CHRONIC HEPATITIS C WITHOUT HEPATIC COMA (H): ICD-10-CM

## 2022-03-23 DIAGNOSIS — Z76.0 ENCOUNTER FOR MEDICATION REFILL: ICD-10-CM

## 2022-03-23 DIAGNOSIS — J44.1 CHRONIC OBSTRUCTIVE PULMONARY DISEASE WITH ACUTE EXACERBATION (H): ICD-10-CM

## 2022-03-23 LAB
AMPHETAMINES UR QL SCN: ABNORMAL
BARBITURATES UR QL: ABNORMAL
BENZODIAZ UR QL: ABNORMAL
CANNABINOIDS UR QL SCN: ABNORMAL
COCAINE UR QL: ABNORMAL
CREAT UR-MCNC: 80 MG/DL
OPIATES UR QL SCN: ABNORMAL
OXYCODONE UR QL: ABNORMAL
PCP UR QL SCN: ABNORMAL

## 2022-03-23 PROCEDURE — 80307 DRUG TEST PRSMV CHEM ANLYZR: CPT | Performed by: FAMILY MEDICINE

## 2022-03-23 PROCEDURE — 91306 COVID-19,PF,MODERNA (18+ YRS BOOSTER .25ML): CPT | Performed by: FAMILY MEDICINE

## 2022-03-23 PROCEDURE — 99214 OFFICE O/P EST MOD 30 MIN: CPT | Performed by: FAMILY MEDICINE

## 2022-03-23 PROCEDURE — 0064A COVID-19,PF,MODERNA (18+ YRS BOOSTER .25ML): CPT | Performed by: FAMILY MEDICINE

## 2022-03-23 RX ORDER — BUDESONIDE 1 MG/2ML
1 INHALANT ORAL 2 TIMES DAILY
Qty: 2 ML | Refills: 3 | Status: SHIPPED | OUTPATIENT
Start: 2022-03-23 | End: 2022-01-01

## 2022-03-23 RX ORDER — ALBUTEROL SULFATE 90 UG/1
2 AEROSOL, METERED RESPIRATORY (INHALATION) EVERY 6 HOURS
Qty: 18 G | Refills: 11 | Status: SHIPPED | OUTPATIENT
Start: 2022-03-23 | End: 2022-01-01

## 2022-03-23 RX ORDER — OXYCODONE AND ACETAMINOPHEN 5; 325 MG/1; MG/1
1 TABLET ORAL 2 TIMES DAILY PRN
Qty: 60 TABLET | Refills: 0 | Status: ON HOLD | OUTPATIENT
Start: 2022-03-23 | End: 2022-04-13

## 2022-03-23 NOTE — PROGRESS NOTES
Answers for HPI/ROS submitted by the patient on 3/20/2022  If you checked off any problems, how difficult have these problems made it for you to do your work, take care of things at home, or get along with other people?: Very difficult  PHQ9 TOTAL SCORE: 10  BUD 7 TOTAL SCORE: 8  Do you check your blood pressure regularly outside of the clinic?: Yes  Are your blood pressures ever more than 140 on the top number (systolic) OR more than 90 on the bottom number (diastolic)? (For example, greater than 140/90): Yes  Are you following a low salt diet?: Yes  Depression/Anxiety: Depression & Anxiety  Current status of COPD symptom:: much worse  Status of fatigue and dyspnea with ambulation:: a lot more than usual  Status of dyspnea:: a lot more than usual  Increase or change in cough or sputum:: often  Have you noticed a change in your sputum/phlegm?: No  Have you experienced a recent fever?: No  Baseline ambulation without stopping to rest:: less than 10 feet  Number of flights of stairs without resting:: None  Have you had any Emergency Room, Urgent Care visits or a Hospital admission because of your COPD since your last office visit?: No  Status since last visit:: medium  Anxiety since last: : medium  Other associated symptoms of depression:: No  Other associated symotome: : Yes  Significant life event: : No  Anxious:: Yes  Current substance use:: No  Your back pain is: chronic  Where is your back pain located? : right lower back, left lower back, right buttock, left buttock, right hip, left hip, right side of waist, left side of waist  How would you describe your back pain? : burning, cramping, dull ache, gnawing, sharp  Where does your back pain spread? : right buttocks, left buttocks, right thigh, left thigh, right knee, left knee  Since you noticed your back pain, how has it changed? : unchanged  Does your back pain interfere with your job?: Not applicable  How many servings of fruits and vegetables do you eat  "daily?: 0-1  On average, how many sweetened beverages do you drink each day (Examples: soda, juice, sweet tea, etc.  Do NOT count diet or artificially sweetened beverages)?: 4  How many minutes a day do you exercise enough to make your heart beat faster?: 9 or less  How many days a week do you exercise enough to make your heart beat faster?: 3 or less  How many days per week do you miss taking your medication?: 0    S  Ki Pederson is a 67 year old male here for follow up on chronic pain and COPD.  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 He is telling me this because he is not going to abuse his oxycodone. He would like to increase to more than 2 Percocet per day. He feels it is helping him get back into activities he enjoys such as making traditional native crafts. He hasn't been able to get any medical cannabis yet. Not sure if his daughter Ethan opened the email or not.     Magnesium is helping his muscle cramps.     Hep C medication- hasn't been able to get this because he hasn't gotten a liver ultrasound yet. He missed the appointment because It was at 7 in the morning.     COPD- he is carrying his albuterol inhaler and thinks he has Advair at home. He hasn't been using his spacer because it is awkward. He just got done with a course of doxycycline and prednisone for an exacerbation.   O  /68   Pulse 96   Temp 98.2  F (36.8  C) (Oral)   Resp 24   Ht 1.829 m (6')   Wt 112 kg (247 lb)   SpO2 94%   BMI 33.50 kg/m     Vitals reviewed. Nursing note reviewed.  General Appearance: Pleasant and alert, in no acute distress  HEENT: mucous membranes moist  Resp: breathing is labored, slightly.   Skin: warm, dry, intact, no rash noted  Neuro: no focal deficits, CNs II-XII normal.   Psych: mood and affect are normal.    A/P  Ki was seen today for f/u pain and copd.    Diagnoses and all " orders for this visit:    Chronic pain disorder: refilled Percocet for #2 per day but explained I do not want to increase the # per day. I will recertify him for medical cannabis and make sure a new email link is sent to his daughter, as we should try this method. He is not a candidate for NSAIDs due to his xarelto use. Will discuss more additional methods of pain control at next visit.   -     oxyCODONE-acetaminophen (PERCOCET) 5-325 MG tablet; Take 1 tablet by mouth 2 times daily as needed for severe pain  -     Drugs of Abuse 1 Panel, Urine ([facility] Only); Future  -     Drugs of Abuse 1 Panel, Urine ([facility] Only)    Chronic obstructive pulmonary disease with acute exacerbation (H): refilled neb solution. Explained that he doesn't need to carry the spacer with him but that he should use it at home. He maintains that inhalers with crushable tablets like spiriva, or advair diskus, do not work for him  -     budesonide (PULMICORT) 1 MG/2ML neb solution; Take 2 mLs (1 mg) by nebulization 2 times daily  -     albuterol (PROAIR HFA/PROVENTIL HFA/VENTOLIN HFA) 108 (90 Base) MCG/ACT inhaler; Inhale 2 puffs into the lungs every 6 hours    Chronic hepatitis C without hepatic coma (H):  Reordered ultrasound and asked him to reschedule.   -     US Abdomen Complete; Future    Encounter for medication refill: refilled xarelto for A Fib.   -     rivaroxaban ANTICOAGULANT (XARELTO) 20 MG TABS tablet; Take 1 tablet (20 mg) by mouth daily (with dinner)    High priority for 2019 novel coronavirus vaccination  -     COVID-19,PF,MODERNA (18+ YRS BOOSTER .25ML)         No follow-ups on file.      Options for treatment and follow-up care were reviewed with the patient and/or guardian. Ki Pederson and/or guardian engaged in the decision making process and verbalized understanding of the options discussed and agreed with the final plan.    Gloria Sebastian MD

## 2022-03-24 ENCOUNTER — TELEPHONE (OUTPATIENT)
Dept: FAMILY MEDICINE | Facility: CLINIC | Age: 67
End: 2022-03-24
Payer: COMMERCIAL

## 2022-03-24 ENCOUNTER — MEDICAL CORRESPONDENCE (OUTPATIENT)
Dept: HEALTH INFORMATION MANAGEMENT | Facility: CLINIC | Age: 67
End: 2022-03-24
Payer: COMMERCIAL

## 2022-03-24 NOTE — TELEPHONE ENCOUNTER
Prior Authorization Retail Medication Request    Medication/Dose: Budesonide 1mg/2ml suspension     ICD code (if different than what is on RX):  Same    Previously Tried and Failed:  Currently on Advail, Ipratropium/abluterol,   Rationale:  COPD with acute exacerbation    Insurance Name:  Medicare and [facility]  Insurance ID:  [id number]         [id number]    COVERMYMEDS KEY:  [id number]      Pharmacy Information (if different than what is on RX)  [facility]:  [facility]  Phone:  [phone]      CALL taken on 3/24/22 at 354 pm by Duyen Jeong CMA. CMT

## 2022-03-24 NOTE — TELEPHONE ENCOUNTER
Prior Authorization Not Needed per Insurance    Medication: Budesonide Susp. 1mg/2ml  Insurance Company:    Expected CoPay:      Pharmacy Filling the Rx: [id number] [place], [place] - [address] [place] [address]  Pharmacy Notified: Yes  Patient Notified:      Called pharmacy to see if this should be billed under Part B.  They just needed his Medicare number, and now are processing under Part B.  PA Not Needed.

## 2022-03-26 ENCOUNTER — HEALTH MAINTENANCE LETTER (OUTPATIENT)
Age: 67
End: 2022-03-26

## 2022-04-08 ENCOUNTER — APPOINTMENT (OUTPATIENT)
Dept: RADIOLOGY | Facility: HOSPITAL | Age: 67
End: 2022-04-08
Attending: EMERGENCY MEDICINE
Payer: MEDICARE

## 2022-04-08 ENCOUNTER — MYC MEDICAL ADVICE (OUTPATIENT)
Dept: FAMILY MEDICINE | Facility: CLINIC | Age: 67
End: 2022-04-08

## 2022-04-08 ENCOUNTER — HOSPITAL ENCOUNTER (EMERGENCY)
Facility: HOSPITAL | Age: 67
Discharge: HOME | End: 2022-04-08
Attending: EMERGENCY MEDICINE | Admitting: EMERGENCY MEDICINE
Payer: MEDICARE

## 2022-04-08 VITALS
OXYGEN SATURATION: 96 % | RESPIRATION RATE: 24 BRPM | BODY MASS INDEX: 31.19 KG/M2 | HEART RATE: 99 BPM | DIASTOLIC BLOOD PRESSURE: 84 MMHG | SYSTOLIC BLOOD PRESSURE: 150 MMHG | TEMPERATURE: 98.4 F | WEIGHT: 230 LBS

## 2022-04-08 DIAGNOSIS — J44.1 COPD EXACERBATION (H): ICD-10-CM

## 2022-04-08 LAB
ALBUMIN SERPL-MCNC: 4.4 G/DL (ref 3.5–5)
ALP SERPL-CCNC: 63 U/L (ref 45–120)
ALT SERPL W P-5'-P-CCNC: 60 U/L (ref 0–45)
ANION GAP SERPL CALCULATED.3IONS-SCNC: 10 MMOL/L (ref 5–18)
AST SERPL W P-5'-P-CCNC: 30 U/L (ref 0–40)
BASOPHILS # BLD AUTO: 0 10E3/UL (ref 0–0.2)
BASOPHILS NFR BLD AUTO: 0 %
BILIRUB SERPL-MCNC: 0.7 MG/DL (ref 0–1)
BNP SERPL-MCNC: <10 PG/ML (ref 0–59)
BUN SERPL-MCNC: 21 MG/DL (ref 8–22)
CALCIUM SERPL-MCNC: 9.3 MG/DL (ref 8.5–10.5)
CHLORIDE BLD-SCNC: 101 MMOL/L (ref 98–107)
CO2 SERPL-SCNC: 27 MMOL/L (ref 22–31)
CREAT SERPL-MCNC: 1.06 MG/DL (ref 0.7–1.3)
EOSINOPHIL # BLD AUTO: 0 10E3/UL (ref 0–0.7)
EOSINOPHIL NFR BLD AUTO: 0 %
ERYTHROCYTE [DISTWIDTH] IN BLOOD BY AUTOMATED COUNT: 13.2 % (ref 10–15)
FLUAV RNA SPEC QL NAA+PROBE: NEGATIVE
FLUBV RNA RESP QL NAA+PROBE: NEGATIVE
GFR SERPL CREATININE-BSD FRML MDRD: 78 ML/MIN/1.73M2
GLUCOSE BLD-MCNC: 151 MG/DL (ref 70–125)
HCT VFR BLD AUTO: 45.4 % (ref 40–53)
HGB BLD-MCNC: 14.7 G/DL (ref 13.3–17.7)
IMM GRANULOCYTES # BLD: 0.1 10E3/UL
IMM GRANULOCYTES NFR BLD: 1 %
LYMPHOCYTES # BLD AUTO: 0.3 10E3/UL (ref 0.8–5.3)
LYMPHOCYTES NFR BLD AUTO: 3 %
MAGNESIUM SERPL-MCNC: 2 MG/DL (ref 1.8–2.6)
MCH RBC QN AUTO: 29.5 PG (ref 26.5–33)
MCHC RBC AUTO-ENTMCNC: 32.4 G/DL (ref 31.5–36.5)
MCV RBC AUTO: 91 FL (ref 78–100)
MONOCYTES # BLD AUTO: 0.3 10E3/UL (ref 0–1.3)
MONOCYTES NFR BLD AUTO: 3 %
NEUTROPHILS # BLD AUTO: 10.3 10E3/UL (ref 1.6–8.3)
NEUTROPHILS NFR BLD AUTO: 93 %
NRBC # BLD AUTO: 0 10E3/UL
NRBC BLD AUTO-RTO: 0 /100
PLATELET # BLD AUTO: 233 10E3/UL (ref 150–450)
POTASSIUM BLD-SCNC: 4.7 MMOL/L (ref 3.5–5)
PROT SERPL-MCNC: 8.2 G/DL (ref 6–8)
RBC # BLD AUTO: 4.99 10E6/UL (ref 4.4–5.9)
SARS-COV-2 RNA RESP QL NAA+PROBE: NEGATIVE
SODIUM SERPL-SCNC: 138 MMOL/L (ref 136–145)
TROPONIN I SERPL-MCNC: <0.01 NG/ML (ref 0–0.29)
WBC # BLD AUTO: 10.9 10E3/UL (ref 4–11)

## 2022-04-08 PROCEDURE — 250N000009 HC RX 250: Performed by: EMERGENCY MEDICINE

## 2022-04-08 PROCEDURE — 250N000011 HC RX IP 250 OP 636: Performed by: EMERGENCY MEDICINE

## 2022-04-08 PROCEDURE — 84484 ASSAY OF TROPONIN QUANT: CPT | Performed by: EMERGENCY MEDICINE

## 2022-04-08 PROCEDURE — C9803 HOPD COVID-19 SPEC COLLECT: HCPCS

## 2022-04-08 PROCEDURE — 94640 AIRWAY INHALATION TREATMENT: CPT

## 2022-04-08 PROCEDURE — 83880 ASSAY OF NATRIURETIC PEPTIDE: CPT | Performed by: EMERGENCY MEDICINE

## 2022-04-08 PROCEDURE — 93005 ELECTROCARDIOGRAM TRACING: CPT | Performed by: EMERGENCY MEDICINE

## 2022-04-08 PROCEDURE — 83735 ASSAY OF MAGNESIUM: CPT | Performed by: EMERGENCY MEDICINE

## 2022-04-08 PROCEDURE — 99285 EMERGENCY DEPT VISIT HI MDM: CPT | Mod: 25

## 2022-04-08 PROCEDURE — 96374 THER/PROPH/DIAG INJ IV PUSH: CPT

## 2022-04-08 PROCEDURE — 80053 COMPREHEN METABOLIC PANEL: CPT | Performed by: EMERGENCY MEDICINE

## 2022-04-08 PROCEDURE — 36415 COLL VENOUS BLD VENIPUNCTURE: CPT | Performed by: EMERGENCY MEDICINE

## 2022-04-08 PROCEDURE — 87636 SARSCOV2 & INF A&B AMP PRB: CPT | Performed by: EMERGENCY MEDICINE

## 2022-04-08 PROCEDURE — 85025 COMPLETE CBC W/AUTO DIFF WBC: CPT | Performed by: EMERGENCY MEDICINE

## 2022-04-08 PROCEDURE — 71045 X-RAY EXAM CHEST 1 VIEW: CPT

## 2022-04-08 RX ORDER — DOXYCYCLINE 100 MG/1
100 CAPSULE ORAL 2 TIMES DAILY
Qty: 14 CAPSULE | Refills: 0 | Status: SHIPPED | OUTPATIENT
Start: 2022-04-08 | End: 2022-01-01

## 2022-04-08 RX ORDER — PREDNISONE 50 MG/1
50 TABLET ORAL DAILY
Qty: 5 TABLET | Refills: 0 | Status: SHIPPED | OUTPATIENT
Start: 2022-04-09 | End: 2022-01-01

## 2022-04-08 RX ORDER — IPRATROPIUM BROMIDE AND ALBUTEROL SULFATE 2.5; .5 MG/3ML; MG/3ML
3 SOLUTION RESPIRATORY (INHALATION) ONCE
Status: COMPLETED | OUTPATIENT
Start: 2022-04-08 | End: 2022-04-08

## 2022-04-08 RX ORDER — METHYLPREDNISOLONE SODIUM SUCCINATE 125 MG/2ML
125 INJECTION, POWDER, LYOPHILIZED, FOR SOLUTION INTRAMUSCULAR; INTRAVENOUS ONCE
Status: COMPLETED | OUTPATIENT
Start: 2022-04-08 | End: 2022-04-08

## 2022-04-08 RX ADMIN — IPRATROPIUM BROMIDE AND ALBUTEROL SULFATE 3 ML: .5; 3 SOLUTION RESPIRATORY (INHALATION) at 15:00

## 2022-04-08 RX ADMIN — METHYLPREDNISOLONE SODIUM SUCCINATE 125 MG: 125 INJECTION, POWDER, FOR SOLUTION INTRAMUSCULAR; INTRAVENOUS at 14:34

## 2022-04-08 NOTE — ED PROVIDER NOTES
ED Triage Provider Note  [facility]  Encounter Date: Apr 8, 2022      The patient was seen in triage to expedite ED workup.     History:  Chief Complaint   Patient presents with     Shortness of Breath     Ki Pederson is a 67 year old male who presents to the ED with 3 days shortness of breath and wheezing. He has COPD and is on 4L NC O2 chronically at home. This feels like prior COPD exacerbations.    Review of Systems:  + chronic cough    Vitals:  /85   Pulse 118   Temp 98.4  F (36.9  C) (Tympanic)   Resp (!) 32   Wt 104.3 kg (230 lb)   SpO2 97%   BMI 31.19 kg/m      Brief Exam:  Constitutional: Awake, alert, no acute distress apparent  HEENT: Atraumatic, normocephalic  PSYCH: Alert, oriented and good historian  RESP: Very distant quiet breath sounds, + intercostal and scalene and abdominal retractions, sat 97% on home 4L O2    Medical Decision Making:  Patient arriving to the ED with problem as above. A medical screening exam was performed. Orders initiated from triage  to expedite ED workup.             Jyoti Rubin MD  04/08/22  Emergency Medicine  [facility] EMERGENCY DEPARTMENT  [address] [place] [id number] [phone]  Dept: [phone]       Jyoti Rubin MD  04/08/22 7842

## 2022-04-08 NOTE — ED NOTES
Pt visibly sob. Unable to talk in complete sentences. Daughter at bedside. Wheezing t/o lung fields

## 2022-04-08 NOTE — ED PROVIDER NOTES
EMERGENCY DEPARTMENT ENCOUNTER      NAME: Ki Pederson  AGE: 67 year old male  YOB: 1955  MRN: [id number]  EVALUATION DATE & TIME: 4/8/2022  2:42 PM    PCP: Gloria Sebastian    ED PROVIDER: Donna Fontanez M.D.      Chief Complaint   Patient presents with     Shortness of Breath     FINAL IMPRESSION:  1. COPD exacerbation (H)      ED COURSE & MEDICAL DECISION MAKING:    Pertinent Labs & Imaging studies reviewed. (See chart for details)  ED Course as of 04/08/22 1543   Fri Apr 08, 2022   1538 Patient is feeling much better now.  He says that he typically improved significantly after the high-dose steroids and is feeling a lot better after that.  His primary doctor typically put him on doxycycline for 7 days and then a burst of prednisone and I think that is very reasonable.  He still little bit tachycardic but appears comfortable.  He is on his 4 L and not hypoxic.  He has all the other modalities to manage at home.  He feels comfortable with going home.  I think the persistent tachycardia may be a little bit related to him getting a neb here so I feel comfortable with him going home in his current state considering he is stabilized significantly since arrival.  He is in agreement as well.  We will get him discharged.       At the conclusion of the encounter I discussed  the results of all of the tests and the disposition with patient.   All questions were answered.  The patient acknowledged understanding and was involved in the decision making regarding the overall care plan.      I discussed with patient the utility, limitations and findings of the exam/interventions/studies done during this visit as well as the list of differential diagnosis and symptoms to monitor/return to ER for.  Additional verbal discharge instructions were provided.     MEDICATIONS GIVEN IN THE EMERGENCY:  Medications   ipratropium - albuterol 0.5 mg/2.5 mg/3 mL (DUONEB) neb solution 3 mL (3 mLs Nebulization Given 4/8/22  1500)   methylPREDNISolone sodium succinate (solu-MEDROL) injection 125 mg (125 mg Intravenous Given 4/8/22 1434)       NEW PRESCRIPTIONS STARTED AT TODAY'S ER VISIT  New Prescriptions    DOXYCYCLINE HYCLATE (VIBRAMYCIN) 100 MG CAPSULE    Take 1 capsule (100 mg) by mouth 2 times daily for 7 days    PREDNISONE (DELTASONE) 50 MG TABLET    Take 1 tablet (50 mg) by mouth daily for 5 days          =================================================================    HPI    Triage Note: Patient arrives by private car for evaluation of increased shortness of breath.  Patient has history of COPD and is on 4L oxygen at home.  Patient arrives without his oxygen, sats at 87%.  Patient placed on 4L in triage.  Reports doing nebs at home, and recent treatment with prednisone.       Patient information was obtained from: Patient    Use of : N/A       Ki Pederson is a 67 year old male who presents for evaluation of 2 days of worsening shortness of breath.  He just felt like he could not catch his breath.  He is on oxygen at home and taking his nebs at home but not getting any relief.  He says this is happened to him in the past and he typically gets a lot better after he gets some IV steroids.  He was given some methylprednisolone about an hour prior to my seeing him and is already feeling better.  He says often times his primary care doctor has prednisone and an antibiotic prescribed for him but they did not this time.  He does report an increased cough with sputum typical of his previous COPD exacerbation.  Denies any chest pain.  He does not have any fever.  He is not complaining of any other symptoms at this time.      REVIEW OF SYSTEMS   Except as stated in the HPI all other systems reviewed and are negative.    PAST MEDICAL HISTORY:  Past Medical History:   Diagnosis Date     Anxiety      Atrial fibrillation (H)      Cerebral infarction (H)      COPD (chronic obstructive pulmonary disease) (H)      Hemangioma      massive hemangioma; left hand     Hypertension      Myocardial infarction (H)      TIA (transient ischemic attack)        PAST SURGICAL HISTORY:  Past Surgical History:   Procedure Laterality Date     CERVICAL FUSION      C6 and C7       CURRENT MEDICATIONS:    No current facility-administered medications for this encounter.    Current Outpatient Medications:      doxycycline hyclate (VIBRAMYCIN) 100 MG capsule, Take 1 capsule (100 mg) by mouth 2 times daily for 7 days, Disp: 14 capsule, Rfl: 0     [START ON 4/9/2022] predniSONE (DELTASONE) 50 MG tablet, Take 1 tablet (50 mg) by mouth daily for 5 days, Disp: 5 tablet, Rfl: 0     acetaminophen (TYLENOL) 500 MG tablet, Take 1-2 tablets (500-1,000 mg) by mouth every 4 hours as needed for fever, Disp: 100 tablet, Rfl: 0     albuterol (PROAIR HFA/PROVENTIL HFA/VENTOLIN HFA) 108 (90 Base) MCG/ACT inhaler, Inhale 2 puffs into the lungs every 6 hours, Disp: 18 g, Rfl: 11     albuterol (PROAIR HFA;PROVENTIL HFA;VENTOLIN HFA) 90 mcg/actuation inhaler, [ALBUTEROL (PROAIR HFA;PROVENTIL HFA;VENTOLIN HFA) 90 MCG/ACTUATION INHALER] Inhale 2 puffs every 6 (six) hours as needed for wheezing., Disp: , Rfl:      amLODIPine (NORVASC) 5 MG tablet, Take 1 tablet (5 mg) by mouth daily, Disp: 60 tablet, Rfl: 1     budesonide (PULMICORT) 1 MG/2ML neb solution, Take 2 mLs (1 mg) by nebulization 2 times daily, Disp: 2 mL, Rfl: 3     buPROPion (WELLBUTRIN XL) 300 MG 24 hr tablet, Take 1 tablet (300 mg) by mouth daily (with lunch) (Patient not taking: Reported on 3/23/2022), Disp: 30 tablet, Rfl: 12     citalopram (CELEXA) 20 MG tablet, [CITALOPRAM (CELEXA) 20 MG TABLET] TAKE 1 TABLET (20 MG TOTAL) BY MOUTH DAILY., Disp: 90 tablet, Rfl: 3     doxycycline monohydrate (MONODOX) 100 MG capsule, Take 1 capsule (100 mg) by mouth 2 times daily, Disp: 10 capsule, Rfl: 11     fluticasone-salmeterol (ADVAIR-HFA) 230-21 MCG/ACT inhaler, Inhale 2 puffs into the lungs 2 times daily, Disp: 12 g, Rfl:  11     furosemide (LASIX) 20 MG tablet, Take 1 tablet (20 mg) by mouth daily, Disp: 60 tablet, Rfl: 0     gabapentin (NEURONTIN) 300 MG capsule, Take 2 capsules (600 mg) by mouth 3 times daily, Disp: 540 capsule, Rfl: 3     glecaprevir-pibrentasvir (MAVYRET) 100-40 MG per tablet, Take 3 tablets by mouth daily (with breakfast), Disp: 90 tablet, Rfl: 1     ipratropium - albuterol 0.5 mg/2.5 mg/3 mL (DUONEB) 0.5-2.5 (3) MG/3ML neb solution, Take 1 vial (3 mLs) by nebulization every 6 hours as needed for shortness of breath / dyspnea or wheezing (Patient not taking: Reported on 3/23/2022), Disp: 90 mL, Rfl: 3     lisinopril (ZESTRIL) 40 MG tablet, [LISINOPRIL (PRINIVIL,ZESTRIL) 40 MG TABLET] TAKE 1 TABLET BY MOUTH DAILY., Disp: 90 tablet, Rfl: 1     magnesium 250 MG tablet, Take 1 tablet (250 mg) by mouth At Bedtime, Disp: 90 tablet, Rfl: 3     naloxone (NARCAN) 4 MG/0.1ML nasal spray, Spray 1 spray (4 mg) into one nostril alternating nostrils once as needed for opioid reversal every 2-3 minutes until assistance arrives, Disp: 0.2 mL, Rfl: 0     oxyCODONE-acetaminophen (PERCOCET) 5-325 MG tablet, Take 1 tablet by mouth 2 times daily as needed for severe pain, Disp: 60 tablet, Rfl: 0     predniSONE (DELTASONE) 10 MG tablet, Take 1 tablet (10 mg) by mouth See Admin Instructions take 4 tabs daily x 4 days, then 3 tabs daily x 4 days, then 2 tabs daily x 4 days, then 1 tab daily x 4 days.., Disp: 40 tablet, Rfl: 5     rivaroxaban ANTICOAGULANT (XARELTO) 20 MG TABS tablet, Take 1 tablet (20 mg) by mouth daily (with dinner), Disp: 90 tablet, Rfl: 3    ALLERGIES:  Allergies   Allergen Reactions     Symbicort Rash       FAMILY HISTORY:  Family History   Problem Relation Age of Onset     Coronary Artery Disease Mother      Diabetes Mother      Coronary Artery Disease Father      Diabetes Sister        SOCIAL HISTORY:   Social History     Socioeconomic History     Marital status:      Spouse name: Not on file     Number  of children: Not on file     Years of education: Not on file     Highest education level: Not on file   Occupational History     Not on file   Tobacco Use     Smoking status: Former Smoker     Packs/day: 0.00     Quit date: 5/15/2018     Years since quitting: 3.9     Smokeless tobacco: Current User     Tobacco comment: No passive exposure   Substance and Sexual Activity     Alcohol use: Not Currently     Drug use: Not Currently     Sexual activity: Not on file   Other Topics Concern     Parent/sibling w/ CABG, MI or angioplasty before 65F 55M? Not Asked   Social History Narrative     Not on file     Social Determinants of Health     Financial Resource Strain: Not on file   Food Insecurity: Not on file   Transportation Needs: Not on file   Physical Activity: Not on file   Stress: Not on file   Social Connections: Not on file   Intimate Partner Violence: Not on file   Housing Stability: Not on file       PHYSICAL EXAM    VITAL SIGNS: /84   Pulse 104   Temp 98.4  F (36.9  C) (Tympanic)   Resp 19   Wt 104.3 kg (230 lb)   SpO2 98%   BMI 31.19 kg/m     GENERAL: Awake, Alert, answering questions, No acute distress, Well nourished  HEENT: Normal cephalic, Atraumatic, bilateral external ears normal, No scleral icterus, mask in place  NECK: No obvious swelling or abnormality, No stridor  PULMONARY: Mild diffuse wheezing bilaterally without any significant respiratory distress.    CARDIOVASCULAR: Regular tachycardic rate and rhythm, Distal pulses present and normal.  ABDOMINAL: Soft, Nondistended, Nontender, No flank tenderness, No palpable masses  BACK: No bruising or tenderness.  EXTREMITIES: Moves all extremities spontaneously, warm, no edema, No major deformities  NEURO: No facial droop, normal motor function, Normal speech   PSYCH: Normal mood and affect  SKIN: No rashes on visualized skin, dry, warm     LAB:  All pertinent labs reviewed and interpreted.  Results for orders placed or performed during the  hospital encounter of 04/08/22   XR Chest Port 1 View    Impression    IMPRESSION: Lungs are clear. Heart and pulmonary vascularity are normal. No signs of acute disease.   Symptomatic; Unknown Influenza A/B & SARS-CoV2 (COVID-19) Virus PCR Multiplex Nasopharyngeal    Specimen: Nasopharyngeal; Swab   Result Value Ref Range    Influenza A PCR Negative Negative    Influenza B PCR Negative Negative    SARS CoV2 PCR Negative Negative   Comprehensive metabolic panel   Result Value Ref Range    Sodium 138 136 - 145 mmol/L    Potassium 4.7 3.5 - 5.0 mmol/L    Chloride 101 98 - 107 mmol/L    Carbon Dioxide (CO2) 27 22 - 31 mmol/L    Anion Gap 10 5 - 18 mmol/L    Urea Nitrogen 21 8 - 22 mg/dL    Creatinine 1.06 0.70 - 1.30 mg/dL    Calcium 9.3 8.5 - 10.5 mg/dL    Glucose 151 (H) 70 - 125 mg/dL    Alkaline Phosphatase 63 45 - 120 U/L    AST 30 0 - 40 U/L    ALT 60 (H) 0 - 45 U/L    Protein Total 8.2 (H) 6.0 - 8.0 g/dL    Albumin 4.4 3.5 - 5.0 g/dL    Bilirubin Total 0.7 0.0 - 1.0 mg/dL    GFR Estimate 78 >60 mL/min/1.73m2   Result Value Ref Range    Troponin I <0.01 0.00 - 0.29 ng/mL   B-Type Natriuretic Peptide ([facility] Only)   Result Value Ref Range    BNP <10 0 - 59 pg/mL   Result Value Ref Range    Magnesium 2.0 1.8 - 2.6 mg/dL   CBC with platelets and differential   Result Value Ref Range    WBC Count 10.9 4.0 - 11.0 10e3/uL    RBC Count 4.99 4.40 - 5.90 10e6/uL    Hemoglobin 14.7 13.3 - 17.7 g/dL    Hematocrit 45.4 40.0 - 53.0 %    MCV 91 78 - 100 fL    MCH 29.5 26.5 - 33.0 pg    MCHC 32.4 31.5 - 36.5 g/dL    RDW 13.2 10.0 - 15.0 %    Platelet Count 233 150 - 450 10e3/uL    % Neutrophils 93 %    % Lymphocytes 3 %    % Monocytes 3 %    % Eosinophils 0 %    % Basophils 0 %    % Immature Granulocytes 1 %    NRBCs per 100 WBC 0 <1 /100    Absolute Neutrophils 10.3 (H) 1.6 - 8.3 10e3/uL    Absolute Lymphocytes 0.3 (L) 0.8 - 5.3 10e3/uL    Absolute Monocytes 0.3 0.0 - 1.3 10e3/uL    Absolute Eosinophils 0.0 0.0 - 0.7 10e3/uL     Absolute Basophils 0.0 0.0 - 0.2 10e3/uL    Absolute Immature Granulocytes 0.1 <=0.4 10e3/uL    Absolute NRBCs 0.0 10e3/uL       RADIOLOGY:  XR Chest Port 1 View   Final Result   IMPRESSION: Lungs are clear. Heart and pulmonary vascularity are normal. No signs of acute disease.          EKG:    Date and time: April 8, 2022 at 1343  Rate: 107 bpm  Rhythm: Sinus tachycardia  MS interval: 152 ms  QRS interval: 86 ms  QT/QTc: 342/456 ms  ST changes or T wave changes: No acute ST or T wave abnormalities  Change from prior ECG: No significant change from prior  I have independently reviewed and interpreted this EKG.     Donna Fontanez M.D.  Emergency Medicine  [facility] EMERGENCY DEPARTMENT  [facility] [facility] [id number] [phone]  Dept: [phone]     Donna Fontanez MD  04/08/22 7204

## 2022-04-08 NOTE — DISCHARGE INSTRUCTIONS
You were seen in the Emergency Department today for evaluation of some shortness of breath.  Your lab work showed no cause of your symptoms. Your imaging studies showed no significant cause of your symptoms.  This is from a COPD exacerbation.  You were prescribed prednisone and doxycycline. You should take all medications as prescribed.  Follow up with your primary care physician to ensure resolution of symptoms. Return if you have new or worsening symptoms.

## 2022-04-08 NOTE — ED TRIAGE NOTES
Patient arrives by private car for evaluation of increased shortness of breath.  Patient has history of COPD and is on 4L oxygen at home.  Patient arrives without his oxygen, sats at 87%.  Patient placed on 4L in triage.  Reports doing nebs at home, and recent treatment with prednisone.

## 2022-04-09 ENCOUNTER — APPOINTMENT (OUTPATIENT)
Dept: RADIOLOGY | Facility: CLINIC | Age: 67
DRG: 190 | End: 2022-04-09
Attending: EMERGENCY MEDICINE
Payer: MEDICARE

## 2022-04-09 ENCOUNTER — HOSPITAL ENCOUNTER (INPATIENT)
Facility: CLINIC | Age: 67
LOS: 4 days | Discharge: HOME | DRG: 190 | End: 2022-04-13
Attending: EMERGENCY MEDICINE | Admitting: INTERNAL MEDICINE
Payer: MEDICARE

## 2022-04-09 DIAGNOSIS — J44.1 CHRONIC OBSTRUCTIVE PULMONARY DISEASE WITH ACUTE EXACERBATION (H): ICD-10-CM

## 2022-04-09 DIAGNOSIS — R06.02 SOB (SHORTNESS OF BREATH): ICD-10-CM

## 2022-04-09 DIAGNOSIS — J44.1 COPD EXACERBATION (H): Primary | ICD-10-CM

## 2022-04-09 DIAGNOSIS — Z72.0 TOBACCO ABUSE: ICD-10-CM

## 2022-04-09 LAB
ANION GAP SERPL CALCULATED.3IONS-SCNC: 13 MMOL/L (ref 5–18)
ATRIAL RATE - MUSE: 107 BPM
ATRIAL RATE - MUSE: 120 BPM
BASOPHILS # BLD AUTO: 0 10E3/UL (ref 0–0.2)
BASOPHILS NFR BLD AUTO: 0 %
BUN SERPL-MCNC: 26 MG/DL (ref 8–22)
CALCIUM SERPL-MCNC: 9.3 MG/DL (ref 8.5–10.5)
CHLORIDE BLD-SCNC: 103 MMOL/L (ref 98–107)
CO2 SERPL-SCNC: 27 MMOL/L (ref 22–31)
CREAT SERPL-MCNC: 1.09 MG/DL (ref 0.7–1.3)
DIASTOLIC BLOOD PRESSURE - MUSE: NORMAL MMHG
DIASTOLIC BLOOD PRESSURE - MUSE: NORMAL MMHG
EOSINOPHIL # BLD AUTO: 0 10E3/UL (ref 0–0.7)
EOSINOPHIL NFR BLD AUTO: 0 %
ERYTHROCYTE [DISTWIDTH] IN BLOOD BY AUTOMATED COUNT: 13.4 % (ref 10–15)
GFR SERPL CREATININE-BSD FRML MDRD: 75 ML/MIN/1.73M2
GLUCOSE BLD-MCNC: 148 MG/DL (ref 70–125)
HCT VFR BLD AUTO: 43.6 % (ref 40–53)
HGB BLD-MCNC: 13.8 G/DL (ref 13.3–17.7)
HOLD SPECIMEN: NORMAL
IMM GRANULOCYTES # BLD: 0.1 10E3/UL
IMM GRANULOCYTES NFR BLD: 1 %
INTERPRETATION ECG - MUSE: NORMAL
INTERPRETATION ECG - MUSE: NORMAL
LYMPHOCYTES # BLD AUTO: 0.7 10E3/UL (ref 0.8–5.3)
LYMPHOCYTES NFR BLD AUTO: 5 %
MAGNESIUM SERPL-MCNC: 2.1 MG/DL (ref 1.8–2.6)
MCH RBC QN AUTO: 29.4 PG (ref 26.5–33)
MCHC RBC AUTO-ENTMCNC: 31.7 G/DL (ref 31.5–36.5)
MCV RBC AUTO: 93 FL (ref 78–100)
MONOCYTES # BLD AUTO: 0.9 10E3/UL (ref 0–1.3)
MONOCYTES NFR BLD AUTO: 6 %
NEUTROPHILS # BLD AUTO: 13.1 10E3/UL (ref 1.6–8.3)
NEUTROPHILS NFR BLD AUTO: 88 %
NRBC # BLD AUTO: 0 10E3/UL
NRBC BLD AUTO-RTO: 0 /100
P AXIS - MUSE: 73 DEGREES
P AXIS - MUSE: 75 DEGREES
PLATELET # BLD AUTO: 261 10E3/UL (ref 150–450)
POTASSIUM BLD-SCNC: 4.7 MMOL/L (ref 3.5–5)
PR INTERVAL - MUSE: 152 MS
PR INTERVAL - MUSE: 152 MS
QRS DURATION - MUSE: 86 MS
QRS DURATION - MUSE: 92 MS
QT - MUSE: 310 MS
QT - MUSE: 342 MS
QTC - MUSE: 438 MS
QTC - MUSE: 456 MS
R AXIS - MUSE: 76 DEGREES
R AXIS - MUSE: 76 DEGREES
RBC # BLD AUTO: 4.7 10E6/UL (ref 4.4–5.9)
SARS-COV-2 RNA RESP QL NAA+PROBE: NEGATIVE
SODIUM SERPL-SCNC: 143 MMOL/L (ref 136–145)
SYSTOLIC BLOOD PRESSURE - MUSE: NORMAL MMHG
SYSTOLIC BLOOD PRESSURE - MUSE: NORMAL MMHG
T AXIS - MUSE: 61 DEGREES
T AXIS - MUSE: 74 DEGREES
TROPONIN I SERPL-MCNC: 0.01 NG/ML (ref 0–0.29)
VENTRICULAR RATE- MUSE: 107 BPM
VENTRICULAR RATE- MUSE: 120 BPM
WBC # BLD AUTO: 14.8 10E3/UL (ref 4–11)

## 2022-04-09 PROCEDURE — 96376 TX/PRO/DX INJ SAME DRUG ADON: CPT

## 2022-04-09 PROCEDURE — 85025 COMPLETE CBC W/AUTO DIFF WBC: CPT | Performed by: EMERGENCY MEDICINE

## 2022-04-09 PROCEDURE — 999N000157 HC STATISTIC RCP TIME EA 10 MIN

## 2022-04-09 PROCEDURE — 96365 THER/PROPH/DIAG IV INF INIT: CPT

## 2022-04-09 PROCEDURE — 94640 AIRWAY INHALATION TREATMENT: CPT | Mod: 76

## 2022-04-09 PROCEDURE — 96375 TX/PRO/DX INJ NEW DRUG ADDON: CPT

## 2022-04-09 PROCEDURE — 80048 BASIC METABOLIC PNL TOTAL CA: CPT | Performed by: EMERGENCY MEDICINE

## 2022-04-09 PROCEDURE — 99285 EMERGENCY DEPT VISIT HI MDM: CPT | Mod: 25

## 2022-04-09 PROCEDURE — C9803 HOPD COVID-19 SPEC COLLECT: HCPCS

## 2022-04-09 PROCEDURE — 84484 ASSAY OF TROPONIN QUANT: CPT | Performed by: EMERGENCY MEDICINE

## 2022-04-09 PROCEDURE — 93005 ELECTROCARDIOGRAM TRACING: CPT | Performed by: EMERGENCY MEDICINE

## 2022-04-09 PROCEDURE — 36415 COLL VENOUS BLD VENIPUNCTURE: CPT | Performed by: EMERGENCY MEDICINE

## 2022-04-09 PROCEDURE — 87635 SARS-COV-2 COVID-19 AMP PRB: CPT | Performed by: EMERGENCY MEDICINE

## 2022-04-09 PROCEDURE — 250N000009 HC RX 250: Performed by: EMERGENCY MEDICINE

## 2022-04-09 PROCEDURE — 120N000001 HC R&B MED SURG/OB

## 2022-04-09 PROCEDURE — 5A09357 ASSISTANCE WITH RESPIRATORY VENTILATION, LESS THAN 24 CONSECUTIVE HOURS, CONTINUOUS POSITIVE AIRWAY PRESSURE: ICD-10-PCS | Performed by: EMERGENCY MEDICINE

## 2022-04-09 PROCEDURE — 83735 ASSAY OF MAGNESIUM: CPT | Performed by: INTERNAL MEDICINE

## 2022-04-09 PROCEDURE — 94660 CPAP INITIATION&MGMT: CPT

## 2022-04-09 PROCEDURE — 71045 X-RAY EXAM CHEST 1 VIEW: CPT

## 2022-04-09 PROCEDURE — 99223 1ST HOSP IP/OBS HIGH 75: CPT | Mod: AI | Performed by: INTERNAL MEDICINE

## 2022-04-09 PROCEDURE — 250N000011 HC RX IP 250 OP 636: Performed by: EMERGENCY MEDICINE

## 2022-04-09 PROCEDURE — 258N000003 HC RX IP 258 OP 636: Performed by: INTERNAL MEDICINE

## 2022-04-09 PROCEDURE — 94640 AIRWAY INHALATION TREATMENT: CPT

## 2022-04-09 RX ORDER — LIDOCAINE 40 MG/G
CREAM TOPICAL
Status: DISCONTINUED | OUTPATIENT
Start: 2022-04-09 | End: 2022-04-13 | Stop reason: HOSPADM

## 2022-04-09 RX ORDER — ALBUTEROL SULFATE 90 UG/1
2 AEROSOL, METERED RESPIRATORY (INHALATION) EVERY 6 HOURS PRN
Status: DISCONTINUED | OUTPATIENT
Start: 2022-04-09 | End: 2022-04-13 | Stop reason: HOSPADM

## 2022-04-09 RX ORDER — IPRATROPIUM BROMIDE AND ALBUTEROL SULFATE 2.5; .5 MG/3ML; MG/3ML
1 SOLUTION RESPIRATORY (INHALATION) EVERY 4 HOURS PRN
Status: DISCONTINUED | OUTPATIENT
Start: 2022-04-09 | End: 2022-04-13 | Stop reason: HOSPADM

## 2022-04-09 RX ORDER — IPRATROPIUM BROMIDE AND ALBUTEROL SULFATE 2.5; .5 MG/3ML; MG/3ML
3 SOLUTION RESPIRATORY (INHALATION) EVERY 6 HOURS
Status: DISCONTINUED | OUTPATIENT
Start: 2022-04-10 | End: 2022-04-10

## 2022-04-09 RX ORDER — HYDROMORPHONE HYDROCHLORIDE 1 MG/ML
0.3 INJECTION, SOLUTION INTRAMUSCULAR; INTRAVENOUS; SUBCUTANEOUS EVERY 4 HOURS PRN
Status: DISCONTINUED | OUTPATIENT
Start: 2022-04-09 | End: 2022-04-13 | Stop reason: HOSPADM

## 2022-04-09 RX ORDER — IPRATROPIUM BROMIDE AND ALBUTEROL SULFATE 2.5; .5 MG/3ML; MG/3ML
3 SOLUTION RESPIRATORY (INHALATION) ONCE
Status: COMPLETED | OUTPATIENT
Start: 2022-04-09 | End: 2022-04-09

## 2022-04-09 RX ORDER — OXYCODONE AND ACETAMINOPHEN 5; 325 MG/1; MG/1
1 TABLET ORAL 2 TIMES DAILY PRN
Status: DISCONTINUED | OUTPATIENT
Start: 2022-04-09 | End: 2022-04-13

## 2022-04-09 RX ORDER — LORAZEPAM 2 MG/ML
0.25 INJECTION INTRAMUSCULAR EVERY 6 HOURS PRN
Status: DISCONTINUED | OUTPATIENT
Start: 2022-04-09 | End: 2022-04-13

## 2022-04-09 RX ORDER — BENZONATATE 100 MG/1
100 CAPSULE ORAL 3 TIMES DAILY PRN
Status: DISCONTINUED | OUTPATIENT
Start: 2022-04-09 | End: 2022-04-13 | Stop reason: HOSPADM

## 2022-04-09 RX ORDER — GABAPENTIN 300 MG/1
600 CAPSULE ORAL 3 TIMES DAILY
Status: DISCONTINUED | OUTPATIENT
Start: 2022-04-10 | End: 2022-04-13 | Stop reason: HOSPADM

## 2022-04-09 RX ORDER — ACETAMINOPHEN 650 MG/1
650 SUPPOSITORY RECTAL EVERY 6 HOURS PRN
Status: DISCONTINUED | OUTPATIENT
Start: 2022-04-09 | End: 2022-04-13 | Stop reason: HOSPADM

## 2022-04-09 RX ORDER — AMLODIPINE BESYLATE 5 MG/1
5 TABLET ORAL DAILY
Status: DISCONTINUED | OUTPATIENT
Start: 2022-04-10 | End: 2022-04-13 | Stop reason: HOSPADM

## 2022-04-09 RX ORDER — CEFTRIAXONE 2 G/1
2 INJECTION, POWDER, FOR SOLUTION INTRAMUSCULAR; INTRAVENOUS ONCE
Status: COMPLETED | OUTPATIENT
Start: 2022-04-09 | End: 2022-04-10

## 2022-04-09 RX ORDER — CEFTRIAXONE 2 G/1
2 INJECTION, POWDER, FOR SOLUTION INTRAMUSCULAR; INTRAVENOUS EVERY 24 HOURS
Status: DISCONTINUED | OUTPATIENT
Start: 2022-04-10 | End: 2022-04-11

## 2022-04-09 RX ORDER — LISINOPRIL 40 MG/1
40 TABLET ORAL DAILY
Status: DISCONTINUED | OUTPATIENT
Start: 2022-04-10 | End: 2022-04-13 | Stop reason: HOSPADM

## 2022-04-09 RX ORDER — METHYLPREDNISOLONE SODIUM SUCCINATE 125 MG/2ML
60 INJECTION, POWDER, LYOPHILIZED, FOR SOLUTION INTRAMUSCULAR; INTRAVENOUS EVERY 8 HOURS
Status: DISCONTINUED | OUTPATIENT
Start: 2022-04-10 | End: 2022-04-12

## 2022-04-09 RX ORDER — SODIUM CHLORIDE 9 MG/ML
INJECTION, SOLUTION INTRAVENOUS CONTINUOUS
Status: DISCONTINUED | OUTPATIENT
Start: 2022-04-09 | End: 2022-04-12 | Stop reason: CLARIF

## 2022-04-09 RX ORDER — ACETAMINOPHEN 325 MG/1
650 TABLET ORAL EVERY 6 HOURS PRN
Status: DISCONTINUED | OUTPATIENT
Start: 2022-04-09 | End: 2022-04-13 | Stop reason: HOSPADM

## 2022-04-09 RX ADMIN — SODIUM CHLORIDE: 9 INJECTION, SOLUTION INTRAVENOUS at 23:49

## 2022-04-09 RX ADMIN — IPRATROPIUM BROMIDE AND ALBUTEROL SULFATE 3 ML: 2.5; .5 SOLUTION RESPIRATORY (INHALATION) at 20:46

## 2022-04-09 RX ADMIN — IPRATROPIUM BROMIDE AND ALBUTEROL SULFATE 3 ML: 2.5; .5 SOLUTION RESPIRATORY (INHALATION) at 20:53

## 2022-04-09 RX ADMIN — CEFTRIAXONE SODIUM 2 G: 2 INJECTION, POWDER, FOR SOLUTION INTRAMUSCULAR; INTRAVENOUS at 23:02

## 2022-04-09 ASSESSMENT — ACTIVITIES OF DAILY LIVING (ADL): ADLS_ACUITY_SCORE: 8

## 2022-04-10 ENCOUNTER — APPOINTMENT (OUTPATIENT)
Dept: OCCUPATIONAL THERAPY | Facility: CLINIC | Age: 67
DRG: 190 | End: 2022-04-10
Attending: FAMILY MEDICINE
Payer: MEDICARE

## 2022-04-10 ENCOUNTER — APPOINTMENT (OUTPATIENT)
Dept: PHYSICAL THERAPY | Facility: CLINIC | Age: 67
DRG: 190 | End: 2022-04-10
Attending: FAMILY MEDICINE
Payer: MEDICARE

## 2022-04-10 LAB
ALBUMIN SERPL-MCNC: 3.5 G/DL (ref 3.5–5)
ALBUMIN UR-MCNC: 30 MG/DL
ALP SERPL-CCNC: 45 U/L (ref 45–120)
ALT SERPL W P-5'-P-CCNC: 38 U/L (ref 0–45)
ANION GAP SERPL CALCULATED.3IONS-SCNC: 9 MMOL/L (ref 5–18)
APPEARANCE UR: CLEAR
AST SERPL W P-5'-P-CCNC: 19 U/L (ref 0–40)
BASOPHILS # BLD AUTO: 0 10E3/UL (ref 0–0.2)
BASOPHILS NFR BLD AUTO: 0 %
BILIRUB SERPL-MCNC: 0.3 MG/DL (ref 0–1)
BILIRUB UR QL STRIP: NEGATIVE
BUN SERPL-MCNC: 27 MG/DL (ref 8–22)
CALCIUM SERPL-MCNC: 8.5 MG/DL (ref 8.5–10.5)
CHLORIDE BLD-SCNC: 107 MMOL/L (ref 98–107)
CO2 SERPL-SCNC: 27 MMOL/L (ref 22–31)
COLOR UR AUTO: YELLOW
CREAT SERPL-MCNC: 0.97 MG/DL (ref 0.7–1.3)
EOSINOPHIL # BLD AUTO: 0 10E3/UL (ref 0–0.7)
EOSINOPHIL NFR BLD AUTO: 0 %
ERYTHROCYTE [DISTWIDTH] IN BLOOD BY AUTOMATED COUNT: 13.6 % (ref 10–15)
GFR SERPL CREATININE-BSD FRML MDRD: 87 ML/MIN/1.73M2
GLUCOSE BLD-MCNC: 175 MG/DL (ref 70–125)
GLUCOSE UR STRIP-MCNC: 50 MG/DL
HCT VFR BLD AUTO: 39.6 % (ref 40–53)
HGB BLD-MCNC: 12.5 G/DL (ref 13.3–17.7)
HGB UR QL STRIP: NEGATIVE
IMM GRANULOCYTES # BLD: 0 10E3/UL
IMM GRANULOCYTES NFR BLD: 0 %
KETONES UR STRIP-MCNC: NEGATIVE MG/DL
L PNEUMO1 AG UR QL IA: NEGATIVE
LACTATE SERPL-SCNC: 3.1 MMOL/L (ref 0.7–2)
LACTATE SERPL-SCNC: 4.2 MMOL/L (ref 0.7–2)
LEUKOCYTE ESTERASE UR QL STRIP: NEGATIVE
LYMPHOCYTES # BLD AUTO: 0.5 10E3/UL (ref 0.8–5.3)
LYMPHOCYTES NFR BLD AUTO: 4 %
MAGNESIUM SERPL-MCNC: 2.3 MG/DL (ref 1.8–2.6)
MCH RBC QN AUTO: 29.2 PG (ref 26.5–33)
MCHC RBC AUTO-ENTMCNC: 31.6 G/DL (ref 31.5–36.5)
MCV RBC AUTO: 93 FL (ref 78–100)
MONOCYTES # BLD AUTO: 0.5 10E3/UL (ref 0–1.3)
MONOCYTES NFR BLD AUTO: 5 %
MUCOUS THREADS #/AREA URNS LPF: PRESENT /LPF
NEUTROPHILS # BLD AUTO: 10.1 10E3/UL (ref 1.6–8.3)
NEUTROPHILS NFR BLD AUTO: 91 %
NITRATE UR QL: NEGATIVE
NRBC # BLD AUTO: 0 10E3/UL
NRBC BLD AUTO-RTO: 0 /100
PH UR STRIP: 6 [PH] (ref 5–7)
PLATELET # BLD AUTO: 212 10E3/UL (ref 150–450)
POTASSIUM BLD-SCNC: 4.6 MMOL/L (ref 3.5–5)
PROCALCITONIN SERPL-MCNC: 0.02 NG/ML (ref 0–0.49)
PROT SERPL-MCNC: 6.4 G/DL (ref 6–8)
RBC # BLD AUTO: 4.28 10E6/UL (ref 4.4–5.9)
RBC URINE: 0 /HPF
S PNEUM AG SPEC QL: NEGATIVE
SODIUM SERPL-SCNC: 143 MMOL/L (ref 136–145)
SP GR UR STRIP: 1.03 (ref 1–1.03)
SQUAMOUS EPITHELIAL: <1 /HPF
TROPONIN I SERPL-MCNC: 0.02 NG/ML (ref 0–0.29)
UROBILINOGEN UR STRIP-MCNC: NORMAL MG/DL
WBC # BLD AUTO: 11.1 10E3/UL (ref 4–11)
WBC URINE: 1 /HPF

## 2022-04-10 PROCEDURE — 97162 PT EVAL MOD COMPLEX 30 MIN: CPT | Mod: GP

## 2022-04-10 PROCEDURE — 97166 OT EVAL MOD COMPLEX 45 MIN: CPT | Mod: GO

## 2022-04-10 PROCEDURE — 93005 ELECTROCARDIOGRAM TRACING: CPT

## 2022-04-10 PROCEDURE — 84484 ASSAY OF TROPONIN QUANT: CPT | Performed by: INTERNAL MEDICINE

## 2022-04-10 PROCEDURE — 258N000003 HC RX IP 258 OP 636: Performed by: HOSPITALIST

## 2022-04-10 PROCEDURE — 87899 AGENT NOS ASSAY W/OPTIC: CPT | Performed by: INTERNAL MEDICINE

## 2022-04-10 PROCEDURE — 120N000001 HC R&B MED SURG/OB

## 2022-04-10 PROCEDURE — 80053 COMPREHEN METABOLIC PANEL: CPT | Performed by: INTERNAL MEDICINE

## 2022-04-10 PROCEDURE — 81001 URINALYSIS AUTO W/SCOPE: CPT | Performed by: HOSPITALIST

## 2022-04-10 PROCEDURE — 97116 GAIT TRAINING THERAPY: CPT | Mod: GP

## 2022-04-10 PROCEDURE — 83735 ASSAY OF MAGNESIUM: CPT | Performed by: INTERNAL MEDICINE

## 2022-04-10 PROCEDURE — 36415 COLL VENOUS BLD VENIPUNCTURE: CPT | Performed by: HOSPITALIST

## 2022-04-10 PROCEDURE — 36415 COLL VENOUS BLD VENIPUNCTURE: CPT | Performed by: INTERNAL MEDICINE

## 2022-04-10 PROCEDURE — 250N000013 HC RX MED GY IP 250 OP 250 PS 637: Performed by: INTERNAL MEDICINE

## 2022-04-10 PROCEDURE — 93010 ELECTROCARDIOGRAM REPORT: CPT | Mod: HIP | Performed by: INTERNAL MEDICINE

## 2022-04-10 PROCEDURE — 250N000009 HC RX 250: Performed by: INTERNAL MEDICINE

## 2022-04-10 PROCEDURE — 250N000011 HC RX IP 250 OP 636: Performed by: FAMILY MEDICINE

## 2022-04-10 PROCEDURE — 83605 ASSAY OF LACTIC ACID: CPT | Performed by: HOSPITALIST

## 2022-04-10 PROCEDURE — 250N000011 HC RX IP 250 OP 636: Performed by: INTERNAL MEDICINE

## 2022-04-10 PROCEDURE — 94660 CPAP INITIATION&MGMT: CPT

## 2022-04-10 PROCEDURE — 93005 ELECTROCARDIOGRAM TRACING: CPT | Performed by: HOSPITALIST

## 2022-04-10 PROCEDURE — 97535 SELF CARE MNGMENT TRAINING: CPT | Mod: GO

## 2022-04-10 PROCEDURE — 36415 COLL VENOUS BLD VENIPUNCTURE: CPT | Performed by: FAMILY MEDICINE

## 2022-04-10 PROCEDURE — 999N000157 HC STATISTIC RCP TIME EA 10 MIN

## 2022-04-10 PROCEDURE — 85025 COMPLETE CBC W/AUTO DIFF WBC: CPT | Performed by: INTERNAL MEDICINE

## 2022-04-10 PROCEDURE — 83605 ASSAY OF LACTIC ACID: CPT | Performed by: FAMILY MEDICINE

## 2022-04-10 PROCEDURE — 99233 SBSQ HOSP IP/OBS HIGH 50: CPT | Performed by: FAMILY MEDICINE

## 2022-04-10 PROCEDURE — 84145 PROCALCITONIN (PCT): CPT | Performed by: INTERNAL MEDICINE

## 2022-04-10 PROCEDURE — 250N000009 HC RX 250: Performed by: FAMILY MEDICINE

## 2022-04-10 PROCEDURE — 94640 AIRWAY INHALATION TREATMENT: CPT | Mod: 76

## 2022-04-10 RX ORDER — DOXYCYCLINE 100 MG/10ML
100 INJECTION, POWDER, LYOPHILIZED, FOR SOLUTION INTRAVENOUS EVERY 12 HOURS
Status: DISCONTINUED | OUTPATIENT
Start: 2022-04-10 | End: 2022-04-11

## 2022-04-10 RX ORDER — CITALOPRAM HYDROBROMIDE 20 MG/1
20 TABLET ORAL DAILY
Status: DISCONTINUED | OUTPATIENT
Start: 2022-04-10 | End: 2022-04-13 | Stop reason: HOSPADM

## 2022-04-10 RX ORDER — ALBUTEROL SULFATE 5 MG/ML
2.5 SOLUTION RESPIRATORY (INHALATION)
Status: DISCONTINUED | OUTPATIENT
Start: 2022-04-10 | End: 2022-04-13 | Stop reason: HOSPADM

## 2022-04-10 RX ORDER — IPRATROPIUM BROMIDE AND ALBUTEROL SULFATE 2.5; .5 MG/3ML; MG/3ML
3 SOLUTION RESPIRATORY (INHALATION)
Status: DISCONTINUED | OUTPATIENT
Start: 2022-04-10 | End: 2022-04-13 | Stop reason: HOSPADM

## 2022-04-10 RX ORDER — IPRATROPIUM BROMIDE AND ALBUTEROL SULFATE 2.5; .5 MG/3ML; MG/3ML
3 SOLUTION RESPIRATORY (INHALATION) EVERY 6 HOURS
Status: DISCONTINUED | OUTPATIENT
Start: 2022-04-10 | End: 2022-04-10

## 2022-04-10 RX ADMIN — OXYCODONE AND ACETAMINOPHEN 1 TABLET: 5; 325 TABLET ORAL at 19:55

## 2022-04-10 RX ADMIN — GABAPENTIN 600 MG: 300 CAPSULE ORAL at 14:13

## 2022-04-10 RX ADMIN — METHYLPREDNISOLONE SODIUM SUCCINATE 62.5 MG: 125 INJECTION, POWDER, FOR SOLUTION INTRAMUSCULAR; INTRAVENOUS at 17:52

## 2022-04-10 RX ADMIN — IPRATROPIUM BROMIDE AND ALBUTEROL SULFATE 3 ML: 2.5; .5 SOLUTION RESPIRATORY (INHALATION) at 18:07

## 2022-04-10 RX ADMIN — CITALOPRAM HYDROBROMIDE 20 MG: 20 TABLET ORAL at 19:55

## 2022-04-10 RX ADMIN — IPRATROPIUM BROMIDE AND ALBUTEROL SULFATE 3 ML: 2.5; .5 SOLUTION RESPIRATORY (INHALATION) at 19:40

## 2022-04-10 RX ADMIN — SODIUM CHLORIDE 500 ML: 9 INJECTION, SOLUTION INTRAVENOUS at 21:00

## 2022-04-10 RX ADMIN — CEFTRIAXONE SODIUM 2 G: 2 INJECTION, POWDER, FOR SOLUTION INTRAMUSCULAR; INTRAVENOUS at 21:51

## 2022-04-10 RX ADMIN — IPRATROPIUM BROMIDE AND ALBUTEROL SULFATE 3 ML: 2.5; .5 SOLUTION RESPIRATORY (INHALATION) at 10:00

## 2022-04-10 RX ADMIN — AMLODIPINE BESYLATE 5 MG: 5 TABLET ORAL at 08:19

## 2022-04-10 RX ADMIN — METHYLPREDNISOLONE SODIUM SUCCINATE 62.5 MG: 125 INJECTION, POWDER, FOR SOLUTION INTRAMUSCULAR; INTRAVENOUS at 10:21

## 2022-04-10 RX ADMIN — DOXYCYCLINE 100 MG: 100 INJECTION, POWDER, LYOPHILIZED, FOR SOLUTION INTRAVENOUS at 23:57

## 2022-04-10 RX ADMIN — SODIUM CHLORIDE 1000 ML: 9 INJECTION, SOLUTION INTRAVENOUS at 20:40

## 2022-04-10 RX ADMIN — LISINOPRIL 40 MG: 40 TABLET ORAL at 08:19

## 2022-04-10 RX ADMIN — IPRATROPIUM BROMIDE AND ALBUTEROL SULFATE 3 ML: 2.5; .5 SOLUTION RESPIRATORY (INHALATION) at 01:43

## 2022-04-10 RX ADMIN — Medication 200 MG: at 21:52

## 2022-04-10 RX ADMIN — METHYLPREDNISOLONE SODIUM SUCCINATE 62.5 MG: 125 INJECTION, POWDER, FOR SOLUTION INTRAMUSCULAR; INTRAVENOUS at 01:28

## 2022-04-10 RX ADMIN — RIVAROXABAN 20 MG: 10 TABLET, FILM COATED ORAL at 17:49

## 2022-04-10 RX ADMIN — GABAPENTIN 600 MG: 300 CAPSULE ORAL at 08:19

## 2022-04-10 RX ADMIN — GABAPENTIN 600 MG: 300 CAPSULE ORAL at 19:55

## 2022-04-10 ASSESSMENT — ACTIVITIES OF DAILY LIVING (ADL)
ADLS_ACUITY_SCORE: 12
WALKING_OR_CLIMBING_STAIRS: OTHER (SEE COMMENTS)
ADLS_ACUITY_SCORE: 12
ADLS_ACUITY_SCORE: 12
TRANSFERRING: 1-->ASSISTANCE (EQUIPMENT/PERSON) NEEDED (NOT DEVELOPMENTALLY APPROPRIATE)
TRANSFERRING: 1-->ASSISTANCE (EQUIPMENT/PERSON) NEEDED
ADLS_ACUITY_SCORE: 12
ADLS_ACUITY_SCORE: 10
WALKING_OR_CLIMBING_STAIRS_DIFFICULTY: YES
ADLS_ACUITY_SCORE: 10
ADLS_ACUITY_SCORE: 12
VISION_MANAGEMENT: GLASSES
ADLS_ACUITY_SCORE: 12
ADLS_ACUITY_SCORE: 12
DRESSING/BATHING_DIFFICULTY: NO
ADLS_ACUITY_SCORE: 10
CONCENTRATING,_REMEMBERING_OR_MAKING_DECISIONS_DIFFICULTY: NO
ADLS_ACUITY_SCORE: 12
ADLS_ACUITY_SCORE: 12
CHANGE_IN_FUNCTIONAL_STATUS_SINCE_ONSET_OF_CURRENT_ILLNESS/INJURY: YES
ADLS_ACUITY_SCORE: 8
ADLS_ACUITY_SCORE: 12
DIFFICULTY_EATING/SWALLOWING: NO
FALL_HISTORY_WITHIN_LAST_SIX_MONTHS: NO
ADLS_ACUITY_SCORE: 12
EQUIPMENT_CURRENTLY_USED_AT_HOME: CANE, STRAIGHT
ADLS_ACUITY_SCORE: 12
TOILETING_ISSUES: NO
DOING_ERRANDS_INDEPENDENTLY_DIFFICULTY: NO
ADLS_ACUITY_SCORE: 12
WEAR_GLASSES_OR_BLIND: YES

## 2022-04-10 NOTE — PHARMACY-ADMISSION MEDICATION HISTORY
Pharmacy Note - Admission Medication History    Pertinent Provider Information:      ______________________________________________________________________    Prior To Admission (PTA) med list completed and updated in EMR.       PTA Med List   Medication Sig Last Dose     acetaminophen (TYLENOL) 500 MG tablet Take 1-2 tablets (500-1,000 mg) by mouth every 4 hours as needed for fever      albuterol (PROAIR HFA/PROVENTIL HFA/VENTOLIN HFA) 108 (90 Base) MCG/ACT inhaler Inhale 2 puffs into the lungs every 6 hours (Patient taking differently: Inhale 2 puffs into the lungs every 6 hours as needed for shortness of breath / dyspnea ) 4/9/2022     amLODIPine (NORVASC) 5 MG tablet Take 1 tablet (5 mg) by mouth daily 4/9/2022     citalopram (CELEXA) 20 MG tablet [CITALOPRAM (CELEXA) 20 MG TABLET] TAKE 1 TABLET (20 MG TOTAL) BY MOUTH DAILY. (Patient taking differently: Take 20 mg by mouth daily [CITALOPRAM (CELEXA) 20 MG TABLET] TAKE 1 TABLET (20 MG TOTAL) BY MOUTH DAILY.) 4/9/2022     doxycycline hyclate (VIBRAMYCIN) 100 MG capsule Take 1 capsule (100 mg) by mouth 2 times daily for 7 days 4/9/2022 at am     furosemide (LASIX) 20 MG tablet Take 1 tablet (20 mg) by mouth daily (Patient taking differently: Take 20 mg by mouth as needed )      gabapentin (NEURONTIN) 300 MG capsule Take 2 capsules (600 mg) by mouth 3 times daily 4/9/2022 at x 2 doses     ipratropium - albuterol 0.5 mg/2.5 mg/3 mL (DUONEB) 0.5-2.5 (3) MG/3ML neb solution Take 1 vial (3 mLs) by nebulization every 6 hours as needed for shortness of breath / dyspnea or wheezing (Patient taking differently: Take 1 vial by nebulization every 4 hours as needed for shortness of breath / dyspnea or wheezing )      lisinopril (ZESTRIL) 40 MG tablet [LISINOPRIL (PRINIVIL,ZESTRIL) 40 MG TABLET] TAKE 1 TABLET BY MOUTH DAILY. 4/9/2022     magnesium 250 MG tablet Take 1 tablet (250 mg) by mouth At Bedtime 4/8/2022     oxyCODONE-acetaminophen (PERCOCET) 5-325 MG tablet Take 1  tablet by mouth 2 times daily as needed for severe pain 4/9/2022 at am     predniSONE (DELTASONE) 10 MG tablet Take 1 tablet (10 mg) by mouth See Admin Instructions take 4 tabs daily x 4 days, then 3 tabs daily x 4 days, then 2 tabs daily x 4 days, then 1 tab daily x 4 days.. Unknown at Unknown time     predniSONE (DELTASONE) 50 MG tablet Take 1 tablet (50 mg) by mouth daily for 5 days 4/9/2022 at am     rivaroxaban ANTICOAGULANT (XARELTO) 20 MG TABS tablet Take 1 tablet (20 mg) by mouth daily (with dinner) 4/9/2022 at pm       Information source(s): Family member, Clinic records and [facility]  Method of interview communication: in-person    Summary of Changes to PTA Med List  New: none  Discontinued: Wellbutrin, Advair HFA  Changed: Lasix changed to PRN per daughter    Patient was asked about OTC/herbal products specifically.  PTA med list reflects this.    In the past week, patient estimated taking medication this percent of the time:  greater than 90%.    Allergies were reviewed, assessed, and updated with the patient.      Patient did not bring any medications to the hospital and can't retrieve from home. No multi-dose medications are available for use during hospital stay.     The information provided in this note is only as accurate as the sources available at the time of the update(s).    Thank you for the opportunity to participate in the care of this patient.    Anitra Bolanos [facility]  4/9/2022 10:22 PM

## 2022-04-10 NOTE — ED NOTES
Introduced self to patient and whiteboard updated.  Hourly rounding explained.  Call light in reach.  Plan of care and expected length of stay explained.  Will continue to monitor.  Pt on BIPAP and RT adjusting.  Pt sleeping.  Denies pain

## 2022-04-10 NOTE — ED NOTES
Pt taken off BIPAP and transitioned to nasal cannula 5 lpm which pt reports he wears at home.  Dr. Hutchinson, hospitalist, down to see patient and regular diet placed. Pt denies pain and reports he is feeling better.  NSR on monitor.  Bilateral lung sounds I/E wheezing and awaiting RT to do neb treatment.

## 2022-04-10 NOTE — PROVIDER NOTIFICATION
Pt admitted with COPD exac.  Pt has hx COPD, wears 5 LPM NC, Home o2, Afib, anxiety, Hep C +.  Pt states he takes nebs about 4 times a day.  Will schedule Duoneb QID and PRN.    Pt is on 5 LPM NC, BS diminished, O2 sats mid to upper 90's.  Pt on BIPAP 12/8, 12, 35% in ER.  BIPAP has been on S/B since this AM.  Pt requests to wear again tonight.  Will RE-EVAL if needed.       04/10/22 1814   RCAT Assessment   Reason for Assessment COPD   Pulmonary Status 4   Surgical Status 0   Chest X-ray 0   Respiratory Pattern 1   Mental Status 0   Breath Sounds 2   Cough Effectiveness 0   Level of Activity 0   O2 Required for SpO2>=92% 2   Acuity Level (points) 9   Acuity Level  4   Clinical Indications/Symptoms   Aerosol Therapy Home regimen   Broncho-pulmonary Hygiene History of mucous producing disease   Volume Expansion Prevent atelectasis   Aerosol Therapy Plan   RT Treatment Nebulizer   Breath Sounds   Breath Sounds All Fields   All Lung Fields Breath Sounds diminished

## 2022-04-10 NOTE — H&P
[facility] MEDICINE ADMISSION HISTORY AND PHYSICAL       Assessment & Plan      1. Acute hypoxic respiratory failure, requiring use of BiPAP, with FIO2 down to 35-40%. Pulling good TV, and reported he feels better.    Acute COPD exacerbation, unclear what triggered, but reported coughing for 5 days now. Chest XR -  No definite PNA, but WBC is 14 thousand, and his HR above 90 - consider SIRS, could be bronchitis, possible early PNA    On xarelto, less likely PE    - Reported took 100 mgs of Prednisone before coming. Will continue Duonebs q6 and PRN -- still wheezy  - Will cover with rocephin  - sputum culture, antigens  - AM labs, check procalcitonin and lactic acid    2. Has Hepatitis C - on oral med, looks like has not been started  - check LFTs    3. History of Afib, HR better  - tele  - electrolyte checks    4. History of Anxiety -- pls clarify his meds, pharm indicates he is not taking med not sure for how long  - PRN anti anxiety    5. Chronic pain issues -  Resume meds         Med reconciliation -- Done  VTE prophylaxis: on xarelto   IV fluids: Per order set   Diet:  NPO for now - while on BiPAP  Code Status: Full   COVID test result:  negative   COVID vaccination: completed   Barriers to discharge: admitting clinical condition  Discharge Disposition and goals:  Unable to determine at this point, pending clinical progress and response to treatment. Patient may need transfer to SNF or [facility] if unsafe to go home and needed treatment inappropriate at home setting OR may need home health care evaluation if care can be delivered at home settings. Consider referral to care manager/    PPE - I was wearing PPE when I met the patient - N95 mask, Surgical mask, Isolation gown, Gloves, Safety glasses.      Care plan was created based on available information provided, including patient's condition at the time of encounter.   This plan was discussed with patient and/or family members  using layman's terms and have agreed to proceed.   At the end of night shift (9PM - 730A), this case will be presented to the AM Hospitalist.    It is recommended to revise care plan and review history if there is change in condition and/or new clinical information is not available during my encounter.     All or some of home medication/s were not resumed on admission due to safety reasons or contraindications. Dosing and frequency may also have been modified. Please resume/review them during your visit.     70 minutes of total visit duration and greater than 50% was spent in direct evaluation of patient and coordination of care including discussion of diagnostic test results and recommended treatment. .      Kj Knight MD, MPH, FACP, [facility]  Internal Medicine - Hospitalist        Chief Complaint SOB      HISTORY     - Met him in the ED. He was asleep and resting comfortable on BiPAP at 40%, good TV 600s  - He was seen at [facility] for COPD exacerbation, was given solumedrol and nebs and felt better. He was sent home with prednisone and doxxycline. He came back because his breathing got worse. His O2 sat was 87%    - Has COPD and on home O2 a 2-3L. He was seen by pulmonary in the past. He was feeling SOB and coughing for 5 days now. No CP. No fevers. No belly pain. No diarrhea or urinary symptoms. He did try her nebs and did not help, he decided to come back, this time at [facility]    - He is on xarelto for Afib. He took his dose today    - In the ED, he required use of BiPAP. He was tachy and sat in mid 80s. He feels better now.  - His WBC was 14 thousand. Trop and BNP were negative    - ROS --- No headache. No dizziness. No weakness. No palpitations. No abdominal pain. No nausea or vomiting. No urinary symptoms. No bleeding symptoms. No weight loss. Rest of 12 point ROS was reviewed and negative.         Past Medical History     Past Medical History:   Diagnosis Date     Anxiety      Atrial fibrillation (H)       Cerebral infarction (H)      COPD (chronic obstructive pulmonary disease) (H)      Hemangioma     massive hemangioma; left hand     Hypertension      Myocardial infarction (H)      TIA (transient ischemic attack)          Surgical History     Past Surgical History:   Procedure Laterality Date     CERVICAL FUSION      C6 and C7        Family History      Family History   Problem Relation Age of Onset     Coronary Artery Disease Mother      Diabetes Mother      Coronary Artery Disease Father      Diabetes Sister          Social History      .  Social History     Socioeconomic History     Marital status:      Spouse name: Not on file     Number of children: Not on file     Years of education: Not on file     Highest education level: Not on file   Occupational History     Not on file   Tobacco Use     Smoking status: Former Smoker     Packs/day: 0.00     Quit date: 5/15/2018     Years since quitting: 3.9     Smokeless tobacco: Current User     Tobacco comment: No passive exposure   Substance and Sexual Activity     Alcohol use: Not Currently     Drug use: Not Currently     Sexual activity: Not on file   Other Topics Concern     Parent/sibling w/ CABG, MI or angioplasty before 65F 55M? Not Asked   Social History Narrative     Not on file     Social Determinants of Health     Financial Resource Strain: Not on file   Food Insecurity: Not on file   Transportation Needs: Not on file   Physical Activity: Not on file   Stress: Not on file   Social Connections: Not on file   Intimate Partner Violence: Not on file   Housing Stability: Not on file          Allergies        Allergies   Allergen Reactions     Symbicort Rash         Prior to Admission Medications      No current facility-administered medications on file prior to encounter.  acetaminophen (TYLENOL) 500 MG tablet, Take 1-2 tablets (500-1,000 mg) by mouth every 4 hours as needed for fever  albuterol (PROAIR HFA/PROVENTIL HFA/VENTOLIN HFA) 108 (90 Base) MCG/ACT  inhaler, Inhale 2 puffs into the lungs every 6 hours  albuterol (PROAIR HFA;PROVENTIL HFA;VENTOLIN HFA) 90 mcg/actuation inhaler, [ALBUTEROL (PROAIR HFA;PROVENTIL HFA;VENTOLIN HFA) 90 MCG/ACTUATION INHALER] Inhale 2 puffs every 6 (six) hours as needed for wheezing.  amLODIPine (NORVASC) 5 MG tablet, Take 1 tablet (5 mg) by mouth daily  budesonide (PULMICORT) 1 MG/2ML neb solution, Take 2 mLs (1 mg) by nebulization 2 times daily  buPROPion (WELLBUTRIN XL) 300 MG 24 hr tablet, Take 1 tablet (300 mg) by mouth daily (with lunch) (Patient not taking: Reported on 3/23/2022)  citalopram (CELEXA) 20 MG tablet, [CITALOPRAM (CELEXA) 20 MG TABLET] TAKE 1 TABLET (20 MG TOTAL) BY MOUTH DAILY.  doxycycline hyclate (VIBRAMYCIN) 100 MG capsule, Take 1 capsule (100 mg) by mouth 2 times daily for 7 days  doxycycline monohydrate (MONODOX) 100 MG capsule, Take 1 capsule (100 mg) by mouth 2 times daily  fluticasone-salmeterol (ADVAIR-HFA) 230-21 MCG/ACT inhaler, Inhale 2 puffs into the lungs 2 times daily  furosemide (LASIX) 20 MG tablet, Take 1 tablet (20 mg) by mouth daily  gabapentin (NEURONTIN) 300 MG capsule, Take 2 capsules (600 mg) by mouth 3 times daily  glecaprevir-pibrentasvir (MAVYRET) 100-40 MG per tablet, Take 3 tablets by mouth daily (with breakfast)  ipratropium - albuterol 0.5 mg/2.5 mg/3 mL (DUONEB) 0.5-2.5 (3) MG/3ML neb solution, Take 1 vial (3 mLs) by nebulization every 6 hours as needed for shortness of breath / dyspnea or wheezing (Patient not taking: Reported on 3/23/2022)  lisinopril (ZESTRIL) 40 MG tablet, [LISINOPRIL (PRINIVIL,ZESTRIL) 40 MG TABLET] TAKE 1 TABLET BY MOUTH DAILY.  magnesium 250 MG tablet, Take 1 tablet (250 mg) by mouth At Bedtime  naloxone (NARCAN) 4 MG/0.1ML nasal spray, Spray 1 spray (4 mg) into one nostril alternating nostrils once as needed for opioid reversal every 2-3 minutes until assistance arrives  oxyCODONE-acetaminophen (PERCOCET) 5-325 MG tablet, Take 1 tablet by mouth 2 times daily  as needed for severe pain  predniSONE (DELTASONE) 10 MG tablet, Take 1 tablet (10 mg) by mouth See Admin Instructions take 4 tabs daily x 4 days, then 3 tabs daily x 4 days, then 2 tabs daily x 4 days, then 1 tab daily x 4 days..  predniSONE (DELTASONE) 50 MG tablet, Take 1 tablet (50 mg) by mouth daily for 5 days  rivaroxaban ANTICOAGULANT (XARELTO) 20 MG TABS tablet, Take 1 tablet (20 mg) by mouth daily (with dinner)            Review of Systems     A 12 point comprehensive review of systems was negative except as noted above in HPI.    PHYSICAL EXAMINATION       Vitals      Vitals: /70   Pulse 108   Temp 98  F (36.7  C) (Oral)   Resp 19   Wt 111.6 kg (246 lb)   SpO2 98%   BMI 33.36 kg/m    BMI= Body mass index is 33.36 kg/m .      Examination     General Appearance:  Alert, cooperative, no distress  Head:    Normocephalic, without obvious abnormality, atraumatic  EENT:  PERRL, conjunctiva/corneas clear, EOM's intact.   Neck:   Supple, symmetrical, trachea midline, no adenopathy; no NVE  Back:  Symmetric, no curvature, no CVA tenderness  Chest/Lungs: decreased air entry, (+) wheezing wilson,  respirations unlabored, No tenderness or deformity. No abdominal breathing or use of accessory muscles.   Heart:    Regular rate and rhythm, S1 and S2 normal, no murmur, rub   or gallop  Abdomen: Soft, non-tender, bowel sounds active all four quadrants, not peritoneal on palpation. Not distended  Extremities:  Extremities normal, atraumatic, no swelling   Skin:  Skin color, texture, turgor normal, no rashes or lesion  Neurologic:  Awake and alert, No lateralizing or localizing signs              Pertinent Lab     Results for orders placed or performed during the hospital encounter of 04/09/22   XR Chest Port 1 View    Impression    IMPRESSION: Lungs clear. No pneumothorax. Normal heart size and pulmonary vascularity. Tortuous aorta. Postoperative changes cervical spine.   Extra Blue Top Tube   Result Value Ref Range     Hold Specimen [facility]    Extra Green Top (Lithium Heparin) Tube   Result Value Ref Range    Hold Specimen [facility]    Extra Purple Top Tube   Result Value Ref Range    Hold Specimen [facility]    Basic metabolic panel   Result Value Ref Range    Sodium 143 136 - 145 mmol/L    Potassium 4.7 3.5 - 5.0 mmol/L    Chloride 103 98 - 107 mmol/L    Carbon Dioxide (CO2) 27 22 - 31 mmol/L    Anion Gap 13 5 - 18 mmol/L    Urea Nitrogen 26 (H) 8 - 22 mg/dL    Creatinine 1.09 0.70 - 1.30 mg/dL    Calcium 9.3 8.5 - 10.5 mg/dL    Glucose 148 (H) 70 - 125 mg/dL    GFR Estimate 75 >60 mL/min/1.73m2   Troponin I (now)   Result Value Ref Range    Troponin I 0.01 0.00 - 0.29 ng/mL   CBC with platelets and differential   Result Value Ref Range    WBC Count 14.8 (H) 4.0 - 11.0 10e3/uL    RBC Count 4.70 4.40 - 5.90 10e6/uL    Hemoglobin 13.8 13.3 - 17.7 g/dL    Hematocrit 43.6 40.0 - 53.0 %    MCV 93 78 - 100 fL    MCH 29.4 26.5 - 33.0 pg    MCHC 31.7 31.5 - 36.5 g/dL    RDW 13.4 10.0 - 15.0 %    Platelet Count 261 150 - 450 10e3/uL    % Neutrophils 88 %    % Lymphocytes 5 %    % Monocytes 6 %    % Eosinophils 0 %    % Basophils 0 %    % Immature Granulocytes 1 %    NRBCs per 100 WBC 0 <1 /100    Absolute Neutrophils 13.1 (H) 1.6 - 8.3 10e3/uL    Absolute Lymphocytes 0.7 (L) 0.8 - 5.3 10e3/uL    Absolute Monocytes 0.9 0.0 - 1.3 10e3/uL    Absolute Eosinophils 0.0 0.0 - 0.7 10e3/uL    Absolute Basophils 0.0 0.0 - 0.2 10e3/uL    Absolute Immature Granulocytes 0.1 <=0.4 10e3/uL    Absolute NRBCs 0.0 10e3/uL   ECG 12-LEAD WITH MUSE (LHE)   Result Value Ref Range    Systolic Blood Pressure  mmHg    Diastolic Blood Pressure  mmHg    Ventricular Rate 120 BPM    Atrial Rate 120 BPM    NH Interval 152 ms    QRS Duration 92 ms     ms    QTc 438 ms    P Axis 75 degrees    R AXIS 76 degrees    T Axis 61 degrees    Interpretation ECG       Sinus tachycardia  Nonspecific ST abnormality  Abnormal ECG  When compared with ECG of 08-APR-2022 13:43,  No  significant change was found  Confirmed by SEE ED PROVIDER NOTE FOR, ECG INTERPRETATION ([id number]),  YVONNE GARCIA ([id number]) on 4/9/2022 9:42:43 PM             Pertinent Radiology

## 2022-04-10 NOTE — PROGRESS NOTES
[facility] MEDICINE PROGRESS NOTE      Identification/Summary: Ki Pederson is a 67 year old male with a past medical history of severe COPD on chronic oxygen at 5 L, chronic atrial fibrillation, chronic anxiety, chronic hepatitis C who was admitted on 4/9/2022 for COPD exacerbation. Hospital course is notable for patient being on BiPAP initially but now back to his 5 L of nasal cannula oxygen which is baseline and he is overall feeling much improved.     Assessment and Plan:    Acute hypoxic respiratory failure, requiring use of BiPAP, with FIO2 down to 35-40%.   Plan: Now weaned down to 5 L of nasal cannula oxygen     Acute COPD exacerbation, unclear what triggered, but reported coughing for 5 days now. Chest XR -  No definite PNA, but WBC is 14 thousand, and his HR above 90 - consider SIRS, could be bronchitis, possible early PNA.  Have been started on doxycycline a couple of days ago at visit the emergency room  Plan: IV steroids, ceftriaxone and doxycycline, scheduled and as needed nebs and supplemental oxygen with continuous oximetry     Has Hepatitis C - on oral med, looks like has not been started  - check LFTs     Chronic atrial fibrillation   Plan: Continue home meds      History of Anxiety -- pls clarify his meds, pharm indicates he is not taking med not sure for how long  - PRN anti anxiety     Chronic pain issues -  Resume meds      VTE prophylaxis: on xarelto     Diet:   Regular diet    Code Status: Full     COVID test result:  negative     COVID vaccination: completed     Disposition: Inpatient will be here at least a couple nights        Rajeev Hutchinson MD  [facility]  [facility]  Phone: #[phone]    Interval History/Subjective:  Patient reports she is feeling much better.  Currently denies any chest pain or lightheadedness or nausea.  No other concerns or issues.    Physical Exam/Objective:  Temp:  [98  F (36.7   C)-98.7  F (37.1  C)] 98.7  F (37.1  C)  Pulse:  [] 86  Resp:  [11-36] 36  BP: (100-168)/(57-95) 117/63  FiO2 (%):  [35 %-40 %] 36 %  SpO2:  [84 %-100 %] 93 %  Body mass index is 33.36 kg/m .    GENERAL:  Alert, appears comfortable, in no acute distress, appears stated age   HEAD:  Normocephalic, without obvious abnormality, atraumatic   LUNGS:    Diffuse mild wheezes and moderately decreased breath sounds throughout.  No increased respiratory effort   HEART:   Moderately distant, irregularly irregular without murmur     ABDOMEN:   Soft, non-tender   EXTREMITIES: Extremities normal, atraumatic, no cyanosis or edema    SKIN: Dry to touch, no exanthems in the visualized areas   NEURO: Alert, appears cognitively intact, moves all four extremities freely   PSYCH: Cooperative, behavior is appropriate      Data reviewed today: I personally reviewed all new medications, labs, imaging/diagnostics reports over the past 24 hours. Pertinent findings include:    Imaging:   Recent Results (from the past 24 hour(s))   XR Chest Port 1 View    Narrative    EXAM: XR CHEST PORT 1 VIEW  LOCATION: [facility]  DATE/TIME: 4/9/2022 9:06 PM    INDICATION: Dyspnea  COMPARISON: 04/08/2022      Impression    IMPRESSION: Lungs clear. No pneumothorax. Normal heart size and pulmonary vascularity. Tortuous aorta. Postoperative changes cervical spine.       Labs:  Most Recent 3 CBC's:Recent Labs   Lab Test 04/10/22  0605 04/09/22 2041 04/08/22  1432   WBC 11.1* 14.8* 10.9   HGB 12.5* 13.8 14.7   MCV 93 93 91    261 233     Most Recent 3 BMP's:Recent Labs   Lab Test 04/10/22  0605 04/09/22  2041 04/08/22  1432    143 138   POTASSIUM 4.6 4.7 4.7   CHLORIDE 107 103 101   CO2 27 27 27   BUN 27* 26* 21   CR 0.97 1.09 1.06   ANIONGAP 9 13 10   PALMER 8.5 9.3 9.3   * 148* 151*     Most Recent 2 LFT's:Recent Labs   Lab Test 04/10/22  0605 04/08/22  1432   AST 19 30   ALT 38 60*   ALKPHOS 45 63   BILITOTAL  0.3 0.7       Medications:   Personally Reviewed.  Medications     - MEDICATION INSTRUCTIONS -       sodium chloride 75 mL/hr at 04/10/22 1155       amLODIPine  5 mg Oral Daily     cefTRIAXone  2 g Intravenous Q24H     gabapentin  600 mg Oral TID     ipratropium - albuterol 0.5 mg/2.5 mg/3 mL  3 mL Nebulization Q6H     lisinopril  40 mg Oral Daily     magnesium oxide  200 mg Oral At Bedtime     methylPREDNISolone  62.5 mg Intravenous Q8H     rivaroxaban ANTICOAGULANT  20 mg Oral Daily with supper     sodium chloride (PF)  3 mL Intracatheter Q8H

## 2022-04-10 NOTE — ED PROVIDER NOTES
EMERGENCY DEPARTMENT ENCOUNTER      NAME: Ki Pederson  AGE: 67 year old male  YOB: 1955  MRN: [id number]  EVALUATION DATE & TIME: 2022  8:26 PM    PCP: Gloria Sebastian    ED PROVIDER: Dipak Lester D.O.      Chief Complaint   Patient presents with     Shortness of Breath     Chest Pain       FINAL IMPRESSION:  1. COPD exacerbation (H)        ED COURSE & MEDICAL DECISION MAKIN:30 PM I met with the patient to gather history and to perform my initial exam. I discussed the plan for care while in the Emergency Department. Patient is currently on 6L O2 via NC (on 5L O2 at baseline) and we will start him on BiPAP.   8:59 PM I rechecked the patient. He is now on BiPAP and is feeling significantly improved. Has also received Duoneb x2 with relief.   10:00 PM I paged for the hospitalist.  10:15 PM I spoke with Dr. Knight, hospitalist, who accepts the patient for admission.      ED Course as of 22   Sat  Reevaluated the patient.  Now that he is on BiPAP, his respiratory rate and work of breathing has improved.  Tachycardia is also improving       Pertinent Labs & Imaging studies reviewed. (See chart for details)  67 year old male presents to the Emergency Department for evaluation of likely COPD exacerbation.  Patient was seen for the same yesterday, was placed on steroids and antibiotics and sent home.  Here tonight due to worsening symptoms.  He did take 100 mg of prednisone at home prior to coming to the ER, therefore IV steroids were not given here.  He was started on a BiPAP, and given duo nebs through the BiPAP which his symptoms seem to be improving.  His tachycardia significantly improved as his respiratory rate improved.  EKG did not show any evidence of ischemia, first troponin was negative.  Do not believe cardiac etiology or PE are clinically likely on this patient.  Chest x-ray does not show any evidence of pneumonia, however with his COPD we did decide to  cover with Rocephin.  At this time the patient will be admitted for further management.  Discussed with hospitalist agreed to the admission.    At the conclusion of the encounter I discussed the results of all of the tests and the disposition. The questions were answered. The patient or family acknowledged understanding and was agreeable with the care plan.      CRITICAL CARE:  Critical Care  Performed by: JUAN GRIFFITH  Authorized by: JUAN GRIFFITH  Total critical care time: 35 minutes  Critical care time was exclusive of separately billable procedures and treating other patients.  Critical care was necessary to treat or prevent imminent or life-threatening deterioration of the following conditions: COPD exacerbation  Critical care was time spent personally by me on the following activities: development of treatment plan with patient or surrogate, discussions with consultants, examination of patient, evaluation of patient's response to treatment, obtaining history from patient or surrogate, ordering and performing treatments and interventions, ordering and review of laboratory studies, ordering and review of radiographic studies and re-evaluation of patient's condition, this excludes any separately billable procedures.      HPI    Patient information was obtained from: patient    Use of : N/A        Ki Pederson is a 67 year old male with a history of COPD on 5L O2 at baseline, persistent atrial fibrillation anticoagulated on Xarelto, and tobacco use disorder, who presents via private vehicle for evaluation of shortness of breath.    Per chart review, patient presented to [facility] ED yesterday with a two day history of worsening dyspnea in the setting of COPD exacerbation that was not improved with supplemental O2 and nebulizers at home. He received IV Solumedrol 125 mg and Duoneb with significant improvement. O2 sats were WNL on 4L O2 and patient was discharged home on his routine  seven day course of Doxycycline 100 mg BID and five day course of Prednisone 50 mg daily.     Following discharge, patient's dyspnea progressively worsened which prompted his return to the ED. He took two doses of Prednisone (100 mg total) and a Duoneb prior to arrival without relief. Patient notes this feels like his typical COPD exacerbations. Associated symptoms include chronic cough (unchanged from baseline) and mild lightheadedness. He otherwise denies any nausea, vomiting, diarrhea, dysuria, hematuria, fevers, sore throat, or congestion. Of note, patient recently switched his Pulmonologist and is currently adjusting his COPD regimen.       REVIEW OF SYSTEMS  Constitutional:  Denies fever, chills, weight loss or weakness  Eyes:  No pain, discharge, redness  HENT:  Denies sore throat, ear pain, congestion  Respiratory: Positive for dyspnea and chronic cough.   Cardiovascular:  No CP, palpitations  GI:  Denies abdominal pain, nausea, vomiting, diarrhea  : Denies dysuria, hematuria  Musculoskeletal:  Denies any new muscle/joint pain, swelling or loss of function.  Skin:  Denies rash, pallor  Neurologic:  Positive for mild lightheadedness. Denies headache, focal weakness or sensory changes  Lymph: Denies swollen nodes    All other systems negative unless noted in HPI.    PAST MEDICAL HISTORY:  Past Medical History:   Diagnosis Date     Anxiety      Atrial fibrillation (H)      Cerebral infarction (H)      COPD (chronic obstructive pulmonary disease) (H)      Hemangioma     massive hemangioma; left hand     Hypertension      Myocardial infarction (H)      TIA (transient ischemic attack)        PAST SURGICAL HISTORY:  Past Surgical History:   Procedure Laterality Date     CERVICAL FUSION      C6 and C7         CURRENT MEDICATIONS:    No current facility-administered medications for this encounter.     Current Outpatient Medications   Medication     acetaminophen (TYLENOL) 500 MG tablet     albuterol (PROAIR  HFA/PROVENTIL HFA/VENTOLIN HFA) 108 (90 Base) MCG/ACT inhaler     albuterol (PROAIR HFA;PROVENTIL HFA;VENTOLIN HFA) 90 mcg/actuation inhaler     amLODIPine (NORVASC) 5 MG tablet     budesonide (PULMICORT) 1 MG/2ML neb solution     buPROPion (WELLBUTRIN XL) 300 MG 24 hr tablet     citalopram (CELEXA) 20 MG tablet     doxycycline hyclate (VIBRAMYCIN) 100 MG capsule     doxycycline monohydrate (MONODOX) 100 MG capsule     fluticasone-salmeterol (ADVAIR-HFA) 230-21 MCG/ACT inhaler     furosemide (LASIX) 20 MG tablet     gabapentin (NEURONTIN) 300 MG capsule     glecaprevir-pibrentasvir (MAVYRET) 100-40 MG per tablet     ipratropium - albuterol 0.5 mg/2.5 mg/3 mL (DUONEB) 0.5-2.5 (3) MG/3ML neb solution     lisinopril (ZESTRIL) 40 MG tablet     magnesium 250 MG tablet     naloxone (NARCAN) 4 MG/0.1ML nasal spray     oxyCODONE-acetaminophen (PERCOCET) 5-325 MG tablet     predniSONE (DELTASONE) 10 MG tablet     predniSONE (DELTASONE) 50 MG tablet     rivaroxaban ANTICOAGULANT (XARELTO) 20 MG TABS tablet         ALLERGIES:  Allergies   Allergen Reactions     Symbicort Rash       FAMILY HISTORY:  Family History   Problem Relation Age of Onset     Coronary Artery Disease Mother      Diabetes Mother      Coronary Artery Disease Father      Diabetes Sister        SOCIAL HISTORY:  Social History     Socioeconomic History     Marital status:      Spouse name: Not on file     Number of children: Not on file     Years of education: Not on file     Highest education level: Not on file   Occupational History     Not on file   Tobacco Use     Smoking status: Former Smoker     Packs/day: 0.00     Quit date: 5/15/2018     Years since quitting: 3.9     Smokeless tobacco: Current User     Tobacco comment: No passive exposure   Substance and Sexual Activity     Alcohol use: Occasional     Drug use: Not Currently     Sexual activity: Not on file   Other Topics Concern     Parent/sibling w/ CABG, MI or angioplasty before 65F 55M? Not  Asked   Social History Narrative     Not on file     Social Determinants of Health     Financial Resource Strain: Not on file   Food Insecurity: Not on file   Transportation Needs: Not on file   Physical Activity: Not on file   Stress: Not on file   Social Connections: Not on file   Intimate Partner Violence: Not on file   Housing Stability: Not on file       VITALS:  Patient Vitals for the past 24 hrs:   BP Temp Temp src Pulse Resp SpO2 Weight   04/09/22 2200 115/70 -- -- 108 19 98 % --   04/09/22 2145 127/68 -- -- 102 17 99 % --   04/09/22 2142 -- -- -- 110 17 99 % --   04/09/22 2141 -- -- -- 104 11 98 % --   04/09/22 2130 111/76 -- -- 109 12 -- --   04/09/22 2115 130/75 -- -- 110 16 100 % --   04/09/22 2100 135/79 -- -- 108 23 100 % --   04/09/22 2045 (!) 148/83 -- -- 118 30 98 % --   04/09/22 2024 (!) 168/95 98  F (36.7  C) Oral (!) 121 26 94 % 111.6 kg (246 lb)   04/09/22 2023 -- -- -- -- -- (!) 84 % --       PHYSICAL EXAM    VITAL SIGNS: /70   Pulse 108   Temp 98  F (36.7  C) (Oral)   Resp 19   Wt 111.6 kg (246 lb)   SpO2 98%   BMI 33.36 kg/m      General Appearance: Well-appearing, well-nourished, in moderate respiratory distress  Head:  Normocephalic, without obvious abnormality, atraumatic  Eyes:  PERRL, conjunctiva/corneas clear, EOM's intact,  ENT:  Lips, mucosa, and tongue normal, membranes are moist without pallor  Neck:  Normal ROM, symmetrical, trachea midline    Cardio:  Tachycardic with regular rhythm, no murmur, rub or gallop, 2+ pulses symmetric in all extremities  Pulm: Diminished breath sounds diffusely with increased work of breathing.   Abdomen:  Soft, non-tender, no rebound or guarding.  Musculoskeletal: Full ROM, no edema, no cyanosis, good ROM of major joints  Integument:  Warm, Dry, No erythema, No rash.    Neurologic:  Alert & oriented.  No focal deficits appreciated.  Ambulatory.  Psychiatric:  Affect normal, Judgment normal, Mood normal.      LABS  Results for orders placed  or performed during the hospital encounter of 04/09/22 (from the past 24 hour(s))   ECG 12-LEAD WITH MUSE (LHE)   Result Value Ref Range    Systolic Blood Pressure  mmHg    Diastolic Blood Pressure  mmHg    Ventricular Rate 120 BPM    Atrial Rate 120 BPM    MT Interval 152 ms    QRS Duration 92 ms     ms    QTc 438 ms    P Axis 75 degrees    R AXIS 76 degrees    T Axis 61 degrees    Interpretation ECG       Sinus tachycardia  Nonspecific ST abnormality  Abnormal ECG  When compared with ECG of 08-APR-2022 13:43,  No significant change was found  Confirmed by SEE ED PROVIDER NOTE FOR, ECG INTERPRETATION ([id number]),  YVONNE GARCIA ([id number]) on 4/9/2022 9:42:43 PM     [place] Draw *Canceled*    Narrative    The following orders were created for panel order [place] Draw.  Procedure                               Abnormality         Status                     ---------                               -----------         ------                       Please view results for these tests on the individual orders.   [place] Draw    Narrative    The following orders were created for panel order [place] Draw.  Procedure                               Abnormality         Status                     ---------                               -----------         ------                     Extra Blue Top Tube[[id number]]                              Final result               Extra Green Top (Lithium...[[id number]]                      Final result               Extra Purple Top Tube[[id number]]                            Final result                 Please view results for these tests on the individual orders.   Extra Blue Top Tube   Result Value Ref Range    Hold Specimen JIC    Extra Green Top (Lithium Heparin) Tube   Result Value Ref Range    Hold Specimen JIC    Extra Purple Top Tube   Result Value Ref Range    Hold Specimen JIC    CBC with platelets + differential    Narrative    The following orders were created for panel  order CBC with platelets + differential.  Procedure                               Abnormality         Status                     ---------                               -----------         ------                     CBC with platelets and d...[[id number]]  Abnormal            Final result                 Please view results for these tests on the individual orders.   Basic metabolic panel   Result Value Ref Range    Sodium 143 136 - 145 mmol/L    Potassium 4.7 3.5 - 5.0 mmol/L    Chloride 103 98 - 107 mmol/L    Carbon Dioxide (CO2) 27 22 - 31 mmol/L    Anion Gap 13 5 - 18 mmol/L    Urea Nitrogen 26 (H) 8 - 22 mg/dL    Creatinine 1.09 0.70 - 1.30 mg/dL    Calcium 9.3 8.5 - 10.5 mg/dL    Glucose 148 (H) 70 - 125 mg/dL    GFR Estimate 75 >60 mL/min/1.73m2   Troponin I (now)   Result Value Ref Range    Troponin I 0.01 0.00 - 0.29 ng/mL   CBC with platelets and differential   Result Value Ref Range    WBC Count 14.8 (H) 4.0 - 11.0 10e3/uL    RBC Count 4.70 4.40 - 5.90 10e6/uL    Hemoglobin 13.8 13.3 - 17.7 g/dL    Hematocrit 43.6 40.0 - 53.0 %    MCV 93 78 - 100 fL    MCH 29.4 26.5 - 33.0 pg    MCHC 31.7 31.5 - 36.5 g/dL    RDW 13.4 10.0 - 15.0 %    Platelet Count 261 150 - 450 10e3/uL    % Neutrophils 88 %    % Lymphocytes 5 %    % Monocytes 6 %    % Eosinophils 0 %    % Basophils 0 %    % Immature Granulocytes 1 %    NRBCs per 100 WBC 0 <1 /100    Absolute Neutrophils 13.1 (H) 1.6 - 8.3 10e3/uL    Absolute Lymphocytes 0.7 (L) 0.8 - 5.3 10e3/uL    Absolute Monocytes 0.9 0.0 - 1.3 10e3/uL    Absolute Eosinophils 0.0 0.0 - 0.7 10e3/uL    Absolute Basophils 0.0 0.0 - 0.2 10e3/uL    Absolute Immature Granulocytes 0.1 <=0.4 10e3/uL    Absolute NRBCs 0.0 10e3/uL   XR Chest Port 1 View    Narrative    EXAM: XR CHEST PORT 1 VIEW  LOCATION: [facility]  DATE/TIME: 4/9/2022 9:06 PM    INDICATION: Dyspnea  COMPARISON: 04/08/2022      Impression    IMPRESSION: Lungs clear. No pneumothorax. Normal heart size  and pulmonary vascularity. Tortuous aorta. Postoperative changes cervical spine.         RADIOLOGY  XR Chest Port 1 View   Final Result   IMPRESSION: Lungs clear. No pneumothorax. Normal heart size and pulmonary vascularity. Tortuous aorta. Postoperative changes cervical spine.             EKG:    Rate: 120bpm  Rhythm: sinus tachycardia  Axis: Normal  Interval: Normal  Conduction: Normal  QRS: Normal  ST: Normal  T-wave: Normal  QT: Not prolonged  Comparison EKG: no significant change compared to 8 Apr 2022  Impression:  No acute ischemic change   I have independently reviewed and interpreted today's EKG, pending Cardiologist read      MEDICATIONS GIVEN IN THE EMERGENCY:  Medications   ipratropium - albuterol 0.5 mg/2.5 mg/3 mL (DUONEB) neb solution 3 mL (3 mLs Nebulization Given 4/9/22 2053)   ipratropium - albuterol 0.5 mg/2.5 mg/3 mL (DUONEB) neb solution 3 mL (3 mLs Nebulization Given 4/9/22 2046)       NEW PRESCRIPTIONS STARTED AT TODAY'S ER VISIT  New Prescriptions    No medications on file        I, Alysa Lynn, am serving as a scribe to document services personally performed by Dr. Dipak Lester based on my observation and the provider's statements to me. I, Dipak Lester, DO attest that Alysa Lynn is acting in a scribe capacity, has observed my performance of the services and has documented them in accordance with my direction.     Dipak Lester D.O.  Emergency Medicine  [facility] EMERGENCY ROOM  [address] [id number] [phone]  Dept: [phone]      Dipak Lester DO  04/09/22 0678

## 2022-04-10 NOTE — ED NOTES
FiO2 adjusted per Dr. Kngiht. Lowered to 35% and will move to 30% if SpO2 is maintained in high 90s

## 2022-04-10 NOTE — PROGRESS NOTES
04/10/22 1319   Quick Adds   Type of Visit Initial PT Evaluation   Living Environment   People in Home child(katie), adult   Current Living Arrangements house   Home Accessibility stairs to enter home;stairs within home   Number of Stairs, Main Entrance 5   Stair Railings, Main Entrance railings safe and in good condition   Number of Stairs, Within Home, Primary none   Living Environment Comments can stay  on one level, lives with  daughter and her children   Self-Care   Usual Activity Tolerance poor   Equipment Currently Used at Home other (see comments);wheelchair, power  (walking stick)   Fall history within last six months no   Activity/Exercise/Self-Care Comment I with adl's.  primarily house hold ambulator   General Information   Onset of Illness/Injury or Date of Surgery 04/09/22   Patient/Family Therapy Goals Statement (PT) none stated   Pertinent History of Current Problem (include personal factors and/or comorbidities that impact the POC) COPD excacerbation   Cognition   Affect/Mental Status (Cognition) WFL   Posture    Posture Not impaired   Range of Motion (ROM)   Range of Motion ROM is WFL   Strength (Manual Muscle Testing)   Strength (Manual Muscle Testing) No deficits observed during functional mobility   Transfers   Transfers sit-stand transfer   Sit-Stand Transfer   Sit-Stand [place] (Transfers) supervision   Gait/Stairs (Locomotion)   [place] Level (Gait) supervision   Pattern (Gait) swing-through   Balance   Balance no deficits were identified   Sensory Examination   Sensory Perception patient reports no sensory changes   Coordination   Coordination no deficits were identified   Muscle Tone   Muscle Tone no deficits were identified   Clinical Impression   Criteria for Skilled Therapeutic Intervention Yes, treatment indicated   PT Diagnosis (PT) impaired functional mobility   Influenced by the following impairments weakness, pain   Functional limitations due to impairments transfers,  gait   Clinical Presentation (PT Evaluation Complexity) Stable/Uncomplicated   Clinical Presentation Rationale Pt. presents as medically diagnosed   Clinical Decision Making (Complexity) low complexity   Planned Therapy Interventions (PT) gait training;balance training;transfer training;stair training   Risk & Benefits of therapy have been explained evaluation/treatment results reviewed;participants included;patient;daughter   PT Discharge Planning   PT Discharge Recommendation (DC Rec) home with assist   PT Rationale for DC Rec Patient mobilizing well. has good  home setup and support   Total Evaluation Time   Total Evaluation Time (Minutes) 15   Physical Therapy Goals   PT Frequency Daily   PT Predicated Duration/Target Date for Goal Attainment 04/12/22   PT Goals Gait;Stairs;Transfers   PT: Transfers Independent;Sit to/from stand   PT: Gait 100 feet;Independent   PT: Stairs 4 stairs;Rail on right;Supervision/stand-by assist

## 2022-04-10 NOTE — ED TRIAGE NOTES
Presents to the ED with C/O COPD exacerbation   Pt went to [facility] yesterday and got discharged on doxycyline and prednisone  However, SOB and chest pressure is now worse   Normally wears 5L O2 at home   86% RA in triage   Placed on 6L O2 nasal canula  Patient is abdominal breathing and pursed lip breathing

## 2022-04-11 ENCOUNTER — APPOINTMENT (OUTPATIENT)
Dept: PHYSICAL THERAPY | Facility: CLINIC | Age: 67
DRG: 190 | End: 2022-04-11
Payer: MEDICARE

## 2022-04-11 ENCOUNTER — APPOINTMENT (OUTPATIENT)
Dept: OCCUPATIONAL THERAPY | Facility: CLINIC | Age: 67
DRG: 190 | End: 2022-04-11
Payer: MEDICARE

## 2022-04-11 DIAGNOSIS — J44.1 COPD WITH ACUTE EXACERBATION (H): Primary | ICD-10-CM

## 2022-04-11 LAB
ANION GAP SERPL CALCULATED.3IONS-SCNC: 10 MMOL/L (ref 5–18)
ATRIAL RATE - MUSE: 117 BPM
BUN SERPL-MCNC: 26 MG/DL (ref 8–22)
CALCIUM SERPL-MCNC: 8.2 MG/DL (ref 8.5–10.5)
CHLORIDE BLD-SCNC: 108 MMOL/L (ref 98–107)
CO2 SERPL-SCNC: 24 MMOL/L (ref 22–31)
CREAT SERPL-MCNC: 1.06 MG/DL (ref 0.7–1.3)
DIASTOLIC BLOOD PRESSURE - MUSE: NORMAL MMHG
ERYTHROCYTE [DISTWIDTH] IN BLOOD BY AUTOMATED COUNT: 13.5 % (ref 10–15)
GFR SERPL CREATININE-BSD FRML MDRD: 78 ML/MIN/1.73M2
GLUCOSE BLD-MCNC: 198 MG/DL (ref 70–125)
HCT VFR BLD AUTO: 37.2 % (ref 40–53)
HGB BLD-MCNC: 11.4 G/DL (ref 13.3–17.7)
INTERPRETATION ECG - MUSE: NORMAL
LACTATE SERPL-SCNC: 2.9 MMOL/L (ref 0.7–2)
MAGNESIUM SERPL-MCNC: 2.1 MG/DL (ref 1.8–2.6)
MCH RBC QN AUTO: 29.2 PG (ref 26.5–33)
MCHC RBC AUTO-ENTMCNC: 30.6 G/DL (ref 31.5–36.5)
MCV RBC AUTO: 95 FL (ref 78–100)
P AXIS - MUSE: 66 DEGREES
PLATELET # BLD AUTO: 214 10E3/UL (ref 150–450)
POTASSIUM BLD-SCNC: 4.2 MMOL/L (ref 3.5–5)
PR INTERVAL - MUSE: 170 MS
QRS DURATION - MUSE: 86 MS
QT - MUSE: 310 MS
QTC - MUSE: 432 MS
R AXIS - MUSE: 74 DEGREES
RBC # BLD AUTO: 3.9 10E6/UL (ref 4.4–5.9)
SODIUM SERPL-SCNC: 142 MMOL/L (ref 136–145)
SYSTOLIC BLOOD PRESSURE - MUSE: NORMAL MMHG
T AXIS - MUSE: 72 DEGREES
VENTRICULAR RATE- MUSE: 117 BPM
WBC # BLD AUTO: 11.1 10E3/UL (ref 4–11)

## 2022-04-11 PROCEDURE — 83605 ASSAY OF LACTIC ACID: CPT | Performed by: EMERGENCY MEDICINE

## 2022-04-11 PROCEDURE — 250N000012 HC RX MED GY IP 250 OP 636 PS 637: Performed by: INTERNAL MEDICINE

## 2022-04-11 PROCEDURE — 36415 COLL VENOUS BLD VENIPUNCTURE: CPT | Performed by: FAMILY MEDICINE

## 2022-04-11 PROCEDURE — 250N000009 HC RX 250: Performed by: EMERGENCY MEDICINE

## 2022-04-11 PROCEDURE — 97535 SELF CARE MNGMENT TRAINING: CPT | Mod: GO

## 2022-04-11 PROCEDURE — 250N000013 HC RX MED GY IP 250 OP 250 PS 637: Performed by: INTERNAL MEDICINE

## 2022-04-11 PROCEDURE — 94640 AIRWAY INHALATION TREATMENT: CPT | Mod: 76

## 2022-04-11 PROCEDURE — 250N000013 HC RX MED GY IP 250 OP 250 PS 637: Performed by: EMERGENCY MEDICINE

## 2022-04-11 PROCEDURE — 99223 1ST HOSP IP/OBS HIGH 75: CPT | Performed by: INTERNAL MEDICINE

## 2022-04-11 PROCEDURE — 99233 SBSQ HOSP IP/OBS HIGH 50: CPT | Performed by: EMERGENCY MEDICINE

## 2022-04-11 PROCEDURE — 36415 COLL VENOUS BLD VENIPUNCTURE: CPT | Performed by: EMERGENCY MEDICINE

## 2022-04-11 PROCEDURE — 250N000011 HC RX IP 250 OP 636: Performed by: FAMILY MEDICINE

## 2022-04-11 PROCEDURE — 258N000003 HC RX IP 258 OP 636: Performed by: INTERNAL MEDICINE

## 2022-04-11 PROCEDURE — 83735 ASSAY OF MAGNESIUM: CPT | Performed by: INTERNAL MEDICINE

## 2022-04-11 PROCEDURE — 94660 CPAP INITIATION&MGMT: CPT

## 2022-04-11 PROCEDURE — 120N000001 HC R&B MED SURG/OB

## 2022-04-11 PROCEDURE — 250N000011 HC RX IP 250 OP 636: Performed by: INTERNAL MEDICINE

## 2022-04-11 PROCEDURE — 97116 GAIT TRAINING THERAPY: CPT | Mod: GP

## 2022-04-11 PROCEDURE — 80048 BASIC METABOLIC PNL TOTAL CA: CPT | Performed by: FAMILY MEDICINE

## 2022-04-11 PROCEDURE — 97110 THERAPEUTIC EXERCISES: CPT | Mod: GO

## 2022-04-11 PROCEDURE — 999N000157 HC STATISTIC RCP TIME EA 10 MIN

## 2022-04-11 PROCEDURE — 85027 COMPLETE CBC AUTOMATED: CPT | Performed by: FAMILY MEDICINE

## 2022-04-11 PROCEDURE — 250N000009 HC RX 250: Performed by: FAMILY MEDICINE

## 2022-04-11 RX ORDER — OXYCODONE HYDROCHLORIDE 5 MG/1
5 TABLET ORAL ONCE
Status: COMPLETED | OUTPATIENT
Start: 2022-04-11 | End: 2022-04-11

## 2022-04-11 RX ORDER — PREDNISONE 20 MG/1
20 TABLET ORAL DAILY
Status: DISCONTINUED | OUTPATIENT
Start: 2022-01-01 | End: 2022-04-13 | Stop reason: HOSPADM

## 2022-04-11 RX ORDER — ALBUTEROL SULFATE 5 MG/ML
7.5 SOLUTION RESPIRATORY (INHALATION) ONCE
Status: COMPLETED | OUTPATIENT
Start: 2022-04-11 | End: 2022-04-11

## 2022-04-11 RX ORDER — PREDNISONE 20 MG/1
40 TABLET ORAL DAILY
Status: DISCONTINUED | OUTPATIENT
Start: 2022-04-12 | End: 2022-04-13 | Stop reason: HOSPADM

## 2022-04-11 RX ORDER — OXYCODONE AND ACETAMINOPHEN 5; 325 MG/1; MG/1
1 TABLET ORAL ONCE
Status: COMPLETED | OUTPATIENT
Start: 2022-04-11 | End: 2022-04-11

## 2022-04-11 RX ORDER — AZITHROMYCIN 250 MG/1
500 TABLET, FILM COATED ORAL ONCE
Status: DISCONTINUED | OUTPATIENT
Start: 2022-04-11 | End: 2022-04-11

## 2022-04-11 RX ORDER — PREDNISONE 20 MG/1
40 TABLET ORAL DAILY
Status: COMPLETED | OUTPATIENT
Start: 2022-04-11 | End: 2022-04-11

## 2022-04-11 RX ORDER — AZITHROMYCIN 250 MG/1
250 TABLET, FILM COATED ORAL DAILY
Status: DISCONTINUED | OUTPATIENT
Start: 2022-04-12 | End: 2022-04-11

## 2022-04-11 RX ORDER — OXYCODONE AND ACETAMINOPHEN 10; 325 MG/1; MG/1
1 TABLET ORAL ONCE
Status: DISCONTINUED | OUTPATIENT
Start: 2022-04-11 | End: 2022-04-11 | Stop reason: RX

## 2022-04-11 RX ORDER — PREDNISONE 5 MG/1
10 TABLET ORAL DAILY
Status: DISCONTINUED | OUTPATIENT
Start: 2022-01-01 | End: 2022-04-13 | Stop reason: HOSPADM

## 2022-04-11 RX ORDER — DOXYCYCLINE HYCLATE 50 MG/1
100 CAPSULE ORAL EVERY 12 HOURS SCHEDULED
Status: DISCONTINUED | OUTPATIENT
Start: 2022-04-11 | End: 2022-04-13 | Stop reason: HOSPADM

## 2022-04-11 RX ADMIN — OXYCODONE HYDROCHLORIDE 5 MG: 5 TABLET ORAL at 21:52

## 2022-04-11 RX ADMIN — ALBUTEROL SULFATE 7.5 MG: 2.5 SOLUTION RESPIRATORY (INHALATION) at 12:18

## 2022-04-11 RX ADMIN — GABAPENTIN 600 MG: 300 CAPSULE ORAL at 08:37

## 2022-04-11 RX ADMIN — METHYLPREDNISOLONE SODIUM SUCCINATE 62.5 MG: 125 INJECTION, POWDER, FOR SOLUTION INTRAMUSCULAR; INTRAVENOUS at 01:47

## 2022-04-11 RX ADMIN — DOXYCYCLINE HYCLATE 100 MG: 50 CAPSULE ORAL at 21:12

## 2022-04-11 RX ADMIN — Medication 200 MG: at 23:02

## 2022-04-11 RX ADMIN — OXYCODONE AND ACETAMINOPHEN 1 TABLET: 5; 325 TABLET ORAL at 14:45

## 2022-04-11 RX ADMIN — GABAPENTIN 600 MG: 300 CAPSULE ORAL at 21:12

## 2022-04-11 RX ADMIN — DOXYCYCLINE 100 MG: 100 INJECTION, POWDER, LYOPHILIZED, FOR SOLUTION INTRAVENOUS at 10:50

## 2022-04-11 RX ADMIN — METHYLPREDNISOLONE SODIUM SUCCINATE 62.5 MG: 125 INJECTION, POWDER, FOR SOLUTION INTRAMUSCULAR; INTRAVENOUS at 10:49

## 2022-04-11 RX ADMIN — PREDNISONE 40 MG: 20 TABLET ORAL at 21:12

## 2022-04-11 RX ADMIN — LISINOPRIL 40 MG: 40 TABLET ORAL at 08:38

## 2022-04-11 RX ADMIN — CITALOPRAM HYDROBROMIDE 20 MG: 20 TABLET ORAL at 08:38

## 2022-04-11 RX ADMIN — AMLODIPINE BESYLATE 5 MG: 5 TABLET ORAL at 08:38

## 2022-04-11 RX ADMIN — OXYCODONE AND ACETAMINOPHEN 1 TABLET: 5; 325 TABLET ORAL at 08:38

## 2022-04-11 RX ADMIN — IPRATROPIUM BROMIDE AND ALBUTEROL SULFATE 3 ML: 2.5; .5 SOLUTION RESPIRATORY (INHALATION) at 20:19

## 2022-04-11 RX ADMIN — IPRATROPIUM BROMIDE AND ALBUTEROL SULFATE 3 ML: 2.5; .5 SOLUTION RESPIRATORY (INHALATION) at 16:11

## 2022-04-11 RX ADMIN — GABAPENTIN 600 MG: 300 CAPSULE ORAL at 14:43

## 2022-04-11 RX ADMIN — OXYCODONE AND ACETAMINOPHEN 1 TABLET: 5; 325 TABLET ORAL at 21:52

## 2022-04-11 RX ADMIN — SODIUM CHLORIDE: 9 INJECTION, SOLUTION INTRAVENOUS at 05:35

## 2022-04-11 RX ADMIN — IPRATROPIUM BROMIDE AND ALBUTEROL SULFATE 3 ML: 2.5; .5 SOLUTION RESPIRATORY (INHALATION) at 07:40

## 2022-04-11 RX ADMIN — RIVAROXABAN 20 MG: 10 TABLET, FILM COATED ORAL at 17:03

## 2022-04-11 ASSESSMENT — ACTIVITIES OF DAILY LIVING (ADL)
ADLS_ACUITY_SCORE: 7
ADLS_ACUITY_SCORE: 7
ADLS_ACUITY_SCORE: 9
ADLS_ACUITY_SCORE: 8
ADLS_ACUITY_SCORE: 7
ADLS_ACUITY_SCORE: 8
ADLS_ACUITY_SCORE: 8
ADLS_ACUITY_SCORE: 7
ADLS_ACUITY_SCORE: 8
ADLS_ACUITY_SCORE: 8
ADLS_ACUITY_SCORE: 9
ADLS_ACUITY_SCORE: 7
ADLS_ACUITY_SCORE: 8
ADLS_ACUITY_SCORE: 9
ADLS_ACUITY_SCORE: 8
ADLS_ACUITY_SCORE: 7
ADLS_ACUITY_SCORE: 7
ADLS_ACUITY_SCORE: 9
ADLS_ACUITY_SCORE: 7

## 2022-04-11 NOTE — PROGRESS NOTES
Care Management Initial Consult    General Information  Assessment completed with: VM-chart review  Type of CM/SW Visit: Offer D/C Planning    Primary Care Provider verified and updated as needed: Yes     Readmission within the last 30 days:   Yes, same problem      Reason for Consult: discharge planning  Advance Care Planning:   Has health care directive, will have family bring it in         Communication Assessment  Patient's communication style: spoken language (English or Bilingual)    Hearing Difficulty or Deaf: no   Wear Glasses or Blind: yes    Living Environment:   People in home:  adult daughter and her family  Current living Arrangements: house      Able to return to prior arrangements: yes       Family/Social Support:  Care provided by: self  Provides care for: no one  Marital Status:   Children            Description of Support System: Supportive, Involved    Support Assessment: Adequate social supports    Current Resources:   Patient receiving home care services: No     Community Resources: None  Equipment currently used at home: cane, straight  Supplies currently used at home:  none    Employment/Financial:  Employment Status: retired     Employment/ Comments: n/a  Financial Concerns: No concerns identified      Functional Status:  Prior to admission patient needed assistance:   Dependent ADLs:: Independent          Mental Health Status:  Mental Health Status: No Current Concerns       Chemical Dependency Status:  Chemical Dependency Status: Past Concern             Values/Beliefs:  Spiritual, Cultural Beliefs, Church Practices, Values that affect care: no               Additional Information:  Chart reviewed. CM met with patient. He lives with his daughter and her family. Patient states he is independent with most ADL's but daughter does assist as needed. Patient is on home 02. Patient uses a cane. Denies having home care prior to admission but anticipates having home care needs at  discharge. Patient states that Palliative Care will see him. Consult ordered. CM will contiue to follow.     Jazmin Marina RN

## 2022-04-11 NOTE — PLAN OF CARE
Vss other than BP's which have been elevated in the 140's and HR which has been  intermittently tachy. Per pt, at baseline he wears 5L O2 via NC. He is currently on 4L O2 via NC and sating in the low 90's at rest. Transitioning from IV to PO steroids and abx. Lactic acid is coming down from 3.1 to 2.9. Will continue to monitor.

## 2022-04-11 NOTE — CONSULTS
PULMONARY MEDICINE CONSULT  4/11/2022    Admit Date: 4/9/2022  CODE: Full Code    Reason for Consult: acute on chronic respiratory failure with hypoxia, acute exacerbation of COPD    Assessment/Plan:   65 year old male with a history of tobacco smoking in remission, IVDU in remission, ongoing chewing tobacco dependence, hepatitis C without cirrhosis, severe COPD, colon polyps, TIA, HTN, atrial fibrillation with RVR, C-spine surgery, depression, RLS, seizure disorder, lumbar disc disease, atrial fibrillation with RVR, hand hemangiomas requiring surgical removal, vaccinated against COVID-19, hospitalized for 9 nights in with COVID-19 infection and AECOPD in [place] in October 2021, now admitted on 4/8 with AECOPD.    Acute exacerbation of COPD: Patient known to me from pulmonary clinic, last visit with him in October 2020 via telephone after which he was lost to follow-up. Since moved up [place] for a while, hospitalized in [place] in October 2021 with COVID-19 infection (fortunately was previously vaccinated) resulting in AECOPD, was there for 9 nights. Was enrolled in hospice for a while for severe COPD, since disenrolled. Baseline severe COPD with increased work of breathing, oxygen-dependent. Still chews tobacco; again encouraged him to quit. Not smoking. Lactic acidosis may be from frequent albuterol nebs.    Plan:  - appears close to baseline; I know him well  - titrate oxygen to goal SpO2 88-92%; turned him down to 4 L/min  - change to prednisone taper starting tomorrow  - supposed to be on azithromycin MWF but lost to f/u x 1.5 yeare  - continue budesonide 1 mg nebulized BID on discharge  - would prescribe nebulized ipratropium-albuterol QID on discharge  - should be on azithromycin 250 mg every Monday, Wednesday, and Friday per my recommendations in late 2020  - of note, he is not on the regimen I had him on in late 2020; unclear what changed with insurance, providers, etc in the interim  - referral to pulmonary  rehabilitation at [facility]  - again advised him to stop chewing tobacco  - continue to address goals of care; would not do well with invasive mechanical ventilation  - agree with palliative care consultation; he was previously enrolled in hospice and disenrolled  - I was trying to get him on home BiPAP when I last talked to him in late 2020; harder to accomplish on discharge from hospital  - needs pulmonary outpatient follow-up; lost to follow-up for 1.5 years; I sent note to pulmonary clinic to get follow-up visit with me in 6-8 weeks  - close to baseline; should be okay to discharge in 1-2 days  - will follow with you while inpatient    Luis Rivera MD  Pulmonary and Critical Care Medicine  [facility]  Office [phone]  Pager [phone]  (he/him/his)                                                                                                                                                        HPI:   CCx: acute exacerbation of COPD    HPI: 65 year old male with a history of tobacco smoking in remission, IVDU in remission, ongoing chewing tobacco dependence, hepatitis C without cirrhosis, severe COPD, colon polyps, TIA, HTN, atrial fibrillation with RVR, C-spine surgery, depression, RLS, seizure disorder, lumbar disc disease, atrial fibrillation with RVR, hand hemangiomas requiring surgical removal, vaccinated against COVID-19, hospitalized for 9 nights in with COVID-19 infection and AECOPD in [place] in October 2021, now admitted on 4/8 with AECOPD. Patient known to me from pulmonary clinic, last visit with him in October 2020 via telephone after which he was lost to follow-up. Since moved up [place] for a while, hospitalized in [place] in October 2021 with COVID-19 infection (fortunately was previously vaccinated) resulting in AECOPD, was there for 9 nights. Was enrolled in hospice for a while for severe COPD, since disenrolled. Baseline severe COPD with increased work of breathing,  oxygen-dependent. Still chews tobacco; again encouraged him to quit. Not smoking. Lactic acidosis may be from frequent albuterol nebs.                                                                                                                                                    Past Medical History:  Past Medical History:   Diagnosis Date     Anxiety      Atrial fibrillation (H)      Cerebral infarction (H)      COPD (chronic obstructive pulmonary disease) (H)      Hemangioma     massive hemangioma; left hand     Hypertension      Myocardial infarction (H)      TIA (transient ischemic attack)        Past Surgical History  Past Surgical History:   Procedure Laterality Date     CERVICAL FUSION      C6 and C7       Allergies:  Allergies   Allergen Reactions     Symbicort Rash       PTA medications:  Medications Prior to Admission   Medication Sig Dispense Refill Last Dose     acetaminophen (TYLENOL) 500 MG tablet Take 1-2 tablets (500-1,000 mg) by mouth every 4 hours as needed for fever 100 tablet 0      albuterol (PROAIR HFA/PROVENTIL HFA/VENTOLIN HFA) 108 (90 Base) MCG/ACT inhaler Inhale 2 puffs into the lungs every 6 hours (Patient taking differently: Inhale 2 puffs into the lungs every 6 hours as needed for shortness of breath / dyspnea ) 18 g 11 4/9/2022     amLODIPine (NORVASC) 5 MG tablet Take 1 tablet (5 mg) by mouth daily 60 tablet 1 4/9/2022     citalopram (CELEXA) 20 MG tablet [CITALOPRAM (CELEXA) 20 MG TABLET] TAKE 1 TABLET (20 MG TOTAL) BY MOUTH DAILY. (Patient taking differently: Take 20 mg by mouth daily [CITALOPRAM (CELEXA) 20 MG TABLET] TAKE 1 TABLET (20 MG TOTAL) BY MOUTH DAILY.) 90 tablet 3 4/9/2022     doxycycline hyclate (VIBRAMYCIN) 100 MG capsule Take 1 capsule (100 mg) by mouth 2 times daily for 7 days 14 capsule 0 4/9/2022 at am     furosemide (LASIX) 20 MG tablet Take 1 tablet (20 mg) by mouth daily (Patient taking differently: Take 20 mg by mouth as needed ) 60 tablet 0      gabapentin  (NEURONTIN) 300 MG capsule Take 2 capsules (600 mg) by mouth 3 times daily 540 capsule 3 4/9/2022 at x 2 doses     ipratropium - albuterol 0.5 mg/2.5 mg/3 mL (DUONEB) 0.5-2.5 (3) MG/3ML neb solution Take 1 vial (3 mLs) by nebulization every 6 hours as needed for shortness of breath / dyspnea or wheezing (Patient taking differently: Take 1 vial by nebulization every 4 hours as needed for shortness of breath / dyspnea or wheezing ) 90 mL 3      lisinopril (ZESTRIL) 40 MG tablet [LISINOPRIL (PRINIVIL,ZESTRIL) 40 MG TABLET] TAKE 1 TABLET BY MOUTH DAILY. 90 tablet 1 4/9/2022     magnesium 250 MG tablet Take 1 tablet (250 mg) by mouth At Bedtime 90 tablet 3 4/8/2022     oxyCODONE-acetaminophen (PERCOCET) 5-325 MG tablet Take 1 tablet by mouth 2 times daily as needed for severe pain 60 tablet 0 4/9/2022 at am     predniSONE (DELTASONE) 10 MG tablet Take 1 tablet (10 mg) by mouth See Admin Instructions take 4 tabs daily x 4 days, then 3 tabs daily x 4 days, then 2 tabs daily x 4 days, then 1 tab daily x 4 days.. 40 tablet 5 Unknown at Unknown time     predniSONE (DELTASONE) 50 MG tablet Take 1 tablet (50 mg) by mouth daily for 5 days 5 tablet 0 4/9/2022 at am     rivaroxaban ANTICOAGULANT (XARELTO) 20 MG TABS tablet Take 1 tablet (20 mg) by mouth daily (with dinner) 90 tablet 3 4/9/2022 at pm     budesonide (PULMICORT) 1 MG/2ML neb solution Take 2 mLs (1 mg) by nebulization 2 times daily (Patient not taking: Reported on 4/9/2022) 2 mL 3 Not Taking at Unknown time     glecaprevir-pibrentasvir (MAVYRET) 100-40 MG per tablet Take 3 tablets by mouth daily (with breakfast) 90 tablet 1 hasn't filled yet     naloxone (NARCAN) 4 MG/0.1ML nasal spray Spray 1 spray (4 mg) into one nostril alternating nostrils once as needed for opioid reversal every 2-3 minutes until assistance arrives 0.2 mL 0        Family Hx:  Family History   Problem Relation Age of Onset     Coronary Artery Disease Mother      Diabetes Mother      Coronary  Artery Disease Father      Diabetes Sister        Social Hx:  Social History     Socioeconomic History     Marital status:      Spouse name: Not on file     Number of children: Not on file     Years of education: Not on file     Highest education level: Not on file   Occupational History     Not on file   Tobacco Use     Smoking status: Former Smoker     Packs/day: 0.00     Quit date: 5/15/2018     Years since quitting: 3.9     Smokeless tobacco: Current User     Tobacco comment: No passive exposure   Substance and Sexual Activity     Alcohol use: Not Currently     Drug use: Not Currently     Sexual activity: Not on file   Other Topics Concern     Parent/sibling w/ CABG, MI or angioplasty before 65F 55M? Not Asked   Social History Narrative     Not on file     Social Determinants of Health     Financial Resource Strain: Not on file   Food Insecurity: Not on file   Transportation Needs: Not on file   Physical Activity: Not on file   Stress: Not on file   Social Connections: Not on file   Intimate Partner Violence: Not on file   Housing Stability: Not on file       Exam/Data:     Vitals  /80 (BP Location: Right arm)   Pulse 78   Temp 98.1  F (36.7  C) (Oral)   Resp 20   Ht 1.829 m (6')   Wt 109.9 kg (242 lb 4.8 oz)   SpO2 92%   BMI 32.86 kg/m       I/O last 3 completed shifts:  In: 3680 [P.O.:2500; I.V.:1180]  Out: 680 [Urine:680]  Weight change: -3.175 kg (-7 lb)  [unfilled]  EXAM:  /80 (BP Location: Right arm)   Pulse 78   Temp 98.1  F (36.7  C) (Oral)   Resp 20   Ht 1.829 m (6')   Wt 109.9 kg (242 lb 4.8 oz)   SpO2 92%   BMI 32.86 kg/m      Intake/Output last 3 shifts:  I/O last 3 completed shifts:  In: 3680 [P.O.:2500; I.V.:1180]  Out: 680 [Urine:680]  Intake/Output this shift:  I/O this shift:  In: 1665 [P.O.:1440; I.V.:225]  Out: -     Physical Exam  Gen: alert, work of breathing at baseline  HEENT: NT, no ALESSANDRA  CV: RRR, no m/g/r  Resp: markedly diminished bilaterally with  prolonged expiratory phase, work of breathing increased at rest, this is patient's baseline  Abd: soft, nontender, BS+  Skin: no rashes or lesions  Ext: trace edema  Neuro: PERRL, nonfocal exam    ROS: A complete 10-system review of systems was obtained and is negative with the exception of what is noted in the history of present illness.    Medications:       - MEDICATION INSTRUCTIONS -       sodium chloride 75 mL/hr at 04/11/22 0840       amLODIPine  5 mg Oral Daily     cefTRIAXone  2 g Intravenous Q24H     citalopram  20 mg Oral Daily     doxycycline (VIBRAMYCIN) IV  100 mg Intravenous Q12H     gabapentin  600 mg Oral TID     ipratropium - albuterol 0.5 mg/2.5 mg/3 mL  3 mL Nebulization 4x daily     lisinopril  40 mg Oral Daily     magnesium oxide  200 mg Oral At Bedtime     methylPREDNISolone  62.5 mg Intravenous Q8H     rivaroxaban ANTICOAGULANT  20 mg Oral Daily with supper     sodium chloride (PF)  3 mL Intracatheter Q8H         DATA  All laboratory and radiology has been personally reviewed by myself today.  Recent Labs   Lab 04/11/22  0445   WBC 11.1*   HGB 11.4*   HCT 37.2*        Recent Labs   Lab 04/11/22  0445 04/10/22  0605 04/09/22  2041 04/08/22  1432    143 143 138   CO2 24 27 27 27   BUN 26* 27* 26* 21   ALKPHOS  --  45  --  63   ALT  --  38  --  60*   AST  --  19  --  30       PFT DATA (July 2019):  FEV1/FVC is 0.46 and is reduced.  FEV1 is 34% predicted and is reduced.  FVC is 57% predicted and is reduced.  There was improvement in spirometry after a single inhaled dose of bronchodilator.  TLC is 120% predicted and is normal.  RV is 277% predicted and is increased.  DLCO is 55% predicted and is reduced when it   is corrected for hemoglobin.  The flow volume loop shows obstructive morphology.     Impression:  Full Pulmonary Function Test is abnormal. Spirometry is consistent with very severe obstructive ventilatory defect.  Spirometry is consistent with reversibility.  There is no  hyperinflation.  There is air-trapping.  Diffusion capacity when corrected for hemoglobin is moderately reduced.    IMAGING:   CXR (4/9/22):  - images directly reviewed, formal interpretation follows:  IMPRESSION: Lungs clear. No pneumothorax. Normal heart size and pulmonary vascularity. Tortuous aorta. Postoperative changes cervical spine.

## 2022-04-11 NOTE — TELEPHONE ENCOUNTER
Please see daughter's inquiry about patient taking coricidin HBP and if it interferes with any medications patient is currently taking.    Thank you!    Reva HEMPHILL RN

## 2022-04-11 NOTE — PROGRESS NOTES
[facility] MEDICINE PROGRESS NOTE      SUMMARY:  67 year old male with a longstanding history of severe O2 (5 liters) dependent COPD, hepatitis C, chronic atrial fibrillation, anxiety here due to dyspnea.  Pt normally needs almost bimonthly steroid tapers and antibiotics due to recurrent flares of the copd.      Pt is admitted with an exacerbation of COPD requiring BiPap on admission.         Assessment and Plan:    #severe COPD:  pts baseline is 5 liters home O2                              Continue steroids solumedrol 62.5 mg iv 3x daily                              Nebulizers,                               antibiotics though                               No evidence of pneumonia on XR                              Consult pulmonary to optimize outpatient meds                               (re long acting anticholinergics, beta agonists)                               Pt wishes for palliative care consult regarding                               Ongoing home needs                               Had a lactic of 3.1 will recheck though not supicious                               For sepsis    #Hepatitis C, chronic: reconcile home meds; LFT's stable  #chronic atrial fibrillation:  Sinus tachycardia on admission                                            Continue xarelto  #social history:  Pt lives with daughter, no spouse, many children; from                            [facility]    Clinically Significant Risk Factors Present on Admission             Diet: Regular Diet Adult  DVT Prophylaxis:  DOAC  Code Status: Full Code    Anticipated possible discharge in 2 days once stable with meds, steroids                                                         And meds    Disposition Plan   Expected Discharge: 04/13/2022     Anticipated discharge location:  Awaiting care coordination huddle  Delays:            The patient's care was discussed with the Patient.    Raiza Mackay  MD  [facility]  Phone: #[phone]    Interval History/Subjective:  I feel better; I am tired of this. Denies chest pain. Appears dyspneic; speaks  Full sentences.     Physical Exam/Objective:  Temp:  [97.9  F (36.6  C)-98.6  F (37  C)] 98.1  F (36.7  C)  Pulse:  [] 78  Resp:  [20-22] 20  BP: (132-154)/(73-82) 134/80  SpO2:  [91 %-97 %] 92 %  Body mass index is 32.86 kg/m .    GENERAL:  Alert, appears comfortable, in no acute distress, appears stated age   HEAD:  Normocephalic, without obvious abnormality, atraumatic   EYES:  PERRL, conjunctiva/corneas clear, no scleral icterus, EOM's intact   NOSE: Nares normal, septum midline, mucosa normal, no drainage   THROAT: Lips, mucosa, and tongue normal; teeth and gums normal, mouth moist   NECK: Supple, symmetrical, trachea midline   BACK:   Symmetric, no curvature, ROM normal   LUNGS:   Decreased lung sounds bilaterally, faint wheezing, no retractions   CHEST WALL:  No tenderness or deformity   HEART:  Regular rate and rhythm, S1 and S2 normal, no murmur, rub, or gallop    ABDOMEN:   Soft, non-tender, bowel sounds active all four quadrants, no masses, no organomegaly, no rebound or guarding   EXTREMITIES: Extremities normal, atraumatic, no cyanosis or edema    SKIN: Dry to touch, no exanthems in the visualized areas   NEURO: Alert, oriented x3, moves all four extremities freely   PSYCH: Cooperative, behavior is appropriate      Data reviewed today: I personally reviewed all new medications, labs, imaging/diagnostics reports over the past 24 hours. Pertinent findings include:    Imaging:   No results found for this or any previous visit (from the past 24 hour(s)).    Labs:  Most Recent 3 BMP's:Recent Labs   Lab Test 04/11/22  0445 04/10/22  0605 04/09/22  2041    143 143   POTASSIUM 4.2 4.6 4.7   CHLORIDE 108* 107 103   CO2 24 27 27   BUN 26* 27* 26*   CR 1.06 0.97 1.09   ANIONGAP 10 9 13   PALMER 8.2*  8.5 9.3   * 175* 148*       Medications:   Personally Reviewed.  Medications     - MEDICATION INSTRUCTIONS -       sodium chloride 75 mL/hr at 04/11/22 0840       amLODIPine  5 mg Oral Daily     cefTRIAXone  2 g Intravenous Q24H     citalopram  20 mg Oral Daily     doxycycline (VIBRAMYCIN) IV  100 mg Intravenous Q12H     gabapentin  600 mg Oral TID     ipratropium - albuterol 0.5 mg/2.5 mg/3 mL  3 mL Nebulization 4x daily     lisinopril  40 mg Oral Daily     magnesium oxide  200 mg Oral At Bedtime     methylPREDNISolone  62.5 mg Intravenous Q8H     rivaroxaban ANTICOAGULANT  20 mg Oral Daily with supper     sodium chloride (PF)  3 mL Intracatheter Q8H

## 2022-04-11 NOTE — PROGRESS NOTES
Paged regarding lactic  Will give IVF, do not see CHF noted in prog note.  Sounds like pt is doing better. HR increased in the past few hours  Check EKG, UA. Will go see pt.

## 2022-04-11 NOTE — PLAN OF CARE
Goal Outcome Evaluation:    Plan of Care Reviewed With: patient, son     Overall Patient Progress: no change    Outcome Evaluation: A/O. On 5L O2 via NC. Critical Lactic Acid of 4.2. Given 500cc Bolus and improved to 3.1. On Telemetry, Sinus Tach. Received IV ABX. NS @ 75ml/hr.  Received PRN Percocet for Chronic pain. Sputum sample ordered, but cough not productive at this time.

## 2022-04-11 NOTE — PROGRESS NOTES
Physical Therapy Discharge Summary    Reason for therapy discharge:    No further expectations of functional progress.    Progress towards therapy goal(s). See goals on Care Plan in [facility] electronic health record for goal details.  Goals partially met.  Barriers to achieving goals:   limited tolerance for therapy.    Therapy recommendation(s):    Continue home exercise program.

## 2022-04-11 NOTE — PLAN OF CARE
Goal Outcome Evaluation:     Almost feels back to baseline. Per Ki, he wears 5L of O2 continuously at home - he has been on 5L throughout the day. Would be able to be up independently in the room if it weren't for his IV tubing, pulse oximetry cord and oxygen tubing. Continues on IV solumedrol but will transition to oral steroids in the AM. MD also changed IV antibiotics to oral this afternoon. Rechecking lactic acid level - results pending.

## 2022-04-11 NOTE — PROGRESS NOTES
Reassuring clinical exam.   Family member present to help provide history. Patient breathing at his baseline. Patient feels improved over presentation. Circulatory exam performed, though he just got a neb. Given History of orthopnea, I ve decrease the previously ordered fluid bolus to 500cc. Review of systems is negative for new sort of infection, complete does not appear bacteremic or septic

## 2022-04-11 NOTE — PLAN OF CARE
Problem: Respiratory Compromise COPD (Chronic Obstructive Pulmonary Disease)  Goal: Effective Oxygenation and Ventilation  Outcome: Ongoing, Progressing  Intervention: Promote Airway Secretion Clearance  Recent Flowsheet Documentation  Taken 4/11/2022 0344 by Justin Murphy, RN  Activity Management: activity adjusted per tolerance  Taken 4/11/2022 0010 by Justin Murphy, RN  Activity Management: activity adjusted per tolerance   Goal Outcome Evaluation:        Pt sating 95% on 5L O2 via NC. RT set pt up on BiPap 35% tonight sating 95%  Lung sounds are diminished with expiratory wheezes noted. Pt gets very SOB with activity. Pt had productive cough at home - instructed pt to put a sample in specimen cup at bedside when he is productive again so we can send a sample to lab.   Continue with IV abx, nebs, IV solu-medrol.   Pt on tele - NSR.        Problem: Infection COPD (Chronic Obstructive Pulmonary Disease)  Goal: Absence of Infection Signs and Symptoms  Outcome: Ongoing, Progressing     Pt afebrile. Lactic high on evening - IV bolus was given.   Continue with IV fluids.

## 2022-04-11 NOTE — PROGRESS NOTES
RESPIRATORY CARE NOTE     Patient Name: Ki Pederson  Today's Date: 4/11/2022       Pt continues to receive duoneb. BS are diminished. Pt is on 5 lpm of oxygen via NC, SpO2 is 95%. Post treatment there is increased aeration. Pt also perceives improvement.  RT encouraged deep breathing and coughing techniques .  RT will continue to monitor and assess.     Sophie Hankins, RT

## 2022-04-11 NOTE — PLAN OF CARE
Problem: Obstructive Sleep Apnea Risk or Actual  Goal: Unobstructed Breathing During Sleep  4/10/2022 1945 by Cara Saucedo, RT  Outcome: Ongoing, Progressing  4/10/2022 1945 by Cara Saucedo, RT  Outcome: Ongoing, Progressing   Goal Outcome Evaluation:        /82 (BP Location: Right arm)   Pulse 95   Temp 97.9  F (36.6  C) (Oral)   Resp 22   Ht 1.829 m (6')   Wt 108.4 kg (239 lb)   SpO2 95%   BMI 32.41 kg/m

## 2022-04-12 PROCEDURE — 94640 AIRWAY INHALATION TREATMENT: CPT

## 2022-04-12 PROCEDURE — 250N000013 HC RX MED GY IP 250 OP 250 PS 637: Performed by: INTERNAL MEDICINE

## 2022-04-12 PROCEDURE — 99232 SBSQ HOSP IP/OBS MODERATE 35: CPT | Performed by: EMERGENCY MEDICINE

## 2022-04-12 PROCEDURE — G0463 HOSPITAL OUTPT CLINIC VISIT: HCPCS

## 2022-04-12 PROCEDURE — 94640 AIRWAY INHALATION TREATMENT: CPT | Mod: 76

## 2022-04-12 PROCEDURE — 250N000013 HC RX MED GY IP 250 OP 250 PS 637: Performed by: EMERGENCY MEDICINE

## 2022-04-12 PROCEDURE — 999N000032 HC STATISTIC CHRONIC DISEASE SPECIALIST RT CONSULT

## 2022-04-12 PROCEDURE — 250N000009 HC RX 250: Performed by: EMERGENCY MEDICINE

## 2022-04-12 PROCEDURE — 99222 1ST HOSP IP/OBS MODERATE 55: CPT | Performed by: CLINICAL NURSE SPECIALIST

## 2022-04-12 PROCEDURE — 250N000009 HC RX 250: Performed by: FAMILY MEDICINE

## 2022-04-12 PROCEDURE — 250N000012 HC RX MED GY IP 250 OP 636 PS 637: Performed by: INTERNAL MEDICINE

## 2022-04-12 PROCEDURE — 94660 CPAP INITIATION&MGMT: CPT

## 2022-04-12 PROCEDURE — 999N000033 HC STATISTIC CHRONIC PULMONARY DISEASE SPECIALIST

## 2022-04-12 PROCEDURE — 99233 SBSQ HOSP IP/OBS HIGH 50: CPT | Performed by: INTERNAL MEDICINE

## 2022-04-12 PROCEDURE — 120N000001 HC R&B MED SURG/OB

## 2022-04-12 RX ORDER — ACETAMINOPHEN 500 MG
500-1000 TABLET ORAL EVERY 4 HOURS PRN
Status: DISCONTINUED | OUTPATIENT
Start: 2022-04-12 | End: 2022-04-13 | Stop reason: HOSPADM

## 2022-04-12 RX ORDER — FUROSEMIDE 20 MG
20 TABLET ORAL DAILY
Status: DISCONTINUED | OUTPATIENT
Start: 2022-04-12 | End: 2022-04-13 | Stop reason: HOSPADM

## 2022-04-12 RX ORDER — CITALOPRAM HYDROBROMIDE 20 MG/1
20 TABLET ORAL DAILY
Status: DISCONTINUED | OUTPATIENT
Start: 2022-04-12 | End: 2022-04-12

## 2022-04-12 RX ORDER — BUDESONIDE 0.5 MG/2ML
1 INHALANT ORAL 2 TIMES DAILY
Status: DISCONTINUED | OUTPATIENT
Start: 2022-04-12 | End: 2022-04-13 | Stop reason: HOSPADM

## 2022-04-12 RX ORDER — DOXYCYCLINE HYCLATE 50 MG/1
100 CAPSULE ORAL 2 TIMES DAILY
Status: DISCONTINUED | OUTPATIENT
Start: 2022-04-12 | End: 2022-04-12

## 2022-04-12 RX ORDER — AZITHROMYCIN 250 MG/1
250 TABLET, FILM COATED ORAL
Status: DISCONTINUED | OUTPATIENT
Start: 2022-01-01 | End: 2022-04-13 | Stop reason: HOSPADM

## 2022-04-12 RX ADMIN — Medication 200 MG: at 21:13

## 2022-04-12 RX ADMIN — OXYCODONE AND ACETAMINOPHEN 1 TABLET: 5; 325 TABLET ORAL at 08:08

## 2022-04-12 RX ADMIN — LISINOPRIL 40 MG: 40 TABLET ORAL at 08:01

## 2022-04-12 RX ADMIN — IPRATROPIUM BROMIDE AND ALBUTEROL SULFATE 3 ML: 2.5; .5 SOLUTION RESPIRATORY (INHALATION) at 11:50

## 2022-04-12 RX ADMIN — GABAPENTIN 600 MG: 300 CAPSULE ORAL at 07:59

## 2022-04-12 RX ADMIN — IPRATROPIUM BROMIDE AND ALBUTEROL SULFATE 3 ML: 2.5; .5 SOLUTION RESPIRATORY (INHALATION) at 16:13

## 2022-04-12 RX ADMIN — PREDNISONE 40 MG: 20 TABLET ORAL at 08:02

## 2022-04-12 RX ADMIN — OXYCODONE AND ACETAMINOPHEN 1 TABLET: 5; 325 TABLET ORAL at 15:29

## 2022-04-12 RX ADMIN — FUROSEMIDE 20 MG: 20 TABLET ORAL at 13:14

## 2022-04-12 RX ADMIN — DOXYCYCLINE HYCLATE 100 MG: 50 CAPSULE ORAL at 21:13

## 2022-04-12 RX ADMIN — BUDESONIDE 1 MG: 0.5 INHALANT ORAL at 20:40

## 2022-04-12 RX ADMIN — RIVAROXABAN 20 MG: 10 TABLET, FILM COATED ORAL at 18:35

## 2022-04-12 RX ADMIN — AMLODIPINE BESYLATE 5 MG: 5 TABLET ORAL at 08:01

## 2022-04-12 RX ADMIN — GABAPENTIN 600 MG: 300 CAPSULE ORAL at 21:13

## 2022-04-12 RX ADMIN — DOXYCYCLINE HYCLATE 100 MG: 50 CAPSULE ORAL at 08:00

## 2022-04-12 RX ADMIN — IPRATROPIUM BROMIDE AND ALBUTEROL SULFATE 3 ML: 2.5; .5 SOLUTION RESPIRATORY (INHALATION) at 07:44

## 2022-04-12 RX ADMIN — GABAPENTIN 600 MG: 300 CAPSULE ORAL at 13:14

## 2022-04-12 RX ADMIN — IPRATROPIUM BROMIDE AND ALBUTEROL SULFATE 3 ML: 2.5; .5 SOLUTION RESPIRATORY (INHALATION) at 19:35

## 2022-04-12 RX ADMIN — CITALOPRAM HYDROBROMIDE 20 MG: 20 TABLET ORAL at 08:00

## 2022-04-12 ASSESSMENT — ACTIVITIES OF DAILY LIVING (ADL)
ADLS_ACUITY_SCORE: 5
ADLS_ACUITY_SCORE: 7
ADLS_ACUITY_SCORE: 7
ADLS_ACUITY_SCORE: 5
ADLS_ACUITY_SCORE: 7
ADLS_ACUITY_SCORE: 7
ADLS_ACUITY_SCORE: 5
ADLS_ACUITY_SCORE: 7
ADLS_ACUITY_SCORE: 5
ADLS_ACUITY_SCORE: 7
ADLS_ACUITY_SCORE: 5
ADLS_ACUITY_SCORE: 7
ADLS_ACUITY_SCORE: 7

## 2022-04-12 NOTE — PLAN OF CARE
Goal Outcome Evaluation:             Problem: Functional Ability Impaired COPD (Chronic Obstructive Pulmonary Disease)  Goal: Optimal Level of Functional [place]  Outcome: Ongoing, Progressing          Morning /79, Amlodipine administered. Patient stated pain was 6/10, oxycodone administered by RN. Checked later and pain was 3/10. Diminished breath sounds and dyspnea with exertion, patient on 4 L O2 nasal canula. Patient calm and independent. BP still high after Amlodipine at 167/93, Furosemide given BP then 140/83.

## 2022-04-12 NOTE — PROGRESS NOTES
[facility] MEDICINE PROGRESS NOTE      SUMMARY:  67 year old male admitted on the 9th for recurrent dyspnea.    Pts backround includes ongoing severe 02 dependent COPD (4-5 liters), with frequent exacerbations, chronic hepatitis C, HTN and atrial fibrillation with anticoagulation, chronic narcotic use, low back pain.  Pt struggles with almost bimonthly flares of the copd requiring   Steroids.  He was in the er, went home then returned due to dyspnea.  On admission he required aggressive support including bipap. He has since improved  Pulmonary has been consulted.  Pt is a long term patient with Dr Rivera who states he has severe difficult to control copd.  Pt was lost to follow up with Dr Rivera.  He is not on long acting meds for the copd.    Goals of care during this time is getting patient back to baseline, discussion with palliative care for ongoing goals of care, pulmonary rehab and possible pm bipap  For pm symptom relief.       Assessment and Plan:      #Severe COPD:  Appreciate pulomonary input                               Will restart budesonide; azithromycin 3 times weekly                               For anti-inflammatory effect; bipap possible                              Appreciate Pulmonary rehab Input         IV access is out today; will not replace; continue prednisone                           #HTN:  Continue oral meds  #Atrial fibrillation:  Continue the xarelto  #Back pain:  Pt requests increase in his oxycodone use; I suspect                       This is ok; will move forward after PC input  #Social issues;  Palliative care consult pending; pt is honest and accepting                             That he has severe ongoing COPD with frequent flares                             Plan for discharge when input received; he lives with                             Daughter; will need shower chair on discharge  Clinically Significant Risk Factors Present on Admission                 Diet: Regular Diet Adult  DVT Prophylaxis:  DOAC  Code Status: Full Code    Anticipated possible discharge in 1 days to palliative care gives input  Disposition Plan   Expected Discharge: 04/14/2022     Anticipated discharge location:  Awaiting care coordination huddle  Delays:  none        The patient's care was discussed with the Patient.    Raiza Mackay MD  [facility] Medicine  [facility]  Phone: #[phone]    Interval History/Subjective:  Above; lost iv access; feeling like he is at his baseline though has little  [place]; can hardly ambulate to bathroom as he is very symptomatic    Physical Exam/Objective:  Temp:  [97.6  F (36.4  C)-98.9  F (37.2  C)] 98.9  F (37.2  C)  Pulse:  [] 79  Resp:  [16-20] 16  BP: (135-167)/(76-93) 140/83  FiO2 (%):  [35 %] 35 %  SpO2:  [95 %-97 %] 96 %  Body mass index is 33.5 kg/m .    GENERAL:  Alert, appears comfortable, in no acute distress, appears stated age   HEAD:  Normocephalic, without obvious abnormality, atraumatic   EYES:  PERRL, conjunctiva/corneas clear, no scleral icterus, EOM's intact   NOSE: Nares normal, septum midline, mucosa normal, no drainage   THROAT: Lips, mucosa, and tongue normal; teeth and gums normal, mouth moist   NECK: Supple, symmetrical, trachea midline   BACK:   Symmetric, no curvature, ROM normal   LUNGS:   Diminished; no wheezing   CHEST WALL:  No tenderness or deformity   HEART:  Regular rate and rhythm, S1 and S2 normal, no murmur, rub, or gallop    ABDOMEN:   Soft, non-tender, bowel sounds active all four quadrants, no masses, no organomegaly, no rebound or guarding   EXTREMITIES: Extremities normal, atraumatic, no cyanosis or edema    SKIN: Dry to touch, no exanthems in the visualized areas   NEURO: Alert, oriented x3, moves all four extremities freely   PSYCH: Cooperative, behavior is appropriate      Data reviewed today: I personally reviewed all new medications, labs, imaging/diagnostics  reports over the past 24 hours. Pertinent findings include:    Imaging:   No results found for this or any previous visit (from the past 24 hour(s)).    Labs:  Most Recent 3 BMP's:Recent Labs   Lab Test 04/11/22  0445 04/10/22  0605 04/09/22  2041    143 143   POTASSIUM 4.2 4.6 4.7   CHLORIDE 108* 107 103   CO2 24 27 27   BUN 26* 27* 26*   CR 1.06 0.97 1.09   ANIONGAP 10 9 13   PALMER 8.2* 8.5 9.3   * 175* 148*       Medications:   Personally Reviewed.  Medications     - MEDICATION INSTRUCTIONS -       sodium chloride 75 mL/hr at 04/11/22 1446       amLODIPine  5 mg Oral Daily     budesonide  1 mg Nebulization BID     citalopram  20 mg Oral Daily     doxycycline hyclate  100 mg Oral Q12H JASIEL     furosemide  20 mg Oral Daily     gabapentin  600 mg Oral TID     ipratropium - albuterol 0.5 mg/2.5 mg/3 mL  3 mL Nebulization 4x daily     lisinopril  40 mg Oral Daily     magnesium oxide  200 mg Oral At Bedtime     predniSONE  40 mg Oral Daily    Followed by     [START ON 4/15/2022] predniSONE  30 mg Oral Daily    Followed by     [START ON 4/18/2022] predniSONE  20 mg Oral Daily    Followed by     [START ON 4/21/2022] predniSONE  10 mg Oral Daily     rivaroxaban ANTICOAGULANT  20 mg Oral Daily with supper     sodium chloride (PF)  3 mL Intracatheter Q8H

## 2022-04-12 NOTE — PROGRESS NOTES
RESPIRATORY CARE NOTE     Patient Name: Ki Pederson  Today's Date: 4/12/2022       Pt continues to receive duonebs. BS are diminished. Pt is on 4 lpm of oxygen via NC, SpO2 is 97%. Post treatment there is increased aeration. Pt also perceives improvement.  RT encouraged deep breathing and coughing techniques .  RT will continue to monitor and assess.     Sophie Hankins, RT

## 2022-04-12 NOTE — PROGRESS NOTES
"Care Management Follow Up    Length of Stay (days): 3    Expected Discharge Date: 04/14/2022     Concerns to be Addressed: palliative care consult today, pulmonary following, IV steroid changed to PO        Patient plan of care discussed at interdisciplinary rounds: yes  Anticipated Discharge Disposition: TBD- anticipate return to daughters home      Anticipated Discharge Services: TBD  Anticipated Discharge DME: TBD     Education Provided on the Discharge Plan: CM will continue to follow and assist with discharge planning.    Patient/Family in Agreement with the Plan: yes    Referrals Placed by CM/SW:None at this time   Private pay costs discussed: None indicated.     Additional Information:  Chart reviewed.     CM met with patient, grzegorz Long, levar Potts and palliative care.   Home 02 prior to hospital admission- supplied by [facility].     Patient/family have requested that ex-wife Liberty Mcdaniel be removed from emergency contacts. CM attempted but unable to do this as states \"activated as health care agent\" (however per HCD is not listed as HCA). CM sent email to [employer] requesting assistance.     Patient and family confirm plan to change code status to DNR/DNI. Palliative Care provided blank [employer]. Patient verbally states grzegorz Long should be making medical decisions for him if he were not able to speak for himself. Levar Potts would be an alternate if daughter Ethan is not available.     Patient and family are hopeful that patient can return to daughters home at hospital discharge.     Hilary Romo RN        "

## 2022-04-12 NOTE — PROGRESS NOTES
PULMONARY MEDICINE PROGRESS NOTE  4/12/2022    Admit Date: 4/9/2022  CODE: Full Code    Reason for Consult: acute on chronic respiratory failure with hypoxia, acute exacerbation of COPD     Assessment/Plan:   65 year old male with a history of tobacco smoking in remission, IVDU in remission, ongoing chewing tobacco dependence, hepatitis C without cirrhosis, severe COPD, colon polyps, TIA, HTN, atrial fibrillation with RVR, C-spine surgery, depression, RLS, seizure disorder, lumbar disc disease, atrial fibrillation with RVR, hand hemangiomas requiring surgical removal, vaccinated against COVID-19, hospitalized for 9 nights in with COVID-19 infection and AECOPD in [place] in October 2021, now admitted on 4/8 with AECOPD.     Acute exacerbation of COPD: Patient known to me from pulmonary clinic, last visit with him in October 2020 via telephone after which he was lost to follow-up. Since moved up [place] for a while, hospitalized in [place] in October 2021 with COVID-19 infection (fortunately was previously vaccinated) resulting in AECOPD, was there for 9 nights. Was enrolled in hospice for a while for severe COPD, since disenrolled. Baseline severe COPD with increased work of breathing, oxygen-dependent. Still chews tobacco; again encouraged him to quit. Not smoking. Lactic acidosis may be from frequent albuterol nebs. He has seen palliative care and will likely change his code status to DNR/DNI, possibly enroll in hospice.     Plan:  - titrate oxygen to goal SpO2 88-92%  - prednisone taper  - complete course of doxycycline  - restart azithromycin MWF beginning on April 18  - continue budesonide 1 mg nebulized BID on discharge  - would prescribe nebulized ipratropium-albuterol QID on discharge  - referral to pulmonary rehabilitation at [facility]  - again advised him to stop chewing tobacco  - continue to address goals of care; he may change to DNR/DNI status after discussion with palliative care today  - he may  enroll in home hospice after discussion with palliative care today  - I was trying to get him on home BiPAP when I last talked to him in late 2020; harder to accomplish on discharge from hospital and hospice may not pay for this if he enrolls; can discuss further outpatient  - follow up with me on June 1  - close to baseline; should be okay to discharge in 1-2 days  - will follow with you while inpatient    Luis Rivera MD  Pulmonary and Critical Care Medicine  [facility]  Office [phone]  Pager [phone]  (he/him/his)                                                                                                                                                        SUBJECTIVE/INTERVAL HISTORY   Breathing feels closer to baseline, minimal cough. Children visiting, had good conversation with palliative care today.                                                                                                                                                    Exam/Data:     Vitals  BP (!) 172/93 (BP Location: Right arm, Cuff Size: Adult Regular)   Pulse 100   Temp 98.1  F (36.7  C) (Oral)   Resp 16   Ht 1.829 m (6')   Wt 112 kg (247 lb)   SpO2 96%   BMI 33.50 kg/m       I/O last 3 completed shifts:  In: 720 [P.O.:720]  Out: -   Weight change: 3.629 kg (8 lb)  [unfilled]  EXAM:  BP (!) 172/93 (BP Location: Right arm, Cuff Size: Adult Regular)   Pulse 100   Temp 98.1  F (36.7  C) (Oral)   Resp 16   Ht 1.829 m (6')   Wt 112 kg (247 lb)   SpO2 96%   BMI 33.50 kg/m      Intake/Output last 3 shifts:  I/O last 3 completed shifts:  In: 720 [P.O.:720]  Out: -   Intake/Output this shift:  No intake/output data recorded.    Physical Exam  Gen: alert, oriented, increased work of breathing at rest, baseline  HEENT: NT, no ALESSANDRA  CV: tachy, regular, no m/g/r  Resp: diminished bilaterally with prolonged expiratory phase  Abd: soft, nontender, BS+  Skin: no rashes or lesions  Ext: no  edema  Neuro: PERRL, nonfocal exam    ROS: A complete 10-system review of systems was obtained and is negative with the exception of what is noted in the history of present illness.    Medications:       - MEDICATION INSTRUCTIONS -       sodium chloride 75 mL/hr at 04/11/22 1446       amLODIPine  5 mg Oral Daily     budesonide  1 mg Nebulization BID     citalopram  20 mg Oral Daily     doxycycline hyclate  100 mg Oral Q12H JASIEL     furosemide  20 mg Oral Daily     gabapentin  600 mg Oral TID     ipratropium - albuterol 0.5 mg/2.5 mg/3 mL  3 mL Nebulization 4x daily     lisinopril  40 mg Oral Daily     magnesium oxide  200 mg Oral At Bedtime     predniSONE  40 mg Oral Daily    Followed by     [START ON 4/15/2022] predniSONE  30 mg Oral Daily    Followed by     [START ON 4/18/2022] predniSONE  20 mg Oral Daily    Followed by     [START ON 4/21/2022] predniSONE  10 mg Oral Daily     rivaroxaban ANTICOAGULANT  20 mg Oral Daily with supper     sodium chloride (PF)  3 mL Intracatheter Q8H         DATA  All laboratory and radiology has been personally reviewed by myself today.  Recent Labs   Lab 04/11/22  0445   WBC 11.1*   HGB 11.4*   HCT 37.2*        Recent Labs   Lab 04/11/22  0445 04/10/22  0605 04/09/22  2041 04/08/22  1432    143 143 138   CO2 24 27 27 27   BUN 26* 27* 26* 21   ALKPHOS  --  45  --  63   ALT  --  38  --  60*   AST  --  19  --  30        PFT DATA (July 2019):  FEV1/FVC is 0.46 and is reduced.  FEV1 is 34% predicted and is reduced.  FVC is 57% predicted and is reduced.  There was improvement in spirometry after a single inhaled dose of bronchodilator.  TLC is 120% predicted and is normal.  RV is 277% predicted and is increased.  DLCO is 55% predicted and is reduced when it   is corrected for hemoglobin.  The flow volume loop shows obstructive morphology.     Impression:  Full Pulmonary Function Test is abnormal. Spirometry is consistent with very severe obstructive ventilatory  defect.  Spirometry is consistent with reversibility.  There is no hyperinflation.  There is air-trapping.  Diffusion capacity when corrected for hemoglobin is moderately reduced.     IMAGING:   CXR (4/9/22):  - images directly reviewed, formal interpretation follows:  IMPRESSION: Lungs clear. No pneumothorax. Normal heart size and pulmonary vascularity. Tortuous aorta. Postoperative changes cervical spine.

## 2022-04-12 NOTE — PROGRESS NOTES
Nurse reported that IV infiltrated and patient refused new iv.    Patient is due for last iv dose of solumedrol.     Plan: 1. Hold iv Solumedrol due to no iv access; 2. Prednisone 40 mg po x1 to replace the iv dose of Solumedrol.

## 2022-04-12 NOTE — CONSULTS
COPD Education Consult  4/12/2022, 9:37 AM    Reason for Consult: COPD Exacerbation admit protocol  Patient Admitted for: COPD exacerbation (H) [J44.1] on 4/9/2022     History of Present Illness: Ki is a 66yo patient with severe COPD w/ baseline oxygen dependence at 5 LPM, nicotine dependence - still chewing, hepatitis C, HTN, Afib w/RVR, depression, seizure disorder, recent recovery from COVID, and hx of smoking and IV drug abuse in remission. He was previously enrolled in hospice but dis enrolled. He is presently full code. Ki presented with 2 days increased shortness of breath and was admitted for COPD exacerbation. He has been receiving treatment with Bipap, supplemental oxygen,scheduled nebs, IV steroids and antibiotics. He has had an IP Pulmonary consult with Dr Rivera and a Palliative Care consult has been ordered.    Home Respiratory Medications:  Bronchodilators:   -albuterol neb/inhaler PRN   -DuoNeb QID  Inhaled Steroids:   -Budesonide neb BID    Assessment: Patient currently is on 4 LPM via nasal cannula. He is able to speak in full sentences at rest. Still experiencing shortness of breath with exertion but reports that he is feeling better and more like his baseline. Bipap is on standby in room.    Education done during visit:  -Reviewed home medications and discussed plan upon discharge per Pulmonary consult notes.   -COPD Action Plan discussed - Ki typically has steroids and antibiotic prescriptions on hand at home for use in the event that he needs them. Discussed when and why to contact his PCP or Pulmonologist and initiate his action plan.  -Information on COPD and tools to help cope  -Issued patient a COPD Handbook  -Discussed anxiety and it's relationship with the dyspnea cycle  -Discussed benefits of Pulmonary Rehab    Recommendations:  -Continue current inpatient therapy, per [facility] protocols  -Follow up appointment with pulmonology. A follow up appointment order has already been  placed by Dr. Rivera   -Palliative Care Consult   -Pulmonary Rehab program  -Home medication adjustments per Dr. Rivera's recommendation. - See Pulmonary Consult Note.    Ki will participate in our call back program. Will continue to follow patient throughout hospital stay along with educating patient and family.    Total time spend with patient 40 minutes and 50 minutes spent in chart review, care coordination, and documentation.    Linda Pires RT, Chronic Pulmonary Disease Specialist  Phone [phone]

## 2022-04-12 NOTE — PLAN OF CARE
Problem: Plan of Care - These are the overarching goals to be used throughout the patient stay.    Goal: Readiness for Transition of Care  Outcome: Ongoing, Not Progressing   A&O. Denies pain. Tele NSR. No IV access, SWAT called for ultrasound guided insertion, SWAT unable to come. Calls appropriately.     Problem: Adjustment to Illness COPD (Chronic Obstructive Pulmonary Disease)  Goal: Optimal Chronic Illness Coping  Outcome: Ongoing, Progressing  On BiPAP overnight. Pt to have palliative consult later today.

## 2022-04-12 NOTE — CONSULTS
Tobacco Treatment Consult  4/12/2022, 9:32 AM    Patient admitted on: 4/9/2022   Patient admitted for: COPD exacerbation (H) [J44.1]   Patient seen on: 4/12/22    Ki has been successful in quitting smoking but is still nicotine dependent and chews. Ki declined tobacco treatment counseling at this time. He states that it is the one vice that he has left and he is not interested in quitting.  He politely declined cessation materials and discussion.     Total 2 minutes spent in smoking cessation, and 15 minutes spent in chart review, care coordination, and documentation.    Linda Pires RT, Chronic Pulmonary Disease Specialist & Tobacco Treatment Specialist  Phone [phone]

## 2022-04-12 NOTE — CONSULTS
"[facility]  Palliative Care Consultation Note    Patient: Ki Pederson  Date of Admission:  4/9/2022    Requesting Clinician / Team: Dr. Mackay  Reason for consult: Goals of care  Decisional support  Patient and family support    Code status: Full Code    Impression & Recommendations:  SYMPTOM ASSESSMENT  1.  Shortness of breath secondary to COPD exacerbation, end stage COPD  -Appreciate [facility] medicine service and pulmonary/intensivist management and expertise.  -Continue Lasix 20 mg p.o. daily, duo nebs 4 times daily, prednisone 40 mg p.o. daily (an ongoing taper)  - Continue oxygen 4L NC; patient may want to utilize BiPAP at night.  -Should patient decide to transition to more comfort focused care, would recommend utilizing lorazepam and morphine concentrate to ease air hunger.  -CODE STATUS: DNR/DNI confirmed.  Continue current cares at this time.    2.  Generalized weakness secondary to advanced COPD and activity intolerance and deconditioning  -Up with assist.  Activity as tolerated.    ADVANCED CARE PLANNING/GOALS OF CARE DISCUSSION  -Now DNR/DNI.  -POLST document updated to reflect patient's wishes for DNR/I, comfort focused care, no tube feeding and oral antibiotics.  We will work to complete honoring choices document overnight with hopes to get it notarized tomorrow.  -Patient's key decision makers are his daughter, Ethan Pederson and son, Katlyn Pederson ONLY.  -Hospice consult ordered.  Patient and family believe they may want to utilize hospice at discharge.  Patient previously on hospice 2 years ago and \"graduated\".    These recommendations have been discussed with Dr. Rivera and updated Dr. Mackay.      Thank you for the opportunity to participate in the care of this patient and family. Our team: will continue to follow.     During regular M-F work hours -- if you are not sure who specifically to contact -- please contact us by calling us directly at the Palliative Care " "Main Line [phone]    After regular work hours and on weekends/holidays, you can leave a message at [phone]      History of Present Illness:  History gathered today from: patient, family/loved ones, medical chart, medical team members, unit team members  Ki Pederson is a 67 year old male with past medical history of COPD, HTN, hepatitis C, atrial fibrillation with RVR, depression, seizure disorder, chronic oxygen use at home 5 L nasal cannula, nicotine dependence (chews tobacco) history of smoking, IV drug abuse and recovery and recent recovery from Covid infection.  Patient presented to [facility] 4/9/2022 with increasing shortness of breath and began treatment for COPD exacerbation initially requiring BiPAP now transition to oxygen, IV steroids, IV antibiotics and scheduled nebulizers.    today, the patient was seen for:  Shortness of breath, fatigue, goals of care discussion    Prognosis, Goals, & Planning:      Functional Status just prior to hospitalization: 2 (Ambulatory and capable of all selfcare but unable to carry out any work activities; may need help with IADLs up and about > 50% of waking hours)      Prognosis, Goals, and/or Advance Care Planning were addressed today: Yes        Summary/Comments: Met with patient and 2 children, Katlyn and Ethan. Reviewed the role of the palliative care specialty and answered questions as well as the differences between hospice and palliative medicine with patient and his children.  Patient acknowledges that he was previously on hospice 2 years ago and \"graduated\" from the hospice program.  Patient and family interested in obtaining additional information on hospice.  Patient uncertain if he will utilize hospice services immediately upon discharge or if it will be a little later.  Should patient decide to not utilize hospice at discharge discussed that he could follow-up with outpatient palliative care until he utilizes hospice.  Overall, family " "feel hospice would be the best option to help with symptom management and make sure patient and they have good support.  Discussed benefit versus burden of resuscitation and intubation with patient who confirms wishes for DNR/DNI.  Patient does wish to use BiPAP this evening if needed.  Understands that this would not be something that is continued after discharge.  We will work to complete honoring choices document as patient is reviewing this with his children this afternoon and would like to have it updated and notarized to reflect his wishes for DNR/DNI.  POLST completed reflecting these wishes.      Patient's decision making preferences: with input from medical clinicians and loved ones          Patient has decision-making capacity today for complex decisions: Yes            I have concerns about the patient/family's health literacy today: No           Patient has a completed Health Care Directive: Yes, and on file.      Code status: No CPR / No Intubation    Coping, Meaning, & Spirituality:   Mood, coping, and/or meaning in the context of serious illness were addressed today: Yes  Summary/Comment: Patient is  and of the [facility].  He is working with his children and [place] members to honor their traditions spiritually.      Key Palliative Symptom Data:  # Pain severity the last 12 hours: low  # Dyspnea severity the last 12 hours: moderate  # Nausea severity the last 12 hours: none  # Anxiety severity the last 12 hours: low  Dyspnea: Worsens with speaking and minimal activity while sitting up at bedside.  Very intolerant to activity.  Patient fearful that he will \"suffocate\" to death.  Anxiety worsens with shortness of breath.  Fatigue: Moderate    ROS:  Comprehensive ROS is reviewed and is negative except as here & per HPI.     Past Medical History:  Past Medical History:   Diagnosis Date     Anxiety      Atrial fibrillation (H)      Cerebral infarction (H)      COPD (chronic obstructive " pulmonary disease) (H)      Hemangioma     massive hemangioma; left hand     Hypertension      Myocardial infarction (H)      TIA (transient ischemic attack)         Past Surgical History:  Past Surgical History:   Procedure Laterality Date     CERVICAL FUSION      C6 and C7         Family History:  Family History   Problem Relation Age of Onset     Coronary Artery Disease Mother      Diabetes Mother      Coronary Artery Disease Father      Diabetes Sister         Social:     Living situation: Lives at home with his daughter    Vyas family / caregivers: Daughter,Panna Maria, and son Katlyn    Occupational history: Not assessed    Substance use history: Yes to IV drugs in the remote past.  Clean and sober.    Current in-home services: None     Allergies:  Allergies   Allergen Reactions     Symbicort Rash        Medications:  I have reviewed this patient's medication profile and medications from this hospitalization.     Lab Results: personally reviewed.   Lab Results   Component Value Date     04/11/2022    CO2 24 04/11/2022    BUN 26 04/11/2022     Lab Results   Component Value Date    WBC 11.1 04/11/2022    HGB 11.4 04/11/2022    HCT 37.2 04/11/2022    MCV 95 04/11/2022     04/11/2022     AST   Date Value Ref Range Status   04/10/2022 19 0 - 40 U/L Final     ALT   Date Value Ref Range Status   04/10/2022 38 0 - 45 U/L Final     Alkaline Phosphatase   Date Value Ref Range Status   04/10/2022 45 45 - 120 U/L Final     Albumin   Date Value Ref Range Status   04/10/2022 3.5 3.5 - 5.0 g/dL Final       RADIOLOGY:  XR Chest Port 1 View    Result Date: 4/9/2022  EXAM: XR CHEST PORT 1 VIEW LOCATION: [facility] DATE/TIME: 4/9/2022 9:06 PM INDICATION: Dyspnea COMPARISON: 04/08/2022     IMPRESSION: Lungs clear. No pneumothorax. Normal heart size and pulmonary vascularity. Tortuous aorta. Postoperative changes cervical spine.    XR Chest Port 1 View    Result Date: 4/8/2022  EXAM: XR CHEST PORT  1 VIEW LOCATION: [facility] DATE/TIME: 4/8/2022 3:07 PM INDICATION: cough sob COMPARISON: Chest CT 08/07/2020, chest x-ray 03/18/2020     IMPRESSION: Lungs are clear. Heart and pulmonary vascularity are normal. No signs of acute disease.        Physical Exam:  Temp:  [97.6  F (36.4  C)-98.9  F (37.2  C)] 98.9  F (37.2  C)  Pulse:  [] 79  Resp:  [16-20] 16  BP: (135-167)/(76-93) 140/83  FiO2 (%):  [35 %] 35 %  SpO2:  [95 %-97 %] 96 %  Wt Readings from Last 3 Encounters:   04/12/22 112 kg (247 lb)   04/08/22 104.3 kg (230 lb)   03/23/22 112 kg (247 lb)      General appearance: alert, cooperative and fatigued  Head: Normocephalic, without obvious abnormality, atraumatic  Eyes: Lids and lashes normal.  Sclera anicteric  Nose: Nasal cannula in place.  No discharge  Lungs: Mildly labored sitting up at bedside talking  Abdomen: Soft, nontender, nondistended  Extremities: No edema noted  Skin: Skin color, texture, turgor normal. No rashes or lesions  Neurologic: Alert.  Oriented x4.  Humorous and jovial.    TTS: I have personally spent a total of 60 minutes today reviewing patient's medical record, consultation with the medical providers, and >than 50% of this time spent assessing patient and symptoms and providing emotional support and discussing goals of care including completing POLST document with patient and his children.    DESHAWN Varela, CNS  Palliative Care  [phone]

## 2022-04-12 NOTE — PROVIDER NOTIFICATION
Paged provider (Dr. Johnson) regarding pt c/o 6/10 pain and requesting Percocet. Declined tylenol. Both available prn doses of Percocet already given today. Notified again no IV access currently (on tele).     Order received for one time Percocet dose.

## 2022-04-12 NOTE — PROGRESS NOTES
Occupational Therapy Discharge Summary    Reason for therapy discharge:    Discharged to home.    Progress towards therapy goal(s). See goals on Care Plan in [facility] electronic health record for goal details.  Goals met    Therapy recommendation(s):    No further therapy is recommended.

## 2022-04-12 NOTE — PROVIDER NOTIFICATION
2014: Paged provider (Dr. Johnson) regarding loss of IV access. PIV infiltrated previously. Multiple attempts to regain IV access has been attempted by two different nurses, including SWAT nurse with ultrasound, unsuccessfully. Pt currently refusing further attempts. Pt had one more dose IV solumedrol ordered tonight; inquired if dose could be switched to oral.     2019: Order received for PO prednisone starting tonight.

## 2022-04-13 ENCOUNTER — DOCUMENTATION ONLY (OUTPATIENT)
Dept: OTHER | Facility: CLINIC | Age: 67
End: 2022-04-13
Payer: COMMERCIAL

## 2022-04-13 ENCOUNTER — APPOINTMENT (OUTPATIENT)
Dept: ULTRASOUND IMAGING | Facility: CLINIC | Age: 67
DRG: 190 | End: 2022-04-13
Attending: CLINICAL NURSE SPECIALIST
Payer: MEDICARE

## 2022-04-13 VITALS
SYSTOLIC BLOOD PRESSURE: 139 MMHG | OXYGEN SATURATION: 96 % | DIASTOLIC BLOOD PRESSURE: 97 MMHG | RESPIRATION RATE: 18 BRPM | HEIGHT: 72 IN | HEART RATE: 71 BPM | TEMPERATURE: 98.4 F | WEIGHT: 247 LBS | BODY MASS INDEX: 33.46 KG/M2

## 2022-04-13 PROCEDURE — 76705 ECHO EXAM OF ABDOMEN: CPT

## 2022-04-13 PROCEDURE — 94640 AIRWAY INHALATION TREATMENT: CPT

## 2022-04-13 PROCEDURE — 250N000013 HC RX MED GY IP 250 OP 250 PS 637: Performed by: EMERGENCY MEDICINE

## 2022-04-13 PROCEDURE — 250N000013 HC RX MED GY IP 250 OP 250 PS 637: Performed by: CLINICAL NURSE SPECIALIST

## 2022-04-13 PROCEDURE — 94640 AIRWAY INHALATION TREATMENT: CPT | Mod: 76

## 2022-04-13 PROCEDURE — 250N000013 HC RX MED GY IP 250 OP 250 PS 637: Performed by: INTERNAL MEDICINE

## 2022-04-13 PROCEDURE — 250N000012 HC RX MED GY IP 250 OP 636 PS 637: Performed by: INTERNAL MEDICINE

## 2022-04-13 PROCEDURE — 250N000009 HC RX 250: Performed by: FAMILY MEDICINE

## 2022-04-13 PROCEDURE — 99233 SBSQ HOSP IP/OBS HIGH 50: CPT | Performed by: CLINICAL NURSE SPECIALIST

## 2022-04-13 PROCEDURE — 99239 HOSP IP/OBS DSCHRG MGMT >30: CPT | Performed by: EMERGENCY MEDICINE

## 2022-04-13 PROCEDURE — 250N000009 HC RX 250: Performed by: EMERGENCY MEDICINE

## 2022-04-13 PROCEDURE — 99233 SBSQ HOSP IP/OBS HIGH 50: CPT | Performed by: INTERNAL MEDICINE

## 2022-04-13 RX ORDER — PREDNISONE 20 MG/1
20 TABLET ORAL DAILY
Qty: 15 TABLET | Refills: 0 | Status: SHIPPED | OUTPATIENT
Start: 2022-01-01 | End: 2022-01-01

## 2022-04-13 RX ORDER — AZITHROMYCIN 250 MG/1
TABLET, FILM COATED ORAL
Qty: 20 TABLET | Refills: 0 | Status: SHIPPED | OUTPATIENT
Start: 2022-04-13 | End: 2022-01-01

## 2022-04-13 RX ORDER — BENZONATATE 100 MG/1
100 CAPSULE ORAL 3 TIMES DAILY PRN
Qty: 40 CAPSULE | Refills: 0 | Status: SHIPPED | OUTPATIENT
Start: 2022-04-13 | End: 2022-01-01

## 2022-04-13 RX ORDER — LORAZEPAM 2 MG/ML
.5-1 CONCENTRATE ORAL EVERY 4 HOURS PRN
Status: DISCONTINUED | OUTPATIENT
Start: 2022-04-13 | End: 2022-04-13 | Stop reason: HOSPADM

## 2022-04-13 RX ORDER — HYDRALAZINE HYDROCHLORIDE 20 MG/ML
10 INJECTION INTRAMUSCULAR; INTRAVENOUS EVERY 6 HOURS PRN
Status: DISCONTINUED | OUTPATIENT
Start: 2022-04-13 | End: 2022-04-13 | Stop reason: HOSPADM

## 2022-04-13 RX ORDER — ACETAMINOPHEN 325 MG/1
650 TABLET ORAL EVERY 6 HOURS PRN
Qty: 100 TABLET | Refills: 0 | Status: SHIPPED | OUTPATIENT
Start: 2022-04-13 | End: 2022-01-01

## 2022-04-13 RX ORDER — MORPHINE SULFATE 20 MG/ML
10 SOLUTION ORAL
Status: DISCONTINUED | OUTPATIENT
Start: 2022-04-13 | End: 2022-04-13 | Stop reason: HOSPADM

## 2022-04-13 RX ORDER — MORPHINE SULFATE 20 MG/ML
10 SOLUTION ORAL
Qty: 20 ML | Refills: 0 | Status: SHIPPED | OUTPATIENT
Start: 2022-04-13 | End: 2022-01-01

## 2022-04-13 RX ORDER — LORAZEPAM 2 MG/ML
.5-1 CONCENTRATE ORAL EVERY 4 HOURS PRN
Qty: 10 ML | Refills: 0 | Status: SHIPPED | OUTPATIENT
Start: 2022-04-13 | End: 2022-01-01

## 2022-04-13 RX ADMIN — BUDESONIDE 0.5 MG: 0.5 INHALANT ORAL at 11:03

## 2022-04-13 RX ADMIN — MORPHINE SULFATE 10 MG: 100 SOLUTION ORAL at 13:14

## 2022-04-13 RX ADMIN — PREDNISONE 40 MG: 20 TABLET ORAL at 09:05

## 2022-04-13 RX ADMIN — OXYCODONE AND ACETAMINOPHEN 1 TABLET: 5; 325 TABLET ORAL at 09:11

## 2022-04-13 RX ADMIN — GABAPENTIN 600 MG: 300 CAPSULE ORAL at 09:04

## 2022-04-13 RX ADMIN — IPRATROPIUM BROMIDE AND ALBUTEROL SULFATE 3 ML: 2.5; .5 SOLUTION RESPIRATORY (INHALATION) at 07:41

## 2022-04-13 RX ADMIN — AMLODIPINE BESYLATE 5 MG: 5 TABLET ORAL at 09:04

## 2022-04-13 RX ADMIN — IPRATROPIUM BROMIDE AND ALBUTEROL SULFATE 3 ML: 2.5; .5 SOLUTION RESPIRATORY (INHALATION) at 12:37

## 2022-04-13 RX ADMIN — CITALOPRAM HYDROBROMIDE 20 MG: 20 TABLET ORAL at 09:05

## 2022-04-13 RX ADMIN — DOXYCYCLINE HYCLATE 100 MG: 50 CAPSULE ORAL at 09:04

## 2022-04-13 RX ADMIN — GABAPENTIN 600 MG: 300 CAPSULE ORAL at 13:14

## 2022-04-13 RX ADMIN — LISINOPRIL 40 MG: 40 TABLET ORAL at 09:05

## 2022-04-13 RX ADMIN — FUROSEMIDE 20 MG: 20 TABLET ORAL at 09:04

## 2022-04-13 RX ADMIN — OXYCODONE AND ACETAMINOPHEN 1 TABLET: 5; 325 TABLET ORAL at 00:05

## 2022-04-13 ASSESSMENT — ACTIVITIES OF DAILY LIVING (ADL)
ADLS_ACUITY_SCORE: 5

## 2022-04-13 NOTE — PROGRESS NOTES
Discussed discharge paperwork with pt and his daughter.  All questions answered to pt and family satisfaction.

## 2022-04-13 NOTE — PROGRESS NOTES
A&Ox4. Up ind in room. O2 4 LPM NC, . C/o back and hip pain, given PRN oxy x 1 with minimal relief. Has been hypertensive, [facility] nocturnist notified, new orders for PRN hydralazine, pt has been below parameter thus far. Hospice consult planned for today. Hourly rounding completed, continuing to monitor.    Blood pressure (!) 157/87, pulse 89, temperature 98.7  F (37.1  C), temperature source Oral, resp. rate 18, height 1.829 m (6'), weight 112 kg (247 lb), SpO2 96 %.    Rochelle Cramer RN

## 2022-04-13 NOTE — PROGRESS NOTES
PULMONARY MEDICINE PROGRESS NOTE  4/12/2022    Admit Date: 4/9/2022  CODE: No CPR- Do NOT Intubate    Reason for Consult: acute on chronic respiratory failure with hypoxia, acute exacerbation of COPD     Assessment/Plan:   65 year old male with a history of tobacco smoking in remission, IVDU in remission, ongoing chewing tobacco dependence, hepatitis C without cirrhosis, severe COPD, colon polyps, TIA, HTN, atrial fibrillation with RVR, C-spine surgery, depression, RLS, seizure disorder, lumbar disc disease, atrial fibrillation with RVR, hand hemangiomas requiring surgical removal, vaccinated against COVID-19, hospitalized for 9 nights in with COVID-19 infection and AECOPD in [place] in October 2021, now admitted on 4/8 with AECOPD.     Acute exacerbation of COPD: Patient known to me from pulmonary clinic, last visit with him in October 2020 via telephone after which he was lost to follow-up. Since moved up [place] for a while, hospitalized in [place] in October 2021 with COVID-19 infection (fortunately was previously vaccinated) resulting in AECOPD, was there for 9 nights. Was enrolled in hospice for a while for severe COPD, since disenrolled. Baseline severe COPD with increased work of breathing, oxygen-dependent. Still chews tobacco; again encouraged him to quit. Not smoking. Lactic acidosis may be from frequent albuterol nebs. He has seen palliative care, code status changed to DNR/DNI, and will be enrolling in home hospice.     Plan:  - titrate oxygen to goal SpO2 88-92%; currently 4 L/min and has oxygen at home  - prednisone taper  - complete course of doxycycline  - restart azithromycin MWF beginning on April 18; this is antiinflammatory for prevention of COPD exacerbation  - continue budesonide 1 mg nebulized BID on discharge  - would prescribe nebulized ipratropium-albuterol QID on discharge  - I placed referral to pulmonary rehabilitation at [facility]; they will contact him  - again advised him to stop  chewing tobacco  - DNR/DNI and home hospice enrollment  - I was trying to get him on home BiPAP when I last talked to him in late 2020; hospice may be able to cover this  - follow up with me on June 1 scheduled  - okay to discharge from my standpoint  - will sign off but please call any time if needed  - case discussed with Dr. Codie Rivera MD  Pulmonary and Critical Care Medicine  [facility]  Office [phone]  Pager [phone]  (he/him/his)                                                                                                                                                        SUBJECTIVE/INTERVAL HISTORY   Breathing feels closer to baseline, minimal cough. Enrolling in home hospice.                                                                                                                                                    Exam/Data:     Vitals  BP (!) 139/97 (BP Location: Right arm)   Pulse 71   Temp 98.4  F (36.9  C) (Oral)   Resp 18   Ht 1.829 m (6')   Wt 112 kg (247 lb)   SpO2 96%   BMI 33.50 kg/m       I/O last 3 completed shifts:  In: 480 [P.O.:480]  Out: -   Weight change:   [unfilled]  EXAM:  BP (!) 139/97 (BP Location: Right arm)   Pulse 71   Temp 98.4  F (36.9  C) (Oral)   Resp 18   Ht 1.829 m (6')   Wt 112 kg (247 lb)   SpO2 96%   BMI 33.50 kg/m      Intake/Output last 3 shifts:  I/O last 3 completed shifts:  In: 480 [P.O.:480]  Out: -   Intake/Output this shift:  No intake/output data recorded.    Physical Exam  Gen: alert, oriented, increased work of breathing at rest, baseline  HEENT: NT, no ALESSANDRA  CV: tachy, regular, no m/g/r  Resp: diminished bilaterally with prolonged expiratory phase  Abd: soft, nontender, BS+  Skin: no rashes or lesions  Ext: no edema  Neuro: PERRL, nonfocal exam    ROS: A complete 10-system review of systems was obtained and is negative with the exception of what is noted in the history of present  illness.    Medications:       - MEDICATION INSTRUCTIONS -         amLODIPine  5 mg Oral Daily     [START ON 4/18/2022] azithromycin  250 mg Oral Q Mon Wed Fri AM     budesonide  1 mg Nebulization BID     citalopram  20 mg Oral Daily     doxycycline hyclate  100 mg Oral Q12H JASIEL     furosemide  20 mg Oral Daily     gabapentin  600 mg Oral TID     ipratropium - albuterol 0.5 mg/2.5 mg/3 mL  3 mL Nebulization 4x daily     lisinopril  40 mg Oral Daily     magnesium oxide  200 mg Oral At Bedtime     predniSONE  40 mg Oral Daily    Followed by     [START ON 4/15/2022] predniSONE  30 mg Oral Daily    Followed by     [START ON 4/18/2022] predniSONE  20 mg Oral Daily    Followed by     [START ON 4/21/2022] predniSONE  10 mg Oral Daily     rivaroxaban ANTICOAGULANT  20 mg Oral Daily with supper     sodium chloride (PF)  3 mL Intracatheter Q8H         DATA  All laboratory and radiology has been personally reviewed by myself today.  Recent Labs   Lab 04/11/22  0445   WBC 11.1*   HGB 11.4*   HCT 37.2*        Recent Labs   Lab 04/11/22  0445 04/10/22  0605 04/09/22  2041 04/08/22  1432    143 143 138   CO2 24 27 27 27   BUN 26* 27* 26* 21   ALKPHOS  --  45  --  63   ALT  --  38  --  60*   AST  --  19  --  30        PFT DATA (July 2019):  FEV1/FVC is 0.46 and is reduced.  FEV1 is 34% predicted and is reduced.  FVC is 57% predicted and is reduced.  There was improvement in spirometry after a single inhaled dose of bronchodilator.  TLC is 120% predicted and is normal.  RV is 277% predicted and is increased.  DLCO is 55% predicted and is reduced when it   is corrected for hemoglobin.  The flow volume loop shows obstructive morphology.     Impression:  Full Pulmonary Function Test is abnormal. Spirometry is consistent with very severe obstructive ventilatory defect.  Spirometry is consistent with reversibility.  There is no hyperinflation.  There is air-trapping.  Diffusion capacity when corrected for hemoglobin is  moderately reduced.     IMAGING:   CXR (4/9/22):  - images directly reviewed, formal interpretation follows:  IMPRESSION: Lungs clear. No pneumothorax. Normal heart size and pulmonary vascularity. Tortuous aorta. Postoperative changes cervical spine.

## 2022-04-13 NOTE — PROGRESS NOTES
[facility]  Palliative Care Daily Progress Note      Code status: No CPR- Do NOT Intubate     Impressions, Recommendations & Counseling     SYMPTOM ASSESSMENT:  1.  Shortness of breath secondary to COPD exacerbation, end stage COPD  -Appreciate hospital medicine service and pulmonary/intensivist management and expertise.  -Continue Lasix 20 mg p.o. daily, duo nebs 4 times daily, pulmicort arminda BID, prednisone 40 mg p.o. daily (an ongoing taper), benzonatate 100 mg TID prn  - Continue oxygen 4L NC; patient may want to utilize BiPAP at night. Working on getting this at home to help with   - lorazepam and morphine concentrate to ease air hunger ordered as patient is signing on to hospice at discharge.     2.  Generalized weakness secondary to advanced COPD and activity intolerance and deconditioning  -Up with assist.  Activity as tolerated.    3. Bilateral hip pain  - Was taking oxycodone 5 mg po twice daily at home for hip pain   - Rotate to Morphine concentrate 5-10 mg po q3h prn pain and sob.  Stress that he should not use both morphine or oxycodone - ONE OR THE OTHER.     ADVANCED CARE PLANNING/GOALS OF CARE DISCUSSION  - DNR/DNI.  - Ordered abdominal US that is needed for him wo continue with Hep C medications  -POLST document updated to reflect patient's wishes for DNR/I, comfort focused care, no tube feeding and oral antibiotics.  - Patient completed honoring choices document - will work to get it notarized after discharge as there is not notary on today.  -Patient's key decision makers are his daughter, Ethan Pederson and son, Katlyn Pederson ONLY.  -Hospice consult ordered and met with patient and family. Family wish to involve hospice at discharge.   - Patient will need 3 day supply of Morphine and lorazepam concentrate until hospice can get medications ordered and delivered.  - Hospice will meet patient and daughter tomorrow at daughter's home at 9:30 AM to sign on to hospice  services.    Discussed with and updated Dr. Mackay.       HPI          Ki Pederson is a 67 year old male with past medical history of COPD, HTN, hepatitis C, atrial fibrillation with RVR, depression, seizure disorder, chronic oxygen use at home 5 L nasal cannula, nicotine dependence (chews tobacco) history of smoking, IV drug abuse and recovery and recent recovery from Covid infection.  Patient presented to [facility] 4/9/2022 with increasing shortness of breath and began treatment for COPD exacerbation initially requiring BiPAP now transition to oxygen, oral steroids and antibiotics and scheduled nebulizers.    Today, the patient was seen for:  Shortness of breath, weakness    Prognosis, Goals, or Advance Care Planning was addressed today with: Yes.  Mood, coping, and/or meaning in the context of serious illness were addressed today: Yes.  Summary/Comments: Met with patient and daughter, Ethan. They are interested in using hospice at discharge and using BiPap at night FOR AIR HUNGER only, not as a means of mechanical ventilation or for continuous use.  Reviewed the role of Morphine and lorazepam concentrate to assist with easing anxiety, pain and air hunger. Stress that he should not use both morphine or oxycodone - ONE OR THE OTHER.    Patient inquired about abdominal US that is needed for him wo continue with Hep C medications. This was ordered.            Interval History:     Chart review/discussion with unit or clinical team members:   Discussed with Chantell Love RN and Dr. Mackay.    Per patient or family/caregivers today:  See above.    Key Palliative Symptoms:  # Pain severity the last 12 hours: moderate  # Dyspnea severity the last 12 hours: moderate  # Nausea severity the last 12 hours: none  # Anxiety severity the last 12 hours: low  Bilateral hip pain, chronic: Moderate  Dyspnea worsens with exertion and speaking  Patient is on opioids: assessed and bowels ok/no needed changes to  plan of care today.           Review of Systems:     Besides above, an additional system ROS was reviewed and is unremarkable          Medications:     I have reviewed this patient's medication profile and medications during this hospitalization.    Noted meds:  MAR reviewed           Physical Exam:   Temp:  [98.1  F (36.7  C)-98.9  F (37.2  C)] 98.4  F (36.9  C)  Pulse:  [] 71  Resp:  [16-18] 18  BP: (139-174)/(79-97) 139/97  SpO2:  [95 %-96 %] 96 %    General appearance: alert, appears stated age and cooperative  Head: Normocephalic, without obvious abnormality, atraumatic  Eyes: lids and lashes normal; sclera anicteric  Nose: no discharge  Throat: lips, mucosa, and tongue normal; teeth and gums normal  Lungs: clear to auscultation bilaterally; mildly labored with speaking  Abdomen: soft, ondistended  Extremities: no edema noted. RICHARDSON equally  Skin: Skin color, texture, turgor normal. No rashes or lesions  Neurologic: alert, oriented x4             Data Reviewed:     Pertinent Labs  Lab Results: personally reviewed.   Lab Results   Component Value Date     04/11/2022    CO2 24 04/11/2022    BUN 26 04/11/2022     Lab Results   Component Value Date    WBC 11.1 04/11/2022    HGB 11.4 04/11/2022    HCT 37.2 04/11/2022    MCV 95 04/11/2022     04/11/2022     AST   Date Value Ref Range Status   04/10/2022 19 0 - 40 U/L Final     ALT   Date Value Ref Range Status   04/10/2022 38 0 - 45 U/L Final     Alkaline Phosphatase   Date Value Ref Range Status   04/10/2022 45 45 - 120 U/L Final     Albumin   Date Value Ref Range Status   04/10/2022 3.5 3.5 - 5.0 g/dL Final         Radiology Results  XR Chest Port 1 View    Result Date: 4/9/2022  EXAM: XR CHEST PORT 1 VIEW LOCATION: [facility] DATE/TIME: 4/9/2022 9:06 PM INDICATION: Dyspnea COMPARISON: 04/08/2022     IMPRESSION: Lungs clear. No pneumothorax. Normal heart size and pulmonary vascularity. Tortuous aorta. Postoperative changes  cervical spine.    XR Chest Port 1 View    Result Date: 4/8/2022  EXAM: XR CHEST PORT 1 VIEW LOCATION: [facility] DATE/TIME: 4/8/2022 3:07 PM INDICATION: cough sob COMPARISON: Chest CT 08/07/2020, chest x-ray 03/18/2020     IMPRESSION: Lungs are clear. Heart and pulmonary vascularity are normal. No signs of acute disease.     TTS: I have personally spent a total of 40 minutes today reviewing patient's medical record, consultation with the medical providers, assessing patient and symptoms and providing emotional support with more than 50% of this time spent in counseling and discussing goals of care with patient and his daughter.    DESHAWN Varela, CNS  Palliative Care  [phone]

## 2022-04-13 NOTE — CONSULTS
Met with pt and daughter Ethan in pt's room. Introduced self and role. Reviewed hospice program, services, philosophy, where hospice can take place and choice of agency. Pt would like to use [facility]. Hospice is able to admit pt on 4/14/22 at 0930. Pt and daughter express they would be comfortable discharging without hospice if admission could be within 1-2 days. Per daughter pt has oxygen at home and portable oxygen she can bring to hospital to use to bring him home and she can provide transport. Pt will need bi-pap, nasal cannulas with the longer prongs, shower chair without back, commode with sleeve and OBT (does not want hospital bed). Pt should have 3 days comfort meds at time of discharge. Responded to all questions and given brochure with 24hr number to call with questions. Updated Hilary PRADO CM, Dr Mackay, Corine GAVIN NP and Chantell PRADO. Hospice will continue to be available as needed.     Thank you for this referral and opportunity to work with this family and team.     Reshma Loja RN BSN PHN [facility]  Hospice Referral Specialist  [facility]    [phone]  [email or web address]

## 2022-04-13 NOTE — PROGRESS NOTES
Care Management Discharge Note    Discharge Date: 04/13/2022       Discharge Disposition: Home, Hospice ([facility])    Discharge Services:  ([facility])    Discharge DME:  Per hospice (order placed for BiPAP and settings)     Discharge Transportation: family or friend will provide    Private pay costs discussed: None indicated     PAS Confirmation Code: N/A   Patient/family educated on Medicare website which has current facility and service quality ratings:  (offer Hospice agency choice)    Education Provided on the Discharge Plan: Patient and family confirm plan for discharge to daughters home today with [facility] start of care visit tomorrow.    Persons Notified of Discharge Plans: Patient and family.   Patient/Family in Agreement with the Plan: yes    Handoff Referral Completed: CM coordinated with [facility]. CM coordinated with MD and Charge RN.     Additional Information:  [facility] consult about 11am today at hospital.   Patient/family confirm plan to discharge home with [facility].   [facility] start of care visit will be at 9:30am tomorrow, 4/14 in the home. Should be OK to discharge home today. Will need order for BiPAP and settings. Will need 3 days worth Rx for discharge medications sent to patient pharmacy. Patient has home 02 already. Family will transport.     CCC referral sent per protocol.     Hilary Romo RN

## 2022-04-13 NOTE — DISCHARGE SUMMARY
[facility] MEDICINE  DISCHARGE SUMMARY     Primary Care Physician: Gloria Sebastian  Admission Date: 4/9/2022   Discharge Provider: Raiza Mackay MD Discharge Date: 4/13/2022   Diet:   Active Diet and Nourishment Order   Procedures     Regular Diet Adult     Diet       Code Status: No CPR- Do NOT Intubate   Activity: as tolerates        Condition at Discharge: Serious     REASON FOR PRESENTATION(See Admission Note for Details)   67 year old male with severe end stage COPD who is home oxygen dependent at 4 liters here due to an exacerbation of the same.  Pt has been having flares 2 times monthly for the last many months. He was supported here on admission with frequent nebulizer treatments, solumedrol and advanced oxygen support.  Pulmonary, palliative care and then hospice all were consulted.  Pt improved back  To his baseline of 4 liters nasal cannula oxygen though he has very little pulmonary reserve and is moderately symptomatic with little exertion.    Pts daughter was here for conversations regarding clinical goals.  His treatment  Regimen was optimized with budesonide, duonebs, azithromycin 3 times weekly and then pulmonary rehab which will continue when he is discharged.    Palliative care and hospice were involved.  Pt's code status was changed to DNR.  He will be discharged to close hospice follow up.      PRINCIPAL & ACTIVE DISCHARGE DIAGNOSES     Severe end stage COPD  Acute on chronic hypoxemic respiratory failure; resolved  hypertension    PENDING LABS     Unresulted Labs Ordered in the Past 30 Days of this Admission     No orders found from 3/10/2022 to 4/10/2022.            PROCEDURES ( this hospitalization only)          RECOMMENDATIONS TO OUTPATIENT PROVIDER FOR F/U VISIT     Pt will follow up with hospice team tomorrow per their discussion        DISPOSITION     Home    SUMMARY OF HOSPITAL COURSE:      67 year old male with severe end stage copd here with  dyspnea. Pt was admitted for acute on chronic hypoxemic respiratory failure.  He was supported with nebulizers, steroids.  He improved back to his baseline which requires 4 liters 24/7 home oxygen. Pt was seen by pulmonary, palliative care and hospice.  The code status was changed to DNR.  A supportive daughter was at the bedside during the discussions.  Per pulmonary pt will be put back on his budesonide and add azithromycin for anti-inflammatory effect.  He will continue a steroid taper.        Discharge Medications with Med changes:     Current Discharge Medication List      START taking these medications    Details   !! acetaminophen (TYLENOL) 325 MG tablet Take 2 tablets (650 mg) by mouth every 6 hours as needed for mild pain or other (and adjunct with moderate or severe pain or per patient request)  Qty: 100 tablet, Refills: 0    Associated Diagnoses: Chronic obstructive pulmonary disease with acute exacerbation (H)      azithromycin (ZITHROMAX) 250 MG tablet USE ONE TAB BY MOUTH ONCE DAILY ON Monday, Wednesday, Friday ONGOING  Qty: 20 tablet, Refills: 0    Associated Diagnoses: Chronic obstructive pulmonary disease with acute exacerbation (H)      benzonatate (TESSALON) 100 MG capsule Take 1 capsule (100 mg) by mouth 3 times daily as needed for cough  Qty: 40 capsule, Refills: 0    Associated Diagnoses: Chronic obstructive pulmonary disease with acute exacerbation (H)      LORazepam (ATIVAN) 2 MG/ML (HIGH CONC) oral solution Take 0.25-0.5 mLs (0.5-1 mg) by mouth every 4 hours as needed for anxiety or nausea (sob)  Qty: 10 mL, Refills: 0    Associated Diagnoses: SOB (shortness of breath)      morphine sulfate (ROXANOL) 20 mg/mL (HIGH CONC) soln Place 0.5 mLs (10 mg) under the tongue every 3 hours as needed for moderate to severe pain or shortness of breath / dyspnea (hip pain)  Qty: 20 mL, Refills: 0    Associated Diagnoses: SOB (shortness of breath)      !! predniSONE (DELTASONE) 20 MG tablet Take 1 tablet  (20 mg) by mouth daily 3 tabs by mouth once daily for 2 days, 2 tabs by mouth once daily for 2 days, 1 tab by mouth once daily for 2 days then STOP  Qty: 15 tablet, Refills: 0    Associated Diagnoses: Chronic obstructive pulmonary disease with acute exacerbation (H)       !! - Potential duplicate medications found. Please discuss with provider.      CONTINUE these medications which have NOT CHANGED    Details   !! acetaminophen (TYLENOL) 500 MG tablet Take 1-2 tablets (500-1,000 mg) by mouth every 4 hours as needed for fever  Qty: 100 tablet, Refills: 0    Associated Diagnoses: Encounter for medication refill      albuterol (PROAIR HFA/PROVENTIL HFA/VENTOLIN HFA) 108 (90 Base) MCG/ACT inhaler Inhale 2 puffs into the lungs every 6 hours  Qty: 18 g, Refills: 11    Comments: Pharmacy may dispense brand covered by insurance (Proair, or proventil or ventolin or generic albuterol inhaler)  Associated Diagnoses: Chronic obstructive pulmonary disease with acute exacerbation (H)      amLODIPine (NORVASC) 5 MG tablet Take 1 tablet (5 mg) by mouth daily  Qty: 60 tablet, Refills: 1    Associated Diagnoses: Essential hypertension      citalopram (CELEXA) 20 MG tablet [CITALOPRAM (CELEXA) 20 MG TABLET] TAKE 1 TABLET (20 MG TOTAL) BY MOUTH DAILY.  Qty: 90 tablet, Refills: 3    Associated Diagnoses: Encounter for medication refill      doxycycline hyclate (VIBRAMYCIN) 100 MG capsule Take 1 capsule (100 mg) by mouth 2 times daily for 7 days  Qty: 14 capsule, Refills: 0      furosemide (LASIX) 20 MG tablet Take 1 tablet (20 mg) by mouth daily  Qty: 60 tablet, Refills: 0    Associated Diagnoses: Cardiovascular disease      gabapentin (NEURONTIN) 300 MG capsule Take 2 capsules (600 mg) by mouth 3 times daily  Qty: 540 capsule, Refills: 3    Associated Diagnoses: Neuropathy      ipratropium - albuterol 0.5 mg/2.5 mg/3 mL (DUONEB) 0.5-2.5 (3) MG/3ML neb solution Take 1 vial (3 mLs) by nebulization every 6 hours as needed for shortness of  breath / dyspnea or wheezing  Qty: 90 mL, Refills: 3    Associated Diagnoses: Chronic obstructive pulmonary disease with acute exacerbation (H)      lisinopril (ZESTRIL) 40 MG tablet [LISINOPRIL (PRINIVIL,ZESTRIL) 40 MG TABLET] TAKE 1 TABLET BY MOUTH DAILY.  Qty: 90 tablet, Refills: 1    Associated Diagnoses: Encounter for medication refill      magnesium 250 MG tablet Take 1 tablet (250 mg) by mouth At Bedtime  Qty: 90 tablet, Refills: 3    Associated Diagnoses: Leg cramps      !! predniSONE (DELTASONE) 10 MG tablet Take 1 tablet (10 mg) by mouth See Admin Instructions take 4 tabs daily x 4 days, then 3 tabs daily x 4 days, then 2 tabs daily x 4 days, then 1 tab daily x 4 days..  Qty: 40 tablet, Refills: 5    Associated Diagnoses: Encounter for medication refill      !! predniSONE (DELTASONE) 50 MG tablet Take 1 tablet (50 mg) by mouth daily for 5 days  Qty: 5 tablet, Refills: 0      rivaroxaban ANTICOAGULANT (XARELTO) 20 MG TABS tablet Take 1 tablet (20 mg) by mouth daily (with dinner)  Qty: 90 tablet, Refills: 3    Associated Diagnoses: Encounter for medication refill      budesonide (PULMICORT) 1 MG/2ML neb solution Take 2 mLs (1 mg) by nebulization 2 times daily  Qty: 2 mL, Refills: 3    Associated Diagnoses: Chronic obstructive pulmonary disease with acute exacerbation (H)      glecaprevir-pibrentasvir (MAVYRET) 100-40 MG per tablet Take 3 tablets by mouth daily (with breakfast)  Qty: 90 tablet, Refills: 1    Associated Diagnoses: Chronic hepatitis C without hepatic coma (H)      naloxone (NARCAN) 4 MG/0.1ML nasal spray Spray 1 spray (4 mg) into one nostril alternating nostrils once as needed for opioid reversal every 2-3 minutes until assistance arrives  Qty: 0.2 mL, Refills: 0    Associated Diagnoses: Chronic, continuous use of opioids       !! - Potential duplicate medications found. Please discuss with provider.      STOP taking these medications       oxyCODONE-acetaminophen (PERCOCET) 5-325 MG tablet  Comments:   Reason for Stopping:                               Consults       PHARMACY IP CONSULT  PHYSICAL THERAPY ADULT IP CONSULT  OCCUPATIONAL THERAPY ADULT IP CONSULT  PALLIATIVE CARE ADULT IP CONSULT  PULMONARY IP CONSULT  IP RESPIRATORY CARE CHRONIC PULMONARY DISEASE SPECIALIST  SMOKING CESSATION PROGRAM IP CONSULT  INPATIENT HOSPICE ADULT CONSULT    Immunizations given this encounter     Most Recent Immunizations   Administered Date(s) Administered     COVID-19,PF,Moderna 02/26/2021     COVID-19,PF,Moderna Booster 03/23/2022     DT (PEDS <7y) 04/27/2004     FLU 6-35 months 09/19/2011     Flu 65+ Years 09/19/2011     Flu, Unspecified 10/24/2013     HepA-Adult 03/10/2017     HepB-Adult 03/10/2017     Influenza (H1N1) 12/15/2009     Influenza (IIV3) PF 12/13/2017     Influenza Vaccine IM > 6 months Valent IIV4 (Alfuria,Fluzone) 10/20/2021     Pneumococcal 23 valent 12/13/2017     TD (ADULT, 7+) 03/10/2017     Tdap (Adacel,Boostrix) 06/14/2011     Zoster vaccine, live 10/23/2012            SIGNIFICANT IMAGING FINDINGS     Results for orders placed or performed during the hospital encounter of 04/09/22   XR Chest Port 1 View    Impression    IMPRESSION: Lungs clear. No pneumothorax. Normal heart size and pulmonary vascularity. Tortuous aorta. Postoperative changes cervical spine.   US Abdomen Limited    Impression    IMPRESSION:  1.  Normal limited abdominal ultrasound. No suspicious hepatic lesion.           SIGNIFICANT LABORATORY FINDINGS     Most Recent 3 BMP's:Recent Labs   Lab Test 04/11/22  0445 04/10/22  0605 04/09/22  2041    143 143   POTASSIUM 4.2 4.6 4.7   CHLORIDE 108* 107 103   CO2 24 27 27   BUN 26* 27* 26*   CR 1.06 0.97 1.09   ANIONGAP 10 9 13   PALMER 8.2* 8.5 9.3   * 175* 148*           Discharge Orders        Reason for your hospital stay    End stage copd; copd exacerbation     Follow-up and recommended labs and tests     Hospice to follow with patient     Activity    As tolerates      Miscellaneous DME Order    DME Documentation:   Describe the reason for need to support medical necessity: end stage copd    I, the undersigned, certify that the above prescribed supplies are medically necessary for this patient and is both reasonable and necessary in reference to accepted standards of medical and necessary in reference to accepted standards of medical practice in the treatment of this patient's condition and is not prescribed as a convenience.     Diet    regular       Examination   Physical Exam   Temp:  [98.1  F (36.7  C)-98.7  F (37.1  C)] 98.4  F (36.9  C)  Pulse:  [] 71  Resp:  [16-18] 18  BP: (139-174)/(87-97) 139/97  SpO2:  [95 %-96 %] 96 %  Wt Readings from Last 1 Encounters:   04/12/22 112 kg (247 lb)       General Appearance: Alert, dyspneic at baseline with O2 4 liters;   Respiratory: diminished bilaterally; no wheezing  Cardiovascular: regular  GI: soft, no wheezing  Skin: no rashes,  Other: Oriented, cooperative      Please see EMR for more detailed significant labs, imaging, consultant notes etc.    IRaiza MD, personally saw the patient today and spent greater than 30 minutes discharging this patient.    Raiza Mackay MD  [facility]    CC:Gloria Sebastian

## 2022-04-13 NOTE — PROGRESS NOTES
Thank you for this Hospice referral. Will will review and set up consult.     Spoke with pt's daughter Ethan. Will plan to meet this morning.     Reshma Loja RN BSN PHN CHPN  Hospice Referral Specialist  [facility]    [phone]  [email or web address]

## 2022-04-13 NOTE — PROGRESS NOTES
[facility] MEDICINE PROGRESS NOTE      SUMMARY:  67 year old male admitted on the 9th for recurrent dyspnea.    Pts backround includes ongoing severe 02 dependent COPD (4-5 liters), with frequent exacerbations, chronic hepatitis C, HTN and atrial fibrillation with anticoagulation, chronic narcotic use, low back pain.  Pt struggles with almost bimonthly flares of the copd requiring   Steroids.  He was in the er, went home then returned due to dyspnea.  On admission he required aggressive support including bipap. He has since improved  Pulmonary has been consulted.  Pt is a long term patient with Dr Rivera who states he has severe difficult to control copd.  Pt was lost to follow up with Dr Rivera.  He is not on long acting meds for the copd.    Goals of care during this time is getting patient back to baseline, discussion with palliative care for ongoing goals of care, pulmonary rehab and possible pm bipap  For pm symptom relief.       Assessment and Plan:      #Severe COPD:   Palliative and hospice have seen pt                                Per teams he can discharge today with hospice f/u                                Pulmonary rehab follow up                               Will restart budesonide; azithromycin 3 times weekly                                For anti-inflammatory effect; bipap possible                                #HTN:  Continue oral meds  #Atrial fibrillation:  Continue the xarelto  #chronic hepatitis C: per palliative abdominal ultrasound done today is neg  #Back pain:  He requested daily narcotic use though hospice  #Social issues;  Palliative care consult pending; pt is honest and accepting                             That he has severe ongoing COPD with frequent flares                             Plan for discharge when input received; he lives with                             Daughter; will need shower chair on discharge  Clinically Significant Risk Factors  Present on Admission                Diet: Regular Diet Adult  Diet  DVT Prophylaxis:  DOAC  Code Status: No CPR- Do NOT Intubate    Anticipated possible discharge in 1 days to palliative care gives input  Disposition Plan   Expected Discharge: 04/13/2022     Anticipated discharge location:  Awaiting care coordination huddle  Delays:  none        The patient's care was discussed with the Patient.    Raiza Mackay MD  [facility]  Phone: #[phone]    Interval History/Subjective:  Above; lost iv access; feeling like he is at his baseline though has little  [place]; can hardly ambulate to bathroom as he is very symptomatic    Pt used bipap last night for a decreased work of breathing and he  Felt better; slept better; PT WILL BENEFIT FROM HAVING BIPAP AT  NIGHT ON AN ONGOING BASIS DUE TO HIS SEVERE LUNG DISEASE    Documentation of Face to Face and Certification for Home Health Services    I certify that patient: Ki Pederson is under my care and that I, or a nurse practitioner or physician's assistant working with me, had a face-to-face encounter that meets the physician face-to-face encounter requirements with this patient on:   4/13/2022    This encounter with the patient was in whole, or in part, for the following medical condition, which is the primary reason for home health care: severe end stage COPD    I certify that, based on my findings, the following services are medically necessary home health services: NIGHTLY BIPAP, COMMODE WITH SLEEVE, SHOWER CHAIR    My clinical findings support the need for the above services because: pt is severely limited in activity due to end stage and oxygen dependent COPD      Further, I certify that my clinical findings support that this patient is homebound (i.e. absences from home require considerable and taxing effort and are for medical reasons or [employer] services or infrequently or of short duration when for  other reasons) because: PT IS HOMEBOUND; HE HAS VERY LITTLE LUNG RESERVE DUE TO END STAGE COPD, PT IS SEVERELY DYSPNEIC WITH 20 FEET OF MOVEMENT    Based on the above findings. I certify that this patient is confined to the home and needs intermittent skilled nursing care, physical therapy and/or speech therapy.  The patient is under my care, and I have initiated the establishment of the plan of care.  This patient will be followed by a physician who will periodically review the plan of care.  Physician/Provider to provide follow up care: Gloria Sebastian    Attending hospital physician (the Medicare certified PECOS provider):     Raiza Mackay MD  Physician Signature: See electronic signature associated with these discharge orders.  Date: 4/13/2022    Physical Exam/Objective:  Temp:  [98.1  F (36.7  C)-98.7  F (37.1  C)] 98.4  F (36.9  C)  Pulse:  [] 71  Resp:  [16-18] 18  BP: (139-174)/(87-97) 139/97  SpO2:  [95 %-96 %] 96 %  Body mass index is 33.5 kg/m .    GENERAL:  Alert, appears comfortable, in no acute distress, appears stated age   HEAD:  Normocephalic, without obvious abnormality, atraumatic   EYES:  PERRL, conjunctiva/corneas clear, no scleral icterus, EOM's intact   NOSE: Nares normal, septum midline, mucosa normal, no drainage   THROAT: Lips, mucosa, and tongue normal; teeth and gums normal, mouth moist   NECK: Supple, symmetrical, trachea midline   BACK:   Symmetric, no curvature, ROM normal   LUNGS:   Diminished; no wheezing, no retractions, dyspneic at rest   CHEST WALL:  No tenderness or deformity   HEART:  Regular rate and rhythm, S1 and S2 normal, no murmur, rub, or gallop    ABDOMEN:   Soft, non-tender, bowel sounds active all four quadrants, no masses, no organomegaly, no rebound or guarding   EXTREMITIES: Extremities normal, atraumatic, no cyanosis or edema    SKIN: Dry to touch, no exanthems in the visualized areas   NEURO: Alert, oriented x3, moves all four extremities freely   PSYCH:  Cooperative, behavior is appropriate      Data reviewed today: I personally reviewed all new medications, labs, imaging/diagnostics reports over the past 24 hours. Pertinent findings include:    Imaging:   Recent Results (from the past 24 hour(s))   US Abdomen Limited    Narrative    EXAM: US ABDOMEN LIMITED  LOCATION: [facility]  DATE/TIME: 4/13/2022 10:10 AM    INDICATION: Hx Hepatitis C; need imaging for Mayvaret  COMPARISON: Chest CT 8/7/2020.  TECHNIQUE: Limited abdominal ultrasound.    FINDINGS:    GALLBLADDER: Normal. No gallstones, wall thickening, or pericholecystic fluid. Negative sonographic Palmer's sign.    BILE DUCTS: No biliary dilatation. The common duct measures 3 mm.    LIVER: Normal parenchyma with smooth contour. No focal mass. The liver capsule appears smooth. Normal hepatopedal flow in the main portal vein.    RIGHT KIDNEY: Normal appearance of the right kidney measuring 10.5 cm.    PANCREAS: The visualized portions are normal.    No ascites.      Impression    IMPRESSION:  1.  Normal limited abdominal ultrasound. No suspicious hepatic lesion.           Labs:  Most Recent 3 BMP's:  Recent Labs   Lab Test 04/11/22  0445 04/10/22  0605 04/09/22  2041    143 143   POTASSIUM 4.2 4.6 4.7   CHLORIDE 108* 107 103   CO2 24 27 27   BUN 26* 27* 26*   CR 1.06 0.97 1.09   ANIONGAP 10 9 13   PALMER 8.2* 8.5 9.3   * 175* 148*       Medications:   Personally Reviewed.  Medications     - MEDICATION INSTRUCTIONS -         amLODIPine  5 mg Oral Daily     [START ON 4/18/2022] azithromycin  250 mg Oral Q Mon Wed Fri AM     budesonide  1 mg Nebulization BID     citalopram  20 mg Oral Daily     doxycycline hyclate  100 mg Oral Q12H JASIEL     furosemide  20 mg Oral Daily     gabapentin  600 mg Oral TID     ipratropium - albuterol 0.5 mg/2.5 mg/3 mL  3 mL Nebulization 4x daily     lisinopril  40 mg Oral Daily     magnesium oxide  200 mg Oral At Bedtime     predniSONE  40 mg Oral Daily     Followed by     [START ON 4/15/2022] predniSONE  30 mg Oral Daily    Followed by     [START ON 4/18/2022] predniSONE  20 mg Oral Daily    Followed by     [START ON 4/21/2022] predniSONE  10 mg Oral Daily     rivaroxaban ANTICOAGULANT  20 mg Oral Daily with supper     sodium chloride (PF)  3 mL Intracatheter Q8H

## 2022-04-14 NOTE — TELEPHONE ENCOUNTER
Can this wait for PCP to return to clinic tomorrow? I am not sure if PCP would want to follow. I am assuming yes, but do not want to make that decision without clarifying first.

## 2022-04-14 NOTE — PROGRESS NOTES
Clinic Care Coordination Contact    Situation: Patient chart reviewed by care coordinator.    Background: CCC Referral received from IP Team    Assessment: Patient Hospitalized at  4/9-4/13 for COPD Exacerbation.  Discharged home with Hospice.    Plan/Recommendations: Not a Candidate for CCC.    No CCC Outreach.  Please place a new Referral if additional needs arise.

## 2022-04-14 NOTE — TELEPHONE ENCOUNTER
Reason for Call:  Other Need verbal certification    Detailed comments: RN at [facility] called asking if Dr. Sebastian will be willing to follow patient while patient is on hospice. Please call [facility] back at [phone], ok'd to leave message at this number to give verbal certification stat.    Phone Number Patient can be reached at: Home number on file [phone] (home)    Best Time: Anytime    Can we leave a detailed message on this number? YES    Call taken on 4/14/2022 at 9:31 AM by Porsha Boothe

## 2022-04-15 NOTE — TELEPHONE ENCOUNTER
Called [facility] back, spoke with Lawson and she will relay Dr Sebastian's response to the team.

## 2022-04-15 NOTE — TELEPHONE ENCOUNTER
Yes, I would like to follow him on hospice and sign related orders, etc.     Gloria Sebastian MD

## 2022-05-04 NOTE — TELEPHONE ENCOUNTER
"  Called Ethan because she sent a message that Ki was still wanting hep C medication. He wants to treat his Hep C even though he is on hospice. He wants his body to be \"clean\" before he passes away. I explained I understood and would prescribe the medication.     Gloria Sebastian MD    "

## 2022-05-05 NOTE — TELEPHONE ENCOUNTER
PA Initiation    Medication: Mavyret  Insurance Company: [employer] - Phone [phone] Fax [phone]  Pharmacy Filling the Rx: [facility] - [place], [place] - [facility] [address]  Filling Pharmacy Phone: [phone]  Filling Pharmacy Fax: [phone]  Start Date: 5/5/2022

## 2022-05-06 NOTE — TELEPHONE ENCOUNTER
Prior Authorization Approval    Authorization Effective Date: 3/1/2022  Authorization Expiration Date: 6/30/2022  Medication: Mavyret  Approved Dose/Quantity: 8 weeks  Reference #: [id number]   Insurance Company: [email or web address] - Phone [phone] Fax [phone]  Expected CoPay: $4     CoPay Card Available: No    Foundation Assistance Needed: No  Which Pharmacy is filling the prescription (Not needed for infusion/clinic administered): [facility] - [place] [address] [address]  Pharmacy Notified: Yes  Patient Notified: Yes    Delivery is set up for 5/10 PM

## 2022-05-13 NOTE — TELEPHONE ENCOUNTER
Called Ethan and left information in the [employer] message. I will prescribe 8 weeks of Mavyret. Will treat as treatment-naive since we don't know how much Hep C medication or which one he received in the past, and also because he has a limited life expectancy and this way, we'll be able to know sooner if he is cured of Hep C.     Gloria Sebastian MD

## 2022-07-18 NOTE — TELEPHONE ENCOUNTER
RN called patient back.  Spoke to patient and daughter Nba recommended to contact Hospice care team or urgent care.  Patient refuses to be seen in ER as he will have to sign off of Hospice service.  Hospice team only deals with his COPD.  Consulted with Dr. Sebastian of the plan and agreed for patient to call his hospice care team for further recommendation as Dr. Sebastian has no availability appointment time this afternoon in clinic.  Hospice works with patient in all their symptoms.  Patient has not been seen by Dr. Sebastian in over three months and uncertain of current treatment from hospice.      RN called patient back with no answer.  Provide detail message of provider recommendation on vm.  Call back number provided for questions.    LOU Bay, RN  [facility]

## 2022-07-18 NOTE — TELEPHONE ENCOUNTER
"Triage call:   Nurse Triage SBAR    Is this a 2nd Level Triage? YES, LICENSED PRACTITIONER REVIEW IS REQUIRED    Situation: Patient and daughter Nba calling with concern for diarrhea and abdominal pain    Background: Symptoms started yesterday. Daughter Nba states he is being seen by hospice for COPD.     Assessment: Loose stools 5 times/day \"every time he urinates\" he also passes loose stool. Denies blood. He reports \"churning in abdomen\" and burning like \"acid.\" Intermittent abdominal pain and nausea   \"tastes like poop\" every time he burps     Protocol Recommended Disposition:   See Today In Office    Recommendation: No appointments available in office. Daughter is hesitant to bring him to walk in care since his oxygen only lasts 3 hours.     Routed to provider    Does the patient meet one of the following criteria for ADS visit consideration? 16+ years old, with an [facility] PCP     TIP  Providers, please consider if this condition is appropriate for management at one of our Acute and Diagnostic Services sites.     If patient is a good candidate, please use dotphrase <dot>triageresponse and select Refer to ADS to document.      Sariah Silver RN   07/18/22 3:44 PM  [facility] Nurse Advisor    Reason for Disposition    MODERATE diarrhea (e.g., 4-6 times / day more than normal) and present > 48 hours (2 days)    Additional Information    Negative: Shock suspected (e.g., cold/pale/clammy skin, too weak to stand, low BP, rapid pulse)    Negative: Sounds like a life-threatening emergency to the triager    Negative: Difficult to awaken or acting confused (e.g., disoriented, slurred speech)    Negative: SEVERE abdominal pain (e.g., excruciating) and present > 1 hour    Negative: SEVERE abdominal pain and age > 60    Negative: Bloody, black, or tarry bowel movements (Exception: chronic-unchanged black-grey bowel movements and is taking iron pills or Pepto-bismol)    Negative: Constant abdominal pain lasting > " 2 hours    Negative: SEVERE diarrhea (e.g., 7 or more times / day more than normal) and age > 60 years    Negative: Drinking very little and has signs of dehydration (e.g., no urine > 12 hours, very dry mouth, very lightheaded)    Negative: Patient sounds very sick or weak to the triager    Negative: SEVERE diarrhea (e.g., 7 or more times / day more than normal) and present > 24 hours (1 day)    Protocols used: DIARRHEA-A-OH

## 2022-09-21 PROBLEM — M48.061 LUMBAR SPINAL STENOSIS: Status: ACTIVE | Noted: 2022-01-01

## 2022-09-21 PROBLEM — E87.5 HYPERKALEMIA: Status: ACTIVE | Noted: 2022-01-01

## 2022-09-21 PROBLEM — F41.9 ANXIETY: Status: ACTIVE | Noted: 2022-01-01

## 2022-09-21 PROBLEM — B18.2 CHRONIC HEPATITIS C VIRUS INFECTION (H): Status: ACTIVE | Noted: 2022-01-01

## 2022-09-21 PROBLEM — G93.41 ACUTE METABOLIC ENCEPHALOPATHY: Status: ACTIVE | Noted: 2022-01-01

## 2022-09-21 PROBLEM — R65.21 SEPTIC SHOCK (H): Status: ACTIVE | Noted: 2022-01-01

## 2022-09-21 PROBLEM — G62.9 PERIPHERAL NEUROPATHY: Status: ACTIVE | Noted: 2022-01-01

## 2022-09-21 PROBLEM — G25.81 RESTLESS LEG SYNDROME: Status: ACTIVE | Noted: 2022-01-01

## 2022-09-21 PROBLEM — I48.91 ATRIAL FIBRILLATION, UNSPECIFIED TYPE (H): Status: ACTIVE | Noted: 2022-01-01

## 2022-09-21 PROBLEM — A41.9 SEPTIC SHOCK (H): Status: ACTIVE | Noted: 2022-01-01

## 2022-09-21 PROBLEM — I48.91 ATRIAL FIBRILLATION WITH RVR (H): Status: ACTIVE | Noted: 2022-01-01

## 2022-09-21 PROBLEM — J44.1 CHRONIC OBSTRUCTIVE PULMONARY DISEASE WITH ACUTE EXACERBATION (H): Status: ACTIVE | Noted: 2022-01-01

## 2022-09-21 NOTE — CONSULTS
See my separate consult note from today.    Eliud Easley MD [facility]  Non-Invasive Cardiologist  [facility]  Pager [phone]

## 2022-09-21 NOTE — PROCEDURES
"PICC Line Insertion Procedure Note  Pt. Name: Ki Pederson   MRN: [id number]         Procedure: Insertion of a  triple Lumen  5 fr  Bard SOLO (valved) Power PICC, Lot number [id number]    Indications: Access    Contraindications : n/a    Procedure Details   Patient identified with 2 identifiers and \"Time Out\" conducted.  .     Central line insertion bundle followed: hand hygeine performed prior to procedure, site cleansed with cholraprep, hat, mask, sterile gloves,sterile gown worn, patient draped with maximum barrier head to toe drape, sterile field maintained.    The vein was assessed and found to be compressible and of adequate size. 4 ml 1% Lidocaine administered sq to the insertion site. A 5 Fr PICC was inserted into the basilic vein of the right arm with ultrasound guidance. 1 attempt(s) required to access vein.   Catheter threaded without difficulty. Good blood return noted.    Modified Seldinger Technique used for insertion.    The 8 sharps that are included in the PICC insertion kit were accounted for and disposed of in the sharps container prior to breakdown of the sterile field.    Catheter secured with Statlock, biopatch and Tegaderm dressing applied.    Findings:  Total catheter length  44 cm, with 1 cm exposed. Mid upper arm circumference is 32 cm. Catheter was flushed with 30 cc NS. Patient  tolerated procedure well.    Tip placement verified by xray. Xray read by AASHISH Vigil . Tip placement in the SVC.    CLABSI prevention brochure left at bedside.    Patient's primary RN notified PICC is ready for use.    Comments:        Cesar Vázquez, ASMITAN, RN  [facility]  "

## 2022-09-21 NOTE — CONSULTS
"[facility]  Palliative Care Consultation Note    Patient: Ki Pederson  Date of Admission:  9/21/2022    Requesting Clinician / Team: Dr. Weldon  Reason for consult: Goals of care  Patient and family support    Code status: No CPR- Do NOT Intubate    Impression & Recommendations:  SYMPTOM ASSESSMENT  1.  Shortness of breath secondary to end stage COPD with exacerbation  -Appreciate [facility] medicine service and pulmonary/intensivist management and expertise.  - Lasix 40 mg IV BID   - Continue oxygen to keep saturation >90%. patient may want to utilize BiPAP at night.      2.  Generalized weakness secondary to advanced COPD and activity intolerance and deconditioning  -Up with assist.  Activity as tolerated.    3.  Chronic pain syndrome, bilateral hip pain and neuropathy  -Add Percocet 1 tab by mouth every 4 hours as needed.  When blood pressure more stabilized could increase to 1 to 2 tablets every 4 hours as needed.  - When patient more alert and less encephalopathic, resume gabapentin 600 mg 3 times a day or could consider resuming at half the dose 3 mg 3 times a day to start.    4.  Bowel regimen while on opiates  - Add senna 1 tab by mouth twice daily as needed at this time.  Consider scheduling.     ADVANCED CARE PLANNING  -Hospice consult ordered and family wish to use [facility] at discharge.   -Care management consult ordered to facilitate discharge planning and connection to [facility].    GOALS OF CARE DISCUSSION  - Code status:DNR/DNI.  -Patient agreeable to BiPAP if needed to help ease air hunger and to treat saturations, amiodarone for rate control until he can be stabilized and converted to oral medications and otherwise more conservative medical management.  -Goal will be to discharge back home with hospice care.  -POLST on file in \"merged chart\" and was completed 4/12/2022 to reflect patient's wishes for DNR/I, comfort focused care, no tube feeding and oral " "antibiotics.  -Patient's key decision makers are his daughter, Ethan Pederson and son, Katlyn Pederson ONLY.    These recommendations have been discussed with Dr. Weldon.      Thank you for the opportunity to participate in the care of this patient and family. Our team: will continue to follow.     During regular M-F work hours -- if you are not sure who specifically to contact -- please contact us by calling us directly at the Palliative Care Main Line [phone]    After regular work hours and on weekends/holidays, you can leave a message at [phone]      History of Present Illness:  History gathered today from: patient, family/loved ones, medical chart, medical team members, unit team members  Ki Pederson is a 67 year old male with past medical history of COPD, 4 L of oxygen at home, atrial fibrillation, myocardial infarction, chronic hepatitis C, restless leg syndrome, peripheral neuropathy, spinal stenosis and CVA without any deficits.  Patient presented to [facility] ED with increased confusion, fever, tachycardia with heart rate in the 150s, shortness of breath and increased lower extremity edema.  Patient has been started on BiPAP and is admitted to \"ICU status\" and being boarded in the ED.  Being treated for acute metabolic encephalopathy and septic shock.  Patient was on hospice at home ([facility]) which was been revoked this morning as patient wishes to be treated for acute problems.  Historically, family report a physical decline since April 2022 with his COPD.  He has been on hospice at home with his daughter since previous admission.  There had been some fluctuations and frequencies of visits and missed appointments when scheduled appointments were to be had.      Today, the patient was seen for:  Goals of care, SOB, patient and family support, bilateral hip pain    Prognosis, Goals, & Planning:      Functional Status just prior to hospitalization: 1 (Restricted in physically strenuous " activity but ambulatory and able to carry out work of a light or sedentary nature)      Prognosis, Goals, and/or Advance Care Planning were addressed today: Yes        Summary/Comments: Met with patient and his son, Katlyn.  They are familiar with palliative care specialty as met with them during April 2022 admission.  Son, Katlyn, reports a fluctuating change in overall condition with a slow decline while at home with [facility] hospice.  Was some fragmented care with visits twice a week then decrease to once a week and potentially not showing up in the home which family feel contributed to this admission.  Patient felt panicked at home and family called 911 to bring him in when it was felt he was not being managed at home with shortness of breath and possible over taking lisinopril.    Overall goals are to continue more conservative medical management.  At this time, patient is agreeable to BiPAP to ease air hunger and use of amiodarone for rate control.  They are interested and stabilizing his medical condition with hopes to then discharge back home (daughter's home) with [facility] hospice going forward.      Patient's decision making preferences: shared with support from loved ones          Patient has decision-making capacity today for complex decisions: Partial (needs assistance with complex decisions)            I have concerns about the patient/family's health literacy today: No           Patient has a completed Health Care Directive: Reportedly yes, but not available to us currently.      Code status: No CPR / No Intubation    Coping, Meaning, & Spirituality:   Mood, coping, and/or meaning in the context of serious illness were addressed today: Yes  Summary/Comments: Patient is  and of the [employer].  He is working with his children and [employer] members to honor their traditions spiritually.        Key Palliative Symptom Data:  # Pain severity the last 12 hours: low  # Dyspnea severity the  last 12 hours: moderate  # Nausea severity the last 12 hours: none  # Anxiety severity the last 12 hours: none  Bilateral hip pain, chronic.  Takes Percocet at home with relief  Constipation: Patient denies however encephalopathic  Patient is on opioids: assessed and I made recommendations about bowel care as above.    ROS:  Comprehensive ROS is reviewed and is negative except as here & per HPI.     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  Clean and sober.    Current in-home services: previously with [facility] hospice     Allergies:  No Known Allergies     Medications:  I have reviewed this patient's medication profile and medications from this hospitalization.     Lab Results: personally reviewed.   Lab Results   Component Value Date     09/21/2022    CO2 26 09/21/2022    BUN 45 09/21/2022     Lab Results   Component Value Date    WBC 10.6 09/21/2022    HGB 12.0 09/21/2022    HCT 40.3 09/21/2022     09/21/2022     09/21/2022     No results found for: AST, ALT, ALKPHOS, ALBUMIN    RADIOLOGY:  XR Chest Port 1 View    Result Date: 9/21/2022  EXAM: XR  CHEST PORT 1 VIEW LOCATION: [facility] DATE/TIME: 9/21/2022 5:39 AM INDICATION: dyspnea COMPARISON: 04/09/2022     IMPRESSION: Shallow inspiration. Cardiomediastinal silhouette within normal limits. No focal consolidation or pleural effusion. Postsurgical change cervical spine.    Physical Exam:  Temp:  [99  F (37.2  C)-100.2  F (37.9  C)] 100.2  F (37.9  C)  Pulse:  [148-172] 148  Resp:  [13-38] 18  BP: ()/(51-89) 95/70  FiO2 (%):  [30 %-40 %] (P) 30 %  SpO2:  [93 %-100 %] 96 %  Wt Readings from Last 3 Encounters:   09/21/22 109.8 kg (242 lb)      Head: Normocephalic, without obvious abnormality, atraumatic  Eyes: Lids and lashes normal.  Nose: no discharge  Throat: lips, mucosa, and tongue normal; teeth and gums normal  Lungs: decreased bases bilaterally  Heart: tachycardic   Abdomen: Soft, nontender, nondistended, positive bowel sounds x4  Extremities: 2+ lower extremity edema.  No cyanosis or nail clubbing noted.   Skin: small abrasion or skin rub on left lateral peewee  Neurologic: alert. Oriented x4    TTS: I have personally spent a total of 45 minutes on unit in review of medical record, consultation with the medical providers and assessment of patient today, with more than 50% of this time spent in counseling, coordination of care, and discussion with patient and son, Katlyn, prognosis, symptom management, risks and benefits of management options, and development of plan of care as noted above.    DESHAWN Varela, CNS  Palliative Care  [phone]

## 2022-09-21 NOTE — PROGRESS NOTES
Patient with increased work of breathing, placed on V60 bipap per MD orders.  Patient on settings of 12/6, RR 14, FiO2 40%, tolerating well. Albuterol nebs given. RT will continue to follow.       Mahdi ALISA Valdez, RT

## 2022-09-21 NOTE — H&P
[facility] MEDICINE ADMISSION HISTORY AND PHYSICAL   Date of Admission: 9/21/2022  Ki Pederson, 1955, [id number]    Identification/Summary:   Ki Pederson is a 67 year old male with PMH signficant for COPD 4 L on hospice, A. fib, MI, CVA no deficits, chronic hep C, RLS, peripheral neuropathy and spinal stenosis.  Presented with confusion, shortness of breath fever and increased lower extremity edema.  Heart rate in 150s.  On BiPAP.  Admitting to the ICU.  Boarding in the ED.    Assessment and Plan:  -Acute metabolic encephalopathy  -Septic shock  -Fever  Inpatient ICU admission.  Expected length of stay greater than 2 nights.  Multifocal etiology.  Discussed with son and may have had some medication errors due to his confusion.  ICU admission.  Intensivist consulted.  Empirically initiated on Solu-Medrol, Zosyn and azithromycin.  Order PICC line.  Utilize sepsis and ICU order sets.  Check a procalcitonin.  Patient has temporarily signed off of hospice at this time.  DNR/DNI confirmed with his son.  Patient may benefit from a increase supervised setting.  Order palliative consult.  -Acute on chronic hypoxemic hypercapnic respiratory failure 4 L  -COPD  Utilize BiPAP per intensivist recommendations.  -Acute on chronic diastolic heart failure  -Atrial fibrillation with RVR  BNP elevated at 272.  Echocardiogram from February 2022 shows EF 55 to 60% otherwise normal.  Patient has been unresponsive to diltiazem drip.  Amiodarone drip initiated in the ED.  Given Lasix 40 mg IV x1.  Consult cardiology.  Follow serial troponins.  -Acute kidney injury  -Hyperkalemia  At baseline patient has normal renal function.  Admission creatinine 1.6 with potassium of 6.2.  Given Lasix 40 mg IV x1, insulin 10 units IV, calcium gluconate and D50.  Recheck potassium pending.  -Essential hypertension  -Coronary artery disease  Awaiting pharmacy medication  reconciliation.  -Anxiety/depression  -Restless leg syndrome  Awaiting pharmacy medication reconciliation.  -History of CVA  Son denies any deficits leftover from her stroke.  -Chronic hepatitis C  -History of IV drug use sober since ~2010  Noted.  Mid May 2022 was prescribed 8 weeks of Mavyret.  Uncertain on completion of this treatment course or not.  -Lumbar stenosis  -Peripheral neuropathy  Noted.  Fall precautions.    -COVID19 PCR status negative from 9/21/2022  -Influenza and RSV PCR's negative  Noted.  Standard precautions  -Anticoagulation   Heparin SQ  High risk for thromboembolism  -Chante present    Code Status: No CPR- Do NOT Intubate-discussed and confirmed with son    Disposition: Inpatient     Clinically Significant Risk Factors Present on Admission        # Hyperkalemia: K = 6.2 mmol/L (Ref range: 3.5 - 5.0 mmol/L) on admission, will monitor as appropriate        # Coagulation Defect: home medication list includes an anticoagulant medication   # Hypertension: home medication list includes antihypertensive(s)          Chief Complaint  confusion, shortness of breath, fever and lower extremity swelling     HISTORY     Ki Pederson is a 67 year old male with PMH of COPD 4 L on hospice, A. fib, MI, CVA no deficits, chronic hep C, RLS, peripheral neuropathy and lumbar spinal stenosis.  History obtained from son.  Patient over the last week has been noted to be having increased confusion.  Son concerned that he may have mixed up some of his medications.  Family reports having some difficulty getting hospice support so 9/21 came into the ED.  Presented with confusion, shortness of breath fever and increased lower extremity edema.  Heart rate in 150s.  , potassium 6.2, creatinine 1.6, WBCs 10.6 and hemoglobin 12.  EKG showed A. fib RVR.  Portable chest x-ray shallow inspiration but otherwise negative.  Started on BiPAP.  Admitting to ICU status.  Boarding in the ED.  Met with son and confirmed  his DNR/DNI CODE STATUS and wishes to resume hospice after eventual discharge.  Historical information confirmed with son.  .  Questions answered to verbalize satisfaction.    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)      Comment:  Sober since ~  No date: Lumbar spinal stenosis  No date: Peripheral neuropathy  No date: Restless leg syndrome  No date: Stroke (H)     Patient Active Problem List    Diagnosis Date Noted     Hyperkalemia 2022     Priority: Medium     Chronic obstructive pulmonary disease with acute exacerbation (H) 2022     Priority: Medium     Atrial fibrillation, unspecified type (H) 2022     Priority: Medium     Anxiety 2022     Priority: Medium     Restless leg syndrome 2022     Priority: Medium     Chronic hepatitis C virus infection (H) 2022     Priority: Medium     Lumbar spinal stenosis 2022     Priority: Medium     Peripheral neuropathy 2022     Priority: Medium     Surgical History     Past Surgical History:   Procedure Laterality Date     CERVICAL FUSION      C6-7     Family History      Family History   Problem Relation Age of Onset     Coronary Artery Disease Mother      Diabetes Mother       Social History      Social History     Tobacco Use     Smoking status: Former Smoker     Quit date:      Years since quittin.7   Substance Use Topics     Alcohol use: Not Currently     Drug use: Not Currently     Comment: Sober from IV drug use since ~      Allergies   No Known Allergies  Prior to Admission Medications      Prior to Admission Medications   Prescriptions Last Dose Informant Patient  Reported? Taking?   Magnesium Oxide 250 MG TABS 9/20/2022 at am  Yes Yes   Sig: Take 1 tablet by mouth daily   albuterol (PROAIR HFA/PROVENTIL HFA/VENTOLIN HFA) 108 (90 Base) MCG/ACT inhaler PRN  Yes Yes   Sig: Inhale 2 puffs into the lungs every 6 hours   amLODIPine (NORVASC) 5 MG tablet 9/20/2022 at am  Yes Yes   Sig: Take 5 mg by mouth daily   citalopram (CELEXA) 40 MG tablet 9/20/2022 at am  Yes Yes   Sig: Take 40 mg by mouth daily   clonazePAM (KLONOPIN) 2 MG tablet PRN  Yes Yes   Sig: Take 2 mg by mouth nightly as needed for anxiety   doxycycline hyclate (VIBRA-TABS) 100 MG tablet 9/20/2022 at am  Yes Yes   Sig: Take 100 mg by mouth daily   famotidine (PEPCID) 20 MG tablet 9/20/2022 at am  Yes Yes   Sig: Take 20 mg by mouth daily   furosemide (LASIX) 40 MG tablet 9/20/2022 at am  Yes Yes   Sig: Take 40 mg by mouth daily   gabapentin (NEURONTIN) 300 MG capsule Past Week at Unknown time  Yes Yes   Sig: Take 600 mg by mouth 3 times daily   guaiFENesin (MUCINEX) 600 MG 12 hr tablet 9/20/2022 at am  Yes Yes   Sig: Take 1,200 mg by mouth daily   ipratropium - albuterol 0.5 mg/2.5 mg/3 mL (DUONEB) 0.5-2.5 (3) MG/3ML neb solution PRN  Yes Yes   Sig: Take 1 vial by nebulization every 6 hours as needed for shortness of breath / dyspnea or wheezing   lisinopril (ZESTRIL) 40 MG tablet 9/20/2022 at am  Yes Yes   Sig: Take 40 mg by mouth daily   oxyCODONE-acetaminophen (PERCOCET) 5-325 MG tablet PRN at Unknown time  Yes Yes   Sig: Take 1 tablet by mouth every 6 hours as needed for severe pain   potassium chloride ER (KLOR-CON M) 20 MEQ CR tablet 9/20/2022 at am  Yes Yes   Sig: Take 20 mEq by mouth daily   predniSONE (DELTASONE) 20 MG tablet 9/20/2022 at am  Yes Yes   Sig: Take 20 mg by mouth daily   rivaroxaban ANTICOAGULANT (XARELTO) 20 MG TABS tablet 9/20/2022 at am  Yes Yes   Sig: Take 20 mg by mouth daily      Facility-Administered Medications: None      Review of Systems     A 12 point comprehensive review of systems  was negative except as noted above in HPI.    PHYSICAL EXAMINATION     Vitals      Temp:  [99  F (37.2  C)-100.2  F (37.9  C)] 100.2  F (37.9  C)  Pulse:  [148-172] 148  Resp:  [13-38] 18  BP: ()/(51-89) 95/70  FiO2 (%):  [30 %-40 %] (P) 30 %  SpO2:  [93 %-100 %] 96 %    Examination     Constitutional: Intermittently awake.  Will open eyes and follow simple commands but also intermittently confused and noted to be reaching out, somnolent, intermittently cooperative, no apparent distress, seems to be tolerating BiPAP, appears stated age and mildly obese  Eyes: Lids and lashes normal, pupils equal, round and reactive to light, extra ocular muscles intact, sclera clear, conjunctiva normal  ENT: Limited exam secondary to BiPAP  Hematologic / Lymphatic: no cervical lymphadenopathy and no supraclavicular lymphadenopathy  Respiratory: Poor air movement but no rales rhonchi nor wheezing  Cardiovascular: Tachycardic irregular rhythm  GI: No scars, normal bowel sounds, soft, non-distended, non-tender, no masses palpated, no hepatosplenomegally  Skin: Left hand hemangioma noted otherwise normal skin color, texture, turgor, no redness, warmth, or swelling, and no rashes  Musculoskeletal: There is no redness, warmth, or swelling of the joints.  Full range of motion noted.  Motor strength is 5 out of 5 all extremities bilaterally.  Tone is normal.  Does have 1+ lower extremity edema bilaterally  Neurologic: Difficult to assess cranial nerves secondary to his confusion  Cranial nerves II-XII are grossly intact. Sensory:  Sensory intact Deep Tendon Reflexes: Unable to perform  Neuropsychiatric: General: restless and poor eye contact Level of consciousness: drowsy Affect: normal Orientation: Unable to assess memory and insight: Unable to assess      Pertinent Lab     Results for orders placed or performed during the hospital encounter of 09/21/22 (from the past 24 hour(s))   CBC with platelets differential    Narrative    The  following orders were created for panel order CBC with platelets differential.  Procedure                               Abnormality         Status                     ---------                               -----------         ------                     CBC with platelets and d...[[id number]]  Abnormal            Final result                 Please view results for these tests on the individual orders.   Basic metabolic panel   Result Value Ref Range    Sodium 138 136 - 145 mmol/L    Potassium 6.2 (HH) 3.5 - 5.0 mmol/L    Chloride 104 98 - 107 mmol/L    Carbon Dioxide (CO2) 26 22 - 31 mmol/L    Anion Gap 8 5 - 18 mmol/L    Urea Nitrogen 45 (H) 8 - 22 mg/dL    Creatinine 1.66 (H) 0.70 - 1.30 mg/dL    Calcium 8.8 8.5 - 10.5 mg/dL    Glucose 85 70 - 125 mg/dL    GFR Estimate 45 (L) >60 mL/min/1.73m2   Troponin I   Result Value Ref Range    Troponin I 0.03 0.00 - 0.29 ng/mL   B-Type Natriuretic Peptide ([facility] Only)   Result Value Ref Range     (H) 0 - 59 pg/mL   Lactic acid whole blood   Result Value Ref Range    Lactic Acid 0.5 (L) 0.7 - 2.0 mmol/L   Magnesium   Result Value Ref Range    Magnesium 2.4 1.8 - 2.6 mg/dL   CBC with platelets and differential   Result Value Ref Range    WBC Count 10.6 4.0 - 11.0 10e3/uL    RBC Count 4.02 (L) 4.40 - 5.90 10e6/uL    Hemoglobin 12.0 (L) 13.3 - 17.7 g/dL    Hematocrit 40.3 40.0 - 53.0 %     78 - 100 fL    MCH 29.9 26.5 - 33.0 pg    MCHC 29.8 (L) 31.5 - 36.5 g/dL    RDW 14.2 10.0 - 15.0 %    Platelet Count 229 150 - 450 10e3/uL    % Neutrophils 67 %    % Lymphocytes 18 %    % Monocytes 11 %    % Eosinophils 3 %    % Basophils 0 %    % Immature Granulocytes 1 %    NRBCs per 100 WBC 0 <1 /100    Absolute Neutrophils 7.1 1.6 - 8.3 10e3/uL    Absolute Lymphocytes 1.9 0.8 - 5.3 10e3/uL    Absolute Monocytes 1.2 0.0 - 1.3 10e3/uL    Absolute Eosinophils 0.3 0.0 - 0.7 10e3/uL    Absolute Basophils 0.0 0.0 - 0.2 10e3/uL    Absolute Immature Granulocytes 0.1 <=0.4 10e3/uL     Absolute NRBCs 0.0 10e3/uL   Symptomatic; Yes; 9/19/2022 Influenza A/B & SARS-CoV2 (COVID-19) Virus PCR Multiplex Nasopharyngeal    Specimen: Nasopharyngeal; Swab   Result Value Ref Range    Influenza A PCR Negative Negative    Influenza B PCR Negative Negative    RSV PCR Negative Negative    SARS CoV2 PCR Negative Negative    Narrative    Testing was performed using the Xpert Xpress CoV2/Flu/RSV Assay on the [employer] GeneXpert Instrument. This test should be ordered for the detection of SARS-CoV-2 and influenza viruses in individuals who meet clinical and/or epidemiological criteria. Test performance is unknown in asymptomatic patients. This test is for in vitro diagnostic use under the FDA EUA for laboratories certified under CLIA to perform high or moderate complexity testing. This test has not been FDA cleared or approved. A negative result does not rule out the presence of PCR inhibitors in the specimen or target RNA in concentration below the limit of detection for the assay. If only one viral target is positive but coinfection with multiple targets is suspected, the sample should be re-tested with another FDA cleared, approved, or authorized test, if coinfection would change clinical management. This test was validated by the [facility]. These laboratories are certified under the Clinical  Laboratory Improvement Amendments of 1988 (CLIA-88) as qualified to perform high complexity laboratory testing.   XR Chest Port 1 View    Narrative    EXAM: XR CHEST PORT 1 VIEW  LOCATION: [facility]  DATE/TIME: 9/21/2022 5:39 AM    INDICATION: dyspnea  COMPARISON: 04/09/2022      Impression    IMPRESSION: Shallow inspiration. Cardiomediastinal silhouette within normal limits. No focal consolidation or pleural effusion. Postsurgical change cervical spine.   Glucose by meter   Result Value Ref Range    GLUCOSE BY METER POCT 100 (H) 70 - 99 mg/dL   Blood gas arterial   Result  Value Ref Range    pH Arterial 7.25 (L) 7.37 - 7.44    pCO2 Arterial 62 (H) 35 - 45 mm Hg    pO2 Arterial 120 (H) 80 - 90 mm Hg    Bicarbonate Arterial 24 23 - 29 mmol/L    O2 Sat, Arterial 98.9 (H) 96.0 - 97.0 %    Oxyhemoglobin 97.1 (H) 96.0 - 97.0 %    Base Excess/Deficit (+/-) 0.2   mmol/L    FIO2 40     Sample Stabilized Temperature 37.0 degrees C   Glucose by meter   Result Value Ref Range    GLUCOSE BY METER POCT 63 (L) 70 - 99 mg/dL   Glucose by meter   Result Value Ref Range    GLUCOSE BY METER POCT 70 70 - 99 mg/dL   Glucose by meter   Result Value Ref Range    GLUCOSE BY METER POCT 121 (H) 70 - 99 mg/dL       Pertinent Radiology     Radiology Results:   Recent Results (from the past 24 hour(s))   XR Chest Port 1 View    Narrative    EXAM: XR CHEST PORT 1 VIEW  LOCATION: [facility]  DATE/TIME: 2022 5:39 AM    INDICATION: dyspnea  COMPARISON: 2022      Impression    IMPRESSION: Shallow inspiration. Cardiomediastinal silhouette within normal limits. No focal consolidation or pleural effusion. Postsurgical change cervical spine.       Echocardiogram Complete  Order: [id number]   Status: Edited Result - FINAL     Visible to patient: Yes (seen)     Next appt: None     Dx: Arteriosclerotic Cardiovascular Disea...     0 Result Notes    Details    Reading Physician Reading Date Result Priority   Falguni Reilly MD  [phone] 2022      Narrative & Impression  [id number]  [id number]  [id number]  [id number]^BRYAN^MIHIR^[place] [address]     Name: GUSTABO CHRISTOPHER  MRN: [id number]  : 1955  Study Date: 2022 01:00 PM  Age: 67 yrs  Gender: Male  Patient [facility]  Reason For Study: Cardiovascular disease, Atrial fibrillation with rapid  ventricul  Ordering Physician: MIHIR ADAMS  Referring Physician: MIHIR ADAMS  Performed By: EMILIO     BSA: 2.3 m2  Height: 72 in  Weight: 244 lb  HR: 95  BP: 150/95  mmHg  ______________________________________________________________________________  Procedure  Complete Echo Adult.  ______________________________________________________________________________  Interpretation Summary     1. Normal left ventricular size and systolic performance with a visually  estimated ejection fraction of 55-60%.  2. No significant valvular heart disease is identified on this study.  3. Normal right ventricular size and systolic performance.       Advance Care Planning        Sp Weldon MD  [facility]   Phone: #[phone]

## 2022-09-21 NOTE — ED NOTES
Dr. Weldon and Dr. Lentz notified of continued elevated HR 150s-160s while on 15mg/hr infusion of Diltiazem

## 2022-09-21 NOTE — PHARMACY-ADMISSION MEDICATION HISTORY
Pharmacy Note - Admission Medication History    Pertinent Provider Information: Patient's son was able to provide a medication list that has all schedule medications and was able to provide what medications are taken as needed. Unable to determine primary pharmacy right now due to just leaving hospice care network who use to manage the filling of the medications.     ______________________________________________________________________    Prior To Admission (PTA) med list completed and updated in EMR.       PTA Med List   Medication Sig Last Dose     albuterol (PROAIR HFA/PROVENTIL HFA/VENTOLIN HFA) 108 (90 Base) MCG/ACT inhaler Inhale 2 puffs into the lungs every 6 hours PRN     amLODIPine (NORVASC) 5 MG tablet Take 5 mg by mouth daily 9/20/2022 at am     citalopram (CELEXA) 40 MG tablet Take 40 mg by mouth daily 9/20/2022 at am     clonazePAM (KLONOPIN) 2 MG tablet Take 2 mg by mouth nightly as needed for anxiety PRN     doxycycline hyclate (VIBRA-TABS) 100 MG tablet Take 100 mg by mouth daily 9/20/2022 at am     famotidine (PEPCID) 20 MG tablet Take 20 mg by mouth daily 9/20/2022 at am     furosemide (LASIX) 40 MG tablet Take 40 mg by mouth daily 9/20/2022 at am     gabapentin (NEURONTIN) 300 MG capsule Take 600 mg by mouth 3 times daily Past Week at Unknown time     guaiFENesin (MUCINEX) 600 MG 12 hr tablet Take 1,200 mg by mouth daily 9/20/2022 at am     ipratropium - albuterol 0.5 mg/2.5 mg/3 mL (DUONEB) 0.5-2.5 (3) MG/3ML neb solution Take 1 vial by nebulization every 6 hours as needed for shortness of breath / dyspnea or wheezing PRN     lisinopril (ZESTRIL) 40 MG tablet Take 40 mg by mouth daily 9/20/2022 at am     Magnesium Oxide 250 MG TABS Take 1 tablet by mouth daily 9/20/2022 at am     oxyCODONE-acetaminophen (PERCOCET) 5-325 MG tablet Take 1 tablet by mouth every 6 hours as needed for severe pain PRN at Unknown time     potassium chloride ER (KLOR-CON M) 20 MEQ CR tablet Take 20 mEq by mouth daily  9/20/2022 at am     predniSONE (DELTASONE) 20 MG tablet Take 20 mg by mouth daily 9/20/2022 at am     rivaroxaban ANTICOAGULANT (XARELTO) 20 MG TABS tablet Take 20 mg by mouth daily 9/20/2022 at am       Information source(s): Patient and CareEverywhere/SureScripts  Method of interview communication: in-person    Summary of Changes to PTA Med List  New: All medications above were added  Discontinued: None  Changed: None    Patient was asked about OTC/herbal products specifically.  PTA med list reflects this.    In the past week, patient estimated taking medication this percent of the time:  greater than 90%.    Allergies were reviewed, assessed, and updated with the patient.      Patient did not bring any medications to the hospital and can't retrieve from home. No multi-dose medications are available for use during hospital stay.     The information provided in this note is only as accurate as the sources available at the time of the update(s).    Thank you for the opportunity to participate in the care of this patient.    Mik Leon  9/21/2022 8:47 AM

## 2022-09-21 NOTE — ED NOTES
Dr. Weldon paged regarding patient agitation, he removed IV, took off Bipap, placed on oxymask 3 lpm. Patient unit(s) pin wheelchair. Dr. Weldon also notified of Diltiazem stopped, Vasopressor stopped, and continuation of Amiodorone.

## 2022-09-21 NOTE — ED NOTES
VORB to administer phenylepheine even with MAP >65 so he can safely continue amiodorone infusion.

## 2022-09-21 NOTE — H&P (VIEW-ONLY)
We have been asked to see Ki Pederson at [facility] by Dr. Angeles Eaton for management of atrial fibrillation.    Impression and Plan     Assessment:  1. History of persistent atrial fibrillation now with rapid ventricular response. [id number] score is at least 4 (hypertension, history of transient ischemic attack/stroke, age 65-74).  2. Acute on chronic congestive heart failure with preserved left ventricular ejection fraction. He does not strike me as particularly fluid-overloaded. His elevated brain natriuretic peptide may be driven by rapid atrial fibrillation.  3. Severe chronic obstructive pulmonary disease on 4 liters home oxygen.  4. Reported history of transient ischemic attack/stroke. I am unable to find further details on this at this time.  5. Acute kidney injury with hyperkalemia.    Plan:    Continue intravenous amiodarone 0.5 mg/minute    Will overlap initiation with oral amiodarone 200 mg twice daily for 7 days followed by 200 mg daily possibly indefinitely (with appropriate monitoring)    If he is hemodynamically stable and not requiring vasopressors, we may be able to also add oral metoprolol tartrate which is generally well-tolerated in chronic obstructive pulmonary disease    I would hold on further diuresis at this time unless he starts to demonstrate new signs of fluid-overload    Continue rivaroxaban 20 mg daily    Primary Cardiologist: previously seen inpatient by Dr. Dafne Nascimento    Clinically Significant Risk Factors Present on Admission     # Hyperkalemia: K = 5.7 mmol/L (Ref range: 3.5 - 5.0 mmol/L) on admission, will monitor as appropriate        # Coagulation Defect: home medication list includes an anticoagulant medication    # Circulatory Shock: currently requiring pressors for blood pressure support      Cardiovascular: Cardiac Arrhythmia: Atrial fibrillation: Persistent    Systemic: Chronic Fatigue and Other Debilities: Other reduced mobility       History of  Present Illness      Mr. Ki Pederson is a 67 year old male with a history of COPD on 4L home O2, persistent AF, reported history of TIA/stroke, HFpEF, and HTN admitted with weakness, altered mental status, and worsening shortness of breath. He was afebrile on arrival although he reportedly had a fever at one point with normal WBC count and procalcitonin. CXR shows no acute abnormalities. Troponins negative, BNP elevated at 272. Potassium high at 6.2. ECG shows AF with RVR. His BP has been low, requiring phenylephrine. He has been started on IV amiodarone. He is receiving IV furosemide. His respiratory status has improved.    He cannot provide with me with much history, just that he feels tired. He says his chest feels funny. Denies palpitations.    Review of Systems:  Further review of systems is otherwise negative/noncontributory (based on review of medical record (admission H&P) and 13 point review of systems reviewed. Pertinent positives noted).    Cardiac Diagnostics     ECG 9/21/2022 (personally reviewed and interpreted): atrial fibrillation with rapid ventricular response ventricular rate 176 bpm    Telemetry (personally reviewed): AF with RVR    Most recent:  Echocardiogram 2/9/2022 (results reviewed):  1. Normal left ventricular size and systolic performance with a visually estimated ejection fraction of 55-60%.  2. No significant valvular heart disease is identified on this study.  3. Normal right ventricular size and systolic performance.    Medical History  Surgical History Family History Social History   Past Medical History:   Diagnosis Date     Anxiety      CAD (coronary artery disease)      Chronic atrial fibrillation (H)      Chronic hepatitis C virus infection (H)      COPD (chronic obstructive pulmonary disease) (H)      Essential hypertension      Hemangioma     Left hand     Intravenous drug abuse in remission (H)     Sober since ~2010     Lumbar spinal stenosis      Peripheral neuropathy       Restless leg syndrome      Stroke (H)      Past Surgical History:   Procedure Laterality Date     CERVICAL FUSION      C6-7     PICC TRIPLE LUMEN PLACEMENT  2022          Family History   Problem Relation Age of Onset     Coronary Artery Disease Mother      Diabetes Mother            Social History     Socioeconomic History     Marital status: Patient Declined     Spouse name: Not on file     Number of children: Not on file     Years of education: Not on file     Highest education level: Not on file   Occupational History     Not on file   Tobacco Use     Smoking status: Former Smoker     Quit date: 2018     Years since quittin.7     Smokeless tobacco: Not on file   Substance and Sexual Activity     Alcohol use: Not Currently     Drug use: Not Currently     Comment: Sober from IV drug use since ~     Sexual activity: Not on file   Other Topics Concern     Not on file   Social History Narrative     Not on file     Social Determinants of Health     Financial Resource Strain: Not on file   Food Insecurity: Not on file   Transportation Needs: Not on file   Physical Activity: Not on file   Stress: Not on file   Social Connections: Not on file   Intimate Partner Violence: Not on file   Housing Stability: Not on file             Physical Examination   VITALS: /75 (BP Location: Left arm)   Pulse (!) 132   Temp 98.2  F (36.8  C) (Oral)   Resp 16   Wt 109.8 kg (242 lb)   SpO2 95%   BMI: There is no height or weight on file to calculate BMI.  Wt Readings from Last 3 Encounters:   22 109.8 kg (242 lb)       Intake/Output Summary (Last 24 hours) at 2022 1536  Last data filed at 2022 1533  Gross per 24 hour   Intake 320 ml   Output 1600 ml   Net -1280 ml       General Appearance:  Alert, cooperative, appears weak but in no acute distress.   Head:  Normocephalic, without obvious abnormality, atraumatic   Eyes:  PERRL, conjunctiva/corneas clear, EOM's intact   Ears:  Normal external ear  canals bilaterally   Nose: Nares normal, septum midline, no drainage   Throat: Lips, mucosa, and tongue normal; teeth and gums normal   Neck: Supple, symmetrical, trachea midline, no adenopathy, no carotid bruit or JVD   Back:   Symmetric, no abnormal curvature, ROM normal   Lungs:   Diminished breath sounds, respirations unlabored   Chest Wall:  No tenderness or deformity   Heart:  Irregularly irregular, S1, S2 normal,no murmur, rub or gallop   Abdomen:   Soft, non-tender, bowel sounds active all four quadrants,  no masses, no organomegaly   Extremities: Extremities normal, atraumatic, no cyanosis or edema   Skin: Skin color, texture, turgor normal, no rashes or lesions   Psychiatric: Normal affect, pleasant and cooperative   Neurologic: Alert and oriented X 3, Moves all 4 extremities            Imaging      CXR 9/21/2022 (report reviewed):  EXAM: XR CHEST PORT 1 VIEW  LOCATION: [facility]  DATE/TIME: 9/21/2022 9:51 AM     INDICATION: RN placed PICC   verify tip placement  COMPARISON: Portable AP view of the chest 9/21/2022 at 0533 hours                                                                      IMPRESSION:   There is a new right PICC. The tip of the catheter resides at the SVC right atrial junction.  Unchanged heart and mediastinal interfaces accounting for new RPO rotation.  The lungs are slightly underexpanded without focal opacity.  No pleural space abnormality.    CT chest 8/7/2020 (report reviewed):  1.  A triangular opacity in the right midlung has not changed from the prior study and has the appearance of scarring. No dedicated follow-up is required.   2.  Hepatic steatosis.  3.  Mild coronary artery calcification.     Lab Results    Chemistry/lipid CBC Cardiac Enzymes/BNP/TSH/INR     Recent Labs   Lab Test 09/21/22  1416 09/21/22  1151 09/21/22  1134 09/21/22  0655 09/21/22  0505   NA  --   --   --   --  138   POTASSIUM  --   --  5.7*   < > 6.2*   CHLORIDE  --   --   --    --  104   CO2  --   --   --   --  26   *   < >  --    < > 85   BUN  --   --   --   --  45*   CR  --   --   --   --  1.66*   GFRESTIMATED  --   --   --   --  45*   PALMER  --   --   --   --  8.8    < > = values in this interval not displayed.     Recent Labs   Lab Test 09/21/22  0505   CR 1.66*            Recent Labs   Lab Test 09/21/22  0505   WBC 10.6   HGB 12.0*   HCT 40.3           Recent Labs   Lab Test 09/21/22  0505   HGB 12.0*    Recent Labs   Lab Test 09/21/22  1134 09/21/22  0505   TROPONINI 0.02 0.03     Recent Labs   Lab Test 09/21/22  0505   *             Current Inpatient Scheduled Medications   Scheduled Meds:    [Held by provider] amLODIPine  5 mg Oral Daily     [START ON 9/22/2022] azithromycin  250 mg Intravenous Q24H     citalopram  40 mg Oral Daily     furosemide  40 mg Intravenous Q12H     [Held by provider] furosemide  40 mg Oral Daily     [Held by provider] gabapentin  600 mg Oral TID     [Held by provider] guaiFENesin  1,200 mg Oral Daily     insulin aspart  1-12 Units Subcutaneous Q4H     [Held by provider] lisinopril  40 mg Oral Daily     [START ON 9/22/2022] pantoprazole  40 mg Intravenous Daily with breakfast     piperacillin-tazobactam  3.375 g Intravenous Q8H     [Held by provider] potassium chloride ER  20 mEq Oral Daily     [START ON 9/22/2022] predniSONE  20 mg Oral Daily     rivaroxaban ANTICOAGULANT  20 mg Oral Daily     Continuous Infusions:    amiodarone 0.5 mg/min (09/21/22 1426)     phenylephrine Stopped (09/21/22 1002)          Medications Prior to Admission   Prior to Admission medications    Medication Sig Start Date End Date Taking? Authorizing Provider   albuterol (PROAIR HFA/PROVENTIL HFA/VENTOLIN HFA) 108 (90 Base) MCG/ACT inhaler Inhale 2 puffs into the lungs every 6 hours   Yes Unknown, Entered By History   amLODIPine (NORVASC) 5 MG tablet Take 5 mg by mouth daily   Yes Unknown, Entered By History   citalopram (CELEXA) 40 MG tablet Take 40 mg  by mouth daily   Yes Unknown, Entered By History   clonazePAM (KLONOPIN) 2 MG tablet Take 2 mg by mouth nightly as needed for anxiety   Yes Unknown, Entered By History   doxycycline hyclate (VIBRA-TABS) 100 MG tablet Take 100 mg by mouth daily   Yes Unknown, Entered By History   famotidine (PEPCID) 20 MG tablet Take 20 mg by mouth daily   Yes Unknown, Entered By History   furosemide (LASIX) 40 MG tablet Take 40 mg by mouth daily   Yes Unknown, Entered By History   gabapentin (NEURONTIN) 300 MG capsule Take 600 mg by mouth 3 times daily   Yes Unknown, Entered By History   guaiFENesin (MUCINEX) 600 MG 12 hr tablet Take 1,200 mg by mouth daily   Yes Unknown, Entered By History   ipratropium - albuterol 0.5 mg/2.5 mg/3 mL (DUONEB) 0.5-2.5 (3) MG/3ML neb solution Take 1 vial by nebulization every 6 hours as needed for shortness of breath / dyspnea or wheezing   Yes Unknown, Entered By History   lisinopril (ZESTRIL) 40 MG tablet Take 40 mg by mouth daily   Yes Unknown, Entered By History   Magnesium Oxide 250 MG TABS Take 1 tablet by mouth daily   Yes Unknown, Entered By History   oxyCODONE-acetaminophen (PERCOCET) 5-325 MG tablet Take 1 tablet by mouth every 6 hours as needed for severe pain   Yes Unknown, Entered By History   potassium chloride ER (KLOR-CON M) 20 MEQ CR tablet Take 20 mEq by mouth daily   Yes Unknown, Entered By History   predniSONE (DELTASONE) 20 MG tablet Take 20 mg by mouth daily   Yes Unknown, Entered By History   rivaroxaban ANTICOAGULANT (XARELTO) 20 MG TABS tablet Take 20 mg by mouth daily   Yes Unknown, Entered By History          Eliud Easley MD [facility]  Non-Invasive Cardiologist  [facility]  Pager [phone]

## 2022-09-21 NOTE — PROGRESS NOTES
[facility] Pulmonary/Critical Care Consult Team Note    Ki Pederson,  1955, MRN [id number]  Admitting Dx: Hyperkalemia [E87.5]  Date / Time of Admission:  2022  4:50 AM    Recent Events:  He was found to be tachycaric to 150's  He denies chest pain  He is anxious      Assessment/Plan: Ki Pederson is a 67 year old male with PMHx of Chronic resp failure due to COPD on 4L home O2, afib on Xarelto, Hep C, peripheral neuropathy, who came to the ER for dyspnea and confusion.     PULM/ID: Chronic Hypoxic resp failure due to COPD  - nebs    FiO2 (%): 30 %  Resp: 19  - Last Arterial Blood Gas:  pH Arterial   Date Value Ref Range Status   2022 7.25 (L) 7.37 - 7.44 Final     pCO2 Arterial   Date Value Ref Range Status   2022 62 (H) 35 - 45 mm Hg Final     pO2 Arterial   Date Value Ref Range Status   2022 120 (H) 80 - 90 mm Hg Final     Bicarbonate Arterial   Date Value Ref Range Status   2022 24 23 - 29 mmol/L Final     O2 Sat, Arterial   Date Value Ref Range Status   2022 98.9 (H) 96.0 - 97.0 % Final     Base Excess/Deficit (+/-)   Date Value Ref Range Status   2022 0.2   mmol/L Final     CV: Septic Shock and afib with RVR on xarelto  - on azithromycin and Zosyn  - cardiology consulted  - on amiodarone gtt  - on phenylephrine to keep MAP >65  - Monitor on tele  - lasix 40mg q12    GI: NPO  - Hx of hep C  - GI proph    RENAL: Acute Kidney injury and hyperkalemia  - Cr 1.66  - baseline Cr of 0.8  - Follow BUN/Creatinine  - strict I/O's    NEURO: mildly confused    ENDO:  - Critical Care hyperglycemic protocol  - Accuchecks and sliding scale q4      Pt has critical illness and impairs circulation on amiodarone gtt such as there is high probability of imminent of life threatening deterioration in the patient's condition.    Code Status: DNR/DNI    Infusions:    amiodarone 1 mg/min (22 1034)     amiodarone       dilTIAZem Stopped (22 0957)     phenylephrine  Stopped (09/21/22 1002)       ICU DAILY CHECKLIST           Can patient transfer out of MICU? no  FAST HUG:  Feeding:  Feeding: No  Fine:{No  Analgesia/Sedation:Yes  Thromboembolic prophylaxis:Heparin  HOB>30:  Yes  Stress Ulcer Protocol Active: Yes; Mode: PPI/H2 Antagonist  Glycemic Control: No components found for: GLU Any glucose > 180 no; Mode of Insulin Therapy: Sliding Scale Insulin  INTUBATED:  PHYSICAL THERAPY AND MOBILITY: Can patient have PT and mobility trial: no Activity: ICU mobility protocol    Critical Care Time excluding procedures and family discussions greater than: 45 Minutes    Risk Factors Present on Admission:  Clinically Significant Risk Factors Present on Admission        # Hyperkalemia: K = 5.8 mmol/L (Ref range: 3.5 - 5.0 mmol/L) on admission, will monitor as appropriate        # Coagulation Defect: home medication list includes an anticoagulant medication    # Circulatory Shock: currently requiring pressors for blood pressure support     Code Status: No CPR- Do NOT Intubate         Angeles Eaton, DO  Pulmonary and Critical Care Attending  pgr [phone]    No Known Allergies    Meds: See MAR    Physical Exam:  BP 93/58   Pulse (!) 131   Temp 100.2  F (37.9  C) (Temporal)   Resp 19   Wt 109.8 kg (242 lb)   SpO2 91%   Intake/Output this shift:  I/O this shift:  In: 200 [I.V.:100; IV Piggyback:100]  Out: -   GEN: laying in ER bed, NAD  HEENT:MMM  CVS: regular rhythm, no murmurs  RESP: CTA BL   ABD: Soft, No abdominal pain with palpation, no guarding, no rigidity  EXT: Warm, well perfused, trace LE edema  NEURO:  Moving all extremities, watched him transfer from a wheelchair to the bed without difficulty    Pertinent Labs: Latest lab results in EHR personally reviewed.   CMP  Recent Labs   Lab 09/21/22  1102 09/21/22  0955 09/21/22  0902 09/21/22  0843 09/21/22  0815 09/21/22  0655 09/21/22  0505   NA  --   --   --   --   --   --  138   POTASSIUM  --   --   --  5.8*  --   --  6.2*    CHLORIDE  --   --   --   --   --   --  104   CO2  --   --   --   --   --   --  26   ANIONGAP  --   --   --   --   --   --  8   * 216* 132*  --  121*   < > 85   BUN  --   --   --   --   --   --  45*   CR  --   --   --   --   --   --  1.66*   GFRESTIMATED  --   --   --   --   --   --  45*   PALMER  --   --   --   --   --   --  8.8   MAG  --   --   --   --   --   --  2.4    < > = values in this interval not displayed.     CBC  Recent Labs   Lab 09/21/22  0505   WBC 10.6   RBC 4.02*   HGB 12.0*   HCT 40.3      MCH 29.9   MCHC 29.8*   RDW 14.2        INRNo lab results found in last 7 days.  Arterial Blood Gas  Recent Labs   Lab 09/21/22  0704   PH 7.25*   PCO2 62*   PO2 120*   HCO3 24   O2PER 40       Cultures: not yet available.    Imaging: personally reviewed.   Results for orders placed or performed during the hospital encounter of 09/21/22   XR Chest Port 1 View    Narrative    EXAM: XR CHEST PORT 1 VIEW  LOCATION: [facility]  DATE/TIME: 9/21/2022 5:39 AM    INDICATION: dyspnea  COMPARISON: 04/09/2022      Impression    IMPRESSION: Shallow inspiration. Cardiomediastinal silhouette within normal limits. No focal consolidation or pleural effusion. Postsurgical change cervical spine.   XR Chest Port 1 View    Narrative    EXAM: XR CHEST PORT 1 VIEW  LOCATION: [facility]  DATE/TIME: 9/21/2022 9:51 AM    INDICATION: RN placed PICC   verify tip placement  COMPARISON: Portable AP view of the chest 9/21/2022 at 0533 hours      Impression    IMPRESSION:     There is a new right PICC. The tip of the catheter resides at the SVC right atrial junction.    Unchanged heart and mediastinal interfaces accounting for new RPO rotation.    The lungs are slightly underexpanded without focal opacity.    No pleural space abnormality.       Patient Active Problem List   Diagnosis     Hyperkalemia     Chronic obstructive pulmonary disease with acute exacerbation (H)      Atrial fibrillation, unspecified type (H)     Anxiety     Restless leg syndrome     Chronic hepatitis C virus infection (H)     Lumbar spinal stenosis     Peripheral neuropathy     Acute metabolic encephalopathy     Septic shock (H)     Atrial fibrillation with RVR (H)       Angeles Eaton DO  Pulmonary and Critical Care Attending  pgr [phone]    Securely message with the Vocera Web Console (learn more here)

## 2022-09-21 NOTE — ED TRIAGE NOTES
Pt here via [place] [facility]. Pt received duo-neb in route top er. Pt altered mental at home. Pt tachycardic in route in the 160's.

## 2022-09-21 NOTE — ED PROVIDER NOTES
EMERGENCY DEPARTMENT ENCOUNTER      NAME: Ki Pereira  AGE: 67 year old male  YOB: 1955  MRN: [id number]  EVALUATION DATE & TIME: 9/21/2022  4:50 AM    PCP: No primary care provider on file.    ED PROVIDER: Fermin Talavera M.D.      Chief Complaint   Patient presents with     Breathing Problem         FINAL IMPRESSION:  1. Chronic obstructive pulmonary disease with acute exacerbation (H)    2. Atrial fibrillation, unspecified type (H)    3. Hyperkalemia          ED COURSE & MEDICAL DECISION MAKING:    Pertinent Labs & Imaging studies reviewed. (See chart for details)  67 year old male presents to the Emergency Department for evaluation of dyspnea.  Patient has COPD.  Was recently on hospice but was taken off hospice today.  Is on 4 L of oxygen at home.  Despite this was having worsening shortness of breath.  Some worsening cough.  Low-grade fever.  On arrival here patient's found to be in A. fib.  Has diminished breath sounds.  Placed on BiPAP.  Given albuterol with some improvement.  Also given Solu-Medrol.  Did give him a dose of IV Zosyn and IV azithromycin for possible pneumonia given he did look like he was early sepsis.  Patient was found to be hyperkalemic.  Given calcium bicarb insulin and dextrose.  For his A. fib did given IV Dilts with minimal improvement and started him on a diltiazem drip.  We will stay away from beta-blockers given his COPD.  He is on Xarelto so no need for anticoagulation.  Did confirm with patient and son that he is DNR/DNI.  Patient will be admitted to the ICU.    4:48 AM I met with the patient to gather history and to perform my initial exam. I discussed the plan for care while in the Emergency Department. PPE: face mask, gloves, and goggles    5:39 AM Rechecked and updated patient.    5:46 AM Spoke with hospitalist Dr. Knight.    5:57 AM Spoke with Intensivist Dr. Randall.        At the conclusion of the encounter I discussed the results of all of the tests and  the disposition. The questions were answered. The patient or family acknowledged understanding and was agreeable with the care plan.         Critical Care     Performed by: Dr Fermin Talavera  Authorized by: Dr Fermin Talavera  Total critical care time: 90 minutes  Critical care was necessary to treat or prevent imminent or life-threatening deterioration of the following conditions:  COPD with BiPAP and respiratory failure.,  Atrial fibrillation with RVR on diltiazem drip.  Hyperkalemia  Critical care was time spent personally by me on the following activities: development of treatment plan with patient or surrogate, discussions with consultants, examination of patient, evaluation of patient's response to treatment, obtaining history from patient or surrogate, ordering and performing treatments and interventions, ordering and review of laboratory studies, ordering and review of radiographic studies, re-evaluation of patient's condition and monitoring for potential decompensation.  Critical care time was exclusive of separately billable procedures and treating other patients.      MEDICATIONS GIVEN IN THE EMERGENCY:  Medications   piperacillin-tazobactam (ZOSYN) 3.375 g vial to attach to  mL bag (has no administration in time range)   azithromycin 500 mg (ZITHROMAX) in 0.9% NaCl 250 mL intermittent infusion 500 mg (500 mg Intravenous New Bag 9/21/22 0603)   glucose gel 15-30 g (has no administration in time range)     Or   dextrose 50 % injection 25-50 mL (has no administration in time range)     Or   glucagon injection 1 mg (has no administration in time range)   sodium bicarbonate 8.4 % injection 50 mEq (has no administration in time range)   furosemide (LASIX) injection 40 mg (has no administration in time range)   diltiazem (CARDIZEM) 125 mg in sodium chloride 0.7 % 125 mL infusion (10 mg/hr Intravenous Rate/Dose Change 9/21/22 0626)   albuterol (PROVENTIL) neb solution 5 mg (5 mg Nebulization Given 9/21/22  0510)   methylPREDNISolone sodium succinate (solu-MEDROL) injection 125 mg (125 mg Intravenous Given 9/21/22 0519)   diltiazem (CARDIZEM) injection 25 mg (25 mg Intravenous Given 9/21/22 0513)   calcium gluconate 1 g in 50 mL sodium chloride intermittent infusion (premix) (1 g Intravenous Given 9/21/22 0554)   dextrose 10% infusion (300 mLs Intravenous New Bag 9/21/22 0629)   dextrose 50 % injection 25 g (25 g Intravenous Given 9/21/22 0627)   insulin regular 1 unit/mL injection 10 Units (10 Units Intravenous Given 9/21/22 0625)       NEW PRESCRIPTIONS STARTED AT TODAY'S ER VISIT  New Prescriptions    No medications on file          =================================================================    HPI    Patient information was obtained from: patient and patient's grandson    Use of : N/A         Ki Pereira is a 67 year old male with a pertinent history of end stage COPD, BiPAP who presents to this ED via EMS for evaluation of breathing difficulty.    Patient reports worsening shortness of breath that started yesterday. He also reports lung pain, leg swelling, and leg pain. He denies chest pain. He is normally on 4 L oxygen nasal canula. EMS reports he was on hospice but was took off today. EMS gave him DUO neb en route. He is DNR/DNI. His temporal temperature was 99F and his rectal was 101.3F.    He is on steroids prednisone 20 mg daily for the past 4 years.    Per chart review, the patient was admitted to [facility] from 4/9/2022 to 4/13/2022 (4 days). The patient initially presented to the ED for evaluation of shortness of breath and chest pain. While in the ED patient had BiPAP and 2 DUO nebs with relief. His tachycardia significantly improved as his respiratory rate improved.  EKG did not show any evidence of ischemia, first troponin was negative.  Do not believe cardiac etiology or PE are clinically likely on this patient.  Chest x-ray does not show any evidence of  pneumonia, however with his COPD we did decide to cover with Rocephin.  At this time the patient will be admitted for further management. While admitted the patient had Acute on chronic hypoxemic respiratory failure. At time of discharge patient had the following medication changes: acetaminophen 325 mg, azithromycin 250mg, benzonatate 100mg, lorazepam 2mg.ml, morphine sulfate 20mg/ml, and prednisone 20mg.           REVIEW OF SYSTEMS   Review of Systems   Constitutional: Positive for fever.   Respiratory: Positive for shortness of breath.         Positive for lung pain   Cardiovascular: Positive for leg swelling. Negative for chest pain.   Musculoskeletal: Positive for myalgias (leg pain).   All other systems reviewed and are negative.      PAST MEDICAL HISTORY:  No past medical history on file.    PAST SURGICAL HISTORY:  No past surgical history on file.        CURRENT MEDICATIONS:    Current Facility-Administered Medications   Medication     azithromycin 500 mg (ZITHROMAX) in 0.9% NaCl 250 mL intermittent infusion 500 mg     glucose gel 15-30 g    Or     dextrose 50 % injection 25-50 mL    Or     glucagon injection 1 mg     diltiazem (CARDIZEM) 125 mg in sodium chloride 0.7 % 125 mL infusion     furosemide (LASIX) injection 40 mg     piperacillin-tazobactam (ZOSYN) 3.375 g vial to attach to  mL bag     sodium bicarbonate 8.4 % injection 50 mEq     No current outpatient medications on file.         ALLERGIES:  No Known Allergies    FAMILY HISTORY:  No family history on file.    SOCIAL HISTORY:        VITALS:  /51   Pulse (!) 156   Temp 99  F (37.2  C) (Temporal)   Resp 13   Wt 109.8 kg (242 lb)   SpO2 97%     PHYSICAL EXAM    Physical Exam  Constitutional:       General: He is not in acute distress.     Appearance: He is ill-appearing. He is not diaphoretic.   HENT:      Head: Atraumatic.   Eyes:      General: No scleral icterus.     Pupils: Pupils are equal, round, and reactive to light.    Cardiovascular:      Rate and Rhythm: Tachycardia present. Rhythm irregular.      Heart sounds: Normal heart sounds.   Pulmonary:      Effort: Respiratory distress present.      Breath sounds: Wheezing present.   Abdominal:      General: Bowel sounds are normal.      Palpations: Abdomen is soft.      Tenderness: There is no abdominal tenderness.   Musculoskeletal:         General: No tenderness.   Skin:     General: Skin is warm.      Findings: No rash.           LAB:  All pertinent labs reviewed and interpreted.  Labs Ordered and Resulted from Time of ED Arrival to Time of ED Departure   BASIC METABOLIC PANEL - Abnormal       Result Value    Sodium 138      Potassium 6.2 (*)     Chloride 104      Carbon Dioxide (CO2) 26      Anion Gap 8      Urea Nitrogen 45 (*)     Creatinine 1.66 (*)     Calcium 8.8      Glucose 85      GFR Estimate 45 (*)    B-TYPE NATRIURETIC PEPTIDE ([facility] ONLY) - Abnormal     (*)    LACTIC ACID WHOLE BLOOD - Abnormal    Lactic Acid 0.5 (*)    CBC WITH PLATELETS AND DIFFERENTIAL - Abnormal    WBC Count 10.6      RBC Count 4.02 (*)     Hemoglobin 12.0 (*)     Hematocrit 40.3            MCH 29.9      MCHC 29.8 (*)     RDW 14.2      Platelet Count 229      % Neutrophils 67      % Lymphocytes 18      % Monocytes 11      % Eosinophils 3      % Basophils 0      % Immature Granulocytes 1      NRBCs per 100 WBC 0      Absolute Neutrophils 7.1      Absolute Lymphocytes 1.9      Absolute Monocytes 1.2      Absolute Eosinophils 0.3      Absolute Basophils 0.0      Absolute Immature Granulocytes 0.1      Absolute NRBCs 0.0     TROPONIN I - Normal    Troponin I 0.03     INFLUENZA A/B & SARS-COV2 PCR MULTIPLEX - Normal    Influenza A PCR Negative      Influenza B PCR Negative      RSV PCR Negative      SARS CoV2 PCR Negative     MAGNESIUM - Normal    Magnesium 2.4     GLUCOSE MONITOR NURSING POCT   GLUCOSE MONITOR NURSING POCT   POTASSIUM   POTASSIUM   GLUCOSE MONITOR NURSING POCT   BLOOD  GAS ARTERIAL   BLOOD CULTURE   BLOOD CULTURE       RADIOLOGY:  Reviewed all pertinent imaging. Please see official radiology report.  XR Chest Port 1 View   Final Result   IMPRESSION: Shallow inspiration. Cardiomediastinal silhouette within normal limits. No focal consolidation or pleural effusion. Postsurgical change cervical spine.          EKG:    Performed at: 457  Impression: Atrial fibrillation with rapid ventricular response.  When compared to previous dated April 10, 2022 now in A. fib.  No acute ischemic changes.  A. fib with ventricular 176.  QRS 82.  QTc 427.      I have independently reviewed and interpreted the EKG(s) documented above.        I, Ruchi Valencia, am serving as a scribe to document services personally performed by Dr. Fermin Talavera, based on my observation and the provider's statements to me. I, Fermin Talavera MD attest that Ruchi Valencia is acting in a scribe capacity, has observed my performance of the services and has documented them in accordance with my direction.    Fermin Talavera M.D.  Emergency Medicine  [facility] EMERGENCY ROOM  [address] [id number] [phone]  Dept: [phone]     Fermin Talavera MD  09/21/22 0679

## 2022-09-21 NOTE — CONSULTS
We have been asked to see Ki Pederson at [facility] by Dr. Angeles Eaton for management of atrial fibrillation.    Impression and Plan     Assessment:  1. History of persistent atrial fibrillation now with rapid ventricular response. [id number] score is at least 4 (hypertension, history of transient ischemic attack/stroke, age 65-74).  2. Acute on chronic congestive heart failure with preserved left ventricular ejection fraction. He does not strike me as particularly fluid-overloaded. His elevated brain natriuretic peptide may be driven by rapid atrial fibrillation.  3. Severe chronic obstructive pulmonary disease on 4 liters home oxygen.  4. Reported history of transient ischemic attack/stroke. I am unable to find further details on this at this time.  5. Acute kidney injury with hyperkalemia.    Plan:    Continue intravenous amiodarone 0.5 mg/minute    Will overlap initiation with oral amiodarone 200 mg twice daily for 7 days followed by 200 mg daily possibly indefinitely (with appropriate monitoring)    If he is hemodynamically stable and not requiring vasopressors, we may be able to also add oral metoprolol tartrate which is generally well-tolerated in chronic obstructive pulmonary disease    I would hold on further diuresis at this time unless he starts to demonstrate new signs of fluid-overload    Continue rivaroxaban 20 mg daily    Primary Cardiologist: previously seen inpatient by Dr. Dafne Nascimento    Clinically Significant Risk Factors Present on Admission     # Hyperkalemia: K = 5.7 mmol/L (Ref range: 3.5 - 5.0 mmol/L) on admission, will monitor as appropriate        # Coagulation Defect: home medication list includes an anticoagulant medication    # Circulatory Shock: currently requiring pressors for blood pressure support      Cardiovascular: Cardiac Arrhythmia: Atrial fibrillation: Persistent    Systemic: Chronic Fatigue and Other Debilities: Other reduced mobility       History of  Present Illness      Mr. Ki Pederson is a 67 year old male with a history of COPD on 4L home O2, persistent AF, reported history of TIA/stroke, HFpEF, and HTN admitted with weakness, altered mental status, and worsening shortness of breath. He was afebrile on arrival although he reportedly had a fever at one point with normal WBC count and procalcitonin. CXR shows no acute abnormalities. Troponins negative, BNP elevated at 272. Potassium high at 6.2. ECG shows AF with RVR. His BP has been low, requiring phenylephrine. He has been started on IV amiodarone. He is receiving IV furosemide. His respiratory status has improved.    He cannot provide with me with much history, just that he feels tired. He says his chest feels funny. Denies palpitations.    Review of Systems:  Further review of systems is otherwise negative/noncontributory (based on review of medical record (admission H&P) and 13 point review of systems reviewed. Pertinent positives noted).    Cardiac Diagnostics     ECG 9/21/2022 (personally reviewed and interpreted): atrial fibrillation with rapid ventricular response ventricular rate 176 bpm    Telemetry (personally reviewed): AF with RVR    Most recent:  Echocardiogram 2/9/2022 (results reviewed):  1. Normal left ventricular size and systolic performance with a visually estimated ejection fraction of 55-60%.  2. No significant valvular heart disease is identified on this study.  3. Normal right ventricular size and systolic performance.    Medical History  Surgical History Family History Social History   Past Medical History:   Diagnosis Date     Anxiety      CAD (coronary artery disease)      Chronic atrial fibrillation (H)      Chronic hepatitis C virus infection (H)      COPD (chronic obstructive pulmonary disease) (H)      Essential hypertension      Hemangioma     Left hand     Intravenous drug abuse in remission (H)     Sober since ~2010     Lumbar spinal stenosis      Peripheral neuropathy       Restless leg syndrome      Stroke (H)      Past Surgical History:   Procedure Laterality Date     CERVICAL FUSION      C6-7     PICC TRIPLE LUMEN PLACEMENT  2022          Family History   Problem Relation Age of Onset     Coronary Artery Disease Mother      Diabetes Mother            Social History     Socioeconomic History     Marital status: Patient Declined     Spouse name: Not on file     Number of children: Not on file     Years of education: Not on file     Highest education level: Not on file   Occupational History     Not on file   Tobacco Use     Smoking status: Former Smoker     Quit date: 2018     Years since quittin.7     Smokeless tobacco: Not on file   Substance and Sexual Activity     Alcohol use: Not Currently     Drug use: Not Currently     Comment: Sober from IV drug use since ~     Sexual activity: Not on file   Other Topics Concern     Not on file   Social History Narrative     Not on file     Social Determinants of Health     Financial Resource Strain: Not on file   Food Insecurity: Not on file   Transportation Needs: Not on file   Physical Activity: Not on file   Stress: Not on file   Social Connections: Not on file   Intimate Partner Violence: Not on file   Housing Stability: Not on file             Physical Examination   VITALS: /75 (BP Location: Left arm)   Pulse (!) 132   Temp 98.2  F (36.8  C) (Oral)   Resp 16   Wt 109.8 kg (242 lb)   SpO2 95%   BMI: There is no height or weight on file to calculate BMI.  Wt Readings from Last 3 Encounters:   22 109.8 kg (242 lb)       Intake/Output Summary (Last 24 hours) at 2022 1536  Last data filed at 2022 1533  Gross per 24 hour   Intake 320 ml   Output 1600 ml   Net -1280 ml       General Appearance:  Alert, cooperative, appears weak but in no acute distress.   Head:  Normocephalic, without obvious abnormality, atraumatic   Eyes:  PERRL, conjunctiva/corneas clear, EOM's intact   Ears:  Normal external ear  canals bilaterally   Nose: Nares normal, septum midline, no drainage   Throat: Lips, mucosa, and tongue normal; teeth and gums normal   Neck: Supple, symmetrical, trachea midline, no adenopathy, no carotid bruit or JVD   Back:   Symmetric, no abnormal curvature, ROM normal   Lungs:   Diminished breath sounds, respirations unlabored   Chest Wall:  No tenderness or deformity   Heart:  Irregularly irregular, S1, S2 normal,no murmur, rub or gallop   Abdomen:   Soft, non-tender, bowel sounds active all four quadrants,  no masses, no organomegaly   Extremities: Extremities normal, atraumatic, no cyanosis or edema   Skin: Skin color, texture, turgor normal, no rashes or lesions   Psychiatric: Normal affect, pleasant and cooperative   Neurologic: Alert and oriented X 3, Moves all 4 extremities            Imaging      CXR 9/21/2022 (report reviewed):  EXAM: XR CHEST PORT 1 VIEW  LOCATION: [facility]  DATE/TIME: 9/21/2022 9:51 AM     INDICATION: RN placed PICC   verify tip placement  COMPARISON: Portable AP view of the chest 9/21/2022 at 0533 hours                                                                      IMPRESSION:   There is a new right PICC. The tip of the catheter resides at the SVC right atrial junction.  Unchanged heart and mediastinal interfaces accounting for new RPO rotation.  The lungs are slightly underexpanded without focal opacity.  No pleural space abnormality.    CT chest 8/7/2020 (report reviewed):  1.  A triangular opacity in the right midlung has not changed from the prior study and has the appearance of scarring. No dedicated follow-up is required.   2.  Hepatic steatosis.  3.  Mild coronary artery calcification.     Lab Results    Chemistry/lipid CBC Cardiac Enzymes/BNP/TSH/INR     Recent Labs   Lab Test 09/21/22  1416 09/21/22  1151 09/21/22  1134 09/21/22  0655 09/21/22  0505   NA  --   --   --   --  138   POTASSIUM  --   --  5.7*   < > 6.2*   CHLORIDE  --   --   --    --  104   CO2  --   --   --   --  26   *   < >  --    < > 85   BUN  --   --   --   --  45*   CR  --   --   --   --  1.66*   GFRESTIMATED  --   --   --   --  45*   PALMER  --   --   --   --  8.8    < > = values in this interval not displayed.     Recent Labs   Lab Test 09/21/22  0505   CR 1.66*            Recent Labs   Lab Test 09/21/22  0505   WBC 10.6   HGB 12.0*   HCT 40.3           Recent Labs   Lab Test 09/21/22  0505   HGB 12.0*    Recent Labs   Lab Test 09/21/22  1134 09/21/22  0505   TROPONINI 0.02 0.03     Recent Labs   Lab Test 09/21/22  0505   *             Current Inpatient Scheduled Medications   Scheduled Meds:    [Held by provider] amLODIPine  5 mg Oral Daily     [START ON 9/22/2022] azithromycin  250 mg Intravenous Q24H     citalopram  40 mg Oral Daily     furosemide  40 mg Intravenous Q12H     [Held by provider] furosemide  40 mg Oral Daily     [Held by provider] gabapentin  600 mg Oral TID     [Held by provider] guaiFENesin  1,200 mg Oral Daily     insulin aspart  1-12 Units Subcutaneous Q4H     [Held by provider] lisinopril  40 mg Oral Daily     [START ON 9/22/2022] pantoprazole  40 mg Intravenous Daily with breakfast     piperacillin-tazobactam  3.375 g Intravenous Q8H     [Held by provider] potassium chloride ER  20 mEq Oral Daily     [START ON 9/22/2022] predniSONE  20 mg Oral Daily     rivaroxaban ANTICOAGULANT  20 mg Oral Daily     Continuous Infusions:    amiodarone 0.5 mg/min (09/21/22 1426)     phenylephrine Stopped (09/21/22 1002)          Medications Prior to Admission   Prior to Admission medications    Medication Sig Start Date End Date Taking? Authorizing Provider   albuterol (PROAIR HFA/PROVENTIL HFA/VENTOLIN HFA) 108 (90 Base) MCG/ACT inhaler Inhale 2 puffs into the lungs every 6 hours   Yes Unknown, Entered By History   amLODIPine (NORVASC) 5 MG tablet Take 5 mg by mouth daily   Yes Unknown, Entered By History   citalopram (CELEXA) 40 MG tablet Take 40 mg  by mouth daily   Yes Unknown, Entered By History   clonazePAM (KLONOPIN) 2 MG tablet Take 2 mg by mouth nightly as needed for anxiety   Yes Unknown, Entered By History   doxycycline hyclate (VIBRA-TABS) 100 MG tablet Take 100 mg by mouth daily   Yes Unknown, Entered By History   famotidine (PEPCID) 20 MG tablet Take 20 mg by mouth daily   Yes Unknown, Entered By History   furosemide (LASIX) 40 MG tablet Take 40 mg by mouth daily   Yes Unknown, Entered By History   gabapentin (NEURONTIN) 300 MG capsule Take 600 mg by mouth 3 times daily   Yes Unknown, Entered By History   guaiFENesin (MUCINEX) 600 MG 12 hr tablet Take 1,200 mg by mouth daily   Yes Unknown, Entered By History   ipratropium - albuterol 0.5 mg/2.5 mg/3 mL (DUONEB) 0.5-2.5 (3) MG/3ML neb solution Take 1 vial by nebulization every 6 hours as needed for shortness of breath / dyspnea or wheezing   Yes Unknown, Entered By History   lisinopril (ZESTRIL) 40 MG tablet Take 40 mg by mouth daily   Yes Unknown, Entered By History   Magnesium Oxide 250 MG TABS Take 1 tablet by mouth daily   Yes Unknown, Entered By History   oxyCODONE-acetaminophen (PERCOCET) 5-325 MG tablet Take 1 tablet by mouth every 6 hours as needed for severe pain   Yes Unknown, Entered By History   potassium chloride ER (KLOR-CON M) 20 MEQ CR tablet Take 20 mEq by mouth daily   Yes Unknown, Entered By History   predniSONE (DELTASONE) 20 MG tablet Take 20 mg by mouth daily   Yes Unknown, Entered By History   rivaroxaban ANTICOAGULANT (XARELTO) 20 MG TABS tablet Take 20 mg by mouth daily   Yes Unknown, Entered By History          Eliud Easley MD [facility]  Non-Invasive Cardiologist  [facility]  Pager [phone]

## 2022-09-21 NOTE — PROGRESS NOTES
Pt was transported from ED to ICU by ED RN on a 3 lpm Oxymask. RT took bipap to pt room and is on standby.

## 2022-09-21 NOTE — PROGRESS NOTES
Care Management Follow Up    Length of Stay (days): 0    Expected Discharge Date: 09/23/2022  Attempted to stop and see he and his son. Both sound asleep.       Ariella Olguin RN

## 2022-09-21 NOTE — ED NOTES
"EMERGENCY DEPARTMENT SIGN OUT NOTE        ED COURSE AND MEDICAL DECISION MAKING  Patient was signed out to me by Dr Fermin Talavera at 6:00 AM    Patient already had been admitted to the hospitalist service and discussion with intensivist was done.    Was asked by nurse to evaluate patient because he had an episode of low blood pressure.  I immediately went to evaluate patient repeat blood pressure 101.  He is tolerating BiPAP.  He still in A. fib with RVR.    He is currently maxed on a diltiazem drip.  Spoke with Dr. Benitez intensivist who recommends amiodarone bolus and drip.  This was ordered.    Patient with an episode of hypoglycemia corrected per protocol.    Dr. Arce hospitalist at bedside care now his hands.      In brief, Ki Pederson is a 67 year old male who initially presented with COPD exacerbation      Via shanique Donaldson for Dr. Talavera: \"Patient reports worsening shortness of breath that started yesterday. He also reports lung pain, leg swelling, and leg pain. He denies chest pain. He is normally on 4 L oxygen nasal canula. EMS reports he was on hospice but was took off today. EMS gave him DUO neb en route. He is DNR/DNI. His temporal temperature was 99F and his rectal was 101.3F\"    At time of sign out, patient is boarding in ED awaiting availability for an ICU bed    FINAL IMPRESSION    1. Chronic obstructive pulmonary disease with acute exacerbation (H)    2. Atrial fibrillation, unspecified type (H)    3. Hyperkalemia        ED MEDS  Medications   glucose gel 15-30 g (15 g Oral Given 9/21/22 0800)     Or   dextrose 50 % injection 25-50 mL ( Intravenous See Alternative 9/21/22 0800)     Or   glucagon injection 1 mg ( Subcutaneous See Alternative 9/21/22 0800)   diltiazem (CARDIZEM) 125 mg in sodium chloride 0.7 % 125 mL infusion (5 mg/hr Intravenous Rate/Dose Change 9/21/22 0905)   amiodarone (NEXTERONE) 360 mg in D5W 200 mL 1.8 mg/mL infusion (1 mg/min Intravenous Restarted 9/21/22 0834) "   amiodarone (NEXTERONE) 360 mg in D5W 200 mL 1.8 mg/mL infusion (has no administration in time range)   lidocaine 1 % 0.1-5 mL (has no administration in time range)   lidocaine (LMX4) cream (has no administration in time range)   sodium chloride (PF) 0.9% PF flush 10-40 mL (has no administration in time range)   phenylephrine (CHRISTI-SYNEPHRINE) 50 mg in NaCl 0.9 % 250 mL infusion PERIPHERAL (0.5 mcg/kg/min × 109.8 kg Intravenous New Bag 9/21/22 0857)   glucose gel 15-30 g (has no administration in time range)     Or   dextrose 50 % injection 25-50 mL (has no administration in time range)     Or   glucagon injection 1 mg (has no administration in time range)   heparin ANTICOAGULANT injection 5,000 Units (has no administration in time range)   ondansetron (ZOFRAN ODT) ODT tab 4 mg (has no administration in time range)     Or   ondansetron (ZOFRAN) injection 4 mg (has no administration in time range)   azithromycin (ZITHROMAX) 250 mg in sodium chloride 0.9 % 250 mL intermittent infusion (has no administration in time range)   piperacillin-tazobactam (ZOSYN) 3.375 g vial to attach to  mL bag (has no administration in time range)   albuterol (PROVENTIL) neb solution 5 mg (5 mg Nebulization Given 9/21/22 0510)   methylPREDNISolone sodium succinate (solu-MEDROL) injection 125 mg (125 mg Intravenous Given 9/21/22 0519)   diltiazem (CARDIZEM) injection 25 mg (25 mg Intravenous Given 9/21/22 0513)   piperacillin-tazobactam (ZOSYN) 3.375 g vial to attach to  mL bag (0 g Intravenous Stopped 9/21/22 0827)   azithromycin 500 mg (ZITHROMAX) in 0.9% NaCl 250 mL intermittent infusion 500 mg (0 mg Intravenous Stopped 9/21/22 0711)   calcium gluconate 1 g in 50 mL sodium chloride intermittent infusion (premix) (1 g Intravenous Given 9/21/22 0554)   sodium bicarbonate 8.4 % injection 50 mEq (50 mEq Intravenous Given 9/21/22 0637)   dextrose 10% infusion ( Intravenous Rate/Dose Verify 9/21/22 0900)   dextrose 50 % injection  25 g (25 g Intravenous Given 9/21/22 0627)   insulin regular 1 unit/mL injection 10 Units (10 Units Intravenous Given 9/21/22 0625)   furosemide (LASIX) injection 40 mg (40 mg Intravenous Given 9/21/22 0638)   amiodarone (NEXTERONE) bolus 150 mg (0 mg Intravenous Stopped 9/21/22 0832)       LAB  Labs Ordered and Resulted from Time of ED Arrival to Time of ED Departure   BASIC METABOLIC PANEL - Abnormal       Result Value    Sodium 138      Potassium 6.2 (*)     Chloride 104      Carbon Dioxide (CO2) 26      Anion Gap 8      Urea Nitrogen 45 (*)     Creatinine 1.66 (*)     Calcium 8.8      Glucose 85      GFR Estimate 45 (*)    B-TYPE NATRIURETIC PEPTIDE ( EAST ONLY) - Abnormal     (*)    LACTIC ACID WHOLE BLOOD - Abnormal    Lactic Acid 0.5 (*)    CBC WITH PLATELETS AND DIFFERENTIAL - Abnormal    WBC Count 10.6      RBC Count 4.02 (*)     Hemoglobin 12.0 (*)     Hematocrit 40.3            MCH 29.9      MCHC 29.8 (*)     RDW 14.2      Platelet Count 229      % Neutrophils 67      % Lymphocytes 18      % Monocytes 11      % Eosinophils 3      % Basophils 0      % Immature Granulocytes 1      NRBCs per 100 WBC 0      Absolute Neutrophils 7.1      Absolute Lymphocytes 1.9      Absolute Monocytes 1.2      Absolute Eosinophils 0.3      Absolute Basophils 0.0      Absolute Immature Granulocytes 0.1      Absolute NRBCs 0.0     BLOOD GAS ARTERIAL - Abnormal    pH Arterial 7.25 (*)     pCO2 Arterial 62 (*)     pO2 Arterial 120 (*)     Bicarbonate Arterial 24      O2 Sat, Arterial 98.9 (*)     Oxyhemoglobin 97.1 (*)     Base Excess/Deficit (+/-) 0.2      FIO2 40      Sample Stabilized Temperature 37.0     POTASSIUM - Abnormal    Potassium 5.8 (*)    GLUCOSE BY METER - Abnormal    GLUCOSE BY METER POCT 100 (*)    GLUCOSE BY METER - Abnormal    GLUCOSE BY METER POCT 63 (*)    GLUCOSE BY METER - Abnormal    GLUCOSE BY METER POCT 121 (*)    GLUCOSE BY METER - Abnormal    GLUCOSE BY METER POCT 132 (*)    TROPONIN I  - Normal    Troponin I 0.03     INFLUENZA A/B & SARS-COV2 PCR MULTIPLEX - Normal    Influenza A PCR Negative      Influenza B PCR Negative      RSV PCR Negative      SARS CoV2 PCR Negative     MAGNESIUM - Normal    Magnesium 2.4     GLUCOSE BY METER - Normal    GLUCOSE BY METER POCT 70     GLUCOSE MONITOR NURSING POCT   GLUCOSE MONITOR NURSING POCT   GLUCOSE MONITOR NURSING POCT   POTASSIUM   TROPONIN I   GLUCOSE MONITOR NURSING POCT   PROCALCITONIN   BLOOD CULTURE   BLOOD CULTURE       EKG      RADIOLOGY    XR Chest Port 1 View   Final Result   IMPRESSION: Shallow inspiration. Cardiomediastinal silhouette within normal limits. No focal consolidation or pleural effusion. Postsurgical change cervical spine.          DISCHARGE MEDS  New Prescriptions    No medications on file       Parul Lentz MD  [facility] EMERGENCY ROOM  [facility] [facility] [address] [phone]     Kenya Lentz MD  09/21/22 8101

## 2022-09-22 NOTE — PLAN OF CARE
Goal Outcome Evaluation:      Problem: Plan of Care - These are the overarching goals to be used throughout the patient stay.    Goal: Optimal Comfort and Wellbeing  Outcome: Ongoing, Progressing     Problem: Risk for Delirium  Goal: Improved Behavioral Control  Outcome: Ongoing, Progressing     Problem: Dysrhythmia  Goal: Normalized Cardiac Rhythm  Outcome: Ongoing, Progressing     Patient continues to be in a fib w/RVR on the tele monitor. Blood pressures have been stable. Continues to be on the amio gtt - dose changed per order/protocol. Plan to start po amio tonight. Cardiology following. Prn pain medication utilized for patient's chronic back pain. Is a 1:1 for patient safety. Impulsive. Tries to climb out of bed without asking for help. Forgetful and disoriented to time. BG Q4 with sliding scale insulin. Taking in PO well at this time. Oxygen via nasal cannula @ 3LPM - patient's baseline is 4LPM per family. Patient's son, Katlyn, involved in care. Met with palliative care provider earlier today to establish goals of care. Purposeful rounding performed. Thais You RN

## 2022-09-22 NOTE — PROGRESS NOTES
Care Management Initial Consult    General Information  Assessment completed with: saige Mejias  Type of CM/SW Visit: Initial Assessment    Primary Care Provider verified and updated as needed:     Readmission within the last 30 days:        Reason for Consult: discharge planning  Advance Care Planning:            Communication Assessment  Patient's communication style: spoken language (English or Bilingual)    Hearing Difficulty or Deaf: no   Wear Glasses or Blind: yes    Cognitive  Cognitive/Neuro/Behavioral: .WDL except, orientation        Orientation: disoriented to, time, situation  Mood/Behavior: calm  Best Language: 0 - No aphasia  Speech: clear, spontaneous    Living Environment:   People in home: child(katie), adult     Current living Arrangements: house      Able to return to prior arrangements: yes       Family/Social Support:  Care provided by: child(katie)  Provides care for: no one                Description of Support System:    Lives in his home with his daughter, and son helps provide care as well.        Current Resources:   Patient receiving home care services:  Was with [facility] hospice, would like to explore another hospice service.     Community Resources:    Equipment currently used at home: walker, rolling  Supplies currently used at home:      Employment/Financial:  Employment Status:          Financial Concerns:             Lifestyle & Psychosocial Needs:  Social Determinants of Health     Tobacco Use: Medium Risk     Smoking Tobacco Use: Former Smoker     Smokeless Tobacco Use: Unknown   Alcohol Use: Not on file   Financial Resource Strain: Not on file   Food Insecurity: Not on file   Transportation Needs: Not on file   Physical Activity: Not on file   Stress: Not on file   Social Connections: Not on file   Intimate Partner Violence: Not on file   Depression: Not on file   Housing Stability: Not on file       Functional Status:  Prior to admission patient needed assistance: He is  incredibly short of breath with any movement. Family assists with everything, they allow him to try, but he needs help with most everything.         Additional Information:  Spoke with Katlyn (son) on phone. He shares that they would like to care for their dad at home with hospice. They felt they were not getting their needs met by [facility], that is why they came into the hospital. Would like a different hospice company. Referral sent to [facility] with sons approval. Will continue to assist with discharge planning.      Ariella Olgiun, RN    343pm spoke with Leda re: hospice referral. They can accept. And will reach out to family. Leda gone Friday, 9/23/22, Nimisha will be covering for her, she can be reached at [phone].

## 2022-09-22 NOTE — PROGRESS NOTES
"[facility]  Palliative Care Daily Progress Note      Code status: No CPR- Do NOT Intubate     Impressions, Recommendations & Counseling     SYMPTOM ASSESSMENT:  1.  Shortness of breath secondary to end stage COPD with exacerbation - improved  -Appreciate hospital medicine service and pulmonary/intensivist management and expertise.  - Lasix 40 mg IV BID given 9/21  - Continue oxygen to keep saturation >90%.  BiPAP per intensivist recommendation.      2.  Generalized weakness secondary to advanced COPD and activity intolerance and deconditioning  -Up with assist.  Activity as tolerated.     3.  Chronic pain syndrome, bilateral hip pain and neuropathy  -Add Percocet 1 tab by mouth every 4 hours as needed.    When condition more stabilized could increase to 1 to 2 tablets every 4 hours as needed.  - When patient more alert and less encephalopathic, resume gabapentin 600 mg 3 times a day or could consider resuming at half the dose 3 mg 3 times a day to start. Held today.     4.  Bowel regimen while on opiates  - Add senna 1 tab by mouth twice daily as needed at this time.  Consider scheduling.     ADVANCED CARE PLANNING  -Hospice consult ordered and family wish to use [facility] at discharge.   -Care management consult ordered to facilitate discharge planning and connection to [facility].    GOALS OF CARE DISCUSSION  - Code status: DNR/DNI.  -Patient agreeable to BiPAP if needed to help ease air hunger and to treat saturations, amiodarone for rate control until he can be stabilized and converted to oral medications and otherwise more conservative medical management.  -Goal will be to discharge back home with hospice care.  -POLST on file in \"merged chart\" and was completed 4/12/2022 to reflect patient's wishes for DNR/I, comfort focused care, no tube feeding and oral antibiotics.  -Patient's key decision makers are his daughter, Ethan Pederson and son, Katlyn Pederson.      HPI        " "Notes and chart reviewed.    Ki Pederson is a 67 year old male with past medical history of COPD, 4 L of oxygen at home, atrial fibrillation, myocardial infarction, chronic hepatitis C, restless leg syndrome, peripheral neuropathy, spinal stenosis and CVA without any deficits.  Patient presented to [facility] ED with increased confusion, fever, tachycardia with heart rate in the 150s, shortness of breath and increased lower extremity edema.  Patient has been started on BiPAP and is admitted to \"ICU status\" and being boarded in the ED.  Being treated for acute metabolic encephalopathy and septic shock.  Patient was on hospice at home ([facility]) which was been revoked this morning as patient wishes to be treated for acute problems.  Historically, family report a physical decline since April 2022 with his COPD.  He has been on hospice at home with his daughter since previous admission.  There had been some fluctuations and frequencies of visits and missed appointments when scheduled appointments were to be had.       Today, the patient was seen for:  Goals of care, SOB, patient and family support, bilateral hip and chronic back pain    Prognosis, Goals, or Advance Care Planning was addressed today with: Yes.  Mood, coping, and/or meaning in the context of serious illness were addressed today: Yes.  Summary/Comments: continue current cares and treatments.            Interval History:     Chart review/discussion with unit or clinical team members:   Afebrile. Intermittently tachycardic - on amiordarone. Tachypnea noted. O2 sats 98% 3L NC. Impulsive and trying to get OOB at times per chart review. More peaceful and calm this afternoon during visit.    Per patient or family/caregivers today:  Message left for sonKatlyn.    [facility] Pall[facility] Symptoms:  # Pain severity the last 12 hours: low  # Dyspnea severity the last 12 hours: none  # Nausea severity the last 12 hours: none  # Anxiety severity the last 12 hours: " none    Patient is on opioids: assessed and I made recommendations about bowel care as above.           Review of Systems:     Besides above, an additional system ROS was reviewed and is unremarkable.          Medications:     I have reviewed this patient's medication profile and medications during this hospitalization.           Physical Exam:   Temp:  [97.5  F (36.4  C)-98.8  F (37.1  C)] 98.8  F (37.1  C)  Pulse:  [104-151] 124  Resp:  [10-33] 32  BP: ()/(50-82) 104/77  FiO2 (%):  [30 %] 30 %  SpO2:  [91 %-99 %] 98 %    Head: Normocephalic, without obvious abnormality, atraumatic  Eyes: Lids and lashes normal.  Nose: no discharge  Throat: lips, mucosa, and tongue normal; teeth and gums normal  Lungs: decreased bases bilaterally, few scattered expiratory wheeze noted onleft anteriorly  Heart: tachycardic   Abdomen: Soft, nontender, nondistended, positive bowel sounds x4  Extremities: trace - 1+ lower extremity edema.  No cyanosis or nail clubbing noted.   Skin: small abrasion or skin rub on left lateral shin  Neurologic: alert. Oriented x4           Data Reviewed:     Pertinent Labs  Lab Results: personally reviewed.   Lab Results   Component Value Date     09/22/2022    CO2 27 09/22/2022    BUN 46 09/22/2022     Lab Results   Component Value Date    WBC 10.8 09/22/2022    HGB 10.8 09/22/2022    HCT 34.3 09/22/2022    MCV 96 09/22/2022     09/22/2022     No results found for: AST, ALT, ALKPHOS, ALBUMIN      Radiology Results  XR Chest Port 1 View    Result Date: 9/21/2022  EXAM: XR CHEST PORT 1 VIEW LOCATION: [facility] DATE/TIME: 9/21/2022 9:51 AM INDICATION: RN placed PICC   verify tip placement COMPARISON: Portable AP view of the chest 9/21/2022 at 0533 hours     IMPRESSION: There is a new right PICC. The tip of the catheter resides at the SVC right atrial junction. Unchanged heart and mediastinal interfaces accounting for new RPO rotation. The lungs are slightly  underexpanded without focal opacity. No pleural space abnormality.    XR Chest Port 1 View    Result Date: 9/21/2022  EXAM: XR CHEST PORT 1 VIEW LOCATION: [facility] DATE/TIME: 9/21/2022 5:39 AM INDICATION: dyspnea COMPARISON: 04/09/2022     IMPRESSION: Shallow inspiration. Cardiomediastinal silhouette within normal limits. No focal consolidation or pleural effusion. Postsurgical change cervical spine.     TTS: I have personally spent a total of 25 minutes today on unit in review of medical record, consultation with the medical providers and assessment of patient today, with more than 50% of this time spent in counseling, coordination of care, and discussion with patient re: symptom management, providing support and discussing goals of care.    TEDDY Bobby, DESHAWN, CNS  Palliative Care  [phone]

## 2022-09-22 NOTE — PROGRESS NOTES
ICU Update Note    Pt did not need phenylephrine after he arrived at the unit yesterday afternoon. He says he feels better this morning. Cardiology is following for his afib with RVR.    Will sign off, please let our service know if any change in clinical condition or questions.    D/w Dr. Shiraz Eaton, DO  Pulmonary and Critical Care Attending  pgr [phone]

## 2022-09-22 NOTE — PLAN OF CARE
Goal: Optimal Comfort and Wellbeing  Outcome: Ongoing, Progressing     Problem: Dysrhythmia  Goal: Normalized Cardiac Rhythm  Outcome: Ongoing, Progressing     Pt complained of back pain, gave percocet x1. PO amiodarone was held this morning due to low BP, notified cardiology; per cardiology's note kept the amiodarone gtt running. Will continue to monitor.

## 2022-09-22 NOTE — PLAN OF CARE
[id number]: RASS -1-+1 on shift. Pt intermittently impulsive with getting up. Sitter on bedside on shift. C/o back pain-gave prn percocet x1-effective. Tele: A-fib RVR, -125s. Continues on amio drip. Maintaining adequate pressures. Still remains off of nancy. LS dim/coarse. Sats maintained on 3L O2 via NC. Using urinal at bedside. Q4hr blood sugar checks- cover with insulin as needed. No acute events overnight.     Problem: Plan of Care - These are the overarching goals to be used throughout the patient stay.    Goal: Optimal Comfort and Wellbeing  Outcome: Ongoing, Progressing    Problem: Risk for Delirium  Goal: Optimal Coping  Outcome: Ongoing, Progressing     Problem: Risk for Delirium  Goal: Improved Sleep  Outcome: Ongoing, Progressing     Problem: Dysrhythmia  Goal: Normalized Cardiac Rhythm  Outcome: Ongoing, Progressing

## 2022-09-22 NOTE — PROGRESS NOTES
[facility] MEDICINE  PROGRESS NOTE     Code Status: No CPR- Do NOT Intubate       Identification/Summary:   Ki Pederson is a 67 year old male with PMH signficant for COPD 4 L on hospice, A. fib, MI, CVA no deficits, chronic hep C, RLS, peripheral neuropathy and spinal stenosis.  Presented with confusion, shortness of breath fever and increased lower extremity edema.  Heart rate in 150s.  On BiPAP. Admitted to ICU.   Intensivist and cardiology consulted.  Diltiazem drip ineffective.  Transition to amiodarone.   9/22 respiratory status improved so downgraded.  Still tachycardic.     Assessment and Plan:  -Acute metabolic encephalopathy  -Septic shock  -Fever-resolved  Multifocal etiology.   ICU admission.  Intensivist consult appreciated.  Empirically initiated on Solu-Medrol, Zosyn and azithromycin.  Procalcitonin 0.03.  PICC line placed.  Appreciate palliative consultation.  Patient intends to sign back on hospice at time of eventual discharge.  9/22 doing better.  Downgraded to cardiac telemetry status.  Continue antibiotics.  -Acute on chronic hypoxemic hypercapnic respiratory failure 4 L  -COPD  Initiated on BiPAP at admission and oxygen demand later decreased.  9/22 downgraded to cardiac telemetry status.  On 3 L.   Solu-Medrol transition to prednisone 20 mg daily.  -Acute on chronic diastolic heart failure  -Atrial fibrillation with RVR  BNP elevated at 272.  Echocardiogram from February 2022 shows EF 55 to 60% otherwise normal.  Patient has been unresponsive to diltiazem drip.  Amiodarone drip initiated in the ED.  Given Lasix 40 mg IV x1.  Negative serial troponins.  Appreciate cardiology consultation.  Discussed case with Dr. Easley.  Unable to give oral amiodarone this morning due to hypotension.  We will try to give later this evening.  Give normal saline bolus 500x1.  Monitor response.  Echocardiogram ordered but unable to perform at this time due to his significant  tachycardia.  They will try again on 9/23.  In looking for other potential etiologies to the tachycardia will check D-dimer and lower extremity ultrasound.  -Acute kidney injury  -Hyperkalemia  At baseline patient has normal renal function.  Admission creatinine 1.6 with potassium of 6.2.  Given Lasix 40 mg IV x1, insulin 10 units IV, calcium gluconate and D50.  Recheck potassium 5.8--> 5.7--> 5.1.  Creatinine improved to 1.48.  Follow daily BMP.  -Essential hypertension  -Coronary artery disease  Due to hypotension holding his Norvasc, Lasix, lisinopril and potassium.  -Anxiety/depression  -Restless leg syndrome  Continue his Celexa.  Holding his gabapentin at this time.  -History of CVA  Son denies any deficits leftover from his stroke.  -Chronic hepatitis C  -History of IV drug use sober since ~2010  Noted.  Mid May 2022 was prescribed 8 weeks of Mavyret.  Uncertain on completion of this treatment course or not.  -Lumbar stenosis  -Peripheral neuropathy  Noted.  Fall precautions.     -COVID19 PCR status negative from 9/21/2022  -Influenza and RSV PCR's negative  Noted.  Standard precautions  -Chronic anticoagulation   Heparin SQ initially.  Now transitioned to his home Xarelto 20 mg daily.  High risk for thromboembolism  Fluids: Normal saline 500 mL bolus x1  Pain meds: na  Therapy: na  Fine:Not present  Current Diet  Orders Placed This Encounter      Regular Diet Adult    Supplements  None    Barriers to Discharge: A. fib RVR, amiodarone drip    Disposition: To be determined    Clinically Significant Risk Factors Present on Admission               # Obesity: Estimated body mass index is 33.46 kg/m  as calculated from the following:    Height as of this encounter: 1.829 m (6').    Weight as of this encounter: 111.9 kg (246 lb 11.2 oz).        Interval History/Subjective:  Patient continues to remain tachycardic 120s.  Breathing is better off of BiPAP and now just requiring 3 L.  Denies chest pain.  No nausea or  vomiting.  No family present at this time.  Questions answered to verbalized satisfaction.    Physical Exam/Objective:  Temp:  [97.5  F (36.4  C)-98.8  F (37.1  C)] 97.7  F (36.5  C)  Pulse:  [104-132] 130  Resp:  [11-32] 16  BP: ()/(50-81) 100/77  SpO2:  [92 %-99 %] 97 %  Wt Readings from Last 4 Encounters:   09/22/22 111.9 kg (246 lb 11.2 oz)     Body mass index is 33.46 kg/m .    Constitutional: fatigued, somnolent, cooperative, no apparent distress, appears stated age and mildly obese  ENT: Normocephalic, without obvious abnormality, atraumatic, external ears without lesions, oral pharynx with moist mucous membranes, tonsils without erythema or exudates, gums normal and good dentition.  Respiratory: Poor air movement but no rhonchi rales nor wheezing.  Cardiovascular: Tachycardic irregular rhythm  GI: No scars, normal bowel sounds, soft, non-distended, non-tender, no masses palpated, no hepatosplenomegally  Skin: normal skin color, texture, turgor, no redness, warmth, or swelling, and no rashes  Musculoskeletal: There is no redness, warmth, or swelling of the joints.  Motor strength is 5 out of 5 all extremities bilaterally.  Tone is normal.  Trace lower extremity edema bilaterally  Neurologic: Cranial nerves II-XII are grossly intact. Sensory:  Sensory intact  Neuropsychiatric: General: normal, calm and normal eye contact Level of consciousness: drowsy Affect: flat Orientation: oriented to self and place Memory and insight: impaired:       Medications:   Personally Reviewed.  Medications     amiodarone 0.5 mg/min (09/22/22 1327)       amiodarone  200 mg Oral BID     [START ON 9/29/2022] amiodarone  200 mg Oral Daily     [Held by provider] amLODIPine  5 mg Oral Daily     azithromycin  250 mg Intravenous Q24H     citalopram  40 mg Oral Daily     [Held by provider] furosemide  40 mg Oral Daily     [Held by provider] gabapentin  600 mg Oral TID     [Held by provider] guaiFENesin  1,200 mg Oral Daily      insulin aspart  1-12 Units Subcutaneous Q4H     [Held by provider] lisinopril  40 mg Oral Daily     pantoprazole  40 mg Intravenous Daily with breakfast     piperacillin-tazobactam  3.375 g Intravenous Q8H     [Held by provider] potassium chloride ER  20 mEq Oral Daily     predniSONE  20 mg Oral Daily     rivaroxaban ANTICOAGULANT  20 mg Oral Daily       Data reviewed today: I personally reviewed all new medications, labs, imaging/diagnostics reports over the past 24 hours. Pertinent findings include:    Imaging:   No results found for this or any previous visit (from the past 24 hour(s)).    Labs:  XR Chest Port 1 View   Final Result   IMPRESSION:       There is a new right PICC. The tip of the catheter resides at the SVC right atrial junction.      Unchanged heart and mediastinal interfaces accounting for new RPO rotation.      The lungs are slightly underexpanded without focal opacity.      No pleural space abnormality.      XR Chest Port 1 View   Final Result   IMPRESSION: Shallow inspiration. Cardiomediastinal silhouette within normal limits. No focal consolidation or pleural effusion. Postsurgical change cervical spine.        Recent Results (from the past 24 hour(s))   Glucose by meter    Collection Time: 09/21/22  4:30 PM   Result Value Ref Range    GLUCOSE BY METER POCT 261 (H) 70 - 99 mg/dL   Troponin I    Collection Time: 09/21/22  5:22 PM   Result Value Ref Range    Troponin I 0.01 0.00 - 0.29 ng/mL   Glucose by meter    Collection Time: 09/21/22  8:02 PM   Result Value Ref Range    GLUCOSE BY METER POCT 254 (H) 70 - 99 mg/dL   Glucose by meter    Collection Time: 09/21/22 11:46 PM   Result Value Ref Range    GLUCOSE BY METER POCT 164 (H) 70 - 99 mg/dL   Glucose by meter    Collection Time: 09/22/22  4:02 AM   Result Value Ref Range    GLUCOSE BY METER POCT 180 (H) 70 - 99 mg/dL   CBC with platelets    Collection Time: 09/22/22  4:17 AM   Result Value Ref Range    WBC Count 10.8 4.0 - 11.0 10e3/uL    RBC  Count 3.59 (L) 4.40 - 5.90 10e6/uL    Hemoglobin 10.8 (L) 13.3 - 17.7 g/dL    Hematocrit 34.3 (L) 40.0 - 53.0 %    MCV 96 78 - 100 fL    MCH 30.1 26.5 - 33.0 pg    MCHC 31.5 31.5 - 36.5 g/dL    RDW 13.5 10.0 - 15.0 %    Platelet Count 191 150 - 450 10e3/uL   Basic metabolic panel    Collection Time: 09/22/22  4:17 AM   Result Value Ref Range    Sodium 138 136 - 145 mmol/L    Potassium 5.1 (H) 3.5 - 5.0 mmol/L    Chloride 102 98 - 107 mmol/L    Carbon Dioxide (CO2) 27 22 - 31 mmol/L    Anion Gap 9 5 - 18 mmol/L    Urea Nitrogen 46 (H) 8 - 22 mg/dL    Creatinine 1.48 (H) 0.70 - 1.30 mg/dL    Calcium 8.4 (L) 8.5 - 10.5 mg/dL    Glucose 156 (H) 70 - 125 mg/dL    GFR Estimate 52 (L) >60 mL/min/1.73m2   Glucose by meter    Collection Time: 09/22/22  7:35 AM   Result Value Ref Range    GLUCOSE BY METER POCT 161 (H) 70 - 99 mg/dL   Glucose by meter    Collection Time: 09/22/22 12:03 PM   Result Value Ref Range    GLUCOSE BY METER POCT 174 (H) 70 - 99 mg/dL       Pending Labs:  Unresulted Labs Ordered in the Past 30 Days of this Admission     Date and Time Order Name Status Description    9/21/2022  4:58 AM Blood Culture Peripheral Blood Preliminary     9/21/2022  4:58 AM Blood Culture Peripheral Blood Preliminary             Sp Weldon MD  [facility]  Phone: #[phone]

## 2022-09-23 NOTE — PROGRESS NOTES
Care Management Follow Up    Length of Stay (days): 2    Expected Discharge Date:  09/24/2022    Concerns to be Addressed:  Cardio status       Patient plan of care discussed at interdisciplinary rounds: Yes    Anticipated Discharge Disposition:  home     Anticipated Discharge Services:  hospice  Anticipated Discharge DME:  Possible need to switch out hospice home DME     Referrals Placed by CM/SW:  [facility]         Additional Information:  Cardiology following, medications adjustment and potential cardioversion.     Patient is from home, cared for by family and had [facility].  They are changing hospice services to [facility].    Spoke with Nimisha at [facility] [phone]. They will follow. Nimisha states they may need to switch out DME. Anticipating patient to discharge in 1-2 days.  She will need to be updated by CM so she can order DME.  Anticipating need for [facility].         Sally Hunt RN

## 2022-09-23 NOTE — PROVIDER NOTIFICATION
Text page to Dr. Easley. Patient's family is requesting to talk to you about patient's goals of care prior to cardioversion. Family will be at bedside within the hour.  Helder Gilbert RN  9/23/2022  2:07 PM

## 2022-09-23 NOTE — PROGRESS NOTES
Called daughter Ethan to notify her about plans for cardioversion per cardiology. Patient's daughter states the family does not want cardioversion prior to having a conversation with cardiology to talk abut goals of care.   Helder Gilbert RN  9/23/2022  2:09 PM

## 2022-09-23 NOTE — PROCEDURES
[facility]    Procedure: Cardioversion    Date/Time: 9/23/2022 4:14 PM  Performed by: Eliud Easley MD  Authorized by: Eliud Easley MD       UNIVERSAL PROTOCOL   Site Marked: NA  Prior Images Obtained and Reviewed:  Yes  Required items: Required blood products, implants, devices and special equipment available    Patient identity confirmed:  Verbally with patient, provided demographic data, hospital-assigned identification number, arm band and anonymous protocol, patient vented/unresponsive  Patient was reevaluated immediately before administering moderate or deep sedation or anesthesia  Confirmation Checklist:  Patient's identity using two indicators, relevant allergies, procedure was appropriate and matched the consent or emergent situation and correct equipment/implants were available  Time out: Immediately prior to the procedure a time out was called    Universal Protocol: the Joint Commission Universal Protocol was followed      SEDATION  Patient Sedated: Yes    Sedation Type:  Moderate (conscious) sedation  Sedation:  Diazepam and fentanyl  Vital signs: Vital signs monitored during sedation      PROCEDURE DETAILS  Cardioversion basis: elective  Indications: failure of anti-arrhythmic medications  Pre-procedure rhythm: atrial fibrillation  Patient position: patient was placed in a supine position  Chest area: chest area exposed  Electrodes: pads  Electrodes placed: anterior-posterior  Number of attempts: 1    Details of Attempts:  A single 200J shock was administered. The patient remained in atrial fibrillation. As only moderate sedation was available and a total of 5 mg diazepam and 125 mcg fentanyl were required to achieve adequate sedation for just one shock, a second attempt was not made. Would recommend deep sedation with anesthesia for any further attempts./  Post-procedure rhythm: atrial fibrillation      PROCEDURE    Patient Tolerance:  Patient tolerated the procedure well  with no immediate complications  Length of time physician/provider present for 1:1 monitoring during sedation: 15

## 2022-09-23 NOTE — PROGRESS NOTES
" [facility] - Palliative Care Daily Progress Note      September 23, 2022      Today, the patient was seen for:Goals of care    Impressions and  Recommendations        Palliative Encounter Summary/Comments:    1)     Goals of Care         Restorative: continue current   treatments/Therapies    Code Status DNR DNI    Plan is to return home with hospice           2). Advanced Care Planning   Health Care Directive:   -POLST on file in \"merged chart\" and was completed 4/12/2022 to reflect patient's wishes for DNR/I, comfort focused care, no tube feeding and oral antibiotics.   Surrogate:  Health Care Agent: Ethan Pederson and son, Katlyn Pederson.  Hospice consult ordered and family wish to use [facility] hospice at discharge.  They no longer want [facility] care  -Care management consult ordered to facilitate discharge planning and connection to [facility]    3) Symptom Management    1.  Shortness of breath secondary to end stage COPD with exacerbation - improved  -Appreciate [facility] medicine service and pulmonary/intensivist management and expertise.  - Lasix 40 mg IV BID given 9/21  - Continue oxygen to keep saturation >90%.  BiPAP per intensivist recommendation.      2.  Generalized weakness secondary to advanced COPD and activity intolerance and deconditioning  -Up with assist.  Activity as tolerated.     3.  Chronic pain syndrome, bilateral hip pain and neuropathy  -Add Percocet 1 tab by mouth every 4 hours as needed.    When condition more stabilized could increase to 1 to 2 tablets every 4 hours as needed.  - When patient more alert and less encephalopathic, resume gabapentin 600 mg 3 times a day or could consider resuming at half the dose 3 mg 3 times a day to start. Held today.     4.  Bowel regimen while on opiates  - Add senna 1 tab by mouth twice daily as needed at this time.  Consider scheduling.         4) Psychosocial/Spiritual support    Appreciate Palliative SW Assistance    Appreciate " "Palliative [place] Assistance    She receives family/Friend support         Assessments   Current Problem List:        HPI  Ki Pederson is a 67 year old male with past medical history of COPD, 4 L of oxygen at home, atrial fibrillation, myocardial infarction, chronic hepatitis C, restless leg syndrome, peripheral neuropathy, spinal stenosis and CVA without any deficits.  Patient presented to [facility] ED with increased confusion, fever, tachycardia with heart rate in the 150s, shortness of breath and increased lower extremity edema.  Patient has been started on BiPAP and is admitted to \"ICU status\" and being boarded in the ED.  Being treated for acute metabolic encephalopathy and septic shock.  Patient was on hospice at home ([facility]) which was been revoked this morning as patient wishes to be treated for acute problems.  Historically, family report a physical decline since April 2022 with his COPD.  He has been on hospice at home with his daughter since previous admission.  There had been some fluctuations and frequencies of visits and missed appointments when scheduled appointments were to be had.                        Interval History:       Chart review/discussion with unit or clinical team members:   9/23   Nursing  C/o chronic back pain 5/10 and getting some relief from pain meds (see MAR) & repositioning. VSS Afib RVR on Amio @ 0.5mg. 02/NC @ baseline and sats >92%. Lungs with occasional scattered wheezes & dim throughout. I & O adequate using urinal.      9/23  Cards:      still in AF with RVR:    Start oral metoprolol tartrate 25 mg twice daily      Continue intravenous amiodarone 0.5 mg/minute    Will overlap initiation with oral amiodarone 200 mg twice daily for 7 days followed by 200 mg daily possibly indefinitely (with appropriate monitoring)    If he has not had any significant lapse in anticoagulation, we could consider a direct current cardioversion later today. I would prefer to not need " to do an transesophageal echocardiogram given his respiratory status       Palliative Overview:       Patient is resting, no family present. He is on NC with sats  96%.  He is on amiodrip with SBP 96, I did not awaken him    Prognosis, Goals, or Advance Care Planning was addressed today with: Yes.  Mood, coping, and/or meaning in the context of serious illness were addressed today: Yes.    Key Palliative Symptoms:difficult to determine given encephalopathy   # Pain severity the last 12 hours: low  # Dyspnea severity the last 12 hours: low  # Nausea severity the last 12 hours: none  # Anxiety severity the last 12 hours: none             Review of Systems:           A full 14 point review of systems was obtained and is negative unless noted above:          Medications:     Current Facility-Administered Medications   Medication     0.9% sodium chloride BOLUS     albuterol (PROVENTIL) neb solution 5 mg     amiodarone (NEXTERONE) 360 mg in D5W 200 mL 1.8 mg/mL infusion     amiodarone (PACERONE) tablet 200 mg     [START ON 9/29/2022] amiodarone (PACERONE) tablet 200 mg     [Held by provider] amLODIPine (NORVASC) tablet 5 mg     azithromycin (ZITHROMAX) 250 mg in sodium chloride 0.9 % 250 mL intermittent infusion     citalopram (celeXA) tablet 40 mg     clonazePAM (klonoPIN) tablet 2 mg     glucose gel 15-30 g    Or     dextrose 50 % injection 25-50 mL    Or     glucagon injection 1 mg     [Held by provider] furosemide (LASIX) tablet 40 mg     [Held by provider] gabapentin (NEURONTIN) capsule 600 mg     [Held by provider] guaiFENesin (MUCINEX) 12 hr tablet 1,200 mg     insulin aspart (NovoLOG) injection (RAPID ACTING)     ipratropium - albuterol 0.5 mg/2.5 mg/3 mL (DUONEB) neb solution 3 mL     lidocaine (LMX4) cream     lidocaine 1 % 0.1-5 mL     [Held by provider] lisinopril (ZESTRIL) tablet 40 mg     metoprolol tartrate (LOPRESSOR) tablet 25 mg     naloxone (NARCAN) injection 0.2 mg    Or     naloxone (NARCAN) injection  0.4 mg    Or     naloxone (NARCAN) injection 0.2 mg    Or     naloxone (NARCAN) injection 0.4 mg     ondansetron (ZOFRAN ODT) ODT tab 4 mg    Or     ondansetron (ZOFRAN) injection 4 mg     oxyCODONE-acetaminophen (PERCOCET) 5-325 MG per tablet 1 tablet     pantoprazole (PROTONIX) IV push injection 40 mg     piperacillin-tazobactam (ZOSYN) 3.375 g vial to attach to  mL bag     [Held by provider] potassium chloride ER (KLOR-CON M) CR tablet 20 mEq     predniSONE (DELTASONE) tablet 20 mg     QUEtiapine (SEROquel) tablet 25 mg     rivaroxaban ANTICOAGULANT (XARELTO) tablet 20 mg     sennosides (SENOKOT) tablet 8.6 mg     sodium chloride (PF) 0.9% PF flush 10-40 mL        I have reviewed this patient's medication profile and medications during this hospitalization.               Physical Exam:   /86 (BP Location: Left arm)   Pulse (!) 139   Temp 96.8  F (36  C) (Oral)   Resp 22   Ht 1.829 m (6')   Wt 112.4 kg (247 lb 12.8 oz)   SpO2 100%   BMI 33.61 kg/m      GENERAL: laying in bed  NAD  SKIN: Warm and dry   HEENT: Normocephalic, anicteric sclera, moist mucous membranes  LUNGS:Breathing pattern regular and non-labored , on NC  CARDIAC: RVR per monior  EXTREMITIES: No edema or cyanosis, pulses 2+ and symmetrical  NEUROLOGIC: sleepy  PSYCH: calm                Data Reviewed:      Reviewed recent pertinent imaging and labs    yes    TT: I have personally spent a total of 25 minutes on the unit in review of medical record, consultation with the medical providers and assessment of patient today, with more than 50% of this time spent in counseling, coordination of care, and discussion with patient and staff re: prognosis, symptom management, and development of plan of care as noted above    DESHAWN Domingo, NP-C, [facility]  Palliative Medicine  Office: [phone]

## 2022-09-23 NOTE — PLAN OF CARE
Goal Outcome Evaluation:    Problem: Dysrhythmia  Goal: Normalized Cardiac Rhythm  Outcome: Ongoing, Progressing     Resting comfortably, Aox4. Remains in A-fib RVR with rates between 120-130s. Discussed with Dr. Weldon. Received 500 mL bolus x1, PO amiodarone resumed. D-Dimer negative, BLE ultrasound completed at bedside. On 3LNC with SpO2= 97%, lung sounds diminished. Son (Katlyn) present & supportive at bedside. Received PRN percocet x2. 1:1 sitter discontinued.     Current drips- amiodarone

## 2022-09-23 NOTE — PLAN OF CARE
Problem: Dysrhythmia  Goal: Normalized Cardiac Rhythm  Outcome: Ongoing, Progressing     Problem: Infection COPD (Chronic Obstructive Pulmonary Disease)  Goal: Absence of Infection Signs and Symptoms  Outcome: Ongoing, Progressing     Problem: Pain Chronic (Persistent)  Goal: Acceptable Pain Control and Functional Ability  Outcome: Ongoing, Progressing   Goal Outcome Evaluation:    Pt alert & oriented. Can make needs known & call light in reach. C/o chronic back pain 5/10 and getting some relief from pain meds (see MAR) & repositioning. VSS Afib RVR on Amio @ 0.5mg. 02/NC @ baseline and sats >92%. Lungs with occasional scattered wheezes & dim throughout. I & O adequate using urinal.

## 2022-09-23 NOTE — PROGRESS NOTES
[facility] MEDICINE  PROGRESS NOTE     Code Status: No CPR- Do NOT Intubate       Identification/Summary:   Ki Pederson is a 67 year old male with PMH signficant for COPD 4 L on hospice, A. fib, MI, CVA no deficits, chronic hep C, RLS, peripheral neuropathy and spinal stenosis.  Presented with confusion, shortness of breath fever and increased lower extremity edema.  Heart rate in 150s.  On BiPAP. Admitted to ICU.  Intensivist and cardiology consulted.  Diltiazem drip ineffective.  Transition to amiodarone.   9/22 respiratory status improved so downgraded.  Still tachycardic.  9/23 cardiology planning on cardioversion.     Assessment and Plan:  -Acute metabolic encephalopathy  -Septic shock  -Fever-resolved  Multifocal etiology.   ICU admit.  Intensivist consult appreciated.  Empirically initiated on Solu-Medrol, Zosyn and azithromycin.  Procalcitonin 0.03.  PICC line placed.  Appreciate palliative consultation.  Patient intends to sign back on hospice at time of eventual discharge.  9/22 doing better.  Downgraded to cardiac telemetry status.  Continue antibiotics.  -Acute on chronic diastolic heart failure  -Atrial fibrillation with RVR  BNP elevated at 272.  Echocardiogram from February 2022 shows EF 55 to 60% otherwise normal.  Patient has been unresponsive to diltiazem drip.  Amiodarone drip initiated in the ED.  Given Lasix 40 mg IV x1.  Negative serial troponins.  Echocardiogram pending heart rate improvement.  Negative D-dimer and lower extremity ultrasound no DVT.  Given normal saline bolus 500 mL x 1.  Despite adding oral amiodarone continues to be tachycardic.  9/23 we will attempt cardioversion.  Monitor response overnight.  -Acute on chronic hypoxemic hypercapnic respiratory failure 4 L  -COPD  Initiated on BiPAP at admission and oxygen demand later decreased.  9/22 downgraded to cardiac telemetry status.  On 3 L.   Solu-Medrol transition to prednisone 20 mg  daily.  -Acute kidney injury  -Hyperkalemia  At baseline patient has normal renal function.  Admission creatinine 1.6 with potassium of 6.2.  Given Lasix 40 mg IV x1, insulin 10 units IV, calcium gluconate and D50.  Recheck potassium 5.8--> 5.7--> 5.1.  Creatinine improved to 1.48--> 1.14.  Follow daily BMP.  -Essential hypertension  -Coronary artery disease  Due to hypotension, holding his Norvasc, Lasix, lisinopril and potassium.  -Anxiety/depression  -Restless leg syndrome  Continue his Celexa.  Holding his gabapentin at this time.  -History of CVA  Son denies any deficits leftover from his stroke.  -Chronic hepatitis C  -History of IV drug use sober since ~2010  Noted.  Mid May 2022 was prescribed 8 weeks of Mavyret.  Uncertain on completion of this treatment course or not.  -Lumbar stenosis  -Peripheral neuropathy  Noted.  Fall precautions.     -COVID19 PCR status negative from 9/21/2022  -Influenza and RSV PCR's negative  Noted.  Standard precautions  -Chronic anticoagulation   Heparin SQ initially.  Transitioned back to his home Xarelto 20 mg daily.  High risk for thromboembolism  Fluids:  Saline lock  Pain meds: na  Therapy: na   Fine:Not present  Current Diet  Orders Placed This Encounter      NPO for Medical/Clinical Reasons Except for: Meds    Supplements  None    Barriers to Discharge: Persistent tachycardia, possible cardioversion 9/23    Disposition: Possible discharge 9/24    Clinically Significant Risk Factors Present on Admission               # Obesity: Estimated body mass index is 33.61 kg/m  as calculated from the following:    Height as of this encounter: 1.829 m (6').    Weight as of this encounter: 112.4 kg (247 lb 12.8 oz).        Interval History/Subjective:  Patient feels well.  Breathing feels at baseline.  Much more alert and interactive than time of admission.  No chest pain.  No unusual shortness of breath.  No nausea or vomiting.  Does feel palpitations but manageable.  Agreeable to  cardioversion.  No family present.  Questions answered to verbalized satisfaction.    Physical Exam/Objective:  Temp:  [96.8  F (36  C)-99  F (37.2  C)] 96.8  F (36  C)  Pulse:  [117-139] 139  Resp:  [10-24] 22  BP: ()/(53-92) 121/86  SpO2:  [93 %-100 %] 100 %  Wt Readings from Last 4 Encounters:   09/23/22 112.4 kg (247 lb 12.8 oz)     Body mass index is 33.61 kg/m .    Constitutional: awake, alert, cooperative, no apparent distress, and appears stated age  ENT: Normocephalic, without obvious abnormality, atraumatic, external ears without lesions, oral pharynx with moist mucous membranes, tonsils without erythema or exudates, gums normal and good dentition.  Respiratory: Decreased air movement.  No wheezing.  Cardiovascular: Tachycardic irregular rhythm  GI: No scars, normal bowel sounds, soft, non-distended, non-tender, no masses palpated, no hepatosplenomegally  Skin: normal skin color, texture, turgor, no redness, warmth, or swelling, and no rashes  Musculoskeletal: There is no redness, warmth, or swelling of the joints.  Motor strength is 5 out of 5 all extremities bilaterally.  Tone is normal. no lower extremity pitting edema present  Neurologic: Cranial nerves II-XII are grossly intact. Sensory:  Sensory intact  Neuropsychiatric: General: normal, calm and normal eye contact Level of consciousness: alert / normal Affect: normal Orientation: oriented to self, place, time and situation Memory and insight: normal, memory for past and recent events intact and thought process normal      Medications:   Personally Reviewed.  Medications     amiodarone 0.5 mg/min (09/23/22 0753)       sodium chloride 0.9%  500 mL Intravenous Once     amiodarone  200 mg Oral BID     [START ON 9/29/2022] amiodarone  200 mg Oral Daily     [Held by provider] amLODIPine  5 mg Oral Daily     azithromycin  250 mg Intravenous Q24H     citalopram  40 mg Oral Daily     [Held by provider] furosemide  40 mg Oral Daily     [Held by  provider] gabapentin  600 mg Oral TID     [Held by provider] guaiFENesin  1,200 mg Oral Daily     insulin aspart  1-12 Units Subcutaneous Q4H     ipratropium - albuterol 0.5 mg/2.5 mg/3 mL  3 mL Nebulization TID     [Held by provider] lisinopril  40 mg Oral Daily     metoprolol tartrate  25 mg Oral BID     pantoprazole  40 mg Intravenous Daily with breakfast     piperacillin-tazobactam  3.375 g Intravenous Q8H     [Held by provider] potassium chloride ER  20 mEq Oral Daily     predniSONE  20 mg Oral Daily     rivaroxaban ANTICOAGULANT  20 mg Oral Daily       Data reviewed today: I personally reviewed all new medications, labs, imaging/diagnostics reports over the past 24 hours. Pertinent findings include:    Imaging:   Recent Results (from the past 24 hour(s))   US Lower Extremity Venous Duplex Bilateral    Narrative    EXAM: US LOWER EXTREMITY VENOUS DUPLEX BILATERAL  LOCATION: [facility]  DATE/TIME: 9/22/2022 4:13 PM    INDICATION: assess for dvt  COMPARISON: None.  TECHNIQUE: Venous Duplex ultrasound of bilateral lower extremities with and without compression, augmentation and duplex. Color flow and spectral Doppler with waveform analysis performed.    FINDINGS: Exam includes the common femoral, femoral, popliteal veins as well as segmentally visualized deep calf veins and greater saphenous vein.     RIGHT: No deep vein thrombosis. No superficial thrombophlebitis. No popliteal cyst.    LEFT: No deep vein thrombosis. No superficial thrombophlebitis. No popliteal cyst.      Impression    IMPRESSION:  1.  No deep venous thrombosis in the bilateral lower extremities.       Labs:  US Lower Extremity Venous Duplex Bilateral   Final Result   IMPRESSION:   1.  No deep venous thrombosis in the bilateral lower extremities.      XR Chest Port 1 View   Final Result   IMPRESSION:       There is a new right PICC. The tip of the catheter resides at the SVC right atrial junction.      Unchanged heart  and mediastinal interfaces accounting for new RPO rotation.      The lungs are slightly underexpanded without focal opacity.      No pleural space abnormality.      XR Chest Port 1 View   Final Result   IMPRESSION: Shallow inspiration. Cardiomediastinal silhouette within normal limits. No focal consolidation or pleural effusion. Postsurgical change cervical spine.      Echocardiogram Complete    (Results Pending)     Recent Results (from the past 24 hour(s))   Glucose by meter    Collection Time: 09/22/22 12:03 PM   Result Value Ref Range    GLUCOSE BY METER POCT 174 (H) 70 - 99 mg/dL   D dimer quantitative    Collection Time: 09/22/22  3:50 PM   Result Value Ref Range    D-Dimer Quantitative <=0.27 0.00 - 0.50 ug/mL FEU   Glucose by meter    Collection Time: 09/22/22  4:08 PM   Result Value Ref Range    GLUCOSE BY METER POCT 174 (H) 70 - 99 mg/dL   Glucose by meter    Collection Time: 09/22/22  9:00 PM   Result Value Ref Range    GLUCOSE BY METER POCT 177 (H) 70 - 99 mg/dL   Glucose by meter    Collection Time: 09/22/22 11:57 PM   Result Value Ref Range    GLUCOSE BY METER POCT 143 (H) 70 - 99 mg/dL   Glucose by meter    Collection Time: 09/23/22  3:57 AM   Result Value Ref Range    GLUCOSE BY METER POCT 139 (H) 70 - 99 mg/dL   CBC with platelets    Collection Time: 09/23/22  6:12 AM   Result Value Ref Range    WBC Count 8.6 4.0 - 11.0 10e3/uL    RBC Count 3.43 (L) 4.40 - 5.90 10e6/uL    Hemoglobin 10.4 (L) 13.3 - 17.7 g/dL    Hematocrit 32.9 (L) 40.0 - 53.0 %    MCV 96 78 - 100 fL    MCH 30.3 26.5 - 33.0 pg    MCHC 31.6 31.5 - 36.5 g/dL    RDW 13.8 10.0 - 15.0 %    Platelet Count 191 150 - 450 10e3/uL   Basic metabolic panel    Collection Time: 09/23/22  6:12 AM   Result Value Ref Range    Sodium 138 136 - 145 mmol/L    Potassium 4.5 3.5 - 5.0 mmol/L    Chloride 103 98 - 107 mmol/L    Carbon Dioxide (CO2) 31 22 - 31 mmol/L    Anion Gap 4 (L) 5 - 18 mmol/L    Urea Nitrogen 31 (H) 8 - 22 mg/dL    Creatinine 1.14  0.70 - 1.30 mg/dL    Calcium 8.2 (L) 8.5 - 10.5 mg/dL    Glucose 116 70 - 125 mg/dL    GFR Estimate 71 >60 mL/min/1.73m2   Glucose by meter    Collection Time: 09/23/22  7:58 AM   Result Value Ref Range    GLUCOSE BY METER POCT 157 (H) 70 - 99 mg/dL       Pending Labs:  Unresulted Labs Ordered in the Past 30 Days of this Admission     Date and Time Order Name Status Description    9/21/2022  4:58 AM Blood Culture Peripheral Blood Preliminary     9/21/2022  4:58 AM Blood Culture Peripheral Blood Preliminary             Sp Weldon MD  [facility]  Phone: #[phone]

## 2022-09-23 NOTE — PROGRESS NOTES
Pt requests to get nebs TID. He also uses bipap and home oxygen. WW home bipap placed in rm for sleep. 2nd neb was not given due to cardioversion, HR- 130's at times. RT attended cardioversion- ETC02, Ambu bag, oral airway, oxygen, and suction at bedside- not needed. Will continue to monitor and nebs TID

## 2022-09-23 NOTE — PROGRESS NOTES
Impression and Plan     Impression:   1. History of persistent atrial fibrillation now with rapid ventricular response. I think his rapid ventricular rates are reactive to whatever process is causing his acute illness and not the primary cause of his illness although he clinically looks much improved today yet remains tachycardic. [id number] score is at least 4 (hypertension, history of transient ischemic attack/stroke, age 65-74).  2. Acute on chronic congestive heart failure with preserved left ventricular ejection fraction. He seems euvolemic.  3. Severe chronic obstructive pulmonary disease on 4 liters home oxygen. Currently only requiring 3 liters of oxygen.  4. Reported history of transient ischemic attack/stroke. I am unable to find further details on this at this time.  5. Acute kidney injury with hyperkalemia. Resolved.    Plan:    Start oral metoprolol tartrate 25 mg twice daily    Continue intravenous amiodarone 0.5 mg/minute    Will overlap initiation with oral amiodarone 200 mg twice daily for 7 days followed by 200 mg daily possibly indefinitely (with appropriate monitoring)    If he has not had any significant lapse in anticoagulation, we could consider a direct current cardioversion later today. I would prefer to not need to do an transesophageal echocardiogram given his respiratory status    Continue rivaroxaban 20 mg daily    Primary Cardiologist: previously seen inpatient by Dr. Dafne Nascimento    Subjective     - given IV fluids and eating and drinking more, feels much better today  - still in AF with RVR    Cardiac Diagnostics   ECG 9/21/2022 (personally reviewed and interpreted): atrial fibrillation with rapid ventricular response ventricular rate 176 bpm     Telemetry (personally reviewed): AF with RVR     Echocardiogram 2/9/2022 (results reviewed):  1. Normal left ventricular size and systolic performance with a visually estimated ejection fraction of 55-60%.  2. No significant valvular  heart disease is identified on this study.  3. Normal right ventricular size and systolic performance.    Physical Examination       /86 (BP Location: Left arm)   Pulse (!) 139   Temp 96.8  F (36  C) (Oral)   Resp 22   Ht 1.829 m (6')   Wt 112.4 kg (247 lb 12.8 oz)   SpO2 100%   BMI 33.61 kg/m          Wt Readings from Last 3 Encounters:   09/23/22 112.4 kg (247 lb 12.8 oz)     Intake/Output Summary (Last 24 hours) at 9/23/2022 0837  Last data filed at 9/23/2022 0600  Gross per 24 hour   Intake 2381.11 ml   Output 750 ml   Net 1631.11 ml       General: pleasant male. No acute distress.  HENT: external ears normal. Nares patent. Mucous membranes moist.  Eyes: perrla, extraocular muscles intact. No scleral icterus.   Neck: No JVD  Lungs: diminished breath sounds  COR: irregularly irregular and tachycardic, No murmurs, rubs, or gallops  Abd: nondistended, BS present  Extrem: No edema         Imaging      CXR 9/21/2022 (report reviewed):  EXAM: XR CHEST PORT 1 VIEW  LOCATION: [facility]  DATE/TIME: 9/21/2022 9:51 AM     INDICATION: RN placed PICC   verify tip placement  COMPARISON: Portable AP view of the chest 9/21/2022 at 0533 hours                                                                      IMPRESSION:   There is a new right PICC. The tip of the catheter resides at the SVC right atrial junction.  Unchanged heart and mediastinal interfaces accounting for new RPO rotation.  The lungs are slightly underexpanded without focal opacity.  No pleural space abnormality.     CT chest 8/7/2020 (report reviewed):  1.  A triangular opacity in the right midlung has not changed from the prior study and has the appearance of scarring. No dedicated follow-up is required.   2.  Hepatic steatosis.  3.  Mild coronary artery calcification.    Lab Results   Lab Results   Component Value Date     09/23/2022    CO2 31 09/23/2022    BUN 31 09/23/2022     Lab Results   Component Value Date     WBC 8.6 09/23/2022    HGB 10.4 09/23/2022    HCT 32.9 09/23/2022    MCV 96 09/23/2022     09/23/2022     Lab Results   Component Value Date     09/21/2022     Lab Results   Component Value Date    TROPONINI 0.01 09/21/2022    TROPONINI 0.02 09/21/2022    TROPONINI 0.03 09/21/2022           Current Inpatient Scheduled Medications   Scheduled Meds:    amiodarone  200 mg Oral BID     [START ON 9/29/2022] amiodarone  200 mg Oral Daily     [Held by provider] amLODIPine  5 mg Oral Daily     azithromycin  250 mg Intravenous Q24H     citalopram  40 mg Oral Daily     [Held by provider] furosemide  40 mg Oral Daily     [Held by provider] gabapentin  600 mg Oral TID     [Held by provider] guaiFENesin  1,200 mg Oral Daily     insulin aspart  1-12 Units Subcutaneous Q4H     [Held by provider] lisinopril  40 mg Oral Daily     metoprolol tartrate  25 mg Oral BID     pantoprazole  40 mg Intravenous Daily with breakfast     piperacillin-tazobactam  3.375 g Intravenous Q8H     [Held by provider] potassium chloride ER  20 mEq Oral Daily     predniSONE  20 mg Oral Daily     rivaroxaban ANTICOAGULANT  20 mg Oral Daily     Continuous Infusions:    amiodarone 0.5 mg/min (09/23/22 0753)            Medications Prior to Admission   Prior to Admission medications    Medication Sig Start Date End Date Taking? Authorizing Provider   albuterol (PROAIR HFA/PROVENTIL HFA/VENTOLIN HFA) 108 (90 Base) MCG/ACT inhaler Inhale 2 puffs into the lungs every 6 hours   Yes Unknown, Entered By History   amLODIPine (NORVASC) 5 MG tablet Take 5 mg by mouth daily   Yes Unknown, Entered By History   citalopram (CELEXA) 40 MG tablet Take 40 mg by mouth daily   Yes Unknown, Entered By History   clonazePAM (KLONOPIN) 2 MG tablet Take 2 mg by mouth nightly as needed for anxiety   Yes Unknown, Entered By History   doxycycline hyclate (VIBRA-TABS) 100 MG tablet Take 100 mg by mouth daily   Yes Unknown, Entered By History   famotidine (PEPCID) 20 MG  tablet Take 20 mg by mouth daily   Yes Unknown, Entered By History   furosemide (LASIX) 40 MG tablet Take 40 mg by mouth daily   Yes Unknown, Entered By History   gabapentin (NEURONTIN) 300 MG capsule Take 600 mg by mouth 3 times daily   Yes Unknown, Entered By History   guaiFENesin (MUCINEX) 600 MG 12 hr tablet Take 1,200 mg by mouth daily   Yes Unknown, Entered By History   ipratropium - albuterol 0.5 mg/2.5 mg/3 mL (DUONEB) 0.5-2.5 (3) MG/3ML neb solution Take 1 vial by nebulization every 6 hours as needed for shortness of breath / dyspnea or wheezing   Yes Unknown, Entered By History   lisinopril (ZESTRIL) 40 MG tablet Take 40 mg by mouth daily   Yes Unknown, Entered By History   Magnesium Oxide 250 MG TABS Take 1 tablet by mouth daily   Yes Unknown, Entered By History   oxyCODONE-acetaminophen (PERCOCET) 5-325 MG tablet Take 1 tablet by mouth every 6 hours as needed for severe pain   Yes Unknown, Entered By History   potassium chloride ER (KLOR-CON M) 20 MEQ CR tablet Take 20 mEq by mouth daily   Yes Unknown, Entered By History   predniSONE (DELTASONE) 20 MG tablet Take 20 mg by mouth daily   Yes Unknown, Entered By History   rivaroxaban ANTICOAGULANT (XARELTO) 20 MG TABS tablet Take 20 mg by mouth daily   Yes Unknown, Entered By History          Eliud Easley MD [facility]  Non-Invasive Cardiologist  [facility]  Pager [phone]

## 2022-09-23 NOTE — PRE-PROCEDURE
GENERAL PRE-PROCEDURE:     Risks and benefits: Risks, benefits and alternatives were discussed    Consent given by:  Patient  Patient states understanding of procedure being performed: Yes    Patient's understanding of procedure matches consent: Yes    Procedure consent matches procedure scheduled: Yes    Expected level of sedation:  Moderate  Appropriately NPO:  Yes  ASA Class:  4  Mallampati  :  Grade 3- soft palate visible, posterior pharyngeal wall not visible  Lungs:  Lungs clear with good breath sounds bilaterally  Heart:  Normal heart sounds and rate and a-fib  History & Physical reviewed:  History and physical reviewed and no updates needed  Statement of review:  I have reviewed the lab findings, diagnostic data, medications, and the plan for sedation

## 2022-09-24 NOTE — PROGRESS NOTES
"Patient states, \"I feel better than yesterday. My breathing feels better and I have more energy.\" Still in A-fib and HR is 110-140's.   "

## 2022-09-24 NOTE — PROGRESS NOTES
[facility] MEDICINE  PROGRESS NOTE     Code Status: No CPR- Do NOT Intubate       Identification/Summary:   Ki Pederson is a 67 year old male with PMH signficant for COPD 4 L on hospice, A. fib, MI, CVA no deficits, chronic hep C, RLS, peripheral neuropathy and spinal stenosis.  Presented with confusion, shortness of breath fever and increased lower extremity edema.  Heart rate in 150s.  On BiPAP. Admitted to ICU.  Intensivist and cardiology consulted.  Diltiazem drip ineffective.  Transition to amiodarone.   Respiratory status improved so downgraded.  Still tachycardic.  Failed bedside cardioversion.  Metoprolol dose increased with slight improvement.  May need repeat cardioversion versus pacemaker.     Assessment and Plan:  -Acute metabolic encephalopathy  -Septic shock  -Fever-resolved  Multifocal etiology.   ICU admit.  Intensivist consult appreciated.  Empirically initiated on Solu-Medrol x1, Zosyn and azithromycin.  Procalcitonin 0.03.  PICC line placed.  Appreciate palliative consultation.  Patient intends to sign back on hospice at time of eventual discharge.  9/22 doing better.  Downgraded to cardiac telemetry status.  Continue antibiotics.  9/24 remains afebrile, normal white count throughout his stay, negative procalcitonin.  Negative culture growth.  Discontinue Zosyn and azithromycin after 1 more day (5-day total treatment).  Monitor clinically afterwards.  -Acute on chronic diastolic and systolic heart failure EF 30 to 35%  -Atrial fibrillation with RVR  BNP elevated at 272.  Patient has been unresponsive to diltiazem drip.  Amiodarone drip initiated in the ED.Given Lasix 40 mg IV x1.  Negative serial troponins.    Echocardiogram EF 30 to 35% with global hypokinesis, moderate biatrial enlargement.  Worse EF compared to February 2022.  Negative D-dimer and lower extremity ultrasound no DVT.  Given normal saline bolus 500 mL x 1.  Despite adding oral amiodarone  continues to be tachycardic.  9/23 failed cardioversion.    9/24 metoprolol dose increased.  Slight improvement to heart rate.  May need repeat cardioversion versus pacemaker.  -Acute on chronic hypoxemic hypercapnic respiratory failure 4 L  -COPD  Initiated on BiPAP at admission and oxygen demand later decreased.  9/22 downgraded to cardiac telemetry status.  On 3 L.   Solu-Medrol transition to prednisone 20 mg daily.  -Acute kidney injury  -Hyperkalemia  At baseline patient has normal renal function.  Admission creatinine 1.6 with potassium of 6.2.  Given Lasix 40 mg IV x1, insulin 10 units IV, calcium gluconate and D50.  Recheck potassium 5.8--> 5.7--> 5.1--> 4.5.  Creatinine improved to 1.48--> 1.14--> 1.07.  Follow daily BMP.  -Essential hypertension  -Coronary artery disease  Due to hypotension, holding his Norvasc, Lasix, lisinopril and potassium.  -Anxiety/depression  -Restless leg syndrome  Continue his Celexa.  Holding his gabapentin at this time.  -History of CVA  Son denies any deficits leftover from his stroke.  -Chronic hepatitis C  -History of IV drug use sober since ~2010  Noted.  Mid May 2022 was prescribed 8 weeks of Mavyret.  Uncertain on completion of this treatment course or not.  -Lumbar stenosis  -Peripheral neuropathy  Noted.  Fall precautions.     -COVID19 PCR status negative from 9/21/2022  -Influenza and RSV PCR's negative  Noted.  Standard precautions  -Chronic anticoagulation   Heparin SQ initially.  Transitioned back to his home Xarelto 20 mg daily.  High risk for thromboembolism  Fluids:  Saline lock  Pain meds: na  Therapy: na    Fine:Not present  Current Diet  Orders Placed This Encounter      Low Saturated Fat Na <2400 mg    Supplements  None    Barriers to Discharge: Persistent A. fib with RVR    Disposition: To be determined    Clinically Significant Risk Factors Present on Admission               # Obesity: Estimated body mass index is 32.78 kg/m  as calculated from the  following:    Height as of this encounter: 1.829 m (6').    Weight as of this encounter: 109.6 kg (241 lb 11.2 oz).        Interval History/Subjective:  Patient feels like his breathing is doing better.  After receiving metoprolol heart rate slightly improved down to around 110.  No chest pain.  No shortness of breath.  No nausea or vomiting.  Is requiring some supplemental oxygen.  Son present and supportive.  Questions answered to verbalized satisfaction.    Physical Exam/Objective:  Temp:  [97.6  F (36.4  C)-98.6  F (37  C)] 98.6  F (37  C)  Pulse:  [] 125  Resp:  [14-20] 18  BP: (105-128)/(73-94) 122/78  SpO2:  [96 %-99 %] 97 %  Wt Readings from Last 4 Encounters:   09/24/22 109.6 kg (241 lb 11.2 oz)     Body mass index is 32.78 kg/m .    Constitutional: awake, alert, cooperative, no apparent distress, and appears stated age  ENT: Normocephalic, without obvious abnormality, atraumatic, external ears without lesions, oral pharynx with moist mucous membranes, tonsils without erythema or exudates, gums normal and good dentition.  Respiratory: Poor air movement throughout  Cardiovascular: Irregular tachycardic rhythm  GI: No scars, normal bowel sounds, soft, non-distended, non-tender, no masses palpated, no hepatosplenomegally  Skin: normal skin color, texture, turgor, no redness, warmth, or swelling, and no rashes  Musculoskeletal: There is no redness, warmth, or swelling of the joints.  Motor strength is 5 out of 5 all extremities bilaterally.  Tone is normal. no lower extremity pitting edema present  Neurologic: Cranial nerves II-XII are grossly intact. Sensory:  Sensory intact  Neuropsychiatric: General: normal, calm and normal eye contact Level of consciousness: alert / normal Affect: normal Orientation: oriented to self, place, time and situation Memory and insight: normal, memory for past and recent events intact and thought process normal      Medications:   Personally Reviewed.  Medications        [START ON 2022] amiodarone  200 mg Oral Daily     amiodarone  400 mg Oral BID     [Held by provider] amLODIPine  5 mg Oral Daily     azithromycin  250 mg Intravenous Q24H     citalopram  40 mg Oral Daily     [Held by provider] furosemide  40 mg Oral Daily     [Held by provider] gabapentin  600 mg Oral TID     [Held by provider] guaiFENesin  1,200 mg Oral Daily     insulin aspart  1-12 Units Subcutaneous Q4H     ipratropium - albuterol 0.5 mg/2.5 mg/3 mL  3 mL Nebulization TID     [Held by provider] lisinopril  40 mg Oral Daily     metoprolol tartrate  50 mg Oral Q6H     pantoprazole  40 mg Intravenous Daily with breakfast     perflutren lipid microsphere  2 mL Intravenous Once     piperacillin-tazobactam  3.375 g Intravenous Q8H     [Held by provider] potassium chloride ER  20 mEq Oral Daily     predniSONE  20 mg Oral Daily     rivaroxaban ANTICOAGULANT  20 mg Oral Daily       Data reviewed today: I personally reviewed all new medications, labs, imaging/diagnostics reports over the past 24 hours. Pertinent findings include:    Imaging:   Recent Results (from the past 24 hour(s))   Echocardiogram Complete   Result Value    LVEF  30-35%    Narrative    [id number]  [id number]  [id number]  [id number]^[address]ILYN^[place] [address]     Name: GUSTABO CHRISTOPHER  MRN: [id number]  : 1955  Study Date: 2022 12:05 PM  Age: 67 yrs  Gender: Male  Patient [facility]  Reason For Study: Atrial Fibrillation  Ordering Physician: LUCIA SANDERS  Performed By: JULIA     BSA: 2.3 m2  Height: 72 in  Weight: 247 lb  HR: 127  BP: 111/74 mmHg  ______________________________________________________________________________  Procedure  Complete Portable Echo Adult. Definity (NDC #07772-886) given intravenously.  Technically difficult study.Extremely difficult acoustic windows despite the  use of contrast for endcardial border definition. Compared to the prior study  dated 2022,  there are changes as noted.  ______________________________________________________________________________  Interpretation Summary     1. Left ventricular size and wall thickness are normal. Systolic function is  moderately reduced with global hypokinesis. The estimated left ventricular  ejection fraction is 30-35%.  2. Right ventricular size and systolic function are normal.  3. Moderate biatrial enlargement.  4. No hemodynamically significant valvular abnormalities.  5. Compared to the prior study dated 2/9/2022, there has been an interval  decrease in the left ventricular ejection fraction.  ______________________________________________________________________________  I      WMSI = 2.00     % Normal = 0     X - Cannot   0 -                      (2) - Mildly 2 -          Segments  Size  Interpret    Hyperkinetic 1 - Normal  Hypokinetic  Hypokinetic  1-2     small                                                     7 -          3-5      moderate  3 - Akinetic 4 -          5 -         6 - Akinetic Dyskinetic   6-14    large               Dyskinetic   Aneurysmal  w/scar       w/scar       15-16   diffuse     Left Ventricle  The left ventricle is normal in size. There is normal left ventricular wall  thickness. The visual ejection fraction is 30-35%. Diastolic function not  assessed due to atrial fibrillation. There is moderate global hypokinesia of  the left ventricle.     Right Ventricle  Normal right ventricle size and systolic function.     Atria  The left atrium is moderately dilated. The right atrium is moderately dilated.     Mitral Valve  Mitral valve leaflets appear normal. There is trace mitral regurgitation.  There is no mitral valve stenosis.     Tricuspid Valve  Tricuspid valve leaflets appear normal. There is trace to mild tricuspid  regurgitation. Right ventricle systolic pressure estimate normal. The right  ventricular systolic pressure is approximated at 18.6 mmHg plus the right  atrial pressure.  There is no tricuspid stenosis.     Aortic Valve  Aortic valve leaflets appear normal. The aortic valve is trileaflet. No aortic  regurgitation is present. No aortic stenosis is present.     Pulmonic Valve  The pulmonic valve is not well visualized.     Vessels  The aorta root is normal. The thoracic aorta is normal. IVC diameter and  respiratory changes fall into an intermediate range suggesting an RA pressure  of 8 mmHg.     Pericardium  There is no pericardial effusion.     Rhythm  The rhythm was atrial fibrillation.     ______________________________________________________________________________  MMode/2D Measurements & Calculations  IVSd: 0.85 cm  LVIDd: 5.8 cm  LVIDs: 5.3 cm  LVPWd: 0.90 cm  FS: 8.8 %     LV mass(C)d: 198.9 grams  LV mass(C)dI: 85.3 grams/m2  Ao root diam: 3.6 cm  LA dimension: 4.0 cm  asc Aorta Diam: 3.5 cm  LA/Ao: 1.1  LVOT diam: 2.2 cm  LVOT area: 3.9 cm2  LA Volume Indexed (AL/bp): 47.6 ml/m2  RWT: 0.31     Time Measurements  MM HR: 127.0 BPM     Doppler Measurements & Calculations  MV E max omar: 86.8 cm/sec  MV dec slope: 539.5 cm/sec2  MV dec time: 0.16 sec  Ao V2 max: 99.9 cm/sec  Ao max P.0 mmHg  Ao V2 mean: 71.2 cm/sec  Ao mean P.3 mmHg  Ao V2 VTI: 15.3 cm  ANDRES(I,D): 3.8 cm2  ANDRES(V,D): 3.0 cm2  LV V1 max P.3 mmHg  LV V1 max: 75.7 cm/sec  LV V1 VTI: 14.9 cm  SV(LVOT): 58.7 ml  SI(LVOT): 25.2 ml/m2  PA acc time: 0.06 sec  TR max omar: 215.8 cm/sec  TR max P.6 mmHg  AV Omar Ratio (DI): 0.76  ANDRES Index (cm2/m2): 1.6  E/E': 9.2  E/E' av.4  Lateral E/e': 5.5  Medial E/e': 9.2  Peak E' Omar: 9.5 cm/sec     ______________________________________________________________________________  Report approved by: Barbara Ibanez 2022 02:25 PM         Cardioversion    Narrative    Eliud Easley MD     2022  4:16 PM  [facility]    Procedure: Cardioversion    Date/Time: 2022 4:14 PM  Performed by: Eliud Easley MD  Authorized by: Tracee  MD Eliud       UNIVERSAL PROTOCOL   Site Marked: NA  Prior Images Obtained and Reviewed:  Yes  Required items: Required blood products, implants, devices and special   equipment available    Patient identity confirmed:  Verbally with patient, provided demographic   data, hospital-assigned identification number, arm band and anonymous   protocol, patient vented/unresponsive  Patient was reevaluated immediately before administering moderate or deep   sedation or anesthesia  Confirmation Checklist:  Patient's identity using two indicators, relevant   allergies, procedure was appropriate and matched the consent or emergent   situation and correct equipment/implants were available  Time out: Immediately prior to the procedure a time out was called    Universal Protocol: the Joint Commission Universal Protocol was followed      SEDATION  Patient Sedated: Yes    Sedation Type:  Moderate (conscious) sedation  Sedation:  Diazepam and fentanyl  Vital signs: Vital signs monitored during sedation      PROCEDURE DETAILS  Cardioversion basis: elective  Indications: failure of anti-arrhythmic medications  Pre-procedure rhythm: atrial fibrillation  Patient position: patient was placed in a supine position  Chest area: chest area exposed  Electrodes: pads  Electrodes placed: anterior-posterior  Number of attempts: 1    Details of Attempts:  A single 200J shock was administered. The patient   remained in atrial fibrillation. As only moderate sedation was available   and a total of 5 mg diazepam and 125 mcg fentanyl were required to achieve   adequate sedation for just one shock, a second attempt was not made. Would   recommend deep sedation with anesthesia for any further attempts./  Post-procedure rhythm: atrial fibrillation      PROCEDURE    Patient Tolerance:  Patient tolerated the procedure well with no immediate   complications  Length of time physician/provider present for 1:1 monitoring during   sedation: 15        Labs:  Cardioversion   Final Result      Echocardiogram Complete   Final Result      US Lower Extremity Venous Duplex Bilateral   Final Result   IMPRESSION:   1.  No deep venous thrombosis in the bilateral lower extremities.      XR Chest Port 1 View   Final Result   IMPRESSION:       There is a new right PICC. The tip of the catheter resides at the SVC right atrial junction.      Unchanged heart and mediastinal interfaces accounting for new RPO rotation.      The lungs are slightly underexpanded without focal opacity.      No pleural space abnormality.      XR Chest Port 1 View   Final Result   IMPRESSION: Shallow inspiration. Cardiomediastinal silhouette within normal limits. No focal consolidation or pleural effusion. Postsurgical change cervical spine.        Recent Results (from the past 24 hour(s))   Echocardiogram Complete    Collection Time: 09/23/22 12:45 PM   Result Value Ref Range    LVEF  30-35%    Glucose by meter    Collection Time: 09/23/22  5:09 PM   Result Value Ref Range    GLUCOSE BY METER POCT 164 (H) 70 - 99 mg/dL   Glucose by meter    Collection Time: 09/23/22  9:07 PM   Result Value Ref Range    GLUCOSE BY METER POCT 163 (H) 70 - 99 mg/dL   Glucose by meter    Collection Time: 09/24/22 12:54 AM   Result Value Ref Range    GLUCOSE BY METER POCT 101 (H) 70 - 99 mg/dL   Glucose by meter    Collection Time: 09/24/22  5:00 AM   Result Value Ref Range    GLUCOSE BY METER POCT 91 70 - 99 mg/dL   CBC with platelets    Collection Time: 09/24/22  5:08 AM   Result Value Ref Range    WBC Count 9.2 4.0 - 11.0 10e3/uL    RBC Count 3.82 (L) 4.40 - 5.90 10e6/uL    Hemoglobin 11.3 (L) 13.3 - 17.7 g/dL    Hematocrit 36.7 (L) 40.0 - 53.0 %    MCV 96 78 - 100 fL    MCH 29.6 26.5 - 33.0 pg    MCHC 30.8 (L) 31.5 - 36.5 g/dL    RDW 13.8 10.0 - 15.0 %    Platelet Count 211 150 - 450 10e3/uL   Basic metabolic panel    Collection Time: 09/24/22  5:08 AM   Result Value Ref Range    Sodium 141 136 - 145 mmol/L     Potassium 4.2 3.5 - 5.0 mmol/L    Chloride 104 98 - 107 mmol/L    Carbon Dioxide (CO2) 30 22 - 31 mmol/L    Anion Gap 7 5 - 18 mmol/L    Urea Nitrogen 21 8 - 22 mg/dL    Creatinine 1.07 0.70 - 1.30 mg/dL    Calcium 8.5 8.5 - 10.5 mg/dL    Glucose 92 70 - 125 mg/dL    GFR Estimate 77 >60 mL/min/1.73m2   Glucose by meter    Collection Time: 09/24/22  8:58 AM   Result Value Ref Range    GLUCOSE BY METER POCT 113 (H) 70 - 99 mg/dL       Pending Labs:  Unresulted Labs Ordered in the Past 30 Days of this Admission     Date and Time Order Name Status Description    9/21/2022  4:58 AM Blood Culture Peripheral Blood Preliminary     9/21/2022  4:58 AM Blood Culture Peripheral Blood Preliminary             Sp Weldon MD  [facility]  Phone: #[phone]

## 2022-09-24 NOTE — PROGRESS NOTES
Care Management Follow Up    Length of Stay (days): 3    Expected Discharge Date: 09/26/2022     Concerns to be Addressed: Cardic status        Patient plan of care discussed at interdisciplinary rounds: Yes    Anticipated Discharge Disposition:  TBD     Anticipated Discharge Services: [facility]     Anticipated Discharge DME:  Per hospice    Education Provided on the Discharge Plan:  AVS per bedside RN.    Referrals Placed by CM/SW:  [facility]      Additional Information:  Chart reviewed. CM following for discharge needs. CM is coordinating with [facility]. CM attempted to meet with patient but he was sleeping and no family in the hospital room.   Nimisha from [facility] has reached out to the son but has not heard back yet. CM following for discharge needs.       Jazmin Marina RN

## 2022-09-24 NOTE — PROGRESS NOTES
Impression and Plan     Impression:   1. History of persistent atrial fibrillation now with rapid ventricular response status post unsuccessful direct current cardioversion on 9/23/2022. [id number] score is at least 4 (hypertension, history of transient ischemic attack/stroke, age 65-74).  2. Acute on chronic congestive heart failure with now reduced left ventricular ejection fraction of 30-35%. 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 He seems euvolemic.  3. Severe chronic obstructive pulmonary disease on 4 liters home oxygen. Currently only requiring 3 liters of oxygen.  4. Reported history of transient ischemic attack/stroke. I am unable to find further details on this at this time.    Plan:    As he has received an adequate load of intravenous amiodarone, this has been discontinued    Continue oral amiodarone 400 mg twice daily for now eventually reducing the dose to 200 mg once daily for maintenance    Increase oral metoprolol tartrate to 50 mg every 6 hours    A repeat attempt at direct current cardioversion with anesthesia could be considered next week    Another option would be atrioventricular rony ablation with concurrent permanent pacemaker placement, likely a biventricular device    Continue rivaroxaban 20 mg daily    I do not think he needs diuresis at this time    Primary Cardiologist: Dr. Dafne Nascimento    Subjective     - cardioversion attempted yesterday but was unsuccessful  - IV amiodarone stopped last night, oral amiodarone dose increased  - feels well at rest but gets out of breath easily with activity which is a chronic problem    Cardiac Diagnostics   Telemetry (personally reviewed): AF with RVR    ECG 9/21/2022 (personally reviewed and interpreted): atrial fibrillation with rapid ventricular response ventricular rate 176 bpm    Echocardiogram 9/23/2022 (results reviewed):   1. Left ventricular size and wall thickness are normal. Systolic function is moderately reduced  with global hypokinesis. The estimated left ventricular ejection fraction is 30-35%.  2. Right ventricular size and systolic function are normal.  3. Moderate biatrial enlargement.  4. No hemodynamically significant valvular abnormalities.  5. Compared to the prior study dated 2/9/2022, there has been an interval decrease in the left ventricular ejection fraction.    Physical Examination       /77 (BP Location: Left arm)   Pulse (!) 135   Temp 98.3  F (36.8  C) (Oral)   Resp 18   Ht 1.829 m (6')   Wt 109.6 kg (241 lb 11.2 oz)   SpO2 97%   BMI 32.78 kg/m          Wt Readings from Last 3 Encounters:   09/24/22 109.6 kg (241 lb 11.2 oz)     Intake/Output Summary (Last 24 hours) at 9/24/2022 0951  Last data filed at 9/24/2022 0500  Gross per 24 hour   Intake 930 ml   Output 0 ml   Net 930 ml       General: pleasant male. No acute distress.  HENT: external ears normal. Nares patent. Mucous membranes moist.  Eyes: perrla, extraocular muscles intact. No scleral icterus.   Neck: No JVD  Lungs: diminished breath sounds  COR: irregularly irregular and tachycardic, No murmurs, rubs, or gallops  Abd: nondistended, BS present  Extrem: No edema         Imaging      CXR 9/21/2022 (report reviewed):  EXAM: XR CHEST PORT 1 VIEW  LOCATION: [facility]  DATE/TIME: 9/21/2022 9:51 AM     INDICATION: RN placed PICC   verify tip placement  COMPARISON: Portable AP view of the chest 9/21/2022 at 0533 hours                                                                      IMPRESSION:   There is a new right PICC. The tip of the catheter resides at the SVC right atrial junction.  Unchanged heart and mediastinal interfaces accounting for new RPO rotation.  The lungs are slightly underexpanded without focal opacity.  No pleural space abnormality.     CT chest 8/7/2020 (report reviewed):  1.  A triangular opacity in the right midlung has not changed from the prior study and has the appearance of scarring.  No dedicated follow-up is required.   2.  Hepatic steatosis.  3.  Mild coronary artery calcification.    Lab Results   Lab Results   Component Value Date     09/24/2022    CO2 30 09/24/2022    BUN 21 09/24/2022     Lab Results   Component Value Date    WBC 9.2 09/24/2022    HGB 11.3 09/24/2022    HCT 36.7 09/24/2022    MCV 96 09/24/2022     09/24/2022     Lab Results   Component Value Date     09/21/2022     Lab Results   Component Value Date    TROPONINI 0.01 09/21/2022    TROPONINI 0.02 09/21/2022    TROPONINI 0.03 09/21/2022           Current Inpatient Scheduled Medications   Scheduled Meds:    [START ON 9/29/2022] amiodarone  200 mg Oral Daily     amiodarone  400 mg Oral BID     [Held by provider] amLODIPine  5 mg Oral Daily     azithromycin  250 mg Intravenous Q24H     citalopram  40 mg Oral Daily     [Held by provider] furosemide  40 mg Oral Daily     [Held by provider] gabapentin  600 mg Oral TID     [Held by provider] guaiFENesin  1,200 mg Oral Daily     insulin aspart  1-12 Units Subcutaneous Q4H     ipratropium - albuterol 0.5 mg/2.5 mg/3 mL  3 mL Nebulization TID     [Held by provider] lisinopril  40 mg Oral Daily     metoprolol tartrate  50 mg Oral Q6H     pantoprazole  40 mg Intravenous Daily with breakfast     perflutren lipid microsphere  2 mL Intravenous Once     piperacillin-tazobactam  3.375 g Intravenous Q8H     [Held by provider] potassium chloride ER  20 mEq Oral Daily     predniSONE  20 mg Oral Daily     rivaroxaban ANTICOAGULANT  20 mg Oral Daily          Medications Prior to Admission   Prior to Admission medications    Medication Sig Start Date End Date Taking? Authorizing Provider   albuterol (PROAIR HFA/PROVENTIL HFA/VENTOLIN HFA) 108 (90 Base) MCG/ACT inhaler Inhale 2 puffs into the lungs every 6 hours   Yes Unknown, Entered By History   amLODIPine (NORVASC) 5 MG tablet Take 5 mg by mouth daily   Yes Unknown, Entered By History   citalopram (CELEXA) 40 MG tablet Take  40 mg by mouth daily   Yes Unknown, Entered By History   clonazePAM (KLONOPIN) 2 MG tablet Take 2 mg by mouth nightly as needed for anxiety   Yes Unknown, Entered By History   doxycycline hyclate (VIBRA-TABS) 100 MG tablet Take 100 mg by mouth daily   Yes Unknown, Entered By History   famotidine (PEPCID) 20 MG tablet Take 20 mg by mouth daily   Yes Unknown, Entered By History   furosemide (LASIX) 40 MG tablet Take 40 mg by mouth daily   Yes Unknown, Entered By History   gabapentin (NEURONTIN) 300 MG capsule Take 600 mg by mouth 3 times daily   Yes Unknown, Entered By History   guaiFENesin (MUCINEX) 600 MG 12 hr tablet Take 1,200 mg by mouth daily   Yes Unknown, Entered By History   ipratropium - albuterol 0.5 mg/2.5 mg/3 mL (DUONEB) 0.5-2.5 (3) MG/3ML neb solution Take 1 vial by nebulization every 6 hours as needed for shortness of breath / dyspnea or wheezing   Yes Unknown, Entered By History   lisinopril (ZESTRIL) 40 MG tablet Take 40 mg by mouth daily   Yes Unknown, Entered By History   Magnesium Oxide 250 MG TABS Take 1 tablet by mouth daily   Yes Unknown, Entered By History   oxyCODONE-acetaminophen (PERCOCET) 5-325 MG tablet Take 1 tablet by mouth every 6 hours as needed for severe pain   Yes Unknown, Entered By History   potassium chloride ER (KLOR-CON M) 20 MEQ CR tablet Take 20 mEq by mouth daily   Yes Unknown, Entered By History   predniSONE (DELTASONE) 20 MG tablet Take 20 mg by mouth daily   Yes Unknown, Entered By History   rivaroxaban ANTICOAGULANT (XARELTO) 20 MG TABS tablet Take 20 mg by mouth daily   Yes Unknown, Entered By History          Eliud Easley MD [facility]  Non-Invasive Cardiologist  [facility]  Pager [phone]

## 2022-09-24 NOTE — PLAN OF CARE
Problem: Dysrhythmia  Goal: Normalized Cardiac Rhythm  Outcome: Ongoing, Not Progressing     Problem: Oral Intake Inadequate COPD (Chronic Obstructive Pulmonary Disease)  Goal: Improved Nutrition Intake  Outcome: Ongoing, Progressing     Pt resting comfortably following cardioversion this afternoon. He is A&O x4, calls appropriately. Tele still showing A fib w/ RVR following unsuccessful cardioversion attempt. Heart rate is sustaining between 100-130, pressures are WDL. Amiodarone gtt still infusing at 16.7 mL/hr. Pt will have been on amio gtt for 24 hours at 0530, order to contact provider if pt reaches this point. He c/o chronic back pain relieved w/ prn percocet. Reports GARCIA on 3L NC, LS coarse bilaterally. Up to bathroom w/ assist of 1. Awaiting transfer to different hospice service per family preference.

## 2022-09-24 NOTE — PROGRESS NOTES
Pt on 3L NC, SpO2 98-99%. Pt received neb treatment and tolerated well. Assessment below;       09/23/22 2040   Tech Time   $Tech Time (10 minute increments) 4   Nebulizer Assessment & Treatment   $RT Use ONLY Delivery Method Nebulizer - Additional   Nebulizer Device Mask   Pretreatment Heart Rate (beats/min) 114   Pretreatment Resp Rate (breaths/min) 18   Pretreatment O2 sats - (TCU only) 98   Pretreat Breath Sounds - Bilat - All Lobes coarse;crackles;wheezes, expiratory   Breath Sounds Post-Respiratory Treatment   Posttreatment Heart Rate (beats/min) 126   Posttreatment Resp Rate (breaths/min) 18   Post treatment O2 Sats - (TCU only) 99   Breath Sounds Posttreatment All Fields All Fields   Breath Sounds Posttreatment All Fields crackles, coarse;aeration increased     Will continue to monitor and assess pt.    Aleida Wright, RT

## 2022-09-25 PROBLEM — I48.91 ATRIAL FIBRILLATION WITH RAPID VENTRICULAR RESPONSE (H): Status: ACTIVE | Noted: 2022-01-01

## 2022-09-25 NOTE — PHARMACY-ADMISSION MEDICATION HISTORY
Admission medication history completed at [facility]. Please see Pharmacy - Admission Medication History note from 09/21/2022.

## 2022-09-25 NOTE — PROGRESS NOTES
Hospitalist Progress Note  Brief history:  67-year-old male with past medical history of severe COPD, chronic respiratory failure with hypoxia, on Hospice prior to admission, A. fib, CVA with no residual deficits, chronic hepatitis C, RLS, peripheral neuropathy, spinal stenosis who originally presented to [facility] with confusion, shortness of breath and lower extremity edema.  He was treated for presumed septic shock.  Found to have A. fib RVR with difficult to control ventricular rates with medical management and status post unsuccessful cardioversion.  Patient is transferred to [facility] for EP evaluation, consideration of AV node ablation and PPM placement    Assessment/Plan  1. A fib with RVR, persistent     Mr. Pederson is a 66 yo male pt admitted with sepsis-like picture d/t pneumonia who presented with a fib with RVR.  His rate was unresponsive to diltiazem gtt and he had associated hypotension/unstable BPs.  Cardiology was consulted.  He is s/p unsuccessful direct current cardioversion on 9/23/22.       He had be started on po metoprolol and now transitioned to po amiodarone.  He is having persistent RVR with softer BP.    Per cardiology recommendations patient transferred to [facility] for EP evaluation d/t persistent afib with RVR and guarded clinical status.      He has been resumed on rivaroxaban 20mg po daily     2.  Acute on chronic CHF     EF noted to be 30-35% and it is suspected that he is developing tachycardia-induced CM.  He did receive IV lasix during his hospital stay     Appears to be fairly euvolemic this time but will need to be monitored closely    3.  Sepsis       Community-acquired PNA       Acute metabolic encephalopathy     Mr. Pederson was admitted to the ICU with sepsis-like picture with tachyarrythmia ( as above), hypotension, fever, tachypnea and acute hypoxic/hypercapnic respiratory faliure requiring BIPAP.  He was given IVF, IV abx for presumed PNA and  placed in the ICU.  He did not require vasopressor agents.       CXR without clear infiltrates.  Leg US bilaterally negative for DVT.       He will complete 5 days of IV azithromycin and IV zosyn today.       He mental status is clearing with the above inpatient medical treatments and improvement in his respiratory status.  His PTA gabapentin was resumed today at decreased dose (300mg po tid instead of 600mg po tid).  Mental status will continue to be monitored closely     4.  Oxygen and steroid dependent COPD       Acute on top of chronic hypoxemic respiratory failure     Required BIPAP support in ICU initially.  Now on 3-4L nasal cannula.  He also received IV solumedrol initially but has now been transitioned to po prednisone.          5.  CAD       HTN        His PTA norvasc, lisinopril and lasix have been held d/t softer BP     6.   Acute renal failure with hyperkalmeia     He was given IV lasix, insulin, calcium gluconate and D50 to treat hyperkalemia on admission. Creat and potassium hav normalized,      Disposition Plan   Expected discharge in 3 days to prior living arrangement once A. fib is controlled.     Entered: Dorota Lal MD 09/25/2022, 4:38 PM         Subjective  Patient reports having intermittent palpitations.  Denies having any chest pain.  His breathing is close to baseline.  He denies having any cough.  Lower extremity edema is significantly improved.  Oral intake is improved.  Moves around without any assistive devices.    Objective    Vital signs in last 24 hours  Temp:  [97.6  F (36.4  C)-98.5  F (36.9  C)] 98.5  F (36.9  C)  Pulse:  [108-128] 108  Resp:  [16-20] 20  BP: ()/(69-90) 122/90  SpO2:  [94 %-98 %] 98 % @LASTSAO2(12)@ O2 Device: Nasal cannula    Weight:   Wt Readings from Last 3 Encounters:   09/25/22 109.4 kg (241 lb 1.6 oz)      Weight change:     Intake/Output last 3 shifts  No intake/output data recorded.  There is no height or weight on file to calculate  BMI.    Physical Exam    General Appearance:    Alert, cooperative, no distress, appears stated age   Lungs:    Diminished bilaterally   Cardiovascular:   Irregularly irregular.  Normal S1, S2.  No murmur, rub or gallop.  No edema   Abdomen:     Soft, non-tender, bowel sounds active all four quadrants   Neurologic:   Awake, alert, oriented x 3.  Grossly nonfocal      Pertinent Labs   Lab Results: personally reviewed.   Recent Labs   Lab 09/25/22  0533 09/24/22  0508 09/23/22  0612    141 138   CO2 29 30 31   BUN 15 21 31*     Recent Labs   Lab 09/25/22  0533 09/24/22  0508 09/23/22  0612   WBC 9.3 9.2 8.6   HGB 11.7* 11.3* 10.4*   HCT 37.4* 36.7* 32.9*    211 191     Recent Labs   Lab 09/21/22  1722 09/21/22  1134 09/21/22  0505   TROPONINI 0.01 0.02 0.03     Invalid input(s): POCGLUFGR    Medications  Current Facility-Administered Medications   Medication     albuterol (PROVENTIL) neb solution 5 mg     [START ON 9/29/2022] amiodarone (PACERONE) tablet 200 mg     amiodarone (PACERONE) tablet 400 mg     amLODIPine (NORVASC) tablet 5 mg     citalopram (celeXA) tablet 40 mg     clonazePAM (klonoPIN) tablet 2 mg     glucose gel 15-30 g    Or     dextrose 50 % injection 25-50 mL    Or     glucagon injection 1 mg     furosemide (LASIX) tablet 40 mg     gabapentin (NEURONTIN) capsule 300 mg     guaiFENesin (MUCINEX) 12 hr tablet 1,200 mg     insulin aspart (NovoLOG) injection (RAPID ACTING)     insulin aspart (NovoLOG) injection (RAPID ACTING)     ipratropium - albuterol 0.5 mg/2.5 mg/3 mL (DUONEB) neb solution 3 mL     lisinopril (ZESTRIL) tablet 40 mg     magnesium sulfate 2 g in water intermittent infusion     metoprolol tartrate (LOPRESSOR) tablet 50 mg     naloxone (NARCAN) injection 0.2 mg    Or     naloxone (NARCAN) injection 0.4 mg    Or     naloxone (NARCAN) injection 0.2 mg    Or     naloxone (NARCAN) injection 0.4 mg     ondansetron (ZOFRAN ODT) ODT tab 4 mg    Or     ondansetron (ZOFRAN) injection  4 mg     oxyCODONE-acetaminophen (PERCOCET) 5-325 MG per tablet 1 tablet     [START ON 9/26/2022] pantoprazole (PROTONIX) IV push injection 40 mg     piperacillin-tazobactam (ZOSYN) 3.375 g vial to attach to  mL bag     potassium chloride ER (KLOR-CON M) CR tablet 20 mEq     predniSONE (DELTASONE) tablet 20 mg     QUEtiapine (SEROquel) tablet 25 mg     rivaroxaban ANTICOAGULANT (XARELTO) tablet 20 mg     sennosides (SENOKOT) tablet 8.6 mg       Pertinent Radiology   No results found for this visit on 09/25/22.      Advanced Care Planning:  Discharge Planning discussed with patient, family, nursing staff        Dorota Lal MD  Internal Medicine Hospitalist  9/25/2022

## 2022-09-25 NOTE — PROGRESS NOTES
Impression and Plan     Impression:   1. History of persistent atrial fibrillation now with rapid ventricular response status post unsuccessful direct current cardioversion on 9/23/2022. [id number] score is at least 4 (hypertension, history of transient ischemic attack/stroke, age 65-74).  2. Acute on chronic congestive heart failure with now reduced left ventricular ejection fraction of 30-35%. 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 He seems euvolemic.  3. Severe chronic obstructive pulmonary disease on 4 liters home oxygen. Currently only requiring 3 liters of oxygen.  4. Reported history of transient ischemic attack/stroke. I am unable to find further details on this at this time.    Plan:    We will try to transfer him to [facility], as he may need an electrophysiology evaluation with consideration of atrioventricular rony ablation with concurrent permanent pacemaker placement, likely a biventricular device. Pulmonary vein isolation may also be an option.    Continue oral amiodarone 400 mg twice daily for now eventually reducing the dose to 200 mg once daily for maintenance    Oral metoprolol tartrate 50 mg every 6 hours    Continue rivaroxaban 20 mg daily    I do not think he needs diuresis at this time    Primary Cardiologist: Dr. Dafne Nascimento    Subjective     - feels okay but continues in AF with RVR    Cardiac Diagnostics   Telemetry (personally reviewed): AF with RVR     ECG 9/21/2022 (personally reviewed and interpreted): atrial fibrillation with rapid ventricular response ventricular rate 176 bpm     Echocardiogram 9/23/2022 (results reviewed):   1. Left ventricular size and wall thickness are normal. Systolic function is moderately reduced with global hypokinesis. The estimated left ventricular ejection fraction is 30-35%.  2. Right ventricular size and systolic function are normal.  3. Moderate biatrial enlargement.  4. No hemodynamically significant valvular  abnormalities.  5. Compared to the prior study dated 2/9/2022, there has been an interval decrease in the left ventricular ejection fraction.    Physical Examination       BP 97/69   Pulse (!) 128   Temp 97.7  F (36.5  C) (Axillary)   Resp 20   Ht 1.829 m (6')   Wt 109.4 kg (241 lb 1.6 oz)   SpO2 97%   BMI 32.70 kg/m          Wt Readings from Last 3 Encounters:   09/25/22 109.4 kg (241 lb 1.6 oz)     Intake/Output Summary (Last 24 hours) at 9/25/2022 0953  Last data filed at 9/24/2022 1900  Gross per 24 hour   Intake 920 ml   Output --   Net 920 ml       General: pleasant male. No acute distress.  HENT: external ears normal. Nares patent. Mucous membranes moist.  Eyes: perrla, extraocular muscles intact. No scleral icterus.   Neck: No JVD  Lungs: diminished breath sounds  COR: irregularly irregular and tachycardic, No murmurs, rubs, or gallops  Abd: nondistended, BS present  Extrem: No edema         Imaging      CXR 9/21/2022 (report reviewed):  EXAM: XR CHEST PORT 1 VIEW  LOCATION: [facility]  DATE/TIME: 9/21/2022 9:51 AM     INDICATION: RN placed PICC   verify tip placement  COMPARISON: Portable AP view of the chest 9/21/2022 at 0533 hours                                                                      IMPRESSION:   There is a new right PICC. The tip of the catheter resides at the SVC right atrial junction.  Unchanged heart and mediastinal interfaces accounting for new RPO rotation.  The lungs are slightly underexpanded without focal opacity.  No pleural space abnormality.     CT chest 8/7/2020 (report reviewed):  1.  A triangular opacity in the right midlung has not changed from the prior study and has the appearance of scarring. No dedicated follow-up is required.   2.  Hepatic steatosis.  3.  Mild coronary artery calcification.    Lab Results   Lab Results   Component Value Date     09/25/2022    CO2 29 09/25/2022    BUN 15 09/25/2022     Lab Results   Component Value  Date    WBC 9.3 09/25/2022    HGB 11.7 09/25/2022    HCT 37.4 09/25/2022    MCV 96 09/25/2022     09/25/2022     Lab Results   Component Value Date     09/21/2022     Lab Results   Component Value Date    TROPONINI 0.01 09/21/2022    TROPONINI 0.02 09/21/2022    TROPONINI 0.03 09/21/2022           Current Inpatient Scheduled Medications   Scheduled Meds:    [START ON 9/29/2022] amiodarone  200 mg Oral Daily     amiodarone  400 mg Oral BID     [Held by provider] amLODIPine  5 mg Oral Daily     citalopram  40 mg Oral Daily     [Held by provider] furosemide  40 mg Oral Daily     [Held by provider] gabapentin  600 mg Oral TID     [Held by provider] guaiFENesin  1,200 mg Oral Daily     insulin aspart  1-7 Units Subcutaneous TID AC     insulin aspart  1-5 Units Subcutaneous At Bedtime     ipratropium - albuterol 0.5 mg/2.5 mg/3 mL  3 mL Nebulization TID     [Held by provider] lisinopril  40 mg Oral Daily     metoprolol tartrate  50 mg Oral Q6H     pantoprazole  40 mg Intravenous Daily with breakfast     perflutren lipid microsphere  2 mL Intravenous Once     piperacillin-tazobactam  3.375 g Intravenous Q8H     [Held by provider] potassium chloride ER  20 mEq Oral Daily     predniSONE  20 mg Oral Daily     rivaroxaban ANTICOAGULANT  20 mg Oral Daily          Medications Prior to Admission   Prior to Admission medications    Medication Sig Start Date End Date Taking? Authorizing Provider   albuterol (PROAIR HFA/PROVENTIL HFA/VENTOLIN HFA) 108 (90 Base) MCG/ACT inhaler Inhale 2 puffs into the lungs every 6 hours   Yes Unknown, Entered By History   amLODIPine (NORVASC) 5 MG tablet Take 5 mg by mouth daily   Yes Unknown, Entered By History   citalopram (CELEXA) 40 MG tablet Take 40 mg by mouth daily   Yes Unknown, Entered By History   clonazePAM (KLONOPIN) 2 MG tablet Take 2 mg by mouth nightly as needed for anxiety   Yes Unknown, Entered By History   doxycycline hyclate (VIBRA-TABS) 100 MG tablet Take 100 mg by  mouth daily   Yes Unknown, Entered By History   famotidine (PEPCID) 20 MG tablet Take 20 mg by mouth daily   Yes Unknown, Entered By History   furosemide (LASIX) 40 MG tablet Take 40 mg by mouth daily   Yes Unknown, Entered By History   gabapentin (NEURONTIN) 300 MG capsule Take 600 mg by mouth 3 times daily   Yes Unknown, Entered By History   guaiFENesin (MUCINEX) 600 MG 12 hr tablet Take 1,200 mg by mouth daily   Yes Unknown, Entered By History   ipratropium - albuterol 0.5 mg/2.5 mg/3 mL (DUONEB) 0.5-2.5 (3) MG/3ML neb solution Take 1 vial by nebulization every 6 hours as needed for shortness of breath / dyspnea or wheezing   Yes Unknown, Entered By History   lisinopril (ZESTRIL) 40 MG tablet Take 40 mg by mouth daily   Yes Unknown, Entered By History   Magnesium Oxide 250 MG TABS Take 1 tablet by mouth daily   Yes Unknown, Entered By History   oxyCODONE-acetaminophen (PERCOCET) 5-325 MG tablet Take 1 tablet by mouth every 6 hours as needed for severe pain   Yes Unknown, Entered By History   potassium chloride ER (KLOR-CON M) 20 MEQ CR tablet Take 20 mEq by mouth daily   Yes Unknown, Entered By History   predniSONE (DELTASONE) 20 MG tablet Take 20 mg by mouth daily   Yes Unknown, Entered By History   rivaroxaban ANTICOAGULANT (XARELTO) 20 MG TABS tablet Take 20 mg by mouth daily   Yes Unknown, Entered By History          Eliud Easley MD [facility]  Non-Invasive Cardiologist  [facility]  Pager [phone]

## 2022-09-25 NOTE — PLAN OF CARE
Problem: Dysrhythmia  Goal: Normalized Cardiac Rhythm  Outcome: Ongoing, Not Progressing     Problem: Pain Chronic (Persistent)  Goal: Acceptable Pain Control and Functional Ability  Outcome: Ongoing, Not Progressing      Pt resting comfortably, no events. He is A&O x4, calls appropriately. Tele still showing A fib w/ RVR following unsuccessful cardioversion attempt yesterday. Heart rate is sustaining between 100-130, pressures are WDL. He c/o chronic back pain relieved w/ prn percocet. Reports GARCIA on 3L NC, LS coarse bilaterally. Up to bathroom w/ assist of 1. Plan to undergo second cardioversion on Monday w/ anaesthesia present. Awaiting transfer to different hospice service per family preference.

## 2022-09-25 NOTE — PROGRESS NOTES
Care Management Discharge Note    Discharge Date: 09/25/2022     Discharge Disposition:  Transfer to [facility]    Discharge Services:  Transfer to [facility]     Discharge Transportation:  agency       Education Provided on the Discharge Plan:  AVS per bedside RN.    Persons Notified of Discharge Plans: patient    Patient/Family in Agreement with the Plan:  yes      Additional Information:  Chart reviewed. CM following for discharge needs. CM met with patient. Patient is agreeable to CM updating Katlyn (son). CM is coordinating with [facility]. CM updated Nimisha RN with [facility] that patient is transferring to [facility] for further workup. CM attempted to reach Katlyn (son) but was unable to speak with him. Per Nimisha PRADO [facility] Katlyn (son) is aware of the plan.       Katlyn (son) return call to CM. Katlyn is aware of the plan and has no questions at this time.     [facility] referral sent per protocol.       Jazmin Marina RN

## 2022-09-25 NOTE — PLAN OF CARE
Problem: Dysrhythmia  Goal: Normalized Cardiac Rhythm  Outcome: Ongoing, Not Progressing     Problem: Respiratory Compromise COPD (Chronic Obstructive Pulmonary Disease)  Goal: Effective Oxygenation and Ventilation  Outcome: Ongoing, Progressing      Pt resting comfortably, no events. C/o pain with PRN percocet given. Pt continues to be in A fib w/ RVR, with HR in the 100-130. Pt is on NC at 2.5L sats in the 94-98 range. Pt up to bathroom with assist x1/SBA.

## 2022-09-25 NOTE — PLAN OF CARE
Problem: Plan of Care - These are the overarching goals to be used throughout the patient stay.    Goal: Plan of Care Review/Shift Note  Description: The Plan of Care Review/Shift note should be completed every shift.  The Outcome Evaluation is a brief statement about your assessment that the patient is improving, declining, or no change.  This information will be displayed automatically on your shift note.  Outcome: Ongoing, Progressing   Goal Outcome Evaluation:        Patient admitted from [facility] at 1630 by medics, patient rhythm  Afib with RVR rate range from 100-150, not symptomatic

## 2022-09-25 NOTE — DISCHARGE SUMMARY
[facility]    Transfer Summary  Hospitalist    Date of Admission:  9/21/2022  Date of Transfer to [facility]:  9/25/2022  Provider:  Shannon Noriega DO    Discharge Diagnoses   1.  A fib with RVR  2.  Acute on top of chronic diastolic and systolic CHF  3.  Acute metabolic encephalopathy  4.  Acute on chronic hypoxic/hypercapneic respiratory failure  5.  Sepsis  6.  Community-acquired pneumonia  7.  Acute renal failure  8.  Hypotension with h/o HTN  9.  Hyperkalemia, resolved  10.  Acute metabolic encephalopathy  11.  Chronic anti-coagulation d/t #1  12.  Hospice enrolled PTA    Other medical issues:  Past Medical History:   Diagnosis Date     Anxiety      CAD (coronary artery disease)      Chronic atrial fibrillation (H)      Chronic hepatitis C virus infection (H)      COPD (chronic obstructive pulmonary disease) (H)      Essential hypertension      Hemangioma     Left hand     Intravenous drug abuse in remission (H)     Sober since ~2010     Lumbar spinal stenosis      Peripheral neuropathy      Restless leg syndrome      Stroke (H)        History of Present Illness   Ki Pederson is an 67 year old male who presented with confusion and worsening respiratory distress. Please see the admission history and physical for full details.    Hospital Course   Ki Pederson was admitted on 9/21/2022.  The following problems were addressed during his hospitalization:    1. A fib with RVR, persistent    Mr. Pederson is a 66 yo male pt admitted with sepsis-like picture d/t pneumonia who presented with a fib with RVR.  His rate was unresponsive to diltiazem gtt and he had associated hypotension/unstable BPs.  Cardiology was consulted.  He is s/p unsuccessful direct current cardioversion on 9/23/22.      He had be started on po metoprolol and now transitioned to po amiodarone.  He is having persistent RVR with softer BP.  Spoke with Dr. Easley (cardiology) who feels that he should be  transferred to [facility] today for EP evaluation d/t persistent afib with RVR and guarded clinical status.     He has been resumed on rivaroxaban 20mg po daily    2.  Acute on chronic CHF    EF noted to be 30-35% and it is suspected that he is developing tachycardia-induced CM.  He did receive IV lasix during his hospital stay    Appears to be fairly euvolemic this am at time of hospital transfer but volume status will need to continue to be monitored closely    3.  Sepsis       Community-acquired PNA       Acute metabolic encephalopathy    Mr. Pederson was admitted to the ICU with sepsis-like picture with tachyarrythmia ( as above), hypotension, fever, tachypnea and acute hypoxic/hypercapnic respiratory faliure requiring BIPAP.  He was given IVF, IV abx for presumed PNA and placed in the ICU.  He did not require vasopressor agents.      CXR without clear infiltrates.  Leg US bilaterally negative for DVT.      He will complete 5 days of IV azithromycin and IV zosyn today.      He mental status is clearing with the above inpatient medical treatments and improvement in his respiratory status.  His PTA gabapentin was resumed today at decreased dose (300mg po tid instead of 600mg po tid).  Mental status will continue to be monitored closely    4.  Oxygen and steroid dependent COPD       Acute on top of chronic hypoxemic respiratory failure    Required BIPAP support in ICU initially.  Now on 3-4L nasal cannula.  He also received IV solumedrol initially but has now been transitioned to po prednisone.        5.  CAD       HTN        His PTA norvasc, lisinopril and lasix have been held d/t softer BP    6.   Acute renal failure with hyperkalmeia    He was given IV lasix, insulin, calcium gluconate and D50 to treat hyperkalemia on admission. Creat and potassium hav normalized,    Significant Results and Procedures   Failed beside cardioversion 9/23/22    Pending Results   Unresulted Labs Ordered in the Past 30 Days  of this Admission     Date and Time Order Name Status Description    9/21/2022  4:58 AM Blood Culture Peripheral Blood Preliminary     9/21/2022  4:58 AM Blood Culture Peripheral Blood Preliminary           Code Status   DNR / DNI       Primary Care Physician   Gloria Sebastian    Physical Exam   Temp: 97.9  F (36.6  C) Temp src: Oral BP: 112/71 Pulse: 110   Resp: 16 SpO2: 95 % O2 Device: None (Room air) Oxygen Delivery: 2.5 LPM  Vitals:    09/23/22 0359 09/24/22 0409 09/25/22 0543   Weight: 112.4 kg (247 lb 12.8 oz) 109.6 kg (241 lb 11.2 oz) 109.4 kg (241 lb 1.6 oz)     Vital Signs with Ranges  Temp:  [97.6  F (36.4  C)-98.5  F (36.9  C)] 97.9  F (36.6  C)  Pulse:  [110-128] 110  Resp:  [16-20] 16  BP: ()/(67-79) 112/71  SpO2:  [94 %-98 %] 95 %  I/O last 3 completed shifts:  In: 1520 [P.O.:1520]  Out: -     PT SEEN AND EXAMINED ON DAY OF D/C  GEN:  Sleeping comfortably but easily arousable when I enter the room  HEENT:  Normocephalic/atraumatic, PERRL, no scleral icterus, no nasal discharge, mouth and membranes appear moist  NECK:  No clear thyromegaly or clear JVD but exam somewhat limited d/t body habitus and positioning  CV:  Tachy and irregular rate and rhythm, no clear loud murmur to ausc.  S1 + S2 noted, no S3 or S4.  LUNGS:  Clear to auscultation ant bilaterally---diminished air exchange at the bases bilaterally to post ausc.  No rhonchi or wheezing auscultated bilaterally.  No costal retractions bilaterally.  Symmetric chest rise on inhalation noted.  ABD:  Active bowel sounds, soft, non-tender, not really distended.  No rebound/guarding/rigidity.  No masses palpated.  No obvious HSM to exam.  EXT:  No significant pretibial edema or cyanosis bilaterally. No joint synovitis noted.  No calf-tenderness or asymmetry noted.  SKIN:  Dry to touch, no rashes or jaundice noted.  PSYCH:  Mood appropriate, Not tearful or depressed.  Maintains direct eye contact.  cooperative  NEURO:  No tremors at rest, speech is  somewhat limited but appears clear and approriate    Discharge Disposition   Transferred to [facility] for EP cardiology service    Consultations This Hospital Stay   PHARMACY IP CONSULT  PHARMACY IP CONSULT  CARDIOLOGY IP CONSULT  VASCULAR ACCESS ADULT IP CONSULT  CARDIOLOGY IP CONSULT  PALLIATIVE CARE ADULT IP CONSULT  INTENSIVIST IP CONSULT  SOCIAL WORK IP CONSULT  INPATIENT HOSPICE ADULT CONSULT  CARE MANAGEMENT / SOCIAL WORK IP CONSULT  SOCIAL WORK IP CONSULT  INPATIENT HOSPICE ADULT CONSULT  CARE MANAGEMENT / SOCIAL WORK IP CONSULT  CARDIOLOGY IP CONSULT    Time Spent on this Encounter   IShannon DO, personally saw the patient today and spent greater than 30 minutes discharging this patient.    Discharge Orders   No discharge procedures on file.  Discharge Medications   Current Discharge Medication List      CONTINUE these medications which have NOT CHANGED    Details   albuterol (PROAIR HFA/PROVENTIL HFA/VENTOLIN HFA) 108 (90 Base) MCG/ACT inhaler Inhale 2 puffs into the lungs every 6 hours      amLODIPine (NORVASC) 5 MG tablet Take 5 mg by mouth daily      citalopram (CELEXA) 40 MG tablet Take 40 mg by mouth daily      clonazePAM (KLONOPIN) 2 MG tablet Take 2 mg by mouth nightly as needed for anxiety      doxycycline hyclate (VIBRA-TABS) 100 MG tablet Take 100 mg by mouth daily      famotidine (PEPCID) 20 MG tablet Take 20 mg by mouth daily      furosemide (LASIX) 40 MG tablet Take 40 mg by mouth daily      gabapentin (NEURONTIN) 300 MG capsule Take 600 mg by mouth 3 times daily      guaiFENesin (MUCINEX) 600 MG 12 hr tablet Take 1,200 mg by mouth daily      ipratropium - albuterol 0.5 mg/2.5 mg/3 mL (DUONEB) 0.5-2.5 (3) MG/3ML neb solution Take 1 vial by nebulization every 6 hours as needed for shortness of breath / dyspnea or wheezing      lisinopril (ZESTRIL) 40 MG tablet Take 40 mg by mouth daily      Magnesium Oxide 250 MG TABS Take 1 tablet by mouth daily       oxyCODONE-acetaminophen (PERCOCET) 5-325 MG tablet Take 1 tablet by mouth every 6 hours as needed for severe pain      potassium chloride ER (KLOR-CON M) 20 MEQ CR tablet Take 20 mEq by mouth daily      predniSONE (DELTASONE) 20 MG tablet Take 20 mg by mouth daily      rivaroxaban ANTICOAGULANT (XARELTO) 20 MG TABS tablet Take 20 mg by mouth daily           Allergies   No Known Allergies  Data   Recent Labs   Lab 09/25/22  1124 09/25/22  0740 09/25/22  0533 09/24/22  0858 09/24/22  0508 09/23/22  0758 09/23/22  0612   NA  --   --  140  --  141  --  138   POTASSIUM  --   --  4.2  --  4.2  --  4.5   CHLORIDE  --   --  104  --  104  --  103   CO2  --   --  29  --  30  --  31   ANIONGAP  --   --  7  --  7  --  4*   * 107* 79   < > 92   < > 116   BUN  --   --  15  --  21  --  31*   CR  --   --  1.07  --  1.07  --  1.14   GFRESTIMATED  --   --  77  --  77  --  71   PALMER  --   --  8.3*  --  8.5  --  8.2*    < > = values in this interval not displayed.     7-Day Micro Results     Collected Updated Procedure Result Status      09/21/2022 0600 09/25/2022 0933 Blood Culture Peripheral Blood [[id number]]   Peripheral Blood    Preliminary result Component Value   Culture No growth after 4 days  [P]                09/21/2022 0511 09/21/2022 0557 Symptomatic; Yes; 9/19/2022 Influenza A/B & SARS-CoV2 (COVID-19) Virus PCR Multiplex Nasopharyngeal [[id number]]    Swab from Nasopharyngeal    Final result Component Value   Influenza A PCR Negative   Influenza B PCR Negative   RSV PCR Negative   SARS CoV2 PCR Negative   NEGATIVE: SARS-CoV-2 (COVID-19) RNA not detected, presumed negative.            09/21/2022 0505 09/25/2022 0933 Blood Culture Peripheral Blood [[id number]]   Peripheral Blood    Preliminary result Component Value   Culture No growth after 4 days  [P]                    Recent Labs   Lab 09/25/22  1124 09/25/22  0740 09/25/22  0533 09/24/22  0858 09/24/22  0508 09/23/22  0758 09/23/22  0612 09/21/22  0655  09/21/22  0505   NA  --   --  140  --  141  --  138   < > 138   POTASSIUM  --   --  4.2  --  4.2  --  4.5   < > 6.2*   CHLORIDE  --   --  104  --  104  --  103   < > 104   CO2  --   --  29  --  30  --  31   < > 26   ANIONGAP  --   --  7  --  7  --  4*   < > 8   * 107* 79   < > 92   < > 116   < > 85   BUN  --   --  15  --  21  --  31*   < > 45*   CR  --   --  1.07  --  1.07  --  1.14   < > 1.66*   GFRESTIMATED  --   --  77  --  77  --  71   < > 45*   PALMER  --   --  8.3*  --  8.5  --  8.2*   < > 8.8   MAG  --   --   --   --   --   --   --   --  2.4    < > = values in this interval not displayed.     No results for input(s): NTBNPI, NTBNP in the last 168 hours.  Recent Labs   Lab 09/21/22  0505   LACT 0.5*     No results for input(s): TSH in the last 168 hours.  No results for input(s): TROPONIN, TROPI, TROPR, TROPONINIS in the last 168 hours.    Invalid input(s): TROP, TROPONINIES, TNIH  Results for orders placed or performed during the hospital encounter of 09/21/22   XR Chest Port 1 View    Narrative    EXAM: XR CHEST PORT 1 VIEW  LOCATION: [facility]  DATE/TIME: 9/21/2022 5:39 AM    INDICATION: dyspnea  COMPARISON: 04/09/2022      Impression    IMPRESSION: Shallow inspiration. Cardiomediastinal silhouette within normal limits. No focal consolidation or pleural effusion. Postsurgical change cervical spine.   XR Chest Port 1 View    Narrative    EXAM: XR CHEST PORT 1 VIEW  LOCATION: [facility]  DATE/TIME: 9/21/2022 9:51 AM    INDICATION: RN placed PICC   verify tip placement  COMPARISON: Portable AP view of the chest 9/21/2022 at 0533 hours      Impression    IMPRESSION:     There is a new right PICC. The tip of the catheter resides at the SVC right atrial junction.    Unchanged heart and mediastinal interfaces accounting for new RPO rotation.    The lungs are slightly underexpanded without focal opacity.    No pleural space abnormality.   US Lower Extremity  Venous Duplex Bilateral    Narrative    EXAM: US LOWER EXTREMITY VENOUS DUPLEX BILATERAL  LOCATION: [facility]  DATE/TIME: 9/22/2022 4:13 PM    INDICATION: assess for dvt  COMPARISON: None.  TECHNIQUE: Venous Duplex ultrasound of bilateral lower extremities with and without compression, augmentation and duplex. Color flow and spectral Doppler with waveform analysis performed.    FINDINGS: Exam includes the common femoral, femoral, popliteal veins as well as segmentally visualized deep calf veins and greater saphenous vein.     RIGHT: No deep vein thrombosis. No superficial thrombophlebitis. No popliteal cyst.    LEFT: No deep vein thrombosis. No superficial thrombophlebitis. No popliteal cyst.      Impression    IMPRESSION:  1.  No deep venous thrombosis in the bilateral lower extremities.   Cardioversion    Narrative    Eliud Easley MD     9/23/2022  4:16 PM  [facility]    Procedure: Cardioversion    Date/Time: 9/23/2022 4:14 PM  Performed by: Eliud Easley MD  Authorized by: Eliud Easley MD       UNIVERSAL PROTOCOL   Site Marked: NA  Prior Images Obtained and Reviewed:  Yes  Required items: Required blood products, implants, devices and special   equipment available    Patient identity confirmed:  Verbally with patient, provided demographic   data, hospital-assigned identification number, arm band and anonymous   protocol, patient vented/unresponsive  Patient was reevaluated immediately before administering moderate or deep   sedation or anesthesia  Confirmation Checklist:  Patient's identity using two indicators, relevant   allergies, procedure was appropriate and matched the consent or emergent   situation and correct equipment/implants were available  Time out: Immediately prior to the procedure a time out was called    Universal Protocol: the Joint Commission Universal Protocol was followed      SEDATION  Patient Sedated: Yes    Sedation Type:  Moderate  (conscious) sedation  Sedation:  Diazepam and fentanyl  Vital signs: Vital signs monitored during sedation      PROCEDURE DETAILS  Cardioversion basis: elective  Indications: failure of anti-arrhythmic medications  Pre-procedure rhythm: atrial fibrillation  Patient position: patient was placed in a supine position  Chest area: chest area exposed  Electrodes: pads  Electrodes placed: anterior-posterior  Number of attempts: 1    Details of Attempts:  A single 200J shock was administered. The patient   remained in atrial fibrillation. As only moderate sedation was available   and a total of 5 mg diazepam and 125 mcg fentanyl were required to achieve   adequate sedation for just one shock, a second attempt was not made. Would   recommend deep sedation with anesthesia for any further attempts./  Post-procedure rhythm: atrial fibrillation      PROCEDURE    Patient Tolerance:  Patient tolerated the procedure well with no immediate   complications  Length of time physician/provider present for 1:1 monitoring during   sedation: 15   Echocardiogram Complete     Value    LVEF  30-35%    Narrative    [id number]  [id number]  [id number]  [id number]^MARILYN^[place] [address]     Name: GUSTABO CHRISTOPHER  MRN: [id number]  : 1955  Study Date: 2022 12:05 PM  Age: 67 yrs  Gender: Male  Patient Location: [facility]  Reason For Study: Atrial Fibrillation  Ordering Physician: LUCIA SANDERS  Performed By: JULIA     BSA: 2.3 m2  Height: 72 in  Weight: 247 lb  HR: 127  BP: 111/74 mmHg  ______________________________________________________________________________  Procedure  Complete Portable Echo Adult. Definity (NDC #83165-350) given intravenously.  Technically difficult study.Extremely difficult acoustic windows despite the  use of contrast for endcardial border definition. Compared to the prior study  dated 2022, there are changes as  noted.  ______________________________________________________________________________  Interpretation Summary     1. Left ventricular size and wall thickness are normal. Systolic function is  moderately reduced with global hypokinesis. The estimated left ventricular  ejection fraction is 30-35%.  2. Right ventricular size and systolic function are normal.  3. Moderate biatrial enlargement.  4. No hemodynamically significant valvular abnormalities.  5. Compared to the prior study dated 2/9/2022, there has been an interval  decrease in the left ventricular ejection fraction.  ______________________________________________________________________________  I      WMSI = 2.00     % Normal = 0     X - Cannot   0 -                      (2) - Mildly 2 -          Segments  Size  Interpret    Hyperkinetic 1 - Normal  Hypokinetic  Hypokinetic  1-2     small                                                     7 -          3-5      moderate  3 - Akinetic 4 -          5 -         6 - Akinetic Dyskinetic   6-14    large               Dyskinetic   Aneurysmal  w/scar       w/scar       15-16   diffuse     Left Ventricle  The left ventricle is normal in size. There is normal left ventricular wall  thickness. The visual ejection fraction is 30-35%. Diastolic function not  assessed due to atrial fibrillation. There is moderate global hypokinesia of  the left ventricle.     Right Ventricle  Normal right ventricle size and systolic function.     Atria  The left atrium is moderately dilated. The right atrium is moderately dilated.     Mitral Valve  Mitral valve leaflets appear normal. There is trace mitral regurgitation.  There is no mitral valve stenosis.     Tricuspid Valve  Tricuspid valve leaflets appear normal. There is trace to mild tricuspid  regurgitation. Right ventricle systolic pressure estimate normal. The right  ventricular systolic pressure is approximated at 18.6 mmHg plus the right  atrial pressure. There is no tricuspid  stenosis.     Aortic Valve  Aortic valve leaflets appear normal. The aortic valve is trileaflet. No aortic  regurgitation is present. No aortic stenosis is present.     Pulmonic Valve  The pulmonic valve is not well visualized.     Vessels  The aorta root is normal. The thoracic aorta is normal. IVC diameter and  respiratory changes fall into an intermediate range suggesting an RA pressure  of 8 mmHg.     Pericardium  There is no pericardial effusion.     Rhythm  The rhythm was atrial fibrillation.     ______________________________________________________________________________  MMode/2D Measurements & Calculations  IVSd: 0.85 cm  LVIDd: 5.8 cm  LVIDs: 5.3 cm  LVPWd: 0.90 cm  FS: 8.8 %     LV mass(C)d: 198.9 grams  LV mass(C)dI: 85.3 grams/m2  Ao root diam: 3.6 cm  LA dimension: 4.0 cm  asc Aorta Diam: 3.5 cm  LA/Ao: 1.1  LVOT diam: 2.2 cm  LVOT area: 3.9 cm2  LA Volume Indexed (AL/bp): 47.6 ml/m2  RWT: 0.31     Time Measurements  MM HR: 127.0 BPM     Doppler Measurements & Calculations  MV E max omar: 86.8 cm/sec  MV dec slope: 539.5 cm/sec2  MV dec time: 0.16 sec  Ao V2 max: 99.9 cm/sec  Ao max P.0 mmHg  Ao V2 mean: 71.2 cm/sec  Ao mean P.3 mmHg  Ao V2 VTI: 15.3 cm  ANDRES(I,D): 3.8 cm2  ANDRES(V,D): 3.0 cm2  LV V1 max P.3 mmHg  LV V1 max: 75.7 cm/sec  LV V1 VTI: 14.9 cm  SV(LVOT): 58.7 ml  SI(LVOT): 25.2 ml/m2  PA acc time: 0.06 sec  TR max omar: 215.8 cm/sec  TR max P.6 mmHg  AV Omar Ratio (DI): 0.76  ANDRES Index (cm2/m2): 1.6  E/E': 9.2  E/E' av.4  Lateral E/e': 5.5  Medial E/e': 9.2  Peak E' Omar: 9.5 cm/sec     ______________________________________________________________________________  Report approved by: Barbara Ibanez 2022 02:25 PM

## 2022-09-26 NOTE — PLAN OF CARE
Problem: Plan of Care - These are the overarching goals to be used throughout the patient stay.    Goal: Optimal Comfort and Wellbeing  Outcome: Ongoing, Progressing     Problem: Respiratory Compromise (Heart Failure)  Goal: Effective Oxygenation and Ventilation  Outcome: Ongoing, Progressing      Goal Outcome Evaluation:      Afib with RVR overnight. HR 110s-130s. Pt asymptomatic.   Requested PRN clonazepam for sleep at HS; effective.   Pt has been NPO, sips with meds, overnight for possible procedure today.

## 2022-09-26 NOTE — PROGRESS NOTES
Patient received scheduled neb.  Breath sounds diminished pre and post neb.  Sats in high 90s on 2.5 L NC.  Patient declined to go on bipap overnight. RT will continue to follow.

## 2022-09-26 NOTE — CONSULTS
HEART CARE CONSULTATON NOTE        Assessment/Recommendations   Assessment:  1.  Atrial fibrillation: Persistent atrial fibrillation with rapid ventricular response still poorly controlled despite amiodarone and metoprolol.  Blood pressure will still allow for further attempts at rate control with diltiazem  2.  Acute on chronic heart failure with reduced ejection fraction 30-35% may be tachycardia induced  3.  End-stage COPD on 3 to 4 L of chronic O2  4.  Chronic hypoxic respiratory failure    Plan:  1.  We will discuss with EP  2.  No plans for procedure today  3.  Start diltiazem and attempts at rate control.  Continue metoprolol and amiodarone  4.  Patient electing for hospice on discharge         History of Present Illness/Subjective    Telemetry shows atrial fibrillation with elevated ventricular response at 100-120 bpm  Shortness of breath at baseline that he feels has worsened with A. fib with RVR.  No chest pain     Physical Examination  Review of Systems   VITALS: /74 (BP Location: Left arm)   Pulse 110   Temp 98.6  F (37  C) (Oral)   Resp 24   Wt 109.8 kg (242 lb)   SpO2 96%   BMI 32.82 kg/m    BMI: Body mass index is 32.82 kg/m .  Wt Readings from Last 3 Encounters:   09/26/22 109.8 kg (242 lb)   09/25/22 109.4 kg (241 lb 1.6 oz)       Intake/Output Summary (Last 24 hours) at 9/26/2022 1314  Last data filed at 9/26/2022 1208  Gross per 24 hour   Intake 720 ml   Output --   Net 720 ml     General Appearance:   no distress, normal body habitus   ENT/Mouth: membranes moist, no oral lesions or bleeding gums.      EYES:  no scleral icterus, normal conjunctivae   Neck: no carotid bruits or thyromegaly   Chest/Lungs:    Decreased breath sounds   Cardiovascular:   irregular.   Abdomen:  no organomegaly, masses, bruits, or tenderness; bowel sounds are present   Extremities: no cyanosis or clubbing   Skin: no xanthelasma, warm.    Neurologic: normal  bilateral, no tremors     Psychiatric: alert  and oriented x3, calm     Review Of Systems  Skin: negative  Eyes: negative  Ears/Nose/Throat: negative  Respiratory: No shortness of breath, dyspnea on exertion, cough, or hemoptysis  Cardiovascular: negative  Gastrointestinal: negative  Genitourinary: negative  Musculoskeletal: negative  Neurologic: negative  Psychiatric: negative  Hematologic/Lymphatic/Immunologic: negative  Endocrine: negative          Lab Results    Chemistry/lipid CBC Cardiac Enzymes/BNP/TSH/INR   No results for input(s): CHOL, HDL, LDL, TRIG, CHOLHDLRATIO in the last 38621 hours.  No results for input(s): LDL in the last 59424 hours.  Recent Labs   Lab Test 09/26/22  1206 09/26/22  0901 09/26/22  0633   NA  --   --  139   POTASSIUM  --   --  3.9   CHLORIDE  --   --  104   CO2  --   --  30*   *   < > 87   BUN  --   --  15.2   CR  --   --  1.22*   GFRESTIMATED  --   --  66   PALMER  --   --  8.0*    < > = values in this interval not displayed.     Recent Labs   Lab Test 09/26/22  0633 09/25/22  0533 09/24/22  0508   CR 1.22* 1.07 1.07     No results for input(s): A1C in the last 73634 hours.       Recent Labs   Lab Test 09/26/22  0633   WBC 7.5   HGB 10.9*   HCT 34.7*   MCV 97        Recent Labs   Lab Test 09/26/22  0633 09/25/22  0533 09/24/22  0508   HGB 10.9* 11.7* 11.3*    Recent Labs   Lab Test 09/21/22  1722 09/21/22  1134 09/21/22  0505   TROPONINI 0.01 0.02 0.03     Recent Labs   Lab Test 09/21/22  0505   *     No results for input(s): TSH in the last 80810 hours.  No results for input(s): INR in the last 47760 hours.     Medical History  Surgical History Family History Social History   Past Medical History:   Diagnosis Date     Anxiety      CAD (coronary artery disease)      Chronic atrial fibrillation (H)      Chronic hepatitis C virus infection (H)      COPD (chronic obstructive pulmonary disease) (H)      Essential hypertension      Hemangioma     Left hand     Intravenous drug abuse in remission (H)     Sober  since ~     Lumbar spinal stenosis      Peripheral neuropathy      Restless leg syndrome      Stroke (H)      Past Surgical History:   Procedure Laterality Date     CERVICAL FUSION      C6-7     PICC TRIPLE LUMEN PLACEMENT  2022          Family History   Problem Relation Age of Onset     Coronary Artery Disease Mother      Diabetes Mother         Social History     Socioeconomic History     Marital status: Patient Declined     Spouse name: Not on file     Number of children: Not on file     Years of education: Not on file     Highest education level: Not on file   Occupational History     Not on file   Tobacco Use     Smoking status: Former Smoker     Quit date:      Years since quittin.7     Smokeless tobacco: Not on file   Substance and Sexual Activity     Alcohol use: Not Currently     Drug use: Not Currently     Comment: Sober from IV drug use since ~     Sexual activity: Not on file   Other Topics Concern     Not on file   Social History Narrative     Not on file     Social Determinants of Health     Financial Resource Strain: Not on file   Food Insecurity: Not on file   Transportation Needs: Not on file   Physical Activity: Not on file   Stress: Not on file   Social Connections: Not on file   Intimate Partner Violence: Not on file   Housing Stability: Not on file         Medications  Allergies   No current outpatient medications on file.      No Known Allergies      Julienne Irizarry MD

## 2022-09-26 NOTE — PROGRESS NOTES
Hospitalist Progress Note  Brief history:  67-year-old male with past medical history of severe COPD, chronic respiratory failure with hypoxia, on Hospice prior to admission, A. fib, CVA with no residual deficits, chronic hepatitis C, RLS, peripheral neuropathy, spinal stenosis who originally presented to [facility] with confusion, shortness of breath and lower extremity edema.  He was treated for presumed septic shock.  Found to have A. fib RVR with difficult to control ventricular rates with medical management and status post unsuccessful cardioversion.  Patient is transferred to [facility] for EP evaluation    Assessment/Plan  1. A fib with RVR, persistent     Mr. Pederson is a 66 yo male pt admitted with sepsis-like picture d/t pneumonia who presented with a fib with RVR.  His rate was unresponsive to diltiazem gtt and he had associated hypotension/unstable BPs.  Cardiology was consulted.  He is s/p unsuccessful direct current cardioversion on 9/23/22.       He had be started on po metoprolol and now transitioned to po amiodarone.  He is having persistent Afib RVR.    Oral diltiazem added today       He has been on rivaroxaban 20mg po daily for CVA prevention      2.  Acute on chronic CHF     EF noted to be 30-35% and it is suspected that he is developing tachycardia-induced CM.  He did receive IV lasix during his hospital stay     Appears to be fairly euvolemic this time but will need to be monitored closely    3.  Sepsis       Community-acquired PNA       Acute metabolic encephalopathy     Mr. Pederson was admitted to the ICU with sepsis-like picture with tachyarrythmia ( as above), hypotension, fever, tachypnea and acute hypoxic/hypercapnic respiratory faliure requiring BIPAP.  He was given IVF, IV abx for presumed PNA and placed in the ICU.  He did not require vasopressor agents.       CXR without clear infiltrates.  Leg US bilaterally negative for DVT.       He will complete 5 days of IV  azithromycin and IV zosyn today.       He mental status is clearing with the above inpatient medical treatments and improvement in his respiratory status.  His PTA gabapentin was resumed today at decreased dose (300mg po tid instead of 600mg po tid).  Mental status will continue to be monitored closely     4.  Oxygen and steroid dependent COPD       Acute on chronic hypoxemic respiratory failure     Required BIPAP support in ICU initially.  Now on 3-4L nasal cannula.  He also received IV solumedrol initially but has now been transitioned to po prednisone.          5.  CAD       HTN        His PTA norvasc, lisinopril and lasix have been held d/t softer BP     6.   Acute renal failure with hyperkalmeia     He was given IV lasix, insulin, calcium gluconate and D50 to treat hyperkalemia on admission. Creat and potassium hav normalized,      Disposition Plan   Expected discharge in 3 days to prior living arrangement once A. fib is controlled.     Entered: Dorota Lal MD 09/26/2022, 4:44 PM         Subjective  Patient reports having intermittent palpitations. No chest pain.  Breathing is at baseline.  Wondering is he's having any procedures today     Objective    Vital signs in last 24 hours  Temp:  [98.2  F (36.8  C)-98.7  F (37.1  C)] 98.7  F (37.1  C)  Pulse:  [100-125] 100  Resp:  [20-24] 22  BP: (103-139)/(64-88) 116/75  SpO2:  [96 %-100 %] 97 % @LASTSAO2(12)@ O2 Device: Nasal cannula    Weight:   Wt Readings from Last 3 Encounters:   09/26/22 109.8 kg (242 lb)   09/25/22 109.4 kg (241 lb 1.6 oz)      Weight change:     Intake/Output last 3 shifts  I/O last 3 completed shifts:  In: 960 [P.O.:960]  Out: -   Body mass index is 32.82 kg/m .    Physical Exam    General Appearance:    Alert, cooperative, no distress, appears stated age   Lungs:    Diminished bilaterally   Cardiovascular:   Irregularly irregular.  Normal S1, S2.  No murmur, rub or gallop.  No edema   Abdomen:     Soft, non-tender, bowel sounds  active all four quadrants   Neurologic:   Awake, alert, oriented x 3.  Grossly nonfocal      Pertinent Labs   Lab Results: personally reviewed.   Recent Labs   Lab 09/26/22  0633 09/25/22  0533 09/24/22  0508    140 141   CO2 30* 29 30   BUN 15.2 15 21     Recent Labs   Lab 09/26/22  0633 09/25/22  0533 09/24/22  0508   WBC 7.5 9.3 9.2   HGB 10.9* 11.7* 11.3*   HCT 34.7* 37.4* 36.7*    202 211     Recent Labs   Lab 09/21/22  1722 09/21/22  1134 09/21/22  0505   TROPONINI 0.01 0.02 0.03     Invalid input(s): POCGLUFGR    Medications  Current Facility-Administered Medications   Medication     albuterol (PROVENTIL) neb solution 5 mg     [START ON 9/29/2022] amiodarone (PACERONE) tablet 200 mg     amiodarone (PACERONE) tablet 400 mg     citalopram (celeXA) tablet 40 mg     clonazePAM (klonoPIN) tablet 2 mg     glucose gel 15-30 g    Or     dextrose 50 % injection 25-50 mL    Or     glucagon injection 1 mg     diltiazem (CARDIZEM) tablet 60 mg     gabapentin (NEURONTIN) capsule 300 mg     guaiFENesin (MUCINEX) 12 hr tablet 1,200 mg     insulin aspart (NovoLOG) injection (RAPID ACTING)     insulin aspart (NovoLOG) injection (RAPID ACTING)     ipratropium - albuterol 0.5 mg/2.5 mg/3 mL (DUONEB) neb solution 3 mL     magnesium sulfate 2 g in water intermittent infusion     metoprolol tartrate (LOPRESSOR) tablet 50 mg     naloxone (NARCAN) injection 0.2 mg    Or     naloxone (NARCAN) injection 0.4 mg    Or     naloxone (NARCAN) injection 0.2 mg    Or     naloxone (NARCAN) injection 0.4 mg     ondansetron (ZOFRAN ODT) ODT tab 4 mg    Or     ondansetron (ZOFRAN) injection 4 mg     oxyCODONE-acetaminophen (PERCOCET) 5-325 MG per tablet 1 tablet     pantoprazole (PROTONIX) IV push injection 40 mg     predniSONE (DELTASONE) tablet 20 mg     QUEtiapine (SEROquel) tablet 25 mg     rivaroxaban ANTICOAGULANT (XARELTO) tablet 20 mg     sennosides (SENOKOT) tablet 8.6 mg       Pertinent Radiology   No results found for  this visit on 09/25/22.      Advanced Care Planning:  Discharge Planning discussed with patient, nursing staff        Dorota Lal MD  Internal Medicine Hospitalist  9/25/2022

## 2022-09-26 NOTE — CONSULTS
Care Management Initial Consult    General Information  Assessment completed with: Care Team Member, SUSAN-chart review,    Type of CM/SW Visit: Initial Assessment    Primary Care Provider verified and updated as needed: Yes   Readmission within the last 30 days:        Reason for Consult: discharge planning  Advance Care Planning:            Communication Assessment  Patient's communication style: spoken language (English or Bilingual)    Hearing Difficulty or Deaf: no   Wear Glasses or Blind: yes    Cognitive  Cognitive/Neuro/Behavioral: WDL                      Living Environment:   People in home: child(katie), adult (Lives in his home with his daughter, and son helps provide care as well.)     Current living Arrangements: house      Able to return to prior arrangements: yes       Family/Social Support:  Care provided by:    Provides care for:                  Description of Support System:           Current Resources:   Patient receiving home care services:       Community Resources: Hospice (Working on plan to discharge home with [facility])  Equipment currently used at home: walker, rolling  Supplies currently used at home:      Employment/Financial:  Employment Status:          Financial Concerns:             Lifestyle & Psychosocial Needs:  Social Determinants of Health     Tobacco Use: Medium Risk     Smoking Tobacco Use: Former Smoker     Smokeless Tobacco Use: Unknown   Alcohol Use: Not on file   Financial Resource Strain: Not on file   Food Insecurity: Not on file   Transportation Needs: Not on file   Physical Activity: Not on file   Stress: Not on file   Social Connections: Not on file   Intimate Partner Violence: Not on file   Depression: Not on file   Housing Stability: Not on file       Functional Status:  Prior to admission patient needed assistance:              Mental Health Status:          Chemical Dependency Status:                Values/Beliefs:  Spiritual, Cultural Beliefs, [employer]  Practices, Values that affect care:                 Additional Information:    Assessed. Trx from [facility]. Pt from home w/[facility]; levar Potts assists as needed. Plan to discharge home with [facility] hospice (Leda, [phone]; Nel, [phone]). Family/hospice are in contact about plan; CM to keep hospice updated.     GARRETT spoke with levar Potts over-the-phone. DME from former hospice company is still in home; [facility] coordinating with family about delivering new equipment (likely tomorrow 9/27). Confirmed that pt/family plan is to discharge home with [facility] hosp[facility]. Levar Potts to transport.     GARRETT spoke with Nel of [facility]. She confirms that pt has been accepted with their agency. She will coordinate with son to confirm final delivery time of DME. Plan for pt to have procedure today. Anticipate pt to discharge tomorrow. CM to f/u with hospice when final discharge plan is known.       Per Nel, possible pacemaker placement tomorrow and likely discharge home on Wednesday 9/28.     CARLOS GarciaSW

## 2022-09-26 NOTE — PLAN OF CARE
Problem: Dysrhythmia (Heart Failure)  Goal: Stable Heart Rate and Rhythm  Outcome: Ongoing, Progressing     Patient remains in a-fib, HR between 100-130. Diltiazem dose increased today. Patient refused Xarelto today, he bleed for a long time yesterday with IV removal so did not want to take dose today.    Problem: Plan of Care - These are the overarching goals to be used throughout the patient stay.    Goal: Plan of Care Review/Shift Note  Description: The Plan of Care Review/Shift note should be completed every shift.  The Outcome Evaluation is a brief statement about your assessment that the patient is improving, declining, or no change.  This information will be displayed automatically on your shift note.  Outcome: Ongoing, Progressing     Patient was hoping to have a cardiac procedure today, pacemaker or ablation, he is frustrated as he thought that was the reason he was transferred to [place]. Per cardiology, no procedure will be done today. Patient remains tachycardic in [facility], however, denies any symptoms.

## 2022-09-27 NOTE — PROGRESS NOTES
HEART CARE CONSULTATON NOTE        Assessment/Recommendations   Assessment:  1.  Atrial fibrillation: Persistent atrial fibrillation with rapid ventricular response now better controlled with the addition of diltiazem  2.  Acute on chronic heart failure with reduced ejection fraction 30-35% may be tachycardia induced  3.  End-stage COPD on 3 to 4 L of chronic O2  4.  Chronic hypoxic respiratory failure     Plan:  1.    Will change metoprolol to tartrate dosing to 100 mg twice daily  2.    Change diltiazem dosing to 240 mg daily  3.    Continue amiodarone for now and will arrange follow-up in A. fib clinic.  At that time can decide on rhythm versus rate control  4.    Xarelto for anticoagulation  Possible discharge tomorrow     History of Present Illness/Subjective    Telemetry shows that his atrial fibrillation is now much better rate controlled.  Rates running 60 to 100 bpm.  He is feeling better today with improved breathing and energy level.       Physical Examination  Review of Systems   VITALS: /71 (BP Location: Right arm)   Pulse 99   Temp 97.9  F (36.6  C) (Oral)   Resp 18   Wt 110.7 kg (244 lb)   SpO2 98%   BMI 33.09 kg/m    BMI: Body mass index is 33.09 kg/m .  Wt Readings from Last 3 Encounters:   09/27/22 110.7 kg (244 lb)   09/25/22 109.4 kg (241 lb 1.6 oz)       Intake/Output Summary (Last 24 hours) at 9/27/2022 1242  Last data filed at 9/27/2022 0300  Gross per 24 hour   Intake 600 ml   Output 0 ml   Net 600 ml     General Appearance:   no distress, normal body habitus   ENT/Mouth: membranes moist, no oral lesions or bleeding gums.      EYES:  no scleral icterus, normal conjunctivae   Neck: no carotid bruits or thyromegaly   Chest/Lungs:   lungs are clear to auscultation   Cardiovascular:   irregular. Normal first and second heart sounds with no murmurs  no edema bilaterally    Abdomen:  no organomegaly, masses, bruits, or tenderness; bowel sounds are present   Extremities: no cyanosis or  clubbing   Skin: no xanthelasma, warm.    Neurologic: normal  bilateral, no tremors     Psychiatric: alert and oriented x3, calm     Review Of Systems  Skin: negative  Eyes: negative  Ears/Nose/Throat: negative  Respiratory: No shortness of breath, dyspnea on exertion, cough, or hemoptysis  Cardiovascular: negative  Gastrointestinal: negative  Genitourinary: negative  Musculoskeletal: negative  Neurologic: negative  Psychiatric: negative  Hematologic/Lymphatic/Immunologic: negative  Endocrine: negative          Lab Results    Chemistry/lipid CBC Cardiac Enzymes/BNP/TSH/INR   No results for input(s): CHOL, HDL, LDL, TRIG, CHOLHDLRATIO in the last 78474 hours.  No results for input(s): LDL in the last 15157 hours.  Recent Labs   Lab Test 09/27/22  1130 09/27/22  0812 09/27/22  0505   NA  --   --  143   POTASSIUM  --   --  3.3*   CHLORIDE  --   --  110*   CO2  --   --  26   *   < > 80   BUN  --   --  18.9   CR  --   --  1.03   GFRESTIMATED  --   --  81   PALMER  --   --  7.0*    < > = values in this interval not displayed.     Recent Labs   Lab Test 09/27/22  0505 09/26/22  0633 09/25/22  0533   CR 1.03 1.22* 1.07     No results for input(s): A1C in the last 69309 hours.       Recent Labs   Lab Test 09/27/22  0505   WBC 6.7   HGB 9.0*   HCT 29.2*   MCV 97        Recent Labs   Lab Test 09/27/22  0505 09/26/22  0633 09/25/22  0533   HGB 9.0* 10.9* 11.7*    Recent Labs   Lab Test 09/21/22  1722 09/21/22  1134 09/21/22  0505   TROPONINI 0.01 0.02 0.03     Recent Labs   Lab Test 09/21/22  0505   *     No results for input(s): TSH in the last 86856 hours.  No results for input(s): INR in the last 67352 hours.     Medical History  Surgical History Family History Social History   Past Medical History:   Diagnosis Date     Anxiety      CAD (coronary artery disease)      Chronic atrial fibrillation (H)      Chronic hepatitis C virus infection (H)      COPD (chronic obstructive pulmonary disease) (H)       Essential hypertension      Hemangioma     Left hand     Intravenous drug abuse in remission (H)     Sober since ~     Lumbar spinal stenosis      Peripheral neuropathy      Restless leg syndrome      Stroke (H)      Past Surgical History:   Procedure Laterality Date     CERVICAL FUSION      C6-7     PICC TRIPLE LUMEN PLACEMENT  2022          Family History   Problem Relation Age of Onset     Coronary Artery Disease Mother      Diabetes Mother         Social History     Socioeconomic History     Marital status: Patient Declined     Spouse name: Not on file     Number of children: Not on file     Years of education: Not on file     Highest education level: Not on file   Occupational History     Not on file   Tobacco Use     Smoking status: Former Smoker     Quit date:      Years since quittin.7     Smokeless tobacco: Not on file   Substance and Sexual Activity     Alcohol use: Not Currently     Drug use: Not Currently     Comment: Sober from IV drug use since ~     Sexual activity: Not on file   Other Topics Concern     Not on file   Social History Narrative     Not on file     Social Determinants of Health     Financial Resource Strain: Not on file   Food Insecurity: Not on file   Transportation Needs: Not on file   Physical Activity: Not on file   Stress: Not on file   Social Connections: Not on file   Intimate Partner Violence: Not on file   Housing Stability: Not on file         Medications  Allergies   No current outpatient medications on file.      No Known Allergies      Julienne Irizarry MD

## 2022-09-27 NOTE — PROGRESS NOTES
Hospitalist Progress Note  Brief history:  67-year-old male with past medical history of severe COPD, chronic respiratory failure with hypoxia, on Hospice prior to admission, A. fib, CVA with no residual deficits, chronic hepatitis C, RLS, peripheral neuropathy, spinal stenosis who originally presented to [facility] ICU with sepsis-like picture with Afib RVR,  hypotension, fever, tachypnea and acute hypoxic/hypercapnic respiratory faliure requiring BIPAP.  He was given IVF, steroids, IV abx for presumed PNA and his respiratory status improved.  He remained in Afib RVR despite initiation of amiodarone and underwent cardioversion on 9/23 which was unsuccessful therefore was transferred to [facility] for EP evaluation.  Here patient was started on oral diltiazem with significant improvement in ventricular rates.  Anticipate patient can be discharged home tomorrow and he is planning to sign of Hospice after discharge       Assessment/Plan  1. A fib with RVR, persistent with better controlled ventricular rates   Continue amiodarone, metoprolol and diltiazem.    Continue rivaroxaban 20mg po daily for CVA prevention.   Follow up in Afib clinic.      2.  Acute on chronic systolic CHF.  Likely tachycardia induced cardiomyopathy LVEF 30%.     He did receive IV lasix during his hospital stay at [facility], appears euvolemic so no diuretics needed per Cardiology at this time.         3.  Sepsis       Community-acquired PNA     Mr. Pederson was admitted to the ICU with sepsis-like picture with tachyarrythmia, hypotension, fever, tachypnea and acute hypoxic/hypercapnic respiratory faliure requiring BIPAP.  He was given IVF, IV abx for presumed PNA and placed in the ICU.  He did not require vasopressor agents.       CXR without clear infiltrates.  Leg US bilaterally negative for DVT.       He will complete 5 days of IV azithromycin and IV zosyn.     4.  Acute metabolic encephalopathy.  Mental status seems to be back  "to baseline.  PTA gabapentin was resumed at a lower dose  (300mg po tid instead of 600mg po tid) with no reported increase in pain.       5.  Oxygen and steroid dependent COPD       Acute on chronic hypoxemic respiratory failure     Required BIPAP support in ICU initially.  Now on 3-4L nasal cannula which is his baseline   He also received IV solumedrol initially but has now been transitioned to home dose po prednisone.    Patient is planning to sign up on Hospice again after discharge (switching from  to [facility])      6.  CAD       HTN        His PTA norvasc, lisinopril and lasix have been held d/t softer BP     7.   Acute renal failure with hyperkalemia      He was given IV lasix, insulin, calcium gluconate and D50 to treat hyperkalemia on admission. Creat and potassium hav normalized,      Disposition Plan   Expected discharge in 1 days to prior living arrangement once A. fib is controlled.     Entered: Dorota Lal MD 09/27/2022, 4:40 PM         Subjective  Patient reports feeling \"the best he's felt in a long time\"   Feels like he has more energy. No chest pain or palpitations. Breathing is at baseline     Objective    Vital signs in last 24 hours  Temp:  [97.8  F (36.6  C)-99.2  F (37.3  C)] 98.3  F (36.8  C)  Pulse:  [] 95  Resp:  [18-22] 18  BP: ()/(56-83) 96/76  SpO2:  [90 %-99 %] 90 % @LASTSAO2(12)@ O2 Device: Nasal cannula    Weight:   Wt Readings from Last 3 Encounters:   09/27/22 110.7 kg (244 lb)   09/25/22 109.4 kg (241 lb 1.6 oz)      Weight change: 0.907 kg (2 lb)    Intake/Output last 3 shifts  I/O last 3 completed shifts:  In: 360 [P.O.:360]  Out: 0   Body mass index is 33.09 kg/m .    Physical Exam    General Appearance:    Alert, cooperative, no distress, appears stated age   Lungs:    Diminished bilaterally with scattered wheezing    Cardiovascular:   Irregularly irregular.  Normal S1, S2.  No murmur, rub or gallop.  No edema   Abdomen:     Soft, non-tender, bowel " sounds active all four quadrants   Neurologic:   Awake, alert, oriented x 3.  Grossly nonfocal      Pertinent Labs   Lab Results: personally reviewed.   Recent Labs   Lab 09/27/22  0505 09/26/22  0633 09/25/22  0533    139 140   CO2 26 30* 29   BUN 18.9 15.2 15     Recent Labs   Lab 09/27/22  0505 09/26/22  0633 09/25/22  0533   WBC 6.7 7.5 9.3   HGB 9.0* 10.9* 11.7*   HCT 29.2* 34.7* 37.4*    183 202     Recent Labs   Lab 09/21/22  1722 09/21/22  1134 09/21/22  0505   TROPONINI 0.01 0.02 0.03     Invalid input(s): POCGLUFGR    Medications  Current Facility-Administered Medications   Medication     albuterol (PROVENTIL) neb solution 5 mg     [START ON 9/29/2022] amiodarone (PACERONE) tablet 200 mg     amiodarone (PACERONE) tablet 400 mg     citalopram (celeXA) tablet 40 mg     clonazePAM (klonoPIN) tablet 2 mg     glucose gel 15-30 g    Or     dextrose 50 % injection 25-50 mL    Or     glucagon injection 1 mg     diltiazem ER COATED BEADS (CARDIZEM CD/CARTIA XT) 24 hr capsule 240 mg     gabapentin (NEURONTIN) capsule 300 mg     guaiFENesin (MUCINEX) 12 hr tablet 1,200 mg     insulin aspart (NovoLOG) injection (RAPID ACTING)     insulin aspart (NovoLOG) injection (RAPID ACTING)     ipratropium - albuterol 0.5 mg/2.5 mg/3 mL (DUONEB) neb solution 3 mL     magnesium sulfate 2 g in water intermittent infusion     metoprolol tartrate (LOPRESSOR) tablet 100 mg     naloxone (NARCAN) injection 0.2 mg    Or     naloxone (NARCAN) injection 0.4 mg    Or     naloxone (NARCAN) injection 0.2 mg    Or     naloxone (NARCAN) injection 0.4 mg     ondansetron (ZOFRAN ODT) ODT tab 4 mg    Or     ondansetron (ZOFRAN) injection 4 mg     oxyCODONE-acetaminophen (PERCOCET) 5-325 MG per tablet 1 tablet     pantoprazole (PROTONIX) IV push injection 40 mg     predniSONE (DELTASONE) tablet 20 mg     QUEtiapine (SEROquel) tablet 25 mg     rivaroxaban ANTICOAGULANT (XARELTO) tablet 20 mg     sennosides (SENOKOT) tablet 8.6 mg        Pertinent Radiology   No results found for this visit on 09/25/22.      Advanced Care Planning:  Discharge Planning discussed with patient, Dr. Irizarry, nursing staff        Dorota Lal MD  Internal Medicine Hospitalist  9/25/2022

## 2022-09-27 NOTE — PLAN OF CARE
Problem: Adjustment to Illness (Heart Failure)  Goal: Optimal Coping  Outcome: Ongoing, Not Progressing     Problem: Cardiac Output Decreased (Heart Failure)  Goal: Optimal Cardiac Output  Outcome: Ongoing, Not Progressing     Problem: Dysrhythmia (Heart Failure)  Goal: Stable Heart Rate and Rhythm  Outcome: Ongoing, Not Progressing   Goal Outcome Evaluation:         Cardiac:  A-fib with rate control/RVR, trending 's.  Respiratory:  Rate 16-20, non-labored.  Sat's: Maintaining mid 90's at RA. LS: Diminished bases, bilat.  Neuro:  WNL.  GI:  Passing flatus. BS: Present X4 quadrants.  : Voiding and flushing.  No recorded UOP.  Pain:  Chronic elevated.  Treated with PRN Oxycodone and anti-anxiety PO medications.  Pain in neck, back and bilat LE.  Ambulation: Up ad andrae independently.  Labs:  AM Labs pending.  Plan:  Meet with EP today to discuss treatment options for A-Fib.  NPO since MN if ablation an option.  Currently, no orders for prep.

## 2022-09-27 NOTE — PROGRESS NOTES
Care Management Follow Up    Length of Stay (days): 2    Expected Discharge Date: 09/28/2022     Concerns to be Addressed:     Cardiology clearance  Patient plan of care discussed at interdisciplinary rounds: Yes    Anticipated Discharge Disposition: Home, Hospice     Anticipated Discharge Services:  (Working on plan to discharge home with [facility])  Anticipated Discharge DME:        Referrals Placed by CM/SW:    Private pay costs discussed: Not applicable    Additional Information:    Possible procedure today. Plan to discharge home with [facility] ([facility], [phone] OR [facility], [phone]). Hospice/ family are coordinating DME delivery to home. Levar Potts is primary contact for discharge planning and will transport pt home.     1130: VM left with Lead asking about how DME coordination is going today and what meds will be needed at time of discharge.    CM following.       VANNESA Garcia

## 2022-09-27 NOTE — PLAN OF CARE
Problem: Dysrhythmia (Heart Failure)  Goal: Stable Heart Rate and Rhythm  Outcome: Ongoing, Progressing   Patient continues to be in A-Fib, mostly rate controlled. HR fluctuates between , but is not sustained in high range. Patient tolerating oral Diltiazem. No other interventions done, patient will likely discharge home if HR/Rhythm remain stable.    Problem: Plan of Care - These are the overarching goals to be used throughout the patient stay.    Goal: Optimal Comfort and Wellbeing  Intervention: Monitor Pain and Promote Comfort  Recent Flowsheet Documentation  Taken 9/27/2022 1630 by Yaz Guerra, RN  Pain Management Interventions: medication (see MAR)  Taken 9/27/2022 0828 by Yaz Guerra RN  Pain Management Interventions: medication (see MAR)   Percocet given q 4 hours for chronic pain.

## 2022-09-28 NOTE — DISCHARGE SUMMARY
[facility] MEDICINE  DISCHARGE SUMMARY     Primary Care Physician: Gloria Sebastian  Admission Date: 9/25/2022   Discharge Provider: Cristy Yañez MD Discharge Date: 9/28/2022   Diet:   Active Diet and Nourishment Order   Procedures     Room Service     Low Saturated Fat Na <2400 mg     Diet       Code Status: No CPR- Do NOT Intubate   Activity: DCACTIVITY: Activity as tolerated        Condition at Discharge: Stable     REASON FOR PRESENTATION(See Admission Note for Details)     Transfer from  for Afib with RVR    PRINCIPAL & ACTIVE DISCHARGE DIAGNOSES     Principal Problem:    Atrial fibrillation with rapid ventricular response (H)  Active Problems:    Chronic obstructive pulmonary disease with acute exacerbation (H)    Acute metabolic encephalopathy      PENDING LABS     Unresulted Labs Ordered in the Past 30 Days of this Admission     No orders found from 8/26/2022 to 9/26/2022.            PROCEDURES ( this hospitalization only)          RECOMMENDATIONS TO OUTPATIENT PROVIDER FOR F/U VISIT     Follow-up Appointments     Follow-up and recommended labs and tests      Follow up with primary care provider, Gloria Sebastian, within 7 days for   hospital follow- up.  The following labs/tests are recommended: BMP  .               DISPOSITION     Home    SUMMARY OF HOSPITAL COURSE:      67M severe COPD, chronic respiratory failure with hypoxia, on Hospice prior to admission, A. fib, CVA with no residual deficits, chronic hepatitis C, RLS, peripheral neuropathy, spinal stenosis who originally presented to [facility] ICU with sepsis-like picture with Afib RVR,  hypotension, fever, tachypnea and acute hypoxic/hypercapnic respiratory faliure requiring BIPAP.  He was given IVF, steroids, IV abx for presumed PNA and his respiratory status improved.  He remained in Afib RVR despite initiation of amiodarone and underwent cardioversion on 9/23 which was unsuccessful therefore was transferred  to [facility] for EP evaluation.  Here patient was started on oral diltiazem with significant improvement in ventricular rates and will be discharged to home on lopressor, diltiazem and amiodarone with follow-up with Afib clinic    #A fib with RVR, persistent with better controlled ventricular rates   Amiodarone 200mg qday, metoprolol 100BID and diltiazem 300qday  Continue rivaroxaban 20mg po daily for CVA prevention.   Follow up in Afib clinic.      #Acute on chronic systolic CHF.  Likely tachycardia induced cardiomyopathy LVEF 30%.    -resume oral lasix     #Sepsis due to Community-acquired PNA  Mr. Pederson was admitted to the ICU with sepsis-like picture with tachyarrythmia, hypotension, fever, tachypnea and acute hypoxic/hypercapnic respiratory faliure requiring BIPAP.  He was given IVF, IV abx for presumed PNA and placed in the ICU.  He did not require vasopressor agents.       CXR without clear infiltrates.  Leg US bilaterally negative for DVT.       Completed 5 days abx.       #Acute metabolic encephalopathy.  Mental status seems to be back to baseline.  PTA gabapentin was resumed at a lower dose  (300mg po tid instead of 600mg po tid) with no reported increase in pain.       #Oxygen and steroid dependent COPD; Acute on chronic hypoxemic respiratory failure  -Required BIPAP support in ICU initially.  Now on 3-4L nasal cannula which is his baseline   -back on home prednisone    #CAD  #HTN     #Acute renal failure with hyperkalemia  - lisinopril stopped due to HyperK     #Goals of care - In my discussion with Mr. Pederson today he is pretty clear that he wants life prolonging measures and want to return to the hospital for acute illness.  Does not want intubation or resuscitation.  There was question of resuming hospice care, but its not clear to me that this is really the course he wants to take.  Discussed with son who will talk to his Dad some more about this.  It seems to me that DNR/DNI status is  most appropriate and perhaps needs home care for services.  Appreciate  assistance.       Discharge Medications with Med changes:     Current Discharge Medication List      START taking these medications    Details   amiodarone (PACERONE) 200 MG tablet Take 1 tablet (200 mg) by mouth daily  Qty: 30 tablet, Refills: 1    Associated Diagnoses: Atrial fibrillation with RVR (H)      bisacodyl (DULCOLAX) 10 MG suppository Place 1 suppository (10 mg) rectally daily as needed for constipation  Qty: 2 suppository, Refills: 0    Associated Diagnoses: Drug-induced constipation      diltiazem ER COATED BEADS (CARDIZEM CD/CARTIA XT) 300 MG 24 hr capsule Take 1 capsule (300 mg) by mouth daily  Qty: 30 capsule, Refills: 3    Associated Diagnoses: Atrial fibrillation with rapid ventricular response (H)      haloperidol (HALDOL) 2 MG/ML (HIGH CONC) solution Take 0.25-0.5 mLs (0.5-1 mg) by mouth or place under tongue every 6 hours as needed for agitation or other (nausea)  Qty: 1 mL, Refills: 0    Associated Diagnoses: Nausea      HYDROmorphone, HIGH CONC, (DILAUDID) 10 mg/mL LIQD oral Take 0.1 mLs (1 mg) by mouth or place under tongue every 2 hours as needed for other (pain or shortness of breath/dyspnea)  Qty: 2 mL, Refills: 0    Comments: Hospice patient. Dispense in quantities of 30 mL.  Associated Diagnoses: Chronic obstructive pulmonary disease with acute exacerbation (H)      LORazepam (ATIVAN) 2 MG/ML (HIGH CONC) oral solution Take 0.125-0.25 mLs (0.25-0.5 mg) by mouth or place under tongue every 4 hours as needed for anxiety (restlessness)  Qty: 1 mL, Refills: 0    Associated Diagnoses: Anxiety      metoprolol tartrate (LOPRESSOR) 100 MG tablet Take 1 tablet (100 mg) by mouth 2 times daily  Qty: 60 tablet, Refills: 0    Associated Diagnoses: Atrial fibrillation with rapid ventricular response (H)         CONTINUE these medications which have CHANGED    Details   furosemide (LASIX) 20 MG tablet Take 2 tablets (40 mg) by  mouth daily  Refills: 0    Associated Diagnoses: Chronic HFrEF (heart failure with reduced ejection fraction) (H)      gabapentin (NEURONTIN) 300 MG capsule Take 1 capsule (300 mg) by mouth 3 times daily  Qty: 90 capsule, Refills: 0    Associated Diagnoses: Peripheral polyneuropathy         CONTINUE these medications which have NOT CHANGED    Details   albuterol (PROAIR HFA/PROVENTIL HFA/VENTOLIN HFA) 108 (90 Base) MCG/ACT inhaler Inhale 2 puffs into the lungs every 6 hours      citalopram (CELEXA) 40 MG tablet Take 40 mg by mouth daily      clonazePAM (KLONOPIN) 2 MG tablet Take 2 mg by mouth nightly as needed for anxiety      doxycycline hyclate (VIBRA-TABS) 100 MG tablet Take 100 mg by mouth daily      famotidine (PEPCID) 20 MG tablet Take 20 mg by mouth daily      guaiFENesin (MUCINEX) 600 MG 12 hr tablet Take 1,200 mg by mouth daily      ipratropium - albuterol 0.5 mg/2.5 mg/3 mL (DUONEB) 0.5-2.5 (3) MG/3ML neb solution Take 1 vial by nebulization every 6 hours as needed for shortness of breath / dyspnea or wheezing      Magnesium Oxide 250 MG TABS Take 1 tablet by mouth daily      oxyCODONE-acetaminophen (PERCOCET) 5-325 MG tablet Take 1 tablet by mouth every 6 hours as needed for severe pain      predniSONE (DELTASONE) 20 MG tablet Take 20 mg by mouth daily      rivaroxaban ANTICOAGULANT (XARELTO) 20 MG TABS tablet Take 20 mg by mouth daily         STOP taking these medications       amLODIPine (NORVASC) 5 MG tablet Comments:   Reason for Stopping:         lisinopril (ZESTRIL) 40 MG tablet Comments:   Reason for Stopping:         potassium chloride ER (KLOR-CON M) 20 MEQ CR tablet Comments:   Reason for Stopping:                     Rationale for medication changes:      Stop lisinopril and K for severe hyperkalemia  Stop norvasc and add diltiazem and lopressor for Afib rate control.  amio for afib  Gabapentin dose reduced for confusion        Consults     SOCIAL WORK IP CONSULT  INPATIENT HOSPICE ADULT  CONSULT  CARE MANAGEMENT / SOCIAL WORK IP CONSULT  CARDIOLOGY IP CONSULT    Immunizations given this encounter     Most Recent Immunizations   Administered Date(s) Administered     COVID-19,PF,Moderna 03/23/2022           Anticoagulation Information      xarelto      SIGNIFICANT IMAGING FINDINGS     No results found for this visit on 09/25/22.      Discharge Orders        Adult Cardiology [facility] Referral      Reason for your hospital stay    You were admitted with suspected infection, rapid atrial fibrillation and renal failure.     Follow-up and recommended labs and tests    Follow up with primary care provider, Gloria Sebastian, within 7 days for hospital follow- up.  The following labs/tests are recommended: BMP  .     Activity    Your activity upon discharge: activity as tolerated     Diet    Follow this diet upon discharge: Orders Placed This Encounter      Room Service      Low Saturated Fat Na <2400 mg       Examination   Physical Exam   Temp:  [97.5  F (36.4  C)-98.5  F (36.9  C)] 98.2  F (36.8  C)  Pulse:  [] 102  Resp:  [18] 18  BP: ()/(71-97) 135/97  SpO2:  [90 %-98 %] 98 %  Wt Readings from Last 1 Encounters:   09/28/22 111 kg (244 lb 12.8 oz)       Hasn't felt this well in months. Feels ready to go home. Has home home O2      Please see EMR for more detailed significant labs, imaging, consultant notes etc.    ICristy MD, personally saw the patient today and spent greater than 30 minutes discharging this patient.    Cristy Yañez MD  [facility]    CC:Gloria Sebastian

## 2022-09-28 NOTE — TELEPHONE ENCOUNTER
FYI - Status Update    Who is Calling: nurse, Thais Bailon, hospice    Update: Pt has decided he no longer wishes Hospice but pallative care to treat his heart issues.      Thais to take care of supplies already ordered for pt and call his cardiologist as well.   Just FYI.  Thanks    Call taken on 9/28/22 at 427 pm by Duyen Jeong, MAYCO, TC

## 2022-09-28 NOTE — PROGRESS NOTES
Pt tolerated nebulizer tx well. No acute events overnight. Rt to follow.    Deandre Grider, RT on 9/28/2022 at 3:53 AM

## 2022-09-28 NOTE — TELEPHONE ENCOUNTER
----- Message from Mao Salguero MD sent at 9/28/2022  3:22 PM CDT -----  Esteemed Eliud,    1) atrial fibrillation has a way of usually getting the upper hand  2) I planned to see him today but he was discharged before I got a chance.      EP team in  - can I see him in outpatient consultation?  May consider setting him up for outpatient DCCV in interim given likely tachycardia-induced cardiomyopathy.  He is presently scheduled with William 10/5/2022.    ThanksAlfredo    ----- Message -----  From: Eliud Easley MD  Sent: 9/25/2022  11:56 AM CDT  To: Mao Salguero MD, #    Dear esteemed electrophysiology colleagues,    I have failed miserably to manage this man's atrial fibrillation. Sending him from [facility] over to [facility] to see if there is anything you can do for him. Much appreciated.    Eliud

## 2022-09-28 NOTE — TELEPHONE ENCOUNTER
1st Attempt to contact pt, unable to leave voicemail. will attempt again  9/28/2022 3:44 PM  Jenna Chawla RN

## 2022-09-28 NOTE — PROGRESS NOTES
HEART CARE CONSULTATON NOTE        Assessment/Recommendations   Assessment:  1.  Atrial fibrillation: Persistent atrial fibrillation with rapid ventricular response now better controlled with the addition of diltiazem  2.  Acute on chronic heart failure with reduced ejection fraction 30-35% may be tachycardia induced  3.  End-stage COPD on 3 to 4 L of chronic O2  4.  Chronic hypoxic respiratory failure     Plan:  1.      Continue metoprolol tartrate 100 mg twice daily and long-acting diltiazem for rate control daily  2.      Continue amlodipine at 200 mg daily and follow-up in A. fib clinic.  At that time can decide whether we are going to continue with rate versus rhythm control  3.    Xarelto for anticoagulation  May be discharged today     History of Present Illness/Subjective    Continuing to feel better with improved breathing.  No complaints of chest pain       Physical Examination  Review of Systems   VITALS: /83 (BP Location: Left arm)   Pulse 77   Temp 97.9  F (36.6  C) (Oral)   Resp 18   Wt 111 kg (244 lb 12.8 oz)   SpO2 94%   BMI 33.20 kg/m    BMI: Body mass index is 33.2 kg/m .  Wt Readings from Last 3 Encounters:   09/28/22 111 kg (244 lb 12.8 oz)   09/25/22 109.4 kg (241 lb 1.6 oz)       Intake/Output Summary (Last 24 hours) at 9/28/2022 1239  Last data filed at 9/28/2022 0856  Gross per 24 hour   Intake 720 ml   Output 0 ml   Net 720 ml     General Appearance:   no distress, normal body habitus   ENT/Mouth: membranes moist, no oral lesions or bleeding gums.      EYES:  no scleral icterus, normal conjunctivae   Neck: no carotid bruits or thyromegaly   Chest/Lungs:    Decreased breath sound   Cardiovascular:   irregular. Normal first and second heart sounds with no murmurs trace edema bilaterally    Abdomen:  no organomegaly, masses, bruits, or tenderness; bowel sounds are present   Extremities: no cyanosis or clubbing   Skin: no xanthelasma, warm.    Neurologic: normal  bilateral, no  tremors     Psychiatric: alert and oriented x3, calm     Review Of Systems  Skin: negative  Eyes: negative  Ears/Nose/Throat: negative  Respiratory: Shortness of breath  Cardiovascular: negative  Gastrointestinal: negative  Genitourinary: negative  Musculoskeletal: negative  Neurologic: negative  Psychiatric: negative  Hematologic/Lymphatic/Immunologic: negative  Endocrine: negative          Lab Results    Chemistry/lipid CBC Cardiac Enzymes/BNP/TSH/INR   No results for input(s): CHOL, HDL, LDL, TRIG, CHOLHDLRATIO in the last 44938 hours.  No results for input(s): LDL in the last 17963 hours.  Recent Labs   Lab Test 09/28/22  0852 09/28/22  0428   NA  --  142   POTASSIUM  --  3.6   CHLORIDE  --  105   CO2  --  28   * 93   BUN  --  15.1   CR  --  1.10   GFRESTIMATED  --  74   PALMER  --  8.0*     Recent Labs   Lab Test 09/28/22  0428 09/27/22  0505 09/26/22  0633   CR 1.10 1.03 1.22*     No results for input(s): A1C in the last 71484 hours.       Recent Labs   Lab Test 09/28/22  0428   WBC 6.9   HGB 10.3*   HCT 32.6*   MCV 96        Recent Labs   Lab Test 09/28/22  0428 09/27/22  0505 09/26/22  0633   HGB 10.3* 9.0* 10.9*    Recent Labs   Lab Test 09/21/22  1722 09/21/22  1134 09/21/22  0505   TROPONINI 0.01 0.02 0.03     Recent Labs   Lab Test 09/21/22  0505   *     No results for input(s): TSH in the last 78915 hours.  No results for input(s): INR in the last 49448 hours.     Medical History  Surgical History Family History Social History   Past Medical History:   Diagnosis Date     Anxiety      CAD (coronary artery disease)      Chronic atrial fibrillation (H)      Chronic hepatitis C virus infection (H)      COPD (chronic obstructive pulmonary disease) (H)      Essential hypertension      Hemangioma     Left hand     Intravenous drug abuse in remission (H)     Sober since ~2010     Lumbar spinal stenosis      Peripheral neuropathy      Restless leg syndrome      Stroke (H)      Past Surgical  History:   Procedure Laterality Date     CERVICAL FUSION      C6-7     PICC TRIPLE LUMEN PLACEMENT  2022          Family History   Problem Relation Age of Onset     Coronary Artery Disease Mother      Diabetes Mother         Social History     Socioeconomic History     Marital status: Patient Declined     Spouse name: Not on file     Number of children: Not on file     Years of education: Not on file     Highest education level: Not on file   Occupational History     Not on file   Tobacco Use     Smoking status: Former Smoker     Quit date: 2018     Years since quittin.7     Smokeless tobacco: Not on file   Substance and Sexual Activity     Alcohol use: Not Currently     Drug use: Not Currently     Comment: Sober from IV drug use since ~     Sexual activity: Not on file   Other Topics Concern     Not on file   Social History Narrative     Not on file     Social Determinants of Health     Financial Resource Strain: Not on file   Food Insecurity: Not on file   Transportation Needs: Not on file   Physical Activity: Not on file   Stress: Not on file   Social Connections: Not on file   Intimate Partner Violence: Not on file   Housing Stability: Not on file         Medications  Allergies   Current Outpatient Medications   Medication Sig Dispense Refill     [START ON 2022] amiodarone (PACERONE) 200 MG tablet Take 1 tablet (200 mg) by mouth daily 30 tablet 1     bisacodyl (DULCOLAX) 10 MG suppository Place 1 suppository (10 mg) rectally daily as needed for constipation 2 suppository 0     diltiazem ER COATED BEADS (CARDIZEM CD/CARTIA XT) 300 MG 24 hr capsule Take 1 capsule (300 mg) by mouth daily 30 capsule 3     furosemide (LASIX) 20 MG tablet Take 2 tablets (40 mg) by mouth daily  0     gabapentin (NEURONTIN) 300 MG capsule Take 1 capsule (300 mg) by mouth 3 times daily 90 capsule 0     haloperidol (HALDOL) 2 MG/ML (HIGH CONC) solution Take 0.25-0.5 mLs (0.5-1 mg) by mouth or place under tongue every 6  hours as needed for agitation or other (nausea) 1 mL 0     HYDROmorphone, HIGH CONC, (DILAUDID) 10 mg/mL LIQD oral Take 0.1 mLs (1 mg) by mouth or place under tongue every 2 hours as needed for other (pain or shortness of breath/dyspnea) 2 mL 0     LORazepam (ATIVAN) 2 MG/ML (HIGH CONC) oral solution Take 0.125-0.25 mLs (0.25-0.5 mg) by mouth or place under tongue every 4 hours as needed for anxiety (restlessness) 1 mL 0     metoprolol tartrate (LOPRESSOR) 100 MG tablet Take 1 tablet (100 mg) by mouth 2 times daily 60 tablet 0      No Known Allergies      Julienne Irizarry MD

## 2022-09-28 NOTE — PLAN OF CARE
"  Problem: Plan of Care - These are the overarching goals to be used throughout the patient stay.    Goal: Plan of Care Review/Shift Note  Description: The Plan of Care Review/Shift note should be completed every shift.  The Outcome Evaluation is a brief statement about your assessment that the patient is improving, declining, or no change.  This information will be displayed automatically on your shift note.  Outcome: Ongoing, Progressing  Goal: Patient-Specific Goal (Individualized)  Description: You can add care plan individualizations to a care plan. Examples of Individualization might be:  \"Parent requests to be called daily at 9am for status\", \"I have a hard time hearing out of my right ear\", or \"Do not touch me to wake me up as it startles me\".  Outcome: Ongoing, Progressing  Goal: Absence of Hospital-Acquired Illness or Injury  Outcome: Ongoing, Progressing  Intervention: Identify and Manage Fall Risk  Recent Flowsheet Documentation  Taken 9/27/2022 2000 by Phil Levy, RN  Safety Promotion/Fall Prevention:   activity supervised   safety round/check completed   room organization consistent   room near nurse's station   room door open   patient and family education   nonskid shoes/slippers when out of bed   mobility aid in reach   lighting adjusted   increased rounding and observation   fall prevention program maintained   clutter free environment maintained   increase visualization of patient  Intervention: Prevent Skin Injury  Recent Flowsheet Documentation  Taken 9/27/2022 2000 by Phil Levy, RN  Body Position:   position changed independently   position maintained  Intervention: Prevent and Manage VTE (Venous Thromboembolism) Risk  Recent Flowsheet Documentation  Taken 9/27/2022 2000 by Phil Levy, RN  Activity Management:   activity adjusted per tolerance   activity encouraged   ambulated in fleming   ambulated in room   ambulated to bathroom   up ad andrae   up in " chair  Intervention: Prevent Infection  Recent Flowsheet Documentation  Taken 9/27/2022 2000 by Phil Levy, RN  Infection Prevention:   hand hygiene promoted   personal protective equipment utilized   rest/sleep promoted   single patient room provided  Goal: Optimal Comfort and Wellbeing  Outcome: Ongoing, Progressing  Goal: Readiness for Transition of Care  Outcome: Ongoing, Progressing   Goal Outcome Evaluation:    Cardiac:  A-Fib with RVR and Rate control.  Trending 's.  Respiratory:  Rate: 16-20, non-labored.  Sat's: Maintaining mid 90's at RA to 2 L NC.  LS: Diminished all fields, bilat.  Neuro:  WNL.  GI:  Passing flatus.  BS: Present X4 quadrants.  No BM for HS/NOC.  :  Voiding and flushing.  Pain:  Chronic pain to Neck, back, hips, and bilat LE.  Treated with PRN PO Oxycodone.  Ambulation:  Up ad andrae in room.  Labs:  AM labs pending.  Plan:  Medical management of A-Fib.  Monitor response to new medications and/or changes in dosage.  Pending  possible discharge.

## 2022-09-28 NOTE — PROGRESS NOTES
Discharged home at 1233 with daughter. Discharge instructions reviewed with patient. Verbalized understanding. Meds from OP pharmacy given to patient. Amiodarone tabs and Diltiazem tabs sent back to OP pharmacy d/t changed dosage and instructions. Writer told patient and daughter that Amiodarone will be ready to be picked up at [facility] OP pharamcy across the street and Diltiazem will be e-sent to [facility] in [place] off of [address], as this med is out of stock at [facility]. All personal belongings sent with patient. NILDA PICC line d/c'd, 44cm pulled, same as when it was inserted. Stable.

## 2022-09-29 NOTE — TELEPHONE ENCOUNTER
Attempted to contact pt again, Mailbox full.   Will attempt again at a later time, if unable to get a hold of him, William has upcoming appt on 10/5/22 and she can discuss at that time.    Yissel

## 2022-09-29 NOTE — PROGRESS NOTES
Care Management Discharge Note    Discharge Date: 09/28/2022       Discharge Disposition: to daughter home with Home care    Discharge Services: Home Care with [facility] for Nursing and [facility]    Discharge DME:  None    Discharge Transportation: agency, family or friend will provide (pend mobility)    Private pay costs discussed: Not applicable    PAS Confirmation Code:    Patient/family educated on Medicare website which has current facility and service quality ratings:      Education Provided on the Discharge Plan:    Persons Notified of Discharge Plans: yes  Patient/Family in Agreement with the Plan: yes    Handoff Referral Completed: Yes    Additional Information:  Pt was going to sign onto hospice services with [facility] but had a change of heart and wants more procedures done.  Provider requested we add addition support for this pt at home so skilled nursing and HHA was added and will be serviced by home [facility]. Pt daughter also has concerns about pt medication not being at the [facility] pharmacy and provider was notified and this has been e-sent to the [facility] pharmacy in [place] off [address]. Daughter called and confirmed that pt has this medication. No further CM needs at this time.        Duyen Holt RN

## 2022-09-30 NOTE — TELEPHONE ENCOUNTER
Son called regarding pt's hospital stay. Requesting for a hospice order. Jose spoke to him earlier and already sent his message to Dr. Sebastian.    Uriel Stinson, BSN RN  [facility]

## 2022-09-30 NOTE — TELEPHONE ENCOUNTER
"Spoke to sonKatlyn on the phone (C2C on file) regarding request for Hospice via [employer]/below.  Family reported patient was recently hospitalized and discharged from [facility] about two days ago.  Patient would like to be enrolled in Hospice Care but hospital forgot to write order.  Requesting provider to write the order.     Unable to see record as patient being patient there.  Family is requested the provider.  Katlyn Cristobal would like Medical Record contact information, RN provided.      Routed encounter to provider for review and recommendation.    \"Good afternoon Dr. Sebastian,      This is Bakari Rodriguez son. He was recently discharged from [facility] with anticipation of hospice at home. They forgot the order and [facility] hospice is ready to proceed as they have all equipment there but need order for admission. My number is [phone]. (Leda from hospice [phone])      Thank you, Katlyn EVANS\"      Thank you    Jose  "

## 2022-09-30 NOTE — PROGRESS NOTES
Connected Care Resource Center Contact  [facility]/Voicemail     Clinical Data: Transitional Care Management Outreach     Outreach attempted x 2.  Voicemail is full, unable to leave a message.     Plan:  [facility] will do no further outreaches at this time.       Melania Laguna RN  [facility]    *Connected Care Resource Team does NOT follow patient ongoing. Referrals are identified based on internal discharge reports and the outreach is to ensure patient has an understanding of their discharge instructions.

## 2022-09-30 NOTE — TELEPHONE ENCOUNTER
Faxed referral to [facility] at [phone].      ASMITA BayN, RN  [facility]

## 2022-10-04 NOTE — TELEPHONE ENCOUNTER
3rd Attempt to contact pt, unable to leave voicemail.   VM box was full.  Msg sent to William as NOEMÍ for her apt tomorrow 10/5 with pt.  10/4/2022 9:31 AM  Jenna Conde RN

## 2022-10-04 NOTE — TELEPHONE ENCOUNTER
General Call    Contacts       Type Contact Phone/Fax    10/04/2022 11:28 AM CDT Phone (Incoming) Davida with [facility]  (Home Care) [phone]        Reason for Call:  Follow patient for Home care services via phone and fax.    What are your questions or concerns:  Davida with [facility] call to ask if Dr. Sebastian will be willing to follow patient for home care services via phone and faxes? Please call Davida back at [phone] back to confirm and ok to leave message.     Date of last appointment with provider: 3/23/2022    Could we send this information to you in [facility] [employer] or would you prefer to receive a phone call?:   No preference   Okay to leave a detailed message?: Yes at Other phone number:  [phone]

## 2022-10-05 PROBLEM — I50.23 ACUTE ON CHRONIC SYSTOLIC HEART FAILURE (H): Status: ACTIVE | Noted: 2022-01-01

## 2022-10-18 NOTE — TELEPHONE ENCOUNTER
Last office visit: 05/21/2020  Last ordered: 06/04/2020  Last lab check: BMP 03/19/2020  Next appointment: 07/21/2020    Chart reviewed. Please review findings below.     6. Anxiety continue Klonopin at current dose sleeping well no nightmares        Pending

## 2022-10-24 PROBLEM — J96.02 ACUTE RESPIRATORY FAILURE WITH HYPOXIA AND HYPERCARBIA (H): Status: ACTIVE | Noted: 2022-01-01

## 2022-10-24 PROBLEM — J96.01 ACUTE RESPIRATORY FAILURE WITH HYPOXIA AND HYPERCARBIA (H): Status: ACTIVE | Noted: 2022-01-01

## 2022-10-24 PROBLEM — Z66 DNR (DO NOT RESUSCITATE): Status: ACTIVE | Noted: 2022-01-01

## 2022-10-25 NOTE — PHARMACY-ADMISSION MEDICATION HISTORY
Pharmacy Note - Admission Medication History    Pertinent Provider Information: history obtained from sonKatlyn.     ______________________________________________________________________    Prior To Admission (PTA) med list completed and updated in EMR.       PTA Med List   Medication Sig Last Dose     albuterol (PROAIR HFA/PROVENTIL HFA/VENTOLIN HFA) 108 (90 Base) MCG/ACT inhaler Inhale 2 puffs into the lungs every 6 hours (Patient taking differently: Inhale 2 puffs into the lungs every 6 hours as needed for shortness of breath / dyspnea) 10/24/2022 at pm     amiodarone (PACERONE) 200 MG tablet Take 1 tablet (200 mg) by mouth daily 10/24/2022 at am     budesonide (PULMICORT) 1 MG/2ML neb solution Take 2 mLs (1 mg) by nebulization 2 times daily Past Month     citalopram (CELEXA) 40 MG tablet Take 40 mg by mouth daily 10/23/2022     clonazePAM (KLONOPIN) 2 MG tablet Take 2 mg by mouth nightly as needed for anxiety      diltiazem ER COATED BEADS (CARDIZEM CD/CARTIA XT) 300 MG 24 hr capsule Take 1 capsule (300 mg) by mouth daily 10/24/2022 at am     doxycycline hyclate (VIBRA-TABS) 100 MG tablet Take 100 mg by mouth daily 10/24/2022 at am     famotidine (PEPCID) 20 MG tablet Take 20 mg by mouth daily 10/24/2022 at am     furosemide (LASIX) 20 MG tablet Take 2 tablets (40 mg) by mouth daily 10/24/2022 at am     gabapentin (NEURONTIN) 300 MG capsule Take 2 capsules (600 mg) by mouth 3 times daily Past Week     guaiFENesin (MUCINEX) 600 MG 12 hr tablet Take 1,200 mg by mouth daily 10/24/2022     haloperidol (HALDOL) 2 MG/ML (HIGH CONC) solution Take 0.25-0.5 mLs (0.5-1 mg) by mouth or place under tongue every 6 hours as needed for agitation or other (nausea)      ipratropium - albuterol 0.5 mg/2.5 mg/3 mL (DUONEB) 0.5-2.5 (3) MG/3ML neb solution Take 1 vial (3 mLs) by nebulization every 6 hours as needed for shortness of breath / dyspnea or wheezing (Patient taking differently: Take 1 vial by nebulization every 4 hours  as needed for shortness of breath / dyspnea or wheezing) Past Week     magnesium 250 MG tablet Take 1 tablet (250 mg) by mouth At Bedtime 10/24/2022 at am     metoprolol tartrate (LOPRESSOR) 100 MG tablet Take 1 tablet (100 mg) by mouth 2 times daily 10/24/2022 at am     naloxone (NARCAN) 4 MG/0.1ML nasal spray Spray 1 spray (4 mg) into one nostril alternating nostrils once as needed for opioid reversal every 2-3 minutes until assistance arrives Unknown     oxyCODONE HCl (ROXICODONE) 20 MG TABS immediate release tablet Take 20 mg by mouth every 4 hours as needed (pain) Every 4-6 hours prn pain 10/24/2022     predniSONE (DELTASONE) 10 MG tablet Take 1 tablet (10 mg) by mouth See Admin Instructions take 4 tabs daily x 4 days, then 3 tabs daily x 4 days, then 2 tabs daily x 4 days, then 1 tab daily x 4 days.. Unknown     predniSONE (DELTASONE) 20 MG tablet Take 20 mg by mouth daily Past Week     rivaroxaban ANTICOAGULANT (XARELTO) 20 MG TABS tablet Take 1 tablet (20 mg) by mouth daily (with dinner) 10/24/2022 at pm       Information source(s): Family member and CareEverywhere/SureScripts  Method of interview communication: in-person    Summary of Changes to PTA Med List  New: oxycodone  Discontinued: duplicates, APAP, amlodipine, azithromycin, benzoate, bisacodyl, hydromorphone solution, oxycodone-APAP, morphine solution  Changed: none    Patient was asked about OTC/herbal products specifically.  PTA med list reflects this.      Allergies were reviewed, assessed, and updated with the patient.      Patient does not use any multi-dose medications prior to admission.    The information provided in this note is only as accurate as the sources available at the time of the update(s).    Thank you for the opportunity to participate in the care of this patient.    Yissel Bah [facility]  10/25/2022 1:04 PM

## 2022-10-25 NOTE — H&P
[facility]    History and Physical - Hospitalist Service       Date of Admission:  10/24/2022    Assessment & Plan      Ki Pederson is a 67 year old male admitted on 10/24/2022. He was brought to the ED by ambulance for evaluation of shortness of breath, lethargy/confusion, fall on 10/21/2022.    1.  Sepsis secondary to healthcare associated pneumonia, lactic acidosis  Temperature 101.3  F, tachycardic, altered mental status  CT showed mild infiltrate left lung base suggests developing pneumonia  Continue IV Zosyn  Follow-up blood cultures    2.  Acute on chronic respiratory failure with hypoxia and hypercapnia, respiratory acidosis  On 5 L oxygen via nasal cannula continuously at home  Oxygen saturation in the 70s on 5 L prior to ED arrival  VB  Continue BiPAP, wean off as tolerated    3.  Acute encephalopathy  Lethargy likely secondary to hypercapnia and sepsis  Slowly improving  Supportive management  Hold gabapentin and other sedating medications until mental status is improved  Will get CT head without contrast given recent fall while on anticoagulation    4.  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  Elevated troponin T and  Likely type II NSTEMI/demand ischemia  Denies angina symptoms  Telemetry monitoring  Cardiology consult    6.  Acute kidney injury  Monitor renal function  Avoid nephrotoxins including NSAIDs    7.  Hyperkalemia  Secondary to YUNIEL  Telemetry monitoring    8.  Chronic systolic congestive heart failure  N-terminal proBNP 699  No signs of acute heart failure  Continue furosemide  Monitor I's and O's, daily weights    9.  Paroxysmal atrial fibrillation  Failed cardioversion on 2022  On diltiazem and amiodarone started during last hospitalization with adjusted metoprolol for better rate control  On Xarelto for stroke risk  "reduction    10.  Chronic anemia  Stable hemoglobin without signs of acute blood loss    11.  Right eye erythema  Worsening after fall on 10/21/2022 as per son  Cover with erythromycin ointment    12.  Goals of care  Spoke with son about poor prognosis with frequent hospitalizations.  Based on last hospitalization discharge summary, patient wanted to be treated in the hospital but elected DNR/DNI.  He was on hospice at home and son is considering to continue hospice versus home care at discharge     Diet: NPO for Medical/Clinical Reasons Except for: No Exceptions    DVT Prophylaxis: DOAC  Fine Catheter: Not present  Central Lines: None  Cardiac Monitoring: ACTIVE order. Indication: sepsis  Code Status: No CPR- Do NOT Intubate      Clinically Significant Risk Factors Present on Admission        # Hyperkalemia: Highest K = 5.5 mmol/L (Ref range: 3.4-5.3) in last 2 days, will monitor as appropriate   # Hypocalcemia: Lowest Ca = 8.4 mg/dL (Ref range: 8.5 - 10.1 mg/dL) in last 2 days, will monitor and replace as appropriate      # Drug Induced Coagulation Defect: home medication list includes an anticoagulant medication    # Hypertension: home medication list includes antihypertensive(s)  # Chronic systolic heart failure: echo within the past year with EF <40%   # Non-Invasive mechanical ventilation: current O2 Device: BiPAP/CPAP  # Acute hypoxic respiratory failure: continue supplemental O2 as needed    # Obesity: Estimated body mass index is 34.96 kg/m  as calculated from the following:    Height as of this encounter: 1.854 m (6' 1\").    Weight as of this encounter: 120.2 kg (265 lb).           Disposition Plan      Expected Discharge Date: 10/26/2022                The patient's care was discussed with the Patient's Family.    Dexter Kimble MD  Hospitalist Service  [facility]  Securely message with the Vocera Web Console (learn more here)  Text page via [employer] Paging/Directory "         ______________________________________________________________________    Chief Complaint   Altered mental status, increased shortness of breath, recent fall    History is obtained from the electronic health record, emergency department physician and patient's son    History of Present Illness   Ki Pederson is a 67 year old male who was brought to the ED by ambulance for evaluation of above chief complaint.  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  Patient has had multiple hospitalizations this year for COPD exacerbation.  He was admitted on 9/21/2022 at [facility] for sepsis from community-acquired pneumonia, COPD exacerbation, acute on chronic respiratory failure.  He had atrial fibrillation with rapid ventricular response and failed cardioversion on 9/23/2022 as well as amiodarone drip.  In addition, ejection fraction 30-35% and was treated for acute CHF with IV Lasix.  He was transferred to [facility] on 9/25/2022 for EP evaluation.  He was started on diltiazem, switch to oral amiodarone and PTA metoprolol was adjusted for better rate controlled.  As per son, patient had a fall on Friday, 10/21/2022 and since then has noted some redness of the right eye.  Prior to ED evaluation, patient was noted with increased shortness of breath and altered mental status/confusion.  Patient was placed on BiPAP, he is able to respond to voice and answers yes or no question.  He currently denied chest pain.    Review of Systems    The 10 point Review of Systems is negative other than noted in the HPI or here.     Past Medical History    I have reviewed this patient's medical history and updated it with pertinent information if needed.   Past Medical History:    Diagnosis Date     Anxiety      Atrial fibrillation (H)      CAD (coronary artery disease)      Cerebral infarction (H)      Chronic hepatitis C virus infection (H)      COPD (chronic obstructive pulmonary disease) (H)      Essential hypertension      Hemangioma     massive hemangioma; left hand     Hemangioma     Left hand     Hypertension      Intravenous drug abuse in remission (H)     Sober since ~     Lumbar spinal stenosis      Myocardial infarction (H)      Peripheral neuropathy      Restless leg syndrome      Stroke (H)      TIA (transient ischemic attack)        Past Surgical History   I have reviewed this patient's surgical history and updated it with pertinent information if needed.  Past Surgical History:   Procedure Laterality Date     CERVICAL FUSION      C6 and C7     CERVICAL FUSION      C6-7     PICC TRIPLE LUMEN PLACEMENT  2022            Social History   I have reviewed this patient's social history and updated it with pertinent information if needed.  Social History     Tobacco Use     Smoking status: Former     Packs/day: 0.00     Types: Cigarettes     Quit date:      Years since quittin.8     Smokeless tobacco: Current     Tobacco comments:     No passive exposure   Substance Use Topics     Alcohol use: Not Currently     Drug use: Not Currently     Comment: Sober from IV drug use since ~       Family History   I have reviewed this patient's family history and updated it with pertinent information if needed.  Family History   Problem Relation Age of Onset     Coronary Artery Disease Mother      Diabetes Mother      Coronary Artery Disease Father      Diabetes Sister        Prior to Admission Medications   Prior to Admission Medications   Prescriptions Last Dose Informant Patient Reported? Taking?   HYDROmorphone, HIGH CONC, (DILAUDID) 10 mg/mL LIQD oral   No No   Sig: Take 0.1 mLs (1 mg) by mouth or place under tongue every 2 hours as needed for other (pain or shortness of  breath/dyspnea)   LORazepam (ATIVAN) 2 MG/ML (HIGH CONC) oral solution   No No   Sig: Take 0.125-0.25 mLs (0.25-0.5 mg) by mouth or place under tongue every 4 hours as needed for anxiety (restlessness)   Magnesium Oxide 250 MG TABS   Yes No   Sig: Take 1 tablet by mouth daily   acetaminophen (TYLENOL) 325 MG tablet   No No   Sig: Take 2 tablets (650 mg) by mouth every 6 hours as needed for mild pain or other (and adjunct with moderate or severe pain or per patient request)   acetaminophen (TYLENOL) 500 MG tablet   No No   Sig: Take 1-2 tablets (500-1,000 mg) by mouth every 4 hours as needed for fever   albuterol (PROAIR HFA/PROVENTIL HFA/VENTOLIN HFA) 108 (90 Base) MCG/ACT inhaler   No No   Sig: Inhale 2 puffs into the lungs every 6 hours   Patient taking differently: Inhale 2 puffs into the lungs every 6 hours as needed for shortness of breath / dyspnea    albuterol (PROAIR HFA/PROVENTIL HFA/VENTOLIN HFA) 108 (90 Base) MCG/ACT inhaler   Yes No   Sig: Inhale 2 puffs into the lungs every 6 hours   amLODIPine (NORVASC) 5 MG tablet   No No   Sig: Take 1 tablet (5 mg) by mouth daily   amiodarone (PACERONE) 200 MG tablet   No No   Sig: Take 1 tablet (200 mg) by mouth daily   azithromycin (ZITHROMAX) 250 MG tablet   No No   Sig: USE ONE TAB BY MOUTH ONCE DAILY ON Monday, Wednesday, Friday ONGOING   benzonatate (TESSALON) 100 MG capsule   No No   Sig: Take 1 capsule (100 mg) by mouth 3 times daily as needed for cough   bisacodyl (DULCOLAX) 10 MG suppository   No No   Sig: Place 1 suppository (10 mg) rectally daily as needed for constipation   budesonide (PULMICORT) 1 MG/2ML neb solution   No No   Sig: Take 2 mLs (1 mg) by nebulization 2 times daily   Patient not taking: Reported on 4/9/2022   citalopram (CELEXA) 20 MG tablet   No No   Sig: [CITALOPRAM (CELEXA) 20 MG TABLET] TAKE 1 TABLET (20 MG TOTAL) BY MOUTH DAILY.   Patient taking differently: Take 20 mg by mouth daily [CITALOPRAM (CELEXA) 20 MG TABLET] TAKE 1 TABLET  (20 MG TOTAL) BY MOUTH DAILY.   citalopram (CELEXA) 40 MG tablet   Yes No   Sig: Take 40 mg by mouth daily   clonazePAM (KLONOPIN) 2 MG tablet   Yes No   Sig: Take 2 mg by mouth nightly as needed for anxiety   diltiazem ER COATED BEADS (CARDIZEM CD/CARTIA XT) 300 MG 24 hr capsule   No No   Sig: Take 1 capsule (300 mg) by mouth daily   doxycycline hyclate (VIBRA-TABS) 100 MG tablet   Yes No   Sig: Take 100 mg by mouth daily   famotidine (PEPCID) 20 MG tablet   Yes No   Sig: Take 20 mg by mouth daily   furosemide (LASIX) 20 MG tablet   No No   Sig: Take 1 tablet (20 mg) by mouth daily   Patient taking differently: Take 20 mg by mouth as needed    furosemide (LASIX) 20 MG tablet   No No   Sig: Take 2 tablets (40 mg) by mouth daily   gabapentin (NEURONTIN) 300 MG capsule   No No   Sig: Take 2 capsules (600 mg) by mouth 3 times daily   gabapentin (NEURONTIN) 300 MG capsule   No No   Sig: Take 1 capsule (300 mg) by mouth 3 times daily   guaiFENesin (MUCINEX) 600 MG 12 hr tablet   Yes No   Sig: Take 1,200 mg by mouth daily   haloperidol (HALDOL) 2 MG/ML (HIGH CONC) solution   No No   Sig: Take 0.25-0.5 mLs (0.5-1 mg) by mouth or place under tongue every 6 hours as needed for agitation or other (nausea)   ipratropium - albuterol 0.5 mg/2.5 mg/3 mL (DUONEB) 0.5-2.5 (3) MG/3ML neb solution   No No   Sig: Take 1 vial (3 mLs) by nebulization every 6 hours as needed for shortness of breath / dyspnea or wheezing   Patient taking differently: Take 1 vial by nebulization every 4 hours as needed for shortness of breath / dyspnea or wheezing    ipratropium - albuterol 0.5 mg/2.5 mg/3 mL (DUONEB) 0.5-2.5 (3) MG/3ML neb solution   Yes No   Sig: Take 1 vial by nebulization every 6 hours as needed for shortness of breath / dyspnea or wheezing   lisinopril (ZESTRIL) 40 MG tablet   No No   Sig: [LISINOPRIL (PRINIVIL,ZESTRIL) 40 MG TABLET] TAKE 1 TABLET BY MOUTH DAILY.   magnesium 250 MG tablet   No No   Sig: Take 1 tablet (250 mg) by mouth  At Bedtime   metoprolol tartrate (LOPRESSOR) 100 MG tablet   No No   Sig: Take 1 tablet (100 mg) by mouth 2 times daily   morphine sulfate (ROXANOL) 20 mg/mL (HIGH CONC) soln   No No   Sig: Place 0.5 mLs (10 mg) under the tongue every 3 hours as needed for moderate to severe pain or shortness of breath / dyspnea (hip pain)   naloxone (NARCAN) 4 MG/0.1ML nasal spray   No No   Sig: Spray 1 spray (4 mg) into one nostril alternating nostrils once as needed for opioid reversal every 2-3 minutes until assistance arrives   oxyCODONE-acetaminophen (PERCOCET) 5-325 MG tablet   Yes No   Sig: Take 1 tablet by mouth every 6 hours as needed for severe pain   predniSONE (DELTASONE) 10 MG tablet   No No   Sig: Take 1 tablet (10 mg) by mouth See Admin Instructions take 4 tabs daily x 4 days, then 3 tabs daily x 4 days, then 2 tabs daily x 4 days, then 1 tab daily x 4 days..   predniSONE (DELTASONE) 20 MG tablet   No No   Sig: Take 1 tablet (20 mg) by mouth daily 3 tabs by mouth once daily for 2 days, 2 tabs by mouth once daily for 2 days, 1 tab by mouth once daily for 2 days then STOP   predniSONE (DELTASONE) 20 MG tablet   Yes No   Sig: Take 20 mg by mouth daily   rivaroxaban ANTICOAGULANT (XARELTO) 20 MG TABS tablet   No No   Sig: Take 1 tablet (20 mg) by mouth daily (with dinner)   rivaroxaban ANTICOAGULANT (XARELTO) 20 MG TABS tablet   Yes No   Sig: Take 20 mg by mouth daily      Facility-Administered Medications: None     Allergies   Allergies   Allergen Reactions     Symbicort Rash       Physical Exam   Vital Signs: Temp: (!) 101.3  F (38.5  C) Temp src: Oral BP: 113/61 Pulse: 100   Resp: 17 SpO2: 94 % O2 Device: BiPAP/CPAP    Weight: 265 lbs 0 oz    Constitutional: Appears chronically ill, arousable  Respiratory: Mild respiratory distress, moderate air exchange and wheeze diffuse, diminished breath sounds right base and left base  Cardiovascular: Regular tachycardic rhythm and normal S1 and S2  GI: normal bowel sounds and  soft  Skin: Diffuse scabbed scratches on lower extremities  Musculoskeletal: 2+ pitting edema  Neurologic: Lethargic but arousable  Neuropsychiatric: General: calm    Data   Data reviewed today: I reviewed all medications, new labs and imaging results over the last 24 hours. I personally reviewed the EKG tracing showing Sinus tachycardia at 114 bpm, nonspecific ST grahams.    Recent Labs   Lab 10/24/22  2148   WBC 11.3*   HGB 13.1*   MCV 98         POTASSIUM 5.5*   CHLORIDE 100   CO2 28   BUN 27.2*   CR 1.30*   ANIONGAP 11   PALMER 8.4*   *   ALBUMIN 4.2   PROTTOTAL 6.7   BILITOTAL 0.5   ALKPHOS 47   ALT 28   AST 35     Recent Results (from the past 24 hour(s))   CT Abdomen Pelvis w Contrast    Narrative    EXAM: CT ABDOMEN PELVIS W CONTRAST  LOCATION: [facility]  DATE/TIME: 10/24/2022 11:41 PM    INDICATION: Fevers, confusion  COMPARISON: PET/CT 8/1/2019   TECHNIQUE: CT scan of the abdomen and pelvis was performed following injection of IV contrast. Multiplanar reformats were obtained. Dose reduction techniques were used.  CONTRAST: isovue 370 100ml    FINDINGS:   LOWER CHEST: Linear scarring peripheral right lung base similar to previous study. Mild shaggy infiltrate at both lung bases, left worse than right, developing pneumonia possible.    HEPATOBILIARY: Normal.    PANCREAS: Normal.    SPLEEN: Normal.    ADRENAL GLANDS: Normal.    KIDNEYS/BLADDER: No change. 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 No stone or hydronephrosis.    Mild bladder wall thickening    BOWEL: Diverticulosis of the colon. No acute inflammatory change. No obstruction. Appendix normal.    LYMPH NODES: Normal.    VASCULATURE: Atherosclerotic calcifications.    PELVIC ORGANS: Mild prostatic enlargement.    MUSCULOSKELETAL: Degenerative changes of lumbar spine. No suspicious bony lesion.      Impression    IMPRESSION:   1.  Mild infiltrate left lung base  suggests developing pneumonia.  2.  No acute inflammatory process involving abdomen/pelvis.   XR Chest Port 1 View    Narrative    EXAM: XR CHEST PORT 1 VIEW  LOCATION: [facility]  DATE/TIME: 10/25/2022 12:08 AM    INDICATION: Dyspnea, fever  COMPARISON: 09/21/2022      Impression    IMPRESSION: Cardiac silhouette within normal limits for portable technique. Streaky bibasilar opacities could reflect atelectasis or infiltrate. Upper lung zones are clear. No pneumothorax.

## 2022-10-25 NOTE — ED TRIAGE NOTES
Late note entry:  Pt to room 1 via [facility] d/t SOB and possible fall. Pt is on 5L at baseline at home. PMH: COPD, Afib. given duoneb en route, sats increased to high 90s up from 70s on 5L NC. Febrile to 101.3 on arrival.      Triage Assessment     Row Name 10/24/22 8518       Triage Assessment (Adult)    Airway WDL X  SOB. PMH: COPD, afib

## 2022-10-25 NOTE — PROGRESS NOTES
"RESPIRATORY CARE NOTE:    PT was on BIPAP with the settings of S/T 12/6 R14 30% with an under-the-nose mask.  Trialled on 2L O2 at approx 1017.  PT is currently on 2L NC with an SpO2 of 98%.  Breathing pattern regular Breath sounds diminished Cough type none Sputum Type none   nebulizer given x2.  PT tolerated treatments well.  RT will continue to follow.      /59   Pulse 74   Temp 98  F (36.7  C) (Axillary)   Resp 16   Ht 1.854 m (6' 1\")   Wt 120.2 kg (265 lb)   SpO2 96%   BMI 34.96 kg/m      Scar Dawkins, RT  10/25/2022      "

## 2022-10-25 NOTE — CONSULTS
Impression and Plan     Assessment:  1. Angina with elevated troponin-     Likely demand ischemia in setting of sepsis and increasing metabolic demands. Less likely could represent Type I NSTEMI, however, given severity of HFrEF and end stage COPD, patient is unlikely to benefit from catheterization and additional testing would not .  2. Chronic heart failure with reduced LVEF (30-35%), currently compensated but respiratory status would likely improve with gentle diuresis.  3. Paroxysmal afib,  medically managed. asymptomatic  4. Sepsis secondary to healthcare associated pneumonia,   5. Acute on chronic respiratory failure with hypoxia and hypercapnia, respiratory acidosis  6. Acute metabolic encephalopathy  4.  COPD exacerbation, end-stage COPD on chronic steroids  Plan:    Continue with current medical management per primary service    Will defer on additional cardiac testing at this time.     Will sign off. Please contact me with additional questions or concerns.    Primary Cardiologist: None    History of Present Illness      Mr. Ki Pederson is a 67 year old male with chronic respiratory failure 2/2 COPD, chronic systolic heart failure  (EF 30-35%) , and paroxysmal atrial fibrillation status post failed ablation 9/23/22 presenting with SOB and confusion. Patient is on hospice due to severe COPD however, over the last few days he has had increasing SOB and confusion prompting him to rescind hospice and seek care. During evaluation in the ED, he was found to have exacerbation of COPD, lactic acidosis, pneumonia, and elevated troponin prompting consultation. Patient is on BiPAP and having difficulty communicating, however, he endorses chest pain yesterday that has since resolved. His shortness of breath is improving. He is unsure if his edema is new or chronic.       Review of Systems:  Further review of systems is otherwise negative/noncontributory (based on review of medical record  (admission H&P) and 13 point review of systems reviewed. Pertinent positives noted).    Cardiac Diagnostics     ECG: Personally reviewed and interpreted: Sinus tachycardia with new incomplete RBBB compared to 9/21/22    Most recent:  Echocardiogram (results reviewed): 9/21/22  Interpretation Summary     1. Left ventricular size and wall thickness are normal. Systolic function is  moderately reduced with global hypokinesis. The estimated left ventricular  ejection fraction is 30-35%.  2. Right ventricular size and systolic function are normal.  3. Moderate biatrial enlargement.  4. No hemodynamically significant valvular abnormalities.  5. Compared to the prior study dated 2/9/2022, there has been an interval  decrease in the left ventricular ejection fraction      Cardiac Cath (results reviewed): None    Cardiac Stress Testing (results reviewed): none        Medical History  Surgical History Family History Social History   Past Medical History:   Diagnosis Date     Anxiety      Atrial fibrillation (H)      CAD (coronary artery disease)      Cerebral infarction (H)      Chronic hepatitis C virus infection (H)      COPD (chronic obstructive pulmonary disease) (H)      Essential hypertension      Hemangioma     massive hemangioma; left hand     Hemangioma     Left hand     Hypertension      Intravenous drug abuse in remission (H)     Sober since ~2010     Lumbar spinal stenosis      Myocardial infarction (H)      Peripheral neuropathy      Restless leg syndrome      Stroke (H)      TIA (transient ischemic attack)      Past Surgical History:   Procedure Laterality Date     CERVICAL FUSION      C6 and C7     CERVICAL FUSION      C6-7     PICC TRIPLE LUMEN PLACEMENT  9/21/2022          Family History   Problem Relation Age of Onset     Coronary Artery Disease Mother      Diabetes Mother      Coronary Artery Disease Father      Diabetes Sister            Social History     Socioeconomic History     Marital status: Single  "    Spouse name: Not on file     Number of children: Not on file     Years of education: Not on file     Highest education level: Not on file   Occupational History     Not on file   Tobacco Use     Smoking status: Former     Packs/day: 0.00     Types: Cigarettes     Quit date: 2018     Years since quittin.8     Smokeless tobacco: Current     Tobacco comments:     No passive exposure   Substance and Sexual Activity     Alcohol use: Not Currently     Drug use: Not Currently     Comment: Sober from IV drug use since ~2010     Sexual activity: Not on file   Other Topics Concern     Parent/sibling w/ CABG, MI or angioplasty before 65F 55M? Not Asked   Social History Narrative    ** Merged History Encounter **          Social Determinants of Health     Financial Resource Strain: Not on file   Food Insecurity: Not on file   Transportation Needs: Not on file   Physical Activity: Not on file   Stress: Not on file   Social Connections: Not on file   Intimate Partner Violence: Not on file   Housing Stability: Not on file             Physical Examination   VITALS: /72   Pulse 77   Temp 98  F (36.7  C) (Axillary)   Resp 12   Ht 1.854 m (6' 1\")   Wt 120.2 kg (265 lb)   SpO2 97%   BMI 34.96 kg/m    BMI: Body mass index is 34.96 kg/m .  Wt Readings from Last 3 Encounters:   10/24/22 120.2 kg (265 lb)   22 111 kg (244 lb 12.8 oz)   22 109.4 kg (241 lb 1.6 oz)     No intake or output data in the 24 hours ending 10/25/22 0917    General Appearance:  Sleepy, cooperative, no distress, appears stated age   Head:  Bipap in place, Normocephalic, without obvious abnormality, atraumatic   Eyes:  conjunctiva/corneas clear, EOM's intact   Nose: Bipap in place,   Neck: Supple, symmetrical, trachea midline, no adenopathy,    Lungs:   Diminished breath sounds at bilateral bases, increased work of breathing and use of accessory mucles   Chest Wall:  No tenderness or deformity   Heart:  Regular rate and rhythm, distant " heart sounds, S1, S2 normal,no murmur, rub or gallop   Extremities: Extremities normal, atraumatic, no cyanosis. Pitting edema to mid calf   Skin: Skin color, texture, turgor normal, no rashes or lesions   Psychiatric: Normal affect, cooperative, able to follow conversation   Neurologic: Alert and oriented, Moves all 4 extremities            Imaging      CT abdomen and pelvis:   IMPRESSION:   1.  Mild infiltrate left lung base suggests developing pneumonia.  2.  No acute inflammatory process involving abdomen/pelvis.    CXR                                                                   IMPRESSION: Cardiac silhouette within normal limits for portable technique. Streaky bibasilar opacities could reflect atelectasis or infiltrate. Upper lung zones are clear. No pneumothorax.     Lab Results    Chemistry/lipid CBC Cardiac Enzymes/BNP/TSH/INR   Recent Labs   Lab Test 03/03/22  1024   CHOL 241*   HDL 82   *   TRIG 101     Recent Labs   Lab Test 03/03/22  1024   *     Recent Labs   Lab Test 10/25/22  0712      POTASSIUM 5.0   CHLORIDE 104   CO2 28   *   BUN 22.6   CR 1.05   GFRESTIMATED 79   PALMER 7.9*     Recent Labs   Lab Test 10/25/22  0712 10/24/22  2148 09/28/22  0428   CR 1.05 1.30* 1.10     Recent Labs   Lab Test 12/18/19  0338   A1C 6.4*          Recent Labs   Lab Test 10/25/22  0712   WBC 8.5   HGB 12.2*   HCT 40.1   MCV 99        Recent Labs   Lab Test 10/25/22  0712 10/24/22  2148 09/28/22  0428   HGB 12.2* 13.1* 10.3*    Lab Test 10/24/22 0114 10/23/22 2148   TROPONINI 129 120     Recent Labs   Lab Test 10/24/22  2148 09/21/22  0505 04/08/22  1432 03/18/20  2046   BNP  --  272* <10 11   NTBNPI 699  --   --   --      Recent Labs   Lab Test 12/11/19  0519   TSH 0.09*     Recent Labs   Lab Test 03/18/20  2046 12/26/19  0624 12/16/19  1201   INR 1.23* 1.05 1.30*           Current Inpatient Scheduled Medications   Scheduled Meds:    erythromycin   Right Eye Q6H JASIEL     famotidine   20 mg Intravenous Q12H     furosemide  40 mg Intravenous Daily     ipratropium - albuterol 0.5 mg/2.5 mg/3 mL  3 mL Nebulization Q6H     methylPREDNISolone  40 mg Intravenous Q12H     piperacillin-tazobactam  3.375 g Intravenous Q8H     sodium chloride (PF)  3 mL Intracatheter Q8H     Continuous Infusions:    - MEDICATION INSTRUCTIONS -       Current Outpatient Medications   Medication Sig Dispense Refill     acetaminophen (TYLENOL) 325 MG tablet Take 2 tablets (650 mg) by mouth every 6 hours as needed for mild pain or other (and adjunct with moderate or severe pain or per patient request) 100 tablet 0     acetaminophen (TYLENOL) 500 MG tablet Take 1-2 tablets (500-1,000 mg) by mouth every 4 hours as needed for fever 100 tablet 0     albuterol (PROAIR HFA/PROVENTIL HFA/VENTOLIN HFA) 108 (90 Base) MCG/ACT inhaler Inhale 2 puffs into the lungs every 6 hours       albuterol (PROAIR HFA/PROVENTIL HFA/VENTOLIN HFA) 108 (90 Base) MCG/ACT inhaler Inhale 2 puffs into the lungs every 6 hours (Patient taking differently: Inhale 2 puffs into the lungs every 6 hours as needed for shortness of breath / dyspnea ) 18 g 11     amiodarone (PACERONE) 200 MG tablet Take 1 tablet (200 mg) by mouth daily 30 tablet 1     amLODIPine (NORVASC) 5 MG tablet Take 1 tablet (5 mg) by mouth daily 60 tablet 1     azithromycin (ZITHROMAX) 250 MG tablet USE ONE TAB BY MOUTH ONCE DAILY ON Monday, Wednesday, Friday ONGOING 20 tablet 0     benzonatate (TESSALON) 100 MG capsule Take 1 capsule (100 mg) by mouth 3 times daily as needed for cough 40 capsule 0     bisacodyl (DULCOLAX) 10 MG suppository Place 1 suppository (10 mg) rectally daily as needed for constipation 2 suppository 0     budesonide (PULMICORT) 1 MG/2ML neb solution Take 2 mLs (1 mg) by nebulization 2 times daily (Patient not taking: Reported on 4/9/2022) 2 mL 3     citalopram (CELEXA) 20 MG tablet [CITALOPRAM (CELEXA) 20 MG TABLET] TAKE 1 TABLET (20 MG TOTAL) BY MOUTH DAILY. (Patient  taking differently: Take 20 mg by mouth daily [CITALOPRAM (CELEXA) 20 MG TABLET] TAKE 1 TABLET (20 MG TOTAL) BY MOUTH DAILY.) 90 tablet 3     citalopram (CELEXA) 40 MG tablet Take 40 mg by mouth daily       clonazePAM (KLONOPIN) 2 MG tablet Take 2 mg by mouth nightly as needed for anxiety       diltiazem ER COATED BEADS (CARDIZEM CD/CARTIA XT) 300 MG 24 hr capsule Take 1 capsule (300 mg) by mouth daily 30 capsule 3     doxycycline hyclate (VIBRA-TABS) 100 MG tablet Take 100 mg by mouth daily       famotidine (PEPCID) 20 MG tablet Take 20 mg by mouth daily       furosemide (LASIX) 20 MG tablet Take 2 tablets (40 mg) by mouth daily  0     furosemide (LASIX) 20 MG tablet Take 1 tablet (20 mg) by mouth daily (Patient taking differently: Take 20 mg by mouth as needed ) 60 tablet 0     gabapentin (NEURONTIN) 300 MG capsule Take 1 capsule (300 mg) by mouth 3 times daily 90 capsule 0     gabapentin (NEURONTIN) 300 MG capsule Take 2 capsules (600 mg) by mouth 3 times daily 540 capsule 3     guaiFENesin (MUCINEX) 600 MG 12 hr tablet Take 1,200 mg by mouth daily       haloperidol (HALDOL) 2 MG/ML (HIGH CONC) solution Take 0.25-0.5 mLs (0.5-1 mg) by mouth or place under tongue every 6 hours as needed for agitation or other (nausea) 1 mL 0     HYDROmorphone, HIGH CONC, (DILAUDID) 10 mg/mL LIQD oral Take 0.1 mLs (1 mg) by mouth or place under tongue every 2 hours as needed for other (pain or shortness of breath/dyspnea) 2 mL 0     ipratropium - albuterol 0.5 mg/2.5 mg/3 mL (DUONEB) 0.5-2.5 (3) MG/3ML neb solution Take 1 vial by nebulization every 6 hours as needed for shortness of breath / dyspnea or wheezing       ipratropium - albuterol 0.5 mg/2.5 mg/3 mL (DUONEB) 0.5-2.5 (3) MG/3ML neb solution Take 1 vial (3 mLs) by nebulization every 6 hours as needed for shortness of breath / dyspnea or wheezing (Patient taking differently: Take 1 vial by nebulization every 4 hours as needed for shortness of breath / dyspnea or wheezing )  90 mL 3     lisinopril (ZESTRIL) 40 MG tablet [LISINOPRIL (PRINIVIL,ZESTRIL) 40 MG TABLET] TAKE 1 TABLET BY MOUTH DAILY. 90 tablet 1     LORazepam (ATIVAN) 2 MG/ML (HIGH CONC) oral solution Take 0.125-0.25 mLs (0.25-0.5 mg) by mouth or place under tongue every 4 hours as needed for anxiety (restlessness) 1 mL 0     magnesium 250 MG tablet Take 1 tablet (250 mg) by mouth At Bedtime 90 tablet 3     Magnesium Oxide 250 MG TABS Take 1 tablet by mouth daily       metoprolol tartrate (LOPRESSOR) 100 MG tablet Take 1 tablet (100 mg) by mouth 2 times daily 60 tablet 0     morphine sulfate (ROXANOL) 20 mg/mL (HIGH CONC) soln Place 0.5 mLs (10 mg) under the tongue every 3 hours as needed for moderate to severe pain or shortness of breath / dyspnea (hip pain) 20 mL 0     naloxone (NARCAN) 4 MG/0.1ML nasal spray Spray 1 spray (4 mg) into one nostril alternating nostrils once as needed for opioid reversal every 2-3 minutes until assistance arrives 0.2 mL 0     oxyCODONE-acetaminophen (PERCOCET) 5-325 MG tablet Take 1 tablet by mouth every 6 hours as needed for severe pain       predniSONE (DELTASONE) 10 MG tablet Take 1 tablet (10 mg) by mouth See Admin Instructions take 4 tabs daily x 4 days, then 3 tabs daily x 4 days, then 2 tabs daily x 4 days, then 1 tab daily x 4 days.. 40 tablet 5     predniSONE (DELTASONE) 20 MG tablet Take 20 mg by mouth daily       predniSONE (DELTASONE) 20 MG tablet Take 1 tablet (20 mg) by mouth daily 3 tabs by mouth once daily for 2 days, 2 tabs by mouth once daily for 2 days, 1 tab by mouth once daily for 2 days then STOP 15 tablet 0     rivaroxaban ANTICOAGULANT (XARELTO) 20 MG TABS tablet Take 20 mg by mouth daily       rivaroxaban ANTICOAGULANT (XARELTO) 20 MG TABS tablet Take 1 tablet (20 mg) by mouth daily (with dinner) 90 tablet 3          Medications Prior to Admission   Prior to Admission medications    Medication Sig Start Date End Date Taking? Authorizing Provider   acetaminophen  (TYLENOL) 325 MG tablet Take 2 tablets (650 mg) by mouth every 6 hours as needed for mild pain or other (and adjunct with moderate or severe pain or per patient request) 4/13/22   Raiza Mackay MD   acetaminophen (TYLENOL) 500 MG tablet Take 1-2 tablets (500-1,000 mg) by mouth every 4 hours as needed for fever 11/30/21   Ricky Lerma MD   albuterol (PROAIR HFA/PROVENTIL HFA/VENTOLIN HFA) 108 (90 Base) MCG/ACT inhaler Inhale 2 puffs into the lungs every 6 hours    Unknown, Entered By History   albuterol (PROAIR HFA/PROVENTIL HFA/VENTOLIN HFA) 108 (90 Base) MCG/ACT inhaler Inhale 2 puffs into the lungs every 6 hours  Patient taking differently: Inhale 2 puffs into the lungs every 6 hours as needed for shortness of breath / dyspnea  3/23/22   Gloria Sebastian MD   amiodarone (PACERONE) 200 MG tablet Take 1 tablet (200 mg) by mouth daily 9/29/22   Cristy Yañez MD   amLODIPine (NORVASC) 5 MG tablet Take 1 tablet (5 mg) by mouth daily 2/23/22   Gloria Sebastian MD   azithromycin (ZITHROMAX) 250 MG tablet USE ONE TAB BY MOUTH ONCE DAILY ON Monday, Wednesday, Friday ONGOING 4/13/22   Raiza Mackay MD   benzonatate (TESSALON) 100 MG capsule Take 1 capsule (100 mg) by mouth 3 times daily as needed for cough 4/13/22   Raiza Mackay MD   bisacodyl (DULCOLAX) 10 MG suppository Place 1 suppository (10 mg) rectally daily as needed for constipation 9/27/22   Dorota Lal MD   budesonide (PULMICORT) 1 MG/2ML neb solution Take 2 mLs (1 mg) by nebulization 2 times daily  Patient not taking: Reported on 4/9/2022 3/23/22   Gloria Sebastian MD   citalopram (CELEXA) 20 MG tablet [CITALOPRAM (CELEXA) 20 MG TABLET] TAKE 1 TABLET (20 MG TOTAL) BY MOUTH DAILY.  Patient taking differently: Take 20 mg by mouth daily [CITALOPRAM (CELEXA) 20 MG TABLET] TAKE 1 TABLET (20 MG TOTAL) BY MOUTH DAILY. 11/30/21   Ricky Lerma MD   citalopram (CELEXA) 40 MG tablet Take 40 mg by mouth daily    Unknown, Entered By History   clonazePAM  (KLONOPIN) 2 MG tablet Take 2 mg by mouth nightly as needed for anxiety    Unknown, Entered By History   diltiazem ER COATED BEADS (CARDIZEM CD/CARTIA XT) 300 MG 24 hr capsule Take 1 capsule (300 mg) by mouth daily 9/29/22   Cristy Yañez MD   doxycycline hyclate (VIBRA-TABS) 100 MG tablet Take 100 mg by mouth daily    Unknown, Entered By History   famotidine (PEPCID) 20 MG tablet Take 20 mg by mouth daily    Unknown, Entered By History   furosemide (LASIX) 20 MG tablet Take 2 tablets (40 mg) by mouth daily 9/28/22   Cristy Yañez MD   furosemide (LASIX) 20 MG tablet Take 1 tablet (20 mg) by mouth daily  Patient taking differently: Take 20 mg by mouth as needed  2/9/22   Gloria Sebastian MD   gabapentin (NEURONTIN) 300 MG capsule Take 1 capsule (300 mg) by mouth 3 times daily 9/27/22   Dorota Lal MD   gabapentin (NEURONTIN) 300 MG capsule Take 2 capsules (600 mg) by mouth 3 times daily 12/23/21   Gloria Sebastian MD   guaiFENesin (MUCINEX) 600 MG 12 hr tablet Take 1,200 mg by mouth daily    Unknown, Entered By History   haloperidol (HALDOL) 2 MG/ML (HIGH CONC) solution Take 0.25-0.5 mLs (0.5-1 mg) by mouth or place under tongue every 6 hours as needed for agitation or other (nausea) 9/27/22   Dorota Lal MD   HYDROmorphone, HIGH CONC, (DILAUDID) 10 mg/mL LIQD oral Take 0.1 mLs (1 mg) by mouth or place under tongue every 2 hours as needed for other (pain or shortness of breath/dyspnea) 9/27/22   Dorota Lal MD   ipratropium - albuterol 0.5 mg/2.5 mg/3 mL (DUONEB) 0.5-2.5 (3) MG/3ML neb solution Take 1 vial by nebulization every 6 hours as needed for shortness of breath / dyspnea or wheezing    Unknown, Entered By History   ipratropium - albuterol 0.5 mg/2.5 mg/3 mL (DUONEB) 0.5-2.5 (3) MG/3ML neb solution Take 1 vial (3 mLs) by nebulization every 6 hours as needed for shortness of breath / dyspnea or wheezing  Patient taking differently: Take 1 vial by nebulization every 4 hours as needed for  shortness of breath / dyspnea or wheezing  2/9/22   Gloria Sebastian MD   lisinopril (ZESTRIL) 40 MG tablet [LISINOPRIL (PRINIVIL,ZESTRIL) 40 MG TABLET] TAKE 1 TABLET BY MOUTH DAILY. 11/30/21   Ricky Lerma MD   LORazepam (ATIVAN) 2 MG/ML (HIGH CONC) oral solution Take 0.125-0.25 mLs (0.25-0.5 mg) by mouth or place under tongue every 4 hours as needed for anxiety (restlessness) 9/27/22   Dorota Lal MD   magnesium 250 MG tablet Take 1 tablet (250 mg) by mouth At Bedtime 2/23/22   Gloria Sebastian MD   Magnesium Oxide 250 MG TABS Take 1 tablet by mouth daily    Unknown, Entered By History   metoprolol tartrate (LOPRESSOR) 100 MG tablet Take 1 tablet (100 mg) by mouth 2 times daily 9/27/22   Dorota Lal MD   morphine sulfate (ROXANOL) 20 mg/mL (HIGH CONC) soln Place 0.5 mLs (10 mg) under the tongue every 3 hours as needed for moderate to severe pain or shortness of breath / dyspnea (hip pain) 4/13/22   Inessa Bobby, CNS   naloxone (NARCAN) 4 MG/0.1ML nasal spray Spray 1 spray (4 mg) into one nostril alternating nostrils once as needed for opioid reversal every 2-3 minutes until assistance arrives 2/9/22   Gloria Sebastian MD   oxyCODONE-acetaminophen (PERCOCET) 5-325 MG tablet Take 1 tablet by mouth every 6 hours as needed for severe pain    Unknown, Entered By History   predniSONE (DELTASONE) 10 MG tablet Take 1 tablet (10 mg) by mouth See Admin Instructions take 4 tabs daily x 4 days, then 3 tabs daily x 4 days, then 2 tabs daily x 4 days, then 1 tab daily x 4 days.. 2/9/22   Gloria Sebastian MD   predniSONE (DELTASONE) 20 MG tablet Take 20 mg by mouth daily    Unknown, Entered By History   predniSONE (DELTASONE) 20 MG tablet Take 1 tablet (20 mg) by mouth daily 3 tabs by mouth once daily for 2 days, 2 tabs by mouth once daily for 2 days, 1 tab by mouth once daily for 2 days then STOP 4/14/22   Raiza Mackay MD   rivaroxaban ANTICOAGULANT (XARELTO) 20 MG TABS tablet Take 20 mg by mouth daily     "Unknown, Entered By History   rivaroxaban ANTICOAGULANT (XARELTO) 20 MG TABS tablet Take 1 tablet (20 mg) by mouth daily (with dinner) 3/23/22   Gloria Sebastian MD          -------------------------------------------------------------------------------------------------------------------  The patient was interviewed and examined by me on 10/25/2022.   My exam confirms the findings described in the note of medical student, Paola Mayers, [email or web address].  The note has been reviewed and edited by me and it accurately reflects the assessment and plan we discussed.         Clinically Significant Risk Factors Present on Admission     # Hyperkalemia: Highest K = 5.5 mmol/L (Ref range: 3.4-5.3) in last 2 days, will monitor as appropriate   # Hypocalcemia: Lowest Ca = 7.9 mg/dL (Ref range: 8.5 - 10.1 mg/dL) in last 2 days, will monitor and replace as appropriate       # Drug Induced Coagulation Defect: home medication list includes an anticoagulant medication    # Obesity: Estimated body mass index is 34.96 kg/m  as calculated from the following:    Height as of this encounter: 1.854 m (6' 1\").    Weight as of this encounter: 120.2 kg (265 lb).    End-stage COPD - recently on hospice care  Paroxysmal atrial fibrillation  Acute metabolic encephalopathy - due to sepsis, healthcare associated pneumonia, and respiratory failure  Mixed metabolic and respiratory acidosis  Demand ischemia with elevated troponin  Acute kidney injury  Severe sepsis from pneumonia c/b acute on chronic respiratory failure  Acute on chronic respiratory failure with hypoxia and hypercapnea  "

## 2022-10-25 NOTE — ED NOTES
"[facility] ED Handoff Report    ED Chief Complaint: Late note entry:  Pt to room 1 via [facility] d/t SOB and possible fall. Pt is on 5L at baseline at home. PMH: COPD, Afib. given duoneb en route, sats increased to high 90s up from 70s on 5L NC. Febrile to 101.3 on arrival.      Triage Assessment       Row Name 10/24/22 4237       Triage Assessment (Adult)    Airway WDL X  SOB. PMH: COPD, afib                      ED Diagnosis:  (J96.01,  J96.02) Acute respiratory failure with hypoxia and hypercarbia (H)  Comment: bipap at 30% currently  Plan: wean off bipap per rt, npo while on bipap no exceptions    (Z66) DNR (do not resuscitate)  Comment: hospice at home currently  Plan: unknown         PMH:    Past Medical History:   Diagnosis Date    Anxiety     Atrial fibrillation (H)     CAD (coronary artery disease)     Cerebral infarction (H)     Chronic hepatitis C virus infection (H)     COPD (chronic obstructive pulmonary disease) (H)     Essential hypertension     Hemangioma     massive hemangioma; left hand    Hemangioma     Left hand    Hypertension     Intravenous drug abuse in remission (H)     Sober since ~2010    Lumbar spinal stenosis     Myocardial infarction (H)     Peripheral neuropathy     Restless leg syndrome     Stroke (H)     TIA (transient ischemic attack)         Code Status:  No CPR- Do NOT Intubate     Falls Risk: Yes Band: Applied    Current Living Situation/Residence: lives with a significant other     Elimination Status: Continent: unknown, periwick placed     Activity Level: Total assist/lift    Patients Preferred Language:  English     Needed: No    Vital Signs:  /59   Pulse 81   Temp 97.8  F (36.6  C) (Axillary)   Resp 14   Ht 1.854 m (6' 1\")   Wt 120.2 kg (265 lb)   SpO2 93%   BMI 34.96 kg/m    2 liters NC, base line 4 at home    Cardiac Rhythm: sr/afib    Pain Score: Patient is unable to quantify.    Is the Patient Confused:  Yes    Last Food or Drink:  " unknown  at     Focused Assessment:  respiratory    Tests Performed: Done: Labs and Imaging  Labs Ordered and Resulted from Time of ED Arrival to Time of ED Departure   CBC WITH PLATELETS - Abnormal       Result Value    WBC Count 11.3 (*)     RBC Count 4.33 (*)     Hemoglobin 13.1 (*)     Hematocrit 42.4      MCV 98      MCH 30.3      MCHC 30.9 (*)     RDW 13.5      Platelet Count 176     COMPREHENSIVE METABOLIC PANEL - Abnormal    Sodium 139      Potassium 5.5 (*)     Chloride 100      Carbon Dioxide (CO2) 28      Anion Gap 11      Urea Nitrogen 27.2 (*)     Creatinine 1.30 (*)     Calcium 8.4 (*)     Glucose 128 (*)     Alkaline Phosphatase 47      AST 35      ALT 28      Protein Total 6.7      Albumin 4.2      Bilirubin Total 0.5      GFR Estimate 61     LACTIC ACID WHOLE BLOOD - Abnormal    Lactic Acid 2.1 (*)    TROPONIN T, HIGH SENSITIVITY - Abnormal    Troponin T, High Sensitivity 120 (*)    BLOOD GAS VENOUS - Abnormal    pH Venous 7.22 (*)     pCO2 Venous 73 (*)     pO2 Venous 28      Bicarbonate Venous 24      Base Excess/Deficit (+/-) 2.3      Oxyhemoglobin Venous 47.1 (*)     O2 Sat, Venous 48.1 (*)    ROUTINE UA WITH MICROSCOPIC REFLEX TO CULTURE - Abnormal    Color Urine Light Yellow      Appearance Urine Clear      Glucose Urine Negative      Bilirubin Urine Negative      Ketones Urine Negative      Specific Gravity Urine 1.034 (*)     Blood Urine Negative      pH Urine 5.0      Protein Albumin Urine Negative      Urobilinogen Urine <2.0      Nitrite Urine Negative      Leukocyte Esterase Urine Negative      Bacteria Urine Few (*)     RBC Urine 0      WBC Urine <1      Squamous Epithelials Urine <1     LACTIC ACID WHOLE BLOOD - Abnormal    Lactic Acid 0.6 (*)    BLOOD GAS VENOUS - Abnormal    pH Venous 7.29 (*)     pCO2 Venous 63 (*)     pO2 Venous 43      Bicarbonate Venous 26      Base Excess/Deficit (+/-) 3.8      Oxyhemoglobin Venous 75.2 (*)     O2 Sat, Venous 77.0 (*)    TROPONIN T, HIGH  SENSITIVITY - Abnormal    Troponin T, High Sensitivity 129 (*)    PROCALCITONIN - Abnormal    Procalcitonin 0.21 (*)    BLOOD GAS VENOUS - Abnormal    pH Venous 7.33 (*)     pCO2 Venous 56 (*)     pO2 Venous 66 (*)     Bicarbonate Venous 26      Base Excess/Deficit (+/-) 2.9      Oxyhemoglobin Venous 91.4 (*)     O2 Sat, Venous 93.9 (*)    BASIC METABOLIC PANEL - Abnormal    Sodium 138      Potassium 5.0      Chloride 104      Carbon Dioxide (CO2) 28      Anion Gap 6 (*)     Urea Nitrogen 22.6      Creatinine 1.05      Calcium 7.9 (*)     Glucose 158 (*)     GFR Estimate 79     CBC WITH PLATELETS AND DIFFERENTIAL - Abnormal    WBC Count 8.5      RBC Count 4.05 (*)     Hemoglobin 12.2 (*)     Hematocrit 40.1      MCV 99      MCH 30.1      MCHC 30.4 (*)     RDW 13.3      Platelet Count 171      % Neutrophils 94      % Lymphocytes 4      % Monocytes 2      % Eosinophils 0      % Basophils 0      % Immature Granulocytes 0      NRBCs per 100 WBC 0      Absolute Neutrophils 8.0      Absolute Lymphocytes 0.3 (*)     Absolute Monocytes 0.2      Absolute Eosinophils 0.0      Absolute Basophils 0.0      Absolute Immature Granulocytes 0.0      Absolute NRBCs 0.0     MAGNESIUM - Normal    Magnesium 2.1     AMMONIA - Normal    Ammonia 27     NT PROBNP INPATIENT - Normal    N terminal Pro BNP Inpatient 699     COVID-19 VIRUS (CORONAVIRUS) BY PCR - Normal    SARS CoV2 PCR Negative     BLOOD CULTURE   BLOOD CULTURE   RESPIRATORY AEROBIC BACTERIAL CULTURE   GRAM STAIN     CT Head w/o Contrast   Final Result   IMPRESSION:   1.  No acute intracranial process.      XR Chest Port 1 View   Final Result   IMPRESSION: Cardiac silhouette within normal limits for portable technique. Streaky bibasilar opacities could reflect atelectasis or infiltrate. Upper lung zones are clear. No pneumothorax.      CT Abdomen Pelvis w Contrast   Final Result   IMPRESSION:    1.  Mild infiltrate left lung base suggests developing pneumonia.   2.  No acute  inflammatory process involving abdomen/pelvis.            Treatments Provided:  see mar    Family Dynamics/Concerns: No    Family Updated On Visitor Policy: No    Plan of Care Communicated to Family: No        Belongings Checklist Done and Signed by Patient: No    Medications sent with patient: no    Covid: symptomatic, negative    Additional Information: watch on wrist noted, dnr/dni    RN: Suri Perez RN 10/25/2022 7:45 AM

## 2022-10-25 NOTE — ED PROVIDER NOTES
EMERGENCY DEPARTMENT ENCOUNTER      NAME: Ki Pederson  AGE: 67 year old male  YOB: 1955  MRN: [id number]  EVALUATION DATE & TIME: 10/24/2022  8:56 PM    PCP: Gloria Sebastian    ED PROVIDER: Julian Tubbs M.D.      Chief Complaint   Patient presents with     Shortness of Breath     Flu Symptoms     Fever         FINAL IMPRESSION:  Respiratory failure with hypercarbia  Encephalopathy  Fever  Elevated troponin  Left lower lobe pneumonia      ED COURSE & MEDICAL DECISION MAKING:    Pertinent Labs & Imaging studies reviewed. (See chart for details)  67 year old male presents to the Emergency Department for evaluation of increased confusion, decreased activities for the last few days.  Patient recently hospitalized with severe respiratory issues.  Patient with underlying severe COPD and cardiac issues.  Patient had been on hospice however rescinded hospice today given symptoms.  Patient seen initially in hallway bed due to ED overcrowding.  Vital signs not available.  Patient with somnolent appearance.  On supplemental O2 greatest concern was of hypercarbia.  Patient on chronic O2 supplementation with recent increases.  Patient however is easily arousable.  Rapid irregular heart rate new noted.  Labs sent for evaluation.  EKG and chest x-ray to be obtained.   9:20 PM I met with the patient and family for the initial interview and physical examination. Discussed plan for treatment and workup in the ED.    10:10 PM.  Patient with both acidosis and hypercarbia as suspected.  BiPAP had been initiated on arrival.  Temperature 101.3 raising greater concerns of infectious process.  Tylenol ordered.  We will also obtain blood culture.  Given patient's decreased alertness we will also obtain CT imaging of the abdomen out of caution.  Fluids to be given judiciously as patient with known congestive heart failure and most recent echo with EF of 30 to 35%  10:28 PM.  Patient with mild hyperkalemia with potassium 5.5.   Minimal renal insufficiency creatinine 1.30.  Calcium slightly reduced at 8.4.  Lactate mildly elevated 2.1.  Magnesium normal.  BNP normal.  Minimal leukocytosis.   10:30 PM Rechecked the patient.  Informed family of likely infectious process.  Zosyn given empirically.  CT of the abdomen being obtained.  11:16 PM.  Troponin returns moderately elevated 120.   At the conclusion of the encounter I discussed the results of all of the tests and the disposition. The questions were answered and return precautions provided. The patient or family acknowledged understanding and was agreeable with the care plan.   11:37 PM I discussed the patient with Dr. Kimble from the hospitalist service who agrees to admit the patient.   12:01 AM.  CT imaging with early left lower lobe infiltrate suggestive of pneumonia.    Patient represents a critical care situation.  Approximately 45 minutes spent direct involved in patient's care independent of any procedures.      PPE: Provider wore N95 mask, eye protection.    MEDICATIONS GIVEN IN THE EMERGENCY:  Medications   0.9% sodium chloride BOLUS (1,000 mLs Intravenous New Bag 10/24/22 2237)   piperacillin-tazobactam (ZOSYN) 3.375 g vial to attach to  mL bag (3.375 g Intravenous Given 10/24/22 2319)   iopamidol (ISOVUE-370) solution 100 mL (has no administration in time range)   acetaminophen (TYLENOL) tablet 650 mg (650 mg Oral Given 10/24/22 2242)       NEW PRESCRIPTIONS STARTED AT TODAY'S ER VISIT  New Prescriptions    No medications on file          =================================================================    HPI    Patient information was obtained from: patient's son    Use of Intrepreter: N/A         Ki CHOPRA Dacia is a 67 year old male with a pertient medical history of CHF (EF 30-35%), CAD, COPD on supplemental O2, HTN, CVA, and h/o polysubstance abuse  who presents to the ED for evaluation of increasing confusion.     Patient comes in by EMS due to increased confusion  at home over the past 5 days. Son states the patient is typically alert, oriented, and walking around but since 10/20 he has progressively been getting more confused. Son notes they found him crawling on the ground from bathroom to bedroom because he was unable to walking. Also notes the patient was previously on hospice although the patient asked family to call EMS tonight. He was previously on BiPAP but is not currently due to home care issues. No history of sleep apnea. No recent changes to his supplemental O2. No vomiting, diarrhea, or urinary symptoms. Does note some mild leg swelling.       REVIEW OF SYSTEMS   Unable to perform: Altered mental status    PAST MEDICAL HISTORY:  Past Medical History:   Diagnosis Date     Anxiety      Atrial fibrillation (H)      CAD (coronary artery disease)      Cerebral infarction (H)      Chronic hepatitis C virus infection (H)      COPD (chronic obstructive pulmonary disease) (H)      Essential hypertension      Hemangioma     massive hemangioma; left hand     Hemangioma     Left hand     Hypertension      Intravenous drug abuse in remission (H)     Sober since ~2010     Lumbar spinal stenosis      Myocardial infarction (H)      Peripheral neuropathy      Restless leg syndrome      Stroke (H)      TIA (transient ischemic attack)        PAST SURGICAL HISTORY:  Past Surgical History:   Procedure Laterality Date     CERVICAL FUSION      C6 and C7     CERVICAL FUSION      C6-7     PICC TRIPLE LUMEN PLACEMENT  9/21/2022              CURRENT MEDICATIONS:      Current Facility-Administered Medications:      0.9% sodium chloride BOLUS, 1,000 mL, Intravenous, Once, Julian Tubbs MD, Last Rate: 250 mL/hr at 10/24/22 2237, 1,000 mL at 10/24/22 2237     iopamidol (ISOVUE-370) solution 100 mL, 100 mL, Intravenous, Once, Julian Tubbs MD     piperacillin-tazobactam (ZOSYN) 3.375 g vial to attach to  mL bag, 3.375 g, Intravenous, Once, Julian Tubbs MD, 3.375 g at 10/24/22  6296    Current Outpatient Medications:      acetaminophen (TYLENOL) 325 MG tablet, Take 2 tablets (650 mg) by mouth every 6 hours as needed for mild pain or other (and adjunct with moderate or severe pain or per patient request), Disp: 100 tablet, Rfl: 0     acetaminophen (TYLENOL) 500 MG tablet, Take 1-2 tablets (500-1,000 mg) by mouth every 4 hours as needed for fever, Disp: 100 tablet, Rfl: 0     albuterol (PROAIR HFA/PROVENTIL HFA/VENTOLIN HFA) 108 (90 Base) MCG/ACT inhaler, Inhale 2 puffs into the lungs every 6 hours, Disp: , Rfl:      albuterol (PROAIR HFA/PROVENTIL HFA/VENTOLIN HFA) 108 (90 Base) MCG/ACT inhaler, Inhale 2 puffs into the lungs every 6 hours (Patient taking differently: Inhale 2 puffs into the lungs every 6 hours as needed for shortness of breath / dyspnea ), Disp: 18 g, Rfl: 11     amiodarone (PACERONE) 200 MG tablet, Take 1 tablet (200 mg) by mouth daily, Disp: 30 tablet, Rfl: 1     amLODIPine (NORVASC) 5 MG tablet, Take 1 tablet (5 mg) by mouth daily, Disp: 60 tablet, Rfl: 1     azithromycin (ZITHROMAX) 250 MG tablet, USE ONE TAB BY MOUTH ONCE DAILY ON Monday, Wednesday, Friday ONGOING, Disp: 20 tablet, Rfl: 0     benzonatate (TESSALON) 100 MG capsule, Take 1 capsule (100 mg) by mouth 3 times daily as needed for cough, Disp: 40 capsule, Rfl: 0     bisacodyl (DULCOLAX) 10 MG suppository, Place 1 suppository (10 mg) rectally daily as needed for constipation, Disp: 2 suppository, Rfl: 0     budesonide (PULMICORT) 1 MG/2ML neb solution, Take 2 mLs (1 mg) by nebulization 2 times daily (Patient not taking: Reported on 4/9/2022), Disp: 2 mL, Rfl: 3     citalopram (CELEXA) 20 MG tablet, [CITALOPRAM (CELEXA) 20 MG TABLET] TAKE 1 TABLET (20 MG TOTAL) BY MOUTH DAILY. (Patient taking differently: Take 20 mg by mouth daily [CITALOPRAM (CELEXA) 20 MG TABLET] TAKE 1 TABLET (20 MG TOTAL) BY MOUTH DAILY.), Disp: 90 tablet, Rfl: 3     citalopram (CELEXA) 40 MG tablet, Take 40 mg by mouth daily, Disp: , Rfl:       clonazePAM (KLONOPIN) 2 MG tablet, Take 2 mg by mouth nightly as needed for anxiety, Disp: , Rfl:      diltiazem ER COATED BEADS (CARDIZEM CD/CARTIA XT) 300 MG 24 hr capsule, Take 1 capsule (300 mg) by mouth daily, Disp: 30 capsule, Rfl: 3     doxycycline hyclate (VIBRA-TABS) 100 MG tablet, Take 100 mg by mouth daily, Disp: , Rfl:      famotidine (PEPCID) 20 MG tablet, Take 20 mg by mouth daily, Disp: , Rfl:      furosemide (LASIX) 20 MG tablet, Take 2 tablets (40 mg) by mouth daily, Disp: , Rfl: 0     furosemide (LASIX) 20 MG tablet, Take 1 tablet (20 mg) by mouth daily (Patient taking differently: Take 20 mg by mouth as needed ), Disp: 60 tablet, Rfl: 0     gabapentin (NEURONTIN) 300 MG capsule, Take 1 capsule (300 mg) by mouth 3 times daily, Disp: 90 capsule, Rfl: 0     gabapentin (NEURONTIN) 300 MG capsule, Take 2 capsules (600 mg) by mouth 3 times daily, Disp: 540 capsule, Rfl: 3     guaiFENesin (MUCINEX) 600 MG 12 hr tablet, Take 1,200 mg by mouth daily, Disp: , Rfl:      haloperidol (HALDOL) 2 MG/ML (HIGH CONC) solution, Take 0.25-0.5 mLs (0.5-1 mg) by mouth or place under tongue every 6 hours as needed for agitation or other (nausea), Disp: 1 mL, Rfl: 0     HYDROmorphone, HIGH CONC, (DILAUDID) 10 mg/mL LIQD oral, Take 0.1 mLs (1 mg) by mouth or place under tongue every 2 hours as needed for other (pain or shortness of breath/dyspnea), Disp: 2 mL, Rfl: 0     ipratropium - albuterol 0.5 mg/2.5 mg/3 mL (DUONEB) 0.5-2.5 (3) MG/3ML neb solution, Take 1 vial by nebulization every 6 hours as needed for shortness of breath / dyspnea or wheezing, Disp: , Rfl:      ipratropium - albuterol 0.5 mg/2.5 mg/3 mL (DUONEB) 0.5-2.5 (3) MG/3ML neb solution, Take 1 vial (3 mLs) by nebulization every 6 hours as needed for shortness of breath / dyspnea or wheezing (Patient taking differently: Take 1 vial by nebulization every 4 hours as needed for shortness of breath / dyspnea or wheezing ), Disp: 90 mL, Rfl: 3      lisinopril (ZESTRIL) 40 MG tablet, [LISINOPRIL (PRINIVIL,ZESTRIL) 40 MG TABLET] TAKE 1 TABLET BY MOUTH DAILY., Disp: 90 tablet, Rfl: 1     LORazepam (ATIVAN) 2 MG/ML (HIGH CONC) oral solution, Take 0.125-0.25 mLs (0.25-0.5 mg) by mouth or place under tongue every 4 hours as needed for anxiety (restlessness), Disp: 1 mL, Rfl: 0     magnesium 250 MG tablet, Take 1 tablet (250 mg) by mouth At Bedtime, Disp: 90 tablet, Rfl: 3     Magnesium Oxide 250 MG TABS, Take 1 tablet by mouth daily, Disp: , Rfl:      metoprolol tartrate (LOPRESSOR) 100 MG tablet, Take 1 tablet (100 mg) by mouth 2 times daily, Disp: 60 tablet, Rfl: 0     morphine sulfate (ROXANOL) 20 mg/mL (HIGH CONC) soln, Place 0.5 mLs (10 mg) under the tongue every 3 hours as needed for moderate to severe pain or shortness of breath / dyspnea (hip pain), Disp: 20 mL, Rfl: 0     naloxone (NARCAN) 4 MG/0.1ML nasal spray, Spray 1 spray (4 mg) into one nostril alternating nostrils once as needed for opioid reversal every 2-3 minutes until assistance arrives, Disp: 0.2 mL, Rfl: 0     oxyCODONE-acetaminophen (PERCOCET) 5-325 MG tablet, Take 1 tablet by mouth every 6 hours as needed for severe pain, Disp: , Rfl:      predniSONE (DELTASONE) 10 MG tablet, Take 1 tablet (10 mg) by mouth See Admin Instructions take 4 tabs daily x 4 days, then 3 tabs daily x 4 days, then 2 tabs daily x 4 days, then 1 tab daily x 4 days.., Disp: 40 tablet, Rfl: 5     predniSONE (DELTASONE) 20 MG tablet, Take 20 mg by mouth daily, Disp: , Rfl:      predniSONE (DELTASONE) 20 MG tablet, Take 1 tablet (20 mg) by mouth daily 3 tabs by mouth once daily for 2 days, 2 tabs by mouth once daily for 2 days, 1 tab by mouth once daily for 2 days then STOP, Disp: 15 tablet, Rfl: 0     rivaroxaban ANTICOAGULANT (XARELTO) 20 MG TABS tablet, Take 20 mg by mouth daily, Disp: , Rfl:      rivaroxaban ANTICOAGULANT (XARELTO) 20 MG TABS tablet, Take 1 tablet (20 mg) by mouth daily (with dinner), Disp: 90 tablet,  "Rfl: 3    Facility-Administered Medications Ordered in Other Encounters:      azithromycin (ZITHROMAX) 500 mg in sodium chloride 0.9 % 250 mL intermittent infusion, 500 mg, Intravenous, Once, Fermin Talavera MD     piperacillin-tazobactam (ZOSYN) 3.375 g vial to attach to  mL bag, 3.375 g, Intravenous, Once, Fermin Talavera MD    ALLERGIES:  Allergies   Allergen Reactions     Symbicort Rash       FAMILY HISTORY:  Family History   Problem Relation Age of Onset     Coronary Artery Disease Mother      Diabetes Mother      Coronary Artery Disease Father      Diabetes Sister        SOCIAL HISTORY:   Social History     Socioeconomic History     Marital status: Patient Declined     Spouse name: None     Number of children: None     Years of education: None     Highest education level: None   Tobacco Use     Smoking status: Former     Packs/day: 0.00     Types: Cigarettes     Quit date:      Years since quittin.8     Smokeless tobacco: Current     Tobacco comments:     No passive exposure   Substance and Sexual Activity     Alcohol use: Not Currently     Drug use: Not Currently     Comment: Sober from IV drug use since ~   Social History Narrative    ** Merged History Encounter **            VITALS:  Patient Vitals for the past 24 hrs:   BP Temp Temp src Pulse Resp SpO2 Height Weight   10/24/22 2201 123/79 (!) 101.3  F (38.5  C) Oral (!) 11 13 97 % 1.854 m (6' 1\") 120.2 kg (265 lb)        PHYSICAL EXAM    Constitutional: Somnolent in moderate apparent distress,.  HENT:  Normocephalic, Atraumatic. Bilateral external ears normal. Oropharynx moist. Nose normal. Neck- Normal range of motion  Eyes:  PERRL, EOMI with no signs of entrapment, injected conjunctiva, No discharge.   Respiratory: Diminished breath sounds, mild respiratory distress, No wheezing.    Cardiovascular: Rapid heart rate, Normal rhythm, No appreciable rubs or gallops.   GI:  Soft, No tenderness, No distension, No palpable " masses  Musculoskeletal:  Intact distal pulses, Mild bilateral lower extremity edema with scattered excoriations. Good range of motion in all major joints. No tenderness to palpation or major deformities noted.  Integument:  Warm, Dry, No erythema, No rash.   Neurologic:  Alert to person, opens eyes to voice.  Follows simple commands., Normal motor function, Normal sensory function, No focal deficits noted.     LAB:  All pertinent labs reviewed and interpreted.  Results for orders placed or performed during the hospital encounter of 10/24/22   CBC (+ platelets, no diff)   Result Value Ref Range    WBC Count 11.3 (H) 4.0 - 11.0 10e3/uL    RBC Count 4.33 (L) 4.40 - 5.90 10e6/uL    Hemoglobin 13.1 (L) 13.3 - 17.7 g/dL    Hematocrit 42.4 40.0 - 53.0 %    MCV 98 78 - 100 fL    MCH 30.3 26.5 - 33.0 pg    MCHC 30.9 (L) 31.5 - 36.5 g/dL    RDW 13.5 10.0 - 15.0 %    Platelet Count 176 150 - 450 10e3/uL   Comprehensive metabolic panel   Result Value Ref Range    Sodium 139 136 - 145 mmol/L    Potassium 5.5 (H) 3.4 - 5.3 mmol/L    Chloride 100 98 - 107 mmol/L    Carbon Dioxide (CO2) 28 22 - 29 mmol/L    Anion Gap 11 7 - 15 mmol/L    Urea Nitrogen 27.2 (H) 8.0 - 23.0 mg/dL    Creatinine 1.30 (H) 0.67 - 1.17 mg/dL    Calcium 8.4 (L) 8.8 - 10.2 mg/dL    Glucose 128 (H) 70 - 99 mg/dL    Alkaline Phosphatase 47 40 - 129 U/L    AST 35 10 - 50 U/L    ALT 28 10 - 50 U/L    Protein Total 6.7 6.4 - 8.3 g/dL    Albumin 4.2 3.5 - 5.2 g/dL    Bilirubin Total 0.5 <=1.2 mg/dL    GFR Estimate 61 >60 mL/min/1.73m2   Lactic acid whole blood   Result Value Ref Range    Lactic Acid 2.1 (H) 0.7 - 2.0 mmol/L   Result Value Ref Range    Troponin T, High Sensitivity 120 (HH) <=22 ng/L   Result Value Ref Range    Magnesium 2.1 1.7 - 2.3 mg/dL   Result Value Ref Range    Ammonia 27 16 - 60 umol/L   Nt probnp inpatient (BNP)   Result Value Ref Range    N terminal Pro BNP Inpatient 699 0 - 900 pg/mL   Blood gas venous   Result Value Ref Range    pH  Venous 7.22 (LL) 7.35 - 7.45    pCO2 Venous 73 (H) 35 - 50 mm Hg    pO2 Venous 28 25 - 47 mm Hg    Bicarbonate Venous 24 24 - 30 mmol/L    Base Excess/Deficit (+/-) 2.3   mmol/L    Oxyhemoglobin Venous 47.1 (L) 70.0 - 75.0 %    O2 Sat, Venous 48.1 (L) 70.0 - 75.0 %       RADIOLOGY:  Reviewed all pertinent imaging. Please see official radiology report.  CT Abdomen Pelvis w Contrast    Result Date: 10/24/2022  EXAM: CT ABDOMEN PELVIS W CONTRAST LOCATION: [facility] DATE/TIME: 10/24/2022 11:41 PM INDICATION: Fevers, confusion COMPARISON: PET/CT 8/1/2019 TECHNIQUE: CT scan of the abdomen and pelvis was performed following injection of IV contrast. Multiplanar reformats were obtained. Dose reduction techniques were used. CONTRAST: isovue 370 100ml FINDINGS: LOWER CHEST: Linear scarring peripheral right lung base similar to previous study. Mild shaggy infiltrate at both lung bases, left worse than right, developing pneumonia possible. HEPATOBILIARY: Normal. PANCREAS: Normal. SPLEEN: Normal. ADRENAL GLANDS: Normal. KIDNEYS/BLADDER: No change. 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 No stone or hydronephrosis. Mild bladder wall thickening BOWEL: Diverticulosis of the colon. No acute inflammatory change. No obstruction. Appendix normal. LYMPH NODES: Normal. VASCULATURE: Atherosclerotic calcifications. PELVIC ORGANS: Mild prostatic enlargement. MUSCULOSKELETAL: Degenerative changes of lumbar spine. No suspicious bony lesion.     IMPRESSION: 1.  Mild infiltrate left lung base suggests developing pneumonia. 2.  No acute inflammatory process involving abdomen/pelvis.    EKG:    Sinus tachycardia.  Rate of 114.  Incomplete right bundle branch block morphology.  No acute ST segment abnormalities.  When compared to September 21, 2022 sinus tachycardia has replaced atrial fibrillation.  I have independently reviewed and interpreted the EKG(s) documented  above.        I, Yves Mota, am serving as a scribe to document services personally performed by Julian Tubbs MD, based on my observation and the provider's statements to me. I, Julian Tubsb MD attest that Yves Mota is acting in a scribe capacity, has observed my performance of the services and has documented them in accordance with my direction.    Julian Tubbs M.D.  Emergency Medicine  [facility] EMERGENCY DEPARTMENT     Julian Tubbs MD  10/25/22 0003

## 2022-10-25 NOTE — PROGRESS NOTES
Desaturations and shallow breathing noted. Pt is placed on BIPAP on ST 12/6 14 30%. sats mid 90s. RT following.    Luis F Mckinney, RT

## 2022-10-26 NOTE — PLAN OF CARE
Problem: Gas Exchange Impaired  Goal: Optimal Gas Exchange  Outcome: Progressing     Problem: Infection  Goal: Absence of Infection Signs and Symptoms  Outcome: Progressing        Goal Outcome Evaluation:  VSS overnight. No complaints of pain.   Was lethargic at the beginning of the shift but improved with sleep and now alert and oriented.  Lung sounds coarse.  Pt. Ambulating with 1 assist and a walker to bathroom.  Sating well on 2L NC.

## 2022-10-26 NOTE — PLAN OF CARE
Patient requesting pain medications.  He says tylenol doesn't help and is requesting his home pain medication.  Spoke with patient's son Katlyn. Per son he takes oxycodone 20mg 4-6 hrs prn.  Page sent to .  Patient's son also requested to speak with the  regarding home care.  He stated that patient no longer wants to be on hospice and wants to look into homecare services.  Updated .

## 2022-10-26 NOTE — PROGRESS NOTES
[facility]    Medicine Progress Note - Hospitalist Service    Date of Admission:  10/24/2022    Assessment & Plan       Ki Pederson is a 67 year old male admitted on 10/24/2022. He was brought to the ED by ambulance for evaluation of shortness of breath, lethargy/confusion, fall on 10/21/2022.       10/25 :       No fevers  Continue iv antibiotics  Swallow evaluation to be done  On oxygen    Multi day stay      A/p :       1.  Sepsis secondary to healthcare associated pneumonia, lactic acidosis  Temperature 101.3  F, tachycardic, altered mental status  CT showed mild infiltrate left lung base suggests developing pneumonia  Continue IV Zosyn  Follow-up blood cultures     2.  Acute on chronic respiratory failure with hypoxia and hypercapnia, respiratory acidosis  On 5 L oxygen via nasal cannula continuously at home  Oxygen saturation in the 70s on 5 L prior to ED arrival  VB.  Continue BiPAP, wean off as tolerated     3.  Acute encephalopathy  Lethargy likely secondary to hypercapnia and sepsis  Slowly improving  Supportive management  Hold gabapentin and other sedating medications until mental status is improved  Will get CT head without contrast given recent fall while on anticoagulation     4.  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  Elevated troponin T and  Likely type II NSTEMI/demand ischemia  Denies angina symptoms  Telemetry monitoring  Cardiology consult     6.  Acute kidney injury  Monitor renal function  Avoid nephrotoxins including NSAIDs     7.  Hyperkalemia  Secondary to YUNIEL  Telemetry monitoring     8.  Chronic systolic congestive heart failure  N-terminal proBNP 699  No signs of acute heart failure  Continue furosemide  Monitor I's and O's, daily weights     9.  Paroxysmal atrial fibrillation  Failed cardioversion on 2022  On diltiazem  "and amiodarone started during last hospitalization with adjusted metoprolol for better rate control  On Xarelto for stroke risk reduction     10.  Chronic anemia  Stable hemoglobin without signs of acute blood loss     11.  Right eye erythema  Worsening after fall on 10/21/2022 as per son  Cover with erythromycin ointment     12.  Goals of care  Spoke with son about poor prognosis with frequent hospitalizations.  Based on last hospitalization discharge summary, patient wanted to be treated in the hospital but elected DNR/DNI.  He was on hospice at home and son is considering to continue hospice versus home care at discharge                 Diet: Low Saturated Fat Na <2400 mg    DVT Prophylaxis: DOAC  Fine Catheter: Not present  Central Lines: None  Cardiac Monitoring: ACTIVE order. Indication: sepsis  Code Status: No CPR- Do NOT Intubate      Disposition Plan         The patient's care was discussed with the Bedside Nurse and Patient.    Jose Conklin MD  Hospitalist Service  [facility]  Securely message with the Vocera Web Console (learn more here)  Text page via [employer] Paging/Directory         Clinically Significant Risk Factors Present on Admission        # Hyperkalemia: Highest K = 5.5 mmol/L (Ref range: 3.4-5.3) in last 2 days, will monitor as appropriate   # Hypocalcemia: Lowest Ca = 7.9 mg/dL (Ref range: 8.5 - 10.1 mg/dL) in last 2 days, will monitor and replace as appropriate      # Drug Induced Coagulation Defect: home medication list includes an anticoagulant medication    # Hypertension: home medication list includes antihypertensive(s)  # Acute systolic heart failure: last echo with EF <40% and receiving IV diuretics  # Acute Respiratory Failure: based on blood gas results.  Continue supplemental oxygen as needed    # Obesity: Estimated body mass index is 34.96 kg/m  as calculated from the following:    Height as of this encounter: 1.854 m (6' 1\").    Weight as of this " encounter: 120.2 kg (265 lb).           ______________________________________________________________________        Data reviewed today: I reviewed all medications, new labs and imaging results over the last 24 hours. I personally reviewed no images or EKG's today.    Physical Exam   Vital Signs: Temp: 98.8  F (37.1  C) Temp src: Oral BP: 130/74 Pulse: 74   Resp: 16 SpO2: 95 % O2 Device: Nasal cannula Oxygen Delivery: 2 LPM  Weight: 265 lbs 0 oz       GENERAL: The patient is not in any acute distressed. Awake and alert.  HEENT: Nonicteric sclerae, PERRLA, EOMI. Oropharynx clear. Moist mucous membranes. Conjunctivae appear well perfused.  HEART: Regular rate and rhythm without murmurs.  LUNGS: Clear to auscultation bilaterally. No wheezing or crackles.  ABDOMEN: Soft, positive bowel sounds, nontender.  SKIN: No rash, no excessive bruising, petechiae, or purpura.  EXTREMITIES : no rashes, no swelling in legs.  NEUROLOGIC: conscious and oriented, follows commands, no obvious focal deficits.  ROS: All other systems negative       Data   Recent Labs   Lab 10/25/22  0712 10/24/22  2148   WBC 8.5 11.3*   HGB 12.2* 13.1*   MCV 99 98    176    139   POTASSIUM 5.0 5.5*   CHLORIDE 104 100   CO2 28 28   BUN 22.6 27.2*   CR 1.05 1.30*   ANIONGAP 6* 11   PALMER 7.9* 8.4*   * 128*   ALBUMIN  --  4.2   PROTTOTAL  --  6.7   BILITOTAL  --  0.5   ALKPHOS  --  47   ALT  --  28   AST  --  35

## 2022-10-26 NOTE — PROGRESS NOTES
[facility]    Medicine Progress Note - Hospitalist Service    Date of Admission:  10/24/2022    Assessment & Plan       Ki Pederson is a 67 year old male admitted on 10/24/2022. He was brought to the ED by ambulance for evaluation of shortness of breath, lethargy/confusion, fall on 10/21/2022.       10/26 :       Low grade fever  On oxygen at home - 4 liters  , currently on 2 liters  Continue iv antibiotic - zosyn  On iv solumedrol  Swallow evaluation done  On oxygen    No cp    Multi day stay      A/p :       1.  Sepsis secondary to healthcare associated pneumonia, lactic acidosis  Temperature 101.3  F, tachycardic, altered mental status  CT showed mild infiltrate left lung base suggests developing pneumonia  Continue IV Zosyn  Follow-up blood cultures     2.  Acute on chronic respiratory failure with hypoxia and hypercapnia, respiratory acidosis  On 5 L oxygen via nasal cannula continuously at home  Oxygen saturation in the 70s on 5 L prior to ED arrival  VB.//28  Continue BiPAP, wean off as tolerated     3.  Acute encephalopathy  Lethargy likely secondary to hypercapnia and sepsis  Slowly improving  Supportive management  Hold gabapentin and other sedating medications until mental status is improved  Will get CT head without contrast given recent fall while on anticoagulation     4.  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  Elevated troponin T and  Likely type II NSTEMI/demand ischemia  Denies angina symptoms  Telemetry monitoring  Cardiology consult     6.  Acute kidney injury  Monitor renal function  Avoid nephrotoxins including NSAIDs     7.  Hyperkalemia  Secondary to YUNIEL  Telemetry monitoring     8.  Chronic systolic congestive heart failure  N-terminal proBNP 699  No signs of acute heart failure  Continue furosemide  Monitor I's and O's, daily  "weights     9.  Paroxysmal atrial fibrillation  Failed cardioversion on 9/23/2022  On diltiazem and amiodarone started during last hospitalization with adjusted metoprolol for better rate control  On Xarelto for stroke risk reduction     10.  Chronic anemia  Stable hemoglobin without signs of acute blood loss     11.  Right eye erythema  Worsening after fall on 10/21/2022 as per son  Cover with erythromycin ointment     12.  Goals of care  Spoke with son about poor prognosis with frequent hospitalizations.  Based on last hospitalization discharge summary, patient wanted to be treated in the hospital but elected DNR/DNI.  He was on hospice at home and son is considering to continue hospice versus home care at discharge                 Diet: Low Saturated Fat Na <2400 mg    DVT Prophylaxis: DOAC  Fine Catheter: Not present  Central Lines: None  Cardiac Monitoring: ACTIVE order. Indication: sepsis  Code Status: No CPR- Do NOT Intubate      Disposition Plan      Expected Discharge Date: 10/28/2022      Destination: home with family  Discharge Comments: From home (was on hospice).      The patient's care was discussed with the Bedside Nurse and Patient.    Jose Conklin MD  Hospitalist Service  [facility]  Securely message with the Vocera Web Console (learn more here)  Text page via [employer] Paging/Directory         Clinically Significant Risk Factors        # Hyperkalemia: Highest K = 5.5 mmol/L (Ref range: 3.4-5.3) in last 2 days, will monitor as appropriate   # Hypocalcemia: Lowest Ca = 7.9 mg/dL (Ref range: 8.5 - 10.1 mg/dL) in last 2 days, will monitor and replace as appropriate               # Obesity: Estimated body mass index is 34.96 kg/m  as calculated from the following:    Height as of this encounter: 1.854 m (6' 1\").    Weight as of this encounter: 120.2 kg (265 lb)., PRESENT ON ADMISSION         ______________________________________________________________________        Data " reviewed today: I reviewed all medications, new labs and imaging results over the last 24 hours. I personally reviewed no images or EKG's today.    Physical Exam   Vital Signs: Temp: 99.1  F (37.3  C) Temp src: Oral BP: 101/59 Pulse: 59   Resp: 14 SpO2: 97 % O2 Device: Nasal cannula Oxygen Delivery: 2 LPM  Weight: 265 lbs 0 oz       GENERAL: The patient is not in any acute distressed. Awake and alert.  HEENT: Nonicteric sclerae, PERRLA, EOMI. Oropharynx clear. Moist mucous membranes. Conjunctivae appear well perfused.  HEART: Regular rate and rhythm without murmurs.  LUNGS: Clear to auscultation bilaterally. No wheezing or crackles.  ABDOMEN: Soft, positive bowel sounds, nontender.  SKIN: No rash, no excessive bruising, petechiae, or purpura.  EXTREMITIES : no rashes, no swelling in legs.  NEUROLOGIC: conscious and oriented, follows commands, no obvious focal deficits.  ROS: All other systems negative       Data   Recent Labs   Lab 10/25/22  0712 10/24/22  2148   WBC 8.5 11.3*   HGB 12.2* 13.1*   MCV 99 98    176    139   POTASSIUM 5.0 5.5*   CHLORIDE 104 100   CO2 28 28   BUN 22.6 27.2*   CR 1.05 1.30*   ANIONGAP 6* 11   PALMER 7.9* 8.4*   * 128*   ALBUMIN  --  4.2   PROTTOTAL  --  6.7   BILITOTAL  --  0.5   ALKPHOS  --  47   ALT  --  28   AST  --  35

## 2022-10-26 NOTE — TREATMENT PLAN
RCAT Treatment Plan    Patient Score: 9    Patient Acuity: 4    Clinical Indication for Therapy: COPD    Therapy Ordered: Duoneb     Assessment Summary: SpO2 93% on 2 lpm NC, BS diminished with improve aeration post-tx. RT will follow per RCAT protocol.

## 2022-10-26 NOTE — PLAN OF CARE
Problem: COPD (Chronic Obstructive Pulmonary Disease)  Goal: Effective Oxygenation and Ventilation  Outcome: Progressing  Intervention: Promote Airway Secretion Clearance  Recent Flowsheet Documentation  Taken 10/26/2022 0830 by Zollinger, Nancy K, RN  Activity Management: up in chair   Goal Outcome Evaluation:  Patient orientated except doesn't know the month.  Up in chair for breakfast.  Complaining of a decreased appetite.  Complaining of 7/10 lower back pain, declining tylenol.  NSR on tele.  Maintaining sats on 2L.  Ambulating with assist x1 with walker and gait belt.

## 2022-10-26 NOTE — CONSULTS
Care Management Initial Consult    General Information  Assessment completed with: Patient, Children, son patti  Type of CM/SW Visit: Initial Assessment    Primary Care Provider verified and updated as needed: Yes   Readmission within the last 30 days:           Advance Care Planning:            Communication Assessment  Patient's communication style: spoken language (English or Bilingual)    Hearing Difficulty or Deaf: no   Wear Glasses or Blind: yes    Cognitive  Cognitive/Neuro/Behavioral: arousability  Level of Consciousness: lethargic  Arousal Level: arouses to voice, arouses to repeated stimulation  Orientation: oriented x 4  Mood/Behavior: behavior appropriate to situation     Speech: clear    Living Environment:   People in home: child(katie), adult, grandchild(katie)     Current living Arrangements: house      Able to return to prior arrangements: yes       Family/Social Support:  Care provided by: self  Provides care for: no one  Marital Status:              Description of Support System: Supportive    Support Assessment: Adequate family and caregiver support    Current Resources:   Patient receiving home care services:       Community Resources:    Equipment currently used at home: walker, rolling  Supplies currently used at home:      Employment/Financial:  Employment Status: disabled, retired        Financial Concerns:             Lifestyle & Psychosocial Needs:  Social Determinants of Health     Tobacco Use: High Risk     Smoking Tobacco Use: Former     Smokeless Tobacco Use: Current     Passive Exposure: Not on file   Alcohol Use: Not on file   Financial Resource Strain: Not on file   Food Insecurity: Not on file   Transportation Needs: Not on file   Physical Activity: Not on file   Stress: Not on file   Social Connections: Not on file   Intimate Partner Violence: Not on file   Depression: Not at risk     PHQ-2 Score: 2   Housing Stability: Not on file       Functional Status:  Prior to admission  With hypotensive  IV fluid hydration  Continue to monitor "patient needed assistance:   Dependent ADLs:: Ambulation-walker  Dependent IADLs:: Cleaning, Cooking, Laundry, Meal Preparation, Medication Management, Transportation       Mental Health Status:  Mental Health Status: No Current Concerns       Chemical Dependency Status:  Chemical Dependency Status: Past Concern             Values/Beliefs:  Spiritual, Cultural Beliefs, Orthodox Practices, Values that affect care:                 Additional Information:  Cm Chart Reviewed. SW spoke to patients's son patti over the phone to introduce self, CM role and complete assessment   Patient is currently living with his daughter and her family in [facility]. He was active with [facility] prior to hospitalization.   Patient's son states current plan is for patient to discharge home with home care- is electing not to sign back on to hospice.   Son reports he has started process for patient to go to Assisted living in the future and has worked with Armin at [employer]. Pt has Medicare and son states he has been approved for EW- unsure if [place] has [place]. Pt lives in [facility].   SW met with patient in his room to introduce self, he reports he uses a walker at baseline and has oxygen at baseline.  GARRETT confirmed home with home care discharge vs resuming hospice. Pt states he is not sure , states \"I am going to die anyway.\"   Pt reports he is mostly independent with ADLs at home with walker.   GARRETT confirmed with Leda- liaison with [facility] - pt did sign on to hospice services on 10/2.     Kristie Grayson, [facility]        "

## 2022-10-27 NOTE — PROGRESS NOTES
"RESPIRATORY CARE NOTE:    PT is currently on 2L NC with an SpO2 of 95%. Uses 2L at home. Breathing pattern dyspnea on exertion Breath sounds diminished with faint wheezes Cough type infrequent, congested Sputum Type not known  Duoneb nebulizer given x1.  PT tolerated treatments well.  RT will continue to follow.      /72 (BP Location: Left arm)   Pulse 57   Temp 98  F (36.7  C) (Oral)   Resp 16   Ht 1.854 m (6' 1\")   Wt 120.2 kg (265 lb)   SpO2 95%   BMI 34.96 kg/m      Scar Dawkins, RT  10/27/2022      "

## 2022-10-27 NOTE — PROGRESS NOTES
RESPIRATORY CARE NOTE    Pt continues to receive duoneb tx as scheduled. Pt currently is on 2L nasal cannula at baseline. HR 63, RR 16, and BBS diminished with scattered crackles pre and post tx. RT following.     Porsha Rodriguez, RT

## 2022-10-27 NOTE — DISCHARGE INSTRUCTIONS
[facility] Intake for services:  Phone: [phone]    Elderly Waiver   Pam Copeland  [phone]

## 2022-10-27 NOTE — PLAN OF CARE
Pt alert and oriented, vitally stable. Pt did not want to be woken up, slept well. Grouped cares to help promote sleep.   Problem: Plan of Care - These are the overarching goals to be used throughout the patient stay.    Goal: Plan of Care Review  Description: The Plan of Care Review/Shift note should be completed every shift.  The Outcome Evaluation is a brief statement about your assessment that the patient is improving, declining, or no change.  This information will be displayed automatically on your shift note.  Outcome: Progressing     Problem: Plan of Care - These are the overarching goals to be used throughout the patient stay.    Goal: Optimal Comfort and Wellbeing  Intervention: Monitor Pain and Promote Comfort  Recent Flowsheet Documentation  Taken 10/27/2022 0300 by BRIDGETTE CARSON  Pain Management Interventions: declines     Problem: Pneumonia  Goal: Effective Oxygenation and Ventilation  Outcome: Progressing  Intervention: Promote Airway Secretion Clearance  Recent Flowsheet Documentation  Taken 10/27/2022 0300 by BRIDGETTE CARSON  Cough And Deep Breathing: done independently per patient  Taken 10/27/2022 0000 by BRIDGETTE CARSON  Cough And Deep Breathing: done independently per patient  Intervention: Optimize Oxygenation and Ventilation  Recent Flowsheet Documentation  Taken 10/27/2022 0009 by BRIDGETTE CARSON  Head of Bed (HOB) Positioning: HOB at 20-30 degrees

## 2022-10-27 NOTE — PROGRESS NOTES
Oxygen Documentation:   I certify that this patient, Ki Pederson has been under my care (or a nurse practitioner or physician's assistant working with me). This is the face-to-face encounter for oxygen medical necessity.      Ki Pederson is now in a chronic stable state and continues to require supplemental oxygen. Patient has continued oxygen desaturation due to COPD J44.9.    Alternative treatment(s) tried or considered and deemed clinically infective for treatment of COPD J44.9 include pulmonary toileting and pulmonary rehab.  If portability is ordered, is the patient mobile within the home? yes    **Patients who qualify for home O2 coverage under the CMS guidelines require ABG tests or O2 sat readings obtained closest to, but no earlier than 2 days prior to the discharge, as evidence of the need for home oxygen therapy. Testing must be performed while patient is in the chronic stable state. See notes for O2 sats.**
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
## 2022-10-27 NOTE — PROGRESS NOTES
Patient has been assessed for Home Oxygen needs.     Pulse oximetry (SpO2) and Oxygen flow readings:    SpO2 = 93% on room air at rest while awake.    SpO2 improved to  % on   liters/minute at rest.    SpO2 = 87% on room air during activity/with exercise.    *SpO2 improved to 93% on 2 liters/minute during activity/with exercise.

## 2022-10-27 NOTE — PROGRESS NOTES
RESPIRATORY CARE NOTE   Patient is on 2LNC, BS diminished, gave duoneb treatment x1, BS post treatment increased aeration, patient perceives mild improvement, patient tolerated well.     Kenneth H. Kjellberg

## 2022-10-27 NOTE — PLAN OF CARE
Received call from oxygen company that oxygen with be deliveried within 1-1.5 hours.

## 2022-10-27 NOTE — PROGRESS NOTES
Patient has an order of 100 mg of Metroprolol Tartrate.  At 2200 his blood pressure was 99/54.  We paged Dr Conklin to update him, and was told to give 25 mg of metroprolol.  Medication was administered as directed.    Joel Parrish RN

## 2022-10-27 NOTE — PROGRESS NOTES
Care Management Follow Up    Length of Stay (days): 3    Expected Discharge Date: 10/27/2022     Concerns to be Addressed: discharge planning     Patient plan of care discussed at interdisciplinary rounds: Yes    Anticipated Discharge Disposition: Home, Home Care     Anticipated Discharge Services:  Home RN  Anticipated Discharge DME:  cari    Patient/family educated on Medicare website which has current facility and service quality ratings:    Education Provided on the Discharge Plan:    Patient/Family in Agreement with the Plan: yes    Referrals Placed by CM/SW:  [facility]  Private pay costs discussed: Not applicable     Additional Information:  CM Chart reviewed. SW met with patient and his son Katlyn in patients room. Patient eager to discharge.   Patient was open with [facility] as of 10/2/2022 and is now electing to go home with out hospice, is open to having a home care RN ordered for disease management. Patient's son reports all equipment that had been provided by hospice has been picked up, including oxygen.  Patient may need a home O2 evaluation, MD aware. Pt reports he does not want a hospital bed and has wedges for elevation of head.   Son is working with [facility] for Elderly waiver and assisting pt with possible move to assisted living. SW spoke to [facility] Intake for Services [phone]. Patient does have an elderly waiver : Pam Copeland [phone].   Per Pam, patient is open to elderly waiver. Pam will follow up with patient and family.     Referral sent to [facility].  3:29 PM  [facility]      GRAHAM Rodriguez

## 2022-10-28 NOTE — PROGRESS NOTES
Clinic Care Coordination Contact  [facility]: Post-Discharge Note  SITUATION                                                      Admission:    Admission Date: 10/24/22   Reason for Admission: Respiratory failure with hypercarbia  Encephalopathy  Fever  Elevated troponin  Left lower lobe pneumonia  Discharge:   Discharge Date: 10/27/22  Discharge Diagnosis: Respiratory failure with hypercarbia  Encephalopathy  Fever  Elevated troponin  Left lower lobe pneumonia    BACKGROUND                                                      Per hospital discharge summary and inpatient provider notes:    Ki Pederson is a 67 year old male with a pertient medical history of CHF (EF 30-35%), CAD, COPD on supplemental O2, HTN, CVA, and h/o polysubstance abuse  who presents to the ED for evaluation of increasing confusion.      Patient comes in by EMS due to increased confusion at home over the past 5 days. Son states the patient is typically alert, oriented, and walking around but since 10/20 he has progressively been getting more confused. Son notes they found him crawling on the ground from bathroom to bedroom because he was unable to walking. Also notes the patient was previously on hospice although the patient asked family to call EMS tonight. He was previously on BiPAP but is not currently due to home care issues. No history of sleep apnea. No recent changes to his supplemental O2. No vomiting, diarrhea, or urinary symptoms. Does note some mild leg swelling.       ASSESSMENT           Discharge Assessment  How are you doing now that you are home?: Feeling pretty good but states he is out of his inhaler.  Patient has not contacted his pharmacy. He will contact his pharmacy. States his symptoms are the same.  How are your symptoms? (Red Flag symptoms escalate to triage hotline per guidelines): Unchanged  Do you feel your condition is stable enough to be safe at home until your provider visit?: Yes  Does the patient have  their discharge instructions? : Yes  Does the patient have questions regarding their discharge instructions? : No  Were you started on any new medications or were there changes to any of your previous medications? : Yes  Does the patient have all of their medications?: Yes  Do you have questions regarding any of your medications? : No  Do you have all of your needed medical supplies or equipment (DME)?  (i.e. oxygen tank, CPAP, cane, etc.): Yes (Oxygen)  Discharge follow-up appointment scheduled within 14 calendar days? : No  Is patient agreeable to assistance with scheduling? :  (States son helps schedule appointments.)         Post-op (Clinicians Only)  Did the patient have surgery or a procedure: No    Patient states his son, Katlyn, has his discharge paperwork and is the one who takes care of his medications as well.  Ki gave verbal permission for RN to speak to Katlyn regarding his recent hospitalization, discharge, medications and recommendations.  1st attempt made to Katlyn and message was left advising another call would be made within 30 minutes but left the 24/7 triage line.  2nd attempt made and left message with 24/7 triage line to call with questions and/or concerns.      PLAN                                                      Outpatient Plan:  Follow up with primary care provider, Gloria Sebastian, within 7 days for hospital follow- up. No follow up labs or test are  needed.  Follow up with Lung physician As advised in 1-2 weeks      No future appointments.      For any urgent concerns, please contact our 24 hour nurse triage line: [phone] ([phone])         Claire Law RN

## 2022-10-28 NOTE — TELEPHONE ENCOUNTER
August 9, 2019      Nica White        [place] [address]            Dear Nica,    There’s no question about it - preventive testing can save lives or can help stop a disease process in its tracks. Many health problems start out silently without symptoms. Testing is often the only way to catch these problems in early stages, when they can be more successfully treated.    Our records show that you are due for the following preventive tests(s). If you have had this testing done, please call us so we can update your record.    · Colonoscopy: Colorectal cancer usually comes from polyps (abnormal growths) in the colon or rectum. Colonoscopy can find the polyps and remove them before they turn to cancer. To schedule your Colonoscopy, please call .    · Our records show that you are due for a colonoscopy after September 24 2019.    Your good health is important to us. Please feel free to call my office with any questions.        Sincerely,         Song Corey MD   [address] [place] [address] [address] [place] [address]  [phone]         [facility] offers online access to your health information via [email or web address] .   By registering for [employer] you will be able to view test results, pay your bill, send messages to your care team (not for immediate response), refill prescriptions, schedule and verify appointments.       FYI - Status Update    Who is Calling: nurseNimisha care    Update: delay of care until 10/31/22 due to pt preference.  If PCP has questions, can call KALANI Murphy at [phone]    Does caller want a call/response back: No     Call taken on 10/28/22 at 240 pm by Duyen Jeong CMA TC

## 2022-10-31 NOTE — TELEPHONE ENCOUNTER
MTM referral from: Transitions of Care (recent hospital discharge or ED visit)    MTM referral outreach attempt #2 on October 31, 2022 at 3:11 PM      Outcome: Patient not reachable after several attempts, will route to [facility] Pharmacist/Provider as an FYI.  [facility] scheduling number is [phone].  Thank you for the referral.    Sariah Tripathi [facility]

## 2022-10-31 NOTE — PROGRESS NOTES
"Assessment/Plan:    Hypertension controlled 94/68.  Recent hospital stay for congestive heart failure.  Still has orthopnea 3 pillows but no leg edema no neck vein distention denies shortness of breath at rest or with activity.  Except for orthopnea.  Advised salt restriction and diet same meds refilled furosemide.    Chronic airways obstructive disease.  Stable.    Recent hospital stay heart failure October 24 through October 27, 2022.  Here for hospital follow-up visit.  Formally on hospice patient now changing his mind.  Personal care attendant present.  Advised patient should establish himself with one of our new providers as his future PCP.  Plans to attend or transfer to assisted living and paperwork for that should be completed by new PCP.    15 minutes spent on the date of the encounter doing chart review, patient visit and documentation     Subjective:  Ki Pederson is a 67 year old male presents for the following health issues recent hospital stay for heart failure.  Antibiotics will be can discontinued discharge date October 27, 2022 previously on azithromycin in the form of Z-Bob plus levofloxacin.    COPD with bronchopneumonia and heart failure.  Orthopnea persists denies PND 3 pillow orthopnea at night.  No leg edema reemphasized salt restriction and diet.  Denies chest pain.    ROS:  No blood in stool urine or sputum med list reviewed reconciled in the chart.  Reemphasized salt restriction and diet.    Objective:  BP 94/68 (BP Location: Right arm, Patient Position: Sitting, Cuff Size: Adult Regular)   Pulse 56   Resp 18   Ht 1.854 m (6' 1\")   Wt 100.7 kg (222 lb)   SpO2 99%   BMI 29.29 kg/m    Neck veins are nondistended there is no thyromegaly no carotid bruits lungs are clear though diminished breath sounds are noted both lung fields left and right.  No rales rhonchi or wheezes heart tones were distant but seemingly regular abdomen revealed mild distention no liver spleen tip palpable " nontender no rebound bowel sounds were present but hypoactive no abdominal masses.  Hair is long the patient has a personal care attendant accompanying him here today.  All questions were posed and answered.  Extremities are free of edema there is no acrocyanosis or clubbing the patient does appear pale or sallow gray in complexion.  Chronically ill.  Patient is confused.    Johnny Rodriguez MD  Internal Medicine    Answers for HPI/ROS submitted by the patient on 10/31/2022  If you checked off any problems, how difficult have these problems made it for you to do your work, take care of things at home, or get along with other people?: Not difficult at all  PHQ9 TOTAL SCORE: 2

## 2022-11-02 PROBLEM — A41.9 SEPTIC SHOCK (H): Status: RESOLVED | Noted: 2022-01-01 | Resolved: 2022-01-01

## 2022-11-02 PROBLEM — A49.2 HAEMOPHILUS INFLUENZAE INFECTION: Status: RESOLVED | Noted: 2019-12-30 | Resolved: 2022-01-01

## 2022-11-02 PROBLEM — E87.5 HYPERKALEMIA: Status: RESOLVED | Noted: 2022-01-01 | Resolved: 2022-01-01

## 2022-11-02 PROBLEM — J18.9 HCAP (HEALTHCARE-ASSOCIATED PNEUMONIA): Status: RESOLVED | Noted: 2017-02-09 | Resolved: 2022-01-01

## 2022-11-02 PROBLEM — R65.21 SEPTIC SHOCK (H): Status: RESOLVED | Noted: 2022-01-01 | Resolved: 2022-01-01

## 2022-11-02 PROBLEM — J96.00 ACUTE RESPIRATORY FAILURE (H): Status: RESOLVED | Noted: 2019-12-16 | Resolved: 2022-01-01

## 2022-11-02 PROBLEM — J96.01 ACUTE RESPIRATORY FAILURE WITH HYPOXIA AND HYPERCARBIA (H): Status: RESOLVED | Noted: 2022-01-01 | Resolved: 2022-01-01

## 2022-11-02 PROBLEM — J44.0 CHRONIC OBSTRUCTIVE PULMONARY DISEASE WITH ACUTE LOWER RESPIRATORY INFECTION (H): Status: ACTIVE | Noted: 2019-05-15

## 2022-11-02 PROBLEM — G93.41 ACUTE METABOLIC ENCEPHALOPATHY: Status: RESOLVED | Noted: 2022-01-01 | Resolved: 2022-01-01

## 2022-11-02 PROBLEM — J96.02 ACUTE RESPIRATORY FAILURE WITH HYPOXIA AND HYPERCARBIA (H): Status: RESOLVED | Noted: 2022-01-01 | Resolved: 2022-01-01

## 2022-11-02 NOTE — DISCHARGE SUMMARY
[facility]  Hospitalist Discharge Summary      Date of Admission:  10/24/2022  Date of Discharge:  10/27/2022  4:18 PM  Discharging Provider: Jose Conklin MD  Discharge Service: Hospitalist Service    Discharge Diagnoses          Ki Pederson is a 67 year old male admitted on 10/24/2022. He was brought to the ED by ambulance for evaluation of shortness of breath, lethargy/confusion, fall on 10/21/2022.        10/27 :         Clinically stable  Discharge home today        A/p :         1.  Sepsis secondary to healthcare associated pneumonia, lactic acidosis  Temperature 101.3  F, tachycardic, altered mental status  CT showed mild infiltrate left lung base suggests developing pneumonia  Continue IV Zosyn  Follow-up blood cultures : NGTD    Clinically improved, discharge on levaquin - complete 7 day course     2.  Acute on chronic respiratory failure with hypoxia and hypercapnia, respiratory acidosis  On 5 L oxygen via nasal cannula continuously at home  Oxygen saturation in the 70s on 5 L prior to ED arrival  VB./28  Continue BiPAP, wean off as tolerated    Stable now, home oxygen evaluation done, needing 2 liters of oxygen with activity, no sob     3.  Acute encephalopathy  Lethargy likely secondary to hypercapnia and sepsis  Slowly improving  Supportive management  Hold gabapentin and other sedating medications until mental status is improved  Will get CT head without contrast given recent fall while on anticoagulation : no acute abnormality  Stable      4.  COPD exacerbation, end-stage COPD on chronic steroids, O2 dependent, on azithromycin 3 times per week  Bilateral wheezing on exam  IV Solu-Medrol  DuoNebs  Stable, discharge on nebs, steroids, outpatient pulmonary follow-up     5. Angina with elevated troponin-     Likely demand ischemia in setting of sepsis and increasing metabolic demands. Less likely could represent Type I NSTEMI, however, given severity of HFrEF and  end stage COPD, patient is unlikely to benefit from catheterization and additional testing would not .      6.  Acute kidney injury  Monitor renal function  Avoid nephrotoxins including NSAIDs  resolved     7.  Hyperkalemia  Secondary to YUNIEL  Telemetry monitoring  resolved     8.  Chronic systolic congestive heart failure  N-terminal proBNP 699  No signs of acute heart failure  Continue furosemide  Monitor I's and O's, daily weights  stable     9.  Paroxysmal atrial fibrillation  Failed cardioversion on 9/23/2022  On diltiazem and amiodarone started during last hospitalization with adjusted metoprolol for better rate control  On Xarelto for stroke risk reduction     10.  Chronic anemia  Stable hemoglobin without signs of acute blood loss     11.  Right eye erythema  Worsening after fall on 10/21/2022 as per son  Cover with erythromycin ointment     12.  Goals of care  Spoke with son about poor prognosis with frequent hospitalizations.  Based on last hospitalization discharge summary, patient wanted to be treated in the hospital but elected DNR/DNI.  patient and family declined to re enroll in hospice at home                Follow-ups Needed After Discharge   Follow-up Appointments     Follow-up and recommended labs and tests       Follow up with primary care provider, Gloria Sebastian, within 7 days for   hospital follow- up.  No follow up labs or test are needed.    Follow up with  Lung physician      As advised in  1-2 weeks         {Additional follow-up instructions/to-do's for PCP    : none    Unresulted Labs Ordered in the Past 30 Days of this Admission     No orders found from 9/24/2022 to 10/25/2022.      These results will be followed up by pcp    Discharge Disposition   Discharged to home  Condition at discharge: Stable      Consultations This Hospital Stay   CARE MANAGEMENT / SOCIAL WORK IP CONSULT  CARDIOLOGY IP CONSULT    Code Status   Prior    Time Spent on this Encounter   Jose UFENTES  MD Rubin, personally saw the patient today and spent greater than 30 minutes discharging this patient.       Jose Conklin MD  [facility]  [address] [place] [address]  Phone: [phone]  Fax: [phone]  ______________________________________________________________________    Physical Exam   Vital Signs:                    Weight: 265 lbs 0 oz       GENERAL: The patient is not in any acute distressed. Awake and alert.  HEENT: Nonicteric sclerae, PERRLA, EOMI. Oropharynx clear. Moist mucous membranes. Conjunctivae appear well perfused.  HEART: Regular rate and rhythm without murmurs.  LUNGS: Clear to auscultation bilaterally. No wheezing or crackles.  ABDOMEN: Soft, positive bowel sounds, nontender.  SKIN: No rash, no excessive bruising, petechiae, or purpura.  EXTREMITIES : no rashes, no swelling in legs.  NEUROLOGIC: conscious and oriented, follows commands, no obvious focal deficits.  ROS: All other systems negative          Primary Care Physician   Baldemar Garcia    Discharge Orders      Medication Therapy Management Referral      Home Care Referral      Reason for your hospital stay    sob     Follow-up and recommended labs and tests     Follow up with primary care provider, Gloria Sebastian, within 7 days for hospital follow- up.  No follow up labs or test are needed.    Follow up with  Lung physician      As advised in  1-2 weeks     Activity    Your activity upon discharge: activity as tolerated     Oxygen Adult/Peds    Oxygen Documentation:   I certify that this patient, Ki Pederson has been under my care (or a nurse practitioner or physician's assistant working with me). This is the face-to-face encounter for oxygen medical necessity.      Ki Pederson is now in a chronic stable state and continues to require supplemental oxygen. Patient has continued oxygen desaturation due to COPD J44.9.    Alternative treatment(s) tried or considered  and deemed clinically infective for treatment of COPD J44.9 include pulmonary toileting and pulmonary rehab.  If portability is ordered, is the patient mobile within the home? yes    **Patients who qualify for home O2 coverage under the CMS guidelines require ABG tests or O2 sat readings obtained closest to, but no earlier than 2 days prior to the discharge, as evidence of the need for home oxygen therapy. Testing must be performed while patient is in the chronic stable state. See notes for O2 sats.**     Diet    Follow this diet upon discharge: Orders Placed This Encounter      Low Saturated Fat Na <2400 mg       Significant Results and Procedures   Most Recent 3 CBC's:Recent Labs   Lab Test 10/25/22  0712 10/24/22  2148 09/28/22  0428   WBC 8.5 11.3* 6.9   HGB 12.2* 13.1* 10.3*   MCV 99 98 96    176 175     Most Recent 3 BMP's:Recent Labs   Lab Test 10/25/22  0712 10/24/22  2148 09/28/22  0852 09/28/22  0428    139  --  142   POTASSIUM 5.0 5.5*  --  3.6   CHLORIDE 104 100  --  105   CO2 28 28  --  28   BUN 22.6 27.2*  --  15.1   CR 1.05 1.30*  --  1.10   ANIONGAP 6* 11  --  9   PALMER 7.9* 8.4*  --  8.0*   * 128* 127* 93     Most Recent 2 LFT's:Recent Labs   Lab Test 10/24/22  2148 04/10/22  0605   AST 35 19   ALT 28 38   ALKPHOS 47 45   BILITOTAL 0.5 0.3     Most Recent 3 INR's:Recent Labs   Lab Test 03/18/20 2046 12/26/19  0624 12/16/19  1201   INR 1.23* 1.05 1.30*       Discharge Medications   Discharge Medication List as of 10/27/2022  3:53 PM      START taking these medications    Details   levofloxacin (LEVAQUIN) 750 MG tablet Take 1 tablet (750 mg) by mouth daily for 5 days, Disp-5 tablet, R-0, E-Prescribe         CONTINUE these medications which have CHANGED    Details   ipratropium - albuterol 0.5 mg/2.5 mg/3 mL (DUONEB) 0.5-2.5 (3) MG/3ML neb solution Nebulize tid for 3 days and then tid prn for sob, Disp-90 mL, R-3, E-Prescribe         CONTINUE these medications which have NOT CHANGED     Details   amiodarone (PACERONE) 200 MG tablet Take 1 tablet (200 mg) by mouth daily, Disp-30 tablet, R-1, E-Prescribe      budesonide (PULMICORT) 1 MG/2ML neb solution Take 2 mLs (1 mg) by nebulization 2 times daily, Disp-2 mL, R-3, E-Prescribe      citalopram (CELEXA) 40 MG tablet Take 40 mg by mouth daily, Historical      clonazePAM (KLONOPIN) 2 MG tablet Take 2 mg by mouth nightly as needed for anxiety, Historical      diltiazem ER COATED BEADS (CARDIZEM CD/CARTIA XT) 300 MG 24 hr capsule Take 1 capsule (300 mg) by mouth daily, Disp-30 capsule, R-3, E-Prescribe      famotidine (PEPCID) 20 MG tablet Take 20 mg by mouth daily, Historical      gabapentin (NEURONTIN) 300 MG capsule Take 2 capsules (600 mg) by mouth 3 times daily, Disp-540 capsule, R-3, E-Prescribe      guaiFENesin (MUCINEX) 600 MG 12 hr tablet Take 1,200 mg by mouth daily, Historical      haloperidol (HALDOL) 2 MG/ML (HIGH CONC) solution Take 0.25-0.5 mLs (0.5-1 mg) by mouth or place under tongue every 6 hours as needed for agitation or other (nausea), Disp-1 mL, R-0, E-Prescribe      magnesium 250 MG tablet Take 1 tablet (250 mg) by mouth At Bedtime, Disp-90 tablet, R-3, E-Prescribe      metoprolol tartrate (LOPRESSOR) 100 MG tablet Take 1 tablet (100 mg) by mouth 2 times daily, Disp-60 tablet, R-0, E-Prescribe      naloxone (NARCAN) 4 MG/0.1ML nasal spray Spray 1 spray (4 mg) into one nostril alternating nostrils once as needed for opioid reversal every 2-3 minutes until assistance arrives, Disp-0.2 mL, R-0, E-Prescribe      predniSONE (DELTASONE) 20 MG tablet Take 20 mg by mouth daily, Historical      rivaroxaban ANTICOAGULANT (XARELTO) 20 MG TABS tablet Take 1 tablet (20 mg) by mouth daily (with dinner), Disp-90 tablet, R-3, E-Prescribe      albuterol (PROAIR HFA/PROVENTIL HFA/VENTOLIN HFA) 108 (90 Base) MCG/ACT inhaler Inhale 2 puffs into the lungs every 6 hours, Disp-18 g, R-11, E-PrescribePharmacy may dispense brand covered by insurance  (Proair, or proventil or ventolin or generic albuterol inhaler)      furosemide (LASIX) 20 MG tablet Take 2 tablets (40 mg) by mouth daily, R-0, No Print Out      bisacodyl (DULCOLAX) 10 MG suppository Place 1 suppository (10 mg) rectally daily as needed for constipation, Disp-2 suppository, R-0, E-Prescribe      azithromycin (ZITHROMAX) 250 MG tablet USE ONE TAB BY MOUTH ONCE DAILY ON Monday, Wednesday, Friday ONGOING, Disp-20 tablet, R-0, CAROL, E-Prescribe         STOP taking these medications       amLODIPine (NORVASC) 5 MG tablet Comments:   Reason for Stopping:         doxycycline hyclate (VIBRA-TABS) 100 MG tablet Comments:   Reason for Stopping:         HYDROmorphone, HIGH CONC, (DILAUDID) 10 mg/mL LIQD oral Comments:   Reason for Stopping:         lisinopril (ZESTRIL) 40 MG tablet Comments:   Reason for Stopping:         LORazepam (ATIVAN) 2 MG/ML (HIGH CONC) oral solution Comments:   Reason for Stopping:         oxyCODONE HCl (ROXICODONE) 20 MG TABS immediate release tablet Comments:   Reason for Stopping:             Allergies   Allergies   Allergen Reactions     Symbicort Rash

## 2022-11-02 NOTE — PROGRESS NOTES
SUBJECTIVE:   Ki is a 67 year old who presents for Preventive Visit.      Patient has been advised of split billing requirements and indicates understanding: Yes  Are you in the first 12 months of your Medicare coverage?  No    Patient new to me.  Needs forms for moving into assisted living with home health care.  Patient was in hospice for quite some time.  However he actually has decided he is not ready to die yet and would like to take efforts to extend life.  Has multiple comorbidities.  Chronic pain syndrome which he does on opioid therapy as well as gabapentin.  Also has underlying A. fib and recently had a rapid ventricular response.  Cardiology is now considering placement of a pacemaker according to the patient.  He is also on benzodiazepines to help with his restless leg and sleep at night.  He is on oxygen during the day at 2 L nasal cannula.  He is working with his pulmonologist of switching over to a BiPAP to use at night while sleeping.  He also has chronic congestive heart failure in which he does take a diuretic daily.  Overall he is doing well.  His goal is to live longer so he can enjoy spending more time with his grandkids.  He still does not want to be resuscitated or intubated in emergent situations.    History of Present Illness       Back Pain:  He presents for follow up of back pain. Patient's back pain is a chronic problem.  Location of back pain:  Right lower back, left lower back, right middle of back, left middle of back, right side of neck, right shoulder, left shoulder, right hip, left hip, right side of waist and left side of waist  Description of back pain: burning, dull ache, shooting and stabbing  Back pain spreads: right buttocks, left buttocks, right thigh and left thigh    Since patient first noticed back pain, pain is: gradually worsening  Does back pain interfere with his job:  Yes  Has the patient tried any new treatments:  No       COPD:  He presents for follow up of  COPD.  Overall, COPD symptoms are slightly worse since last visit. He has more than usual fatigue or shortness of breath with exertion and more than usual shortness of breath at rest.  He sometimes coughs and does have change in sputum. Patient has had recent fever. He can walk the length of 1-2 rooms without stopping to rest. He can not walk a flight of stairs without resting. The patient has had an ED, urgent care, or hospital admission because of COPD since the last visit. He states he has had 2 visit(s) to an ED, Urgent Care, or Hospital due to his COPD.    Heart Failure:  He presents for follow up of heart failure. He is experiencing shortness of breath with rest and activity, which is slightly worse. He is experiencing lower extremity edema which is worse than usual. He has orthopenea and coughs at night. Patient is not checking weight daily. He has recently had a None. He has no side effects from medications.  He has has a medical visit for heart failure 1 time since the last visit.    He eats 0-1 servings of fruits and vegetables daily.He consumes 0 sweetened beverage(s) daily.He exercises with enough effort to increase his heart rate 9 or less minutes per day.  He exercises with enough effort to increase his heart rate 3 or less days per week. He is missing 2 dose(s) of medications per week.  He is not taking prescribed medications regularly due to remembering to take.    Today's PHQ-9         PHQ-9 Total Score: 6    PHQ-9 Q9 Thoughts of better off dead/self-harm past 2 weeks :   Not at all    How difficult have these problems made it for you to do your work, take care of things at home, or get along with other people: Not difficult at all  Today's BUD-7 Score: 0    Do you feel safe in your environment? Yes    Have you ever done Advance Care Planning? (For example, a Health Directive, POLST, or a discussion with a medical provider or your loved ones about your wishes): Yes, advance care planning is on  file.       Fall risk  Fallen 2 or more times in the past year?: Yes  Any fall with injury in the past year?: No  Timed Up and Go Test (>13.5 is fall risk; contact physician) : 12      Cognitive Screening   1) Repeat 3 items (Leader, Season, Table)    2) Clock draw: ABNORMAL Hands incorrect  3) 3 item recall: Recalls 2 objects   Results: ABNORMAL clock, 1-2 items recalled: PROBABLE COGNITIVE IMPAIRMENT, **INFORM PROVIDER**    Mini-CogTM Copyright AMANDA Rubin. Licensed by the author for use in [facility] [employer]; reprinted with permission (blanca@[facility]). All rights reserved.      Do you have sleep apnea, excessive snoring or daytime drowsiness?: yes    Reviewed and updated as needed this visit by clinical staff   Tobacco  Allergies  Meds  Problems  Med Hx  Surg Hx  Fam Hx  Soc   Hx        Reviewed and updated as needed this visit by Provider   Tobacco  Allergies  Meds  Problems  Med Hx  Surg Hx  Fam Hx         Social History     Tobacco Use     Smoking status: Former     Packs/day: 2.00     Types: Cigarettes     Quit date: 2018     Years since quittin.8     Smokeless tobacco: Current     Types: Chew     Tobacco comments:     No passive exposure   Substance Use Topics     Alcohol use: Not Currently     Current providers sharing in care for this patient include:   Patient Care Team:  Baldemar Garcia DO as PCP - General  Harry Garg DO as Assigned Neuroscience Provider  Gloria Sebastian MD as Assigned PCP  Gloria Sebastian MD (Family Medicine)    The following health maintenance items are reviewed in Epic and correct as of today:  Health Maintenance   Topic Date Due     URINE DRUG SCREEN  Never done     COPD ACTION PLAN  Never done     DEPRESSION ACTION PLAN  Never done     MEDICARE ANNUAL WELLNESS VISIT  Never done     LUNG CANCER SCREENING  2022     INFLUENZA VACCINE (1) 2022 (Originally 2022)     Pneumococcal Vaccine: 65+ Years (2 - PCV) 2022 (Originally 2018)      "ZOSTER IMMUNIZATION (2 of 3) 12/02/2022 (Originally 12/18/2012)     COVID-19 Vaccine (3 - Moderna risk series) 12/02/2022 (Originally 4/20/2022)     ANNUAL REVIEW OF HM ORDERS  01/26/2023     PHQ-9  05/02/2023     FALL RISK ASSESSMENT  11/02/2023     LIPID  03/03/2027     DTAP/TDAP/TD IMMUNIZATION (4 - Td or Tdap) 03/10/2027     ADVANCE CARE PLANNING  11/02/2027     COLORECTAL CANCER SCREENING  09/05/2029     SPIROMETRY  Completed     HIV SCREENING  Completed     HEPATITIS B IMMUNIZATION  Completed     AORTIC ANEURYSM SCREENING (SYSTEM ASSIGNED)  Completed     IPV IMMUNIZATION  Aged Out     MENINGITIS IMMUNIZATION  Aged Out     Labs reviewed in EPIC    Review of Systems   All other systems reviewed and are negative.      OBJECTIVE:   /58 (BP Location: Left arm, Patient Position: Sitting, Cuff Size: Adult Large)   Pulse 63   Temp 98.4  F (36.9  C) (Oral)   Resp 20   Ht 1.854 m (6' 1\")   Wt 102.6 kg (226 lb 3.2 oz)   SpO2 96%   BMI 29.84 kg/m   Estimated body mass index is 29.84 kg/m  as calculated from the following:    Height as of this encounter: 1.854 m (6' 1\").    Weight as of this encounter: 102.6 kg (226 lb 3.2 oz).  Physical Exam  Vitals and nursing note reviewed.   Constitutional:       General: He is not in acute distress.     Appearance: Normal appearance. He is not ill-appearing.   HENT:      Head: Normocephalic and atraumatic.   Eyes:      Extraocular Movements: Extraocular movements intact.      Conjunctiva/sclera: Conjunctivae normal.   Cardiovascular:      Rate and Rhythm: Normal rate. Rhythm irregular.      Pulses: Normal pulses.      Heart sounds: Normal heart sounds.   Pulmonary:      Effort: Pulmonary effort is normal.      Breath sounds: No wheezing, rhonchi or rales.   Abdominal:      General: There is no distension.   Musculoskeletal:      Right lower leg: No edema.      Left lower leg: No edema.   Skin:     Capillary Refill: Capillary refill takes less than 2 seconds. "   Neurological:      Mental Status: He is alert and oriented to person, place, and time.   Psychiatric:         Attention and Perception: Attention normal.         Mood and Affect: Mood normal.         Speech: Speech normal.         Thought Content: Thought content normal.         ASSESSMENT / PLAN:     Problem List Items Addressed This Visit        Medium    Major depression    Restless Legs Syndrome     Controlled will with use of gabapentin and clonazepam.         Relevant Medications    oxyCODONE HCl (ROXICODONE) 20 MG TABS immediate release tablet    Essential hypertension     Below goal of 130/80 on current regimen. Will continue.         Relevant Medications    furosemide (LASIX) 40 MG tablet    Arteriosclerotic Cardiovascular Disease (ASCVD)     Noted in chart. In review, patient has never been on statin or therapy for ASCVD. Last LDL in March 2022 was less than 140. Continue cardiology care.         Lumbar Disc Degeneration     Part of his chronic pain generate and treatment plan.         Relevant Medications    oxyCODONE HCl (ROXICODONE) 20 MG TABS immediate release tablet    Chronic obstructive pulmonary disease with acute lower respiratory infection (H)     On oxygen at 2L/min nasal canula while awake. Working with pulmonology for a Bipap for use at night. Also currently on Levaquin for exacerbation and is on prednisone 20 mg daily for chronic with inhalers.         Chronic pain disorder     Not a surgical candidate. On long term opioid therapy at high dose. Of note, also on clonazepam at night for sleep. Patient and son understand risk of respiratory depression with this combination. Both are aware and know how to use narcan nasal spray if needed.         Chronic hepatitis C without hepatic coma (H)     Continue specialty care.          Persistent atrial fibrillation (H)     Rate controlled and anticoagulated. Continue care with cardiology.         Spinal stenosis of lumbar region - Primary    Relevant  "Medications    oxyCODONE HCl (ROXICODONE) 20 MG TABS immediate release tablet    Current moderate episode of major depressive disorder without prior episode (H)     Stable. Continue SSRI         Anxiety     Controlled well with selective serotonin reuptake inhibitor. Will continue         Acute on chronic systolic heart failure (H)     Lasix 40 mg daily  Take daily weights. If 3 lbs weight gain in 24 hours, then increase to 80 mg until back to starting weight then return to 40 mg daily.         DNR (do not resuscitate)     Though patient does not want to be in hospice any longer and has goal of living longer, confirmed today that desires to remain DNR/DNI.             Patient has been advised of split billing requirements and indicates understanding: Yes  MED REC REQUIRED  Post Medication Reconciliation Status: discharge medications reconciled and changed, per note/orders    COUNSELING:  Reviewed preventive health counseling, as reflected in patient instructions       Regular exercise       Dental care       Bladder control       Fall risk prevention    Estimated body mass index is 29.84 kg/m  as calculated from the following:    Height as of this encounter: 1.854 m (6' 1\").    Weight as of this encounter: 102.6 kg (226 lb 3.2 oz).    Weight management plan: Discussed healthy diet and exercise guidelines    He reports that he quit smoking about 4 years ago. His smoking use included cigarettes. He smoked an average of 2 packs per day. His smokeless tobacco use includes chew.      Appropriate preventive services were discussed with this patient, including applicable screening as appropriate for cardiovascular disease, diabetes, osteopenia/osteoporosis, and glaucoma.  As appropriate for age/gender, discussed screening for colorectal cancer, prostate cancer, breast cancer, and cervical cancer. Checklist reviewing preventive services available has been given to the patient.    Reviewed patients plan of care and provided " an AVS. The Complex Care Plan (for patients with higher acuity and needing more deliberate coordination of services) for Ki meets the Care Plan requirement. This Care Plan has been established and reviewed with the Patient and son.    Counseling Resources:  ATP IV Guidelines  Pooled Cohorts Equation Calculator  Breast Cancer Risk Calculator  Breast Cancer: Medication to Reduce Risk  FRAX Risk Assessment  ICSI Preventive Guidelines  Dietary Guidelines for Americans, 2010  [employer]'s MyPlate  ASA Prophylaxis  Lung CA Screening    Baldemar Garcia [facility]    Identified Health Risks:    The patient's PHQ-9 score is consistent with mild depression. He was provided with information regarding depression and was advised to schedule a follow up appointment in 8 weeks to further address this issue.  He is at risk for falling and has been provided with information to reduce the risk of falling at home.

## 2022-11-03 NOTE — ASSESSMENT & PLAN NOTE
Though patient does not want to be in hospice any longer and has goal of living longer, confirmed today that desires to remain DNR/DNI.

## 2022-11-03 NOTE — PATIENT INSTRUCTIONS
"Daily weights:  If weight increases by more than 3 lbs in 24 hours, then increase furosemide to 80 mg until weight back to basline, then return to 40 mg daily.    Please note: furosemide strength changed to 40 mg tab today. Still same total strength, but will only need one tab instead of the 2 tabs of 20 mg he has been taking.  Patient Education   Personalized Prevention Plan  You are due for the preventive services outlined below.  Your care team is available to assist you in scheduling these services.  If you have already completed any of these items, please share that information with your care team to update in your medical record.  Health Maintenance Due   Topic Date Due     URINE DRUG SCREEN  Never done     COPD Action Plan  Never done     Depression Action Plan  Never done     LUNG CANCER SCREENING  06/21/2022       Depression and Suicide in Older Adults    Nearly 2 million older Americans have some type of depression. Some of them even take their own lives. Yet depression among older adults is often ignored. Learn the warning signs. You may help spare a loved one needless pain. You may also save a life.   What is depression?  Depression is a common and serious illness that affects the way you think and feel. It is not a normal part of aging, nor is it a sign of weakness, a character flaw, or something you can snap out of. Most people with depression need treatment to get better. The most common symptom is a feeling of deep sadness. People who are depressed also may seem tired and listless. And nothing seems to give them pleasure. It s normal to grieve or be sad sometimes. But sadness lessens or passes with time. Depression rarely goes away or improves on its own. A person with clinical depression can't \"snap out of it.\" Other symptoms of depression are:     Sleeping more or less than normal    Eating more or less than normal    Having headaches, stomachaches, or other pains that don t go away    Feeling " nervous,  empty,  or worthless    Crying a great deal    Thinking or talking about suicide or death    Loss of interest in activities previously enjoyed    Social isolation    Feeling confused or forgetful  What causes it?  The causes of depression aren t fully known. But it is thought to result from a complex blend of these factors:     Biochemistry. Certain chemicals in the brain play a role.    Genes. Depression does run in families.    Life stress. Life stresses can also trigger depression in some people. Older adults often face many stressors, such as death of friends or a spouse, health problems, and financial concerns.    Chronic conditions. This includes conditions such as diabetes, heart disease, or cancer. These can cause symptoms of depression. Medicine side effects can cause changes in thoughts and behaviors.  How you can help  Often, depressed people may not want to ask for help. When they do, they may be ignored. Or, they may receive the wrong treatment. You can help by showing parents and older friends love and support. If they seem depressed, don t lecture the person, ignore the symptoms, or discount the symptoms as a  normal  part of aging -which they are not. Get involved, listen, and show interest and support.   Help them understand that depression is a treatable illness. Tell them you can help them find the right treatment. Offer to go to their healthcare provider's appointment with them for support when the symptoms are discussed. With their approval, contact a local mental health center, social service agency, or hospital about services.   You can be an advocate for him or her at healthcare appointments. Many older adults have chronic illnesses that can cause symptoms of depression. Medicine side effects can change thoughts and behaviors. You can help make sure that the healthcare provider looks at all of these factors. He or she should refer your family member or friend to a mental healthcare  provider when needed. in some cases, untreated depression can lead to a misdiagnosis. A person may be diagnosed with a brain disorder such as dementia. If the healthcare provider does not take the issue of depression seriously, help your family member or friend to find another provider.   Don't be afraid to ask  If you think an older person you care about could be suicidal, ask,  Have you thought about suicide?  Most people will tell you the truth. If they say  yes,  they may already have a plan for how and when they will attempt it. Find out as much as you can. The more detailed the plan, and the easier it is to carry out, the more danger the person is in right now. Tell the person you are there for them and do not want them to harm him or herself. Don't wait to get help for the person. Call the person's healthcare provider, local hospital, or emergency services.   To learn more    National Suicide Prevention Lifeline (crisis hotline) 835-860-VPUK (325-449-9142)    National [place] of Mental Cffugf243-801-2942amb.[facility].nih.gov    National [place] on Mental Yztmsun800-924-0039izc.kallie.org    Mental Health Xvvfafj824-571-4666bxi.[facility].org    National Suicide Yskuqah019-QUYBIYG (983-254-6247)    Call 911  Never leave the person alone. A person who is actively suicidal needs psychiatric care right away. They will need constant supervision. Never leave the person out of sight. Call 911 or the national 24-hour suicide crisis hotline at 069-357-LHZA (223-541-2173). You can also take the person to the closest emergency room.   Melina last reviewed this educational content on 5/1/2020 2000-2021 [employer]. All rights reserved. This information is not intended as a substitute for professional medical care. Always follow your healthcare professional's instructions.          Preventing Falls at Home  A person can fall for many reasons. Older adults may fall because reaction time slows down as we age. Your  muscles and joints may get stiff, weak, or less flexible because of illness, medicines, or a physical condition.   Other health problems that make falls more likely include:     Arthritis    Dizziness or lightheadedness when you stand up (orthostatic hypotension)    History of a stroke    Dizziness    Anemia    Certain medicines taken for mental illness or to control blood pressure.    Problems with balance or gait    Bladder or urinary problems    History of falling    Changes in vision (vision impairment)    Changes in thinking skills and memory (cognitive impairment)  Injuries from a fall can include serious injuries such as broken bones, dislocated joints, internal bleeding and cuts. Injuries like these can limit your independence.   Prevention tips  To help prevent falls and fall-related injuries, follow the tips below.    Floors  To make floors safer:     Put nonskid pads under area rugs.    Remove small rugs.    Replace worn floor coverings.    Tack carpets firmly to each step on carpeted stairs. Put nonskid strips on the edges of uncarpeted stairs.    Keep floors and stairs free of clutter and cords.    Arrange furniture so there are clear pathways.    Clean up any spills right away.  Bathrooms    To make bathrooms safer:     Install grab bars in the tub or shower.    Apply nonskid strips or put a nonskid rubber mat in the tub or shower.    Sit on a bath chair to bathe.    Use bathmats with nonskid backing.  Lighting  To improve visibility in your home:      Keep a flashlight in each room. Or put a lamp next to the bed within easy reach.    Put nightlights in the bedrooms, hallways, kitchen, and bathrooms.    Make sure all stairways have good lighting.    Take your time when going up and down stairs.    Put handrails on both sides of stairs and in walkways for more support. To prevent injury to your wrist or arm, don t use handrails to pull yourself up.    Install grab bars to pull yourself up.    Move or  rearrange items that you use often. This will make them easier to find or reach.    Look at your home to find any safety hazards. Especially look at doorways, walkways, and the driveway. Remove or repair any safety problems that you find.  Other changes to make    Look around to find any safety hazards. Look closely at doorways, walkways, and the driveway. Remove or repair any safety problems that you find.    Wear shoes that fit well.    Take your time when going up and down stairs.    Put handrails on both sides of stairs and in walkways for more support. To prevent injury to your wrist or arm, don t use handrails to pull yourself up.    Install grab bars wherever needed to pull yourself up.    Arrange items that you use often. This will make them easier to find or reach.    Melina last reviewed this educational content on 3/1/2020    7867-8118 [employer]. All rights reserved. This information is not intended as a substitute for professional medical care. Always follow your healthcare professional's instructions.

## 2022-11-03 NOTE — ASSESSMENT & PLAN NOTE
Noted in chart. In review, patient has never been on statin or therapy for ASCVD. Last LDL in March 2022 was less than 140. Continue cardiology care.

## 2022-11-03 NOTE — ASSESSMENT & PLAN NOTE
Not a surgical candidate. On long term opioid therapy at high dose. Of note, also on clonazepam at night for sleep. Patient and son understand risk of respiratory depression with this combination. Both are aware and know how to use narcan nasal spray if needed.

## 2022-11-03 NOTE — ASSESSMENT & PLAN NOTE
On oxygen at 2L/min nasal canula while awake. Working with pulmonology for a Bipap for use at night. Also currently on Levaquin for exacerbation and is on prednisone 20 mg daily for chronic with inhalers.

## 2022-11-03 NOTE — ASSESSMENT & PLAN NOTE
Lasix 40 mg daily  Take daily weights. If 3 lbs weight gain in 24 hours, then increase to 80 mg until back to starting weight then return to 40 mg daily.

## 2022-11-08 NOTE — TELEPHONE ENCOUNTER
Call to JOAQUÍN Bolden with verbal okay for requested orders below. Detailed vm left. Encouraged to call back with questions/concerns.   Please advise if do not agree    Robin Interiano RN  [facility]

## 2022-11-08 NOTE — TELEPHONE ENCOUNTER
Order/Referral Request    Who is requesting: Kimi OT    Orders being requested: OT once a week for 5 weeks    Reason service is needed/diagnosis: Energy conservation, balance, strengthening, self care, ADL's    When are orders needed by: ASAP    Has this been discussed with Provider: Yes    Does patient have a preference on a Group/Provider/Facility? [facility]    Does patient have an appointment scheduled?: No    Where to send orders: Verbal Orders    Could we send this information to you in [facility] or would you prefer to receive a phone call?:     Kimi would prefer a phone call     Okay to leave a detailed message?: Yes at [phone]

## 2022-11-09 NOTE — TELEPHONE ENCOUNTER
Order/Referral Request    Who is requesting: [facility]    Orders being requested: OXYCODONE 20 MG by mouth every 4 hours (while awake) not exceed 5 tabs per day.    Reason service is needed/diagnosis: pain    When are orders needed by: ASAP    Has this been discussed with Provider: Yes    Does patient have a preference on a Group/Provider/Facility? [facility]     Does patient have an appointment scheduled?: No    Where to send orders: VERBAL    Could we send this information to you in [facility] or would you prefer to receive a phone call?:   Would prefer a phone call   Okay to leave a detailed message?: No at [phone]

## 2022-11-10 NOTE — PROGRESS NOTES
[facility] Care Coordination Contact    Member became effective with  [facility] on 11/1/22 with Stacy MSC+.  Previous Health Plan: MA/Fee For Service  Previous Care System: [facility]  Previous care coordinators name and number: CAROL Roland Type: EW  Last MMIS Entry: Date 06/08/22, and Type 02 pers assmt  MMIS visit date (and type) if different from above: na  Services Listed in MMIS: na  UTF received: No: Requested on 11/10/22 from [facility]. Request through Fax: [phone].     Ba Chris  Care Management Specialist  [facility]  [phone]

## 2022-11-10 NOTE — LETTER
November 10, 2022    KI CHRISTOPHER  [address] [place] [id number]    Dear  Ki,    Welcome to [facility] s MSC+ health program. My name is Prashanth Romero RN. I am your MSC+ care coordinator. You are eligible for Care Coordination through [facility] MSC+ pla.    As your care coordinator, we ll:    Meet to go over your care coordination benefits    Talk about your physical and mental health care needs     Review your preventative care needs    Create a plan that meets your needs with the services you choose    What happens next?  I ll call you soon to introduce myself and tell you more about my role. We ll then plan time to go over your health and safety needs. Our goal is to keep you as healthy and independent as possible.    Soon, you will receive a new MSC+ member identification (ID) card from [facility]. When you receive it, please use this card along with your [place] [facility] card and Prescription Drug Coverage Program card. When you receive, it please use this card where you get your health services. If you have Medicare, you will need to show your Medicare card when you get health services.    The [facility]+ care coordination program is voluntary and offered to you at no cost. If you wish to stop being in the care coordination program or have questions, call me at [phone]. If you reach my voicemail, leave a message and your phone number. TTY users, call the [place] [employer] at 229 or [phone] (qsekbf-ms-qxomjr relay service).    Sincerely,      Prashanth Romero RN  [phone]  [email or web address]    [id number] accepted   [id number]       [id number] (07/2022)

## 2022-11-18 NOTE — TELEPHONE ENCOUNTER
LDVM on Marilynn from [facility]'s confidential line okay for home care orders. Encouraged to call back with questions.    Last OV 11/2/22. Spoke with PCP in person and he gave verbal okay for orders.

## 2022-11-21 NOTE — ED TRIAGE NOTES
"Pt arrives to the ED via EMS for an overdose d/t fentanyl patch. EMS reports that pt used wife's fentanyl patch, went unresponsive. Son called EMS. Pt reports that he has never used one of the patches before. EMS did not give pt anything. Pt alert and oriented, but \"sleepy\".      Triage Assessment     Row Name 11/21/22 1640       Triage Assessment (Adult)    Airway WDL WDL       Respiratory WDL    Respiratory WDL WDL       Skin Circulation/Temperature WDL    Skin Circulation/Temperature WDL WDL       Cardiac WDL    Cardiac WDL WDL       Peripheral/Neurovascular WDL    Peripheral Neurovascular WDL WDL       Cognitive/Neuro/Behavioral WDL    Cognitive/Neuro/Behavioral WDL WDL  EMS reports pt was unresponsive earlier       Pupils (CN II)    Pupil PERRLA yes       Kareem Coma Scale    Best Eye Response 4-->(E4) spontaneous    Best Motor Response 6-->(M6) obeys commands    Best Verbal Response 5-->(V5) oriented    [place] Coma Scale Score 15              "

## 2022-11-21 NOTE — ED PROVIDER NOTES
EMERGENCY DEPARTMENT ENCOUNTER      NAME: Ki Pederson  AGE: 67 year old male  YOB: 1955  MRN: [id number]  EVALUATION DATE & TIME: 11/21/2022  4:33 PM    PCP: Baldemar Garcia    ED PROVIDER: Margarito Cooln M.D.      Chief Complaint   Patient presents with     Drug Overdose         FINAL IMPRESSION:  1. Accidental overdose, initial encounter          ED COURSE & MEDICAL DECISION MAKING:    Pertinent Labs & Imaging studies reviewed. (See chart for details)  67 year old male presents to the Emergency Department for evaluation of change in mental status.  Patient's son said that he took his wife's fentanyl patch and applied it because he has hip pain.  Patient does admit to some amount of alcohol this morning.  He was witnessed by the son and home nurse to become obtunded in front of them.  He did maintain breathing.  They called the ambulance immediately.  Patient did not receive Narcan in started to wake up once EMS was there.  He currently is alert and oriented and interacting appropriately.  No slurred speech.  His eyes are injected.  I was concerned for the possibility of some alcohol intoxication however the patient does definitely have capacity to make his own decisions based on my interactions.  Spoke with the patient and the son about and concerns and normal work-up for changes in mental status even when we suspected overdose which would include CT scans of the head and screening blood work.  Discussion with the patient and his son decision was made to forego any testing and he would like to just leave.  We will have the patient's sign out AMA and have him follow-up with primary doctor.    4:39 PM I met with the patient to gather history and to perform my initial exam. We discussed plans for the ED course, including diagnostic testing and treatment. PPE: N95 mask.   4:50 PM We discussed the plan for discharge and the patient is agreeable. Reviewed supportive cares, symptomatic  treatment, outpatient follow up, and reasons to return to the Emergency Department. Patient to be discharged by ED RN.     At the conclusion of the encounter I discussed the results of all of the tests and the disposition. The questions were answered. The patient or family acknowledged understanding and was agreeable with the care plan.       MEDICATIONS GIVEN IN THE EMERGENCY:  Medications - No data to display    NEW PRESCRIPTIONS STARTED AT TODAY'S ER VISIT  Discharge Medication List as of 11/21/2022  5:01 PM             =================================================================    HPI    Patient information was obtained from: patient, son, nurse    Use of : GIACOMO        Ki Pederson is a 67 year old male with a pertinent history of hypertension, ASCVD, COPD, nicotine abuse, chronic pain, atrial fibrillation, MI, TIA, and acute on systolic heart failure who presents for evaluation of an episode of responsiveness.    Per nurse, the patient put on his wife's fentanyl patch today, became unresponsive, and then his son called EMS. Patient states he put the patch on due to his arthritic hip pain that has been ongoing. He has never used a fentanyl patch before. He states that after putting on the patch he became tired and went to bed. Patient's son noticed the patient was not alert and oriented after putting on the patch as he was dozing off and mumbling. He then was not able to wake the patient up so called his brother in law, who then called EMS. Patient took his medications as normal this morning, which included gabapentin. He also endorses drinking alcohol this morning due to his hip pain. Patient states he has never had anything like this happen before. He denies any chest pain, fever, or headache prior to the episode, and denies any chest pain or lightheadedness presently. No other complaints or concerns expressed at this time.                                              REVIEW OF SYSTEMS    Review of Systems   Constitutional: Negative for fever.   Cardiovascular: Negative for chest pain.   Musculoskeletal: Positive for arthralgias (bilateral hip (chronic)).   Neurological: Negative for light-headedness and headaches.        Positive for episode of unresponsiveness   All other systems reviewed and are negative.       PAST MEDICAL HISTORY:  Past Medical History:   Diagnosis Date     Anxiety      Atrial fibrillation (H)      CAD (coronary artery disease)      Cerebral infarction (H)      Chronic hepatitis C virus infection (H)      COPD (chronic obstructive pulmonary disease) (H)      Essential hypertension      Hemangioma     massive hemangioma; left hand     Hemangioma     Left hand     Hypertension      Intravenous drug abuse in remission (H)     Sober since ~2010     Lumbar spinal stenosis      Myocardial infarction (H)      Peripheral neuropathy      Restless leg syndrome      Stroke (H)      TIA (transient ischemic attack)        PAST SURGICAL HISTORY:  Past Surgical History:   Procedure Laterality Date     CERVICAL FUSION      C6 and C7     CERVICAL FUSION      C6-7     PICC TRIPLE LUMEN PLACEMENT  9/21/2022                CURRENT MEDICATIONS:    albuterol (PROAIR HFA/PROVENTIL HFA/VENTOLIN HFA) 108 (90 Base) MCG/ACT inhaler  amiodarone (PACERONE) 200 MG tablet  azithromycin (ZITHROMAX) 250 MG tablet  budesonide (PULMICORT) 1 MG/2ML neb solution  citalopram (CELEXA) 40 MG tablet  clonazePAM (KLONOPIN) 2 MG tablet  diltiazem ER COATED BEADS (CARDIZEM CD/CARTIA XT) 300 MG 24 hr capsule  famotidine (PEPCID) 20 MG tablet  furosemide (LASIX) 40 MG tablet  gabapentin (NEURONTIN) 300 MG capsule  guaiFENesin (MUCINEX) 600 MG 12 hr tablet  ipratropium - albuterol 0.5 mg/2.5 mg/3 mL (DUONEB) 0.5-2.5 (3) MG/3ML neb solution  levofloxacin (LEVAQUIN) 750 MG tablet  magnesium 250 MG tablet  metoprolol tartrate (LOPRESSOR) 100 MG tablet  naloxone (NARCAN) 4 MG/0.1ML nasal spray  oxyCODONE HCl (ROXICODONE) 20 MG  TABS immediate release tablet  predniSONE (DELTASONE) 20 MG tablet  rivaroxaban ANTICOAGULANT (XARELTO) 20 MG TABS tablet        ALLERGIES:  Allergies   Allergen Reactions     Symbicort Rash       FAMILY HISTORY:  Family History   Problem Relation Age of Onset     Coronary Artery Disease Mother      Diabetes Mother      Coronary Artery Disease Father      Chronic Obstructive Pulmonary Disease Brother      Heart Disease Brother      Chronic Obstructive Pulmonary Disease Brother      Heart Disease Brother      No Known Problems Brother      Heart Disease Sister      Chronic Obstructive Pulmonary Disease Sister      Chronic Obstructive Pulmonary Disease Sister      Heart Disease Sister      No Known Problems Sister      Chronic Obstructive Pulmonary Disease Sister      Heart Disease Sister      No Known Problems Sister      No Known Problems Sister        SOCIAL HISTORY:   Social History     Socioeconomic History     Marital status:      Number of children: 10     Years of education: 15     Highest education level: Some college, no degree   Occupational History     Occupation: Disability   Tobacco Use     Smoking status: Former     Packs/day: 2.00     Types: Cigarettes     Quit date:      Years since quittin.8     Smokeless tobacco: Current     Types: Chew     Tobacco comments:     No passive exposure   Vaping Use     Vaping Use: Never used   Substance and Sexual Activity     Alcohol use: Not Currently     Drug use: Not Currently     Types: IV     Comment: Sober from IV drug use since ~   Social History Narrative    ** Merged History Encounter **            VITALS:  /75   Pulse 81   Temp 97.9  F (36.6  C) (Temporal)   Resp 16   Wt 102.1 kg (225 lb)   SpO2 96%   BMI 29.69 kg/m        PHYSICAL EXAM    Constitutional: Well developed, Well nourished, NAD, GCS 15  HENT: Normocephalic, Atraumatic, Bilateral external ears normal, Oropharynx normal, mucous membranes moist, Nose normal. Neck-   Normal range of motion, No tenderness, Supple, No stridor.  Eyes: PERRL, EOMI, injected sclera, No discharge.   Respiratory: Normal breath sounds, No respiratory distress, No wheezing, Speaks full sentences easily. No cough.  Cardiovascular: Normal heart rate, Regular rhythm, No murmurs, No rubs, No gallops. Chest wall nontender.  GI:Soft, No tenderness, No masses, No flank tenderness. No rebound or guarding.   Musculoskeletal: 2+ DP pulses. No edema.No cyanosis, No clubbing. Good range of motion in all major joints. No tenderness to palpation or major deformities noted.   Integument: Warm, Dry, No erythema, No rash. No petechiae.   Neurologic: Alert & oriented x 3,  CN 3-12 intact Normal motor function, Normal sensory function, No focal deficits noted. Normal gait. Normal finger to nose bilaterally  Psychiatric: Affect normal, Judgment normal, Mood normal. Cooperative.          LAB:  All pertinent labs reviewed and interpreted.  Labs Ordered and Resulted from Time of ED Arrival to Time of ED Departure - No data to display    RADIOLOGY:  Reviewed all pertinent imaging. Please see official radiology report.  No orders to display       EKG:    Performed at: 16:48    Impression: Sinus rhythm. Prolonged QT. Abnormal ECG.     Rate: 85 BPM  Rhythm: Sinus  Axis: 73  NE Interval: 158 ms  QRS Interval: 92 ms  QTc Interval: 497 ms  ST Changes: None  Comparison: When compared with ECG of 24-OC T-2022, no ST changes.     I have independently reviewed and interpreted the EKG(s) documented above.      I, Wendy Kenyon, am serving as a scribe to document services personally performed by Dr. Margarito Colon based on my observation and the provider's statements to me. I, Margarito Colon MD attest that Wendy Kenyon is acting in a scribe capacity, has observed my performance of the services and has documented them in accordance with my direction.    Margarito Colon M.D.  Emergency Medicine  [facility]  [facility] EMERGENCY ROOM  [address] [address]  [phone]  Dept: [phone]     Margarito Colon MD  11/22/22 0001

## 2022-11-21 NOTE — ED NOTES
Pt leaving AMA. Paperwork signed. Pt alert and oriented. Was able to walk out of the ER, to son's car at entrance. See providers notes for further assessment.

## 2022-11-22 NOTE — TELEPHONE ENCOUNTER
"Received fax from pharmacy got third party reject information for early refill.    Documented notes:    \"Home RN called - using 2 puffs [id number]. Need new Rx with this dose to be able to fill as often as needed. Current Rx is 2 puffs [id number].\"  "

## 2022-11-22 NOTE — TELEPHONE ENCOUNTER
Patient's son calling to refill medications as patient is moving to assisted living. Last OV 11/2/22    Prescriptions reordered below, please review and advise on number of refills

## 2022-11-22 NOTE — TELEPHONE ENCOUNTER
[facility] calling stating they will not have 2 mg clonazepam avail until Friday or Saturday.   They requested to go back to 2 - 1mg tabs in the mean time dispensing qty 60  Verbal given to do so.  They will review directions with patient at  to make sure alt directions are understood.   fyi to provider

## 2022-11-22 NOTE — TELEPHONE ENCOUNTER
Drug Change Request      What is the current medication?:  albuterol (PROAIR HFA/PROVENTIL HFA/VENTOLIN HFA) 108 (90 Base) MCG/ACT inhaler    What alternative is being requested?: Change to sig  *2 puffs every 3 hours*    Why the request to change?:  Patient has been having a hard time and has been using more often then the sig states. Insurance wont cover how often it is used without new RX with updated sig.    Preferred Pharmacy:   [id number] [address] [place] [address]  Phone: [phone] Fax: [phone]    Could we send this information to you in [facility] or would you prefer to receive a phone call?:   Patient would prefer a phone call     Okay to leave a detailed message?: Yes at Cell number on file:    Telephone Information:   Mobile [phone]               Please advise on drug change request.

## 2022-11-23 NOTE — TELEPHONE ENCOUNTER
Spoke with patient's son and relayed provider's message. He said he is not at home so unsure how many oxycodone tabs are left, his father left AMA from hospital, and they are in a meeting deciding which facility he will be moving to as first assisted living facility fell through. Patient's son monitors medication and home health nurse comes once a week to monitor medications as well.

## 2022-11-23 NOTE — TELEPHONE ENCOUNTER
"Was requesting a sig change to be written as \"2 puffs every 3 hours\"    Rx was refilled for original sig again.    They are requesting a new RX be sent over to reflect the requested changes.  Please advise on request for sig change.  "

## 2022-11-23 NOTE — TELEPHONE ENCOUNTER
Please call and check with son. 143 tabs of oxycodone were picked up from pharmacy 11/2/2022. At a max of 5 per day, patient should not need a refill until 11/28 at earliest. With his recent ER visit, I need to know how many tabs does he currently still have. Also, will his medication be monitored in the new living arrangements?    The other two medications have been refilled.

## 2022-11-24 NOTE — TELEPHONE ENCOUNTER
Pt's son called stating he is requesting oxycodone refill and sent to the pharmacy. He stated pt only had 10 tablets of oxycodne  left. He stated he is monitoring pt medication and locks it is locked bill box. Provider message was relayed to him and message will be sent to Dr Pack.     oxyCODONE HCl (ROXICODONE) 20 MG TABS immediate release tablet      Deion Rubalcava RN  [facility] Nurse Advisors COVID 19 Nurse Triage Plan/Patient Instructions

## 2022-11-28 NOTE — PROGRESS NOTES
[facility] Care Coordination Contact    Phone call to Katlyn, son informed him that case has been transferred to care coordinator.  Scheduled time for 11/29/22 to review documents.   UTF and documents received from previous care coordinator.      Prashanth Romero RN, PHN  [facility]  [phone]

## 2022-11-28 NOTE — PROGRESS NOTES
[facility] Care Coordination Contact  CC received notification of Emergency Room visit.  ER visit occurred on 11/21/22 at [facility] with Dx of drug overdose.    CC contacted adult son Katlyn and reviewed discharge summary.  Member has a follow-up appointment with PCP: Yes: scheduled on 12/7/22  Member has had a change in condition: No  New referrals placed: No  Home Visit Needed:  No  Care plan reviewed and updated.  PCP notified of ED visit via EMR.    Prashanth Romero RN, PHN  [facility]  [phone]

## 2022-11-30 NOTE — PROGRESS NOTES
[facility] Care Coordination Contact      [facility] Health Plan or Product Change    CC received notification that member's health plan or health plan product changed from MA/Fee For Service to [employer] MSC+ effective 11/1/22.  CC contacted member and discussed change and face-to-face visit was offered. Member declined need for home visit. CC reviewed previous Health Risk Assessment/LTCC and POC with member and no changes noted.    Called member and introduced self as member s new CC. Confirmed with member that the welcome letter with writer's name and contact information has been received.  Reviewed LTCC/Health Risk Assessment (HRA) and POC with member. No changes noted.  Transitional HRA completed. Care Plan Summary updated and reflects current services.  Required referral authorization information communicated to CMS: Not applicable  Writer reviewed the following with member:  ER visits: Yes  Hospitalizations: Yes  TCU stays: No  Significant health status changes: na  Falls/Injuries: No  ADL/IADL changes: No  Changes in services: No    Follow-Up Plan: Member informed of future contact, plan to f/u with member with at next regularly scheduled contact.  Contact information shared with member and family, encouraged member to call with any questions or concerns.    Prashanth Romero RN, PHN  [facility]  [phone]

## 2022-12-01 NOTE — TELEPHONE ENCOUNTER
The Home Care/Assisted Living/Nursing Facility is calling regarding an established patient.  Has the patient seen Home Care in the past or is currently residing in Assisted Living or Nursing Facility? unknown    Anitra calling from [facility] requesting the following orders that are within the Home Care, Assisted Living or Nursing Home Eval and Treatment standing order and can be signed as standing order signature required by RN.    Preferred Call Back Number: [phone]    Home Care Visits Continuation   1 week, one time for Home Health visit for Skilled Nursing  Orders to teach coping skills and CBT to treat anxiety  Anitra will call back in about 3 weeks with updates    Any additional Orders:  Are there any orders requested, not stated above, that are outside of the standing order and must be routed to a licensed practitioner for approval?    No    Christina Javier RN  [facility] Nurse Advisor   12/1/2022  5:45 PM

## 2022-12-07 PROBLEM — I50.22 CHRONIC SYSTOLIC HEART FAILURE (H): Status: ACTIVE | Noted: 2022-01-01

## 2022-12-07 NOTE — PROGRESS NOTES
Assessment & Plan   Problem List Items Addressed This Visit        Nervous and Auditory    Chronic back pain     On long-term opiate therapy for chronic back pain secondary to degenerative disc disease.  Patient is on very significant doses of oxycodone as he was on hospice care earlier this year.  However, his goals right now are not in line with hospice and that he wants to keep treating his medical conditions and to keep on going to doctors and in the hospital if needed.  Discussed with patient and his son that we can continue his current regimen for now however I would like them to establish care with palliative medicine for assistance with pain and symptom management.  I will continue prescribing his opiates until he is able to establish with palliative.  Pain contract signed today.  Additionally, discussed that while I am prescribing opiate therapy if he uses any other persons controlled substances or opiates this would be a violation of the pain contract and we would have to taper or have someone else take over the prescribing.  Patient is understanding and in agreement with this, his son is now in control of all of his medications as well.         Relevant Medications    predniSONE (DELTASONE) 20 MG tablet    Other Relevant Orders    [id number] - Urine Drug Confirmation Panel (Comprehensive)       Respiratory    Chronic obstructive pulmonary disease with acute lower respiratory infection (H) - Primary     Very severe chronic COPD.  He is on 2 L nasal cannula with activity.  Needs to follow-up with pulmonology in clinic to discuss BiPAP at night.  Breathing is stable today.  However he does have an expiratory wheezing on exam.  We will continue his current regimen including prednisone 20 mg daily as his son says he has been on this dose of steroid for the last year.  I discussed that ideally we would get him on a lower dose of steroid even if he does have to be on 1 chronically.  However I will defer dosing  and that taper to pulmonology.  Phone number for Dr. Rivera's office provided in after visit summary.         Relevant Medications    budesonide (PULMICORT) 1 MG/2ML neb solution    predniSONE (DELTASONE) 20 MG tablet       Circulatory    Essential hypertension     Blood pressure above goal today however has been low at all prior office visits and during recent admissions.  Will not add back lisinopril or amlodipine today.  As noted elsewhere patient needs to follow-up with cardiology.         Persistent atrial fibrillation (H)     Rate controlled and on Xarelto for AC.  Continue diltiazem  mg daily, reduce metoprolol to 50 mg twice daily (was only taking 100 mg daily so hesitant to increase to 100 mg BID with HR of 57 today), and continue Xarelto 20 mg daily.  Patient's son will help him get follow-up visit scheduled with cardiology.         Relevant Medications    metoprolol tartrate (LOPRESSOR) 100 MG tablet    Chronic systolic heart failure (H)     EF of 30% on echo September 2022.  This is significantly decreased from prior.  Patient does not appear volume overloaded today.  They are checking daily weights.    - We will continue Lasix 40 mg daily; if he gains 3 pounds in 24 hours increased to 80 mg daily until back to dry weight then decrease back to 40 mg daily for  - Given bradycardia today instructed patient to take metoprolol 50 mg twice a day (they have been doing 100 mg once a day).    - Instructed to get cardiology follow-up as scheduled.         Relevant Orders    Palliative Care Referral    Basic metabolic panel       Musculoskeletal and Integumentary    Restless Legs Syndrome     Controlled with gabapentin and clonazepam.  As per chronic pain I think patient would be well served with palliative care to manage symptoms and his controlled substances.  I think they are indicated for symptom control given his poor prognosis with end-stage COPD and heart failure, however quite risky to be on  benzodiazepine and so much opiate therapy given his respiratory status.  Patient and son are aware of risks and okay with continuing these 2 medications today.  I will continue prescribing until he is able to establish care with palliative.         Relevant Medications    predniSONE (DELTASONE) 20 MG tablet       Behavioral    Major depression     Well-controlled with Celexa 40 mg daily.            Other    Polysubstance abuse (H)     Is at high risk for polysubstance abuse given very large opiate prescription, benzodiazepine use and history of alcohol use (per chart).  I do think his opiate prescription for symptom control is appropriate given end-stage nature of his lung and cardiac disease, albeit risky given respiratory status.  Discussed these concerns with patient and his son today they are okay with continuing.  We will refer to palliative care for assistance with pain and symptom control.  Pain contract signed today, strongly emphasized that any use of any other controlled substance or illicit substance will be a violation of the pain contract in which case I would taper his opiates.         Relevant Orders    [id number] - Urine Drug Confirmation Panel (Comprehensive)    Palliative Care Referral   Other Visit Diagnoses     Encounter for long-term (current) use of medications        Relevant Orders    [id number] - Urine Drug Confirmation Panel (Comprehensive)    Pneumococcal vaccination administered at current visit        Relevant Orders    Pneumococcal 20 Valent Conjugate (PCV20) (Completed)    COVID-19 vaccine administered        Relevant Orders    COVID-19 VACCINE BIVALENT BOOSTER 12+ (PFIZER) (Completed)    Influenza vaccination administered at current visit        Relevant Orders    INFLUENZA VACCINE 65+ (FLUZONE HD) (Completed)         Return in about 3 months (around 3/7/2023) for Follow up.    Paola Rico MD  [facility]    Trinh Emerson is a 67 year old  accompanied by his son, presenting for the following health issues:  Establish Care    DDD / Chronic Pain: Lower back and hip pain.  Patient reports he has been on long-term opiate therapy for this pain for at least 5 years, maybe longer he is not sure.  His current regimen is oxycodone 20 mg every 4 hours, maximum 5 pills daily.  He reports he has been on this much oxycodone for at least the last year.  As below patient was on hospice for around 6 months this year, during that time hospice was filling his opiate prescriptions.  Patient and son report while on hospice they were using oxycodone for air hunger in addition to pain control.  Over the last month, Dr. Garcia has been filling as he saw the patient for annual physical on November 2.  He did have ED visit for accidental overdose in the setting of trying one of his wife's fentanyl patches.  He stated he did this because he liked the idea of not having to take pills every day.  We had a long discussion about how this is inappropriate and warned against using any other persons controlled substances as this can be life-threatening.  They do have Narcan at home.    Insomnia: Clonazepam for sleep.  Reports he has been on this for at least 20 years varying doses, 1 to 2 mg.    COPD: Chronic and severe.  Is currently on 2L NC with activity, nothing at rest.  Has been hospitalized for acute respiratory failure multiple times in the past year.  Has not followed up with pulmonology in clinic in several years.  Son reports that during October admission he spoke with the respiratory therapist who recommended looking into getting a BiPAP at night for the patient.  He has not heard anything since this admission.  Current regimen is azithromycin Monday Wednesday Friday, prednisone 20 mg daily, budesonide nebulizers twice daily, DuoNebs as needed (patient is using maybe once a day), albuterol as needed (patient is using 3-4 times a day for shortness of breath) and Mucinex as  needed (he using most nights).  Patient and son report they have been on the same dose of prednisone for the last year. He has very little respiratory reserve.  He is unable to walk more than 50 feet without feeling short of breath.    MDD/BUD: Mood well controlled with Celexa 40 mg daily.  Has been on this dose for many years.    HFrEF: TTE 9/22/22 showed EF 30 to 35%, no significant valvular abnormalities.  Decline in his ejection fraction is new in the last year.  Has not followed up with cardiology recently since he has been in the hospital so many times.  His current regimen is Lasix 40 mg daily and metoprolol.  Metoprolol was supposed to be 100 mg twice daily however they have only been taking it daily.  Heart rate is only 57 today. Blood pressure is mildly elevated at 151/86.  He reports that his dry weight is around 220 pounds.  If he gains more than 3 pounds in a day he does take an extra Lasix.  He only needs to do this maybe once a month.     IVETH fib: Current regimen is metoprolol (dose issues as above), amiodarone 200 mg daily, diltiazem 300 mg daily and Xarelto 20 mg daily.  Was admitted in September with ALISA garcia with RVR in setting of acute on chronic respiratory failure and sepsis.  He underwent cardioversion on 9/23, which was unsuccessful.  After that he was started on diltiazem with improvement in rates discharged on the above regimen. As above needs to follow-up with cardiology.    HTN: Per chart review was previously also on amlodipine and lisinopril however these were held during admissions and at hospital follow-up visits due to softer blood pressures patient needs to follow-up with cardiology.  Likely would add back ACE inhibitor given his HFrEF however will defer to cardiology at this time.  Additionally, patient's goals of care are fluctuating as below.  Given he is feeling well on current regimen we will not make any changes today.    GERD: Well-controlled with pepcid 20 mg daily     Goals of  Care:   Patient was on hospice after April admission for COPD exacerbation and that admission pulmonary palliative and hospice were consulted and ultimately patient was discharged on hospice care.  He then was admitted with ALISA garcia with RVR and sepsis in September.  After September admission he was discharged on still on hospice care but with a different hospice company.  Patient was then readmitted in October with acute on chronic respiratory failure, sepsis secondary to pneumonia, YUNIEL and demand ischemia.  After this admission patient and family elected to discharge from hospice care and instead continue medical treatments and going to office visits.  Hence establishing care with me today.  We discussed what patient's current goals are today.  He does still want to keep treating his chronic medical conditions and is not ready to go back on hospice care.  He ultimately wants to live longer so he can enjoy time spending time with his children and grandchildren.  He does still agree with remaining DNR/DNI.  Is still currently living at home but they are working on getting him into an assisted living facility.  They are using [facility] home care.    COPD Follow-Up    Overall, how are your COPD symptoms since your last clinic visit?  No change    How much fatigue or shortness of breath do you have when you are walking?  A lot more than usual    How much shortness of breath do you have when you are resting?  Same as usual    How often do you cough? Often    Have you noticed any change in your sputum/phlegm?  No    Have you experienced a recent fever? No    Please describe how far you can walk without stopping to rest:  The length of 3-5 rooms    How many flights of stairs are you able to walk up without stopping?  1    Have you had any Emergency Room Visits, Urgent Care Visits, or Hospital Admissions because of your COPD since your last office visit?  No    History   Smoking Status     Former     Packs/day: 2.00      "Types: Cigarettes     Quit date: 2018   Smokeless Tobacco     Current     Types: Chew     No results found for: FEV1, LPL2MBS    Review of Systems   Constitutional, HEENT, cardiovascular, pulmonary, GI, , musculoskeletal, neuro, skin, endocrine and psych systems are negative, except as otherwise noted.      Objective    BP (!) 151/86 (BP Location: Right arm, Patient Position: Right side, Cuff Size: Adult Regular)   Pulse 57   Temp 98.9  F (37.2  C) (Temporal)   Ht 1.854 m (6' 1\")   Wt 107 kg (236 lb)   SpO2 96%   BMI 31.14 kg/m    Body mass index is 31.14 kg/m .  Physical Exam   GENERAL: healthy, alert and no distress  EYES: Eyes grossly normal to inspection, PERRL and conjunctivae and sclerae normal  HENT: ear canals and TM's normal, nose and mouth without ulcers or lesions  NECK: no adenopathy, no asymmetry, masses, or scars and thyroid normal to palpation  RESP: lungs clear to auscultation - no rales, rhonchi or wheezes  CV: irregularly irregular rhythm, normal S1 S2, no S3 or S4, no murmur, click or rub, peripheral pulses strong and trace b/l peripheral edema  ABDOMEN: soft, nontender, no hepatosplenomegaly, no masses and bowel sounds normal  MS: no gross musculoskeletal defects noted, no edema  SKIN: no suspicious lesions or rashes  NEURO: Normal strength and tone, mentation intact and speech normal  PSYCH: mentation appears normal, affect normal/bright    Admission on 11/21/2022, Discharged on 11/21/2022   Component Date Value Ref Range Status     Systolic Blood Pressure 11/21/2022 131  mmHg Final     Diastolic Blood Pressure 11/21/2022 76  mmHg Final     Ventricular Rate 11/21/2022 85  BPM Final     Atrial Rate 11/21/2022 85  BPM Final     MD Interval 11/21/2022 158  ms Final     QRS Duration 11/21/2022 92  ms Final     QT 11/21/2022 418  ms Final     QTc 11/21/2022 497  ms Final     P Axis 11/21/2022 56  degrees Final     R AXIS 11/21/2022 73  degrees Final     T Axis 11/21/2022 74  degrees Final     " Interpretation ECG 11/21/2022    Final                    Value:Sinus rhythm  Prolonged QT  Abnormal ECG  When compared with ECG of 24-OCT-2022 21:41,  Incomplete right bundle branch block is no longer Present  Confirmed by SEE ED PROVIDER NOTE FOR, ECG INTERPRETATION ([id number]),  YAN RIVERA ([id number]) on 11/23/2022 10:21:33 AM

## 2022-12-07 NOTE — ASSESSMENT & PLAN NOTE
Controlled with gabapentin and clonazepam.  As per chronic pain I think patient would be well served with palliative care to manage symptoms and his controlled substances.  I think they are indicated for symptom control given his poor prognosis with end-stage COPD and heart failure, however quite risky to be on benzodiazepine and so much opiate therapy given his respiratory status.  Patient and son are aware of risks and okay with continuing these 2 medications today.  I will continue prescribing until he is able to establish care with palliative.

## 2022-12-07 NOTE — LETTER
Opioid / Opioid Plus Controlled Substance Agreement    This is an agreement between you and your provider about the safe and appropriate use of controlled substance/opioids prescribed by your care team. Controlled substances are medicines that can cause physical and mental dependence (abuse).    There are strict laws about having and using these medicines. We here at [facility] are committing to working with you in your efforts to get better. To support you in this work, we ll help you schedule regular office appointments for medicine refills. If we must cancel or change your appointment for any reason, we ll make sure you have enough medicine to last until your next appointment.     As a Provider, I will:    Listen carefully to your concerns and treat you with respect.     Recommend a treatment plan that I believe is in your best interest. This plan may involve therapies other than opioid pain medication.     Talk with you often about the possible benefits, and the risk of harm of any medicine that we prescribe for you.     Provide a plan on how to taper (discontinue or go off) using this medicine if the decision is made to stop its use.    As a Patient, I understand that opioid(s):     Are a controlled substance prescribed by my care team to help me function or work and manage my condition(s).     Are strong medicines and can cause serious side effects such as:    Drowsiness, which can seriously affect my driving ability    A lower breathing rate, enough to cause death    Harm to my thinking ability     Depression     Abuse of and addiction to this medicine    Need to be taken exactly as prescribed. Combining opioids with certain medicines or chemicals (such as illegal drugs, sedatives, sleeping pills, and benzodiazepines) can be dangerous or even fatal. If I stop opioids suddenly, I may have severe withdrawal symptoms.    Do not work for all types of pain nor for all patients. If they re not helpful, I may  be asked to stop them.        The risks, benefits and side effects of these medicine(s) were explained to me. I agree that:  1. I will take part in other treatments as advised by my care team. This may be psychiatry or counseling, physical therapy, behavioral therapy, group treatment or a referral to a specialist.     2. I will keep all my appointments. I understand that this is part of the monitoring of opioids. My care team may require an office visit for EVERY opioid/controlled substance refill. If I miss appointments or don t follow instructions, my care team may stop my medicine.    3. I will take my medicines as prescribed. I will not change the dose or schedule unless my care team tells me to. There will be no refills if I run out early.     4. I may be asked to come to the clinic and complete a urine drug test or complete a pill count at any time. If I don t give a urine sample or participate in a pill count, the care team may stop my medicine.    5. I will only receive prescriptions from this clinic for chronic pain. If I am treated by another provider for acute pain issues, I will tell them that I am taking opioid pain medication for chronic pain and that I have a treatment agreement with this provider. I will inform my [facility] care team within one business day if I am given a prescription for any pain medication by another healthcare provider. My [facility] care team can contact other providers and pharmacists about my use of any medicines.    6. It is up to me to make sure that I don t run out of my medicines on weekends or holidays. If my care team is willing to refill my opioid prescription without a visit, I must request refills only during office hours. Refills may take up to 3 business days to process. I will use one pharmacy to fill all my opioid and other controlled substance prescriptions. I will notify the clinic about any changes to my insurance or medication  availability.    7. I am responsible for my prescriptions. If the medicine/prescription is lost, stolen or destroyed, it will not be replaced. I also agree not to share controlled substance medicines with anyone.    8. I am aware I should not use any illegal or recreational drugs. I agree not to drink alcohol unless my care team says I can.       9. If I enroll in the [place] [facility] program, I will tell my care team prior to my next refill.     10. I will tell my care team right away if I become pregnant, have a new medical problem treated outside of my regular clinic, or have a change in my medications.    11. I understand that this medicine can affect my thinking, judgment and reaction time. Alcohol and drugs affect the brain and body, which can affect the safety of my driving. Being under the influence of alcohol or drugs can affect my decision-making, behaviors, personal safety, and the safety of others. Driving while impaired (DWI) can occur if a person is driving, operating, or in physical control of a car, motorcycle, boat, snowmobile, ATV, motorbike, off-road vehicle, or any other motor vehicle ([place] Statute 169A.20). I understand the risk if I choose to drive or operate any vehicle or machinery.    I understand that if I do not follow any of the conditions above, my prescriptions or treatment may be stopped or changed.          Opioids  What You Need to Know    What are opioids?   Opioids are pain medicines that must be prescribed by a doctor. They are also known as narcotics.     Examples are:   1. morphine (MS Contin, Mine)  2. oxycodone (Oxycontin)  3. oxycodone and acetaminophen (Percocet)  4. hydrocodone and acetaminophen (Vicodin, Norco)   5. fentanyl patch (Duragesic)   6. hydromorphone (Dilaudid)   7. methadone  8. codeine (Tylenol #3)     What do opioids do well?   Opioids are best for severe short-term pain such as after a surgery or injury. They may work well for cancer pain. They may  help some people with long-lasting (chronic) pain.     What do opioids NOT do well?   Opioids never get rid of pain entirely, and they don t work well for most patients with chronic pain. Opioids don t reduce swelling, one of the causes of pain.                                    Other ways to manage chronic pain and improve function include:       Treat the health problem that may be causing pain    Anti-inflammation medicines, which reduce swelling and tenderness, such as ibuprofen (Advil, Motrin) or naproxen (Aleve)    Acetaminophen (Tylenol)    Antidepressants and anti-seizure medicines, especially for nerve pain    Topical treatments such as patches or creams    Injections or nerve blocks    Chiropractic or osteopathic treatment    Acupuncture, massage, deep breathing, meditation, visual imagery, aromatherapy    Use heat or ice at the pain site    Physical therapy     Exercise    Stop smoking    Take part in therapy       Risks and side effects     Talk to your doctor before you start or decide to keep taking opioids. Possible side effects include:      Lowering your breathing rate enough to cause death    Overdose, including death, especially if taking higher than prescribed doses    Worse depression symptoms; less pleasure in things you usually enjoy    Feeling tired or sluggish    Slower thoughts or cloudy thinking    Being more sensitive to pain over time; pain is harder to control    Trouble sleeping or restless sleep    Changes in hormone levels (for example, less testosterone)    Changes in sex drive or ability to have sex    Constipation    Unsafe driving    Itching and sweating    Dizziness    Nausea, throwing up and dry mouth    What else should I know about opioids?    Opioids may lead to dependence, tolerance, or addiction.      Dependence means that if you stop or reduce the medicine too quickly, you will have withdrawal symptoms. These include loose poop (diarrhea), jitters, flu-like symptoms,  nervousness and tremors. Dependence is not the same as addiction.                       Tolerance means needing higher doses over time to get the same effect. This may increase the chance of serious side effects.      Addiction is when people improperly use a substance that harms their body, their mind or their relations with others. Use of opiates can cause a relapse of addiction if you have a history of drug or alcohol abuse.      People who have used opioids for a long time may have a lower quality of life, worse depression, higher levels of pain and more visits to doctors.    You can overdose on opioids. Take these steps to lower your risk of overdose:    1. Recognize the signs:  Signs of overdose include decrease or loss of consciousness (blackout), slowed breathing, trouble waking up and blue lips. If someone is worried about overdose, they should call 911.    2. Talk to your doctor about Narcan (naloxone).   If you are at risk for overdose, you may be given a prescription for Narcan. This medicine very quickly reverses the effects of opioids.   If you overdose, a friend or family member can give you Narcan while waiting for the ambulance. They need to know the signs of overdose and how to give Narcan.     3. Don't use alcohol or street drugs.   Taking them with opioids can cause death.    4. Do not take any of these medicines unless your doctor says it s OK. Taking these with opioids can cause death:    Benzodiazepines, such as lorazepam (Ativan), alprazolam (Xanax) or diazepam (Valium)    Muscle relaxers, such as cyclobenzaprine (Flexeril)    Sleeping pills like zolpidem (Ambien)     Other opioids      How to keep you and other people safe while taking opioids:    1. Never share your opioids with others.  Opioid medicines are regulated by the Drug Enforcement Agency (ZAIDA). Selling or sharing medications is a criminal act.    2. Be sure to store opioids in a secure place, locked up if possible. Young children  can easily swallow them and overdose.    3. When you are traveling with your medicines, keep them in the original bottles. If you use a pill box, be sure you also carry a copy of your medicine list from your clinic or pharmacy.    4. Safe disposal of opioids    Most pharmacies have places to get rid of medicine, called disposal kiosks. Medicine disposal options are also available in every [facility]. Search your county and  medication disposal  to find more options. You can find more details at:  [email or web address]-[email or web address]     I agree that my provider, clinic care team, and pharmacy may work with any city, state or federal law enforcement agency that investigates the misuse, sale, or other diversion of my controlled medicine. I will allow my provider to discuss my care with, or share a copy of, this agreement with any other treating provider, pharmacy or emergency room where I receive care.    I have read this agreement and have asked questions about anything I did not understand.    _______________________________________________________  Patient Signature - Ki Pederson _____________________                   Date     _______________________________________________________  Provider Signature - Paola Rico MD   _____________________                   Date     _______________________________________________________  Witness Signature (required if provider not present while patient signing)   _____________________                   Date

## 2022-12-07 NOTE — ASSESSMENT & PLAN NOTE
Rate controlled and on Xarelto for AC.  Continue diltiazem  mg daily, reduce metoprolol to 50 mg twice daily (was only taking 100 mg daily so hesitant to increase to 100 mg BID with HR of 57 today), and continue Xarelto 20 mg daily.  Patient's son will help him get follow-up visit scheduled with cardiology.

## 2022-12-07 NOTE — ASSESSMENT & PLAN NOTE
On long-term opiate therapy for chronic back pain secondary to degenerative disc disease.  Patient is on very significant doses of oxycodone as he was on hospice care earlier this year.  However, his goals right now are not in line with hospice and that he wants to keep treating his medical conditions and to keep on going to doctors and in the hospital if needed.  Discussed with patient and his son that we can continue his current regimen for now however I would like them to establish care with palliative medicine for assistance with pain and symptom management.  I will continue prescribing his opiates until he is able to establish with palliative.  Pain contract signed today.  Additionally, discussed that while I am prescribing opiate therapy if he uses any other persons controlled substances or opiates this would be a violation of the pain contract and we would have to taper or have someone else take over the prescribing.  Patient is understanding and in agreement with this, his son is now in control of all of his medications as well.

## 2022-12-07 NOTE — ASSESSMENT & PLAN NOTE
EF of 30% on echo September 2022.  This is significantly decreased from prior.  Patient does not appear volume overloaded today.  They are checking daily weights.    - We will continue Lasix 40 mg daily; if he gains 3 pounds in 24 hours increased to 80 mg daily until back to dry weight then decrease back to 40 mg daily for  - Given bradycardia today instructed patient to take metoprolol 50 mg twice a day (they have been doing 100 mg once a day).    - Instructed to get cardiology follow-up as scheduled.

## 2022-12-07 NOTE — PATIENT INSTRUCTIONS
Luis Rivera MD  Pulmonary and Critical Care Medicine  [facility]  Office [phone]    Call cardiology and palliative care to set up appointments.     Continue prednisone 20 mg until you see Dr. Rivera again.     Cut metoprolol in half to 50 mg twice a day.

## 2022-12-07 NOTE — LETTER
Opioid / Opioid Plus Controlled Substance Agreement    This is an agreement between you and your provider about the safe and appropriate use of controlled substance/opioids prescribed by your care team. Controlled substances are medicines that can cause physical and mental dependence (abuse).    There are strict laws about having and using these medicines. We here at [facility] are committing to working with you in your efforts to get better. To support you in this work, we ll help you schedule regular office appointments for medicine refills. If we must cancel or change your appointment for any reason, we ll make sure you have enough medicine to last until your next appointment.     As a Provider, I will:    Listen carefully to your concerns and treat you with respect.     Recommend a treatment plan that I believe is in your best interest. This plan may involve therapies other than opioid pain medication.     Talk with you often about the possible benefits, and the risk of harm of any medicine that we prescribe for you.     Provide a plan on how to taper (discontinue or go off) using this medicine if the decision is made to stop its use.    As a Patient, I understand that opioid(s):     Are a controlled substance prescribed by my care team to help me function or work and manage my condition(s).     Are strong medicines and can cause serious side effects such as:    Drowsiness, which can seriously affect my driving ability    A lower breathing rate, enough to cause death    Harm to my thinking ability     Depression     Abuse of and addiction to this medicine    Need to be taken exactly as prescribed. Combining opioids with certain medicines or chemicals (such as illegal drugs, sedatives, sleeping pills, and benzodiazepines) can be dangerous or even fatal. If I stop opioids suddenly, I may have severe withdrawal symptoms.    Do not work for all types of pain nor for all patients. If they re not helpful, I may  be asked to stop them.        The risks, benefits and side effects of these medicine(s) were explained to me. I agree that:  1. I will take part in other treatments as advised by my care team. This may be psychiatry or counseling, physical therapy, behavioral therapy, group treatment or a referral to a specialist.     2. I will keep all my appointments. I understand that this is part of the monitoring of opioids. My care team may require an office visit for EVERY opioid/controlled substance refill. If I miss appointments or don t follow instructions, my care team may stop my medicine.    3. I will take my medicines as prescribed. I will not change the dose or schedule unless my care team tells me to. There will be no refills if I run out early.     4. I may be asked to come to the clinic and complete a urine drug test or complete a pill count at any time. If I don t give a urine sample or participate in a pill count, the care team may stop my medicine.    5. I will only receive prescriptions from this clinic for chronic pain. If I am treated by another provider for acute pain issues, I will tell them that I am taking opioid pain medication for chronic pain and that I have a treatment agreement with this provider. I will inform my [facility] care team within one business day if I am given a prescription for any pain medication by another healthcare provider. My [facility] care team can contact other providers and pharmacists about my use of any medicines.    6. It is up to me to make sure that I don t run out of my medicines on weekends or holidays. If my care team is willing to refill my opioid prescription without a visit, I must request refills only during office hours. Refills may take up to 3 business days to process. I will use one pharmacy to fill all my opioid and other controlled substance prescriptions. I will notify the clinic about any changes to my insurance or medication  availability.    7. I am responsible for my prescriptions. If the medicine/prescription is lost, stolen or destroyed, it will not be replaced. I also agree not to share controlled substance medicines with anyone.    8. I am aware I should not use any illegal or recreational drugs. I agree not to drink alcohol unless my care team says I can.       9. If I enroll in the [place] [facility] program, I will tell my care team prior to my next refill.     10. I will tell my care team right away if I become pregnant, have a new medical problem treated outside of my regular clinic, or have a change in my medications.    11. I understand that this medicine can affect my thinking, judgment and reaction time. Alcohol and drugs affect the brain and body, which can affect the safety of my driving. Being under the influence of alcohol or drugs can affect my decision-making, behaviors, personal safety, and the safety of others. Driving while impaired (DWI) can occur if a person is driving, operating, or in physical control of a car, motorcycle, boat, snowmobile, ATV, motorbike, off-road vehicle, or any other motor vehicle ([place] Statute 169A.20). I understand the risk if I choose to drive or operate any vehicle or machinery.    I understand that if I do not follow any of the conditions above, my prescriptions or treatment may be stopped or changed.          Opioids  What You Need to Know    What are opioids?   Opioids are pain medicines that must be prescribed by a doctor. They are also known as narcotics.     Examples are:   1. morphine (MS Contin, Mine)  2. oxycodone (Oxycontin)  3. oxycodone and acetaminophen (Percocet)  4. hydrocodone and acetaminophen (Vicodin, Norco)   5. fentanyl patch (Duragesic)   6. hydromorphone (Dilaudid)   7. methadone  8. codeine (Tylenol #3)     What do opioids do well?   Opioids are best for severe short-term pain such as after a surgery or injury. They may work well for cancer pain. They may  help some people with long-lasting (chronic) pain.     What do opioids NOT do well?   Opioids never get rid of pain entirely, and they don t work well for most patients with chronic pain. Opioids don t reduce swelling, one of the causes of pain.                                    Other ways to manage chronic pain and improve function include:       Treat the health problem that may be causing pain    Anti-inflammation medicines, which reduce swelling and tenderness, such as ibuprofen (Advil, Motrin) or naproxen (Aleve)    Acetaminophen (Tylenol)    Antidepressants and anti-seizure medicines, especially for nerve pain    Topical treatments such as patches or creams    Injections or nerve blocks    Chiropractic or osteopathic treatment    Acupuncture, massage, deep breathing, meditation, visual imagery, aromatherapy    Use heat or ice at the pain site    Physical therapy     Exercise    Stop smoking    Take part in therapy       Risks and side effects     Talk to your doctor before you start or decide to keep taking opioids. Possible side effects include:      Lowering your breathing rate enough to cause death    Overdose, including death, especially if taking higher than prescribed doses    Worse depression symptoms; less pleasure in things you usually enjoy    Feeling tired or sluggish    Slower thoughts or cloudy thinking    Being more sensitive to pain over time; pain is harder to control    Trouble sleeping or restless sleep    Changes in hormone levels (for example, less testosterone)    Changes in sex drive or ability to have sex    Constipation    Unsafe driving    Itching and sweating    Dizziness    Nausea, throwing up and dry mouth    What else should I know about opioids?    Opioids may lead to dependence, tolerance, or addiction.      Dependence means that if you stop or reduce the medicine too quickly, you will have withdrawal symptoms. These include loose poop (diarrhea), jitters, flu-like symptoms,  nervousness and tremors. Dependence is not the same as addiction.                       Tolerance means needing higher doses over time to get the same effect. This may increase the chance of serious side effects.      Addiction is when people improperly use a substance that harms their body, their mind or their relations with others. Use of opiates can cause a relapse of addiction if you have a history of drug or alcohol abuse.      People who have used opioids for a long time may have a lower quality of life, worse depression, higher levels of pain and more visits to doctors.    You can overdose on opioids. Take these steps to lower your risk of overdose:    1. Recognize the signs:  Signs of overdose include decrease or loss of consciousness (blackout), slowed breathing, trouble waking up and blue lips. If someone is worried about overdose, they should call 911.    2. Talk to your doctor about Narcan (naloxone).   If you are at risk for overdose, you may be given a prescription for Narcan. This medicine very quickly reverses the effects of opioids.   If you overdose, a friend or family member can give you Narcan while waiting for the ambulance. They need to know the signs of overdose and how to give Narcan.     3. Don't use alcohol or street drugs.   Taking them with opioids can cause death.    4. Do not take any of these medicines unless your doctor says it s OK. Taking these with opioids can cause death:    Benzodiazepines, such as lorazepam (Ativan), alprazolam (Xanax) or diazepam (Valium)    Muscle relaxers, such as cyclobenzaprine (Flexeril)    Sleeping pills like zolpidem (Ambien)     Other opioids      How to keep you and other people safe while taking opioids:    1. Never share your opioids with others.  Opioid medicines are regulated by the Drug Enforcement Agency (ZAIDA). Selling or sharing medications is a criminal act.    2. Be sure to store opioids in a secure place, locked up if possible. Young children  can easily swallow them and overdose.    3. When you are traveling with your medicines, keep them in the original bottles. If you use a pill box, be sure you also carry a copy of your medicine list from your clinic or pharmacy.    4. Safe disposal of opioids    Most pharmacies have places to get rid of medicine, called disposal kiosks. Medicine disposal options are also available in every [facility]. Search your county and  medication disposal  to find more options. You can find more details at:  [email or web address]     I agree that my provider, clinic care team, and pharmacy may work with any city, state or federal law enforcement agency that investigates the misuse, sale, or other diversion of my controlled medicine. I will allow my provider to discuss my care with, or share a copy of, this agreement with any other treating provider, pharmacy or emergency room where I receive care.    I have read this agreement and have asked questions about anything I did not understand.    _______________________________________________________  Patient Signature - Ki Pederson _____________________                   Date     _______________________________________________________  Provider Signature - Paola Rico MD   _____________________                   Date     _______________________________________________________  Witness Signature (required if provider not present while patient signing)   _____________________                   Date

## 2022-12-07 NOTE — ASSESSMENT & PLAN NOTE
Blood pressure above goal today however has been low at all prior office visits and during recent admissions.  Will not add back lisinopril or amlodipine today.  As noted elsewhere patient needs to follow-up with cardiology.

## 2022-12-07 NOTE — ASSESSMENT & PLAN NOTE
Very severe chronic COPD.  He is on 2 L nasal cannula with activity.  Needs to follow-up with pulmonology in clinic to discuss BiPAP at night.  Breathing is stable today.  However he does have an expiratory wheezing on exam.  We will continue his current regimen including prednisone 20 mg daily as his son says he has been on this dose of steroid for the last year.  I discussed that ideally we would get him on a lower dose of steroid even if he does have to be on 1 chronically.  However I will defer dosing and that taper to pulmonology.  Phone number for Dr. Rivera's office provided in after visit summary.

## 2022-12-07 NOTE — ASSESSMENT & PLAN NOTE
Is at high risk for polysubstance abuse given very large opiate prescription, benzodiazepine use and history of alcohol use (per chart).  I do think his opiate prescription for symptom control is appropriate given end-stage nature of his lung and cardiac disease, albeit risky given respiratory status.  Discussed these concerns with patient and his son today they are okay with continuing.  We will refer to palliative care for assistance with pain and symptom control.  Pain contract signed today, strongly emphasized that any use of any other controlled substance or illicit substance will be a violation of the pain contract in which case I would taper his opiates.

## 2022-12-09 NOTE — RESULT ENCOUNTER NOTE
Here are your lab results. Your kidney function and electrolytes were normal. Your urine drug screen returned as expected.

## 2022-12-18 NOTE — DISCHARGE INSTRUCTIONS
Please take your prednisone 40 mg for the next 5 days.    Continue the doxycycline to help prevent pneumonia.    Use your supplemental oxygen to maintain an oxygen saturation between 90 and 92%    Follow-up with your primary care doctor to discuss need for sleep apnea testing and initiation of nighttime BiPAP.    Return to the emergency department if you are unable to manage her symptoms at home.

## 2022-12-18 NOTE — ED PROVIDER NOTES
"EMERGENCY DEPARTMENT ENCOUNTER      NAME: Ki Pederson  AGE: 67 year old male  YOB: 1955  MRN: [id number]  EVALUATION DATE & TIME: 12/17/2022 10:19 PM    PCP: Paola Rico    ED PROVIDER: Claire Shields M.D.      Chief Complaint   Patient presents with     Shortness of Breath         FINAL IMPRESSION:  1. COPD exacerbation (H)        MEDICAL DECISION MAKING:    10:19 PM I met with the patient, obtained history, performed an initial exam, and discussed options and plan for diagnostics and treatment here in the ED.   12:48 AM Rechecked and updated the patient. We discussed the plan for discharge and the patient is agreeable. Reviewed supportive cares, symptomatic treatment, outpatient follow up, and reasons to return to the Emergency Department. Patient to be discharged by ED RN.   Pertinent Labs & Imaging studies reviewed. (See chart for details)     Ki Pederson is a 67 year old male who presents with shortness of breath.  Patient does occasionally use supplemental oxygen at home but hesitant to use it because he developed \"oxygen toxicity\" the past.  Having increasing wheezing, shortness of breath and chest pain.  DuoNeb's at home were not working.  O2 sats at home 89% on room air.  Vital signs here show oxygen saturation 96% on 2 L/min.  He has some increased work of breathing but his blood pressure, temperature and pulse are normal.  Differential diagnosis aside from his known COPD is ACS, pneumonia, viral URI, or PE.  Low likelihood of heart failure or effusion given no significant peripheral edema and the acuity of the symptoms.  I will get chest x-ray, ECG, troponin, D-dimer, basic labs and give him Decadron, additional duo nebs and supplemental oxygen to help with symptoms.    His ECG is unchanged and showing no signs of acute ischemia.  Troponin is well within the age limit norms and symptoms have been present for 6 hours so this value is very reassuring against ACS.  " His D-dimer was also normal and chest x-ray did not show any effusion, pneumothorax or pneumonia.  I did consider a CT scan but given normal labs and improvement in symptoms with medications and normal chest x-ray, will defer at this time.  He is chronically antibiotics, namely azithromycin to prevent development of COPD into pneumonia.  He does take a low-dose of prednisone daily but given the fact that tests and imaging are pointing towards a COPD exacerbation, we will plan to increase that to a prednisone burst for 5 days.    We discussed how to use the supplemental oxygen at home to maintain his oxygen saturation between 90 and 92%.  They do have a home pulse oximeter.  He used to have CPAP at night but is no longer on this due to switching medical providers.  I did recommend he follow-up in clinic to discuss referral for sleep study and possible reinitiation of nightly CPAP.  We also discussed warning signs and indications to return to the emergency department.  He and his daughter understand these and all discharge instructions.       Medical Decision Making    History:    Supplemental history from: Family Member/Significant Other    External Record(s) reviewed: Outpatient Record: 12/7/22 clinic visit    Work Up:    Chart documentation includes differential considered and any EKGs or imaging interpreted by provider.    In additional to work up documented, I considered the following work up: Imaging CT, but deferred due to normal ddimer and cxr.    External consultation:    Discussion of management with another provider: See chart documentation, if applicable    Complicating factors:    Care impacted by chronic illness: Chronic Lung Disease    Care affected by social determinants of health: N/A    Disposition considerations: Discharge. I prescribed additional prescription strength medication(s) as charted. I considered admission, but discharged patient after significant clinical improvement.        MEDICATIONS  "GIVEN IN THE EMERGENCY:  Medications   ipratropium - albuterol 0.5 mg/2.5 mg/3 mL (DUONEB) neb solution 3 mL (3 mLs Nebulization Given 12/17/22 2259)   dexamethasone PF (DECADRON) injection 10 mg (10 mg Intravenous Given 12/17/22 2257)   oxyCODONE-acetaminophen (PERCOCET) 5-325 MG per tablet 2 tablet (2 tablets Oral Given 12/18/22 0012)   gabapentin (NEURONTIN) capsule 600 mg (600 mg Oral Given 12/18/22 0012)       NEW PRESCRIPTIONS STARTED AT TODAY'S ER VISIT:  New Prescriptions    No medications on file          =================================================================    HPI    Patient information was obtained from: patient and EMS    Use of : Use of : N/A      Ki CHOPRA Dacia is a 67 year old male with a history of MI, CAD, COPD, hypertension, Afib, stroke, intravenous drug abuse in remission, tobacco abuse, DNR, who presents with shortness of breath.    Per EMS, patient comes from home after eating dinner with his family. States that while eating dinner around 6 pm, he had shortness of breath which later developed into chest pain.  Family decided to call EMS after that. En route, he was given duo neb with minimal relief. His O2 saturation was 90% room air prior to getting into the rig and was 99% after duo neb treatment.    Per patient, he reports he was having shortness of breath in between bites at dinner this evening. He went into his room to try to have a nebulizer treatment but had a syncopal episode. He states that he also was having some chest pain. He has some wheezing currently. He says that he hasn't been feeling well \"for a while\" and reports a subjective fever and cough with no production about 2 days ago. He denies nausea, vomiting, numbness, tingling, weakness, change in bowel and urinary habits, hematuria, and hematochezia. He is on thinners, Xarelto. He takes oxycodone daily due to chronic pain. He uses O2 2L NC PRN. There were no other concerns/complaints at this " time.    REVIEW OF SYSTEMS   Review of Systems   Constitutional: Positive for fever.   Respiratory: Positive for cough (no production), shortness of breath and wheezing.    Cardiovascular: Positive for chest pain.   Gastrointestinal: Negative for blood in stool, constipation, diarrhea, nausea and vomiting.   Genitourinary: Negative for difficulty urinating, dysuria and hematuria.   Neurological: Positive for syncope (1 episode). Negative for weakness and numbness.        Denies tingling.   All other systems reviewed and are negative.       PAST MEDICAL HISTORY:  Past Medical History:   Diagnosis Date     Anxiety      Atrial fibrillation (H)      CAD (coronary artery disease)      Cerebral infarction (H)      Chronic hepatitis C virus infection (H)      COPD (chronic obstructive pulmonary disease) (H)      Essential hypertension      Hemangioma     massive hemangioma; left hand     Hemangioma     Left hand     Hypertension      Intravenous drug abuse in remission (H)     Sober since ~2010     Lumbar spinal stenosis      Myocardial infarction (H)      Peripheral neuropathy      Restless leg syndrome      Stroke (H)      TIA (transient ischemic attack)        PAST SURGICAL HISTORY:  Past Surgical History:   Procedure Laterality Date     CERVICAL FUSION      C6 and C7     CERVICAL FUSION      C6-7     PICC TRIPLE LUMEN PLACEMENT  9/21/2022            CURRENT MEDICATIONS:    No current facility-administered medications for this encounter.    Current Outpatient Medications:      albuterol (PROAIR HFA/PROVENTIL HFA/VENTOLIN HFA) 108 (90 Base) MCG/ACT inhaler, Inhale 2 puffs into the lungs every 3 hours, Disp: 18 g, Rfl: 11     amiodarone (PACERONE) 200 MG tablet, Take 1 tablet (200 mg) by mouth daily, Disp: 30 tablet, Rfl: 11     azithromycin (ZITHROMAX) 250 MG tablet, USE ONE TAB BY MOUTH ONCE DAILY ON Monday, Wednesday, Friday ONGOING, Disp: 36 tablet, Rfl: 3     citalopram (CELEXA) 40 MG tablet, Take 1 tablet (40 mg) by  mouth daily, Disp: 60 tablet, Rfl: 0     clonazePAM (KLONOPIN) 1 MG tablet, Take 1-2 tablets by mouth nightly as needed for anxiety or sleep, Disp: , Rfl:      diltiazem ER COATED BEADS (CARDIZEM CD/CARTIA XT) 300 MG 24 hr capsule, Take 1 capsule (300 mg) by mouth daily, Disp: 30 capsule, Rfl: 3     furosemide (LASIX) 40 MG tablet, Take 1 tablet (40 mg) by mouth daily, Disp: 90 tablet, Rfl: 3     gabapentin (NEURONTIN) 300 MG capsule, Take 2 capsules (600 mg) by mouth 3 times daily, Disp: 540 capsule, Rfl: 3     guaiFENesin (MUCINEX) 600 MG 12 hr tablet, Take 2 tablets (1,200 mg) by mouth daily, Disp: 120 tablet, Rfl: 0     ipratropium - albuterol 0.5 mg/2.5 mg/3 mL (DUONEB) 0.5-2.5 (3) MG/3ML neb solution, Nebulize tid for 3 days and then tid prn for sob (Patient taking differently: Take 1 vial by nebulization 3 times daily as needed Nebulize tid for 3 days and then tid prn for sob), Disp: 90 mL, Rfl: 3     magnesium 250 MG tablet, Take 1 tablet (250 mg) by mouth At Bedtime, Disp: 90 tablet, Rfl: 3     metoprolol tartrate (LOPRESSOR) 100 MG tablet, Take 0.5 tablets (50 mg) by mouth 2 times daily, Disp: 60 tablet, Rfl: 11     oxyCODONE HCl (ROXICODONE) 20 MG TABS immediate release tablet, Take 20 mg by mouth every 4 hours (while awake) Not to exceed 5 tabs per day, Disp: 150 tablet, Rfl: 0     predniSONE (DELTASONE) 20 MG tablet, Take 1 tablet (20 mg) by mouth daily, Disp: 30 tablet, Rfl: 3     rivaroxaban ANTICOAGULANT (XARELTO) 20 MG TABS tablet, Take 1 tablet (20 mg) by mouth daily (with dinner), Disp: 90 tablet, Rfl: 3     famotidine (PEPCID) 20 MG tablet, Take 1 tablet (20 mg) by mouth daily (Patient not taking: Reported on 12/17/2022), Disp: 60 tablet, Rfl: 0     naloxone (NARCAN) 4 MG/0.1ML nasal spray, Spray 1 spray (4 mg) into one nostril alternating nostrils once as needed for opioid reversal every 2-3 minutes until assistance arrives, Disp: 0.2 mL, Rfl: 0    Facility-Administered Medications Ordered in  Other Encounters:      azithromycin (ZITHROMAX) 500 mg in sodium chloride 0.9 % 250 mL intermittent infusion, 500 mg, Intravenous, Once, Fermin Talavera MD     piperacillin-tazobactam (ZOSYN) 3.375 g vial to attach to  mL bag, 3.375 g, Intravenous, Once, Fermin Talavera MD    ALLERGIES:  Allergies   Allergen Reactions     Symbicort Rash       FAMILY HISTORY:  Family History   Problem Relation Age of Onset     Coronary Artery Disease Mother      Diabetes Mother      Coronary Artery Disease Father      Chronic Obstructive Pulmonary Disease Brother      Heart Disease Brother      Chronic Obstructive Pulmonary Disease Brother      Heart Disease Brother      No Known Problems Brother      Heart Disease Sister      Chronic Obstructive Pulmonary Disease Sister      Chronic Obstructive Pulmonary Disease Sister      Heart Disease Sister      No Known Problems Sister      Chronic Obstructive Pulmonary Disease Sister      Heart Disease Sister      No Known Problems Sister      No Known Problems Sister        SOCIAL HISTORY:   Social History     Tobacco Use     Smoking status: Former     Packs/day: 2.00     Types: Cigarettes     Quit date:      Years since quittin.9     Smokeless tobacco: Current     Types: Chew     Tobacco comments:     No passive exposure   Vaping Use     Vaping Use: Never used   Substance Use Topics     Alcohol use: Not Currently     Drug use: Not Currently     Types: IV     Comment: Sober from IV drug use since ~        PHYSICAL EXAM:    Vitals: /73   Pulse 75   Temp 97.6  F (36.4  C) (Oral)   Resp 23   Ht 1.829 m (6')   Wt 99.8 kg (220 lb)   SpO2 96%   BMI 29.84 kg/m     General:. Alert and interactive, comfortable appearing.  HENT: Oropharynx without erythema or exudates. MMM.  TMs clear bilaterally.  Eyes: Pupils mid-sized and equally reactive.   Neck: Full AROM.  No midline tenderness to palpation.  Cardiovascular: Regular rate and rhythm. Peripheral pulses 2+  bilaterally.  Chest/Pulmonary: Increased respiratory effort, 3-4 word sentences, diffuse expiratory wheezing bilaterally with decreased air movement, no accessory muscle use. No chest wall tenderness or deformities.  Abdomen: Soft, nondistended. Nontender without guarding or rebound.  Back/Spine: No CVA or midline tenderness.  Extremities: Normal ROM of all major joints. No lower extremity edema.   Skin: Warm and dry. Normal skin color.   Neuro: Speech clear. CNs grossly intact. Moves all extremities appropriately. Strength and sensation grossly intact to all extremities.   Psych: Normal affect/mood, cooperative, memory appropriate.     LAB:  All pertinent labs reviewed and interpreted.  Labs Ordered and Resulted from Time of ED Arrival to Time of ED Departure   BASIC METABOLIC PANEL - Abnormal       Result Value    Sodium 140      Potassium 3.6      Chloride 100      Carbon Dioxide (CO2) 27      Anion Gap 13      Urea Nitrogen 16.5      Creatinine 1.35 (*)     Calcium 8.5 (*)     Glucose 141 (*)     GFR Estimate 58 (*)    BLOOD GAS VENOUS - Abnormal    pH Venous 7.38      pCO2 Venous 50      pO2 Venous 38      Bicarbonate Venous 29      Base Excess/Deficit (+/-) 4.2      Oxyhemoglobin Venous 68.1 (*)     O2 Sat, Venous 69.1 (*)    CBC WITH PLATELETS AND DIFFERENTIAL - Abnormal    WBC Count 9.3      RBC Count 4.30 (*)     Hemoglobin 12.9 (*)     Hematocrit 40.3      MCV 94      MCH 30.0      MCHC 32.0      RDW 13.4      Platelet Count 283      % Neutrophils 88      % Lymphocytes 4      % Monocytes 7      % Eosinophils 0      % Basophils 0      % Immature Granulocytes 1      NRBCs per 100 WBC 0      Absolute Neutrophils 8.3      Absolute Lymphocytes 0.4 (*)     Absolute Monocytes 0.6      Absolute Eosinophils 0.0      Absolute Basophils 0.0      Absolute Immature Granulocytes 0.1      Absolute NRBCs 0.0     TROPONIN T, HIGH SENSITIVITY - Normal    Troponin T, High Sensitivity 12     INR - Normal    INR 0.87      PARTIAL THROMBOPLASTIN TIME - Normal    aPTT 24     D DIMER QUANTITATIVE - Normal    D-Dimer Quantitative 0.47     INFLUENZA A/B & SARS-COV2 PCR MULTIPLEX - Normal    Influenza A PCR Negative      Influenza B PCR Negative      RSV PCR Negative      SARS CoV2 PCR Negative         RADIOLOGY:  XR Chest Port 1 View   Final Result   IMPRESSION: Normal heart size and pulmonary vascularity. Lungs are clear. Postoperative changes lower cervical spine. Otherwise unremarkable. No acute findings.          EKG:   Impression: Normal sinus rhythm  Rate: 75 bpm  Rhythm: Sinus  QRS Interval: 84 ms  QTc Interval: 477 ms  Comparison: Compared with ECG of 11-, no significant or concerning changes  I have independently reviewed and interpreted the EKG(s) documented above.     I, Ruchi Valencia, am serving as a scribe to document services personally performed by Dr. Claire Shields  based on my observation and the provider's statements to me. I, Claire Shields MD attest that Ruchi Valencia is acting in a scribe capacity, has observed my performance of the services and has documented them in accordance with my direction.      Claire Shields M.D.  Emergency Medicine  [facility] EMERGENCY DEPARTMENT  [facility] [facility] [address]  [phone]  Dept: [phone]         Claire Shields MD  12/18/22 2409

## 2022-12-18 NOTE — ED NOTES
Bed: JNED-06  Expected date: 12/17/22  Expected time:   Means of arrival:   Comments:  [facility]   67 year old male   Chest pain

## 2022-12-18 NOTE — PHARMACY-ADMISSION MEDICATION HISTORY
Pharmacy Note - Admission Medication History    _____________________________________________________________________    Prior To Admission (PTA) med list completed and updated in EMR.       PTA Med List   Medication Sig Last Dose     albuterol (PROAIR HFA/PROVENTIL HFA/VENTOLIN HFA) 108 (90 Base) MCG/ACT inhaler Inhale 2 puffs into the lungs every 3 hours 12/17/2022     amiodarone (PACERONE) 200 MG tablet Take 1 tablet (200 mg) by mouth daily 12/17/2022     azithromycin (ZITHROMAX) 250 MG tablet USE ONE TAB BY MOUTH ONCE DAILY ON Monday, Wednesday, Friday ONGOING Past Week     citalopram (CELEXA) 40 MG tablet Take 1 tablet (40 mg) by mouth daily 12/17/2022     clonazePAM (KLONOPIN) 1 MG tablet Take 1-2 tablets by mouth nightly as needed for anxiety or sleep Past Week     diltiazem ER COATED BEADS (CARDIZEM CD/CARTIA XT) 300 MG 24 hr capsule Take 1 capsule (300 mg) by mouth daily 12/17/2022     furosemide (LASIX) 40 MG tablet Take 1 tablet (40 mg) by mouth daily 12/17/2022     gabapentin (NEURONTIN) 300 MG capsule Take 2 capsules (600 mg) by mouth 3 times daily 12/17/2022 at x3     guaiFENesin (MUCINEX) 600 MG 12 hr tablet Take 2 tablets (1,200 mg) by mouth daily 12/17/2022     ipratropium - albuterol 0.5 mg/2.5 mg/3 mL (DUONEB) 0.5-2.5 (3) MG/3ML neb solution Nebulize tid for 3 days and then tid prn for sob (Patient taking differently: Take 1 vial by nebulization 3 times daily as needed Nebulize tid for 3 days and then tid prn for sob) 12/17/2022 at x2     magnesium 250 MG tablet Take 1 tablet (250 mg) by mouth At Bedtime 12/17/2022     metoprolol tartrate (LOPRESSOR) 100 MG tablet Take 0.5 tablets (50 mg) by mouth 2 times daily 12/17/2022 at x2     oxyCODONE HCl (ROXICODONE) 20 MG TABS immediate release tablet Take 20 mg by mouth every 4 hours (while awake) Not to exceed 5 tabs per day 12/17/2022     predniSONE (DELTASONE) 20 MG tablet Take 1 tablet (20 mg) by mouth daily 12/17/2022     rivaroxaban ANTICOAGULANT  (XARELTO) 20 MG TABS tablet Take 1 tablet (20 mg) by mouth daily (with dinner) 12/17/2022       Information source(s): Patient, Family member, Clinic records, Hospital records and [facility]Everywhere/[facility]  Method of interview communication: in-person    Summary of Changes to PTA Med List  New: -  Discontinued: famotidine, budesonide  Changed: -    Patient was asked about OTC/herbal products specifically.  PTA med list reflects this.    In the past week, patient estimated taking medication this percent of the time:  greater than 90%.    Allergies were reviewed, assessed, and updated with the patient.      Patient did not bring any medications to the hospital and can't retrieve from home. No multi-dose medications are available for use during hospital stay.     The information provided in this note is only as accurate as the sources available at the time of the update(s).    Thank you for the opportunity to participate in the care of this patient.    Yissel Hinojosa, PharmD, BCPS 12/17/22 11:14 PM

## 2022-12-18 NOTE — ED TRIAGE NOTES
[facility] brought pt in with c/o sob and chest pain. Pt wheezing and received duo neb enroute. Has SL. Pt states hurts all over and lungs hurt. Pt was here less than a month ago. Takes percocet for chronic pin     Triage Assessment     Row Name 12/17/22 2225       Triage Assessment (Adult)    Airway WDL WDL

## 2022-12-20 NOTE — PROGRESS NOTES
[facility] Care Coordination Contact  CC received notification of Emergency Room visit.  ER visit occurred on 12/17/22 at [facility] with Dx of shortness of breath.    CC contacted adult son Katlyn and reviewed discharge summary.    Member has a follow-up appointment with PCP: Yes: scheduled on 1/4/23  Member has had a change in condition: No  New referrals placed: NO  Home Visit Needed: No  Care plan reviewed and updated.  PCP notified of ED visit via EMR.    Katlyn reported that member is now staying with his mother.  He gave her contact number.      Phone call to member, family member reports that he is sleeping and is doing stable since discharge home.  She reports that he is staying with her for now but was told by son that will be moving into Assisted Living as [facility] worker is helping him with process.      Left voice mail message for DANICA Arreola [phone] for update regarding assisted living.      Prashanth Romero RN, PHN  [facility]  [phone]

## 2022-12-22 NOTE — TELEPHONE ENCOUNTER
Routing refill request to provider for review/approval because:  Drug not on the [facility] [place]   Medication is reported/historical    Last Written Prescription Date:  12/17/2022  Last Fill Quantity: NA,  # refills: NA   Last office visit provider:  12/7/2022     Requested Prescriptions   Pending Prescriptions Disp Refills     clonazePAM (KLONOPIN) 1 MG tablet       Sig: Take 1-2 tablets (1-2 mg) by mouth nightly as needed for anxiety or sleep       There is no refill protocol information for this order          Ariella Jeronimo RN 12/21/22 10:49 PM

## 2022-12-22 NOTE — TELEPHONE ENCOUNTER
"Routing refill request to provider for review/approval because:  Drug not on the [facility] refill protocol     Last Written Prescription Date:  11/25/22  Last Fill Quantity: 150,  # refills: 0   Last office visit provider:  12/7/22     Requested Prescriptions   Pending Prescriptions Disp Refills     famotidine (PEPCID) 20 MG tablet 60 tablet 0     Sig: Take 1 tablet (20 mg) by mouth daily       H2 Blockers Protocol Passed - 12/20/2022 12:36 PM        Passed - Patient is age 12 or older        Passed - Recent (12 mo) or future (30 days) visit within the authorizing provider's specialty     Patient has had an office visit with the authorizing provider or a provider within the authorizing providers department within the previous 12 mos or has a future within next 30 days. See \"Patient Info\" tab in inbasket, or \"Choose Columns\" in Meds & Orders section of the refill encounter.              Passed - Medication is active on med list           oxyCODONE HCl (ROXICODONE) 20 MG TABS immediate release tablet 150 tablet 0     Sig: Take 20 mg by mouth every 4 hours (while awake) Not to exceed 5 tabs per day       There is no refill protocol information for this order          Erika Lo RN 12/21/22 6:33 PM  "

## 2022-12-26 PROBLEM — R00.1 BRADYCARDIA: Status: ACTIVE | Noted: 2022-01-01

## 2022-12-26 PROBLEM — I95.9 HYPOTENSION, UNSPECIFIED HYPOTENSION TYPE: Status: ACTIVE | Noted: 2022-01-01

## 2022-12-26 PROBLEM — J43.8 OTHER EMPHYSEMA (H): Status: ACTIVE | Noted: 2022-01-01

## 2022-12-26 PROBLEM — J96.11 CHRONIC RESPIRATORY FAILURE WITH HYPOXIA AND HYPERCAPNIA (H): Status: ACTIVE | Noted: 2022-01-01

## 2022-12-26 PROBLEM — J96.12 CHRONIC RESPIRATORY FAILURE WITH HYPOXIA AND HYPERCAPNIA (H): Status: ACTIVE | Noted: 2022-01-01

## 2022-12-26 PROBLEM — N18.31 STAGE 3A CHRONIC KIDNEY DISEASE (H): Status: ACTIVE | Noted: 2022-01-01

## 2022-12-26 NOTE — ED PROVIDER NOTES
EMERGENCY DEPARTMENT ENCOUNTER      NAME: Ki Pederson  AGE: 67 year old male  YOB: 1955  MRN: [id number]  EVALUATION DATE & TIME: 12/26/2022  2:48 PM    PCP: Paola Rico    ED PROVIDER: Lois Jurado MD    Chief Complaint   Patient presents with     Syncope         FINAL IMPRESSION:  1. Acute and chr resp failure, unsp w hypoxia or hypercapnia (H)    2. COPD exacerbation (H)    3. Altered mental status, unspecified altered mental status type    4. Hyperkalemia    5. Elevated brain natriuretic peptide (BNP) level          ED COURSE & MEDICAL DECISION MAKING:    Pertinent Labs & Imaging studies reviewed. (See chart for details)  67 year old male with history of HTN, CAD with previous MI, CVA, A. fib anticoagulated, COPD on 2 L home O2 who presents to the Emergency Department for evaluation of altered mental status.  Family describes syncope, but patient off home O2, and mental status improved with oxygen application.  Differential includes hypoxia, hypercapnia, arrhythmia, symptomatic anemia, electrolyte abnormality, syncope.  Patient altered and drowsy here.  There is some report of him taking extra Klonopin but the ex-wife does not believe that patient has access to anything.  He is however on significant amounts of oxycodone and Klonopin daily which no doubt might contribute to his drowsiness.  Less likely CVA.  Patient has been complaining of some shortness of breath as of late.  Concern for COPD exacerbation, pneumonia.  Again anticoagulated and compliant.  My concern for PE is low.    Patient placed on monitor, IV established and blood obtained.  Twelve-lead EKG shows sinus bradycardia.  No ischemic changes.  Patient wheezy here.  Given 125 mg Solu-Medrol, DuoNeb.  CBC, BMP, BNP, troponin, coags notable for creatinine of 1.37, baseline.  Minimal hyper kalemia with potassium of 5.5 though specimen is hemolyzed.  BNP elevated at 494 up from previous.  Evidence of respiratory  acidosis with pH of 7.28, PCO2 63, PO2 of 119 and bicarb of 30.  COVID/influenza/RSV negative.  Chest x-ray unremarkable.  CT head unremarkable.  While in ED patient did have episode of hypotension.  Given 500 mL normal saline bolus with resolution.  He remains on his home O2.  Will be admitted to medicine for further evaluation.      ED Course as of 12/26/22 2236   Mon Dec 26, 2022   1444 I met with the patient to gather history and to perform my initial exam. We discussed plans for the ED course, including diagnostic testing and treatment.      1456 Spoke w ex wife, sabine, who lives w pt and was there at the time. Last several days states that he feels a copd flare coming on. Passed out in chair. Unresponsive. On 2L O2 at baseline, but was NOT on O2.  Family put O2 on, sats still in 80s, so family bumped up to 3L w O2 89%.  Called 911.     1458 Pt doesn't have control of meds. When he gets hypoxic, he says things that don't make sense.  Stated he took 8 klonopin between 8am and 3pm.  Ex-wife has meds in safe in HER room, he can't get meds.  Ex-wife counted pills and none of them are missing.    1500 Ex-wife states yest c/o chills yest. They started empiric doxy yest 1 dose for copd sx.    1502 Swelling in legs x2d, given 80mg lasix daily instead of usual 40mg.  3lb wt gain.    1502 No longer on hospice.  Appt 1/4/23 with [facility] care.    1535 BP(!): 85/47  500ml bolus still going in currently   1545 Blood gas arterial(!)  Overall unchanged from 3mo ago   1552 Creatinine(!): 1.37  Unchanged    1552 Troponin T, High Sensitivity: 20  Gender nml   1618 N-Terminal Pro BNP Inpatient(!): 949  Up from 699, 2mo ago   1618 XR Chest Port 1 View  unremarkable   1618 Called lab on ETA for CBC.    1707 I rechecked and updated the patient with results.     1712 Blood pressure 110s over 70s.  He is still quite drowsy and falls asleep readily on exam.  Agreeable with admission.   1722 I spoke with Dr. Mason, hospitalist, who  agrees to plan of admission.        Medical Decision Making    History:    Supplemental history from: Family Member/Significant Other EMS    External Record(s) reviewed: Documented in HPI, if applicable.    Work Up:    Chart documentation includes differential considered and any EKGs or imaging independently interpreted by provider.    In additional to work up documented, I considered the following work up: See chart documentation, if applicable.    External consultation:    Discussion of management with another provider: See chart documentation, if applicable    Complicating factors:    Care impacted by chronic illness: Cerebrovascular Disease, Chronic Lung Disease and Heart Disease    Care affected by social determinants of health: Access to Medical Care    Disposition considerations: Admit.        At the conclusion of the encounter I discussed the results of all of the tests and the disposition. The questions were answered. The patient or family acknowledged understanding and was agreeable with the care plan.    CONSULTS:  Hospitalist     CRITICAL CARE:  Critical Care  Performed by: Lois Jurado MD  Authorized by: Lois Jurado MD     Total critical care time: 35 minutes  Criticalcare time was exclusive of separately billable procedures and treating other patients.  Critical care was necessary to treat or prevent imminent or life-threatening deterioration of the following conditions: Respiratory failure  Critical care was time spent personally by me on the following activities: development of treatment plan with patient or surrogate, discussions with consultants, examination of patient, evaluation of patient's response totreatment, obtaining history from patient or surrogate, ordering and performing treatments and interventions, ordering and review of laboratory studies, ordering and review of radiographic studies and re-evaluation ofpatient's condition, this excludes any separately billable  procedures.      MEDICATIONS GIVEN IN THE EMERGENCY:  Medications   ipratropium - albuterol 0.5 mg/2.5 mg/3 mL (DUONEB) neb solution 3 mL (3 mLs Nebulization Not Given 12/26/22 1644)   albuterol (PROVENTIL HFA/VENTOLIN HFA) inhaler (2 puffs Inhalation Given 12/26/22 2218)   azithromycin (ZITHROMAX) tablet 250 mg (has no administration in time range)   citalopram (celeXA) tablet 40 mg (has no administration in time range)   clonazePAM (klonoPIN) tablet 1 mg (has no administration in time range)   clonazePAM (klonoPIN) tablet 1 mg (1 mg Oral Given 12/26/22 2232)   famotidine (PEPCID) tablet 20 mg (has no administration in time range)   guaiFENesin (MUCINEX) 12 hr tablet 1,200 mg (has no administration in time range)   predniSONE (DELTASONE) tablet 20 mg (has no administration in time range)   rivaroxaban ANTICOAGULANT (XARELTO) tablet 20 mg (20 mg Oral Given 12/26/22 1943)   oxyCODONE (ROXICODONE) tablet 10 mg (10 mg Oral Given 12/26/22 2231)   acetaminophen (TYLENOL) tablet 650 mg (has no administration in time range)   bisacodyl (DULCOLAX) suppository 10 mg (has no administration in time range)   bisacodyl (DULCOLAX) EC tablet 5 mg (has no administration in time range)   alum & mag hydroxide-simethicone (MAALOX) suspension 30 mL (has no administration in time range)   melatonin tablet 3 mg (has no administration in time range)   magnesium hydroxide (MILK OF MAGNESIA) suspension 30 mL (has no administration in time range)   senna-docusate (SENOKOT-S/PERICOLACE) 8.6-50 MG per tablet 1 tablet (has no administration in time range)   naloxone (NARCAN) injection 0.2 mg (has no administration in time range)     Or   naloxone (NARCAN) injection 0.4 mg (has no administration in time range)     Or   naloxone (NARCAN) injection 0.2 mg (has no administration in time range)     Or   naloxone (NARCAN) injection 0.4 mg (has no administration in time range)   gabapentin (NEURONTIN) capsule 300 mg (300 mg Oral Given 12/26/22 1955)    furosemide (LASIX) tablet 40 mg (has no administration in time range)   0.9% sodium chloride BOLUS (0 mLs Intravenous Stopped 12/26/22 1637)   methylPREDNISolone sodium succinate (solu-MEDROL) injection 125 mg (125 mg Intravenous Given 12/26/22 1522)       NEW PRESCRIPTIONS STARTED AT TODAY'S ER VISIT  Current Discharge Medication List             =================================================================    HPI    Patient information was obtained from: Patient and patient family     Use of Intrepreter: N/A        Ki Pederson is a 67 year old male with pertinent medical history of A-fib, CAD, cerebral infarction, COPD, hypertension, myocardial infarction, stroke, TIA who presents with syncope.     HPI limited due to patient mental status     Per patient he had x1 episode of syncope while sitting. Family called 911. Patient is on blood thinners for A-fib.     Denies lightheadedness, dizziness, pain, shortness of breath, chest pain, palpitations, blood diarrhea.     Per nurse, EMS reported patient took 8mg of Klonopin. Patient is on 2.5L of oxygen at home. On arrival patient was in a state of drowsiness and nurse had to give a sternal rub for patient to wake up.     Per patient's ex wife, last several days states that he feels a copd flare coming on. Passed out in chair. Unresponsive. On 2L O2 at baseline, but was NOT on O2.  Family put O2 on, sats still in 80s, so family bumped up to 3L w O2 89%.  Called 911. Patient doesn't have control of meds. When he gets hypoxic, he says things that don't make sense.  Stated he took 8 klonopin between 8am and 3pm.  Ex-wife has meds in safe in HER room, he can't get meds.  Ex-wife counted pills and none of them are missing. Ex-wife states yest c/o chills yest. They started empiric doxy yest 1 dose for copd sx. Swelling in legs x2d, given 80mg lasix daily instead of usual 40mg.  3lb wt gain. No longer on hospice.  Appt 1/4/23 with [facility] care.     REVIEW OF  SYSTEMS  ROS limited due to patient's mental status.   Constitutional:  Denies fever, chills, weight loss or weakness  Respiratory: Positive shortness of breath cough  Cardiovascular:  No CP, palpitations  GI:  Denies abdominal pain, nausea, vomiting, diarrhea  Neurologic:  Denies headache, focal weakness or sensory changes    PAST MEDICAL HISTORY:  Past Medical History:   Diagnosis Date     Anxiety      Atrial fibrillation (H)      CAD (coronary artery disease)      Cerebral infarction (H)      Chronic hepatitis C virus infection (H)      COPD (chronic obstructive pulmonary disease) (H)      Essential hypertension      Hemangioma     massive hemangioma; left hand     Hemangioma     Left hand     Hypertension      Intravenous drug abuse in remission (H)     Sober since ~2010     Lumbar spinal stenosis      Myocardial infarction (H)      Peripheral neuropathy      Restless leg syndrome      Stroke (H)      TIA (transient ischemic attack)        PAST SURGICAL HISTORY:  Past Surgical History:   Procedure Laterality Date     CERVICAL FUSION      C6 and C7     CERVICAL FUSION      C6-7     PICC TRIPLE LUMEN PLACEMENT  9/21/2022            CURRENT MEDICATIONS:    Prior to Admission Medications   Prescriptions Last Dose Informant Patient Reported? Taking?   albuterol (PROAIR HFA/PROVENTIL HFA/VENTOLIN HFA) 108 (90 Base) MCG/ACT inhaler   No Yes   Sig: Inhale 2 puffs into the lungs every 3 hours   amiodarone (PACERONE) 200 MG tablet 12/26/2022  No Yes   Sig: Take 1 tablet (200 mg) by mouth daily   azithromycin (ZITHROMAX) 250 MG tablet 12/26/2022  No Yes   Sig: USE ONE TAB BY MOUTH ONCE DAILY ON Monday, Wednesday, Friday ONGOING   citalopram (CELEXA) 40 MG tablet 12/26/2022  No Yes   Sig: Take 1 tablet (40 mg) by mouth daily   clonazePAM (KLONOPIN) 1 MG tablet   No Yes   Sig: Take 1-2 tablets (1-2 mg) by mouth nightly as needed for anxiety or sleep   Patient taking differently: Take 1-2 mg by mouth 2 times daily as needed  for anxiety or sleep   clonazePAM (KLONOPIN) 2 MG tablet 12/25/2022  Yes Yes   Sig: Take 2 mg by mouth At Bedtime   diltiazem ER COATED BEADS (CARDIZEM CD/CARTIA XT) 300 MG 24 hr capsule 12/26/2022  No Yes   Sig: Take 1 capsule (300 mg) by mouth daily   famotidine (PEPCID) 20 MG tablet 12/26/2022  No Yes   Sig: Take 1 tablet (20 mg) by mouth daily   furosemide (LASIX) 40 MG tablet 12/26/2022 at 80 mg  No Yes   Sig: Take 1 tablet (40 mg) by mouth daily   gabapentin (NEURONTIN) 300 MG capsule 12/26/2022 at x 2  No Yes   Sig: Take 2 capsules (600 mg) by mouth 3 times daily   guaiFENesin (MUCINEX) 600 MG 12 hr tablet 12/26/2022  No Yes   Sig: Take 2 tablets (1,200 mg) by mouth daily   ipratropium - albuterol 0.5 mg/2.5 mg/3 mL (DUONEB) 0.5-2.5 (3) MG/3ML neb solution 12/26/2022 at x2  No Yes   Sig: Nebulize tid for 3 days and then tid prn for sob   Patient taking differently: Take 1 vial by nebulization 4 times daily Nebulize tid for 3 days and then tid prn for sob   magnesium 250 MG tablet 12/25/2022  No Yes   Sig: Take 1 tablet (250 mg) by mouth At Bedtime   metoprolol tartrate (LOPRESSOR) 100 MG tablet 12/26/2022 at x1 am  No Yes   Sig: Take 0.5 tablets (50 mg) by mouth 2 times daily   naloxone (NARCAN) 4 MG/0.1ML nasal spray   No Yes   Sig: Spray 1 spray (4 mg) into one nostril alternating nostrils once as needed for opioid reversal every 2-3 minutes until assistance arrives   oxyCODONE HCl (ROXICODONE) 20 MG TABS immediate release tablet 12/26/2022 at x 2  No Yes   Sig: Take 20 mg by mouth every 4 hours (while awake) Not to exceed 5 tabs per day   predniSONE (DELTASONE) 20 MG tablet 12/26/2022  No Yes   Sig: Take 1 tablet (20 mg) by mouth daily   rivaroxaban ANTICOAGULANT (XARELTO) 20 MG TABS tablet 12/25/2022  No Yes   Sig: Take 1 tablet (20 mg) by mouth daily (with dinner)      Facility-Administered Medications: None       ALLERGIES:  Allergies   Allergen Reactions     Symbicort Rash       FAMILY HISTORY:  Family  "History   Problem Relation Age of Onset     Coronary Artery Disease Mother      Diabetes Mother      Coronary Artery Disease Father      Chronic Obstructive Pulmonary Disease Brother      Heart Disease Brother      Chronic Obstructive Pulmonary Disease Brother      Heart Disease Brother      No Known Problems Brother      Heart Disease Sister      Chronic Obstructive Pulmonary Disease Sister      Chronic Obstructive Pulmonary Disease Sister      Heart Disease Sister      No Known Problems Sister      Chronic Obstructive Pulmonary Disease Sister      Heart Disease Sister      No Known Problems Sister      No Known Problems Sister        SOCIAL HISTORY:  Social History     Tobacco Use     Smoking status: Former     Packs/day: 2.00     Types: Cigarettes     Quit date: 2018     Years since quittin.9     Smokeless tobacco: Current     Types: Chew     Tobacco comments:     No passive exposure   Vaping Use     Vaping Use: Never used   Substance Use Topics     Alcohol use: Not Currently     Drug use: Not Currently     Types: IV     Comment: Sober from IV drug use since ~        VITALS:  Patient Vitals for the past 24 hrs:   BP Temp Temp src Pulse Resp SpO2 Height Weight   22 1938 114/64 97.8  F (36.6  C) Oral 62 20 93 % 1.829 m (6' 0.01\") 109.4 kg (241 lb 3.2 oz)   22 1815 100/64 -- -- 62 -- 93 % -- --   22 1745 104/64 -- -- 60 -- 93 % -- --   22 1730 101/56 -- -- 58 12 96 % -- --   22 1530 (!) 85/47 -- -- 59 28 99 % -- --   22 1515 99/50 -- -- 58 22 99 % -- --   22 1455 110/83 97.9  F (36.6  C) Oral 56 18 99 % -- --       PHYSICAL EXAM    General Appearance: Drowsy, verbal and physical stimuli needed to arose patient.   Head:  Normocephalic, atraumatic  Eyes:  Pupils 2-3 mm, conjunctiva/corneas clear, EOM's intact  ENT:   membranes are moist without pallor  Neck:  Supple, symmetrical, trachea midline, no adenopathy  Chest:  No tenderness or deformity, no crepitus  Cardio:  " Bradycardic, initially hypotensive, normalized without intervention Regular rhythm, no murmur/gallop/rub, 2+ pulses symmetric in all extremities  Pulm:  No respiratory distress, clear to auscultation bilaterally  Back:  No CVA tenderness, normal ROM  Abdomen:  Soft, non-tender, non distended,no rebound or guarding. Obese   Extremities: Extremities atraumatic.  Normal range of motion  Skin:  Skin warm, dry, no rashes  Neuro:  Alert and oriented ×3, moving all extremities, no gross sensory defects     RADIOLOGY/LABS:  Reviewed all pertinent imaging. Please see official radiology report. All pertinent labs reviewed and interpreted.    Results for orders placed or performed during the hospital encounter of 12/26/22   CT Head w/o Contrast    Impression    IMPRESSION:  1.  No acute intracranial process.   XR Chest Port 1 View    Impression    IMPRESSION: Heart size magnified in AP projection with normal vascularity. No focal consolidation, pneumothorax nor pleural effusion.   Extra Blue Top Tube   Result Value Ref Range    Hold Specimen JIC    Extra Red Top Tube   Result Value Ref Range    Hold Specimen JIC    Extra Green Top (Lithium Heparin) Tube   Result Value Ref Range    Hold Specimen JIC    Extra Purple Top Tube   Result Value Ref Range    Hold Specimen JIC    Basic metabolic panel   Result Value Ref Range    Sodium 140 136 - 145 mmol/L    Potassium 5.5 (H) 3.4 - 5.3 mmol/L    Chloride 100 98 - 107 mmol/L    Carbon Dioxide (CO2) 28 22 - 29 mmol/L    Anion Gap 12 7 - 15 mmol/L    Urea Nitrogen 22.2 8.0 - 23.0 mg/dL    Creatinine 1.37 (H) 0.67 - 1.17 mg/dL    Calcium 8.4 (L) 8.8 - 10.2 mg/dL    Glucose 194 (H) 70 - 99 mg/dL    GFR Estimate 57 (L) >60 mL/min/1.73m2   Hepatic panel   Result Value Ref Range    Protein Total 7.0 6.4 - 8.3 g/dL    Albumin 4.2 3.5 - 5.2 g/dL    Bilirubin Total 0.3 <=1.2 mg/dL    Alkaline Phosphatase 58 40 - 129 U/L    AST 26 10 - 50 U/L    ALT 24 10 - 50 U/L    Bilirubin Direct <0.20 0.00 -  0.30 mg/dL   Result Value Ref Range    Magnesium 2.5 (H) 1.7 - 2.3 mg/dL   CBC with platelets   Result Value Ref Range    WBC Count 9.8 4.0 - 11.0 10e3/uL    RBC Count 4.45 4.40 - 5.90 10e6/uL    Hemoglobin 13.1 (L) 13.3 - 17.7 g/dL    Hematocrit 43.2 40.0 - 53.0 %    MCV 97 78 - 100 fL    MCH 29.4 26.5 - 33.0 pg    MCHC 30.3 (L) 31.5 - 36.5 g/dL    RDW 13.7 10.0 - 15.0 %    Platelet Count 298 150 - 450 10e3/uL   Protime-INR   Result Value Ref Range    INR 1.14 0.85 - 1.15   APTT   Result Value Ref Range    aPTT 27 22 - 38 Seconds   Result Value Ref Range    Troponin T, High Sensitivity 20 <=22 ng/L   Alcohol level blood   Result Value Ref Range    Alcohol ethyl <0.01 <=0.01 g/dL   Blood gas arterial   Result Value Ref Range    pH Arterial 7.28 (L) 7.37 - 7.44    pCO2 Arterial 63 (H) 35 - 45 mm Hg    pO2 Arterial 119 (H) 80 - 90 mm Hg    Bicarbonate Arterial 30 (H) 23 - 29 mmol/L    O2 Sat, Arterial 98.6 (H) 96.0 - 97.0 %    Oxyhemoglobin 97.2 (H) 96.0 - 97.0 %    Base Excess/Deficit (+/-) 3.2   mmol/L    Sample Stabilized Temperature 37.0 degrees C   Nt probnp inpatient   Result Value Ref Range    N terminal Pro BNP Inpatient 949 (H) 0 - 900 pg/mL   Symptomatic Influenza A/B & SARS-CoV2 (COVID-19) Virus PCR Multiplex Nasopharyngeal    Specimen: Nasopharyngeal; Swab   Result Value Ref Range    Influenza A PCR Negative Negative    Influenza B PCR Negative Negative    RSV PCR Negative Negative    SARS CoV2 PCR Negative Negative       EKG:  Performed at: 15:10    Impression: Sinus bradycardia    Rate: 57 bpm  Rhythm: Sinus  Axis: 78  ID Interval: 152 ms   QRS Interval: 88 ms   QTc Interval: 445 ms   ST Changes: No significant ST elevations or depressions.   Comparison: When compared to EKG from 12/17/22, no significant change was found.   I have independently reviewed and interpreted the EKG(s) documented above.    The creation of this record is based on the scribe s observations of the work being performed by Lois  MD Adrien and the provider s statements to them. It was created on her behalf by Ariella Quiroga, a trained medical scribe. This document has been checked and approved by the attending provider.    Lois Jurado MD  Emergency Medicine  [facility] EMERGENCY DEPARTMENT  [address] [place] [id number] [phone]  Dept: [phone]       Lois Jurado MD  12/26/22 9235

## 2022-12-26 NOTE — PHARMACY-ADMISSION MEDICATION HISTORY
Pharmacy Note - Admission Medication History    Pertinent Provider Information: Patient took 80mg furosemide today due to lower extremity swelling.     ______________________________________________________________________    Prior To Admission (PTA) med list completed and updated in EMR.       PTA Med List   Medication Sig Last Dose     albuterol (PROAIR HFA/PROVENTIL HFA/VENTOLIN HFA) 108 (90 Base) MCG/ACT inhaler Inhale 2 puffs into the lungs every 3 hours      amiodarone (PACERONE) 200 MG tablet Take 1 tablet (200 mg) by mouth daily 12/26/2022     azithromycin (ZITHROMAX) 250 MG tablet USE ONE TAB BY MOUTH ONCE DAILY ON Monday, Wednesday, Friday ONGOING 12/26/2022     citalopram (CELEXA) 40 MG tablet Take 1 tablet (40 mg) by mouth daily 12/26/2022     clonazePAM (KLONOPIN) 1 MG tablet Take 1-2 tablets (1-2 mg) by mouth nightly as needed for anxiety or sleep (Patient taking differently: Take 1-2 mg by mouth 2 times daily as needed for anxiety or sleep)      clonazePAM (KLONOPIN) 2 MG tablet Take 2 mg by mouth At Bedtime 12/25/2022     diltiazem ER COATED BEADS (CARDIZEM CD/CARTIA XT) 300 MG 24 hr capsule Take 1 capsule (300 mg) by mouth daily 12/26/2022     famotidine (PEPCID) 20 MG tablet Take 1 tablet (20 mg) by mouth daily 12/26/2022     furosemide (LASIX) 40 MG tablet Take 1 tablet (40 mg) by mouth daily 12/26/2022 at 80 mg     gabapentin (NEURONTIN) 300 MG capsule Take 2 capsules (600 mg) by mouth 3 times daily 12/26/2022 at x 2     guaiFENesin (MUCINEX) 600 MG 12 hr tablet Take 2 tablets (1,200 mg) by mouth daily 12/26/2022     ipratropium - albuterol 0.5 mg/2.5 mg/3 mL (DUONEB) 0.5-2.5 (3) MG/3ML neb solution Nebulize tid for 3 days and then tid prn for sob (Patient taking differently: Take 1 vial by nebulization 4 times daily Nebulize tid for 3 days and then tid prn for sob) 12/26/2022 at x2     magnesium 250 MG tablet Take 1 tablet (250 mg) by mouth At Bedtime 12/25/2022     metoprolol tartrate  (LOPRESSOR) 100 MG tablet Take 0.5 tablets (50 mg) by mouth 2 times daily 12/26/2022 at x1 am     naloxone (NARCAN) 4 MG/0.1ML nasal spray Spray 1 spray (4 mg) into one nostril alternating nostrils once as needed for opioid reversal every 2-3 minutes until assistance arrives      oxyCODONE HCl (ROXICODONE) 20 MG TABS immediate release tablet Take 20 mg by mouth every 4 hours (while awake) Not to exceed 5 tabs per day 12/26/2022 at x 2     predniSONE (DELTASONE) 20 MG tablet Take 1 tablet (20 mg) by mouth daily 12/26/2022     rivaroxaban ANTICOAGULANT (XARELTO) 20 MG TABS tablet Take 1 tablet (20 mg) by mouth daily (with dinner) 12/25/2022       Information source(s): Patient, Family member and CareEverywhere/SureScripts  Method of interview communication: in-person    Summary of Changes to PTA Med List  New: Scheduled clonazepam  Discontinued: Prednisone 40mg x 5 days  Changed: Duoneb PRN to QID    Patient was asked about OTC/herbal products specifically.  PTA med list reflects this.    In the past week, patient estimated taking medication this percent of the time:  greater than 90%.    Allergies were reviewed, assessed, and updated with the patient.      Patient did not bring any medications to the hospital and can't retrieve from home. No multi-dose medications are available for use during hospital stay.     The information provided in this note is only as accurate as the sources available at the time of the update(s).    Thank you for the opportunity to participate in the care of this patient.    Alycia Hess, [facility]  12/26/2022 3:34 PM

## 2022-12-26 NOTE — PROGRESS NOTES
RESPIRATORY CARE NOTE     Patient was on 3 lpm and had an Sp02 of 95%.     Arterial blood gas drawn on 3lpm and is as follows:    7.28- C02 63- O2 119 and Bicarc of 30      Duoneb not given as patient requested to rest. RT will update order as patient takes it QID at home.      RT will continue to assess and monitor cardiopulmonary status.     Ariella Campbell, RT

## 2022-12-26 NOTE — ED NOTES
Bed: JNED-35  Expected date: 12/26/22  Expected time:   Means of arrival: Ambulance  Comments:  WBL  67 M  syncope

## 2022-12-26 NOTE — TELEPHONE ENCOUNTER
"Routing refill request to provider for review/approval because:  PHQ-9 score    Last Written Prescription Date:  11/21/2022  Last Fill Quantity: 60,  # refills: 0   Last office visit provider:  12/7/2022     Requested Prescriptions   Pending Prescriptions Disp Refills     citalopram (CELEXA) 40 MG tablet 60 tablet 0     Sig: Take 1 tablet (40 mg) by mouth daily       SSRIs Protocol Failed - 12/25/2022  5:00 AM        Failed - PHQ-9 score less than 5 in past 6 months     Please review last PHQ-9 score.           Passed - Medication is active on med list        Passed - Patient is age 18 or older        Passed - Recent (6 mo) or future (30 days) visit within the authorizing provider's specialty     Patient had office visit in the last 6 months or has a visit in the next 30 days with authorizing provider or within the authorizing provider's specialty.  See \"Patient Info\" tab in inbasket, or \"Choose Columns\" in Meds & Orders section of the refill encounter.                 Ariella Jeronimo RN 12/25/22 7:05 PM  "

## 2022-12-26 NOTE — ED NOTES
Ex wife who is patients care giver reports that patient has not taken extra of his Klonopin as she has them locked in her safe and is the one that dispenses them to him.

## 2022-12-26 NOTE — ED NOTES
Patient is eating a [employer] filet of fish or chicken sandwich.  Unsure as to where patient received the food from.  MD updated that patient is eating.

## 2022-12-26 NOTE — H&P
Admission History and Physical   Ki Pederson, 1955, [id number]  [facility]  Acute and chr resp failure, unsp w hypoxia or hypercapnia (H) [J96.20]  PCP:Paola Rico [facility], [phone]   Admitting provider: Estrella Miranda MD.    Code status:  No CPR- Do NOT Intubate          Extended Emergency Contact Information  Primary Emergency Contact: Katlyn Pederson  [place] [facility]  Mobile Phone: [phone]  Relation: Son  Secondary Emergency Contact: Ethan Pederson  Address: [address]  Mobile Phone: [phone]  Relation: Ex-Spouse       Assessment and Plan  Active Problems:    Essential hypertension    Chronic pain disorder    Persistent atrial fibrillation (H)    At risk for delirium    Current moderate episode of major depressive disorder without prior episode (H)    Hyperkalemia    Chronic systolic heart failure (H)    Chronic respiratory failure with hypoxia and hypercapnia (H)    Stage 3a chronic kidney disease (H)    Bradycardia    Other emphysema (H)    Hypotension, unspecified hypotension type    Ki Pederson is a 67 year old male presenting with syncope    Syncope and patient still very groggy and falling asleep still able to answer questions and now asking for pain meds  - No acute EKG changes  - likely due to home Oxygen was not on at home. Slowly more awake  - was hypotensive on presentation and given 500ml fluid bolus and pressures better  - head CT negative and CXR   - on scheduled oxycodone 20mg Q4hrs for 5 doses max per day given hypotensive and groggy decreased to 10mg for now to prevent withdrawal symptoms but hold for sedation or hypotension   - continue klonipin which he is on chronically but reduced to 1mg for same reasoning reassess before discharge to titrate up   - monitor on telemetry with bradycardia and hypotension. Holding amiodarone and dilt for now and reassess overnight   - PT/OT to see in am      Bradycardia an hypotensive as above  - holding home amiodarone and dilt  - telemetry monitoring  - given fluid bolus 500ml once  - reviewed EKG     Mild hyperkalemia 5.5 and mag elevated 2.5   - EKG no acute findings  - recheck potassium and mag  - holding home mag    Chronic resp failure with hypoxia and hypercapnia/COPD  - keep at 2.5 liters and not to titrate up unless to keep sats 88-92%   - this was off when he had event and is slowly improving with o2  - ABG done similar to previous   - not in exacerbation already on prednisone and continue for now   - Continue chronic azithromycin MWF   - continue home inhaler and DUOnebs   - mucinex     Chronic CHF does not appear to be in exacerbation despite exwife stating weight increased and his home lasix was doubled  - continue home lasix dose in morning   - daily weight sand I/Os    CKD3  - trend renal function     Chronic pain (was on hospice in the past now off)    - as above oxycodone 10mg Q4hrs(on20mg Q4hrs at home) hold for sedation or hypotension   - gabapentin 600mg TID decreased to 300mg TID    Depression  - continue celexa  - klonipin dose decreased to 1mg for now     Afib  -bradycardic in the 50'sand hypotensive   - holding amiodarone and dilt   - telemetry   - reassess when to resume   - continue xarelto head ct negative and no fall     COVID-19 PCR Results    COVID-19 PCR Results 8/23/21 10/11/21 10/12/21 4/8/22 4/9/22 9/21/22 10/24/22 12/17/22 12/26/22   COVID-19 Virus by PCR (External Result) Not Detected Not Detected Detected (A)         SARS CoV2 PCR    Negative Negative Negative Negative Negative Negative   (A) Abnormal value       Comments are available for some flowsheets but are not being displayed.         COVID-19 Antibody Results, Testing for Immunity    COVID-19 Antibody Results, Testing for Immunity   No data to display.           VTE prophylaxis:  DOAC  DIET: Orders Placed This Encounter      Regular Diet Adult    Drains/Lines:  none  Weight bearing status: WBAT  Disposition/Barriers to discharge: anticipated discharge tomorrow   Code Status:No CPR- Do NOT Intubate    HPI: Ki Pederson is a 67 year old old male with h/o A-fib, CAD, cerebral infarction, COPD, hypertension, myocardial infarction, stroke, TIA who presents with syncope.      HPI limited due to patient mental status      Per patient he had x1 episode of syncope while sitting. Family called 911. Patient is on blood thinners for A-fib.      Denies lightheadedness, dizziness, pain, shortness of breath, chest pain, palpitations, blood diarrhea.      Per nurse, EMS reported patient took 8mg of Klonopin. Patient is on 2.5L of oxygen at home. On arrival patient was in a state of drowsiness and nurse had to give a sternal rub for patient to wake up.      Per patient's ex wife, last several days states that he feels a copd flare coming on. Passed out in chair. Unresponsive. On 2L O2 at baseline, but was NOT on O2.  Family put O2 on, sats still in 80s, so family bumped up to 3L w O2 89%.  Called 911. Patient doesn't have control of meds. When he gets hypoxic, he says things that don't make sense.  Stated he took 8 klonopin between 8am and 3pm.  Ex-wife has meds in safe in HER room, he can't get meds.  Ex-wife counted pills and none of them are missing. Ex-wife states yest c/o chills yest. They started empiric doxy yest 1 dose for copd sx. Swelling in legs x2d, given 80mg lasix daily instead of usual 40mg.  3lb wt gain. No longer on hospice.  Appt 1/4/23 with [facility].     Past Medical History:   Diagnosis Date     Anxiety      Atrial fibrillation (H)      CAD (coronary artery disease)      Cerebral infarction (H)      Chronic hepatitis C virus infection (H)      COPD (chronic obstructive pulmonary disease) (H)      Essential hypertension      Hemangioma     massive hemangioma; left hand     Hemangioma     Left hand     Hypertension      Intravenous drug abuse in remission (H)      Sober since ~     Lumbar spinal stenosis      Myocardial infarction (H)      Peripheral neuropathy      Restless leg syndrome      Stroke (H)      TIA (transient ischemic attack)      Past Surgical History:   Procedure Laterality Date     CERVICAL FUSION      C6 and C7     CERVICAL FUSION      C6-7     PICC TRIPLE LUMEN PLACEMENT  2022          Family History   Problem Relation Age of Onset     Coronary Artery Disease Mother      Diabetes Mother      Coronary Artery Disease Father      Chronic Obstructive Pulmonary Disease Brother      Heart Disease Brother      Chronic Obstructive Pulmonary Disease Brother      Heart Disease Brother      No Known Problems Brother      Heart Disease Sister      Chronic Obstructive Pulmonary Disease Sister      Chronic Obstructive Pulmonary Disease Sister      Heart Disease Sister      No Known Problems Sister      Chronic Obstructive Pulmonary Disease Sister      Heart Disease Sister      No Known Problems Sister      No Known Problems Sister      Social History     Socioeconomic History     Marital status:      Spouse name: Not on file     Number of children: 10     Years of education: 15     Highest education level: Some college, no degree   Occupational History     Occupation: Disability   Tobacco Use     Smoking status: Former     Packs/day: 2.00     Types: Cigarettes     Quit date:      Years since quittin.9     Smokeless tobacco: Current     Types: Chew     Tobacco comments:     No passive exposure   Vaping Use     Vaping Use: Never used   Substance and Sexual Activity     Alcohol use: Not Currently     Drug use: Not Currently     Types: IV     Comment: Sober from IV drug use since ~     Sexual activity: Not on file   Other Topics Concern     Parent/sibling w/ CABG, MI or angioplasty before 65F 55M? Not Asked   Social History Narrative    ** Merged History Encounter **          Social Determinants of Health     Financial Resource Strain: Not on  file   Food Insecurity: Not on file   Transportation Needs: Not on file   Physical Activity: Not on file   Stress: Not on file   Social Connections: Not on file   Intimate Partner Violence: Not on file   Housing Stability: Not on file     Allergies   Allergen Reactions     Symbicort Rash       PRIOR TO ADMISSION MEDICATIONS   Prior to Admission medications    Medication Sig Last Dose Taking? Auth Provider Long Term End Date   albuterol (PROAIR HFA/PROVENTIL HFA/VENTOLIN HFA) 108 (90 Base) MCG/ACT inhaler Inhale 2 puffs into the lungs every 3 hours  Yes Gloria Sebastian MD Yes    amiodarone (PACERONE) 200 MG tablet Take 1 tablet (200 mg) by mouth daily 12/26/2022 Yes Baldemar Garcia DO Yes    azithromycin (ZITHROMAX) 250 MG tablet USE ONE TAB BY MOUTH ONCE DAILY ON Monday, Wednesday, Friday ONGOING 12/26/2022 Yes Gloria Sebastian MD     citalopram (CELEXA) 40 MG tablet Take 1 tablet (40 mg) by mouth daily 12/26/2022 Yes Paola Rico MD Yes    clonazePAM (KLONOPIN) 1 MG tablet Take 1-2 tablets (1-2 mg) by mouth nightly as needed for anxiety or sleep  Patient taking differently: Take 1-2 mg by mouth 2 times daily as needed for anxiety or sleep  Yes Paloa Rico MD Yes    clonazePAM (KLONOPIN) 2 MG tablet Take 2 mg by mouth At Bedtime 12/25/2022 Yes Unknown, Entered By History Yes    diltiazem ER COATED BEADS (CARDIZEM CD/CARTIA XT) 300 MG 24 hr capsule Take 1 capsule (300 mg) by mouth daily 12/26/2022 Yes Cristy Yañez MD Yes    famotidine (PEPCID) 20 MG tablet Take 1 tablet (20 mg) by mouth daily 12/26/2022 Yes Paola Rico MD     furosemide (LASIX) 40 MG tablet Take 1 tablet (40 mg) by mouth daily 12/26/2022 at 80 mg Yes Baldemar Garcia DO Yes    gabapentin (NEURONTIN) 300 MG capsule Take 2 capsules (600 mg) by mouth 3 times daily 12/26/2022 at x 2 Yes Gloria Sebastian MD Yes    guaiFENesin (MUCINEX) 600 MG 12 hr tablet Take 2 tablets (1,200 mg) by mouth daily  12/26/2022 Yes Paola Rico MD     ipratropium - albuterol 0.5 mg/2.5 mg/3 mL (DUONEB) 0.5-2.5 (3) MG/3ML neb solution Nebulize tid for 3 days and then tid prn for sob  Patient taking differently: Take 1 vial by nebulization 4 times daily Nebulize tid for 3 days and then tid prn for sob 12/26/2022 at x2 Yes Jose Conklin MD Yes    magnesium 250 MG tablet Take 1 tablet (250 mg) by mouth At Bedtime 12/25/2022 Yes Gloria Sebastian MD     metoprolol tartrate (LOPRESSOR) 100 MG tablet Take 0.5 tablets (50 mg) by mouth 2 times daily 12/26/2022 at x1 am Yes Paola Rico MD Yes    naloxone (NARCAN) 4 MG/0.1ML nasal spray Spray 1 spray (4 mg) into one nostril alternating nostrils once as needed for opioid reversal every 2-3 minutes until assistance arrives  Yes Gloria Sebastian MD Yes    oxyCODONE HCl (ROXICODONE) 20 MG TABS immediate release tablet Take 20 mg by mouth every 4 hours (while awake) Not to exceed 5 tabs per day 12/26/2022 at x 2 Yes Paola Rico MD Yes    predniSONE (DELTASONE) 20 MG tablet Take 1 tablet (20 mg) by mouth daily 12/26/2022 Yes Paola Rico MD     rivaroxaban ANTICOAGULANT (XARELTO) 20 MG TABS tablet Take 1 tablet (20 mg) by mouth daily (with dinner) 12/25/2022 Yes Gloria Sebastian MD Yes         REVIEW OF SYSTEMS:  12 point reviewed pertinent negatives and positives in HPI all others negative     PHYSICAL EXAM  Temp:  [97.9  F (36.6  C)] 97.9  F (36.6  C)  Pulse:  [56-62] 62  Resp:  [12-28] 12  BP: ()/(47-83) 100/64  SpO2:  [93 %-99 %] 93 %  Wt Readings from Last 1 Encounters:   12/17/22 99.8 kg (220 lb)     There is no height or weight on file to calculate BMI.  Physical Exam  Constitutional:       Comments: Sitting up groggy and falls asleep, NAD. More awake form previous  But still falls asleep during exam, was trying to turn the TV off, but was hitting nurse button, and lights.    HENT:      Head: Normocephalic.      Mouth/Throat:       Mouth: Mucous membranes are moist.      Pharynx: Oropharynx is clear.   Eyes:      Conjunctiva/sclera: Conjunctivae normal.      Comments: Pupils pinpoint slow to react, EOM intact he does track   Neck:      Comments: Old surgical scar   Cardiovascular:      Rate and Rhythm: Regular rhythm. Tachycardia present.      Pulses: Normal pulses.   Pulmonary:      Effort: Pulmonary effort is normal.      Comments: Distant lung sounds, unlabored breathing, increased lung fields and prolonged expiration  Abdominal:      General: Bowel sounds are normal. There is no distension.      Palpations: Abdomen is soft.      Tenderness: There is no abdominal tenderness.   Musculoskeletal:      Comments: BLE ankle edema  Varicosities on left index finger and MTP   Skin:     General: Skin is warm and dry.      Capillary Refill: Capillary refill takes less than 2 seconds.      Comments: Multiple tattos   Neurological:      Mental Status: He is disoriented.      Comments: Drowsy and does follow commands when able to keep awake  No arm drift noted but groggy.          PERTINENT LABS and RADIOLOGY   Results for orders placed or performed during the hospital encounter of 12/26/22   CT Head w/o Contrast    Impression    IMPRESSION:  1.  No acute intracranial process.   XR Chest Port 1 View    Impression    IMPRESSION: Heart size magnified in AP projection with normal vascularity. No focal consolidation, pneumothorax nor pleural effusion.       Recent Labs   Lab 12/26/22  1454   WBC 9.8   HGB 13.1*   MCV 97      INR 1.14      POTASSIUM 5.5*   CHLORIDE 100   CO2 28   BUN 22.2   CR 1.37*   ANIONGAP 12   PALMER 8.4*   *   ALBUMIN 4.2   PROTTOTAL 7.0   BILITOTAL 0.3   ALKPHOS 58   ALT 24   AST 26     EKG:SInus deonna no acute changes      Estrella Miranda MD  [facility]  [phone]

## 2022-12-26 NOTE — TELEPHONE ENCOUNTER
"Routing refill request to provider for review/approval because:  PHQ-9 score below protocol threshold     Last Written Prescription Date: 11/21/22  Last Fill Quantity: 60 # refills: 0  Last office visit provider: 11/2/22    Requested Prescriptions   Pending Prescriptions Disp Refills     citalopram (CELEXA) 40 MG tablet 60 tablet 0     Sig: Take 1 tablet (40 mg) by mouth daily       SSRIs Protocol Failed - 12/25/2022  4:46 AM        Failed - PHQ-9 score less than 5 in past 6 months     Please review last PHQ-9 score.           Passed - Medication is active on med list        Passed - Patient is age 18 or older        Passed - Recent (6 mo) or future (30 days) visit within the authorizing provider's specialty     Patient had office visit in the last 6 months or has a visit in the next 30 days with authorizing provider or within the authorizing provider's specialty.  See \"Patient Info\" tab in inbasket, or \"Choose Columns\" in Meds & Orders section of the refill encounter.                 Evelin Rodrigues RN 12/25/22 7:31 PM  "

## 2022-12-27 NOTE — PROGRESS NOTES
PRIMARY DIAGNOSIS: SYNCOPE/TIA  OUTPATIENT/OBSERVATION GOALS TO BE MET BEFORE DISCHARGE:  1. Orthostatic performed: No    2. Diagnostic testing complete & at baseline neurologic testing: No    3. Cleared by consultants (if involved): No    4. Interpretation of cardiac rhythm per telemetry tech: NSR      5. Tolerating adequate PO diet and medications: Yes    6. Return to near baseline physical activity or neurologic status: No    Discharge Planner Nurse   Safe discharge environment identified: No  Barriers to discharge: Yes       Entered by: Vivi Gee RN 12/27/2022 6:06 AM     Please review provider order for any additional goals.   Nurse to notify provider when observation goals have been met and patient is ready for discharge.    Pt. Now weaker after fall, but still alert and orientated and did admit to having episodes of sleep walking at home witnessed per family.  Pt. More comfortable after clonopin and oxycodone given.  Pt. Still has more underlying anxiety, but otherwise is directable. Bed alarm on, call light in reach

## 2022-12-27 NOTE — PROGRESS NOTES
RESPIRATORY CARE NOTE   Patient is on RA, BS diminished, gave Duoneb treatment x2, BS post treatment no change, patient perceives moderate improvement, patient tolerated treatment well.       Star Cindy, RT

## 2022-12-27 NOTE — SIGNIFICANT EVENT
Significant Event Note    Time of event: 12:59 AM December 27, 2022    Description of event:  Paged regarding an unwitnessed fall around 12:50am.  Briefly, patient is here for syncopal episodes- unclear etiology although thought to be related to hypotension, bradycardia, possibly polypharmacy    Patient had an unwitnessed fall after getting out of bed to get something in the closet. Sounds syncopal as patient doesn't recall the sequence of events. He was found leaning against the wall. Thinks he may of hit his head.    States his elbow hurts, but he is otherwise okay.     He is on xarelto now for afib.    Plan:  CT wo contrast  Fall precautions-- bed alarm on  Discussed he should have assistance should he want to get out of bed    Discussed with: bedside nurse    Bibiana Huber MD    3:04 AM  Imaging negative for acute process.    3:43 AM  Spoke with patient regarding some past memories that are disturbing to him. He would like to meet with someone to talk about his leonard. Put in an order for spiritual consult.

## 2022-12-27 NOTE — PLAN OF CARE
PRIMARY DIAGNOSIS: SYNCOPE/TIA  OUTPATIENT/OBSERVATION GOALS TO BE MET BEFORE DISCHARGE:  1. Orthostatic performed: N/A    2. Diagnostic testing complete & at baseline neurologic testing: N/A    3. Cleared by consultants (if involved): No    4. Interpretation of cardiac rhythm per telemetry tech: NSR    5. Tolerating adequate PO diet and medications: Yes    6. Return to near baseline physical activity or neurologic status: Yes    Discharge Planner Nurse   Safe discharge environment identified: No  Barriers to discharge: Yes    Took 2 people to ambulate with therapy, put was able to walk from bathroom with SBA, complained that was only getting 10mg oxycodone at a time and normally took 20mg at home discussed with MD and increased to 15mg, given 15mg and then became lethargic and some difficulty keeping eyes open and wobbling at bedside        Entered by: Jennifer Peters RN 12/27/2022 5:11 PM     Please review provider order for any additional goals.   Nurse to notify provider when observation goals have been met and patient is ready for discharge.Goal Outcome Evaluation:

## 2022-12-27 NOTE — SIGNIFICANT EVENT
Pt. Called out on call light and said that he thinks he fell. Went to room and found pt. Down on floor in front of his closet, sitting on butt/left side, with feet facing out to the right side, and his upper body and head leaning against the wall on his left side.  Pt. Stated he still felt dizzy and did not know how he fell, and felt like he had been asleep.  Pt. Was holding his belongings bag and pants that had been put away in the closet.  VS obtained, HO called and came up to see the pt. And assisted pt. Back to bed with gait belt and assist of 3 people to help pt. Stand.  Head CT ordered per pt. Had started xarelto and unwitnessed fall.  Pt. Also complained of some left hip pain after so xray added on for that.    Bed alarm now on and pt. Told that it needs to stay on per he had a fall.      See MD's not for significant event

## 2022-12-27 NOTE — SIGNIFICANT EVENT
Pt. Started to say that he was feeling very anxious and talking about how he has had suicidal thoughts at home because of how uncontrolled his pain is and because of his very traumatic childhood and adult life.  Pt. States that he has the means to end it, but that he currently does not have any plans to harm himself/does not feel suicidal at the moment, but needs something for anxiety and something more for his pain.  Notified HO who came up and talked with pt. And ordered a one time PRN oxycodone 10mg dose, and PRN clonodine given. spiritual consult also ordered.   See MD's note.

## 2022-12-27 NOTE — PROGRESS NOTES
PRIMARY DIAGNOSIS: SYNCOPE/TIA  OUTPATIENT/OBSERVATION GOALS TO BE MET BEFORE DISCHARGE:  1. Orthostatic performed: No    2. Diagnostic testing complete & at baseline neurologic testing: No    3. Cleared by consultants (if involved): No    4. Interpretation of cardiac rhythm per telemetry tech: SR    5. Tolerating adequate PO diet and medications: Yes    6. Return to near baseline physical activity or neurologic status: Yes    Discharge Planner Nurse   Safe discharge environment identified: Yes  Barriers to discharge: Yes       Entered by: Vivi Gee RN 12/27/2022 5:52 AM     Please review provider order for any additional goals.   Nurse to notify provider when observation goals have been met and patient is ready for discharge.    Pt. Up independent in room, denies any dizziness/light headedness  Pt. Refusing bed alarm, call light in reach  Orientated to room and surroundings, snacks and fluids given to pt.

## 2022-12-27 NOTE — PROGRESS NOTES
TRANSITIONS OF CARE (MONICA) LOG   MONICA tasks should be completed by the CC within one (1) business day of notification of each transition. Follow up contact with member is required after return to their usual care setting.  Note:  If CC finds out about the transitions fifteen (15) days or more after the member has returned to their usual care setting, no MONICA log is needed. However, the CC should check in with the member to discuss the transition process, any changes needed to the care plan and document it in a case note.    Member Name:  Ki Pederson MCO Name:  [employer]/Health Plan Member ID#:   [id number]   Product: [employer]+ Care Coordinator Contact:  Prashanth Romero RN Agency/[facility]/Care System: [facility]   Transition Communication Actions from Care Management Contact   Transition #1   Notification Date: 12/27/22 Transition Date:   12/26/22 Transition From: Home     Is this the member s usual care setting?               yes Transition To: Hospital, [facility]   Transition Type:  Unplanned  Reason for Admission/Comments:  Hyperkalemia  Contact member/responsible party to offer assistance with transition Date completed: 12/27/22    Notes from conversation with the member/responsible party, provider, discharging and receiving facility (as applicable): CC contacted Hospital /discharge planner via the [facility] Hand-In Process, with community care plan included.  CC reached out to adult son Katlyn regarding transition and left a message requesting a return call.  Reviewed and update care plan as needed.  Notified community service providers and placed services None on hold as needed.  Transition log initiated.   PCP notified of hospitalization via EMR.       Shared CC contact info, care plan/services with receiving setting--Date completed: 12/27/22   Name & Title of receiving setting contact: [facility]   Notified PCP of transition--Date  completed:  12/26/22     via  EMR  Name of PCP: Paola Rico     Transition #2   Notification Date:1/7/23 Transition Date:  1/6/23 Transition From: [facility]     Is this the member s usual care setting?               no Transition To: Home   Transition Type:  Planned  Reason for Admission/Comments:  Discharge home    Contact member/responsible party to offer assistance with transition Date completed: 1/9/23    Notes from conversation with the member/responsible party, provider, discharging and receiving facility (as applicable): CC contacted adult son Katlyn and left a message requesting a return call. 1/10/22; Called him, he reports that to call his sister as member is staying with her.  Call her [phone], no answer.  1/11/23 Called and spoke with José Antonio, daughter reports that member is doing well after discharge, has f/u appt; staying with her temporarily but hopes to move into Assisted Living per worker.  She is confused about all workers involved.  Clarify roles with her, will contact Victorino, worker who likely is working with [facility] program.  She reports that Victorino told her that [facility] will accept him through [facility].  Gave [facility] as closer in proximity.  She will call Laura for tour and call care coordinator if wants to look at other facilities.    Member has a follow-up appointment with PCP in 7 days: Yes: scheduled on 1/9/23   Member has had a change in condition: No  Home visit needed: No  Care plan reviewed and updated.  The following home based services None were resumed.  New referrals placed: No  Transition log completed.   PCP notified of transition back to home via EMR.    Prashanth Romero RN, N  [facility]  [phone]         Shared CC contact info, care plan/services with receiving setting--Date completed: 1/9/23   Name & Title of receiving setting contact: home   Notified PCP of transition--Date completed:  1/6/23     via   EMR  Name of PCP: Paola Rico                                 *RETURN TO USUAL CARE SETTING: *Complete tasks below when the member is discharging TO their usual care setting within one (1) business day of notification..      For situations where the Care Coordinator is notified of the discharge prior to the date of discharge, the Care Coordinator must follow up with the member or designated representative to confirm that discharge actually occurred and discuss required MONICA tasks as outlined in the MONICA Instructions.  (This includes situations where it may be a  new  usual care setting for the member. (i.e., a community member who decides upon permanent nursing home placement following hospitalization and rehab).    Discuss with Member/Responsible Party:    Check  Yes  - if the member, family member and/or SNF/facility staff manages the following:    If  No  provide explanation in the comments section.          Date completed: 1/11/22 Communicated with member or their designated representative about the following:  care transition process; about changes to the member s health status; plan of care updates; education about transitions and how to prevent unplanned transitions/readmissions    Four Pillars for Optimal Transition:    Check  Yes  - if the member, family member and/or SNF/facility staff manages the following:    If  No  provide explanation in the comments section.          [x]  Yes     []  No Does the member have a follow-up appointment scheduled with primary care or specialist? (Mental health hospitalizations--the appt. should be w/in 7 days)              For mental health hospitalizations:  []  Yes     []  No     Does the member have a follow-up appointment scheduled with a mental health practitioner within 7 days of discharge?  [x]  Yes     []  No     Has a medication review been completed with member? If no, refer to PCP, home care nurse, MTM, pharmacist  [x]  Yes     []  No     Can the  member manage their medications or is there a system in place to manage medications (e.g. home care set-up)?         [x]  Yes     []  No     Can the member verbalize warning signs and symptoms to watch for and how to respond?  [x]  Yes     []  No     Does the member have a copy of and understand their discharge instructions?  If no, assist to obtain copy of discharge instructions, review discharge instructions, and assist to contact PCP to discuss questions about their recent hospitalization.  [x]  Yes     []  No     Does the member have adequate food, housing and transportation?  If no, add goal and discuss additional supports available to the member                                                                                                                                                                                 [x]  Yes     []  No     Is the member safe in their home?  If no, document needs and support provided                                                                                                                                                                          [x]  Yes     []  No     Are there any concerns of vulnerability, abuse, or neglect?  If yes, document concerns and actions taken by Care Coordinator as a mandated                                                                                                                                                                              [x]  Yes     []  No     Does the member use a Personal Health Care Record?  Check  Yes  if visit summary, discharge summary, and/or healthcare summary are being used as a PHR.                                                                                                                                                                                  [x]  Yes     []  No     Have you reviewed the discharge summary with the member? If  No  provide explanation in comments.  [x]  Yes     []  No      Have you updated the member s care plan/support plan? Add new diagnosis, medications, treatments, goals & interventions, as applicable. If No, provide explanation in comments.    Comments:

## 2022-12-27 NOTE — PROGRESS NOTES
[facility]    Medicine Progress Note - Hospitalist Service    Date of Admission:  12/26/2022    Assessment & Plan    Ki Pederson is a 67 year old male presenting with syncope     Syncope and patient still appears sleepy  - No acute EKG changes  - likely due to home Oxygen was not on at home. Slowly more awake  - was hypotensive on presentation and given 500ml fluid bolus and pressures better  - head CT negative and CXR   - on scheduled oxycodone 20mg Q4hrs for 5 doses max per day given hypotensive and groggy decreased to 10mg on admit to prevent withdrawal symptoms.  Will increase back up to 15 due to pain complaint but hold for sedation or hypotension   - continue klonipin which he is on chronically but reduced to 1mg for same reasoning reassess before discharge to titrate up potentially  - monitor on telemetry with bradycardia and hypotension. Held amiodarone and dit initially. Continue to monitor restart when appropriate.  - PT/OT to see     Bradycardia an hypotensive as above  - holding home amiodarone and dilt  - telemetry monitoring  - given fluid bolus 500ml once  - reviewed EKG      Mild hyperkalemia 5.5 and mag elevated 2.5   - EKG no acute findings  - recheck potassium and mag  - holding home mag     Chronic resp failure with hypoxia and hypercapnia/COPD  - keep at 2.5 liters and not to titrate up unless to keep sats 88-92%   - this was off when he had event and is slowly improving with o2  - ABG done similar to previous   - not in exacerbation already on prednisone and continue for now   - Continue chronic azithromycin MWF   - continue home inhaler and DUOnebs   - mucinex      Chronic CHF does not appear to be in exacerbation despite exwife stating weight increased and his home lasix was doubled  - continue home lasix dose  - daily weight sand I/Os     CKD3  - trend renal function      Chronic pain (was on hospice in the past now off)    - as above oxycodone dosing  -  "gabapentin 600mg TID decreased to 300mg TID now will go back up to 400 mg.     Depression - with suicidal threats last night  - continue celexa  - klonipin dose decreased to 1mg for now   - Psych consult.  He has means at home.  This is complicated by hospice status.     Afib  -bradycardic in the 50'sand hypotensive   - holding amiodarone and dilt   - telemetry   - reassess when to resume   - continue xarelto head ct negative and no fall        Diet: Regular Diet Adult    DVT Prophylaxis: DOAC  Fine Catheter: Not present  Central Lines: None  Cardiac Monitoring: ACTIVE order. Indication: Syncope- low cardiac risk (24 hours)  Code Status: No CPR- Do NOT Intubate      Disposition Plan      Expected Discharge Date: 12/28/2022    Discharge Delays: Other (Add Comment)    Discharge Comments: pending psych eval        The patient's care was discussed with the Bedside Nurse and Patient.    Jose M Fernandez MD  Hospitalist Service  [facility]  Securely message with the Vocera Web Console (learn more here)  Text page via [employer] Paging/Directory         Clinically Significant Risk Factors Present on Admission        # Hyperkalemia: Highest K = 5.5 mmol/L in last 2 days, will monitor as appropriate   # Hypocalcemia: Lowest Ca = 8.4 mg/dL in last 2 days, will monitor and replace as appropriate      # Drug Induced Coagulation Defect: home medication list includes an anticoagulant medication     # Chronic systolic heart failure: echo within the past year with EF <40%   # Acute Respiratory Failure: based on blood gas results.  Continue supplemental oxygen as needed     # Obesity: Estimated body mass index is 33.02 kg/m  as calculated from the following:    Height as of this encounter: 1.829 m (6' 0.01\").    Weight as of this encounter: 110.5 kg (243 lb 8 oz).           ______________________________________________________________________    Interval History   Patient is more awake.  Wants to leave but " has not really contracted for safety.  Notes his goal is to get a pace maker as an outpatient.  Pain controlled at the time of my eval but later complained to nurse of severe pain.    Data reviewed today: I reviewed all medications, new labs and imaging results over the last 24 hours. I personally reviewed EKG and imaging.    Physical Exam   Vital Signs: Temp: 98.3  F (36.8  C) Temp src: Oral BP: 136/77 Pulse: 70   Resp: 20 SpO2: 98 % O2 Device: None (Room air) Oxygen Delivery: 2 LPM  Weight: 243 lbs 8 oz  Constitutional: awake, cooperative and no apparent distress  Respiratory: no increased work of breathing and wheeze diffuse  Cardiovascular: normal S1 and S2  Musculoskeletal: no lower extremity pitting edema present  Neurologic: Mental Status Exam:  Orientation:   person, place, time  Memory:   normal  Attention/Concentration:  normal  Cranial Nerves:  cranial nerves II-XII are grossly intact    Data   Recent Labs   Lab 12/27/22  0446 12/27/22  0213 12/26/22  1454   WBC 11.0  --  9.8   HGB 12.0*  --  13.1*   MCV 95  --  97     --  298   INR  --   --  1.14     --  140   POTASSIUM 5.5*  --  5.5*   CHLORIDE 99  --  100   CO2 26  --  28   BUN 39.6*  --  22.2   CR 1.50*  --  1.37*   ANIONGAP 11  --  12   PALMER 8.6*  --  8.4*   * 211* 194*   ALBUMIN  --   --  4.2   PROTTOTAL  --   --  7.0   BILITOTAL  --   --  0.3   ALKPHOS  --   --  58   ALT  --   --  24   AST  --   --  26     Recent Results (from the past 24 hour(s))   CT Head w/o Contrast    Narrative    EXAM: CT HEAD W/O CONTRAST  LOCATION: [facility]  DATE/TIME: 12/27/2022 1:57 AM    INDICATION: fall, hit head, on xarelto  COMPARISON: CT head 12/26/2022  TECHNIQUE: Routine CT Head without IV contrast. Multiplanar reformats. Dose reduction techniques were used.    FINDINGS:  INTRACRANIAL CONTENTS: No intracranial hemorrhage, extraaxial collection, or mass effect.  No CT evidence of acute infarct. Mild volume loss and  presumed chronic small vessel ischemia are stable.    VISUALIZED ORBITS/SINUSES/MASTOIDS: No intraorbital abnormality. No paranasal sinus mucosal disease. No middle ear or mastoid effusion.    BONES/SOFT TISSUES: No acute abnormality.      Impression    IMPRESSION:  1.  No acute intracranial abnormality or significant change compared to the prior study.   XR Pelvis w Hip Left G/E 2 Views    Narrative    EXAM: XR PELVIS AND HIP LEFT 2 VIEWS  LOCATION: [facility]  DATE/TIME: 12/27/2022 2:15 AM    INDICATION: fall on left hip  COMPARISON: None.      Impression    IMPRESSION: Mild degenerative changes of the hips and lower lumbar spine.. No fracture or dislocation.     Medications       albuterol  2 puff Inhalation Q3H     [START ON 12/28/2022] azithromycin  250 mg Oral Q Mon Wed Fri AM     citalopram  40 mg Oral Daily     clonazePAM  1 mg Oral At Bedtime     famotidine  20 mg Oral Daily     furosemide  40 mg Oral Daily     gabapentin  400 mg Oral TID     guaiFENesin  1,200 mg Oral Daily     ipratropium - albuterol 0.5 mg/2.5 mg/3 mL  3 mL Nebulization 4x daily     oxyCODONE  15 mg Oral Q4H While awake     predniSONE  20 mg Oral Daily     rivaroxaban ANTICOAGULANT  20 mg Oral Daily with supper

## 2022-12-27 NOTE — PLAN OF CARE
PRIMARY DIAGNOSIS: SYNCOPE/TIA  OUTPATIENT/OBSERVATION GOALS TO BE MET BEFORE DISCHARGE:  1. Orthostatic performed: N/A    2. Diagnostic testing complete & at baseline neurologic testing: N/A    3. Cleared by consultants (if involved): No    4. Interpretation of cardiac rhythm per telemetry tech: NSR    5. Tolerating adequate PO diet and medications: Yes    6. Return to near baseline physical activity or neurologic status: No    Discharge Planner Nurse   Safe discharge environment identified: No  Barriers to discharge: Yes    Gait unsteady, needs assist of 1-2 for activity       Entered by: Jennifer Peters RN 12/27/2022 11:26 AM     Please review provider order for any additional goals.   Nurse to notify provider when observation goals have been met and patient is ready for discharge.Goal Outcome Evaluation:

## 2022-12-27 NOTE — PROGRESS NOTES
[facility]  OUTPATIENT PHYSICAL THERAPY EVALUATION  PLAN OF TREATMENT FOR OUTPATIENT REHABILITATION  (COMPLETE FOR INITIAL CLAIMS ONLY)  Patient's Last Name, First Name, M.I.  YOB: 1955  Ki Pederson                        Provider's Name  [facility] Medical Record No.  [id number]                             Onset Date:  12/26/22   Start of Care Date:  12/27/22   Type:     _X_PT   ___OT   ___SLP Medical Diagnosis:  syncope              PT Diagnosis:  decreased independent mobility Visits from SOC:  1     See note for plan of treatment, functional goals and certification details    I CERTIFY THE NEED FOR THESE SERVICES FURNISHED UNDER        THIS PLAN OF TREATMENT AND WHILE UNDER MY CARE     (Physician co-signature of this document indicates review and certification of the therapy plan).                       12/27/22 1050   Appointment Info   Signing Clinician's Name / Credentials (PT) Julienne Oconnor PT   Quick Adds   Quick Adds Certification       Present no   Living Environment   People in Home child(katie), adult  (daughter)   Current Living Arrangements house   Transportation Anticipated family or friend will provide   Self-Care   Equipment Currently Used at Home walker, rolling   Fall history within last six months yes   Number of times patient has fallen within last six months 2   Activity/Exercise/Self-Care Comment per chart I with ADLs, pt at first said he does not use FWW then later reported he uses FWW for gait   General Information   Onset of Illness/Injury or Date of Surgery 12/26/22   Referring Physician Dr. Jarvis Fernandez   Patient/Family Therapy Goals Statement (PT) pt wants to return home   Pertinent History of Current Problem (include personal factors and/or comorbidities that impact the POC) admit HTN, syncope, bradycardia. pt has h/o CHF, chronic pain, depresssion   Existing  Precautions/Restrictions other (see comments)  (pt on 1:1)   General Observations pt up in chart -reports fustration due to changein pain meds and reporting pain B hips   Cognition   Affect/Mental Status (Cognition) unable/difficult to assess   Pain Assessment   Patient Currently in Pain Yes, see Vital Sign flowsheet  (B hips)   Range of Motion (ROM)   ROM Comment BLEs WFl with some pain in hips with movement   Strength (Manual Muscle Testing)   Strength Comments limited BLEs   Bed Mobility   Comment, (Bed Mobility) pt up in chair at start /end of therapy session   Transfers   Transfer Safety Concerns Noted decreased weight-shifting ability   Impairments Contributing to Impaired Transfers impaired balance;pain;decreased strength   Comment, (Transfers) mod Ax2   Gait/Stairs (Locomotion)   [place] Level (Gait) minimum assist (75% patient effort);2 person assist   Assistive Device (Gait) walker, front-wheeled   Distance in Feet 100'   Pattern (Gait) step-through   Deviations/Abnormal Patterns (Gait) antalgic;gait speed decreased;weight shifting decreased   Comment, (Gait/Stairs) unsteady and shakey,   Balance   Balance Comments unsteady   Clinical Impression   Criteria for Skilled Therapeutic Intervention Yes, treatment indicated   PT Diagnosis (PT) decreased independent mobility   Influenced by the following impairments pain, weakness,  balance   Functional limitations due to impairments trasnfers and gait   Clinical Presentation (PT Evaluation Complexity) Evolving/Changing   Clinical Presentation Rationale presents as medically diagnosed   Clinical Decision Making (Complexity) moderate complexity   Planned Therapy Interventions (PT) balance training;gait training;transfer training;progressive activity/exercise;strengthening   Anticipated Equipment Needs at Discharge (PT) walker, rolling   Risk & Benefits of therapy have been explained evaluation/treatment results reviewed   PT Total Evaluation Time   PT  Eval, Moderate Complexity Minutes (37671) 23   Therapy Certification   Start of care date 12/27/22   Certification date from 12/27/22   Certification date to 01/02/23   Medical Diagnosis syncope   Physical Therapy Goals   PT Frequency Daily   PT Predicted Duration/Target Date for Goal Attainment 01/02/23   PT Goals Transfers;Gait   PT: Transfers Sit to/from stand;Bed to/from chair;Assistive device  (CGA)   PT: Gait 150 feet;Rolling walker  (CGA)   PT Discharge Planning   PT Plan all trasnfers, gait w/ FWW, LE and balance ex   PT Discharge Recommendation (DC Rec) Transitional Care Facility   PT Rationale for DC Rec pt AX2 today if unable to go t o TCU needs hands on A , home PT and FWW   PT Brief overview of current status pt Ax2   Total Session Time   Total Session Time (sum of timed and untimed services) 23

## 2022-12-27 NOTE — CONSULTS
Care Management Follow Up    Length of Stay (days): 0    Expected Discharge Date: 12/27/2022     Concerns to be Addressed:   Therapy consults pending    Patient plan of care discussed at interdisciplinary rounds: Yes    Anticipated Discharge Disposition:  TBD     Anticipated Discharge Services:  TBD  Anticipated Discharge DME:  TBD    Education Provided on the Discharge Plan:  Per Care Team   Patient/Family in Agreement with the Plan:  Yes    Referrals Placed by CM/SW:    Private pay costs discussed: Not applicable    Additional Information:    Pt lives w/adult dtr, no svcs and independent at baseline, uses home oxygen 2.5L at bedtime and dtr to transport. States he was on hospice but now maybe just palliative care.    Therapy consults pending, awaiting recs.      Cm will continue to follow plan of care, review recommendations, and assist with any discharge needs anticipated.     Yessica Cramer RN

## 2022-12-27 NOTE — PROGRESS NOTES
[facility] Care Coordination Contact    [facility]  Ambulatory Care Coordination to Inpatient Care Management   Hand-In Communication    Date:  December 27, 2022  Name: Ki Pederson is enrolled in [facility] Care Coordination program and I am the Lead Care Coordinator.  CC Contact Information:.   Payor Source: [facility] Plan: [facility] PMAP / Product Type: HMO /   Current services in place:     Please see the CC Snaphot and Care Management Flowsheets for specific  details of this Ki Pederson care plan.   Additional details/specific concerns r/t this admission:        I will follow this admission in Epic. Please feel free to contact me with questions or for further collaboration in discharge planning.

## 2022-12-27 NOTE — PROGRESS NOTES
PRIMARY DIAGNOSIS: SYNCOPE/TIA  OUTPATIENT/OBSERVATION GOALS TO BE MET BEFORE DISCHARGE:  1. Orthostatic performed: No    2. Diagnostic testing complete & at baseline neurologic testing: No    3. Cleared by consultants (if involved): No    4. Interpretation of cardiac rhythm per telemetry tech: NSR    5. Tolerating adequate PO diet and medications: Yes    6. Return to near baseline physical activity or neurologic status: Yes    Discharge Planner Nurse   Safe discharge environment identified: Yes  Barriers to discharge: Yes       Entered by: Vivi Gee RN 12/27/2022 5:55 AM     Please review provider order for any additional goals.   Nurse to notify provider when observation goals have been met and patient is ready for discharge.    Pt. Up independently in room, still refuses bed alarm  pain tolerable with scheduled oxycodone, RASS score is 0.  Calls appropriately, grouping cares to help promote sleep

## 2022-12-27 NOTE — PROGRESS NOTES
Occupational Therapy      12/27/22 1040   Appointment Info   Signing Clinician's Name / Credentials (OT) Sariah Mauro OTR/L   Quick Adds   Quick Adds Certification   Living Environment   People in Home child(katie), adult  (Adult daughter)   Current Living Arrangements house   Transportation Anticipated family or friend will provide   Self-Care   Usual Activity Tolerance good   Current Activity Tolerance fair   Regular Exercise No   Equipment Currently Used at Home walker, rolling   Fall history within last six months yes   Number of times patient has fallen within last six months 2   Activity/Exercise/Self-Care Comment Per chart review pt is Ind. w/ ADL routine   Instrumental Activities of Daily Living (IADL)   IADL Comments Daughter assists w/ IADL tasks   General Information   Onset of Illness/Injury or Date of Surgery 12/26/22   Referring Physician Estrella Miranda MD   Additional Occupational Profile Info/Pertinent History of Current Problem 67 year old male presenting with syncope, Chronic resp failure with hypoxia and hypercapnia/COPD   Existing Precautions/Restrictions (S)  fall   Cognitive Status Examination   Orientation Status person   Range of Motion Comprehensive   General Range of Motion no range of motion deficits identified   Transfers   Transfers sit-stand transfer   Sit-Stand Transfer   Sit-Stand [place] (Transfers) moderate assist (50% patient effort);minimum assist (75% patient effort);2 person assist   Assistive Device (Sit-Stand Transfers) walker, front-wheeled   Balance   Balance Assessment standing balance: static;standing balance: dynamic   Clinical Impression   Criteria for Skilled Therapeutic Interventions Met (OT) Yes, treatment indicated   OT Diagnosis decreased ADL ind/safety   OT Problem List-Impairments impacting ADL problems related to;activity tolerance impaired;balance;cognition;mobility;strength   Assessment of Occupational Performance 3-5 Performance Deficits   Identified  Performance Deficits balance, toileting, bed mobility, dressing   Planned Therapy Interventions (OT) ADL retraining;balance training;bed mobility training;strengthening;transfer training   Clinical Decision Making Complexity (OT) moderate complexity   Anticipated Equipment Needs Upon Discharge (OT) walker, standard   Risk & Benefits of therapy have been explained evaluation/treatment results reviewed;patient   OT Total Evaluation Time   OT Eval, Moderate Complexity Minutes (51795) 8   Therapy Certification   Medical Diagnosis syncope   Start of Care Date 12/27/22   Certification date from 12/27/22   Certification date to 01/04/23   OT Goals   Therapy Frequency (OT) 5 times/wk   OT Predicted Duration/Target Date for Goal Attainment 01/03/23   OT Goals Lower Body Dressing;Bed Mobility;Transfers;Toilet Transfer/Toileting;Cognition   OT: Lower Body Dressing Supervision/stand-by assist;including set-up/clothing retrieval   OT: Bed Mobility Supervision/stand-by assist;supine to/from sitting   OT: Transfer Supervision/stand-by assist;with assistive device   OT: Toilet Transfer/Toileting Supervision/stand-by assist;using adaptive equipment   OT: Cognitive Patient/caregiver will verbalize understanding of cognitive assessment results/recommendations as needed for safe discharge planning   Self-Care/Home Management   Self-Care/Home Mgmt/ADL, Compensatory, Meal Prep Minutes (33913) 10   Symptoms Noted During/After Treatment (Meal Preparation/Planning Training) fatigue   Treatment Detail/Skilled Intervention Pt up in recliner upon OT arrival, pt agitated as he felt his medications were not correct RN notified- STSx2 Mod.Ax1 w/ fww w/ increased cues for safety w/ hand placmeent/tech. Pt initally very unsteady w/ STS trnf Significant Mod.A for standing balance, STSx2 Min-Mod.Ax2 w/ fww w/ close chair follow. Min.A doff/don socks upright in recliner, Min.A to assist w/ UB dressing   OT Discharge Planning   OT Plan SLUMS, trnf  Assistx2- monitor for syncope/safety, toileting   OT Discharge Recommendation (DC Rec) Transitional Care Facility   OT Rationale for DC Rec OT recommending TCU as safest d/c option at this time pt is currently requiring Assistx2 for trnfs/mobility d/t sig. decreased balance/strength- TCU would assist w/ improving strength/endurance/balance/safety w/ ADL tasks. If d/c home would require Min-Mod.Ax2 w/ fww w/ assist for all mobility/self cares.   OT Brief overview of current status Min-Mod.Ax2 w/ fww d/t unsteadiness/syncope   Total Session Time   Timed Code Treatment Minutes 10   Total Session Time (sum of timed and untimed services) 18      [facility]  OUTPATIENT OCCUPATIONAL THERAPY  EVALUATION  PLAN OF TREATMENT FOR OUTPATIENT REHABILITATION  (COMPLETE FOR INITIAL CLAIMS ONLY)  Patient's Last Name, First Name, M.I.  YOB: 1955  Ki Pederson                          Provider's Name  [facility] Medical Record No.  [id number]                             Onset Date:  12/26/22   Start of Care Date:  12/27/22   Type:     ___PT   _X_OT   ___SLP Medical Diagnosis:  syncope                    OT Diagnosis:  decreased ADL ind/safety Visits from SOC:  1     See note for plan of treatment, functional goals and certification details    I CERTIFY THE NEED FOR THESE SERVICES FURNISHED UNDER        THIS PLAN OF TREATMENT AND WHILE UNDER MY CARE     (Physician co-signature of this document indicates review and certification of the therapy plan).

## 2022-12-27 NOTE — PROGRESS NOTES
"SPIRITUAL HEALTH SERVICES Progress Note  [facility], P3    Saw pt Ki Pederson per consult order.    Patient/Family Understanding of Illness and Goals of Care - Ki shared that he had been having some balance issues recently. He stated that he had an emotional \"breakdown\" a couple days ago. He feels as though he is nearing the end of his life.     Distress and Loss - Significant emotional and existential distress. Ki shared about some of his childhood trauma and relationship challenges throughout his life. He stated that he has no hope and shared his spiritual distress of believing that he is a condemned man that God doesn't forgive.     Strengths, Coping, and Resources - He shared about his connection to AA and the period of 20 years where he was sober. He has 11 children, but is not connected to all of them.     Meaning, Beliefs, and Spirituality - His father was a lay Mormonism . He identifies as Mormonism and Native Spirituality. We talked about forgiveness and what that would mean for him to forgive himself. He is longing for inner peace and states he has not experienced that in over 40 years. His primary spiritual concern is not knowing where to start when it comes to feeling forgiveness and experiencing peace. Writer offered validation for the goodness and kindness in Ki, while also validating the feelings of distress. Patient welcomed prayer and expressed appreciation for the visit.      Plan of Care - A  will continue to visit as able or per request by patient/family/staff.      Frankie Driver MDiv, [facility]  /Manager Spiritual Health Services  [phone]    "

## 2022-12-27 NOTE — ED NOTES
[facility] ED Handoff Report    ED Chief Complaint: syncopal episode/ hypoxic    ED Diagnosis:  (J96.20) Acute and chr resp failure, unsp w hypoxia or hypercapnia (H)  Comment:   Plan:     (J44.1) COPD exacerbation (H)  Comment: Patient is on 2.5L of oxygen at home  Plan:     (R41.82) Altered mental status, unspecified altered mental status type  Comment:   Plan:     (E87.5) Hyperkalemia  Comment:   Plan:     (R79.89) Elevated brain natriuretic peptide (BNP) level  Comment:   Plan:        PMH:    Past Medical History:   Diagnosis Date    Anxiety     Atrial fibrillation (H)     CAD (coronary artery disease)     Cerebral infarction (H)     Chronic hepatitis C virus infection (H)     COPD (chronic obstructive pulmonary disease) (H)     Essential hypertension     Hemangioma     massive hemangioma; left hand    Hemangioma     Left hand    Hypertension     Intravenous drug abuse in remission (H)     Sober since ~2010    Lumbar spinal stenosis     Myocardial infarction (H)     Peripheral neuropathy     Restless leg syndrome     Stroke (H)     TIA (transient ischemic attack)         Code Status:  No CPR- Do NOT Intubate     Falls Risk: Yes Band: Applied    Current Living Situation/Residence: lives in a house     Elimination Status: Continent: Yes     Activity Level: SBA w/ walker    Patients Preferred Language:  English     Needed: No    Vital Signs:  /64   Pulse 62   Temp 97.9  F (36.6  C) (Oral)   Resp 12   SpO2 93%      Cardiac Rhythm: NSR- is sinus deonna at times    Pain Score: 7/10    Is the Patient Confused:  Yes    Last Food or Drink: 12/26/22    Focused Assessment:  Patient is on home O2 at baseline.  Patient continues to have moments on confusion and drowsiness.       Tests Performed: Done: Labs and Imaging    Treatments Provided:  SEE MAR    Family Dynamics/Concerns: Yes; please explain: Patient  lives with his ex wife and states that she does not treat him well.  He is wanting to  "move back \"home\" which is near [facility]    Family Updated On Visitor Policy: Yes    Plan of Care Communicated to Family: Yes    Who Was Updated about Plan of Care: Daughter and ex-wife    Belongings Checklist Done and Signed by Patient: Yes    Medications sent with patient: No    Covid: symptomatic, negative    Additional Information: Patient is unsteady on feet.  He reports that he uses a walker at home.  Patient became irritable at times during his time in ED, was able to be redirected.  Continues to have times of drowsiness, but awakes to voice.  Patient is on high doses of pain medications, due to his drowsiness we are giving less than what he takes at home, but enough to keep him out of withdrawal.      RN: Tamela Cloud RN   12/26/2022 6:31 PM      "

## 2022-12-28 PROBLEM — J44.1 COPD EXACERBATION (H): Status: ACTIVE | Noted: 2022-01-01

## 2022-12-28 PROBLEM — R41.82 ALTERED MENTAL STATUS, UNSPECIFIED ALTERED MENTAL STATUS TYPE: Status: ACTIVE | Noted: 2022-01-01

## 2022-12-28 PROBLEM — J96.20 ACUTE AND CHR RESP FAILURE, UNSP W HYPOXIA OR HYPERCAPNIA (H): Status: ACTIVE | Noted: 2022-01-01

## 2022-12-28 NOTE — CONSULTS
[facility] - Palliative Care Consultation Note    Patient: Ki Pederson  Date of Admission:  12/26/2022    Requesting Clinician / Team: Dr Fernandez  Reason for consult: Goals of Care (side effects from medications)    Summary/Recommendations:    Goals of care:  Met with pt alone (along with sitter) today for a palliative care visit (he has seen palliative care 2x before, prior to enrolling in hospice). Reviewed the role of palliative care and the reason for the consult. Omar indicates he stopped [facility] ~1-2 months ago, when was advised to see Cardiology re: a PPM. He however did not follow up. Nor did he connect with Outpatient Palliative Care (appears to have an appt Jan., 2023. He would value a Cardiology evaluation). Today, is sitting on the edge of the bed. He is at times sleepy, with eyes closed. Also has shaking, which he said started after he had a fall 2 days ago (says has not had this before; does have a hx of RLS syndrome and some ETOH yet very little per month now; head CT ordered). He indicates that he is struggling with end of life decisions. Being on hospice was hard and created some family conflicts with family being asked to assist with his care, ?medications. Most recently he has moved in with his ex-wife and 2 other family (x 1 month). He wishes more family were around with him. He is unable to articulate what is most important to him at this time. He is open to more medical evaluation, medication review, and discussion re: goals.     Advanced care planning  -Previous Healthcare Directive: Has a POLST on file  -Surrogate Medical Decision Maker: Dtr and son named as primary health care agents.  -CODE STATUS:DNR/DNI    Symptom management  Chronic Pain, hx of. Differential for tremors-broad, good to rule out seizures, myoclonus, serotonin syndrome, other neurologic issue.   -Appreciate Psychiatry evaluation and recommendations.  Value review from Psychiatry and Hospitalist re: ?  shaking (serotonin syndrome). Pt indicates he appreciates discussion re: his medications/interactions. He is open to considering some adjustments including Klonopin.  -Consider Addiction Medicine and/or Acute Pain Team consultation while inpatient for advice on EVANGELISTA mgmt options with chronic pain ( buprenorphine or methadone).  -May benefit from Neurology/EEG/seizure evaluation, particularly with recent falls and new shaking.  -Agree with drug tox screen.  -Acupuncture and Healing Touch (pt open to this)  -May need inpatient mental health care for safety and medication adjustments, titrations.     Psychosocial and support needs  -Will ask Palliative Care LICSW to connect with pt tomorrow.  -Appreciate Spiritual Care connecting with pt.      Case discussed with MD.      Thank you for the opportunity to participate in the care of this patient and family.      During regular M-F work hours -- if you are not sure who specifically to contact -- please contact us by calling [phone].      Palliative Care Assessment    Information gathered today from:chart review, MD.  Ki Pederson is a 67 year old male with a history of hypertension, ASCVD, COPD, 4 L of oxygen at home, nicotine abuse, chronic pain/hx remote IVD use, atrial fibrillation, MI, TIA, and acute on systolic heart,, chronic hepatitis C, restless leg syndrome, peripheral neuropathy, spinal stenosis and CVA without any deficits. who presented to the ED on Dec. 26th  With syncope.  Admitted to the hospital with syncope.   Previous hospitalizations: Nov. 2022 ER drug overdose  Other specialities following this admission: Psychiatry  Per ER, recent changes: Per patient he had x1 episode of syncope while sitting.Per nurse/EMS reported patient took 8mg of Klonopin. Patient is on 2.5L of oxygen at home. On arrival patient was in a state of drowsiness and nurse had to give a sternal rub for patient to wake up. Per patient's ex wife, last several days states that he  feels a copd flare coming on. Passed out in chair. Unresponsive. On 2L O2 at baseline, but was NOT on O2.  Family put O2 on, sats still in 80s, so family bumped up to 3L w O2 89%.  Called 911. Patient doesn't have control of meds. When he gets hypoxic, he says things that don't make sense.  Stated he took 8 klonopin between 8am and 3pm.  Ex-wife has meds in safe in HER room, he can't get meds.  Ex-wife counted pills and none of them are missing. Ex-wife states yest c/o chills yest. They started empiric doxy yest 1 dose for copd sx. Swelling in legs x2d, given 80mg lasix daily instead of usual 40mg.  3lb wt gain. No longer on hospice.  Appt 1/4/23 with pall care.     Today, the patient was seen for:  Goals of Care (side effects from medications)    Previous Palliative Care Encounters:  Saw Palliative Care in Sept. 2022, referred to Hospice.     Summary of Palliative Encounter:  I  discussed the reason for Palliative Care Referral and our role in symptom management, patient family communication and understanding their choices for medical treatment and providing  guidance in making difficult decisions in the framework of focusing on patient comfort and quality of life.    Reviewed current conditions and treatments including recent clinical decisions/changes/goals, coping, symptom mgmt, cardiology evaluation, hospice.        Prognosis, Goals, and/or Advance Care Planning were addressed today: Yes, limited    Mood, coping, and/or meaning in the context of serious illness were addressed today: Yes. Pt states that he is having a difficult time thinking of his exact feelings.     Functional Status:     Functional Status two weeks prior to hospitalization: , PPS:   40%- 1. Mainly in bed; 2. Unable to do most activity, extensive disease; 3. Mainly assistance; 4. Normal or reduced; 5. Full or drowsy +/- confusion. Uses a walker.     Functional status Current:  , PPS:   40%- 1. Mainly in bed; 2. Unable to do most activity,  extensive disease; 3. Mainly assistance; 4. Normal or reduced; 5. Full or drowsy +/- confusion    Prognosis, Goals, & Planning:   Prognosis (quality & quantity): Previously was hospice (as long ago as 2 years ago)   High risk of death within one year? Uncertain    Patient's decision making preferences: With son and dtr        Capacity evaluation:    Pt Omar does not have full capacity to make a decision regarding his care based on the following: given somnolence today.             I have concerns about the patient/family's health literacy today:Possibly           Patient has a completed Health Care Directive: Has done POLST      Code status:DNR/DNI    Social:     Relationships: . Lives now with ex-wife Dereck as well as ? Children Thais and Michael. Has 11 children and 72 grandchildren.     Hobbies: He loves to cook, yet is dangerous.     Spirituality: Is spiritual.     Alcohol: Reports very little       Key Palliative Symptom Data:  SOB-None, is on room air. Says uses oxygen at home.   Pain-Yes, has pain in left hip, and tailbone.   Nausea None  Anxiety-Is stressed, seems frustrated, wishes more family were around at home, to help     ROS:  Comprehensive ROS is reviewed and is negative except as here & per HPI:     Past Medical History:  Past Medical History:   Diagnosis Date     Anxiety      Atrial fibrillation (H)      CAD (coronary artery disease)      Cerebral infarction (H)      Chronic hepatitis C virus infection (H)      COPD (chronic obstructive pulmonary disease) (H)      Essential hypertension      Hemangioma     massive hemangioma; left hand     Hemangioma     Left hand     Hypertension      Intravenous drug abuse in remission (H)     Sober since ~2010     Lumbar spinal stenosis      Myocardial infarction (H)      Peripheral neuropathy      Restless leg syndrome      Stroke (H)      TIA (transient ischemic attack)         Past Surgical History:  Past Surgical History:   Procedure Laterality Date      CERVICAL FUSION      C6 and C7     CERVICAL FUSION      C6-7     PICC TRIPLE LUMEN PLACEMENT  9/21/2022              Family History:  Family History   Problem Relation Age of Onset     Coronary Artery Disease Mother      Diabetes Mother      Coronary Artery Disease Father      Chronic Obstructive Pulmonary Disease Brother      Heart Disease Brother      Chronic Obstructive Pulmonary Disease Brother      Heart Disease Brother      No Known Problems Brother      Heart Disease Sister      Chronic Obstructive Pulmonary Disease Sister      Chronic Obstructive Pulmonary Disease Sister      Heart Disease Sister      No Known Problems Sister      Chronic Obstructive Pulmonary Disease Sister      Heart Disease Sister      No Known Problems Sister      No Known Problems Sister          Allergies:  Allergies   Allergen Reactions     Symbicort Rash        Medications:  I have reviewed this patient's medication profile and medications from this hospitalization.       albuterol  2 puff Inhalation Q3H     amiodarone  200 mg Oral Daily     azithromycin  250 mg Oral Q Mon Wed Fri AM     citalopram  40 mg Oral Daily     clonazePAM  1 mg Oral At Bedtime     cloNIDine  0.1 mg Oral Q8H     diltiazem ER COATED BEADS  120 mg Oral Daily     famotidine  20 mg Oral Daily     [START ON 12/29/2022] folic acid  1 mg Oral Daily     [START ON 12/29/2022] furosemide  20 mg Oral Daily     gabapentin  400 mg Oral TID     guaiFENesin  1,200 mg Oral Daily     ipratropium - albuterol 0.5 mg/2.5 mg/3 mL  3 mL Nebulization 4x daily     [START ON 12/29/2022] multivitamin w/minerals  1 tablet Oral Daily     nicotine  1 patch Transdermal Daily     nicotine   Transdermal Q8H     oxyCODONE  10 mg Oral Q4H While awake     predniSONE  20 mg Oral Daily     rivaroxaban ANTICOAGULANT  20 mg Oral Daily with supper     [START ON 12/29/2022] thiamine  100 mg Oral Daily        PRN MEDS:   acetaminophen, alum & mag hydroxide-simethicone, bisacodyl, bisacodyl,  "clonazePAM, flumazenil, haloperidol lactate, OLANZapine zydis **OR** haloperidol lactate, LORazepam **OR** LORazepam, magnesium hydroxide, melatonin, melatonin, naloxone **OR** naloxone **OR** naloxone **OR** naloxone, oxyCODONE, senna-docusate     Morphine Equivalent Daily Dose: Oxycodone 55 mg or 82.5 mg OME    Physical Exam:  BP (!) 142/95 (BP Location: Left arm)   Pulse 90   Temp 98.7  F (37.1  C) (Oral)   Resp 22   Ht 1.829 m (6' 0.01\")   Wt 111.2 kg (245 lb 1.6 oz)   SpO2 98%   BMI 33.23 kg/m        General Appearance: Variable alertness and somnolence. Does have shaking at times in both legs and hands.     Data reviewed:    Data         Lab Results   Component Value Date/Time    ALBUMIN 4.2 12/26/2022 02:54 PM    ALBUMIN 3.5 04/10/2022 06:05 AM     Wt Readings from Last 3 Encounters:   12/28/22 111.2 kg (245 lb 1.6 oz)   12/17/22 99.8 kg (220 lb)   12/07/22 107 kg (236 lb)           TTS: I have personally spent a total of 55 minutes  today on the unit in review of medical record, consultation with the medical providers and assessment of patient today, with more than 50% of this time spent in counseling, coordination of care, and conversation with pt, MD, Palliative Care LICSW  in a family meeting re: diagnostic results, prognosis, symptom management, emotional support, risks and benefits of management options, and development of plan of care.     DESHAWN Avila, FNP-BC, PMHNP-BC  Palliative Care Nurse Practitioner  [facility]  [phone]      During regular M-F work hours -- if you are not sure who specifically to contact -- please contact us by calling [phone].  "

## 2022-12-28 NOTE — PLAN OF CARE
Problem: Pain Chronic (Persistent)  Goal: Optimal Pain Control and Function  Outcome: Progressing  Intervention: Develop Pain Management Plan  Recent Flowsheet Documentation  Taken 12/28/2022 0900 by Tej Bueno RN  Pain Management Interventions: medication (see MAR)  Intervention: Manage Persistent Pain  Recent Flowsheet Documentation  Taken 12/28/2022 0919 by Tej Bueno RN  Medication Review/Management: medications reviewed     Problem: Fall Injury Risk  Goal: Absence of Fall and Fall-Related Injury  Outcome: Progressing  Intervention: Identify and Manage Contributors  Recent Flowsheet Documentation  Taken 12/28/2022 0919 by Tej Bueno RN  Medication Review/Management: medications reviewed  Intervention: Promote Injury-Free Environment  Recent Flowsheet Documentation  Taken 12/28/2022 0919 by Tej Bueno RN  Safety Promotion/Fall Prevention:    supervised activity    sitter at bedside   Goal Outcome Evaluation:             Pt was sleeping this morning till writer woke pt for morning medications.  PT was falling asleep while talking with writer.  Pt asked about pain medications and fell asleep at end of sentence.    Pt later was sitting on edge of bed and fell asleep and started tipping over.  Writer caught pt before falling.   Pt had stool incontinence in bed.  PT was cleaned up and placed fell asleep promptly.    Pt met with psychology and fell asleep after.      Pt is more alert this afternoon  Pt was able to eat lunch and talk with hospitalist.  Pt was bladder scanned for 556 and straight cathed for 850.  This is second straight cath today.    Pt was given afternoon dose of 10 mg oxycodone for pain 6/10 in hips and buttock.    Pt has bruising on left buttock.

## 2022-12-28 NOTE — PROGRESS NOTES
Patient is on RA, BS vesicular, gave Duoneb treatment x2, BS post treatment no change, patient tolerated treatment well. RT will cont to monitor.     José Miguel Merino RRT

## 2022-12-28 NOTE — PROGRESS NOTES
Patient is tremulous, reports sever pain but is having significant sedation with the increase in oxycodone.  Vitals stable HR coming up a bit.  Hx of EtOH abuse.  Will start CIWA and check head to r/o CT for late bleed after fall.    Decrease lasix presumptively and recheck BMP.    Offered numerous adjuncts for pain/anxiety and he refuses them.

## 2022-12-28 NOTE — TREATMENT PLAN
RCAT Treatment Plan    Patient Score: 14  Patient Acuity: 3    Clinical Indication for Therapy: history of bronchospasm, home regimen and prevent atelectasis    Therapy Ordered: Duoneb QID, Incentive Spirometer    Assessment Summary: PT has been on room air, but was desatting this AM.  Uses 2L with activity at home. Uses nebs at home. Placed back on 2L NC with an SpO2 of 99%.     Scar Dawkins, RT  12/28/2022

## 2022-12-28 NOTE — PLAN OF CARE
Problem: Plan of Care - These are the overarching goals to be used throughout the patient stay.    Goal: Absence of Hospital-Acquired Illness or Injury  Outcome: Progressing  Intervention: Identify and Manage Fall Risk  Recent Flowsheet Documentation  Taken 12/27/2022 2130 by Yoni Garcia, RN  Safety Promotion/Fall Prevention:   supervised activity   sitter at bedside   room near nurse's station   patient and family education   nonskid shoes/slippers when out of bed   increase visualization of patient   increased rounding and observation   fall prevention program maintained   activity supervised  Goal: Optimal Comfort and Wellbeing  Outcome: Progressing     Problem: Pain Chronic (Persistent)  Goal: Optimal Pain Control and Function  Outcome: Progressing  Intervention: Manage Persistent Pain  Recent Flowsheet Documentation  Taken 12/27/2022 2130 by Yoni Garcia, RN  Medication Review/Management: medications reviewed     Problem: Fall Injury Risk  Goal: Absence of Fall and Fall-Related Injury  Outcome: Progressing  Intervention: Identify and Manage Contributors  Recent Flowsheet Documentation  Taken 12/27/2022 2130 by Yoni Garcia, RN  Medication Review/Management: medications reviewed  Intervention: Promote Injury-Free Environment  Recent Flowsheet Documentation  Taken 12/27/2022 2130 by Yoni Garcia, RN  Safety Promotion/Fall Prevention:   supervised activity   sitter at bedside   room near nurse's station   patient and family education   nonskid shoes/slippers when out of bed   increase visualization of patient   increased rounding and observation   fall prevention program maintained   activity supervised   Goal Outcome Evaluation:    Patient is drowsy but does wake up to voice. Oriented x 3-4. Pleasant and cooperative. A 1:1 sitter remains due to fall risk.   Telemetry: NSR

## 2022-12-28 NOTE — PLAN OF CARE
PRIMARY DIAGNOSIS: SYNCOPE/TIA  OUTPATIENT/OBSERVATION GOALS TO BE MET BEFORE DISCHARGE:  1. Orthostatic performed: N/A    2. Diagnostic testing complete & at baseline neurologic testing: N/A    3. Cleared by consultants (if involved): No    4. Interpretation of cardiac rhythm per telemetry tech: NSR    5. Tolerating adequate PO diet and medications: Yes    6. Return to near baseline physical activity or neurologic status: Yes    Discharge Planner Nurse   Safe discharge environment identified: No  Barriers to discharge: Yes       Entered by: Scottie Rodriguez RN 12/28/2022 6:25 AM      Patient alert and oriented. Cooperative and calm this morning. Patient had no void and no urge overnight. Bladder scan for 734ml, straight cath for 775ml of urine.  1:1 sitter at bedside for safety.      Please review provider order for any additional goals.   Nurse to notify provider when observation goals have been met and patient is ready for discharge
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
## 2022-12-28 NOTE — PLAN OF CARE
"PRIMARY DIAGNOSIS: SYNCOPE/TIA  OUTPATIENT/OBSERVATION GOALS TO BE MET BEFORE DISCHARGE:  1. Orthostatic performed: N/A    2. Diagnostic testing complete & at baseline neurologic testing: N/A    3. Cleared by consultants (if involved): No    4. Interpretation of cardiac rhythm per telemetry tech: NSR    5. Tolerating adequate PO diet and medications: Yes    6. Return to near baseline physical activity or neurologic status: Yes    Discharge Planner Nurse   Safe discharge environment identified: No  Barriers to discharge: Yes       Entered by: Scottie Rodriguez RN 12/28/2022 4:50 AM     Please review provider order for any additional goals.   Nurse to notify provider when observation goals have been met and patient is ready for discharge.  Goal Outcome Evaluation:  Patient alert and oriented X 4, restless, agitated. Redirectable.  Patient stating that dosage of oxycodone not enough. Reports chronic pain \"everywhere\". Reassure patient that he can discuss his concern with MD this morning as dose was already increased from 10mg to 15mg yesterday.  1:1 sitter at bedside for safety.                      "

## 2022-12-28 NOTE — PROGRESS NOTES
Care Management Follow Up    Length of Stay (days): 0    Expected Discharge Date: 12/29/2022     Concerns to be Addressed:    On 1 on 1 would need to be off for 24 to 48 hours to qualify for TCU. Needs TCU placement   Patient plan of care discussed at interdisciplinary rounds: Yes    Anticipated Discharge Disposition:  TCU     Anticipated Discharge Services:  TCU  Education Provided on the Discharge Plan:  Per Care Team   Patient/Family in Agreement with the Plan:  Yes    Referrals Placed by CM/SW:    Private pay costs discussed: Not applicable    Additional Information:  Chart reviewed.    Assessed, lives w/adult dtr, no svcs and independent at baseline, uses home oxygen 2.5L at bedtime and dtr to transport.  States he was on hospice but now maybe just palliative care. Per daughter wants pt to go to a TCU to get stronger. Per daughter Pt has an [employer] worker, who is working on FDC or LTC placement for the pt. Pt wants us to talk to family.     Pt has been on a 1 on 1 today, pt will need to be off 1 on 1 for 24 to 48 hours in order to qualify for TCU.       Daughter was fine with any TCU able to accept pt, and somewhat closer to home if possible.      TCU referrals sent (pending).      CM will continue to follow plan of care, review recommendations, and assist with any discharge needs anticipated.       Yessica Cramer RN

## 2022-12-28 NOTE — UTILIZATION REVIEW
Admission Status; Secondary Review Determination   Under the authority of the Utilization Management Committee, the utilization review process indicated a secondary review on Ki Pederson. The review outcome is based on review of the medical records, discussions with staff, and applying clinical experience noted on the date of the review.   (x) Inpatient Status Appropriate - This patient's medical care is consistent with medical management for inpatient care and reasonable inpatient medical practice.     RATIONALE FOR DETERMINATION   67 yr old male who presented with syncope.  Medically complex with oxygen dependent COPD, Afib, CKD3, Chronic CHF, Chronic pain and depression.  Has home oxygen needs but appears was not on when event occured.  Some hypotension on arrival as well.  Worked on adjustments of medications (oxycodone, klonopin) and now ongoing sedation.  Working on resuming oxygen.  Some bradycardia in addition to hypotension and have been holding home meds of amiodarone and diltiazem.  Mild hyperkalemia and magnesium also elevated.  Suicidal ideation on presentation and psych to consult.  He is also tremulous with concern for withdrawal and starting CIWA as well as repeat CT assessing for late bleed.  Medically complex and not stable for discharge with ongoing evaluation.    At the time of admission with the information available to the attending physician more than 2 nights Hospital complex care was anticipated, based on patient risk of adverse outcome if treated as outpatient and complex care required. Inpatient admission is appropriate based on the Medicare guidelines.   The information on this document is developed by the utilization review team in order for the business office to ensure compliance. This only denotes the appropriateness of proper admission status and does not reflect the quality of care rendered.   The definitions of Inpatient Status and Observation Status used in making the  determination above are those provided in the CMS Coverage Manual, Chapter 1 and Chapter 6, section 70.4.   Sincerely,   Candelaria Cardoso MD  Utilization Review  Physician Advisor  [facility]

## 2022-12-28 NOTE — PROGRESS NOTES
[facility]    Medicine Progress Note - Hospitalist Service    Date of Admission:  12/26/2022    Assessment & Plan   Ki Pederson is a 67 year old male presenting with syncope.  His presentation is complicated by his recent self discharge from hospice and encephalopathy.     Syncope with acute metabolic encephalopathy  - No acute EKG changes  - initial presentation likely due to home Oxygen was not on at home and he has not restarted BiPAP at night since discontinuing hospice.  - was hypotensive on presentation and given 500ml fluid bolus, very low PO intake due to ongoing sedation from opioids so gave additional IVF today.  - head CT negative and CXR on admit.  Patient hit head and fell and has had CT head x2 to r/o bleed in the setting of anticoagulant and on going encephalopathy.  - At home on scheduled oxycodone 20mg Q4hrs for 5 doses max per day given hypotensive and groggy we decreased to 10mg on admit to prevent withdrawal symptoms.  He woke up, complained of 10/10 pain so increase back up to 15 due to pain complaint but he became sedated again.   - continued klonipin which he is on chronically but reduced dose to 1mg to avoid withdrawal.  He initially told me his tremor was new so I started to eval and treat for withdrawal but then his daughter reported he has long history of intention tremor.  - consult addiction medicine to assist with clonazepam taper.  - Held amiodarone and dit initially gradually adding it back as he tolerates it.  - he seems more confabulatory today.  - PT/OT to see     Bradycardia an hypotensive as above  - continue tele  - adding back amiodarone and dilt  - should have outpatient follow up for pacemaker.  He has not followed up in a-fib clinic since this was recommended.      Mild hyperkalemia 5.5 and mag elevated 2.5   - EKG no acute findings  - improved with lasix and gentle IVF.  - recheck potassium tomorrow    Hypermagnesemia  - gentle hydration  -  holding home mag     Chronic resp failure with hypoxia and hypercapnia/COPD and chronic CHF  - keep at 2.5 liters and not to titrate up unless to keep sats 88-92%   - Restart night time BiPAP  - ABG done on admit similar to previous   - not in COPD exacerbation already on prednisone and continue for now   - Continue chronic azithromycin MWF   - continue home inhaler and DUOnebs   - mucinex      Chronic CHF does not appear to be in exacerbation despite exwife stating weight increased and his home lasix was doubled.  - Did not eat or drink much yesterday per report due to sedation and appeared hypovolemic today on exam so holding lasix, gentle IVF bolus about 500  and recheck BMP.      YUNIEL/CKD3  - trend renal function, hold lasix, reduce sedation so he goes back to taking PO, gentle NS bolus this afternoon, start IVF at rate if PO intake doesn't improve.      Chronic pain (was on hospice in the past now off)    - as above oxycodone dosing  - gabapentin 600mg TID decreased to 300mg TID now will go back up to 400 mg.     Depression - with suicidal threats on the night of 12/26  - continue celexa  - klonipin dose decreased to 1mg for now   - Psych consult appreciated.  He is not actively suicidal     Afib  -bradycardic in the 50'sand hypotensive on admit  - gradually restarting dilt and amio as vitals tolerate  - continue xarelto head ct negative    Tobacco Abuse and hx muti-substance abuse.  - nicotine patch  - confirmed he is not drinking with daughter.       Diet: Regular Diet Adult    DVT Prophylaxis: DOAC  Fine Catheter: Not present  Central Lines: None  Cardiac Monitoring: ACTIVE order. Indication: Drug overdose (24 hours)  Code Status: No CPR- Do NOT Intubate      Disposition Plan      Expected Discharge Date: 12/29/2022    Discharge Delays: Other (Add Comment)    Discharge Comments: pending respiratory and cognitive improvement         Reviewed Goals of Care with his daughter Ethan who is incredibly helpful  "and should be updated daily.  She reports that he stopped hospice and is back to curative care.  Since discontinuing hospice he has not had his BiPAP restarted or care plan updated.    The patient's care was discussed with the Bedside Nurse, Patient, Patient's Family and Psych and Palliative Consultant.    Jose M Fernandez MD  Hospitalist Service  [facility]  Securely message with the Vocera Web Console (learn more here)  Text page via [employer] Paging/Directory         Clinically Significant Risk Factors Present on Admission        # Hyperkalemia: Highest K = 5.5 mmol/L in last 2 days, will monitor as appropriate  # Hyponatremia: Lowest Na = 134 mmol/L in last 2 days, will monitor as appropriate       # Drug Induced Coagulation Defect: home medication list includes an anticoagulant medication   # Acute Kidney Injury, unspecified: based on a >150% or 0.3 mg/dL increase in last creatinine compared to past 90 day average, will monitor renal function   # Chronic systolic heart failure: echo within the past year with EF <40%   # Acute Respiratory Failure: based on blood gas results.  Continue supplemental oxygen as needed     # Obesity: Estimated body mass index is 33.23 kg/m  as calculated from the following:    Height as of this encounter: 1.829 m (6' 0.01\").    Weight as of this encounter: 111.2 kg (245 lb 1.6 oz).           ______________________________________________________________________    Interval History   Patient had escalating behaviors today due to wanting more oxycodone and clonazepam in spite of excessive somnolence that was interfering with his ADLs such as taking PO.  Discussed his care with his daughter who clarified his goal is now curative, he should be on BiPAP at night and weaning off opioid and benzo.    Data reviewed today: I reviewed all medications, new labs and imaging results over the last 24 hours. I personally reviewed     Physical Exam   Vital Signs: Temp: 98.6 "  F (37  C) Temp src: Oral BP: 133/83 Pulse: 95   Resp: 22 SpO2: 94 % O2 Device: Nasal cannula Oxygen Delivery: 2 LPM  Weight: 245 lbs 1.6 oz  Constitutional: fatigued, somnolent, uncooperative and no apparent distress  Respiratory: no increased work of breathing, mild air exchange and wheeze diffuse, no crackles  Cardiovascular: normal S1 and S2  GI: normal bowel sounds and non-tender  Skin: plethoric  Musculoskeletal: no lower extremity pitting edema present  Neurologic: Mental Status Exam:  Level of Alertness:   Awake alternating with lethargy.  Cranial Nerves:  cranial nerves II-XII are grossly intact  Motor Exam:  moves all extremities well and symmetrically  Bilateral intention tremor of upper extremity.  Neuropsychiatric: General: restless and normal eye contact  Level of consciousness: alert / normal  Affect: frustrated  Orientation: oriented to self, place, time and situation  Memory and insight: memory for past and recent events intact and contradicts self regarding past events.    Data   Recent Labs   Lab 12/28/22  1237 12/27/22  0446 12/27/22  0213 12/26/22  1454   WBC 11.8* 11.0  --  9.8   HGB 11.0* 12.0*  --  13.1*   MCV 96 95  --  97    253  --  298   INR  --   --   --  1.14   * 136  --  140   POTASSIUM 4.8 5.5*  --  5.5*   CHLORIDE 95* 99  --  100   CO2 26 26  --  28   BUN 61.5* 39.6*  --  22.2   CR 2.07* 1.50*  --  1.37*   ANIONGAP 13 11  --  12   PALMER 8.5* 8.6*  --  8.4*   * 186* 211* 194*   ALBUMIN  --   --   --  4.2   PROTTOTAL  --   --   --  7.0   BILITOTAL  --   --   --  0.3   ALKPHOS  --   --   --  58   ALT  --   --   --  24   AST  --   --   --  26     Recent Results (from the past 24 hour(s))   CT Head w/o Contrast    Narrative    EXAM: CT HEAD W/O CONTRAST  LOCATION: [facility]  DATE/TIME: 12/28/2022 5:38 PM    INDICATION: check for late bleed after fall   on anticoagulation  COMPARISON: 12/27/2022 head CT  TECHNIQUE: Routine CT Head without IV  contrast. Multiplanar reformats. Dose reduction techniques were used.    FINDINGS:  INTRACRANIAL CONTENTS: No intracranial hemorrhage, extraaxial collection, or mass effect.  No CT evidence of acute infarct. Mild presumed chronic small vessel ischemic changes. Mild generalized volume loss. No hydrocephalus.     VISUALIZED ORBITS/SINUSES/MASTOIDS: No intraorbital abnormality. No paranasal sinus mucosal disease. Hypoplastic right sided mastoid air cells. Left mastoid air cells and middle ear cavity clear.    BONES/SOFT TISSUES: No acute abnormality.      Impression    IMPRESSION:  1.  No acute intracranial process.     Medications       albuterol  2 puff Inhalation Q3H     amiodarone  200 mg Oral Daily     azithromycin  250 mg Oral Q Mon Wed Fri AM     citalopram  40 mg Oral Daily     clonazePAM  1 mg Oral At Bedtime     cloNIDine  0.1 mg Oral Q8H     diltiazem ER COATED BEADS  120 mg Oral Daily     famotidine  20 mg Oral Daily     [START ON 12/29/2022] folic acid  1 mg Oral Daily     gabapentin  400 mg Oral TID     guaiFENesin  1,200 mg Oral Daily     ipratropium - albuterol 0.5 mg/2.5 mg/3 mL  3 mL Nebulization 4x daily     [START ON 12/29/2022] multivitamin w/minerals  1 tablet Oral Daily     nicotine  1 patch Transdermal Daily     nicotine   Transdermal Q8H     oxyCODONE  10 mg Oral Q4H While awake     predniSONE  20 mg Oral Daily     rivaroxaban ANTICOAGULANT  20 mg Oral Daily with supper     [START ON 12/29/2022] thiamine  100 mg Oral Daily

## 2022-12-28 NOTE — PROGRESS NOTES
"RESPIRATORY CARE NOTE:    PT is currently on 2L NC with an SpO2 of 98%.  Breathing pattern regular Breath sounds diminished with upper airway wheezes Cough type  Sputum Type unknown  Duoneb nebulizer given x2.  PT tolerated treatments well.  RT will continue to follow.      /70   Pulse 99   Temp 98.7  F (37.1  C) (Oral)   Resp 22   Ht 1.829 m (6' 0.01\")   Wt 111.2 kg (245 lb 1.6 oz)   SpO2 98%   BMI 33.23 kg/m      Scar Dawkins, RT  12/28/2022      "

## 2022-12-29 NOTE — PROGRESS NOTES
RESPIRATORY CARE NOTE       Patient was on Bipap ST 10/5, Rate of 14, Fi02 of 55% titrated from 50%. Sp02 is 92%. Patient wore it most of the day. RT attempted to trial off the oxymask 8lpm to 15lpm and patient could not maintain saturations above 90%. Skin is intact and he has a barrier on the bridge of his nose.     Due to his thick, tan, secretions patient was placed on the heated humidified circuit.    Breath sounds prior to treatment where coarse bilaterally and post treatment increased aeration.      Patient has a strong cough that is very productive.    RT will continue to assess and monitor cardiopulmonary status.     Ariella Campbell, RT

## 2022-12-29 NOTE — PROGRESS NOTES
Care Management Follow Up    Length of Stay (days): 1    Expected Discharge Date: 12/30/2022     Concerns to be Addressed:  1 on 1, needing Bipap, lethargic     Patient plan of care discussed at interdisciplinary rounds: Yes    Anticipated Discharge Disposition: TCU     Anticipated Discharge Services:  TCU    Education Provided on the Discharge Plan:  Per Care Team   Patient/Family in Agreement with the Plan: Yes     Referrals Placed by CM/SW:    Private pay costs discussed: Not applicable    Additional Information:  Chart reviewed.    Pt lives w/adult dtr, no svcs and independent at baseline, uses home oxygen 2.5L at bedtime . Daughter states he was on hospice but now maybe just palliative care. Per daughter wants pt to go to a TCU to get stronger. Per daughter Pt has an Arms worker, who is working on [facility] or LTC placement for the pt. Transport TBD.       Pt continues on 1 on 1. Per Charge pt has needed bipap all day today as well as overnight last night. Addiction medicine following as well.         TCU referrals sent (pending).        CM will continue to follow plan of care, review recommendations, and assist with any discharge needs anticipated.        Yessica Cramer RN

## 2022-12-29 NOTE — CONSULTS
[facility] ACUTE PAIN SERVICE CONSULTATION     Date of Admission:  12/26/2022  Date of Consult (When I saw the patient): 12/29/22  Physician requesting consult: Thomas Mota MD  Reason for consult: Chronic Pain  Primary Care Physician: Paola Rico MD     Assessment/Plan:     Ki Pederson is a 67 year old male who was admitted on 12/26/2022.  Pain team was asked to see the patient for chronic pain. Patient on BiPAP at time of visit, RASS -4.  History obtained from chart review.     Admitted for syncope. History of chronic back pain on opioids, A-fib, CAD, cerebral infarction, COPD, hypertension, myocardial infarction, stroke, TIA. EMS reported patient took 8mg of Klonopin. Patient is on 2.5L of oxygen at home. Currently on BiPAP at time of visit. On 12/27 patient had an unwitnessed fall after getting out of bed. Head CT negative and CXR. Psychiatry, Palliative Care, and Addiction Medicine consulted. Per Psych evaluation symptoms are suggestive of delirium and associated behaviors, placed on Delirium prevention protocol. At home he's on scheduled oxycodone 20mg Q4hrs for 5 doses max per day,  given hypotensive and grogginess Oxycodone was decreased to 10mg q4h on admit to prevent withdrawal symptoms. However, complained of 10/10 so oxycodone was increased back to 15 mg, he then became sedated. Patient has significant sedation with increased oxycodone, now on 10 mg Q4H, will change this to PRN given sedation and on BiPAP. Addiction Medicine recommending medication assisted therapy (MAT) Suboxone and benzodiazepine cross titration with barbiturate due to high risk of being overdose with prescription opiate and benzodiazepine, agree with this. 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     Opioid Induced Respiratory Depression Risk Assessment:?  High due to the following risk factors: ABIGAIL, COPD, CHF, renal dysfunction, BMI 33.23,  Age>60, concomitant CNS depressants. ?     PLAN:   1) Pain is consistent with chronic pain. High risk med use and for overdose with prescription opiate and benzodiazepine and hypoxia requiring bipap. Labs and imaging indicated: I have personally reviewed pertinent labs, tests, and radiologic imaging in patient's chart. Treatment plan includes multimodal pain approach, Hospital Medicine Service for medical management, Palliative care, Addiction Med.   2)Multimodal Medication Therapy  Topical: None, consider   NSAID'S: None. CrCl 62.8 mL/min. Not recommended CKD3.  Steroids: Prednisone 20 mg QD  Muscle Relaxants: None.   Adjuvants: Gabapentin 400 mg TID, APAP 650 Q4H PRN  Antidepressants/anxiolytics: scheduled and PRN Klonopin 1 mg at bedtime, Citalopram 40 mg every day  Antipsychotics: Haldol 2 mg IV Q6H PRN, Haldol 2.5-5 mg Q6H PRN, Olanzapine 5-10 mg Q6H PRN  Opioids: discontinue scheduled and change to Oxycodone 5-10 mg Q4H PRN  IV Pain medication: None.   3)Non-medication interventions: rest, ice vs heat   4)Constipation Prophylaxis: Scheduled and prn: PRN Senna-docusate BID, Milk of Magnesia, Dulcolax. Consider scheduling Senna-docusate  5) Care Teams: [facility] [place], Palliative Care, Addiction Medicine, Psychiatry, PT/OT.    -Opioid prescriber has been Paola Rico. Noted 12 different providers per  data.   -MN  pulled from system on 12/29/22. This indicates Chronic opioid use, polypharmacy.  12/26/22: Oxycodone 20 mg #15 for 3 ds  12/22/22: Clonazepam 1 mg #60 for 30 ds  12/13/22: Gabapentin 300 mg #540 for 90 ds  11/28/22: Oxycodone 20 mg #140 for 28 ds  11/28/22: Oxycodone 20 mg #10 for 2 ds  11/22/22: Clonazepam 1 mg #60 for 30 ds  11/02/22: Oxycodone 20 mg #143 for 29 ds     Discharge Recommendations - We recommend prescribing the following at the time of discharge: TBD.         History of Present Illness (HPI):       Per chart review Dacia Emerson 67 years old who came to emergency room through EMS on  December 26, 2022 due to altered mental status, and a syncopal episode after taking 8 mg of Klonopin and the patient was on 20 mg oxycodone every 4 hours prescribed by outpatient provider, max 5 tabs per day.  Patient with history of severe COPD, on 2 L of oxygen baseline at home. He indicates he stopped [facility] ~1-2 months ago per notes. He follows with Primary Care Provider for medication who has advised Outpatient Palliative Care.   Per Hospital Medicine Service notes: The reason he went off hospice was in order to get a pacemaker. Since discontinuing hospice he has not had his BiPAP restarted as outpatient and that was resumed in the hospital.    Per review of HP: EMS reported patient took 8mg of Klonopin. Patient is on 2.5L of oxygen at home. On arrival patient was in a state of drowsiness and nurse had to give a sternal rub for patient to wake up.  Per patient's ex wife, last several days states that he feels a copd flare coming on. Passed out in chair. Unresponsive. On 2L O2 at baseline, but was NOT on O2.  Family put O2 on, sats still in 80s, so family bumped up to 3L w O2 89%.  Called 911. Patient doesn't have control of meds. When he gets hypoxic, he says things that don't make sense.  Stated he took 8 klonopin between 8am and 3pm.  Ex-wife has meds in safe in HER room, he can't get meds.  Ex-wife counted pills and none of them are missing. Ex-wife states yest c/o chills yest. They started empiric doxy yest 1 dose for copd sx. Swelling in legs x2d, given 80mg lasix daily instead of usual 40mg.  3lb wt gain. No longer on hospice.  Appt 1/4/23 with [facility] [place].     Per review of progress note from outpatient visit 12/7/22 with Paola Rico:   Chronic back pain: On long-term opiate therapy for chronic back pain secondary to degenerative disc disease.  Patient is on very significant doses of oxycodone as he was on hospice care earlier this year.  However, his goals right now are not in line with  hospice and that he wants to keep treating his medical conditions and to keep on going to doctors and in the hospital if needed.  Discussed with patient and his son that we can continue his current regimen for now however I would like them to establish care with palliative medicine for assistance with pain and symptom management.  I will continue prescribing his opiates until he is able to establish with palliative.  Pain contract signed today.  Additionally, discussed that while I am prescribing opiate therapy if he uses any other persons controlled substances or opiates this would be a violation of the pain contract and we would have to taper or have someone else take over the prescribing.  Patient is understanding and in agreement with this, his son is now in control of all of his medications as well.   Restless leg syndrome: Controlled with gabapentin and clonazepam.  As per chronic pain I think patient would be well served with palliative care to manage symptoms and his controlled substances.  I think they are indicated for symptom control given his poor prognosis with end-stage COPD and heart failure, however quite risky to be on benzodiazepine and so much opiate therapy given his respiratory status.  Patient and son are aware of risks and okay with continuing these 2 medications today.  I will continue prescribing until he is able to establish care with palliative.    Medical History   PAST MEDICAL HISTORY:   Past Medical History:   Diagnosis Date     Anxiety      Atrial fibrillation (H)      CAD (coronary artery disease)      Cerebral infarction (H)      Chronic hepatitis C virus infection (H)      COPD (chronic obstructive pulmonary disease) (H)      Essential hypertension      Hemangioma     massive hemangioma; left hand     Hemangioma     Left hand     Hypertension      Intravenous drug abuse in remission (H)     Sober since ~2010     Lumbar spinal stenosis      Myocardial infarction (H)      Peripheral  neuropathy      Restless leg syndrome      Stroke (H)      TIA (transient ischemic attack)        PAST SURGICAL HISTORY:   Past Surgical History:   Procedure Laterality Date     CERVICAL FUSION      C6 and C7     CERVICAL FUSION      C6-7     PICC TRIPLE LUMEN PLACEMENT  2022            FAMILY HISTORY:   Family History   Problem Relation Age of Onset     Coronary Artery Disease Mother      Diabetes Mother      Coronary Artery Disease Father      Chronic Obstructive Pulmonary Disease Brother      Heart Disease Brother      Chronic Obstructive Pulmonary Disease Brother      Heart Disease Brother      No Known Problems Brother      Heart Disease Sister      Chronic Obstructive Pulmonary Disease Sister      Chronic Obstructive Pulmonary Disease Sister      Heart Disease Sister      No Known Problems Sister      Chronic Obstructive Pulmonary Disease Sister      Heart Disease Sister      No Known Problems Sister      No Known Problems Sister        SOCIAL HISTORY:   Social History     Tobacco Use     Smoking status: Former     Packs/day: 2.00     Types: Cigarettes     Quit date:      Years since quittin.9     Smokeless tobacco: Current     Types: Chew     Tobacco comments:     No passive exposure   Substance Use Topics     Alcohol use: Not Currently        [employer]  Tobacco:  reports that he quit smoking about 4 years ago. His smoking use included cigarettes. He smoked an average of 2 packs per day. His smokeless tobacco use includes chew.  Alcohol:  reports that he does not currently use alcohol.  Illicit drugs:  reports that he does not currently use drugs after having used the following drugs: IV.    Allergies  Allergies   Allergen Reactions     Symbicort Rash       Problem List  Patient Active Problem List    Diagnosis Date Noted     COPD exacerbation (H) 2022     Priority: Medium     Elevated brain natriuretic peptide (BNP) level 2022     Priority: Medium     Altered  mental status, unspecified altered mental status type 12/28/2022     Priority: Medium     Acute and chr resp failure, unsp w hypoxia or hypercapnia (H) 12/28/2022     Priority: Medium     Chronic respiratory failure with hypoxia and hypercapnia (H) 12/26/2022     Priority: Medium     Stage 3a chronic kidney disease (H) 12/26/2022     Priority: Medium     Bradycardia 12/26/2022     Priority: Medium     Other emphysema (H) 12/26/2022     Priority: Medium     Hypotension, unspecified hypotension type 12/26/2022     Priority: Medium     DNR (do not resuscitate) 10/24/2022     Priority: Medium     Chronic systolic heart failure (H) 10/05/2022     Priority: Medium     Hyperkalemia 09/21/2022     Priority: Medium     Anxiety 09/21/2022     Priority: Medium     Restless leg syndrome 09/21/2022     Priority: Medium     Lumbar spinal stenosis 09/21/2022     Priority: Medium     Peripheral neuropathy 09/21/2022     Priority: Medium     Current moderate episode of major depressive disorder without prior episode (H) 01/26/2022     Priority: Medium     Major depression      Priority: Medium     Created by Conversion         Restless Legs Syndrome      Priority: Medium     Created by Conversion         Persistent atrial fibrillation (H)      Priority: Medium     Dx around age 42 and reportedly had CV  Recurrence 12/2019  [id number] 2 (CVA, HTN) on Xarelto.          Essential hypertension      Priority: Medium     Created by Conversion  Replacement Utility updated for latest IMO load         At risk for delirium      Priority: Medium     Cognitive and behavioral changes      Priority: Medium     Episodes of formed visual hallucinations      Priority: Medium     Chest pain due to myocardial ischemia, unspecified ischemic chest pain type      Priority: Medium     Nicotine abuse 07/26/2019     Priority: Medium     Chronic obstructive pulmonary disease with acute lower respiratory infection (H) 05/15/2019     Priority: Medium      Chronic hepatitis C without hepatic coma (H) 02/08/2017     Priority: Medium     Genotype 3a.         Hemangioma      Priority: Medium     Created by Conversion  [facility] Annotation: Sep  2 2009  3:49PM - Sukhdev Manzano: hand  Replacement Utility updated for latest IMO load         Arteriosclerotic Cardiovascular Disease (ASCVD)      Priority: Medium              Tobacco abuse 05/08/2015     Priority: Medium     We'll write for tobacco replacement patch.         Polysubstance abuse (H) 02/09/2015     Priority: Medium     Patient reports alcohol use only; however, urine drug screen positive on   2.3.15 for benzos, opiates, marijuana, amphetamines.  Follow-up urine   testing strongly positive for methamphetamines.Patient was counseled on   the importance of cessation, although patient has altered mental status at   this time.  Would revisit topic once patient improves clinically.  Did   discuss with daughter the importance of cessation.         Myopia of both eyes with astigmatism and presbyopia 12/31/2014     Priority: Medium     Senile nuclear sclerosis 12/31/2014     Priority: Medium     Lumbar Disc Degeneration      Priority: Medium     Created by Conversion         Chronic pain disorder 06/04/2013     Priority: Medium     Chronic back pain 01/14/2013     Priority: Medium     Spinal stenosis of lumbar region 01/29/2010     Priority: Medium     IMO Update 10/11           Prior to Admission Medications   Medications Prior to Admission   Medication Sig Dispense Refill Last Dose     albuterol (PROAIR HFA/PROVENTIL HFA/VENTOLIN HFA) 108 (90 Base) MCG/ACT inhaler Inhale 2 puffs into the lungs every 3 hours 18 g 11      amiodarone (PACERONE) 200 MG tablet Take 1 tablet (200 mg) by mouth daily 30 tablet 11 12/26/2022     azithromycin (ZITHROMAX) 250 MG tablet USE ONE TAB BY MOUTH ONCE DAILY ON Monday, Wednesday, Friday ONGOING 36 tablet 3 12/26/2022     citalopram (CELEXA) 40 MG tablet Take 1 tablet (40 mg) by mouth  daily 60 tablet 3 12/26/2022     clonazePAM (KLONOPIN) 1 MG tablet Take 1-2 tablets (1-2 mg) by mouth nightly as needed for anxiety or sleep (Patient taking differently: Take 1-2 mg by mouth 2 times daily as needed for anxiety or sleep) 60 tablet 0      clonazePAM (KLONOPIN) 2 MG tablet Take 2 mg by mouth At Bedtime   12/25/2022     diltiazem ER COATED BEADS (CARDIZEM CD/CARTIA XT) 300 MG 24 hr capsule Take 1 capsule (300 mg) by mouth daily 30 capsule 3 12/26/2022     famotidine (PEPCID) 20 MG tablet Take 1 tablet (20 mg) by mouth daily 60 tablet 0 12/26/2022     furosemide (LASIX) 40 MG tablet Take 1 tablet (40 mg) by mouth daily 90 tablet 3 12/26/2022 at 80 mg     gabapentin (NEURONTIN) 300 MG capsule Take 2 capsules (600 mg) by mouth 3 times daily 540 capsule 3 12/26/2022 at x 2     guaiFENesin (MUCINEX) 600 MG 12 hr tablet Take 2 tablets (1,200 mg) by mouth daily 120 tablet 0 12/26/2022     ipratropium - albuterol 0.5 mg/2.5 mg/3 mL (DUONEB) 0.5-2.5 (3) MG/3ML neb solution Nebulize tid for 3 days and then tid prn for sob (Patient taking differently: Take 1 vial by nebulization 4 times daily Nebulize tid for 3 days and then tid prn for sob) 90 mL 3 12/26/2022 at x2     magnesium 250 MG tablet Take 1 tablet (250 mg) by mouth At Bedtime 90 tablet 3 12/25/2022     metoprolol tartrate (LOPRESSOR) 100 MG tablet Take 0.5 tablets (50 mg) by mouth 2 times daily 60 tablet 11 12/26/2022 at x1 am     naloxone (NARCAN) 4 MG/0.1ML nasal spray Spray 1 spray (4 mg) into one nostril alternating nostrils once as needed for opioid reversal every 2-3 minutes until assistance arrives 0.2 mL 0      oxyCODONE HCl (ROXICODONE) 20 MG TABS immediate release tablet Take 20 mg by mouth every 4 hours (while awake) Not to exceed 5 tabs per day 150 tablet 0 12/26/2022 at x 2     predniSONE (DELTASONE) 20 MG tablet Take 1 tablet (20 mg) by mouth daily 30 tablet 3 12/26/2022     rivaroxaban ANTICOAGULANT (XARELTO) 20 MG TABS tablet Take 1  "tablet (20 mg) by mouth daily (with dinner) 90 tablet 3 12/25/2022       Review of Systems  Complete ROS reviewed, unless noted in HPI, all other systems reviewed (with patient) and all others found to be negative.      Objective:     Physical Exam:  /72 (BP Location: Left arm)   Pulse 95   Temp (!) 100.9  F (38.3  C) (Axillary)   Resp 20   Ht 1.829 m (6' 0.01\")   Wt 111.2 kg (245 lb 1.6 oz)   SpO2 97%   BMI 33.23 kg/m    Weight:   Vitals:    12/26/22 1938 12/27/22 0345 12/28/22 0540   Weight: 109.4 kg (241 lb 3.2 oz) 110.5 kg (243 lb 8 oz) 111.2 kg (245 lb 1.6 oz)      Body mass index is 33.23 kg/m .    General Appearance:  RASS -4   Head:  Normocephalic, without obvious abnormality, atraumatic   Eyes:  PERRL, conjunctiva/corneas clear, EOM's intact   ENT/Throat: Lips, mucosa, and tongue normal; teeth and gums normal   Lymph/Neck: Supple, symmetrical, trachea midline   Lungs:   Diminished to auscultation bilaterally, respirations unlabored, BiPAP   Chest Wall:  No tenderness or deformity   Cardiovascular/Heart:  Regular rate and rhythm, S1, S2 normal   Abdomen:   Soft, non-tender, bowel sounds active all four quadrants   Musculoskeletal: Extremities normal, atraumatic   Skin: Skin warm, dry    Neurologic: RASS -4      Psych: Affect is appropriate      Imaging: Reviewed I have personally reviewed pertinent labs, tests, and radiologic imaging in patient's chart.  XR Pelvis w Hip Left G/E 2 Views    Result Date: 12/27/2022  EXAM: XR PELVIS AND HIP LEFT 2 VIEWS LOCATION: [facility] DATE/TIME: 12/27/2022 2:15 AM INDICATION: fall on left hip COMPARISON: None.     IMPRESSION: Mild degenerative changes of the hips and lower lumbar spine.. No fracture or dislocation.    XR Chest Port 1 View    Result Date: 12/26/2022  EXAM: XR CHEST PORT 1 VIEW LOCATION: [facility] DATE/TIME: 12/26/2022 4:09 PM INDICATION: Shortness of breath. COMPARISON: 12/17/2022 "     IMPRESSION: Heart size magnified in AP projection with normal vascularity. No focal consolidation, pneumothorax nor pleural effusion.    XR Chest Port 1 View    Result Date: 12/17/2022  EXAM: XR CHEST PORT 1 VIEW LOCATION: [facility] DATE/TIME: 12/17/2022 11:17 PM INDICATION: Shortness of breath. COMPARISON: 10/24/2022     IMPRESSION: Normal heart size and pulmonary vascularity. Lungs are clear. Postoperative changes lower cervical spine. Otherwise unremarkable. No acute findings.    CT Head w/o Contrast    Result Date: 12/28/2022  EXAM: CT HEAD W/O CONTRAST LOCATION: [facility] DATE/TIME: 12/28/2022 5:38 PM INDICATION: check for late bleed after fall   on anticoagulation COMPARISON: 12/27/2022 head CT TECHNIQUE: Routine CT Head without IV contrast. Multiplanar reformats. Dose reduction techniques were used. FINDINGS: INTRACRANIAL CONTENTS: No intracranial hemorrhage, extraaxial collection, or mass effect.  No CT evidence of acute infarct. Mild presumed chronic small vessel ischemic changes. Mild generalized volume loss. No hydrocephalus. VISUALIZED ORBITS/SINUSES/MASTOIDS: No intraorbital abnormality. No paranasal sinus mucosal disease. Hypoplastic right sided mastoid air cells. Left mastoid air cells and middle ear cavity clear. BONES/SOFT TISSUES: No acute abnormality.     IMPRESSION: 1.  No acute intracranial process.    CT Head w/o Contrast    Result Date: 12/27/2022  EXAM: CT HEAD W/O CONTRAST LOCATION: [facility] DATE/TIME: 12/27/2022 1:57 AM INDICATION: fall, hit head, on xarelto COMPARISON: CT head 12/26/2022 TECHNIQUE: Routine CT Head without IV contrast. Multiplanar reformats. Dose reduction techniques were used. FINDINGS: INTRACRANIAL CONTENTS: No intracranial hemorrhage, extraaxial collection, or mass effect.  No CT evidence of acute infarct. Mild volume loss and presumed chronic small vessel ischemia are stable. VISUALIZED  ORBITS/SINUSES/MASTOIDS: No intraorbital abnormality. No paranasal sinus mucosal disease. No middle ear or mastoid effusion. BONES/SOFT TISSUES: No acute abnormality.     IMPRESSION: 1.  No acute intracranial abnormality or significant change compared to the prior study.    CT Head w/o Contrast    Result Date: 12/26/2022  EXAM: CT HEAD W/O CONTRAST LOCATION: [facility] DATE/TIME: 12/26/2022 4:07 PM INDICATION: Altered mental status COMPARISON:  CT head 10/25/2022. TECHNIQUE: Routine CT Head without IV contrast. Multiplanar reformats. Dose reduction techniques were used. FINDINGS: INTRACRANIAL CONTENTS: No intracranial hemorrhage, extraaxial collection, or mass effect.  No CT evidence of acute infarct. Mild presumed chronic small vessel ischemic changes. Normal ventricles and sulci. Dolichoectasia of the basilar artery. VISUALIZED ORBITS/SINUSES/MASTOIDS: No intraorbital abnormality. No paranasal sinus mucosal disease. No middle ear or mastoid effusion. BONES/SOFT TISSUES: No acute abnormality.     IMPRESSION: 1.  No acute intracranial process.      Labs: Reviewed I have personally reviewed pertinent labs, tests, and radiologic imaging in patient's chart.  Recent Results (from the past 24 hour(s))   Basic metabolic panel    Collection Time: 12/28/22 12:37 PM   Result Value Ref Range    Sodium 134 (L) 136 - 145 mmol/L    Potassium 4.8 3.4 - 5.3 mmol/L    Chloride 95 (L) 98 - 107 mmol/L    Carbon Dioxide (CO2) 26 22 - 29 mmol/L    Anion Gap 13 7 - 15 mmol/L    Urea Nitrogen 61.5 (H) 8.0 - 23.0 mg/dL    Creatinine 2.07 (H) 0.67 - 1.17 mg/dL    Calcium 8.5 (L) 8.8 - 10.2 mg/dL    Glucose 189 (H) 70 - 99 mg/dL    GFR Estimate 35 (L) >60 mL/min/1.73m2   CBC with platelets    Collection Time: 12/28/22 12:37 PM   Result Value Ref Range    WBC Count 11.8 (H) 4.0 - 11.0 10e3/uL    RBC Count 3.83 (L) 4.40 - 5.90 10e6/uL    Hemoglobin 11.0 (L) 13.3 - 17.7 g/dL    Hematocrit 36.9 (L) 40.0 - 53.0 %    MCV  96 78 - 100 fL    MCH 28.7 26.5 - 33.0 pg    MCHC 29.8 (L) 31.5 - 36.5 g/dL    RDW 14.0 10.0 - 15.0 %    Platelet Count 212 150 - 450 10e3/uL   Magnesium    Collection Time: 12/28/22 12:37 PM   Result Value Ref Range    Magnesium 2.6 (H) 1.7 - 2.3 mg/dL   Phosphorus    Collection Time: 12/28/22 12:37 PM   Result Value Ref Range    Phosphorus 6.6 (H) 2.5 - 4.5 mg/dL   Basic metabolic panel    Collection Time: 12/29/22  4:35 AM   Result Value Ref Range    Sodium 139 136 - 145 mmol/L    Potassium 4.8 3.4 - 5.3 mmol/L    Chloride 101 98 - 107 mmol/L    Carbon Dioxide (CO2) 30 (H) 22 - 29 mmol/L    Anion Gap 8 7 - 15 mmol/L    Urea Nitrogen 48.3 (H) 8.0 - 23.0 mg/dL    Creatinine 1.51 (H) 0.67 - 1.17 mg/dL    Calcium 8.6 (L) 8.8 - 10.2 mg/dL    Glucose 122 (H) 70 - 99 mg/dL    GFR Estimate 51 (L) >60 mL/min/1.73m2   CBC with platelets    Collection Time: 12/29/22  4:35 AM   Result Value Ref Range    WBC Count 11.7 (H) 4.0 - 11.0 10e3/uL    RBC Count 3.89 (L) 4.40 - 5.90 10e6/uL    Hemoglobin 11.3 (L) 13.3 - 17.7 g/dL    Hematocrit 37.7 (L) 40.0 - 53.0 %    MCV 97 78 - 100 fL    MCH 29.0 26.5 - 33.0 pg    MCHC 30.0 (L) 31.5 - 36.5 g/dL    RDW 14.1 10.0 - 15.0 %    Platelet Count 215 150 - 450 10e3/uL       Total time spent 80 minutes with greater than 50% in consultation, education and coordination of care.     Also discussed with RN, pharmacist.     Thank you for this consultation.    Edwina HESS, DENISEP-C  Acute Care Pain Management Program  [facility] ([place], [place], [place])  Monday-Friday 8a-4p   Page via online paging system or call [phone]

## 2022-12-29 NOTE — PLAN OF CARE
Overnight patient wore BIPAP. Around 0320 patient wanted bipap off and 2.5L nasal cannula applied. Patient reporting lots of secretions and is able to cough up secretions. At this time patient looked SOB but was reporting that he was fine. RN followed up a few minutes later and patient continued to look SOB and sats were in the 70's. RN had patient cough and work out some secretions and put Bipap back on patient. Bipap got sats up to 80's. RN called RT to assess and oxymask applied at 15L. Sats improved to 91%. RT reapplied Bipap and adjusted settings. BP and HR elevated at this time as well. House officer Dr. Villegas notified of changes. MD response is to recheck VS and update. After first update MD again said to continue to monitor and update 30min to 1 hour later or if a significant change happens. Upon reassessment VSS, breathing looks slightly better.

## 2022-12-29 NOTE — PROGRESS NOTES
Called by RN to evaluate pt, sats had dropped as low as 72%, was placed on BiPAP, and then oxymask at 15L with sats rising into 80.  When I entered room sats were 93% on 15L oxymask.  Pt tachypneic and SOB, RR 28.  BS coarse/dim.  Had pt cough, coughed and suctioned a small amount of creamy thick secretions and placed back on BiPAP at 45% FiO2.  Sats 94.  Demonstrated to RN how to titrate FiO2 on BiPAP if needed to be done again before I could get to room

## 2022-12-29 NOTE — PROGRESS NOTES
Daily Progress Note    Assessment/Plan:  Ki Pederson is a 67 year old male presenting with syncope.  His presentation is complicated by his recent self discharge from hospice and encephalopathy.  He discharged himself from hospice in order to have a pacemaker placement.    Addendum: Now has fever with new infiltrate on chest x-ray.  We will start Zosyn and vancomycin.  COVID/influenza/RSV also added.  We will also check UA.  Procalcitonin mildly elevated 0.22.     Syncope with acute metabolic encephalopathy  - No acute EKG changes  - initial presentation likely due to home Oxygen was not on at home and he has not restarted BiPAP at night since discontinuing hospice.  - was hypotensive on presentation and has been given IV fluids  - head CT negative and CXR on admit.  Patient hit head and fell and has had CT head x2 to r/o bleed in the setting of anticoagulant and on going encephalopathy.  - At home  was supposed to be on oxycodone scheduled but had been off it 2 weeks as he ran out and per his wife Ewelina had been given 2 doses on the day of admission after having been off for 2 weeks.  On scheduled oxycodone 20mg Q4hrs for 5 doses max per day given   - continued klonipin which he is on chronically but reduced dose to 1mg to avoid withdrawal.    -Addiction medicine consulted due to concerns of Klonopin overdose.  He had told EMS that he took 8 Klonopin pills.  His wife states that this was not the case as she controlled his medications and he has been receiving 2 pills at bedtime chronically  -Pain team consult       Bradycardia an hypotensive as above-resolved, now slightly tachycardic, resume cardiac medications  - continue tele  - adding back amiodarone and dilt  - should have outpatient follow up for pacemaker.  He has not followed up in a-fib clinic since this was recommended.           Hypermagnesemia  - gentle hydration  - holding home mag  -trend     Chronic resp failure with hypoxia and  hypercapnia/COPD and chronic CHF  - keep at 2.5 liters and not to titrate up unless to keep sats 88-92%   - Restart night time BiPAP  - ABG done on admit similar to previous   - not in COPD exacerbation already on prednisone, will discontinue secondary to agitation/delirium  - Continue chronic azithromycin MWF   - continue home inhaler and DUOnebs   - mucinex      Acute on chronic CHF: Has edema and rales on lung exam.  Will start IV Lasix and monitor closely.  Will check chest x-ray and echocardiogram if able but this may be difficult given his delirium.        YUNIEL/CKD3  - trend renal function,  creatinine improved today to 1.51 from 2.07 prior, no known past history of renal failure per his wife.  We will watch closely.     Chronic pain (was on hospice in the past now off)    - as above oxycodone dosing  - gabapentin 600mg TID decreased to 300mg TID now will go back up to 400 mg  -Pain team and addiction medicine consulted     Depression - with suicidal threats on the night of 12/26  - continue celexa  - klonipin dose decreased to 1mg for now   - Psych consult appreciated.  He is not actively suicidal     Afib  -bradycardic in the 50'sand hypotensive on admit, now tachycardic with good blood pressures  - gradually restarting dilt and amio as vitals tolerate  - continue xarelto head ct negative     Tobacco Abuse and hx muti-substance abuse.  - nicotine patch  - confirmed he is not drinking with family    Fever: Temperature was 100.9.  White count mildly elevated, will add procalcitonin.  Will check chest x-ray.  Consider UA if indicated.                   Diet: Regular Diet Adult    DVT Prophylaxis: DOAC  Fine Catheter: Not present  Central Lines: None  Cardiac Monitoring: ACTIVE order.  Code Status: No CPR- Do NOT Intubate               Prior hospitalist reviewed Goals of Care with his daughter Ethan who is incredibly helpful and should be updated daily.  I spoke with his ex-wife Ewelina today.  The reason he  went off hospice was in order to get a pacemaker. Since discontinuing hospice he has not had his BiPAP restarted as outpatient and that was resumed in the hospital.    Code status:No CPR- Do NOT Intubate        Barriers to Discharge: Encephalopathy    Disposition: Anticipate multi day stay    Subjective:  Ki is new to me today, chart reviewed and discussed with staff.  I had a long discussion with his ex-wife Ewelina.  She reports that he normally does not have this much edema and she has instructions for adjusting his Lasix at home based on weight gain.  Currently he is not able to provide history as he is encephalopathic and on BiPAP.        Current Medications Reviewed via EHR List    Objective:  Vital signs in last 24 hours:  [unfilled]  .prog  Weight:   @THISENCWEIGHTS(1)@  Weight change:   Body mass index is 33.23 kg/m .    Intake/Output last 3 shifts:  I/O last 3 completed shifts:  In: 1010 [P.O.:1010]  Out: 2785 [Urine:2785]  Intake/Output this shift:  No intake/output data recorded.    Physical Exam:  General: On BiPAP, appears somewhat confused but able to answer basic questions, denies pain  CV: Mild irregularity, rate in the upper 90s/low 100s, pitting bilateral lower extremity edema  Lungs: Bilateral rales  Abdomen: Soft, nontender        Imaging:  Personally Reviewed.  XR Pelvis w Hip Left G/E 2 Views    Result Date: 12/27/2022  EXAM: XR PELVIS AND HIP LEFT 2 VIEWS LOCATION: [facility] DATE/TIME: 12/27/2022 2:15 AM INDICATION: fall on left hip COMPARISON: None.     IMPRESSION: Mild degenerative changes of the hips and lower lumbar spine.. No fracture or dislocation.    XR Chest Port 1 View    Result Date: 12/26/2022  EXAM: XR CHEST PORT 1 VIEW LOCATION: [facility] DATE/TIME: 12/26/2022 4:09 PM INDICATION: Shortness of breath. COMPARISON: 12/17/2022     IMPRESSION: Heart size magnified in AP projection with normal vascularity. No focal  consolidation, pneumothorax nor pleural effusion.    XR Chest Port 1 View    Result Date: 12/17/2022  EXAM: XR CHEST PORT 1 VIEW LOCATION: [facility] DATE/TIME: 12/17/2022 11:17 PM INDICATION: Shortness of breath. COMPARISON: 10/24/2022     IMPRESSION: Normal heart size and pulmonary vascularity. Lungs are clear. Postoperative changes lower cervical spine. Otherwise unremarkable. No acute findings.    CT Head w/o Contrast    Result Date: 12/28/2022  EXAM: CT HEAD W/O CONTRAST LOCATION: [facility] DATE/TIME: 12/28/2022 5:38 PM INDICATION: check for late bleed after fall   on anticoagulation COMPARISON: 12/27/2022 head CT TECHNIQUE: Routine CT Head without IV contrast. Multiplanar reformats. Dose reduction techniques were used. FINDINGS: INTRACRANIAL CONTENTS: No intracranial hemorrhage, extraaxial collection, or mass effect.  No CT evidence of acute infarct. Mild presumed chronic small vessel ischemic changes. Mild generalized volume loss. No hydrocephalus. VISUALIZED ORBITS/SINUSES/MASTOIDS: No intraorbital abnormality. No paranasal sinus mucosal disease. Hypoplastic right sided mastoid air cells. Left mastoid air cells and middle ear cavity clear. BONES/SOFT TISSUES: No acute abnormality.     IMPRESSION: 1.  No acute intracranial process.    CT Head w/o Contrast    Result Date: 12/27/2022  EXAM: CT HEAD W/O CONTRAST LOCATION: [facility] DATE/TIME: 12/27/2022 1:57 AM INDICATION: fall, hit head, on xarelto COMPARISON: CT head 12/26/2022 TECHNIQUE: Routine CT Head without IV contrast. Multiplanar reformats. Dose reduction techniques were used. FINDINGS: INTRACRANIAL CONTENTS: No intracranial hemorrhage, extraaxial collection, or mass effect.  No CT evidence of acute infarct. Mild volume loss and presumed chronic small vessel ischemia are stable. VISUALIZED ORBITS/SINUSES/MASTOIDS: No intraorbital abnormality. No paranasal sinus mucosal disease.  No middle ear or mastoid effusion. BONES/SOFT TISSUES: No acute abnormality.     IMPRESSION: 1.  No acute intracranial abnormality or significant change compared to the prior study.    CT Head w/o Contrast    Result Date: 12/26/2022  EXAM: CT HEAD W/O CONTRAST LOCATION: [facility] DATE/TIME: 12/26/2022 4:07 PM INDICATION: Altered mental status COMPARISON:  CT head 10/25/2022. TECHNIQUE: Routine CT Head without IV contrast. Multiplanar reformats. Dose reduction techniques were used. FINDINGS: INTRACRANIAL CONTENTS: No intracranial hemorrhage, extraaxial collection, or mass effect.  No CT evidence of acute infarct. Mild presumed chronic small vessel ischemic changes. Normal ventricles and sulci. Dolichoectasia of the basilar artery. VISUALIZED ORBITS/SINUSES/MASTOIDS: No intraorbital abnormality. No paranasal sinus mucosal disease. No middle ear or mastoid effusion. BONES/SOFT TISSUES: No acute abnormality.     IMPRESSION: 1.  No acute intracranial process.      Lab Results:  Personally Reviewed.   Fingerstick Blood Glucose: @VRSBOUP51EIV(POCGLUFGR:10)@    Last Hbg A1C: No results found for: HGBA1C   Lab Results   Component Value Date    INR 1.14 12/26/2022    INR 0.87 12/17/2022    INR 1.23 (H) 03/18/2020     Recent Results (from the past 24 hour(s))   Basic metabolic panel    Collection Time: 12/28/22 12:37 PM   Result Value Ref Range    Sodium 134 (L) 136 - 145 mmol/L    Potassium 4.8 3.4 - 5.3 mmol/L    Chloride 95 (L) 98 - 107 mmol/L    Carbon Dioxide (CO2) 26 22 - 29 mmol/L    Anion Gap 13 7 - 15 mmol/L    Urea Nitrogen 61.5 (H) 8.0 - 23.0 mg/dL    Creatinine 2.07 (H) 0.67 - 1.17 mg/dL    Calcium 8.5 (L) 8.8 - 10.2 mg/dL    Glucose 189 (H) 70 - 99 mg/dL    GFR Estimate 35 (L) >60 mL/min/1.73m2   CBC with platelets    Collection Time: 12/28/22 12:37 PM   Result Value Ref Range    WBC Count 11.8 (H) 4.0 - 11.0 10e3/uL    RBC Count 3.83 (L) 4.40 - 5.90 10e6/uL    Hemoglobin 11.0 (L) 13.3 -  17.7 g/dL    Hematocrit 36.9 (L) 40.0 - 53.0 %    MCV 96 78 - 100 fL    MCH 28.7 26.5 - 33.0 pg    MCHC 29.8 (L) 31.5 - 36.5 g/dL    RDW 14.0 10.0 - 15.0 %    Platelet Count 212 150 - 450 10e3/uL   Magnesium    Collection Time: 12/28/22 12:37 PM   Result Value Ref Range    Magnesium 2.6 (H) 1.7 - 2.3 mg/dL   Phosphorus    Collection Time: 12/28/22 12:37 PM   Result Value Ref Range    Phosphorus 6.6 (H) 2.5 - 4.5 mg/dL   Basic metabolic panel    Collection Time: 12/29/22  4:35 AM   Result Value Ref Range    Sodium 139 136 - 145 mmol/L    Potassium 4.8 3.4 - 5.3 mmol/L    Chloride 101 98 - 107 mmol/L    Carbon Dioxide (CO2) 30 (H) 22 - 29 mmol/L    Anion Gap 8 7 - 15 mmol/L    Urea Nitrogen 48.3 (H) 8.0 - 23.0 mg/dL    Creatinine 1.51 (H) 0.67 - 1.17 mg/dL    Calcium 8.6 (L) 8.8 - 10.2 mg/dL    Glucose 122 (H) 70 - 99 mg/dL    GFR Estimate 51 (L) >60 mL/min/1.73m2   CBC with platelets    Collection Time: 12/29/22  4:35 AM   Result Value Ref Range    WBC Count 11.7 (H) 4.0 - 11.0 10e3/uL    RBC Count 3.89 (L) 4.40 - 5.90 10e6/uL    Hemoglobin 11.3 (L) 13.3 - 17.7 g/dL    Hematocrit 37.7 (L) 40.0 - 53.0 %    MCV 97 78 - 100 fL    MCH 29.0 26.5 - 33.0 pg    MCHC 30.0 (L) 31.5 - 36.5 g/dL    RDW 14.1 10.0 - 15.0 %    Platelet Count 215 150 - 450 10e3/uL           Advanced Care Planning    Time > 35 minutes with greater than 50% of time spent in counseling and coordination of care.    Yovani Mota MD  Date: 12/29/2022  Time: 8:18 AM  [facility]

## 2022-12-29 NOTE — PROGRESS NOTES
PT is currently on 2L NC with an SpO2 of 98%.  gave Duoneb treatment x2, BS post treatment no change, patient tolerated treatment well. RT will cont to monitor. RT started BiPAP ST mode 10/5 R12 30% at bedtime.     José Miguel Merino

## 2022-12-29 NOTE — PROGRESS NOTES
Pt wore BiPAP until about 0315.  10/5, rate of 12 and 30%.  Currently on 2L NC, does not want to wear BiPAP anymore tonight

## 2022-12-29 NOTE — CONSULTS
The writer attempted to see patient today through [employer] video system per Per his nurse patient was not able to participate in assessment and interview due to being on BiPAP and sleepiness.  Per chart review Dacia Emerson 67 years old who came to emergency room through EMS on December 26, 2022 due to altered mental status, and a syncopal episode after taking 8 mg of Klonopin and the patient was on 20 mg oxycodone every 4 hours prescribed by outpatient provider.  Patient have severe COPD, on 2 L of oxygen baseline at home.    Per chart review PDMP patient was on oxycodone 20 mg every 4 hours and Klonopin 2 mg at bedtime, oxycodone was refilled 150 tablets on December 22, 2022; Ambien was refilled on December 22, 2022 also patient is on gabapentin 600 mg 3 times daily.  Patient have history of being on hydromorphone, and morphine sulfate, lorazepam, OxyContin  His overdose risk.  PDMP is 810.  Recommendation  Due to high risk of being overdose with prescription opiate and benzodiazepine, patient will benefit from medication assisted therapy (MAT) Suboxone and benzodiazepine cross titration with barbiturate. Spoke with hospitialist about the recomandation   1.  Currently patient is taking 1 mg Klonopin at bedtime, discontinue clonazepam and start  16 mg phenobarbital twice daily for 5 days, then once daily for 1 day and then stop. please monitor for sedation, and anxiety while he is in hospital.    2.  If patient agrees starting him on micro dosing of Belbuca and switching to Suboxone then stop oxycodone  3.  Continue to use gabapentin 400 mg 3 times daily monitor him for drowsiness  4.  When discharged from the hospital patient may need to follow-up with  pain clinic due to history of shopping for doctors per PDMP

## 2022-12-30 NOTE — PROGRESS NOTES
Pt. Woke up and states yes he was in pain. Gave his Neurontin and clonidine and Tylenol.   Had him on oxy mask at 12 liters and he was sat. Was 92%. No high flow cannula In room but will talk to RT.  Pt. was  Hungry and wanted a sandwich settled on applesauce.

## 2022-12-30 NOTE — PROGRESS NOTES
Pt. Got up to commode with assist of 3 and gait belt, had large BM.     Pt. Has oxy mask on now. And gave pain medication again.  Sore back and over all body.   Remains a none to one because he started ot pull his mask off while on commode and thought it was his glasses.  He goes in and out of  Impulsiveness. occassionally lacks  Judgement for his own best interest.     Before left did encourage him to try sleeping with bipap again to night and trying to stay wake for meals today.

## 2022-12-30 NOTE — PROGRESS NOTES
Patient on BiPAP 10/5 R 12 55% Patient received Douneb X 1. Patient tolerated treatment well.RT will continue to moniter
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
## 2022-12-30 NOTE — PHARMACY-VANCOMYCIN DOSING SERVICE
Pharmacy Vancomycin Initial Note  Date of Service 2022  Patient's  1955  67 year old, male    Indication: Aspiration Pneumonia    Current estimated CrCl = Estimated Creatinine Clearance: 62.8 mL/min (A) (based on SCr of 1.51 mg/dL (H)).    Creatinine for last 3 days  2022:  4:46 AM Creatinine 1.50 mg/dL  2022: 12:37 PM Creatinine 2.07 mg/dL  2022:  4:35 AM Creatinine 1.51 mg/dL    Recent Vancomycin Level(s) for last 3 days  No results found for requested labs within last 72 hours.      Vancomycin IV Administrations (past 72 hours)      No vancomycin orders with administrations in past 72 hours.                Nephrotoxins and other renal medications (From now, onward)    Start     Dose/Rate Route Frequency Ordered Stop    22 0037  piperacillin-tazobactam (ZOSYN) 3.375 g vial to attach to  mL bag        Note to Pharmacy: Extended infusion dosing to start 6 hours after initial infusion.   See Hyperspace for full Linked Orders Report.    3.375 g  over 240 Minutes Intravenous EVERY 8 HOURS 22 1838      22 1900  piperacillin-tazobactam (ZOSYN) 3.375 g vial to attach to  mL bag        See Hyperspace for full Linked Orders Report.    3.375 g  over 30 Minutes Intravenous ONCE 22 1838      22 1400  furosemide (LASIX) injection 20 mg         20 mg  over 1-3 Minutes Intravenous EVERY 12 HOURS 22 1342            Contrast Orders - past 72 hours (72h ago, onward)    None          [employer] Prediction of Planned Initial Vancomycin Regimen  Loading dose: 2000 mg at 20:00 2022.  Regimen: 1500 mg IV every 24 hours.  Start time: 19:07 on 2022  Exposure target: AUC24 (range)400-600 mg/L.hr   AUC24,ss: 468 mg/L.hr  Probability of AUC24 > 400: 67 %  Ctrough,ss: 14.2 mg/L  Probability of Ctrough,ss > 20: 22 %  Probability of nephrotoxicity (Lodise ELIDA ): 9 %          Plan:  1. Start vancomycin  2000 mg loading dose followed by 1500 mg IV  q24h.   2. Vancomycin monitoring method: AUC  3. Vancomycin therapeutic monitoring goal: 400-600 mg*h/L  4. Pharmacy will check vancomycin levels as appropriate in 1-3 Days.    5. Serum creatinine levels will be ordered daily x 3 days then reassess.      Li Pena RPH

## 2022-12-30 NOTE — PROGRESS NOTES
RESPIRATORY CARE NOTE     Patient Name: Ki Pederson  Today's Date: 12/30/2022       Pt continues to receive duo neb. BS are dem coarse. Pt is on 6lpm of oxygen via NC, SpO2 is 95%. Post treatment there is no change.  RT will continue to monitor and assess.

## 2022-12-30 NOTE — PLAN OF CARE
Problem: Pain Chronic (Persistent)  Goal: Optimal Pain Control and Function  Outcome: Progressing: Pt having c/o 8/10 back pain after therapy. PRN Oxycodone given with good relief.    Problem: Hypertension Acute  Goal: Blood Pressure Within Desired Range  Outcome: Progressing: BP:99/59, 102/67, 99/62     Problem: Hyperglycemia  Goal: Blood Glucose Level Within Targeted Range  Outcome: Progressing:  and 175. Sliding scale given per orders.     Pt A&O x's 1. Does know he is in the hospital but unsure which one. Pt more alert and aware today. Therapy worked with pt and had him sit up in the chair. Pt was not able to tolerate for very long. Has more of an appetite today. Started on Voltaren gel and lidocaine ointment for back pain. Will continue to monitor. Nicotine patch on right upper arm. Tele: NSR

## 2022-12-30 NOTE — PROGRESS NOTES
Daily Progress Note    Assessment/Plan:  Ki Pederson is a 67 year old male presenting with syncope.  His presentation is complicated by his recent self discharge from hospice and encephalopathy.  He discharged himself from hospice in order to have a pacemaker placement.    1.  Acute on chronic resp failure with hypoxia and hypercapnia/COPD and chronic CHF and pneumonia.  Breathing much improved today and now down to 6 L nasal cannula from high of 13 L oxime mask.  Likely a combination of CHF and pneumonia  - Restart night time and as needed BiPAP  - Continue chronic azithromycin MWF   - continue home inhaler and DUOnebs   -Has been febrile, blood cultures ordered, UA does show white cells, awaiting cultures but already on Zosyn and vancomycin started 12/29/2022.  Procalcitonin was elevated at 0.22, lactate was normal.       2.Syncope with acute metabolic encephalopathy  - No acute EKG changes  - initial presentation likely due to home Oxygen was not on at home and he has not restarted BiPAP at night since discontinuing hospice.  - was hypotensive on presentation and has been given IV fluids  - head CT negative and CXR on admit.  Patient hit head and fell and has had CT head x2 to r/o bleed in the setting of anticoagulant and on going encephalopathy.  - At home  was supposed to be on oxycodone but has been unclear how he has been taking this  - continued klonipin which he is on chronically but reduced dose to 1mg to avoid withdrawal.    -Addiction medicine consulted due to concerns of Klonopin overdose but it is unclear if this actually happened  -Pain team consult-Per pain team he is interested in Suboxone  -        3.Bradycardia an hypotensive as above-resolved, now slightly tachycardic, resume cardiac medications  - continue tele  - adding back amiodarone and dilt  - should have outpatient follow up for pacemaker.  He has not followed up in a-fib clinic since this was  recommended.            4.Hypermagnesemia  - gentle hydration  - holding home mag  -trend        5.Acute on chronic CHF: We will check echocardiogram, was given a couple doses of Lasix with significant decrease in edema but this is being held secondary to low blood pressures.  We will continue to evaluate.        6.YUNIEL/CKD3  - trend renal function,  creatinine improved today to 1.31 from 2.07 prior, no known past history of renal failure per his wife.  We will watch closely.     7.Chronic pain (was on hospice in the past now off)    -Pain team and addiction medicine consulted     8.Depression - with suicidal threats on the night of 12/26  - continue celexa  - klonipin dose decreased to 1mg for now   - Psych consult appreciated.  He is not actively suicidal     9.Afib  -bradycardic in the 50'sand hypotensive on admit, now tachycardic with good blood pressures  - gradually restarting dilt and amio as vitals tolerate  - continue xarelto head ct negative     10.Tobacco Abuse and hx muti-substance abuse.  - nicotine patch  - confirmed he is not drinking with family      Code status:No CPR- Do NOT Intubate        Barriers to Discharge: Acute hypoxic respiratory failure    Disposition: Anticipate multi day stay    Subjective:  He appears much improved today.  He is alert and oriented and his mental status is significantly improved.  He reports his breathing is much improved, he is requiring less oxygen and his edema is less.  He denies any chest pain, no fever since this morning.        Current Medications Reviewed via EHR List    Objective:  Vital signs in last 24 hours:  [unfilled]  .prog  Weight:   @THISENCWEIGHTS(1)@  Weight change:   Body mass index is 33.23 kg/m .    Intake/Output last 3 shifts:  I/O last 3 completed shifts:  In: 1020 [P.O.:1020]  Out: 3775 [Urine:3775]  Intake/Output this shift:  I/O this shift:  In: 600 [P.O.:600]  Out: -     Physical Exam:  General: Much less distress  CV: Regular rate and  rhythm, much less bilateral lower extremity edema  Lungs: Still with bilateral rales but more clear  Abdomen: Soft, nontender        Imaging:  Personally Reviewed.  XR Pelvis w Hip Left G/E 2 Views    Result Date: 12/27/2022  EXAM: XR PELVIS AND HIP LEFT 2 VIEWS LOCATION: [facility] DATE/TIME: 12/27/2022 2:15 AM INDICATION: fall on left hip COMPARISON: None.     IMPRESSION: Mild degenerative changes of the hips and lower lumbar spine.. No fracture or dislocation.    XR Chest Port 1 View    Result Date: 12/29/2022  EXAM: XR CHEST PORT 1 VIEW LOCATION: [facility] DATE/TIME: 12/29/2022 5:57 PM INDICATION: fever COMPARISON: Chest radiograph 12/26/2022.     IMPRESSION: Stable enlarged cardiac silhouette. Newly visualized opacity overlying the right costophrenic sulcus, could represent pneumonia or superimposed soft tissues. No definite pleural effusion or pneumothorax. Bones are unchanged. Cerclage wires overlying the neck are again noted.    XR Chest Port 1 View    Result Date: 12/26/2022  EXAM: XR CHEST PORT 1 VIEW LOCATION: [facility] DATE/TIME: 12/26/2022 4:09 PM INDICATION: Shortness of breath. COMPARISON: 12/17/2022     IMPRESSION: Heart size magnified in AP projection with normal vascularity. No focal consolidation, pneumothorax nor pleural effusion.    XR Chest Port 1 View    Result Date: 12/17/2022  EXAM: XR CHEST PORT 1 VIEW LOCATION: [facility] DATE/TIME: 12/17/2022 11:17 PM INDICATION: Shortness of breath. COMPARISON: 10/24/2022     IMPRESSION: Normal heart size and pulmonary vascularity. Lungs are clear. Postoperative changes lower cervical spine. Otherwise unremarkable. No acute findings.    CT Head w/o Contrast    Result Date: 12/28/2022  EXAM: CT HEAD W/O CONTRAST LOCATION: [facility] DATE/TIME: 12/28/2022 5:38 PM INDICATION: check for late bleed after fall   on anticoagulation  COMPARISON: 12/27/2022 head CT TECHNIQUE: Routine CT Head without IV contrast. Multiplanar reformats. Dose reduction techniques were used. FINDINGS: INTRACRANIAL CONTENTS: No intracranial hemorrhage, extraaxial collection, or mass effect.  No CT evidence of acute infarct. Mild presumed chronic small vessel ischemic changes. Mild generalized volume loss. No hydrocephalus. VISUALIZED ORBITS/SINUSES/MASTOIDS: No intraorbital abnormality. No paranasal sinus mucosal disease. Hypoplastic right sided mastoid air cells. Left mastoid air cells and middle ear cavity clear. BONES/SOFT TISSUES: No acute abnormality.     IMPRESSION: 1.  No acute intracranial process.    CT Head w/o Contrast    Result Date: 12/27/2022  EXAM: CT HEAD W/O CONTRAST LOCATION: [facility] DATE/TIME: 12/27/2022 1:57 AM INDICATION: fall, hit head, on xarelto COMPARISON: CT head 12/26/2022 TECHNIQUE: Routine CT Head without IV contrast. Multiplanar reformats. Dose reduction techniques were used. FINDINGS: INTRACRANIAL CONTENTS: No intracranial hemorrhage, extraaxial collection, or mass effect.  No CT evidence of acute infarct. Mild volume loss and presumed chronic small vessel ischemia are stable. VISUALIZED ORBITS/SINUSES/MASTOIDS: No intraorbital abnormality. No paranasal sinus mucosal disease. No middle ear or mastoid effusion. BONES/SOFT TISSUES: No acute abnormality.     IMPRESSION: 1.  No acute intracranial abnormality or significant change compared to the prior study.    CT Head w/o Contrast    Result Date: 12/26/2022  EXAM: CT HEAD W/O CONTRAST LOCATION: [facility] DATE/TIME: 12/26/2022 4:07 PM INDICATION: Altered mental status COMPARISON:  CT head 10/25/2022. TECHNIQUE: Routine CT Head without IV contrast. Multiplanar reformats. Dose reduction techniques were used. FINDINGS: INTRACRANIAL CONTENTS: No intracranial hemorrhage, extraaxial collection, or mass effect.  No CT evidence of acute infarct.  Mild presumed chronic small vessel ischemic changes. Normal ventricles and sulci. Dolichoectasia of the basilar artery. VISUALIZED ORBITS/SINUSES/MASTOIDS: No intraorbital abnormality. No paranasal sinus mucosal disease. No middle ear or mastoid effusion. BONES/SOFT TISSUES: No acute abnormality.     IMPRESSION: 1.  No acute intracranial process.      Lab Results:  Personally Reviewed.   Fingerstick Blood Glucose: @[id number](POCGLUFGR:10)@    Last Hbg A1C: No results found for: HGBA1C   Lab Results   Component Value Date    INR 1.14 12/26/2022    INR 0.87 12/17/2022    INR 1.23 (H) 03/18/2020     Recent Results (from the past 24 hour(s))   Glucose by meter    Collection Time: 12/29/22  2:35 PM   Result Value Ref Range    GLUCOSE BY METER POCT 132 (H) 70 - 99 mg/dL   Lactic Acid STAT    Collection Time: 12/29/22  4:31 PM   Result Value Ref Range    Lactic Acid 0.7 0.7 - 2.0 mmol/L   Glucose by meter    Collection Time: 12/29/22  4:54 PM   Result Value Ref Range    GLUCOSE BY METER POCT 145 (H) 70 - 99 mg/dL   Symptomatic Influenza A/B & SARS-CoV2 (COVID-19) Virus PCR Multiplex Nasopharyngeal    Collection Time: 12/29/22  5:41 PM    Specimen: Nasopharyngeal; Swab   Result Value Ref Range    Influenza A PCR Negative Negative    Influenza B PCR Negative Negative    RSV PCR Negative Negative    SARS CoV2 PCR Negative Negative   Blood Culture Hand, Left    Collection Time: 12/29/22  6:51 PM    Specimen: Hand, Left; Blood   Result Value Ref Range    Culture No growth after 12 hours    Blood Culture Hand, Right    Collection Time: 12/29/22  6:51 PM    Specimen: Hand, Right; Blood   Result Value Ref Range    Culture No growth after 12 hours    UA reflex to Microscopic and Culture    Collection Time: 12/29/22  6:59 PM    Specimen: Urine, Fine Catheter   Result Value Ref Range    Color Urine Light Yellow Colorless, Straw, Light Yellow, Yellow    Appearance Urine Clear Clear    Glucose Urine Negative Negative mg/dL     Bilirubin Urine Negative Negative    Ketones Urine Negative Negative mg/dL    Specific Gravity Urine 1.014 1.001 - 1.030    Blood Urine 1.0 mg/dL (A) Negative    pH Urine 5.0 5.0 - 7.0    Protein Albumin Urine 20 (A) Negative mg/dL    Urobilinogen Urine <2.0 <2.0 mg/dL    Nitrite Urine Negative Negative    Leukocyte Esterase Urine 25 Leonarda/uL (A) Negative    Bacteria Urine Few (A) None Seen /HPF    Mucus Urine Present (A) None Seen /LPF    RBC Urine 21 (H) <=2 /HPF    WBC Urine 6 (H) <=5 /HPF    Hyaline Casts Urine 3 (H) <=2 /LPF   Glucose by meter    Collection Time: 12/29/22  8:35 PM   Result Value Ref Range    GLUCOSE BY METER POCT 114 (H) 70 - 99 mg/dL   MRSA MSSA PCR, Nasal Swab    Collection Time: 12/29/22 10:19 PM    Specimen: Nose; Swab   Result Value Ref Range    MRSA Target DNA Negative Negative    SA Target DNA Negative    CBC with platelets    Collection Time: 12/30/22  5:22 AM   Result Value Ref Range    WBC Count 9.4 4.0 - 11.0 10e3/uL    RBC Count 3.55 (L) 4.40 - 5.90 10e6/uL    Hemoglobin 10.4 (L) 13.3 - 17.7 g/dL    Hematocrit 34.8 (L) 40.0 - 53.0 %    MCV 98 78 - 100 fL    MCH 29.3 26.5 - 33.0 pg    MCHC 29.9 (L) 31.5 - 36.5 g/dL    RDW 14.1 10.0 - 15.0 %    Platelet Count 160 150 - 450 10e3/uL   Basic metabolic panel    Collection Time: 12/30/22  5:22 AM   Result Value Ref Range    Sodium 140 136 - 145 mmol/L    Potassium 4.6 3.4 - 5.3 mmol/L    Chloride 101 98 - 107 mmol/L    Carbon Dioxide (CO2) 32 (H) 22 - 29 mmol/L    Anion Gap 7 7 - 15 mmol/L    Urea Nitrogen 30.9 (H) 8.0 - 23.0 mg/dL    Creatinine 1.31 (H) 0.67 - 1.17 mg/dL    Calcium 8.4 (L) 8.8 - 10.2 mg/dL    Glucose 163 (H) 70 - 99 mg/dL    GFR Estimate 60 (L) >60 mL/min/1.73m2   Glucose by meter    Collection Time: 12/30/22  7:46 AM   Result Value Ref Range    GLUCOSE BY METER POCT 177 (H) 70 - 99 mg/dL   Lactic acid whole blood    Collection Time: 12/30/22 10:28 AM   Result Value Ref Range    Lactic Acid 1.4 0.7 - 2.0 mmol/L            Advanced Care Planning    Time > 35 minutes with greater than 50% of time spent in counseling and coordination of care.     Yovani Mota MD  Date: 12/30/2022  Time: 1:37 PM  [facility]

## 2022-12-30 NOTE — PLAN OF CARE
Problem: Pain Chronic (Persistent)  Goal: Optimal Pain Control and Function  12/30/2022 1749 by Lety Lawler RN  Outcome: Progressing      Pt continues to have back pain. Creams/ointments applied.   Tele: NSR  Nothing eventful past 4 hours.

## 2022-12-30 NOTE — PROGRESS NOTES
Oxygen -  Pt. Was off BiPAP just long enough ot take medication at 2200. But now off BiPAP for a while ( 230 AM)  and more pain medication given and on oxy mask. At 12 liters. We changed 02 sensor on finger, an hour ago and that helped the readings.  RT aware.   Pt. Complains of pain.   Will pass on to use high flow nasal cannula when off bipap for eating.  And encourage pt. To eat  During meals times.     Lungs very congested, and occ cough, wet/ loose sounding.     Oxy given for pain, had Neurontin and tylenol earlier.

## 2022-12-30 NOTE — PROGRESS NOTES
Patient continues on BIPAP on bellow settings:     12/30/22 0020   CPAP/BiPAP/Settings   IPAP/EPAP (cmH2O) 10/5   Rate (breaths/min) 14   Oxygen (%) 55

## 2022-12-30 NOTE — PROGRESS NOTES
[facility]  Palliative Care Daily Progress Note      Requesting Clinician / Team: Dr Fernandez  Reason for consult: Goals of Care (side effects from medications)     Summary/Recommendations:     Goals of care:  Stopped by to see pt who was getting out of bed, going to work with PT. Dtr not present as planned for 1100 AM visit. Let him know would go reach out to dtr.     Spoke with dtr Ethan by telephone (she is pts 1st HCA on his 2019 valid health care directive) from [phone] (she has a sick child at home and was unable to come in). She indicates that she used to be pts primary caregiver yet with nursing school and a new baby, her brother Raleigh was more involved. She however is now back involved as the primary contact, does believe she knows pts medications well. She confirms that pts goals are now restorative. She shares that her Mom was invovled for a while with pts medications. She is aware of Addiction Medicine working on a taper off of Klonopin (she said that he did not take this everyday; was taking as needed, however today, pt says he was taking 6 Klonopin at home and then per him stopped cold turn here; again reviewed with him, the important of getting an accurate history for safe dosing). Also reviewed the pain team is involved. Did share how important it is to understand the exact amounts of medications are taking at home, in case it is less or more, so we do not over or under prescribe. She indicates she had a good conversation with Hospitalist yesterday and feels this may have been reviewed. Explained to Omar that he is on a taper getting off of Klonopin.     She wishes that Omar could get discharged to a TCU then perhaps to a another type of location like assisted living (?wondered if a small supportive group home might be worth exploring). She does not think he can have independent living. He does not want a NH. She does believe it is going to take  some time adjusting to his new restorative goals. Omar says he is open to TCU. He also is open to having a new living arrangement. Did not have indepth discussion with pt today as is still a little altered yet improving.    Advanced care planning  -Previous Healthcare Directive: Has a POLST on file  -Surrogate Medical Decision Maker: Dtr and son named as primary health care agents.  -CODE STATUS:DNR/DNI (dtr confirmed this)     Symptom management  Chronic Pain, hx of. Currently on Citalopram and Oxycodone (may have 2D6 inhibition, limiting bioavailability of opioid medication). Differential for tremors-broad, good to rule out seizures, myoclonus, serotonin syndrome, other neurologic issue. Tremors and somnolence better today. While he says he has pain, he also is feeling better today than yesterday.   -Appreciate Psychiatry evaluation and recommendations. Value review from Psychiatry and Hospitalist re: ? shaking (serotonin syndrome). Pt indicates he appreciates discussion re: his medications/interactions. He is open to considering some adjustments including Klonopin.  -Appreciate Addiction Medicine and Acute Pain Team consultation while inpatient for advice on [facility]ANGELIST[facility] mgmt options with chronic pain ( buprenorphine or methadone).  -Acupuncture and Healing Touch (pt open to this). Ordered today.  -May need inpatient mental health care for safety and medication adjustments, titrations.      Psychosocial and support needs  -Dtr declined Palliative Care LICSW.  -Appreciate Spiritual Care connecting with pt.         Thank you for the opportunity to participate in the care of this patient and family.      During regular M-F work hours -- if you are not sure who specifically to contact -- please contact us by calling [phone].        Palliative Care Assessment     Information gathered today from:chart review, MD.  Ki Pederson is a 67 year old male with a history of hypertension, ASCVD, COPD, 4 L of oxygen at home,  nicotine abuse, chronic pain/hx remote IVD use, atrial fibrillation, MI, TIA, and acute on systolic heart,, chronic hepatitis C, restless leg syndrome, peripheral neuropathy, spinal stenosis and CVA without any deficits. who presented to the ED on Dec. 26th  With syncope.  Admitted to the hospital with syncope.   Previous hospitalizations: Nov. 2022 ER drug overdose  Other specialities following this admission: Psychiatry  Per ER, recent changes: Per patient he had x1 episode of syncope while sitting.Per nurse/EMS reported patient took 8mg of Klonopin. Patient is on 2.5L of oxygen at home. On arrival patient was in a state of drowsiness and nurse had to give a sternal rub for patient to wake up. Per patient's ex wife, last several days states that he feels a copd flare coming on. Passed out in chair. Unresponsive. On 2L O2 at baseline, but was NOT on O2.  Family put O2 on, sats still in 80s, so family bumped up to 3L w O2 89%.  Called 911. Patient doesn't have control of meds. When he gets hypoxic, he says things that don't make sense.  Stated he took 8 klonopin between 8am and 3pm.  Ex-wife has meds in safe in HER room, he can't get meds.  Ex-wife counted pills and none of them are missing. Ex-wife states yest c/o chills yest. They started empiric doxy yest 1 dose for copd sx. Swelling in legs x2d, given 80mg lasix daily instead of usual 40mg.  3lb wt gain. No longer on hospice.  Appt 1/4/23 with [facility] care.      Today, the patient was seen for:  Goals of Care (side effects from medications)     Previous Palliative Care Encounters:  Saw Palliative Care in Sept. 2022, referred to Hospice.     Palliative Care Note from Wednesday:  Met with pt alone (along with sitter) today for a palliative care visit (he has seen palliative care 2x before, prior to enrolling in hospice). Reviewed the role of palliative care and the reason for the consult. Joealthea indicates he stopped [facility] ~1-2 months ago, when was advised  to see Cardiology re: a PPM. He however did not follow up. Nor did he connect with Outpatient Palliative Care (appears to have an appt Jan., 2023. He would value a Cardiology evaluation). Today, is sitting on the edge of the bed. He is at times sleepy, with eyes closed. Also has shaking, which he said started after he had a fall 2 days ago (says has not had this before; does have a hx of RLS syndrome and some ETOH yet very little per month now; head CT ordered). He indicates that he is struggling with end of life decisions. Being on hospice was hard and created some family conflicts with family being asked to assist with his care, ?medications. Most recently he has moved in with his ex-wife and 2 other family (x 1 month). He wishes more family were around with him. He is unable to articulate what is most important to him at this time. He is open to more medical evaluation, medication review, and discussion re: goals.      Summary of Palliative Encounter:  I  discussed the reason for Palliative Care Referral and our role in symptom management, patient family communication and understanding their choices for medical treatment and providing  guidance in making difficult decisions in the framework of focusing on patient comfort and quality of life.    Reviewed current conditions and treatments including recent clinical decisions/changes/goals, coping, symptom mgmt, cardiology evaluation, hospice.         Prognosis, Goals, and/or Advance Care Planning were addressed today: Yes, w/dtr/1st HCA (confirmed DNR/DNI)    Mood, coping, and/or meaning in the context of serious illness were addressed today: Yes. Pt states that he is having a difficult time thinking of his exact feelings.      Functional Status:     Functional Status two weeks prior to hospitalization: , PPS:   40%- 1. Mainly in bed; 2. Unable to do most activity, extensive disease; 3. Mainly assistance; 4. Normal or reduced; 5. Full or drowsy +/- confusion. Uses a  walker.     Functional status Current:  , PPS:   40%- 1. Mainly in bed; 2. Unable to do most activity, extensive disease; 3. Mainly assistance; 4. Normal or reduced; 5. Full or drowsy +/- confusion     Prognosis, Goals, & Planning:   Prognosis (quality & quantity): Previously was hospice (as long ago as 2 years ago)   High risk of death within one year? Uncertain     Patient's decision making preferences: With son and dtr        Capacity evaluation:    Pt Omar does not have full capacity to make a decision regarding his care based on the following: given somnolence today.             I have concerns about the patient/family's health literacy today:Possibly           Patient has a completed Health Care Directive: Has done POLST       Code status:DNR/DNI     Social:     Relationships: . Lives now with ex-wife Dereck as well as ? Children Thais and Michael. Has 11 children and 72 grandchildren.     Hobbies: He loves to cook, yet is dangerous.     Spirituality: Is spiritual.     Alcohol: Reports very little      Key Palliative Symptom Data: Pt starting to work with PT. Limited visit today. Saw for a 2nd visit this afternoon. Able to converse in a limited way. Breathing comfortable on NC. Some pain but manageable.          Review of Systems:     Besides above, an additional ROS system was not done.          Medications:     I have reviewed this patient's medication profile and medications during this hospitalization.           Physical Exam:   Vitals were reviewed  Temp: 99.5  F (37.5  C) Temp src: Oral BP: 102/67 Pulse: 87   Resp: 18 SpO2: 98 % O2 Device: Oxymask Oxygen Delivery: 10 LPM  Constitutional:   awake, alert, cooperative, no apparent distress, and appears stated age                Data Reviewed:     Reviewed recent pertinent imaging, comments:     EXAM: XR CHEST PORT 1 VIEW  LOCATION: [facility]  DATE/TIME: 12/29/2022 5:57 PM     INDICATION: fever  COMPARISON: Chest radiograph  12/26/2022.                                                                      IMPRESSION: Stable enlarged cardiac silhouette. Newly visualized opacity overlying the right costophrenic sulcus, could represent pneumonia or superimposed soft tissues. No definite pleural effusion or pneumothorax. Bones are unchanged. Cerclage wires   overlying the neck are again noted.  Reviewed recent labs, comments:         TTS: I have personally spent a total of 35 minutes  today on the unit in review of medical record, consultation with the medical providers and assessment of patient, with more than 50% of this time spent in counseling, coordination of care, and discussion  in a family meeting (face to face with pt x 15 minutes, and with dtr by telephone for 20 minutes, re: diagnostic results, prognosis, symptom management, risks and benefits of management options, emotional support and development of plan of care.    DESHAWN Avila, FNP-BC, PMHNP-BC  Palliative Care Nurse Practitioner  [facility]  [phone]      During regular M-F work hours -- if you are not sure who specifically to contact -- please contact us by calling [phone].

## 2022-12-30 NOTE — PROGRESS NOTES
Care Management Follow Up    Length of Stay (days): 2    Expected Discharge Date: 12/31/2022     Concerns to be Addressed:    On Bipap overnight, and oxymask during day    Patient plan of care discussed at interdisciplinary rounds: Yes    Anticipated Discharge Disposition:  TCU     Anticipated Discharge Services:  TCU    Education Provided on the Discharge Plan: Per Care Team    Patient/Family in Agreement with the Plan:  Yes    Referrals Placed by CM/SW:    Private pay costs discussed: Not applicable    Additional Information:  Chart reviewed.     Pt lives w/adult dtr, no svcs and independent at baseline, uses home oxygen 2.5L at bedtime . Daughter states he was on hospice but now maybe just palliative care. Per daughter wants pt to go to a TCU to get stronger. Per daughter Pt has an [facility] worker, who is working on long term or LTC placement for the pt. Transport TBD.      Pt on 1 on 1. Still requiring Bipap overnight, and currently on oxymask .       TCU referrals (pending)    Donald from [facility] called and left  for writer to call back today if pt is needed a TCU bed still, RNCM called and left VM back, awaiting call back.      CM will continue to follow plan of care, review recommendations, and assist with any discharge needs anticipated.         Yessica Cramer RN

## 2022-12-31 NOTE — PHARMACY-VANCOMYCIN DOSING SERVICE
Pharmacy Vancomycin Note  Date of Service 2022  Patient's  1955   67 year old, male    Indication: Aspiration Pneumonia  Day of Therapy: 3  Current vancomycin regimen:  1500 mg IV q24h  Current vancomycin monitoring method: AUC  Current vancomycin therapeutic monitoring goal: 400-600 mg*h/L    [employer] Prediction of Current Vancomycin Regimen  Regimen: 1500 mg IV every 24 hours.  Start time: 14:24 on 2022  Exposure target: AUC24 (range)400-600 mg/L.hr   AUC24,ss: 279 mg/L.hr  Probability of AUC24 > 400: 7 %  Ctrough,ss: 6.7 mg/L  Probability of Ctrough,ss > 20: 2 %  Probability of nephrotoxicity (Lodise ELIDA ): 4 %    Current estimated CrCl = Estimated Creatinine Clearance: 97.7 mL/min (based on SCr of 0.96 mg/dL).    Creatinine for last 3 days  2022:  4:35 AM Creatinine 1.51 mg/dL  2022:  5:22 AM Creatinine 1.31 mg/dL  2022:  4:51 AM Creatinine 0.96 mg/dL    Recent Vancomycin Levels (past 3 days)  2022: 11:37 AM Vancomycin 7.9 ug/mL    Vancomycin IV Administrations (past 72 hours)                   vancomycin (VANCOCIN) 1,500 mg in sodium chloride 0.9 % 250 mL intermittent infusion (mg) 1,500 mg New Bag 22    vancomycin (VANCOCIN) 2,000 mg in sodium chloride 0.9 % 500 mL intermittent infusion (mg) 2,000 mg New Bag 22                Nephrotoxins and other renal medications (From now, onward)    Start     Dose/Rate Route Frequency Ordered Stop    22  vancomycin (VANCOCIN) 1,500 mg in sodium chloride 0.9 % 250 mL intermittent infusion         1,500 mg  over 90 Minutes Intravenous EVERY 24 HOURS 22 1913      12/30/22 0037  piperacillin-tazobactam (ZOSYN) 3.375 g vial to attach to  mL bag        Note to Pharmacy: Extended infusion dosing to start 6 hours after initial infusion.   See Hyperspace for full Linked Orders Report.    3.375 g  over 240 Minutes Intravenous EVERY 8 HOURS 22 1838      22 1400   furosemide (LASIX) injection 20 mg         20 mg  over 1-3 Minutes Intravenous EVERY 12 HOURS 12/29/22 1342               Contrast Orders - past 72 hours (72h ago, onward)    Start     Dose/Rate Route Frequency Stop    12/31/22 1005  perflutren lipid microsphere (DEFINITY) injection SUSP 2 mL         2 mL Intravenous ONCE 12/31/22 1005          Interpretation of levels and current regimen:  Vancomycin level is reflective of -600    Has serum creatinine changed greater than 50% in last 72 hours: Yes    Renal Function: Improving    [employer] Prediction of Planned New Vancomycin Regimen  Regimen: 1250 mg IV every 12 hours.  Start time: 14:24 on 12/31/2022  Exposure target: AUC24 (range)400-600 mg/L.hr   AUC24,ss: 457 mg/L.hr  Probability of AUC24 > 400: 73 %  Ctrough,ss: 14.4 mg/L  Probability of Ctrough,ss > 20: 18 %  Probability of nephrotoxicity (Lodise ELIDA 2009): 10 %    Plan:  1. Increase Dose to 1250 mg q12h  2. Vancomycin monitoring method: AUC  3. Vancomycin therapeutic monitoring goal: 400-600 mg*h/L  4. Pharmacy will check vancomycin levels as appropriate in 1-3 Days.  5. Serum creatinine levels will be ordered daily for the first week of therapy and at least twice weekly for subsequent weeks.    Ariella Montoya [facility]

## 2022-12-31 NOTE — PLAN OF CARE
Problem: Pain Chronic (Persistent)  Goal: Optimal Pain Control and Function  Outcome: Progressing  Intervention: Develop Pain Management Plan  Recent Flowsheet Documentation  Taken 12/31/2022 0316 by Scottie Rodriguez RN  Pain Management Interventions:    emotional support    pillow support provided    repositioned    rest  Taken 12/31/2022 0027 by Scottie Rodriguez RN  Pain Management Interventions:    medication (see MAR)    emotional support    pillow support provided    repositioned    rest  Taken 12/30/2022 2030 by Scottie Rodriguez RN  Pain Management Interventions:    medication (see MAR)    emotional support    pillow support provided    repositioned    rest  Intervention: Manage Persistent Pain  Recent Flowsheet Documentation  Taken 12/31/2022 0027 by Scottie Rodriguez RN  Medication Review/Management: medications reviewed  Taken 12/30/2022 2030 by Scottie Rodriguez RN  Medication Review/Management: medications reviewed     Problem: Fall Injury Risk  Goal: Absence of Fall and Fall-Related Injury  Outcome: Progressing  Intervention: Identify and Manage Contributors  Recent Flowsheet Documentation  Taken 12/31/2022 0027 by Scottie Rodriguez RN  Medication Review/Management: medications reviewed  Taken 12/30/2022 2030 by Scottie Rodriguez RN  Medication Review/Management: medications reviewed  Intervention: Promote Injury-Free Environment  Recent Flowsheet Documentation  Taken 12/31/2022 0027 by Scottie Rodriguez RN  Safety Promotion/Fall Prevention:    clutter free environment maintained    fall prevention program maintained    increased rounding and observation    increase visualization of patient    lighting adjusted    mobility aid in reach    nonskid shoes/slippers when out of bed    patient and family education    room organization consistent    safety round/check completed    sitter at bedside  Taken 12/30/2022 2030 by Scottie Rodriguez RN  Safety Promotion/Fall Prevention:    clutter free environment maintained    fall prevention program maintained    increased  "rounding and observation    increase visualization of patient    lighting adjusted    mobility aid in reach    nonskid shoes/slippers when out of bed    patient and family education    room organization consistent    safety round/check completed    sitter at bedside   Goal Outcome Evaluation:  Reported ongoing chronic back pain, Oxycodone Prn given once overnight with no improvement per patient. Lidocaine cream applied earlier in evening with some relief.  Restless overnight but cooperative. Patient can be impulsive, unsteady on feet.  1:1 sitter at bedside.  Refused Bipap at bedtime, remains on 6L O2 NC.        Addendum: This AM at 0630 patient starting to get upset regarding pain meds. He reported lots of pain in back radiating to hip, he did not rate. As he is reporting this to RN, he is falling asleep in bed. Refusing oxycodone and voltaren cream. He stated those do not work. He stated he wanted a \"shot\". Rn informed him that oxycodone and Voltaren creams were the only meds that are ordered for him, but he continued to refused.                  "

## 2022-12-31 NOTE — PLAN OF CARE
Problem: Pain Chronic (Persistent)  Goal: Optimal Pain Control and Function  Outcome: Progressing  Intervention: Manage Persistent Pain  Recent Flowsheet Documentation  Taken 12/31/2022 1200 by Ray Walton RN  Medication Review/Management: medications reviewed  Taken 12/31/2022 0800 by Ray Walton RN  Medication Review/Management: medications reviewed     Problem: Fall Injury Risk  Goal: Absence of Fall and Fall-Related Injury  Outcome: Progressing  Intervention: Identify and Manage Contributors  Recent Flowsheet Documentation  Taken 12/31/2022 1200 by Ray Walton RN  Medication Review/Management: medications reviewed  Taken 12/31/2022 0800 by Ray Walton RN  Medication Review/Management: medications reviewed  Intervention: Promote Injury-Free Environment  Recent Flowsheet Documentation  Taken 12/31/2022 1200 by Ray Walton RN  Safety Promotion/Fall Prevention:    clutter free environment maintained    fall prevention program maintained    increased rounding and observation    increase visualization of patient    lighting adjusted    mobility aid in reach    nonskid shoes/slippers when out of bed    patient and family education    room organization consistent    safety round/check completed    sitter at bedside  Taken 12/31/2022 0800 by Ray Walton RN  Safety Promotion/Fall Prevention:    clutter free environment maintained    fall prevention program maintained    increased rounding and observation    increase visualization of patient    lighting adjusted    mobility aid in reach    nonskid shoes/slippers when out of bed    patient and family education    room organization consistent    safety round/check completed    sitter at bedside     Problem: Hypertension Acute  Goal: Blood Pressure Within Desired Range  Outcome: Progressing   Goal Outcome Evaluation:      Major Shift Events:  Pt wanted to leave hospital AMA d/t not getting the pain med he wants, but Dr. Mota advised  it would be unwise to leave because of how sick he is   Plan: Provide adequate pain management. Continue 1:1. POC.  For vital signs and complete assessments, please see documentation flowsheets.

## 2022-12-31 NOTE — PROGRESS NOTES
Daily Progress Note    Assessment/Plan:  Ki Pederson is a 67 year old male presenting with syncope with subsequent respiratory failure and encephalopathy which is improved.  Went  off hospice this past fall in order to have pacemaker placement.  Unclear what his hospice diagnosis was.     1.  Acute on chronic resp failure with hypoxia and hypercapnia/COPD and chronic CHF and pneumonia.  Breathing much improved today and now down to 4 L nasal cannula from high of 13 L oxime mask.  Likely a combination of CHF and pneumonia  - Restart night time and as needed BiPAP  - Continue chronic azithromycin MWF   - continue home inhaler and DUOnebs   -Fever now resolved on Zosyn and vancomycin.  MRSA screen negative, will stop Vanco., blood cultures pending.  UA does show white cells, awaiting cultures but already on Zosyn and vancomycin started 12/29/2022.  Procalcitonin was elevated at 0.22, lactate was normal.        2.Syncope with acute metabolic encephalopathy  - No acute EKG changes  - initial presentation likely due to home Oxygen was not on at home and he has not restarted BiPAP at night since discontinuing hospice.  - was hypotensive on presentation and has been given IV fluids  - head CT negative and CXR on admit.  Patient hit head and fell and has had CT head x2 to r/o bleed in the setting of anticoagulant and on going encephalopathy.  - At home  was supposed to be on oxycodone but has been unclear how he has been taking this  - continued klonipin which he is on chronically but reduced dose to 1mg to avoid withdrawal.    -Addiction medicine consulted due to concerns of Klonopin overdose but it is unclear if this actually happened  -Pain team consult-recommending narcotic taper and Suboxone therapy  -        3.Bradycardia an hypotensive as above-resolved, now slightly tachycardic, resume cardiac medications  - continue tele  - adding back amiodarone, dilt on hold secondary to soft blood pressures but will  reinitiate when able  - should have outpatient follow up for pacemaker.  He has not followed up in a-fib clinic since this was recommended.            4.Hypermagnesemia  - gentle hydration  - holding home mag  -trend        5.Acute on chronic CHF:  Echo today shows EF of 55 to 60% with no wall motion abnormalities.  Excellent diuresis on Lasix will continue 40 mg IV every 12 hours.        6.YUNIEL/CKD3  -Creatinine now normalized with diuresis,  We will watch closely.     7.Chronic pain  -remote history of trauma.-Pain team and addiction medicine consulted.  Pain team recommending tapering off narcotics and subsequent Suboxone therapy.     8.Depression - with suicidal threats on the night of 12/26  - continue celexa  - klonipin dose decreased to 1mg for now   - Psych consult appreciated.  He is not actively suicidal     9.Afib  -bradycardic in the 50'sand hypotensive on admit, now tachycardic with good blood pressures  - gradually restarting dilt and amio as vitals tolerate  - continue xarelto head ct negative     10.Tobacco Abuse and hx muti-substance abuse.  - nicotine patch  - confirmed he is not drinking with family     Code status:No CPR- Do NOT Intubate        Barriers to Discharge: Acute hypoxic respiratory failure, narcotic taper    Disposition: Anticipate multi day stay    Subjective:  Clinically, Ki appears much improved today.  He is alert and oriented and showing no evidence of encephalopathy.  He is angry that he is being tapered off narcotics.  He reports his breathing is much better and he has much less edema and less O2 requirements.  I called and updated his daughter.        Current Medications Reviewed via EHR List    Objective:  Vital signs in last 24 hours:  [unfilled]  .prog  Weight:   @THISENCWEIGHTS(1)@  Weight change:   Body mass index is 32.49 kg/m .    Intake/Output last 3 shifts:  I/O last 3 completed shifts:  In: 1320 [P.O.:1320]  Out: 3950 [Urine:3950]  Intake/Output this shift:  I/O  this shift:  In: -   Out: 550 [Urine:550]    Physical Exam:  General: No apparent distress  CV: Regular rate and rhythm, significantly less edema,   Lungs: Much improved air movement with mild scattered rales  Abdomen: Soft, nontender        Imaging:  Personally Reviewed.  XR Pelvis w Hip Left G/E 2 Views    Result Date: 2022  EXAM: XR PELVIS AND HIP LEFT 2 VIEWS LOCATION: [facility] DATE/TIME: 2022 2:15 AM INDICATION: fall on left hip COMPARISON: None.     IMPRESSION: Mild degenerative changes of the hips and lower lumbar spine.. No fracture or dislocation.    Echocardiogram Complete    Result Date: 2022  [id number]^JASMIN^CAROL^[place] [address]  Name: GUSTABO CHRISTOPHER SR. NAV MRN: [id number] : 1955 Study Date: 2022 09:30 AM Age: 67 yrs Gender: Male Patient Location: [facility] Reason For Study: CHF Ordering Physician: CAROL CASTELLANOS Performed By: AT  BSA: 2.3 m2 Height: 72 in Weight: 239 lb HR: 96 BP: 130/73 mmHg ______________________________________________________________________________ Procedure Complete Echo Adult. Definity (NDC #73792-649) given intravenously. ______________________________________________________________________________ Interpretation Summary  1. Normal left ventricular size and systolic performance with a visually estimated ejection fraction of 55-60%. 2. No significant valvular heart disease is identified on this study. 3. Normal right ventricular size and systolic performance. 4. There is mild left atrial enlargement.  When compared to the prior real-time echocardiogram dated 2022, there has been an interval improvement in left ventricular systolic performance with normalization of the ejection fraction. ______________________________________________________________________________ Left ventricle: Normal left ventricular size and systolic  performance with a visually estimated ejection fraction of 55-60%. There is normal regional wall motion. Left ventricular wall thickness is normal.  Assessment of LV Diastolic Function: The cumulative findings suggest impaired diastolic filling [The septal e' velocity is > 7 cm/s & lateral e' velocity is < 10 cm/s. The average E/e' is >14. TR velocity is > 2.8 m/s. Left atrial volume index is greater than 34 mL/mÂ ].  Assessment of left atrial pressure (LAP): The cumulative findings suggest moderately elevated left atrial pressure (the E/A is > 0.8 and <2.0 plus 2 or 3 of 3 of the following present: Average E/e' > 14, TRvel > 2.8 m/s, and/or LA vol. index > 34 ml/mÂ  ).  Right ventricle: Normal right ventricular size and systolic performance.  Left atrium: There is mild left atrial enlargement.  Right atrium: The right atrium is of normal size.  IVC: The IVC is mildly dilated.  Aortic valve: The aortic valve is comprised of three cusps. No significant aortic stenosis or aortic insufficiency is detected on this study.  Mitral valve: The mitral valve appears morphologically normal. There is trace mitral insufficiency.  Tricuspid valve: The tricuspid valve is grossly morphologically normal. There is trace-mild tricuspid insufficiency.  Pulmonic valve: The pulmonic valve is grossly morphologically normal.  Thoracic aorta: The aortic root and proximal ascending aorta are of normal dimension.  Pericardium: There is no significant pericardial effusion. ______________________________________________________________________________ ______________________________________________________________________________ MMode/2D Measurements & Calculations IVSd: 1.2 cm LVIDd: 5.4 cm LVIDs: 4.3 cm LVPWd: 1.0 cm FS: 20.4 % LV mass(C)d: 238.4 grams LV mass(C)dI: 103.7 grams/m2 Ao root diam: 3.4 cm LA dimension: 4.7 cm asc Aorta Diam: 3.5 cm LA/Ao: 1.4 LVOT diam: 2.5 cm LVOT area: 5.0 cm2 LA Volume Indexed (AL/bp): 38.4 ml/m2  RWT: 0.37   Time Measurements MM HR: 98.0 BPM  Doppler Measurements & Calculations MV E max omar: 104.0 cm/sec MV A max omar: 93.6 cm/sec MV E/A: 1.1 MV max P.1 mmHg MV mean PG: 3.3 mmHg MV V2 VTI: 26.9 cm MVA(VTI): 4.2 cm2 MV dec slope: 622.9 cm/sec2 MV dec time: 0.17 sec Ao V2 max: 186.4 cm/sec Ao max P.0 mmHg Ao V2 mean: 139.2 cm/sec Ao mean P.3 mmHg Ao V2 VTI: 37.4 cm ANDRES(I,D): 3.0 cm2 ANDRES(V,D): 3.7 cm2 LV V1 max P.7 mmHg LV V1 max: 138.9 cm/sec LV V1 VTI: 22.4 cm SV(LVOT): 112.1 ml SI(LVOT): 48.8 ml/m2 PA acc time: 0.15 sec TR max omar: 297.4 cm/sec TR max P.4 mmHg AV Omar Ratio (DI): 0.75 ANDRES Index (cm2/m2): 1.3  E/E': 14.9 E/E' av.0 Lateral E/e': 11.4 Medial E/e': 14.7 Peak E' Omar: 7.0 cm/sec  ______________________________________________________________________________ Report approved by: Barbara Aleman 2022 10:54 AM       XR Chest Port 1 View    Result Date: 2022  EXAM: XR CHEST PORT 1 VIEW LOCATION: [facility] DATE/TIME: 2022 5:57 PM INDICATION: fever COMPARISON: Chest radiograph 2022.     IMPRESSION: Stable enlarged cardiac silhouette. Newly visualized opacity overlying the right costophrenic sulcus, could represent pneumonia or superimposed soft tissues. No definite pleural effusion or pneumothorax. Bones are unchanged. Cerclage wires overlying the neck are again noted.    XR Chest Port 1 View    Result Date: 2022  EXAM: XR CHEST PORT 1 VIEW LOCATION: [facility] DATE/TIME: 2022 4:09 PM INDICATION: Shortness of breath. COMPARISON: 2022     IMPRESSION: Heart size magnified in AP projection with normal vascularity. No focal consolidation, pneumothorax nor pleural effusion.    XR Chest Port 1 View    Result Date: 2022  EXAM: XR CHEST PORT 1 VIEW LOCATION: [facility] DATE/TIME: 2022 11:17 PM INDICATION: Shortness of breath. COMPARISON: 10/24/2022     IMPRESSION:  Normal heart size and pulmonary vascularity. Lungs are clear. Postoperative changes lower cervical spine. Otherwise unremarkable. No acute findings.    CT Head w/o Contrast    Result Date: 12/28/2022  EXAM: CT HEAD W/O CONTRAST LOCATION: [facility] DATE/TIME: 12/28/2022 5:38 PM INDICATION: check for late bleed after fall   on anticoagulation COMPARISON: 12/27/2022 head CT TECHNIQUE: Routine CT Head without IV contrast. Multiplanar reformats. Dose reduction techniques were used. FINDINGS: INTRACRANIAL CONTENTS: No intracranial hemorrhage, extraaxial collection, or mass effect.  No CT evidence of acute infarct. Mild presumed chronic small vessel ischemic changes. Mild generalized volume loss. No hydrocephalus. VISUALIZED ORBITS/SINUSES/MASTOIDS: No intraorbital abnormality. No paranasal sinus mucosal disease. Hypoplastic right sided mastoid air cells. Left mastoid air cells and middle ear cavity clear. BONES/SOFT TISSUES: No acute abnormality.     IMPRESSION: 1.  No acute intracranial process.    CT Head w/o Contrast    Result Date: 12/27/2022  EXAM: CT HEAD W/O CONTRAST LOCATION: [facility] DATE/TIME: 12/27/2022 1:57 AM INDICATION: fall, hit head, on xarelto COMPARISON: CT head 12/26/2022 TECHNIQUE: Routine CT Head without IV contrast. Multiplanar reformats. Dose reduction techniques were used. FINDINGS: INTRACRANIAL CONTENTS: No intracranial hemorrhage, extraaxial collection, or mass effect.  No CT evidence of acute infarct. Mild volume loss and presumed chronic small vessel ischemia are stable. VISUALIZED ORBITS/SINUSES/MASTOIDS: No intraorbital abnormality. No paranasal sinus mucosal disease. No middle ear or mastoid effusion. BONES/SOFT TISSUES: No acute abnormality.     IMPRESSION: 1.  No acute intracranial abnormality or significant change compared to the prior study.    CT Head w/o Contrast    Result Date: 12/26/2022  EXAM: CT HEAD W/O CONTRAST LOCATION:   [facility] DATE/TIME: 12/26/2022 4:07 PM INDICATION: Altered mental status COMPARISON:  CT head 10/25/2022. TECHNIQUE: Routine CT Head without IV contrast. Multiplanar reformats. Dose reduction techniques were used. FINDINGS: INTRACRANIAL CONTENTS: No intracranial hemorrhage, extraaxial collection, or mass effect.  No CT evidence of acute infarct. Mild presumed chronic small vessel ischemic changes. Normal ventricles and sulci. Dolichoectasia of the basilar artery. VISUALIZED ORBITS/SINUSES/MASTOIDS: No intraorbital abnormality. No paranasal sinus mucosal disease. No middle ear or mastoid effusion. BONES/SOFT TISSUES: No acute abnormality.     IMPRESSION: 1.  No acute intracranial process.      Lab Results:  Personally Reviewed.   Fingerstick Blood Glucose: @[id number](POCGLUFGR:10)@    Last Hbg A1C: No results found for: HGBA1C   Lab Results   Component Value Date    INR 1.14 12/26/2022    INR 0.87 12/17/2022    INR 1.23 (H) 03/18/2020     Recent Results (from the past 24 hour(s))   Glucose by meter    Collection Time: 12/30/22  8:56 PM   Result Value Ref Range    GLUCOSE BY METER POCT 161 (H) 70 - 99 mg/dL   Glucose by meter    Collection Time: 12/31/22  3:14 AM   Result Value Ref Range    GLUCOSE BY METER POCT 155 (H) 70 - 99 mg/dL   CBC with platelets    Collection Time: 12/31/22  4:51 AM   Result Value Ref Range    WBC Count 9.2 4.0 - 11.0 10e3/uL    RBC Count 3.53 (L) 4.40 - 5.90 10e6/uL    Hemoglobin 10.1 (L) 13.3 - 17.7 g/dL    Hematocrit 33.7 (L) 40.0 - 53.0 %    MCV 96 78 - 100 fL    MCH 28.6 26.5 - 33.0 pg    MCHC 30.0 (L) 31.5 - 36.5 g/dL    RDW 13.6 10.0 - 15.0 %    Platelet Count 167 150 - 450 10e3/uL   Basic metabolic panel    Collection Time: 12/31/22  4:51 AM   Result Value Ref Range    Sodium 134 (L) 136 - 145 mmol/L    Potassium 3.9 3.4 - 5.3 mmol/L    Chloride 96 (L) 98 - 107 mmol/L    Carbon Dioxide (CO2) 31 (H) 22 - 29 mmol/L    Anion Gap 7 7 - 15 mmol/L    Urea  Nitrogen 15.3 8.0 - 23.0 mg/dL    Creatinine 0.96 0.67 - 1.17 mg/dL    Calcium 8.2 (L) 8.8 - 10.2 mg/dL    Glucose 97 70 - 99 mg/dL    GFR Estimate 88 >60 mL/min/1.73m2   Lactic Acid STAT    Collection Time: 12/31/22  4:51 AM   Result Value Ref Range    Lactic Acid 1.0 0.7 - 2.0 mmol/L   Glucose by meter    Collection Time: 12/31/22  7:56 AM   Result Value Ref Range    GLUCOSE BY METER POCT 107 (H) 70 - 99 mg/dL   Vancomycin level    Collection Time: 12/31/22 11:37 AM   Result Value Ref Range    Vancomycin 7.9   ug/mL   Glucose by meter    Collection Time: 12/31/22 11:55 AM   Result Value Ref Range    GLUCOSE BY METER POCT 171 (H) 70 - 99 mg/dL           Advanced Care Planning    Time > 35 minutes with greater than 50% of time spent in counseling and coordination of care.    Yovani Mota MD  Date: 12/31/2022  Time: 5:10 PM  [facility]

## 2022-12-31 NOTE — PLAN OF CARE
Problem: Hypertension Acute  Goal: Blood Pressure Within Desired Range  Outcome: Progressing: /66    Problem: Hyperglycemia  Goal: Blood Glucose Level Within Targeted Range  Outcome: Progressing: . Sliding scale given per orders    Problem: Pain Chronic (Persistent)  Goal: Optimal Pain Control and Function  Outcome: Progressing: Pt continues to have back pain. PRN Oxycodone given x's 1, Voltaren gel and Lidocaine ointment applied.
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
## 2023-01-01 ENCOUNTER — ANCILLARY PROCEDURE (OUTPATIENT)
Dept: GENERAL RADIOLOGY | Facility: CLINIC | Age: 68
End: 2023-01-01
Attending: PHYSICIAN ASSISTANT
Payer: MEDICARE

## 2023-01-01 ENCOUNTER — PATIENT OUTREACH (OUTPATIENT)
Dept: GERIATRIC MEDICINE | Facility: CLINIC | Age: 68
End: 2023-01-01
Payer: MEDICARE

## 2023-01-01 ENCOUNTER — DOCUMENTATION ONLY (OUTPATIENT)
Dept: OTHER | Facility: CLINIC | Age: 68
End: 2023-01-01

## 2023-01-01 ENCOUNTER — VIRTUAL VISIT (OUTPATIENT)
Dept: ONCOLOGY | Facility: CLINIC | Age: 68
End: 2023-01-01
Attending: SOCIAL WORKER
Payer: MEDICARE

## 2023-01-01 ENCOUNTER — APPOINTMENT (OUTPATIENT)
Dept: OCCUPATIONAL THERAPY | Facility: HOSPITAL | Age: 68
DRG: 189 | End: 2023-01-01
Payer: MEDICARE

## 2023-01-01 ENCOUNTER — NURSE TRIAGE (OUTPATIENT)
Dept: NURSING | Facility: CLINIC | Age: 68
End: 2023-01-01
Payer: MEDICARE

## 2023-01-01 ENCOUNTER — OFFICE VISIT (OUTPATIENT)
Dept: FAMILY MEDICINE | Facility: CLINIC | Age: 68
End: 2023-01-01
Payer: MEDICARE

## 2023-01-01 ENCOUNTER — PATIENT OUTREACH (OUTPATIENT)
Dept: GERIATRIC MEDICINE | Facility: CLINIC | Age: 68
End: 2023-01-01

## 2023-01-01 ENCOUNTER — OFFICE VISIT (OUTPATIENT)
Dept: SLEEP MEDICINE | Facility: CLINIC | Age: 68
End: 2023-01-01
Attending: FAMILY MEDICINE
Payer: MEDICARE

## 2023-01-01 ENCOUNTER — MYC MEDICAL ADVICE (OUTPATIENT)
Dept: ADDICTION MEDICINE | Facility: CLINIC | Age: 68
End: 2023-01-01
Payer: MEDICARE

## 2023-01-01 ENCOUNTER — TELEPHONE (OUTPATIENT)
Dept: ADDICTION MEDICINE | Facility: CLINIC | Age: 68
End: 2023-01-01
Payer: MEDICARE

## 2023-01-01 ENCOUNTER — NURSE TRIAGE (OUTPATIENT)
Dept: NURSING | Facility: CLINIC | Age: 68
End: 2023-01-01

## 2023-01-01 ENCOUNTER — VIRTUAL VISIT (OUTPATIENT)
Dept: ADDICTION MEDICINE | Facility: CLINIC | Age: 68
End: 2023-01-01
Payer: MEDICARE

## 2023-01-01 ENCOUNTER — LAB REQUISITION (OUTPATIENT)
Dept: LAB | Facility: CLINIC | Age: 68
End: 2023-01-01
Payer: COMMERCIAL

## 2023-01-01 ENCOUNTER — TELEPHONE (OUTPATIENT)
Dept: INTERNAL MEDICINE | Facility: CLINIC | Age: 68
End: 2023-01-01

## 2023-01-01 ENCOUNTER — APPOINTMENT (OUTPATIENT)
Dept: CT IMAGING | Facility: CLINIC | Age: 68
End: 2023-01-01
Attending: STUDENT IN AN ORGANIZED HEALTH CARE EDUCATION/TRAINING PROGRAM
Payer: MEDICARE

## 2023-01-01 ENCOUNTER — MYC REFILL (OUTPATIENT)
Dept: FAMILY MEDICINE | Facility: CLINIC | Age: 68
End: 2023-01-01
Payer: MEDICARE

## 2023-01-01 ENCOUNTER — HOSPITAL ENCOUNTER (EMERGENCY)
Facility: CLINIC | Age: 68
Discharge: HOME | End: 2023-03-06
Attending: STUDENT IN AN ORGANIZED HEALTH CARE EDUCATION/TRAINING PROGRAM | Admitting: STUDENT IN AN ORGANIZED HEALTH CARE EDUCATION/TRAINING PROGRAM
Payer: MEDICARE

## 2023-01-01 ENCOUNTER — APPOINTMENT (OUTPATIENT)
Dept: PHYSICAL THERAPY | Facility: HOSPITAL | Age: 68
DRG: 189 | End: 2023-01-01
Payer: MEDICARE

## 2023-01-01 ENCOUNTER — VIRTUAL VISIT (OUTPATIENT)
Dept: PALLIATIVE MEDICINE | Facility: CLINIC | Age: 68
End: 2023-01-01
Payer: MEDICARE

## 2023-01-01 ENCOUNTER — TELEPHONE (OUTPATIENT)
Dept: INTERNAL MEDICINE | Facility: CLINIC | Age: 68
End: 2023-01-01
Payer: MEDICARE

## 2023-01-01 ENCOUNTER — TELEPHONE (OUTPATIENT)
Dept: GERIATRIC MEDICINE | Facility: CLINIC | Age: 68
End: 2023-01-01
Payer: MEDICARE

## 2023-01-01 ENCOUNTER — OFFICE VISIT (OUTPATIENT)
Dept: ADDICTION MEDICINE | Facility: CLINIC | Age: 68
End: 2023-01-01
Payer: MEDICARE

## 2023-01-01 VITALS
DIASTOLIC BLOOD PRESSURE: 75 MMHG | TEMPERATURE: 97.8 F | SYSTOLIC BLOOD PRESSURE: 120 MMHG | WEIGHT: 231.2 LBS | RESPIRATION RATE: 20 BRPM | OXYGEN SATURATION: 91 % | BODY MASS INDEX: 31.31 KG/M2 | HEIGHT: 72 IN | HEART RATE: 66 BPM

## 2023-01-01 VITALS
BODY MASS INDEX: 29.83 KG/M2 | HEART RATE: 62 BPM | SYSTOLIC BLOOD PRESSURE: 132 MMHG | OXYGEN SATURATION: 97 % | HEIGHT: 72 IN | DIASTOLIC BLOOD PRESSURE: 84 MMHG | WEIGHT: 220.25 LBS

## 2023-01-01 VITALS
RESPIRATION RATE: 16 BRPM | WEIGHT: 226 LBS | HEART RATE: 81 BPM | OXYGEN SATURATION: 93 % | BODY MASS INDEX: 30.64 KG/M2 | SYSTOLIC BLOOD PRESSURE: 165 MMHG | DIASTOLIC BLOOD PRESSURE: 89 MMHG | TEMPERATURE: 97 F

## 2023-01-01 VITALS
RESPIRATION RATE: 20 BRPM | SYSTOLIC BLOOD PRESSURE: 137 MMHG | DIASTOLIC BLOOD PRESSURE: 80 MMHG | OXYGEN SATURATION: 90 % | HEART RATE: 76 BPM | TEMPERATURE: 98 F

## 2023-01-01 VITALS
HEART RATE: 63 BPM | SYSTOLIC BLOOD PRESSURE: 134 MMHG | WEIGHT: 230 LBS | DIASTOLIC BLOOD PRESSURE: 81 MMHG | BODY MASS INDEX: 31.15 KG/M2 | HEIGHT: 72 IN

## 2023-01-01 DIAGNOSIS — R41.89 COGNITIVE AND BEHAVIORAL CHANGES: ICD-10-CM

## 2023-01-01 DIAGNOSIS — F19.10 POLYSUBSTANCE ABUSE (H): ICD-10-CM

## 2023-01-01 DIAGNOSIS — F11.90 CHRONIC, CONTINUOUS USE OF OPIOIDS: ICD-10-CM

## 2023-01-01 DIAGNOSIS — F33.41 RECURRENT MAJOR DEPRESSIVE DISORDER, IN PARTIAL REMISSION (H): ICD-10-CM

## 2023-01-01 DIAGNOSIS — F11.20 OPIOID DEPENDENCE, UNCOMPLICATED (H): ICD-10-CM

## 2023-01-01 DIAGNOSIS — R13.10 DYSPHAGIA, UNSPECIFIED TYPE: ICD-10-CM

## 2023-01-01 DIAGNOSIS — Z72.0 NICOTINE ABUSE: ICD-10-CM

## 2023-01-01 DIAGNOSIS — E03.9 HYPOTHYROIDISM, UNSPECIFIED: ICD-10-CM

## 2023-01-01 DIAGNOSIS — J96.21 ACUTE ON CHRONIC RESPIRATORY FAILURE WITH HYPOXIA AND HYPERCAPNIA (H): ICD-10-CM

## 2023-01-01 DIAGNOSIS — G89.4 CHRONIC PAIN DISORDER: ICD-10-CM

## 2023-01-01 DIAGNOSIS — E53.8 FOLATE DEFICIENCY: ICD-10-CM

## 2023-01-01 DIAGNOSIS — I48.19 PERSISTENT ATRIAL FIBRILLATION (H): ICD-10-CM

## 2023-01-01 DIAGNOSIS — S01.112A LACERATION OF LEFT EYEBROW, INITIAL ENCOUNTER: ICD-10-CM

## 2023-01-01 DIAGNOSIS — F43.0 ACUTE REACTION TO STRESS: ICD-10-CM

## 2023-01-01 DIAGNOSIS — J44.0 CHRONIC OBSTRUCTIVE PULMONARY DISEASE WITH ACUTE LOWER RESPIRATORY INFECTION (H): ICD-10-CM

## 2023-01-01 DIAGNOSIS — R61 NIGHT SWEATS: ICD-10-CM

## 2023-01-01 DIAGNOSIS — Z79.899 HIGH RISK MEDICATION USE: ICD-10-CM

## 2023-01-01 DIAGNOSIS — R11.2 NAUSEA AND VOMITING, UNSPECIFIED VOMITING TYPE: ICD-10-CM

## 2023-01-01 DIAGNOSIS — K59.00 CONSTIPATION, UNSPECIFIED CONSTIPATION TYPE: ICD-10-CM

## 2023-01-01 DIAGNOSIS — F11.90 OPIOID USE DISORDER: ICD-10-CM

## 2023-01-01 DIAGNOSIS — I25.10 CARDIOVASCULAR DISEASE: ICD-10-CM

## 2023-01-01 DIAGNOSIS — G47.33 OSA (OBSTRUCTIVE SLEEP APNEA): ICD-10-CM

## 2023-01-01 DIAGNOSIS — R46.89 COGNITIVE AND BEHAVIORAL CHANGES: ICD-10-CM

## 2023-01-01 DIAGNOSIS — R06.02 SHORTNESS OF BREATH: Primary | ICD-10-CM

## 2023-01-01 DIAGNOSIS — G89.4 CHRONIC PAIN DISORDER: Primary | ICD-10-CM

## 2023-01-01 DIAGNOSIS — I10 ESSENTIAL (PRIMARY) HYPERTENSION: ICD-10-CM

## 2023-01-01 DIAGNOSIS — G89.29 OTHER CHRONIC PAIN: ICD-10-CM

## 2023-01-01 DIAGNOSIS — F33.1 MODERATE EPISODE OF RECURRENT MAJOR DEPRESSIVE DISORDER (H): Primary | ICD-10-CM

## 2023-01-01 DIAGNOSIS — I50.22 CHRONIC SYSTOLIC HEART FAILURE (H): ICD-10-CM

## 2023-01-01 DIAGNOSIS — J96.22 ACUTE ON CHRONIC RESPIRATORY FAILURE WITH HYPOXIA AND HYPERCAPNIA (H): ICD-10-CM

## 2023-01-01 DIAGNOSIS — G47.9 SLEEP DISTURBANCE: Primary | ICD-10-CM

## 2023-01-01 DIAGNOSIS — J96.11 CHRONIC RESPIRATORY FAILURE WITH HYPOXIA AND HYPERCAPNIA (H): Primary | ICD-10-CM

## 2023-01-01 DIAGNOSIS — J44.0 CHRONIC OBSTRUCTIVE PULMONARY DISEASE WITH ACUTE LOWER RESPIRATORY INFECTION (H): Primary | ICD-10-CM

## 2023-01-01 DIAGNOSIS — D64.9 ANEMIA, UNSPECIFIED: ICD-10-CM

## 2023-01-01 DIAGNOSIS — R11.0 NAUSEA: Primary | ICD-10-CM

## 2023-01-01 DIAGNOSIS — F43.22 ADJUSTMENT DISORDER WITH ANXIOUS MOOD: ICD-10-CM

## 2023-01-01 DIAGNOSIS — G47.30 SLEEP-RELATED BREATHING DISORDER: Primary | ICD-10-CM

## 2023-01-01 DIAGNOSIS — S09.90XA TRAUMATIC INJURY OF HEAD, INITIAL ENCOUNTER: ICD-10-CM

## 2023-01-01 DIAGNOSIS — E55.9 VITAMIN D DEFICIENCY, UNSPECIFIED: ICD-10-CM

## 2023-01-01 DIAGNOSIS — J96.12 CHRONIC RESPIRATORY FAILURE WITH HYPOXIA AND HYPERCAPNIA (H): Primary | ICD-10-CM

## 2023-01-01 DIAGNOSIS — J96.12 CHRONIC RESPIRATORY FAILURE WITH HYPOXIA AND HYPERCAPNIA (H): ICD-10-CM

## 2023-01-01 DIAGNOSIS — J96.11 CHRONIC RESPIRATORY FAILURE WITH HYPOXIA AND HYPERCAPNIA (H): ICD-10-CM

## 2023-01-01 LAB
ALBUMIN SERPL BCG-MCNC: 4.3 G/DL (ref 3.5–5.2)
ALBUMIN SERPL-MCNC: 4.3 G/DL (ref 3.5–5)
ALBUMIN UR-MCNC: NEGATIVE MG/DL
ALP SERPL-CCNC: 55 U/L (ref 40–129)
ALP SERPL-CCNC: 56 U/L (ref 45–120)
ALT SERPL W P-5'-P-CCNC: 35 U/L (ref 10–50)
ALT SERPL W P-5'-P-CCNC: 41 U/L (ref 0–45)
ANION GAP SERPL CALCULATED.3IONS-SCNC: 11 MMOL/L (ref 7–15)
ANION GAP SERPL CALCULATED.3IONS-SCNC: 12 MMOL/L (ref 7–15)
ANION GAP SERPL CALCULATED.3IONS-SCNC: 13 MMOL/L (ref 7–15)
ANION GAP SERPL CALCULATED.3IONS-SCNC: 14 MMOL/L (ref 5–18)
ANION GAP SERPL CALCULATED.3IONS-SCNC: 8 MMOL/L (ref 7–15)
APPEARANCE UR: CLEAR
AST SERPL W P-5'-P-CCNC: 32 U/L (ref 0–40)
AST SERPL W P-5'-P-CCNC: 38 U/L (ref 10–50)
ATRIAL RATE - MUSE: 114 BPM
ATRIAL RATE - MUSE: 56 BPM
BACTERIA BLD CULT: NO GROWTH
BACTERIA BLD CULT: NO GROWTH
BASE EXCESS BLDV CALC-SCNC: 10 MMOL/L
BASOPHILS # BLD AUTO: 0 10E3/UL (ref 0–0.2)
BASOPHILS NFR BLD AUTO: 1 %
BILIRUB DIRECT SERPL-MCNC: 0.2 MG/DL
BILIRUB SERPL-MCNC: 0.3 MG/DL
BILIRUB SERPL-MCNC: 0.5 MG/DL (ref 0–1)
BILIRUB UR QL STRIP: NEGATIVE
BNP SERPL-MCNC: 183 PG/ML (ref 0–60)
BUN SERPL-MCNC: 13.9 MG/DL (ref 8–23)
BUN SERPL-MCNC: 15 MG/DL (ref 8–22)
BUN SERPL-MCNC: 15.4 MG/DL (ref 8–23)
BUN SERPL-MCNC: 16.9 MG/DL (ref 8–23)
BUN SERPL-MCNC: 19.6 MG/DL (ref 8–23)
CALCIUM SERPL-MCNC: 8.2 MG/DL (ref 8.8–10.2)
CALCIUM SERPL-MCNC: 8.4 MG/DL (ref 8.8–10.2)
CALCIUM SERPL-MCNC: 8.5 MG/DL (ref 8.8–10.2)
CALCIUM SERPL-MCNC: 9 MG/DL (ref 8.5–10.5)
CALCIUM SERPL-MCNC: 9.4 MG/DL (ref 8.8–10.2)
CHLORIDE BLD-SCNC: 101 MMOL/L (ref 98–107)
CHLORIDE SERPL-SCNC: 100 MMOL/L (ref 98–107)
CHLORIDE SERPL-SCNC: 101 MMOL/L (ref 98–107)
CHLORIDE SERPL-SCNC: 101 MMOL/L (ref 98–107)
CHLORIDE SERPL-SCNC: 102 MMOL/L (ref 98–107)
CO2 SERPL-SCNC: 25 MMOL/L (ref 22–31)
COLOR UR AUTO: YELLOW
CREAT SERPL-MCNC: 1.03 MG/DL (ref 0.67–1.17)
CREAT SERPL-MCNC: 1.07 MG/DL (ref 0.67–1.17)
CREAT SERPL-MCNC: 1.1 MG/DL (ref 0.7–1.3)
CREAT SERPL-MCNC: 1.12 MG/DL (ref 0.67–1.17)
CREAT SERPL-MCNC: 1.2 MG/DL (ref 0.67–1.17)
DEPRECATED CALCIDIOL+CALCIFEROL SERPL-MC: 20 UG/L (ref 20–75)
DEPRECATED HCO3 PLAS-SCNC: 26 MMOL/L (ref 22–29)
DEPRECATED HCO3 PLAS-SCNC: 27 MMOL/L (ref 22–29)
DEPRECATED HCO3 PLAS-SCNC: 28 MMOL/L (ref 22–29)
DEPRECATED HCO3 PLAS-SCNC: 32 MMOL/L (ref 22–29)
DIASTOLIC BLOOD PRESSURE - MUSE: 79 MMHG
DIASTOLIC BLOOD PRESSURE - MUSE: NORMAL MMHG
EOSINOPHIL # BLD AUTO: 0.2 10E3/UL (ref 0–0.7)
EOSINOPHIL NFR BLD AUTO: 3 %
ERYTHROCYTE [DISTWIDTH] IN BLOOD BY AUTOMATED COUNT: 13.6 % (ref 10–15)
ERYTHROCYTE [DISTWIDTH] IN BLOOD BY AUTOMATED COUNT: 14.7 % (ref 10–15)
ERYTHROCYTE [DISTWIDTH] IN BLOOD BY AUTOMATED COUNT: 15.1 % (ref 10–15)
FOLATE SERPL-MCNC: 36.6 NG/ML (ref 4.6–34.8)
GFR SERPL CREATININE-BSD FRML MDRD: 67 ML/MIN/1.73M2
GFR SERPL CREATININE-BSD FRML MDRD: 73 ML/MIN/1.73M2
GFR SERPL CREATININE-BSD FRML MDRD: 74 ML/MIN/1.73M2
GFR SERPL CREATININE-BSD FRML MDRD: 77 ML/MIN/1.73M2
GFR SERPL CREATININE-BSD FRML MDRD: 81 ML/MIN/1.73M2
GLUCOSE BLD-MCNC: 93 MG/DL (ref 70–125)
GLUCOSE BLDC GLUCOMTR-MCNC: 101 MG/DL (ref 70–99)
GLUCOSE BLDC GLUCOMTR-MCNC: 108 MG/DL (ref 70–99)
GLUCOSE BLDC GLUCOMTR-MCNC: 113 MG/DL (ref 70–99)
GLUCOSE BLDC GLUCOMTR-MCNC: 122 MG/DL (ref 70–99)
GLUCOSE BLDC GLUCOMTR-MCNC: 124 MG/DL (ref 70–99)
GLUCOSE BLDC GLUCOMTR-MCNC: 131 MG/DL (ref 70–99)
GLUCOSE BLDC GLUCOMTR-MCNC: 144 MG/DL (ref 70–99)
GLUCOSE BLDC GLUCOMTR-MCNC: 151 MG/DL (ref 70–99)
GLUCOSE BLDC GLUCOMTR-MCNC: 168 MG/DL (ref 70–99)
GLUCOSE BLDC GLUCOMTR-MCNC: 207 MG/DL (ref 70–99)
GLUCOSE BLDC GLUCOMTR-MCNC: 239 MG/DL (ref 70–99)
GLUCOSE BLDC GLUCOMTR-MCNC: 96 MG/DL (ref 70–99)
GLUCOSE BLDC GLUCOMTR-MCNC: 99 MG/DL (ref 70–99)
GLUCOSE SERPL-MCNC: 115 MG/DL (ref 70–99)
GLUCOSE SERPL-MCNC: 118 MG/DL (ref 70–99)
GLUCOSE SERPL-MCNC: 127 MG/DL (ref 70–99)
GLUCOSE SERPL-MCNC: 96 MG/DL (ref 70–99)
GLUCOSE UR STRIP-MCNC: NEGATIVE MG/DL
HCO3 BLDV-SCNC: 35 MMOL/L (ref 24–30)
HCT VFR BLD AUTO: 32.4 % (ref 40–53)
HCT VFR BLD AUTO: 43.7 % (ref 40–53)
HCT VFR BLD AUTO: 45.8 % (ref 40–53)
HGB BLD-MCNC: 10 G/DL (ref 13.3–17.7)
HGB BLD-MCNC: 10.9 G/DL (ref 13.3–17.7)
HGB BLD-MCNC: 13.3 G/DL (ref 13.3–17.7)
HGB BLD-MCNC: 13.5 G/DL (ref 13.3–17.7)
HGB UR QL STRIP: NEGATIVE
HOLD SPECIMEN: NORMAL
IMM GRANULOCYTES # BLD: 0 10E3/UL
IMM GRANULOCYTES NFR BLD: 0 %
INTERPRETATION ECG - MUSE: NORMAL
INTERPRETATION ECG - MUSE: NORMAL
KETONES UR STRIP-MCNC: NEGATIVE MG/DL
LEUKOCYTE ESTERASE UR QL STRIP: NEGATIVE
LIPASE SERPL-CCNC: <9 U/L (ref 0–52)
LYMPHOCYTES # BLD AUTO: 1.3 10E3/UL (ref 0.8–5.3)
LYMPHOCYTES NFR BLD AUTO: 21 %
MAGNESIUM SERPL-MCNC: 1.9 MG/DL (ref 1.7–2.3)
MAGNESIUM SERPL-MCNC: 1.9 MG/DL (ref 1.8–2.6)
MCH RBC QN AUTO: 27.4 PG (ref 26.5–33)
MCH RBC QN AUTO: 27.6 PG (ref 26.5–33)
MCH RBC QN AUTO: 29.2 PG (ref 26.5–33)
MCHC RBC AUTO-ENTMCNC: 29 G/DL (ref 31.5–36.5)
MCHC RBC AUTO-ENTMCNC: 30.9 G/DL (ref 31.5–36.5)
MCHC RBC AUTO-ENTMCNC: 30.9 G/DL (ref 31.5–36.5)
MCV RBC AUTO: 89 FL (ref 78–100)
MCV RBC AUTO: 94 FL (ref 78–100)
MCV RBC AUTO: 95 FL (ref 78–100)
MONOCYTES # BLD AUTO: 0.6 10E3/UL (ref 0–1.3)
MONOCYTES NFR BLD AUTO: 9 %
NEUTROPHILS # BLD AUTO: 4 10E3/UL (ref 1.6–8.3)
NEUTROPHILS NFR BLD AUTO: 66 %
NITRATE UR QL: NEGATIVE
NRBC # BLD AUTO: 0 10E3/UL
NRBC BLD AUTO-RTO: 0 /100
OXYHGB MFR BLDV: 59.8 % (ref 70–75)
P AXIS - MUSE: 45 DEGREES
P AXIS - MUSE: 64 DEGREES
PCO2 BLDV: 53 MM HG (ref 35–50)
PH BLDV: 7.42 [PH] (ref 7.35–7.45)
PH UR STRIP: 5.5 [PH] (ref 5–8)
PLATELET # BLD AUTO: 183 10E3/UL (ref 150–450)
PLATELET # BLD AUTO: 267 10E3/UL (ref 150–450)
PLATELET # BLD AUTO: 290 10E3/UL (ref 150–450)
PO2 BLDV: 32 MM HG (ref 25–47)
POTASSIUM BLD-SCNC: 4.1 MMOL/L (ref 3.5–5)
POTASSIUM SERPL-SCNC: 3.3 MMOL/L (ref 3.4–5.3)
POTASSIUM SERPL-SCNC: 3.6 MMOL/L (ref 3.4–5.3)
POTASSIUM SERPL-SCNC: 3.7 MMOL/L (ref 3.4–5.3)
POTASSIUM SERPL-SCNC: 3.7 MMOL/L (ref 3.4–5.3)
POTASSIUM SERPL-SCNC: 4.3 MMOL/L (ref 3.4–5.3)
PR INTERVAL - MUSE: 154 MS
PR INTERVAL - MUSE: 160 MS
PROT SERPL-MCNC: 7.3 G/DL (ref 6.4–8.3)
PROT SERPL-MCNC: 7.8 G/DL (ref 6–8)
QRS DURATION - MUSE: 100 MS
QRS DURATION - MUSE: 98 MS
QT - MUSE: 354 MS
QT - MUSE: 488 MS
QTC - MUSE: 470 MS
QTC - MUSE: 487 MS
R AXIS - MUSE: 77 DEGREES
R AXIS - MUSE: 84 DEGREES
RBC # BLD AUTO: 3.43 10E6/UL (ref 4.4–5.9)
RBC # BLD AUTO: 4.86 10E6/UL (ref 4.4–5.9)
RBC # BLD AUTO: 4.9 10E6/UL (ref 4.4–5.9)
SAO2 % BLDV: 61 % (ref 70–75)
SODIUM SERPL-SCNC: 138 MMOL/L (ref 136–145)
SODIUM SERPL-SCNC: 140 MMOL/L (ref 136–145)
SODIUM SERPL-SCNC: 140 MMOL/L (ref 136–145)
SODIUM SERPL-SCNC: 141 MMOL/L (ref 136–145)
SODIUM SERPL-SCNC: 142 MMOL/L (ref 136–145)
SP GR UR STRIP: 1.01 (ref 1–1.03)
SYSTOLIC BLOOD PRESSURE - MUSE: 123 MMHG
SYSTOLIC BLOOD PRESSURE - MUSE: NORMAL MMHG
T AXIS - MUSE: 69 DEGREES
T AXIS - MUSE: 78 DEGREES
TROPONIN I SERPL-MCNC: 0.02 NG/ML (ref 0–0.29)
TSH SERPL DL<=0.005 MIU/L-ACNC: 2.14 UIU/ML (ref 0.3–4.2)
UROBILINOGEN UR STRIP-ACNC: 0.2 E.U./DL
VENTRICULAR RATE- MUSE: 114 BPM
VENTRICULAR RATE- MUSE: 56 BPM
VIT B12 SERPL-MCNC: 597 PG/ML (ref 232–1245)
WBC # BLD AUTO: 4.7 10E3/UL (ref 4–11)
WBC # BLD AUTO: 6.1 10E3/UL (ref 4–11)
WBC # BLD AUTO: 7.1 10E3/UL (ref 4–11)

## 2023-01-01 PROCEDURE — 250N000013 HC RX MED GY IP 250 OP 250 PS 637: Performed by: FAMILY MEDICINE

## 2023-01-01 PROCEDURE — 250N000013 HC RX MED GY IP 250 OP 250 PS 637: Performed by: INTERNAL MEDICINE

## 2023-01-01 PROCEDURE — 36415 COLL VENOUS BLD VENIPUNCTURE: CPT | Performed by: EMERGENCY MEDICINE

## 2023-01-01 PROCEDURE — 999N000157 HC STATISTIC RCP TIME EA 10 MIN

## 2023-01-01 PROCEDURE — 250N000009 HC RX 250: Performed by: EMERGENCY MEDICINE

## 2023-01-01 PROCEDURE — 93005 ELECTROCARDIOGRAM TRACING: CPT | Performed by: STUDENT IN AN ORGANIZED HEALTH CARE EDUCATION/TRAINING PROGRAM

## 2023-01-01 PROCEDURE — 80048 BASIC METABOLIC PNL TOTAL CA: CPT | Performed by: EMERGENCY MEDICINE

## 2023-01-01 PROCEDURE — 85018 HEMOGLOBIN: CPT | Performed by: INTERNAL MEDICINE

## 2023-01-01 PROCEDURE — 94640 AIRWAY INHALATION TREATMENT: CPT

## 2023-01-01 PROCEDURE — 250N000013 HC RX MED GY IP 250 OP 250 PS 637: Performed by: NURSE PRACTITIONER

## 2023-01-01 PROCEDURE — 94640 AIRWAY INHALATION TREATMENT: CPT | Mod: 76

## 2023-01-01 PROCEDURE — 99233 SBSQ HOSP IP/OBS HIGH 50: CPT | Mod: GC | Performed by: INTERNAL MEDICINE

## 2023-01-01 PROCEDURE — 99207 PR NO CHARGE LOS: CPT | Performed by: INTERNAL MEDICINE

## 2023-01-01 PROCEDURE — 97530 THERAPEUTIC ACTIVITIES: CPT | Mod: GO

## 2023-01-01 PROCEDURE — 36415 COLL VENOUS BLD VENIPUNCTURE: CPT | Performed by: INTERNAL MEDICINE

## 2023-01-01 PROCEDURE — 74177 CT ABD & PELVIS W/CONTRAST: CPT | Mod: MG

## 2023-01-01 PROCEDURE — 250N000011 HC RX IP 250 OP 636: Performed by: STUDENT IN AN ORGANIZED HEALTH CARE EDUCATION/TRAINING PROGRAM

## 2023-01-01 PROCEDURE — 250N000013 HC RX MED GY IP 250 OP 250 PS 637: Performed by: PAIN MEDICINE

## 2023-01-01 PROCEDURE — 97116 GAIT TRAINING THERAPY: CPT | Mod: GP | Performed by: PHYSICAL THERAPIST

## 2023-01-01 PROCEDURE — 96374 THER/PROPH/DIAG INJ IV PUSH: CPT | Mod: 59

## 2023-01-01 PROCEDURE — 250N000011 HC RX IP 250 OP 636: Performed by: INTERNAL MEDICINE

## 2023-01-01 PROCEDURE — 82306 VITAMIN D 25 HYDROXY: CPT | Mod: ORL | Performed by: PHYSICIAN ASSISTANT

## 2023-01-01 PROCEDURE — 94660 CPAP INITIATION&MGMT: CPT

## 2023-01-01 PROCEDURE — 999N000127 HC STATISTIC PERIPHERAL IV START W US GUIDANCE

## 2023-01-01 PROCEDURE — 85025 COMPLETE CBC W/AUTO DIFF WBC: CPT | Performed by: STUDENT IN AN ORGANIZED HEALTH CARE EDUCATION/TRAINING PROGRAM

## 2023-01-01 PROCEDURE — 250N000013 HC RX MED GY IP 250 OP 250 PS 637: Performed by: EMERGENCY MEDICINE

## 2023-01-01 PROCEDURE — 250N000012 HC RX MED GY IP 250 OP 636 PS 637: Performed by: INTERNAL MEDICINE

## 2023-01-01 PROCEDURE — 97535 SELF CARE MNGMENT TRAINING: CPT | Mod: GO

## 2023-01-01 PROCEDURE — 83880 ASSAY OF NATRIURETIC PEPTIDE: CPT | Performed by: STUDENT IN AN ORGANIZED HEALTH CARE EDUCATION/TRAINING PROGRAM

## 2023-01-01 PROCEDURE — 210N000001 HC R&B IMCU HEART CARE

## 2023-01-01 PROCEDURE — 99233 SBSQ HOSP IP/OBS HIGH 50: CPT | Performed by: INTERNAL MEDICINE

## 2023-01-01 PROCEDURE — 83735 ASSAY OF MAGNESIUM: CPT | Performed by: INTERNAL MEDICINE

## 2023-01-01 PROCEDURE — 99239 HOSP IP/OBS DSCHRG MGMT >30: CPT | Performed by: FAMILY MEDICINE

## 2023-01-01 PROCEDURE — 99232 SBSQ HOSP IP/OBS MODERATE 35: CPT | Performed by: NURSE PRACTITIONER

## 2023-01-01 PROCEDURE — 80048 BASIC METABOLIC PNL TOTAL CA: CPT | Performed by: INTERNAL MEDICINE

## 2023-01-01 PROCEDURE — 83735 ASSAY OF MAGNESIUM: CPT | Performed by: STUDENT IN AN ORGANIZED HEALTH CARE EDUCATION/TRAINING PROGRAM

## 2023-01-01 PROCEDURE — 80053 COMPREHEN METABOLIC PANEL: CPT | Mod: ORL | Performed by: PHYSICIAN ASSISTANT

## 2023-01-01 PROCEDURE — 99232 SBSQ HOSP IP/OBS MODERATE 35: CPT | Performed by: EMERGENCY MEDICINE

## 2023-01-01 PROCEDURE — 84484 ASSAY OF TROPONIN QUANT: CPT | Performed by: STUDENT IN AN ORGANIZED HEALTH CARE EDUCATION/TRAINING PROGRAM

## 2023-01-01 PROCEDURE — 80076 HEPATIC FUNCTION PANEL: CPT | Performed by: STUDENT IN AN ORGANIZED HEALTH CARE EDUCATION/TRAINING PROGRAM

## 2023-01-01 PROCEDURE — 99232 SBSQ HOSP IP/OBS MODERATE 35: CPT | Performed by: PAIN MEDICINE

## 2023-01-01 PROCEDURE — 250N000011 HC RX IP 250 OP 636: Performed by: EMERGENCY MEDICINE

## 2023-01-01 PROCEDURE — 97110 THERAPEUTIC EXERCISES: CPT | Mod: GO

## 2023-01-01 PROCEDURE — 82805 BLOOD GASES W/O2 SATURATION: CPT | Performed by: EMERGENCY MEDICINE

## 2023-01-01 PROCEDURE — 97116 GAIT TRAINING THERAPY: CPT | Mod: GP

## 2023-01-01 PROCEDURE — 82746 ASSAY OF FOLIC ACID SERUM: CPT | Mod: ORL | Performed by: PHYSICIAN ASSISTANT

## 2023-01-01 PROCEDURE — 99223 1ST HOSP IP/OBS HIGH 75: CPT | Performed by: INTERNAL MEDICINE

## 2023-01-01 PROCEDURE — 99214 OFFICE O/P EST MOD 30 MIN: CPT | Mod: VID | Performed by: NURSE PRACTITIONER

## 2023-01-01 PROCEDURE — 82607 VITAMIN B-12: CPT | Mod: ORL | Performed by: PHYSICIAN ASSISTANT

## 2023-01-01 PROCEDURE — 71046 X-RAY EXAM CHEST 2 VIEWS: CPT | Mod: TC | Performed by: RADIOLOGY

## 2023-01-01 PROCEDURE — 258N000003 HC RX IP 258 OP 636: Performed by: EMERGENCY MEDICINE

## 2023-01-01 PROCEDURE — 85027 COMPLETE CBC AUTOMATED: CPT | Mod: ORL | Performed by: PHYSICIAN ASSISTANT

## 2023-01-01 PROCEDURE — 90791 PSYCH DIAGNOSTIC EVALUATION: CPT | Mod: 95 | Performed by: SOCIAL WORKER

## 2023-01-01 PROCEDURE — 36415 COLL VENOUS BLD VENIPUNCTURE: CPT | Performed by: STUDENT IN AN ORGANIZED HEALTH CARE EDUCATION/TRAINING PROGRAM

## 2023-01-01 PROCEDURE — 99204 OFFICE O/P NEW MOD 45 MIN: CPT | Performed by: NURSE PRACTITIONER

## 2023-01-01 PROCEDURE — 99215 OFFICE O/P EST HI 40 MIN: CPT | Performed by: NURSE PRACTITIONER

## 2023-01-01 PROCEDURE — 84132 ASSAY OF SERUM POTASSIUM: CPT | Performed by: INTERNAL MEDICINE

## 2023-01-01 PROCEDURE — 85027 COMPLETE CBC AUTOMATED: CPT | Performed by: EMERGENCY MEDICINE

## 2023-01-01 PROCEDURE — 90471 IMMUNIZATION ADMIN: CPT | Performed by: PHYSICIAN ASSISTANT

## 2023-01-01 PROCEDURE — 70450 CT HEAD/BRAIN W/O DYE: CPT | Mod: MG

## 2023-01-01 PROCEDURE — 97130 THER IVNTJ EA ADDL 15 MIN: CPT | Mod: GO

## 2023-01-01 PROCEDURE — P9604 ONE-WAY ALLOW PRORATED TRIP: HCPCS | Mod: ORL | Performed by: PHYSICIAN ASSISTANT

## 2023-01-01 PROCEDURE — 99214 OFFICE O/P EST MOD 30 MIN: CPT | Mod: 25 | Performed by: PHYSICIAN ASSISTANT

## 2023-01-01 PROCEDURE — 97129 THER IVNTJ 1ST 15 MIN: CPT | Mod: GO

## 2023-01-01 PROCEDURE — 36415 COLL VENOUS BLD VENIPUNCTURE: CPT | Mod: ORL | Performed by: PHYSICIAN ASSISTANT

## 2023-01-01 PROCEDURE — 84443 ASSAY THYROID STIM HORMONE: CPT | Mod: ORL | Performed by: PHYSICIAN ASSISTANT

## 2023-01-01 PROCEDURE — 99285 EMERGENCY DEPT VISIT HI MDM: CPT | Mod: 25

## 2023-01-01 PROCEDURE — 99214 OFFICE O/P EST MOD 30 MIN: CPT | Mod: 95 | Performed by: NURSE PRACTITIONER

## 2023-01-01 PROCEDURE — 83690 ASSAY OF LIPASE: CPT | Performed by: STUDENT IN AN ORGANIZED HEALTH CARE EDUCATION/TRAINING PROGRAM

## 2023-01-01 PROCEDURE — 258N000003 HC RX IP 258 OP 636: Performed by: STUDENT IN AN ORGANIZED HEALTH CARE EDUCATION/TRAINING PROGRAM

## 2023-01-01 PROCEDURE — 90715 TDAP VACCINE 7 YRS/> IM: CPT | Performed by: PHYSICIAN ASSISTANT

## 2023-01-01 PROCEDURE — 81003 URINALYSIS AUTO W/O SCOPE: CPT | Performed by: PHYSICIAN ASSISTANT

## 2023-01-01 PROCEDURE — 99222 1ST HOSP IP/OBS MODERATE 55: CPT | Performed by: INTERNAL MEDICINE

## 2023-01-01 PROCEDURE — 99232 SBSQ HOSP IP/OBS MODERATE 35: CPT | Performed by: INTERNAL MEDICINE

## 2023-01-01 RX ORDER — BUPRENORPHINE HYDROCHLORIDE AND NALOXONE HYDROCHLORIDE DIHYDRATE 2; .5 MG/1; MG/1
1 TABLET SUBLINGUAL 2 TIMES DAILY
Status: DISCONTINUED | OUTPATIENT
Start: 2023-01-01 | End: 2023-01-01

## 2023-01-01 RX ORDER — ZOLPIDEM TARTRATE 5 MG/1
TABLET ORAL
Qty: 1 TABLET | Refills: 0 | Status: SHIPPED | OUTPATIENT
Start: 2023-01-01

## 2023-01-01 RX ORDER — ALBUTEROL SULFATE 90 UG/1
2 AEROSOL, METERED RESPIRATORY (INHALATION) EVERY 4 HOURS PRN
Status: DISCONTINUED | OUTPATIENT
Start: 2023-01-01 | End: 2023-01-01 | Stop reason: HOSPADM

## 2023-01-01 RX ORDER — BUPRENORPHINE HYDROCHLORIDE AND NALOXONE HYDROCHLORIDE DIHYDRATE 8; 2 MG/1; MG/1
1 TABLET SUBLINGUAL 2 TIMES DAILY
Qty: 42 TABLET | Refills: 0 | Status: SHIPPED | OUTPATIENT
Start: 2023-01-01 | End: 2023-01-01

## 2023-01-01 RX ORDER — BUPRENORPHINE HYDROCHLORIDE AND NALOXONE HYDROCHLORIDE DIHYDRATE 8; 2 MG/1; MG/1
0.5 TABLET SUBLINGUAL 4 TIMES DAILY
Qty: 60 TABLET | Refills: 0 | Status: SHIPPED | OUTPATIENT
Start: 2023-01-01

## 2023-01-01 RX ORDER — BUPRENORPHINE HYDROCHLORIDE AND NALOXONE HYDROCHLORIDE DIHYDRATE 2; .5 MG/1; MG/1
1 TABLET SUBLINGUAL AT BEDTIME
Status: DISCONTINUED | OUTPATIENT
Start: 2023-01-01 | End: 2023-01-01

## 2023-01-01 RX ORDER — METOPROLOL TARTRATE 25 MG/1
25 TABLET, FILM COATED ORAL 2 TIMES DAILY
Qty: 60 TABLET | Refills: 0 | Status: SHIPPED | OUTPATIENT
Start: 2023-01-01 | End: 2023-01-01

## 2023-01-01 RX ORDER — ONDANSETRON 2 MG/ML
4 INJECTION INTRAMUSCULAR; INTRAVENOUS ONCE
Status: COMPLETED | OUTPATIENT
Start: 2023-01-01 | End: 2023-01-01

## 2023-01-01 RX ORDER — FOLIC ACID 1 MG/1
1 TABLET ORAL DAILY
Qty: 30 TABLET | Refills: 3 | Status: SHIPPED | OUTPATIENT
Start: 2023-01-01

## 2023-01-01 RX ORDER — MULTIPLE VITAMINS W/ MINERALS TAB 9MG-400MCG
1 TAB ORAL DAILY
COMMUNITY
Start: 2023-01-01 | End: 2023-01-01

## 2023-01-01 RX ORDER — METOPROLOL TARTRATE 25 MG/1
25 TABLET, FILM COATED ORAL 2 TIMES DAILY
Status: DISCONTINUED | OUTPATIENT
Start: 2023-01-01 | End: 2023-01-01 | Stop reason: HOSPADM

## 2023-01-01 RX ORDER — HYDROXYZINE HYDROCHLORIDE 10 MG/1
10 TABLET, FILM COATED ORAL 3 TIMES DAILY PRN
Status: DISCONTINUED | OUTPATIENT
Start: 2023-01-01 | End: 2023-01-01 | Stop reason: HOSPADM

## 2023-01-01 RX ORDER — FOLIC ACID 1 MG/1
1 TABLET ORAL DAILY
Qty: 20 TABLET | Refills: 0 | Status: SHIPPED | OUTPATIENT
Start: 2023-01-01 | End: 2023-01-01

## 2023-01-01 RX ORDER — POTASSIUM CHLORIDE 750 MG/1
10 TABLET, EXTENDED RELEASE ORAL DAILY
Status: DISCONTINUED | OUTPATIENT
Start: 2023-01-01 | End: 2023-01-01 | Stop reason: HOSPADM

## 2023-01-01 RX ORDER — FAMOTIDINE 20 MG/1
20 TABLET, FILM COATED ORAL DAILY
Qty: 60 TABLET | Refills: 3 | Status: SHIPPED | OUTPATIENT
Start: 2023-01-01

## 2023-01-01 RX ORDER — POTASSIUM CHLORIDE 1500 MG/1
40 TABLET, EXTENDED RELEASE ORAL ONCE
Status: COMPLETED | OUTPATIENT
Start: 2023-01-01 | End: 2023-01-01

## 2023-01-01 RX ORDER — BUPRENORPHINE HYDROCHLORIDE AND NALOXONE HYDROCHLORIDE DIHYDRATE 2; .5 MG/1; MG/1
2 TABLET SUBLINGUAL 2 TIMES DAILY
Qty: 16 TABLET | Refills: 0 | Status: SHIPPED | OUTPATIENT
Start: 2023-01-01 | End: 2023-01-01

## 2023-01-01 RX ORDER — BUPRENORPHINE HYDROCHLORIDE AND NALOXONE HYDROCHLORIDE DIHYDRATE 2; .5 MG/1; MG/1
1 TABLET SUBLINGUAL
Status: DISCONTINUED | OUTPATIENT
Start: 2023-01-01 | End: 2023-01-01

## 2023-01-01 RX ORDER — BUPRENORPHINE HYDROCHLORIDE AND NALOXONE HYDROCHLORIDE DIHYDRATE 8; 2 MG/1; MG/1
0.5 TABLET SUBLINGUAL 3 TIMES DAILY
Qty: 36 TABLET | Refills: 0 | Status: SHIPPED | OUTPATIENT
Start: 2023-01-01 | End: 2023-01-01

## 2023-01-01 RX ORDER — METOPROLOL TARTRATE 25 MG/1
25 TABLET, FILM COATED ORAL 2 TIMES DAILY
Qty: 60 TABLET | Refills: 3 | Status: SHIPPED | OUTPATIENT
Start: 2023-01-01

## 2023-01-01 RX ORDER — GABAPENTIN 300 MG/1
300 CAPSULE ORAL 3 TIMES DAILY
Status: DISCONTINUED | OUTPATIENT
Start: 2023-01-01 | End: 2023-01-01 | Stop reason: HOSPADM

## 2023-01-01 RX ORDER — CLONAZEPAM 0.5 MG/1
0.5 TABLET ORAL
Status: DISCONTINUED | OUTPATIENT
Start: 2023-01-01 | End: 2023-01-01 | Stop reason: HOSPADM

## 2023-01-01 RX ORDER — PREDNISONE 5 MG/1
10 TABLET ORAL DAILY
Status: DISCONTINUED | OUTPATIENT
Start: 2023-01-01 | End: 2023-01-01 | Stop reason: HOSPADM

## 2023-01-01 RX ORDER — HYDROXYZINE HYDROCHLORIDE 10 MG/1
10 TABLET, FILM COATED ORAL 3 TIMES DAILY PRN
Status: DISCONTINUED | OUTPATIENT
Start: 2023-01-01 | End: 2023-01-01

## 2023-01-01 RX ORDER — BUPRENORPHINE HYDROCHLORIDE AND NALOXONE HYDROCHLORIDE DIHYDRATE 2; .5 MG/1; MG/1
2 TABLET SUBLINGUAL 2 TIMES DAILY
Qty: 44 TABLET | Refills: 0 | Status: SHIPPED | OUTPATIENT
Start: 2023-01-01 | End: 2023-01-01

## 2023-01-01 RX ORDER — BUPRENORPHINE HYDROCHLORIDE AND NALOXONE HYDROCHLORIDE DIHYDRATE 8; 2 MG/1; MG/1
0.5 TABLET SUBLINGUAL 2 TIMES DAILY
Qty: 11 TABLET | Refills: 0 | Status: SHIPPED | OUTPATIENT
Start: 2023-01-01 | End: 2023-01-01

## 2023-01-01 RX ORDER — AMOXICILLIN 250 MG
1 CAPSULE ORAL 2 TIMES DAILY
Status: DISCONTINUED | OUTPATIENT
Start: 2023-01-01 | End: 2023-01-01 | Stop reason: HOSPADM

## 2023-01-01 RX ORDER — ACETAMINOPHEN 325 MG/1
975 TABLET ORAL EVERY 8 HOURS SCHEDULED
Status: DISCONTINUED | OUTPATIENT
Start: 2023-01-01 | End: 2023-01-01 | Stop reason: HOSPADM

## 2023-01-01 RX ORDER — ONDANSETRON 4 MG/1
4 TABLET, ORALLY DISINTEGRATING ORAL EVERY 6 HOURS PRN
Qty: 12 TABLET | Refills: 0 | Status: SHIPPED | OUTPATIENT
Start: 2023-01-01

## 2023-01-01 RX ORDER — BUPRENORPHINE AND NALOXONE 4; 1 MG/1; MG/1
1 FILM, SOLUBLE BUCCAL; SUBLINGUAL 2 TIMES DAILY
Qty: 12 FILM | Refills: 0 | Status: SHIPPED | OUTPATIENT
Start: 2023-01-01 | End: 2023-01-01

## 2023-01-01 RX ORDER — BUPRENORPHINE HYDROCHLORIDE AND NALOXONE HYDROCHLORIDE DIHYDRATE 2; .5 MG/1; MG/1
2 TABLET SUBLINGUAL 2 TIMES DAILY
Status: DISCONTINUED | OUTPATIENT
Start: 2023-01-01 | End: 2023-01-01

## 2023-01-01 RX ORDER — GABAPENTIN 300 MG/1
300 CAPSULE ORAL 3 TIMES DAILY
Qty: 90 CAPSULE | Refills: 3 | Status: SHIPPED | OUTPATIENT
Start: 2023-01-01

## 2023-01-01 RX ORDER — PREDNISONE 10 MG/1
10 TABLET ORAL DAILY
Qty: 3 TABLET | Refills: 0 | Status: SHIPPED | OUTPATIENT
Start: 2023-01-01 | End: 2023-01-01

## 2023-01-01 RX ORDER — BUPRENORPHINE HYDROCHLORIDE AND NALOXONE HYDROCHLORIDE DIHYDRATE 2; .5 MG/1; MG/1
1 TABLET SUBLINGUAL 2 TIMES DAILY
Status: DISCONTINUED | OUTPATIENT
Start: 2023-01-01 | End: 2023-01-01 | Stop reason: HOSPADM

## 2023-01-01 RX ORDER — BUPRENORPHINE HYDROCHLORIDE AND NALOXONE HYDROCHLORIDE DIHYDRATE 8; 2 MG/1; MG/1
TABLET SUBLINGUAL
Qty: 42 TABLET | Refills: 0 | Status: SHIPPED | OUTPATIENT
Start: 2023-01-01 | End: 2023-01-01

## 2023-01-01 RX ORDER — BUPRENORPHINE AND NALOXONE 2; .5 MG/1; MG/1
1 FILM, SOLUBLE BUCCAL; SUBLINGUAL AT BEDTIME
Status: DISCONTINUED | OUTPATIENT
Start: 2023-01-01 | End: 2023-01-01 | Stop reason: CLARIF

## 2023-01-01 RX ORDER — BUPRENORPHINE AND NALOXONE 4; 1 MG/1; MG/1
1 FILM, SOLUBLE BUCCAL; SUBLINGUAL 2 TIMES DAILY
Qty: 14 FILM | Refills: 0 | Status: SHIPPED | OUTPATIENT
Start: 2023-01-01 | End: 2023-01-01 | Stop reason: ALTCHOICE

## 2023-01-01 RX ORDER — FUROSEMIDE 20 MG
20 TABLET ORAL
Status: DISCONTINUED | OUTPATIENT
Start: 2023-01-01 | End: 2023-01-01

## 2023-01-01 RX ORDER — BUPRENORPHINE HYDROCHLORIDE AND NALOXONE HYDROCHLORIDE DIHYDRATE 2; .5 MG/1; MG/1
1 TABLET SUBLINGUAL 2 TIMES DAILY
Qty: 28 TABLET | Refills: 0 | Status: SHIPPED | OUTPATIENT
Start: 2023-01-01 | End: 2023-01-01

## 2023-01-01 RX ORDER — POLYETHYLENE GLYCOL 3350 17 G/17G
17 POWDER, FOR SOLUTION ORAL 2 TIMES DAILY PRN
Status: DISCONTINUED | OUTPATIENT
Start: 2023-01-01 | End: 2023-01-01 | Stop reason: HOSPADM

## 2023-01-01 RX ORDER — CLONAZEPAM 0.5 MG/1
1 TABLET ORAL 2 TIMES DAILY PRN
Status: DISCONTINUED | OUTPATIENT
Start: 2023-01-01 | End: 2023-01-01

## 2023-01-01 RX ORDER — ONDANSETRON 4 MG/1
4 TABLET, FILM COATED ORAL EVERY 6 HOURS PRN
Qty: 10 TABLET | Refills: 0 | Status: SHIPPED | OUTPATIENT
Start: 2023-01-01

## 2023-01-01 RX ORDER — GABAPENTIN 300 MG/1
300 CAPSULE ORAL 3 TIMES DAILY
Qty: 90 CAPSULE | Refills: 0 | Status: SHIPPED | OUTPATIENT
Start: 2023-01-01 | End: 2023-01-01

## 2023-01-01 RX ORDER — GUAIFENESIN 600 MG/1
1200 TABLET, EXTENDED RELEASE ORAL DAILY
Qty: 120 TABLET | Refills: 0 | Status: SHIPPED | OUTPATIENT
Start: 2023-01-01

## 2023-01-01 RX ORDER — FUROSEMIDE 20 MG
40 TABLET ORAL DAILY
Status: DISCONTINUED | OUTPATIENT
Start: 2023-01-01 | End: 2023-01-01 | Stop reason: HOSPADM

## 2023-01-01 RX ORDER — SENNA AND DOCUSATE SODIUM 50; 8.6 MG/1; MG/1
1 TABLET, FILM COATED ORAL 2 TIMES DAILY PRN
Qty: 30 TABLET | Refills: 1 | Status: SHIPPED | OUTPATIENT
Start: 2023-01-01

## 2023-01-01 RX ORDER — IOPAMIDOL 755 MG/ML
100 INJECTION, SOLUTION INTRAVASCULAR ONCE
Status: COMPLETED | OUTPATIENT
Start: 2023-01-01 | End: 2023-01-01

## 2023-01-01 RX ADMIN — METOPROLOL TARTRATE 25 MG: 25 TABLET, FILM COATED ORAL at 08:37

## 2023-01-01 RX ADMIN — GABAPENTIN 400 MG: 300 CAPSULE ORAL at 08:42

## 2023-01-01 RX ADMIN — POTASSIUM CHLORIDE 40 MEQ: 1500 TABLET, EXTENDED RELEASE ORAL at 17:07

## 2023-01-01 RX ADMIN — MULTIPLE VITAMINS W/ MINERALS TAB 1 TABLET: TAB at 08:08

## 2023-01-01 RX ADMIN — FUROSEMIDE 20 MG: 20 TABLET ORAL at 17:35

## 2023-01-01 RX ADMIN — PREDNISONE 10 MG: 5 TABLET ORAL at 08:08

## 2023-01-01 RX ADMIN — CITALOPRAM HYDROBROMIDE 40 MG: 40 TABLET ORAL at 08:37

## 2023-01-01 RX ADMIN — FAMOTIDINE 20 MG: 20 TABLET ORAL at 09:32

## 2023-01-01 RX ADMIN — PIPERACILLIN AND TAZOBACTAM 3.38 G: 3; .375 INJECTION, POWDER, LYOPHILIZED, FOR SOLUTION INTRAVENOUS at 17:08

## 2023-01-01 RX ADMIN — PIPERACILLIN AND TAZOBACTAM 3.38 G: 3; .375 INJECTION, POWDER, LYOPHILIZED, FOR SOLUTION INTRAVENOUS at 08:56

## 2023-01-01 RX ADMIN — AZITHROMYCIN MONOHYDRATE 250 MG: 250 TABLET ORAL at 08:08

## 2023-01-01 RX ADMIN — FUROSEMIDE 40 MG: 20 TABLET ORAL at 08:38

## 2023-01-01 RX ADMIN — FOLIC ACID 1 MG: 1 TABLET ORAL at 08:37

## 2023-01-01 RX ADMIN — INSULIN ASPART 1 UNITS: 100 INJECTION, SOLUTION INTRAVENOUS; SUBCUTANEOUS at 12:02

## 2023-01-01 RX ADMIN — FUROSEMIDE 40 MG: 20 TABLET ORAL at 08:13

## 2023-01-01 RX ADMIN — FOLIC ACID 1 MG: 1 TABLET ORAL at 08:43

## 2023-01-01 RX ADMIN — SENNOSIDES AND DOCUSATE SODIUM 1 TABLET: 50; 8.6 TABLET ORAL at 20:35

## 2023-01-01 RX ADMIN — MULTIPLE VITAMINS W/ MINERALS TAB 1 TABLET: TAB at 08:13

## 2023-01-01 RX ADMIN — MULTIPLE VITAMINS W/ MINERALS TAB 1 TABLET: TAB at 08:48

## 2023-01-01 RX ADMIN — IPRATROPIUM BROMIDE AND ALBUTEROL SULFATE 3 ML: 2.5; .5 SOLUTION RESPIRATORY (INHALATION) at 12:26

## 2023-01-01 RX ADMIN — FUROSEMIDE 40 MG: 20 TABLET ORAL at 08:47

## 2023-01-01 RX ADMIN — AMIODARONE HYDROCHLORIDE 200 MG: 200 TABLET ORAL at 09:33

## 2023-01-01 RX ADMIN — INSULIN ASPART 1 UNITS: 100 INJECTION, SOLUTION INTRAVENOUS; SUBCUTANEOUS at 12:30

## 2023-01-01 RX ADMIN — ACETAMINOPHEN 975 MG: 325 TABLET ORAL at 17:27

## 2023-01-01 RX ADMIN — CLONAZEPAM 0.5 MG: 0.5 TABLET ORAL at 01:41

## 2023-01-01 RX ADMIN — CITALOPRAM HYDROBROMIDE 40 MG: 40 TABLET ORAL at 08:13

## 2023-01-01 RX ADMIN — GABAPENTIN 300 MG: 300 CAPSULE ORAL at 08:46

## 2023-01-01 RX ADMIN — IPRATROPIUM BROMIDE AND ALBUTEROL SULFATE 3 ML: 2.5; .5 SOLUTION RESPIRATORY (INHALATION) at 11:30

## 2023-01-01 RX ADMIN — DICLOFENAC 2 G: 10 GEL TOPICAL at 19:02

## 2023-01-01 RX ADMIN — Medication 5 MG: at 01:41

## 2023-01-01 RX ADMIN — PIPERACILLIN AND TAZOBACTAM 3.38 G: 3; .375 INJECTION, POWDER, LYOPHILIZED, FOR SOLUTION INTRAVENOUS at 08:53

## 2023-01-01 RX ADMIN — RIVAROXABAN 20 MG: 10 TABLET, FILM COATED ORAL at 17:07

## 2023-01-01 RX ADMIN — IPRATROPIUM BROMIDE AND ALBUTEROL SULFATE 3 ML: 2.5; .5 SOLUTION RESPIRATORY (INHALATION) at 15:50

## 2023-01-01 RX ADMIN — BUPRENORPHINE AND NALOXONE 1 TABLET: 2; .5 TABLET SUBLINGUAL at 21:34

## 2023-01-01 RX ADMIN — ACETAMINOPHEN 975 MG: 325 TABLET ORAL at 17:47

## 2023-01-01 RX ADMIN — PIPERACILLIN AND TAZOBACTAM 3.38 G: 3; .375 INJECTION, POWDER, LYOPHILIZED, FOR SOLUTION INTRAVENOUS at 00:23

## 2023-01-01 RX ADMIN — PIPERACILLIN AND TAZOBACTAM 3.38 G: 3; .375 INJECTION, POWDER, LYOPHILIZED, FOR SOLUTION INTRAVENOUS at 00:38

## 2023-01-01 RX ADMIN — IPRATROPIUM BROMIDE AND ALBUTEROL SULFATE 3 ML: 2.5; .5 SOLUTION RESPIRATORY (INHALATION) at 07:27

## 2023-01-01 RX ADMIN — METOPROLOL TARTRATE 25 MG: 25 TABLET, FILM COATED ORAL at 20:35

## 2023-01-01 RX ADMIN — IPRATROPIUM BROMIDE AND ALBUTEROL SULFATE 3 ML: 2.5; .5 SOLUTION RESPIRATORY (INHALATION) at 11:50

## 2023-01-01 RX ADMIN — GABAPENTIN 300 MG: 300 CAPSULE ORAL at 14:02

## 2023-01-01 RX ADMIN — BUPRENORPHINE AND NALOXONE 1 TABLET: 2; .5 TABLET SUBLINGUAL at 08:37

## 2023-01-01 RX ADMIN — PIPERACILLIN AND TAZOBACTAM 3.38 G: 3; .375 INJECTION, POWDER, LYOPHILIZED, FOR SOLUTION INTRAVENOUS at 01:02

## 2023-01-01 RX ADMIN — INSULIN ASPART 1 UNITS: 100 INJECTION, SOLUTION INTRAVENOUS; SUBCUTANEOUS at 16:48

## 2023-01-01 RX ADMIN — FUROSEMIDE 40 MG: 20 TABLET ORAL at 08:08

## 2023-01-01 RX ADMIN — CLONAZEPAM 0.5 MG: 0.5 TABLET ORAL at 21:23

## 2023-01-01 RX ADMIN — GABAPENTIN 300 MG: 300 CAPSULE ORAL at 13:17

## 2023-01-01 RX ADMIN — INSULIN ASPART 1 UNITS: 100 INJECTION, SOLUTION INTRAVENOUS; SUBCUTANEOUS at 17:48

## 2023-01-01 RX ADMIN — IPRATROPIUM BROMIDE AND ALBUTEROL SULFATE 3 ML: 2.5; .5 SOLUTION RESPIRATORY (INHALATION) at 08:28

## 2023-01-01 RX ADMIN — Medication 5 MG: at 21:23

## 2023-01-01 RX ADMIN — AZITHROMYCIN MONOHYDRATE 250 MG: 250 TABLET ORAL at 08:41

## 2023-01-01 RX ADMIN — IPRATROPIUM BROMIDE AND ALBUTEROL SULFATE 3 ML: 2.5; .5 SOLUTION RESPIRATORY (INHALATION) at 11:27

## 2023-01-01 RX ADMIN — PIPERACILLIN AND TAZOBACTAM 3.38 G: 3; .375 INJECTION, POWDER, LYOPHILIZED, FOR SOLUTION INTRAVENOUS at 16:44

## 2023-01-01 RX ADMIN — GABAPENTIN 300 MG: 300 CAPSULE ORAL at 08:37

## 2023-01-01 RX ADMIN — ALBUTEROL SULFATE 2 PUFF: 90 AEROSOL, METERED RESPIRATORY (INHALATION) at 03:27

## 2023-01-01 RX ADMIN — ACETAMINOPHEN 975 MG: 325 TABLET ORAL at 17:07

## 2023-01-01 RX ADMIN — CITALOPRAM HYDROBROMIDE 40 MG: 40 TABLET ORAL at 08:08

## 2023-01-01 RX ADMIN — THIAMINE HCL TAB 100 MG 100 MG: 100 TAB at 09:33

## 2023-01-01 RX ADMIN — IPRATROPIUM BROMIDE AND ALBUTEROL SULFATE 3 ML: 2.5; .5 SOLUTION RESPIRATORY (INHALATION) at 19:34

## 2023-01-01 RX ADMIN — INSULIN ASPART 2 UNITS: 100 INJECTION, SOLUTION INTRAVENOUS; SUBCUTANEOUS at 17:11

## 2023-01-01 RX ADMIN — SENNOSIDES AND DOCUSATE SODIUM 1 TABLET: 50; 8.6 TABLET ORAL at 08:37

## 2023-01-01 RX ADMIN — PIPERACILLIN AND TAZOBACTAM 3.38 G: 3; .375 INJECTION, POWDER, LYOPHILIZED, FOR SOLUTION INTRAVENOUS at 01:32

## 2023-01-01 RX ADMIN — SENNOSIDES AND DOCUSATE SODIUM 1 TABLET: 50; 8.6 TABLET ORAL at 21:23

## 2023-01-01 RX ADMIN — ALBUTEROL SULFATE 2 PUFF: 90 AEROSOL, METERED RESPIRATORY (INHALATION) at 12:31

## 2023-01-01 RX ADMIN — FAMOTIDINE 20 MG: 20 TABLET ORAL at 08:42

## 2023-01-01 RX ADMIN — PREDNISONE 20 MG: 20 TABLET ORAL at 09:32

## 2023-01-01 RX ADMIN — CLONAZEPAM 1 MG: 0.5 TABLET ORAL at 22:18

## 2023-01-01 RX ADMIN — GABAPENTIN 300 MG: 300 CAPSULE ORAL at 13:46

## 2023-01-01 RX ADMIN — ACETAMINOPHEN 650 MG: 325 TABLET ORAL at 23:38

## 2023-01-01 RX ADMIN — BUPRENORPHINE AND NALOXONE 1 TABLET: 2; .5 TABLET SUBLINGUAL at 07:05

## 2023-01-01 RX ADMIN — IPRATROPIUM BROMIDE AND ALBUTEROL SULFATE 3 ML: 2.5; .5 SOLUTION RESPIRATORY (INHALATION) at 19:57

## 2023-01-01 RX ADMIN — VANCOMYCIN HYDROCHLORIDE 1250 MG: 5 INJECTION, POWDER, LYOPHILIZED, FOR SOLUTION INTRAVENOUS at 00:53

## 2023-01-01 RX ADMIN — ALBUTEROL SULFATE 2 PUFF: 90 AEROSOL, METERED RESPIRATORY (INHALATION) at 17:17

## 2023-01-01 RX ADMIN — RIVAROXABAN 20 MG: 10 TABLET, FILM COATED ORAL at 17:28

## 2023-01-01 RX ADMIN — FOLIC ACID 1 MG: 1 TABLET ORAL at 08:14

## 2023-01-01 RX ADMIN — GABAPENTIN 300 MG: 300 CAPSULE ORAL at 20:35

## 2023-01-01 RX ADMIN — RIVAROXABAN 20 MG: 10 TABLET, FILM COATED ORAL at 17:48

## 2023-01-01 RX ADMIN — FAMOTIDINE 20 MG: 20 TABLET ORAL at 08:14

## 2023-01-01 RX ADMIN — PIPERACILLIN AND TAZOBACTAM 3.38 G: 3; .375 INJECTION, POWDER, LYOPHILIZED, FOR SOLUTION INTRAVENOUS at 09:59

## 2023-01-01 RX ADMIN — CLONAZEPAM 1 MG: 0.5 TABLET ORAL at 21:12

## 2023-01-01 RX ADMIN — PREDNISONE 20 MG: 20 TABLET ORAL at 08:37

## 2023-01-01 RX ADMIN — IPRATROPIUM BROMIDE AND ALBUTEROL SULFATE 3 ML: 2.5; .5 SOLUTION RESPIRATORY (INHALATION) at 19:21

## 2023-01-01 RX ADMIN — IPRATROPIUM BROMIDE AND ALBUTEROL SULFATE 3 ML: 2.5; .5 SOLUTION RESPIRATORY (INHALATION) at 12:03

## 2023-01-01 RX ADMIN — FAMOTIDINE 20 MG: 20 TABLET ORAL at 08:37

## 2023-01-01 RX ADMIN — GABAPENTIN 300 MG: 300 CAPSULE ORAL at 08:09

## 2023-01-01 RX ADMIN — METOPROLOL TARTRATE 25 MG: 25 TABLET, FILM COATED ORAL at 21:35

## 2023-01-01 RX ADMIN — ACETAMINOPHEN 650 MG: 325 TABLET ORAL at 08:41

## 2023-01-01 RX ADMIN — PIPERACILLIN AND TAZOBACTAM 3.38 G: 3; .375 INJECTION, POWDER, LYOPHILIZED, FOR SOLUTION INTRAVENOUS at 00:43

## 2023-01-01 RX ADMIN — BUPRENORPHINE AND NALOXONE 1 TABLET: 2; .5 TABLET SUBLINGUAL at 17:47

## 2023-01-01 RX ADMIN — FOLIC ACID 1 MG: 1 TABLET ORAL at 08:47

## 2023-01-01 RX ADMIN — METOPROLOL TARTRATE 25 MG: 25 TABLET, FILM COATED ORAL at 21:23

## 2023-01-01 RX ADMIN — RIVAROXABAN 20 MG: 10 TABLET, FILM COATED ORAL at 17:12

## 2023-01-01 RX ADMIN — FUROSEMIDE 20 MG: 10 INJECTION, SOLUTION INTRAMUSCULAR; INTRAVENOUS at 09:46

## 2023-01-01 RX ADMIN — GABAPENTIN 300 MG: 300 CAPSULE ORAL at 14:20

## 2023-01-01 RX ADMIN — AMIODARONE HYDROCHLORIDE 200 MG: 200 TABLET ORAL at 08:14

## 2023-01-01 RX ADMIN — PIPERACILLIN AND TAZOBACTAM 3.38 G: 3; .375 INJECTION, POWDER, LYOPHILIZED, FOR SOLUTION INTRAVENOUS at 08:14

## 2023-01-01 RX ADMIN — PREDNISONE 20 MG: 20 TABLET ORAL at 08:14

## 2023-01-01 RX ADMIN — GABAPENTIN 400 MG: 300 CAPSULE ORAL at 09:30

## 2023-01-01 RX ADMIN — ACETAMINOPHEN 975 MG: 325 TABLET ORAL at 08:08

## 2023-01-01 RX ADMIN — GUAIFENESIN 1200 MG: 600 TABLET ORAL at 08:38

## 2023-01-01 RX ADMIN — AZITHROMYCIN MONOHYDRATE 250 MG: 250 TABLET ORAL at 08:14

## 2023-01-01 RX ADMIN — CITALOPRAM HYDROBROMIDE 40 MG: 40 TABLET ORAL at 09:29

## 2023-01-01 RX ADMIN — NICOTINE 1 PATCH: 7 PATCH, EXTENDED RELEASE TRANSDERMAL at 09:39

## 2023-01-01 RX ADMIN — FUROSEMIDE 20 MG: 20 TABLET ORAL at 08:43

## 2023-01-01 RX ADMIN — ACETAMINOPHEN 975 MG: 325 TABLET ORAL at 08:37

## 2023-01-01 RX ADMIN — BUPRENORPHINE AND NALOXONE 1 TABLET: 2; .5 TABLET SUBLINGUAL at 14:21

## 2023-01-01 RX ADMIN — CITALOPRAM HYDROBROMIDE 40 MG: 40 TABLET ORAL at 11:44

## 2023-01-01 RX ADMIN — SENNOSIDES AND DOCUSATE SODIUM 1 TABLET: 50; 8.6 TABLET ORAL at 08:13

## 2023-01-01 RX ADMIN — ACETAMINOPHEN 650 MG: 325 TABLET ORAL at 17:12

## 2023-01-01 RX ADMIN — ALBUTEROL SULFATE 2 PUFF: 90 AEROSOL, METERED RESPIRATORY (INHALATION) at 13:06

## 2023-01-01 RX ADMIN — GUAIFENESIN 1200 MG: 600 TABLET ORAL at 08:13

## 2023-01-01 RX ADMIN — MULTIPLE VITAMINS W/ MINERALS TAB 1 TABLET: TAB at 09:36

## 2023-01-01 RX ADMIN — METOPROLOL TARTRATE 25 MG: 25 TABLET, FILM COATED ORAL at 11:44

## 2023-01-01 RX ADMIN — PREDNISONE 20 MG: 20 TABLET ORAL at 08:46

## 2023-01-01 RX ADMIN — IPRATROPIUM BROMIDE AND ALBUTEROL SULFATE 3 ML: 2.5; .5 SOLUTION RESPIRATORY (INHALATION) at 07:12

## 2023-01-01 RX ADMIN — ACETAMINOPHEN 650 MG: 325 TABLET ORAL at 08:44

## 2023-01-01 RX ADMIN — GABAPENTIN 400 MG: 300 CAPSULE ORAL at 15:10

## 2023-01-01 RX ADMIN — MULTIPLE VITAMINS W/ MINERALS TAB 1 TABLET: TAB at 08:44

## 2023-01-01 RX ADMIN — ONDANSETRON 4 MG: 2 INJECTION INTRAMUSCULAR; INTRAVENOUS at 16:39

## 2023-01-01 RX ADMIN — GUAIFENESIN 1200 MG: 600 TABLET ORAL at 08:09

## 2023-01-01 RX ADMIN — AMIODARONE HYDROCHLORIDE 200 MG: 200 TABLET ORAL at 08:08

## 2023-01-01 RX ADMIN — METOPROLOL TARTRATE 25 MG: 25 TABLET, FILM COATED ORAL at 08:14

## 2023-01-01 RX ADMIN — OXYCODONE HYDROCHLORIDE 5 MG: 5 TABLET ORAL at 03:36

## 2023-01-01 RX ADMIN — GUAIFENESIN 1200 MG: 600 TABLET ORAL at 08:46

## 2023-01-01 RX ADMIN — GABAPENTIN 300 MG: 300 CAPSULE ORAL at 21:24

## 2023-01-01 RX ADMIN — PIPERACILLIN AND TAZOBACTAM 3.38 G: 3; .375 INJECTION, POWDER, LYOPHILIZED, FOR SOLUTION INTRAVENOUS at 08:38

## 2023-01-01 RX ADMIN — ACETAMINOPHEN 975 MG: 325 TABLET ORAL at 00:38

## 2023-01-01 RX ADMIN — PIPERACILLIN AND TAZOBACTAM 3.38 G: 3; .375 INJECTION, POWDER, LYOPHILIZED, FOR SOLUTION INTRAVENOUS at 17:12

## 2023-01-01 RX ADMIN — MULTIPLE VITAMINS W/ MINERALS TAB 1 TABLET: TAB at 08:37

## 2023-01-01 RX ADMIN — NICOTINE 1 PATCH: 7 PATCH, EXTENDED RELEASE TRANSDERMAL at 08:55

## 2023-01-01 RX ADMIN — CITALOPRAM HYDROBROMIDE 40 MG: 40 TABLET ORAL at 08:41

## 2023-01-01 RX ADMIN — GABAPENTIN 400 MG: 300 CAPSULE ORAL at 19:00

## 2023-01-01 RX ADMIN — GABAPENTIN 300 MG: 300 CAPSULE ORAL at 21:34

## 2023-01-01 RX ADMIN — ALBUTEROL SULFATE 2 PUFF: 90 AEROSOL, METERED RESPIRATORY (INHALATION) at 15:10

## 2023-01-01 RX ADMIN — IPRATROPIUM BROMIDE AND ALBUTEROL SULFATE 3 ML: 2.5; .5 SOLUTION RESPIRATORY (INHALATION) at 15:40

## 2023-01-01 RX ADMIN — ACETAMINOPHEN 975 MG: 325 TABLET ORAL at 08:14

## 2023-01-01 RX ADMIN — FAMOTIDINE 20 MG: 20 TABLET ORAL at 08:46

## 2023-01-01 RX ADMIN — IPRATROPIUM BROMIDE AND ALBUTEROL SULFATE 3 ML: 2.5; .5 SOLUTION RESPIRATORY (INHALATION) at 19:33

## 2023-01-01 RX ADMIN — FAMOTIDINE 20 MG: 20 TABLET ORAL at 08:08

## 2023-01-01 RX ADMIN — PREDNISONE 20 MG: 20 TABLET ORAL at 08:43

## 2023-01-01 RX ADMIN — AMIODARONE HYDROCHLORIDE 200 MG: 200 TABLET ORAL at 08:37

## 2023-01-01 RX ADMIN — POTASSIUM CHLORIDE 10 MEQ: 750 TABLET, EXTENDED RELEASE ORAL at 08:09

## 2023-01-01 RX ADMIN — AMIODARONE HYDROCHLORIDE 200 MG: 200 TABLET ORAL at 08:43

## 2023-01-01 RX ADMIN — NICOTINE 1 PATCH: 7 PATCH, EXTENDED RELEASE TRANSDERMAL at 08:51

## 2023-01-01 RX ADMIN — IPRATROPIUM BROMIDE AND ALBUTEROL SULFATE 3 ML: 2.5; .5 SOLUTION RESPIRATORY (INHALATION) at 20:07

## 2023-01-01 RX ADMIN — GABAPENTIN 300 MG: 300 CAPSULE ORAL at 13:25

## 2023-01-01 RX ADMIN — AMIODARONE HYDROCHLORIDE 200 MG: 200 TABLET ORAL at 08:47

## 2023-01-01 RX ADMIN — ALBUTEROL SULFATE 2 PUFF: 90 AEROSOL, METERED RESPIRATORY (INHALATION) at 22:31

## 2023-01-01 RX ADMIN — IPRATROPIUM BROMIDE AND ALBUTEROL SULFATE 3 ML: 2.5; .5 SOLUTION RESPIRATORY (INHALATION) at 07:43

## 2023-01-01 RX ADMIN — ACETAMINOPHEN 650 MG: 325 TABLET ORAL at 00:43

## 2023-01-01 RX ADMIN — ALBUTEROL SULFATE 2 PUFF: 90 AEROSOL, METERED RESPIRATORY (INHALATION) at 13:47

## 2023-01-01 RX ADMIN — IOPAMIDOL 100 ML: 755 INJECTION, SOLUTION INTRAVENOUS at 17:10

## 2023-01-01 RX ADMIN — GUAIFENESIN 1200 MG: 600 TABLET ORAL at 08:42

## 2023-01-01 RX ADMIN — GABAPENTIN 300 MG: 300 CAPSULE ORAL at 21:11

## 2023-01-01 RX ADMIN — FOLIC ACID 1 MG: 1 TABLET ORAL at 09:33

## 2023-01-01 RX ADMIN — RIVAROXABAN 20 MG: 10 TABLET, FILM COATED ORAL at 16:43

## 2023-01-01 RX ADMIN — PIPERACILLIN AND TAZOBACTAM 3.38 G: 3; .375 INJECTION, POWDER, LYOPHILIZED, FOR SOLUTION INTRAVENOUS at 17:48

## 2023-01-01 RX ADMIN — METOPROLOL TARTRATE 25 MG: 25 TABLET, FILM COATED ORAL at 08:09

## 2023-01-01 RX ADMIN — BUPRENORPHINE AND NALOXONE 1 TABLET: 2; .5 TABLET SUBLINGUAL at 17:28

## 2023-01-01 RX ADMIN — FUROSEMIDE 20 MG: 20 TABLET ORAL at 18:59

## 2023-01-01 RX ADMIN — SENNOSIDES AND DOCUSATE SODIUM 1 TABLET: 50; 8.6 TABLET ORAL at 08:09

## 2023-01-01 RX ADMIN — IPRATROPIUM BROMIDE AND ALBUTEROL SULFATE 3 ML: 2.5; .5 SOLUTION RESPIRATORY (INHALATION) at 07:56

## 2023-01-01 RX ADMIN — ACETAMINOPHEN 975 MG: 325 TABLET ORAL at 01:32

## 2023-01-01 RX ADMIN — LIDOCAINE: 50 OINTMENT TOPICAL at 09:40

## 2023-01-01 RX ADMIN — POTASSIUM CHLORIDE 10 MEQ: 750 TABLET, EXTENDED RELEASE ORAL at 08:37

## 2023-01-01 RX ADMIN — SENNOSIDES AND DOCUSATE SODIUM 1 TABLET: 50; 8.6 TABLET ORAL at 21:35

## 2023-01-01 RX ADMIN — FOLIC ACID 1 MG: 1 TABLET ORAL at 08:09

## 2023-01-01 RX ADMIN — IPRATROPIUM BROMIDE AND ALBUTEROL SULFATE 3 ML: 2.5; .5 SOLUTION RESPIRATORY (INHALATION) at 15:35

## 2023-01-01 RX ADMIN — GUAIFENESIN 1200 MG: 600 TABLET ORAL at 09:31

## 2023-01-01 RX ADMIN — GABAPENTIN 300 MG: 300 CAPSULE ORAL at 08:14

## 2023-01-01 RX ADMIN — ACETAMINOPHEN 975 MG: 325 TABLET ORAL at 01:01

## 2023-01-01 RX ADMIN — IPRATROPIUM BROMIDE AND ALBUTEROL SULFATE 3 ML: 2.5; .5 SOLUTION RESPIRATORY (INHALATION) at 16:34

## 2023-01-01 ASSESSMENT — ACTIVITIES OF DAILY LIVING (ADL)
ADLS_ACUITY_SCORE: 31
ADLS_ACUITY_SCORE: 33
ADLS_ACUITY_SCORE: 32
ADLS_ACUITY_SCORE: 31
ADLS_ACUITY_SCORE: 33
ADLS_ACUITY_SCORE: 34
ADLS_ACUITY_SCORE: 32
ADLS_ACUITY_SCORE: 34
ADLS_ACUITY_SCORE: 30
ADLS_ACUITY_SCORE: 34
ADLS_ACUITY_SCORE: 32
ADLS_ACUITY_SCORE: 35
ADLS_ACUITY_SCORE: 34
DEPENDENT_IADLS:: CLEANING;COOKING;LAUNDRY;SHOPPING;MEAL PREPARATION;MEDICATION MANAGEMENT;MONEY MANAGEMENT;TRANSPORTATION
ADLS_ACUITY_SCORE: 29
ADLS_ACUITY_SCORE: 34
ADLS_ACUITY_SCORE: 34
ADLS_ACUITY_SCORE: 35
ADLS_ACUITY_SCORE: 39
ADLS_ACUITY_SCORE: 33
ADLS_ACUITY_SCORE: 34
ADLS_ACUITY_SCORE: 34
ADLS_ACUITY_SCORE: 39
ADLS_ACUITY_SCORE: 33
ADLS_ACUITY_SCORE: 31
ADLS_ACUITY_SCORE: 34
ADLS_ACUITY_SCORE: 31
ADLS_ACUITY_SCORE: 34
ADLS_ACUITY_SCORE: 29
ADLS_ACUITY_SCORE: 31
ADLS_ACUITY_SCORE: 34
ADLS_ACUITY_SCORE: 33
ADLS_ACUITY_SCORE: 29
ADLS_ACUITY_SCORE: 31
ADLS_ACUITY_SCORE: 34
ADLS_ACUITY_SCORE: 34
ADLS_ACUITY_SCORE: 32
ADLS_ACUITY_SCORE: 33
ADLS_ACUITY_SCORE: 34
ADLS_ACUITY_SCORE: 33
ADLS_ACUITY_SCORE: 35
ADLS_ACUITY_SCORE: 31
ADLS_ACUITY_SCORE: 29
ADLS_ACUITY_SCORE: 34
ADLS_ACUITY_SCORE: 31
ADLS_ACUITY_SCORE: 34
ADLS_ACUITY_SCORE: 32
ADLS_ACUITY_SCORE: 34
ADLS_ACUITY_SCORE: 32
ADLS_ACUITY_SCORE: 34
ADLS_ACUITY_SCORE: 32
ADLS_ACUITY_SCORE: 34
ADLS_ACUITY_SCORE: 31
ADLS_ACUITY_SCORE: 34
ADLS_ACUITY_SCORE: 35
ADLS_ACUITY_SCORE: 31
ADLS_ACUITY_SCORE: 34
ADLS_ACUITY_SCORE: 34
ADLS_ACUITY_SCORE: 35
ADLS_ACUITY_SCORE: 33
ADLS_ACUITY_SCORE: 34
ADLS_ACUITY_SCORE: 30
ADLS_ACUITY_SCORE: 34
ADLS_ACUITY_SCORE: 31
ADLS_ACUITY_SCORE: 33
ADLS_ACUITY_SCORE: 35

## 2023-01-01 ASSESSMENT — SLEEP AND FATIGUE QUESTIONNAIRES
HOW LIKELY ARE YOU TO NOD OFF OR FALL ASLEEP WHILE SITTING INACTIVE IN A PUBLIC PLACE: MODERATE CHANCE OF DOZING
HOW LIKELY ARE YOU TO NOD OFF OR FALL ASLEEP WHILE LYING DOWN TO REST IN THE AFTERNOON WHEN CIRCUMSTANCES PERMIT: HIGH CHANCE OF DOZING
HOW LIKELY ARE YOU TO NOD OFF OR FALL ASLEEP WHILE WATCHING TV: MODERATE CHANCE OF DOZING
HOW LIKELY ARE YOU TO NOD OFF OR FALL ASLEEP WHEN YOU ARE A PASSENGER IN A CAR FOR AN HOUR WITHOUT A BREAK: HIGH CHANCE OF DOZING
HOW LIKELY ARE YOU TO NOD OFF OR FALL ASLEEP WHILE SITTING AND TALKING TO SOMEONE: MODERATE CHANCE OF DOZING
HOW LIKELY ARE YOU TO NOD OFF OR FALL ASLEEP IN A CAR, WHILE STOPPED FOR A FEW MINUTES IN TRAFFIC: MODERATE CHANCE OF DOZING
HOW LIKELY ARE YOU TO NOD OFF OR FALL ASLEEP WHILE SITTING AND READING: MODERATE CHANCE OF DOZING
HOW LIKELY ARE YOU TO NOD OFF OR FALL ASLEEP WHILE SITTING QUIETLY AFTER LUNCH WITHOUT ALCOHOL: MODERATE CHANCE OF DOZING

## 2023-01-01 ASSESSMENT — PATIENT HEALTH QUESTIONNAIRE - PHQ9: SUM OF ALL RESPONSES TO PHQ QUESTIONS 1-9: 4

## 2023-01-01 ASSESSMENT — ANXIETY QUESTIONNAIRES
GAD7 TOTAL SCORE: 2
7. FEELING AFRAID AS IF SOMETHING AWFUL MIGHT HAPPEN: NOT AT ALL
GAD7 TOTAL SCORE: 2
IF YOU CHECKED OFF ANY PROBLEMS ON THIS QUESTIONNAIRE, HOW DIFFICULT HAVE THESE PROBLEMS MADE IT FOR YOU TO DO YOUR WORK, TAKE CARE OF THINGS AT HOME, OR GET ALONG WITH OTHER PEOPLE: SOMEWHAT DIFFICULT
4. TROUBLE RELAXING: NOT AT ALL
5. BEING SO RESTLESS THAT IT IS HARD TO SIT STILL: NOT AT ALL
3. WORRYING TOO MUCH ABOUT DIFFERENT THINGS: SEVERAL DAYS
6. BECOMING EASILY ANNOYED OR IRRITABLE: NOT AT ALL
2. NOT BEING ABLE TO STOP OR CONTROL WORRYING: SEVERAL DAYS
1. FEELING NERVOUS, ANXIOUS, OR ON EDGE: NOT AT ALL
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
## 2023-01-01 NOTE — PROGRESS NOTES
Pt on Bipap overnight 10/5, rate 14, 40% FIO2.  Tolerating will.  Pt has a large mask on with liquicel in place to prevent breakdown.  RT will continue to monitor.

## 2023-01-01 NOTE — PROGRESS NOTES
Daily Progress Note    Assessment/Plan:  Ki Pederson is a 67 year old male presenting with syncope with subsequent respiratory failure and encephalopathy which is improved.  Went  off hospice this past fall in order to have pacemaker placement.  Unclear what his hospice diagnosis was.  His respiratory failure have significantly improved as has his fluid retention and his mental status.      1.  Acute on chronic resp failure with hypoxia and hypercapnia/COPD and chronic CHF and pneumonia.  Breathing much improved today and now down to 4 L nasal cannula from high of 13 L oxime mask.  Likely a combination of CHF and pneumonia  - Restart night time and as needed BiPAP  - Continue chronic azithromycin MWF   - continue home inhaler and DUOnebs   -Was on prednisone 20 mg daily prior to admission.  Unclear how long he has been on this.  Consider taper in the next few days given his greatly improved respiratory status.  -Fever now resolved on Zosyn and vancomycin.  MRSA screen negative, will stop Vanco., blood cultures showing no growth to date UA does show white cells, awaiting cultures but already on Zosyn  started 12/29/2022.  Procalcitonin was elevated at 0.22, lactate was normal.        2.Syncope with acute metabolic encephalopathy  - No acute EKG changes  - initial presentation likely due to home Oxygen was not on at home and he has not restarted BiPAP at night since discontinuing hospice.  - was hypotensive on presentation and has been given IV fluids  - head CT negative and CXR on admit.  Patient hit head and fell and has had CT head x2 to r/o bleed in the setting of anticoagulant and on going encephalopathy.  - At home  was supposed to be on oxycodone but has been unclear how he has been taking this  - continued klonipin which he is on chronically but reduced dose to 1mg to avoid withdrawal.    -Addiction medicine consulted due to concerns of Klonopin overdose but it is unclear if this actually happened  -Pain  team consult-recommending narcotic taper and Suboxone therapy  -Has been very tired today but VBG shows no significant evidence of CO2 retention and he has received very little pain medication.  We will continue to monitor.        3.Bradycardia an hypotensive as above-resolved, resume cardiac medications  - continue tele  - adding back amiodarone, dilt on hold secondary to soft blood pressures but will reinitiate when able  - should have outpatient follow up for pacemaker.  He has not followed up in a-fib clinic since this was recommended.            4.Hypermagnesemia  - gentle hydration  - holding home mag  -trend        5.Acute on chronic CHF:  Echo  shows EF of 55 to 60% with no wall motion abnormalities.  Excellent diuresis on Lasix 40 mg IV every 12 hours, will convert to oral Lasix 40 mg twice a day.        6.YUNIEL/CKD3  -Creatinine now normalized with diuresis,  We will watch closely.     7.Chronic pain  -remote history of trauma.-Pain team and addiction medicine consulted.  Pain team recommending tapering off narcotics and subsequent Suboxone therapy.     8.Depression - with suicidal threats on the night of 12/26  - continue celexa  - klonipin dose decreased to 1mg for now   - Psych consult appreciated.  He is not actively suicidal     9.Afib  -bradycardic in the 50'sand hypotensive on admit, now tachycardic with good blood pressures  - gradually restarting dilt and amio as vitals tolerate  - continue xarelto head ct negative     10.Tobacco Abuse and hx muti-substance abuse.  - nicotine patch  - confirmed he is not drinking with family      11.  Hyperglycemia/prediabetes: He has been prediabetic in the past, hemoglobin A1c 6.3 on this admission.  Generally blood sugars have been less than 180 but will switch to a moderate carbohydrate diet.  If not tapering steroids could consider metformin.    Code status:No CPR- Do NOT Intubate        Barriers to Discharge: Acute respiratory failure    Disposition:  Anticipate discharge to TCU in approximately 2 to 3 days    Subjective:  Overall much improved the last couple days regarding encephalopathy and respiratory failure and edema.  Today he is quite sleepy but easily arousable and answers questions and interacts but falls back to sleep.  He is not confused while he is awake and he reports his dyspnea is much improved.  He does complain of his chronic pain but today has refused pain medication.        Current Medications Reviewed via EHR List    Objective:  Vital signs in last 24 hours:  [unfilled]  .prog  Weight:   @THISENCWEIGHTS(1)@  Weight change: 0.181 kg (6.4 oz)  Body mass index is 32.54 kg/m .    Intake/Output last 3 shifts:  I/O last 3 completed shifts:  In: 360 [P.O.:360]  Out: 4650 [Urine:4650]  Intake/Output this shift:  No intake/output data recorded.    Physical Exam:  General: Comfortable, easily awakened and interactive, nontoxic-appearing  CV: Regular rate and rhythm, no peripheral edema, much improved  Lungs: Clear to auscultation with mild basilar rales.  Significant improvement from prior  Abdomen: Soft, nontender        Imaging:  Personally Reviewed.  XR Pelvis w Hip Left G/E 2 Views    Result Date: 2022  EXAM: XR PELVIS AND HIP LEFT 2 VIEWS LOCATION: [facility] DATE/TIME: 2022 2:15 AM INDICATION: fall on left hip COMPARISON: None.     IMPRESSION: Mild degenerative changes of the hips and lower lumbar spine.. No fracture or dislocation.    Echocardiogram Complete    Result Date: 2022  [id number]^JASMIN^[place]OL^[place] [address]  Name: GUSTABO CHRISTOPHER, SR. MRN: [id number] : 1955 Study Date: 2022 09:30 AM Age: 67 yrs Gender: Male Patient Location: [facility] Reason For Study: CHF Ordering Physician: CAROL CASTELLANOS Performed By: AT  BSA: 2.3 m2 Height: 72 in Weight: 239 lb HR: 96 BP: 130/73 mmHg  ______________________________________________________________________________ Procedure Complete Echo Adult. Definity (NDC #87570-064) given intravenously. ______________________________________________________________________________ Interpretation Summary  1. Normal left ventricular size and systolic performance with a visually estimated ejection fraction of 55-60%. 2. No significant valvular heart disease is identified on this study. 3. Normal right ventricular size and systolic performance. 4. There is mild left atrial enlargement.  When compared to the prior real-time echocardiogram dated 23 September 2022, there has been an interval improvement in left ventricular systolic performance with normalization of the ejection fraction. ______________________________________________________________________________ Left ventricle: Normal left ventricular size and systolic performance with a visually estimated ejection fraction of 55-60%. There is normal regional wall motion. Left ventricular wall thickness is normal.  Assessment of LV Diastolic Function: The cumulative findings suggest impaired diastolic filling [The septal e' velocity is > 7 cm/s & lateral e' velocity is < 10 cm/s. The average E/e' is >14. TR velocity is > 2.8 m/s. Left atrial volume index is greater than 34 mL/mÂ ].  Assessment of left atrial pressure (LAP): The cumulative findings suggest moderately elevated left atrial pressure (the E/A is > 0.8 and <2.0 plus 2 or 3 of 3 of the following present: Average E/e' > 14, TRvel > 2.8 m/s, and/or LA vol. index > 34 ml/mÂ  ).  Right ventricle: Normal right ventricular size and systolic performance.  Left atrium: There is mild left atrial enlargement.  Right atrium: The right atrium is of normal size.  IVC: The IVC is mildly dilated.  Aortic valve: The aortic valve is comprised of three cusps. No significant aortic stenosis or aortic insufficiency is detected on this study.  Mitral valve: The mitral valve  appears morphologically normal. There is trace mitral insufficiency.  Tricuspid valve: The tricuspid valve is grossly morphologically normal. There is trace-mild tricuspid insufficiency.  Pulmonic valve: The pulmonic valve is grossly morphologically normal.  Thoracic aorta: The aortic root and proximal ascending aorta are of normal dimension.  Pericardium: There is no significant pericardial effusion. ______________________________________________________________________________ ______________________________________________________________________________ MMode/2D Measurements & Calculations IVSd: 1.2 cm LVIDd: 5.4 cm LVIDs: 4.3 cm LVPWd: 1.0 cm FS: 20.4 % LV mass(C)d: 238.4 grams LV mass(C)dI: 103.7 grams/m2 Ao root diam: 3.4 cm LA dimension: 4.7 cm asc Aorta Diam: 3.5 cm LA/Ao: 1.4 LVOT diam: 2.5 cm LVOT area: 5.0 cm2 LA Volume Indexed (AL/bp): 38.4 ml/m2  RWT: 0.37  Time Measurements MM HR: 98.0 BPM  Doppler Measurements & Calculations MV E max omar: 104.0 cm/sec MV A max omar: 93.6 cm/sec MV E/A: 1.1 MV max P.1 mmHg MV mean PG: 3.3 mmHg MV V2 VTI: 26.9 cm MVA(VTI): 4.2 cm2 MV dec slope: 622.9 cm/sec2 MV dec time: 0.17 sec Ao V2 max: 186.4 cm/sec Ao max P.0 mmHg Ao V2 mean: 139.2 cm/sec Ao mean P.3 mmHg Ao V2 VTI: 37.4 cm ANDRES(I,D): 3.0 cm2 ANDRES(V,D): 3.7 cm2 LV V1 max P.7 mmHg LV V1 max: 138.9 cm/sec LV V1 VTI: 22.4 cm SV(LVOT): 112.1 ml SI(LVOT): 48.8 ml/m2 PA acc time: 0.15 sec TR max omar: 297.4 cm/sec TR max P.4 mmHg AV Omar Ratio (DI): 0.75 ANDRES Index (cm2/m2): 1.3  E/E': 14.9 E/E' av.0 Lateral E/e': 11.4 Medial E/e': 14.7 Peak E' Omar: 7.0 cm/sec  ______________________________________________________________________________ Report approved by: Barbara Aleman 2022 10:54 AM       XR Chest Port 1 View    Result Date: 2022  EXAM: XR CHEST PORT 1 VIEW LOCATION: [facility] DATE/TIME: 2022 5:57 PM INDICATION: fever COMPARISON: Chest  radiograph 12/26/2022.     IMPRESSION: Stable enlarged cardiac silhouette. Newly visualized opacity overlying the right costophrenic sulcus, could represent pneumonia or superimposed soft tissues. No definite pleural effusion or pneumothorax. Bones are unchanged. Cerclage wires overlying the neck are again noted.    XR Chest Port 1 View    Result Date: 12/26/2022  EXAM: XR CHEST PORT 1 VIEW LOCATION: [facility] DATE/TIME: 12/26/2022 4:09 PM INDICATION: Shortness of breath. COMPARISON: 12/17/2022     IMPRESSION: Heart size magnified in AP projection with normal vascularity. No focal consolidation, pneumothorax nor pleural effusion.    XR Chest Port 1 View    Result Date: 12/17/2022  EXAM: XR CHEST PORT 1 VIEW LOCATION: [facility] DATE/TIME: 12/17/2022 11:17 PM INDICATION: Shortness of breath. COMPARISON: 10/24/2022     IMPRESSION: Normal heart size and pulmonary vascularity. Lungs are clear. Postoperative changes lower cervical spine. Otherwise unremarkable. No acute findings.    CT Head w/o Contrast    Result Date: 12/28/2022  EXAM: CT HEAD W/O CONTRAST LOCATION: [facility] DATE/TIME: 12/28/2022 5:38 PM INDICATION: check for late bleed after fall   on anticoagulation COMPARISON: 12/27/2022 head CT TECHNIQUE: Routine CT Head without IV contrast. Multiplanar reformats. Dose reduction techniques were used. FINDINGS: INTRACRANIAL CONTENTS: No intracranial hemorrhage, extraaxial collection, or mass effect.  No CT evidence of acute infarct. Mild presumed chronic small vessel ischemic changes. Mild generalized volume loss. No hydrocephalus. VISUALIZED ORBITS/SINUSES/MASTOIDS: No intraorbital abnormality. No paranasal sinus mucosal disease. Hypoplastic right sided mastoid air cells. Left mastoid air cells and middle ear cavity clear. BONES/SOFT TISSUES: No acute abnormality.     IMPRESSION: 1.  No acute intracranial process.    CT Head w/o  Contrast    Result Date: 12/27/2022  EXAM: CT HEAD W/O CONTRAST LOCATION: [facility] DATE/TIME: 12/27/2022 1:57 AM INDICATION: fall, hit head, on xarelto COMPARISON: CT head 12/26/2022 TECHNIQUE: Routine CT Head without IV contrast. Multiplanar reformats. Dose reduction techniques were used. FINDINGS: INTRACRANIAL CONTENTS: No intracranial hemorrhage, extraaxial collection, or mass effect.  No CT evidence of acute infarct. Mild volume loss and presumed chronic small vessel ischemia are stable. VISUALIZED ORBITS/SINUSES/MASTOIDS: No intraorbital abnormality. No paranasal sinus mucosal disease. No middle ear or mastoid effusion. BONES/SOFT TISSUES: No acute abnormality.     IMPRESSION: 1.  No acute intracranial abnormality or significant change compared to the prior study.    CT Head w/o Contrast    Result Date: 12/26/2022  EXAM: CT HEAD W/O CONTRAST LOCATION: [facility] DATE/TIME: 12/26/2022 4:07 PM INDICATION: Altered mental status COMPARISON:  CT head 10/25/2022. TECHNIQUE: Routine CT Head without IV contrast. Multiplanar reformats. Dose reduction techniques were used. FINDINGS: INTRACRANIAL CONTENTS: No intracranial hemorrhage, extraaxial collection, or mass effect.  No CT evidence of acute infarct. Mild presumed chronic small vessel ischemic changes. Normal ventricles and sulci. Dolichoectasia of the basilar artery. VISUALIZED ORBITS/SINUSES/MASTOIDS: No intraorbital abnormality. No paranasal sinus mucosal disease. No middle ear or mastoid effusion. BONES/SOFT TISSUES: No acute abnormality.     IMPRESSION: 1.  No acute intracranial process.      Lab Results:  Personally Reviewed.   Fingerstick Blood Glucose: @[id number](POCGLUFGR:10)@    Last Hbg A1C: No results found for: HGBA1C   Lab Results   Component Value Date    INR 1.14 12/26/2022    INR 0.87 12/17/2022    INR 1.23 (H) 03/18/2020     Recent Results (from the past 24 hour(s))   Vancomycin level     Collection Time: 12/31/22 11:37 AM   Result Value Ref Range    Vancomycin 7.9   ug/mL   Glucose by meter    Collection Time: 12/31/22 11:55 AM   Result Value Ref Range    GLUCOSE BY METER POCT 171 (H) 70 - 99 mg/dL   Glucose by meter    Collection Time: 12/31/22  4:28 PM   Result Value Ref Range    GLUCOSE BY METER POCT 197 (H) 70 - 99 mg/dL   Glucose by meter    Collection Time: 12/31/22  8:25 PM   Result Value Ref Range    GLUCOSE BY METER POCT 175 (H) 70 - 99 mg/dL   CBC with platelets    Collection Time: 01/01/23  5:20 AM   Result Value Ref Range    WBC Count 7.1 4.0 - 11.0 10e3/uL    RBC Count 3.43 (L) 4.40 - 5.90 10e6/uL    Hemoglobin 10.0 (L) 13.3 - 17.7 g/dL    Hematocrit 32.4 (L) 40.0 - 53.0 %    MCV 95 78 - 100 fL    MCH 29.2 26.5 - 33.0 pg    MCHC 30.9 (L) 31.5 - 36.5 g/dL    RDW 13.6 10.0 - 15.0 %    Platelet Count 183 150 - 450 10e3/uL   Basic metabolic panel    Collection Time: 01/01/23  5:20 AM   Result Value Ref Range    Sodium 142 136 - 145 mmol/L    Potassium 3.6 3.4 - 5.3 mmol/L    Chloride 102 98 - 107 mmol/L    Carbon Dioxide (CO2) 32 (H) 22 - 29 mmol/L    Anion Gap 8 7 - 15 mmol/L    Urea Nitrogen 16.9 8.0 - 23.0 mg/dL    Creatinine 1.07 0.67 - 1.17 mg/dL    Calcium 8.2 (L) 8.8 - 10.2 mg/dL    Glucose 127 (H) 70 - 99 mg/dL    GFR Estimate 77 >60 mL/min/1.73m2   Glucose by meter    Collection Time: 01/01/23  7:29 AM   Result Value Ref Range    GLUCOSE BY METER POCT 108 (H) 70 - 99 mg/dL           Advanced Care Planning    Time > 35 minutes with greater than 50% of time spent in counseling and coordination of care.    Yovani Mota MD  Date: 1/1/2023  Time: 8:38 AM  [facility]

## 2023-01-01 NOTE — PROGRESS NOTES
Duonebs given QID.  Lung sounds diminished throughout.  Increased aeration after treatment.  SpO2 96% on 3L nasal cannula.  Will continue to monitor and treat.

## 2023-01-01 NOTE — PLAN OF CARE
Goal Outcome Evaluation:      Plan of Care Reviewed With: patient               Vitals:    12/31/22 2012 12/31/22 2334 01/01/23 0300 01/01/23 0439   BP:  121/79 112/77    BP Location:  Left arm Left arm    Pulse: 68 62 61    Resp:  19 18    Temp:  97.5  F (36.4  C) 98.8  F (37.1  C)    TempSrc:  Axillary Axillary    SpO2: 99% 100% 100%    Weight:    108.9 kg (240 lb)   Height:        Chronic pain was very sleepy the second assessment. Bipap on at night. Lungs are coarse and diminished. A-2 to reposition. 1;1 sitter. IV antibiotics. Refusing creams and inhaler. Roxana Hoff RN

## 2023-01-01 NOTE — PROVIDER NOTIFICATION
12/31/22 2018   Tech Time   $Tech Time (10 minute increments) 2   Mode: CPAP/ BiPAP/ AVAPS/ AVAPS AE   CPAP/BiPAP/ AVAPS/ AVAPS AE Mode BiPAP S/T   Equipment   Device V60   Device Serial Number [id number]   CPAP/BiPAP/Settings   $CPAP/BiPAP Subsequent completed   BIPAP/CPAP On Standby On   IPAP/EPAP (cmH2O) 10/5   Rate (breaths/min) 14   Oxygen (%) 40   Timed Inspiration (sec) 0.9   IPAP RISE  Settings (V60) 3   CPAP/BiPAP Patient Parameters   IPAP (cm H2O) 10 cmH2O   EPAP (cm H2O) 5 cmH2O   Pressure Support (cm H2O) 5 cmH2O   RR Total (breaths/min) 15 breaths/min   Vt (mL) 910 mL   Minute Ventilation (L/min) 14.5 L/min   Peak Inspiratory Pressure (cm H2O) 11 cmH2O   Pt.  Leak (L/min) 18 L/min   CPAP/BiPAP/AVAPS/AVAPS AE Alarms   High Pressure (cm H2O) 22 cmH2O   Low Pressure (cm H2O) 10   Low Pressure Delay (sec) 20 sec   Lo Min Vent 3   High Rate (breaths/min) 50 breaths/min   Low Rate (breaths/min) 10   Audible Alarm set at (Volume of Alarm) 7   CPAP/BiPAP/AVAPS/AVAPS AE Patient Assessment   Interface Face Mask - Large   Skin Integrity INTACT   Humidifier Checked Yes   RT Device Skin Assessment   Oxygen Delivery Device CPAP/BiPAP Mask   Ventilator Arm In Place No   Site Appearance neck circumference Clean and dry   Site Appearance bridge of nose Clean and dry   Site Appearance occiput Clean and dry   Strap Tightness Finger Allowance between head and device strap   Device Skin Interventions Taken No adjustments needed

## 2023-01-01 NOTE — PLAN OF CARE
"  Problem: Plan of Care - These are the overarching goals to be used throughout the patient stay.    Goal: Optimal Comfort and Wellbeing  Outcome: Not Progressing   Goal Outcome Evaluation:    Patient angry and frustrated with the pain team for decreasing his pain medication. He is threatening to leave AMA tomorrow if it is not addressed and even said \"I guess I'll have to give a call to the streets to get some, if it's not adjusted.\" However, his objective appearance appears to mostly calm and relaxed, not groaning in pain. He does have bruising on his left hip and thigh, due to his fall on 12/27 and he complains of his pain being due to that. He was agreeable to use the BiPap tonight, using now.   "

## 2023-01-01 NOTE — PLAN OF CARE
Problem: Pain Acute  Goal: Optimal Pain Control and Function  2023 1659 by Paola Swartz, RN  Outcome: Progressing  20238 by Paola Swartz, RN  Outcome: Progressing     Problem: Fall Injury Risk  Goal: Absence of Fall and Fall-Related Injury  Outcome: Progressing   Goal Outcome Evaluation:       Pt is AXOX4, NSR, on 2L NC with a baseline of 2.5L, and assistx1 with a walker. Acute sherwood removed this shift. Pt has not yet voided, bladder scanned for 380, pt said they would like to wait awhile longer before trying again to urinate. Pt c/o 5/10 generalized back pain, refused pain medications. Pt initially refused some medications, reproached and pt took more of the medications. Continue to monitor. B, 124

## 2023-01-01 NOTE — PROGRESS NOTES
PT is currently on 4L NC with an SpO2 of 95%.  gave Duoneb treatment x2, BS post treatment no change, patient tolerated treatment well. RT will cont to monitor.       José Miguel Merino RRT

## 2023-01-01 NOTE — PLAN OF CARE
Goal Outcome Evaluation:    Pt aox4, asleep most of day. Tele NSR. LS diminished. Pt on 3L O2. Hip pain rated at 5. 1:1 in room. Bed alarm on, call light within reach.     Problem: Pain Chronic (Persistent)  Goal: Optimal Pain Control and Function  Outcome: Adequate for Care Transition  Intervention: Develop Pain Management Plan  Recent Flowsheet Documentation  Taken 1/1/2023 0800 by Cheryl Frank RN  Pain Management Interventions:    repositioned    emotional support  Intervention: Manage Persistent Pain  Recent Flowsheet Documentation  Taken 1/1/2023 1200 by Cheryl Frank RN  Medication Review/Management: medications reviewed  Taken 1/1/2023 0800 by Cheryl Frank RN  Medication Review/Management: medications reviewed     Problem: Fall Injury Risk  Goal: Absence of Fall and Fall-Related Injury  Outcome: Adequate for Care Transition  Intervention: Identify and Manage Contributors  Recent Flowsheet Documentation  Taken 1/1/2023 1200 by Cheryl Frank RN  Medication Review/Management: medications reviewed  Taken 1/1/2023 0800 by Cheryl Frank RN  Medication Review/Management: medications reviewed  Intervention: Promote Injury-Free Environment  Recent Flowsheet Documentation  Taken 1/1/2023 1200 by Cheyrl Frank RN  Safety Promotion/Fall Prevention:    clutter free environment maintained    fall prevention program maintained    increased rounding and observation    increase visualization of patient    lighting adjusted    mobility aid in reach    nonskid shoes/slippers when out of bed    patient and family education    room organization consistent    safety round/check completed    sitter at bedside  Taken 1/1/2023 0800 by Cheryl Frank RN  Safety Promotion/Fall Prevention:    clutter free environment maintained    fall prevention program maintained    increased rounding and observation    increase visualization of patient    lighting adjusted    mobility aid in reach    nonskid  shoes/slippers when out of bed    patient and family education    room organization consistent    safety round/check completed    sitter at bedside     Problem: Hypertension Acute  Goal: Blood Pressure Within Desired Range  Outcome: Adequate for Care Transition  Intervention: Normalize Blood Pressure  Recent Flowsheet Documentation  Taken 1/1/2023 1200 by Cheryl Frank RN  Medication Review/Management: medications reviewed  Taken 1/1/2023 0800 by Cheryl Frank, RN  Medication Review/Management: medications reviewed

## 2023-01-01 NOTE — PROGRESS NOTES
Care Management Follow Up    Length of Stay (days): 4    Expected Discharge Date: 01/02/2023     Concerns to be Addressed:  1:1, placement     Patient plan of care discussed at interdisciplinary rounds: Yes    Anticipated Discharge Disposition:  TBD     Anticipated Discharge Services:    Anticipated Discharge DME:      Patient/family educated on Medicare website which has current facility and service quality ratings:    Education Provided on the Discharge Plan:    Patient/Family in Agreement with the Plan:      Referrals Placed by CM/SW:  TCU  Private pay costs discussed: Not applicable    Additional Information:  Social history:  Pt lives with adult daughter, no services and is independent at baseline. Home O2 2.5L at bedtime. Daughter said he was on hospice but now he may be on palliative care. Daughter wants pt to go to TCU for some strength. Transport TBD.    Pt still on a 1:1 will need to be off in order to qualify for TCU placement.    Pt also has and [employer] worker named  Ameba - [phone] CM reached out to her for update regarding assisted living progress but no answer CM M to call back.         Duyen Holt RN

## 2023-01-02 PROBLEM — R79.89 ELEVATED BRAIN NATRIURETIC PEPTIDE (BNP) LEVEL: Status: ACTIVE | Noted: 2022-01-01

## 2023-01-02 PROBLEM — J96.21 ACUTE ON CHRONIC RESPIRATORY FAILURE WITH HYPOXIA AND HYPERCAPNIA (H): Status: ACTIVE | Noted: 2022-01-01

## 2023-01-02 PROBLEM — J96.22 ACUTE ON CHRONIC RESPIRATORY FAILURE WITH HYPOXIA AND HYPERCAPNIA (H): Status: ACTIVE | Noted: 2022-01-01

## 2023-01-02 NOTE — CONSULTS
"ACUPUNCTURIST TREATMENT NOTE    Name: Ki Pederson  :  1955  MRN:  [id number]    Acupuncture Treatment  Patient Type: Medical  Intervention Reason: Pain  Pain Location: low back  Pre-session Pain Ratin  Post-session Pain Ratin  Patient complaint:: chronic low back pain  Initial insertions: Du 20, Yin Desir, (R) Fang Jurado, (R) Da Jessica, (R) (BL 60, BL 62, BL 65); BL 23, BL 25, (R) (Harvey Bob, GB 30, GB 29)  Number of needles inserted: 14  Number of needles removed: 14         \"Risks and benefits of acupuncture were discussed with patient. Consent for treatment was given. We thank you for the referral.\"     Pebbles Condon L.Ac.     Date:  2023  Time:  3:20 PM    "

## 2023-01-02 NOTE — PROGRESS NOTES
Pt was placed on BiPAP 10/5 rate 14 and 30% this evening and later requested to take off and back to 2L NC. At 0455, pt had coughing with desats to the mid to high 80's and was placed back to BiPAP 10/5, rate 14 and 40% with sats quicly coming back up to the mid 90's and pt reporting his breathing felt much more comfortable. BS diminished throughout.

## 2023-01-02 NOTE — PLAN OF CARE
Heart Failure Care Map  GOALS TO BE MET BEFORE DISCHARGE:    1. Decrease congestion and/or edema with diuretic therapy to achieve near optimal volume status.     Dyspnea improved: No, further care required to meet this goal. Please explain     Edema improved: Yes, satisfactory for discharge.        Last 24 hour I/O:   Intake/Output Summary (Last 24 hours) at 1/2/2023 0526  Last data filed at 1/2/2023 0149  Gross per 24 hour   Intake 1422 ml   Output 2400 ml   Net -978 ml           Net I/O and Weights since admission:   12/03 0700 - 01/02 0659  In: 9672 [P.O.:9130; I.V.:42]  Out: 62738 [Urine:62918]  Net: -7363     Vitals:    12/26/22 1938 12/27/22 0345 12/28/22 0540 12/31/22 0316   Weight: 109.4 kg (241 lb 3.2 oz) 110.5 kg (243 lb 8 oz) 111.2 kg (245 lb 1.6 oz) 108.7 kg (239 lb 9.6 oz)    01/01/23 0439 01/02/23 0331   Weight: 108.9 kg (240 lb) 104.6 kg (230 lb 9.6 oz)       2.  O2 sats > 90% on room air, or at prior home O2 therapy level.      Able to wean O2 this shift to keep sats above 90%?: No, further care required to meet this goal. Please explain Requiring 1L O2 and BIPAP at night   Does patient use Home O2? Yes-  2.5L          Current oxygenation status:   SpO2: 93 %     O2 Device: Nasal cannula, Oxygen Delivery: 1 LPM    3.  Tolerates ambulation and mobility near baseline.     Ambulation: No, further care required to meet this goal. Please explain Assist x2 w/ walker. Had fall on 12/7.   Times patient ambulated with staff this shift: 0    Please review the Heart Failure Care Map for additional HF goal outcomes.    Pt remains on 1L O2 and BIPAP at night. Initially declined BIPAP but requested it be placed ~0400.   Tele: NSR  Was able to void 550mL at 0100, PVR was 47  Pt endorsed some difficulty sleeping as he stated he slept for most of last night and during the day.       Ria Thakkar RN  1/2/2023

## 2023-01-02 NOTE — PROVIDER NOTIFICATION
01/01/23 2019   Tech Time   $Tech Time (10 minute increments) 2   Mode: CPAP/ BiPAP/ AVAPS/ AVAPS AE   CPAP/BiPAP/ AVAPS/ AVAPS AE Mode BiPAP S/T   Equipment   Device V60   Device Serial Number [id number]   CPAP/BiPAP/Settings   $CPAP/BiPAP Subsequent completed   BIPAP/CPAP On Standby On   IPAP/EPAP (cmH2O) 10/5   Rate (breaths/min) 14   Oxygen (%) 30   Timed Inspiration (sec) 0.9   IPAP RISE  Settings (V60) 3   CPAP/BiPAP Patient Parameters   IPAP (cm H2O) 10 cmH2O   EPAP (cm H2O) 5 cmH2O   Pressure Support (cm H2O) 5 cmH2O   RR Total (breaths/min) 16 breaths/min   Vt (mL) 665 mL   Minute Ventilation (L/min) 10.8 L/min   Peak Inspiratory Pressure (cm H2O) 11 cmH2O   Pt.  Leak (L/min) 30 L/min   CPAP/BiPAP/AVAPS/AVAPS AE Alarms   High Pressure (cm H2O) 22 cmH2O   Low Pressure (cm H2O) 8   Low Pressure Delay (sec) 20 sec   Lo Min Vent 3   High Rate (breaths/min) 50 breaths/min   Low Rate (breaths/min) 10   Audible Alarm set at (Volume of Alarm) 7   CPAP/BiPAP/AVAPS/AVAPS AE Patient Assessment   Interface Face Mask - Large   Skin Integrity intact: liquicell in place   Humidifier Checked Yes   RT Device Skin Assessment   Oxygen Delivery Device CPAP/BiPAP Mask   Ventilator Arm In Place No   Site Appearance neck circumference Clean and dry   Site Appearance bridge of nose Clean and dry   Site Appearance occiput Clean and dry   Strap Tightness Finger Allowance between head and device strap   Device Skin Interventions Taken No adjustments needed

## 2023-01-02 NOTE — PROGRESS NOTES
Pt is currently on 2L NC with an SpO2 of 95%.  gave Duoneb treatment x2, BS post treatment no change, patient tolerated treatment well. RT will cont to monitor.       José Miguel Merino RRT

## 2023-01-02 NOTE — PLAN OF CARE
Goal Outcome Evaluation:    Pt aox4. Tele NSR. LS diminished. Pt on 3L NC. Chronic back and hip pain relieved with tylenol. Pt refused all creams today. Bed alarm on, call light within reach.       Problem: Fall Injury Risk  Goal: Absence of Fall and Fall-Related Injury  Intervention: Identify and Manage Contributors  Recent Flowsheet Documentation  Taken 1/2/2023 1200 by Cheryl Frank RN  Medication Review/Management: medications reviewed  Taken 1/2/2023 0800 by Cheryl Frank RN  Medication Review/Management: medications reviewed  Intervention: Promote Injury-Free Environment  Recent Flowsheet Documentation  Taken 1/2/2023 1200 by Cheryl Frank RN  Safety Promotion/Fall Prevention:    activity supervised    assistive device/personal items within reach    bed alarm on    fall prevention program maintained    nonskid shoes/slippers when out of bed    room door open    safety round/check completed  Taken 1/2/2023 0800 by Cheryl Frank RN  Safety Promotion/Fall Prevention:    activity supervised    assistive device/personal items within reach    bed alarm on    fall prevention program maintained    nonskid shoes/slippers when out of bed    room door open    safety round/check completed     Problem: Hypertension Acute  Goal: Blood Pressure Within Desired Range  Intervention: Normalize Blood Pressure  Recent Flowsheet Documentation  Taken 1/2/2023 1200 by Cheryl Frank RN  Medication Review/Management: medications reviewed  Taken 1/2/2023 0800 by Cheryl Frank RN  Medication Review/Management: medications reviewed     Problem: Pain Acute  Goal: Optimal Pain Control and Function  Outcome: Progressing  Intervention: Develop Pain Management Plan  Recent Flowsheet Documentation  Taken 1/2/2023 0800 by Cheryl Frank RN  Pain Management Interventions:    repositioned    emotional support  Intervention: Prevent or Manage Pain  Recent Flowsheet Documentation  Taken 1/2/2023 1200 by  Cheryl Frank RN  Medication Review/Management: medications reviewed  Taken 1/2/2023 0800 by Cheryl Frank RN  Medication Review/Management: medications reviewed     Problem: Heart Failure  Goal: Optimal Coping  Outcome: Progressing  Goal: Optimal Cardiac Output  Outcome: Progressing  Goal: Stable Heart Rate and Rhythm  Outcome: Progressing  Goal: Optimal Functional Ability  Outcome: Progressing  Intervention: Optimize Functional Ability  Recent Flowsheet Documentation  Taken 1/2/2023 1200 by Cheryl Frank RN  Activity Management:    activity adjusted per tolerance    ambulated to bathroom  Taken 1/2/2023 0800 by Cheryl Frank RN  Activity Management:    activity adjusted per tolerance    ambulated to bathroom  Goal: Fluid and Electrolyte Balance  Outcome: Progressing  Goal: Improved Oral Intake  Outcome: Progressing  Goal: Effective Oxygenation and Ventilation  Outcome: Progressing  Intervention: Promote Airway Secretion Clearance  Recent Flowsheet Documentation  Taken 1/2/2023 1200 by Cheryl Frank RN  Cough And Deep Breathing: done with encouragement  Activity Management:    activity adjusted per tolerance    ambulated to bathroom  Taken 1/2/2023 0800 by Cheryl Frank RN  Cough And Deep Breathing: done with encouragement  Activity Management:    activity adjusted per tolerance    ambulated to bathroom  Intervention: Optimize Oxygenation and Ventilation  Recent Flowsheet Documentation  Taken 1/2/2023 0900 by Cheryl Frank RN  Head of Bed (HOB) Positioning: HOB at 30-45 degrees  Goal: Effective Breathing Pattern During Sleep  Outcome: Progressing  Intervention: Monitor and Manage Obstructive Sleep Apnea  Recent Flowsheet Documentation  Taken 1/2/2023 1200 by Cheryl Frank RN  Medication Review/Management: medications reviewed  Taken 1/2/2023 0800 by Cheryl Frank RN  Medication Review/Management: medications reviewed

## 2023-01-02 NOTE — PROGRESS NOTES
PALLIATIVE CARE SOCIAL WORK Progress Note     PCSW check in with Pt today, but he was not up for a visit. He said it would be ok to check in tomorrow.  Call to dtr to check in, was unable to reach. Will try again later.     Plan: PCSW remains available for supportive visits.     STACY Antoine, [facility]  Palliative Care Team  Team Pager: [phone]

## 2023-01-02 NOTE — PROGRESS NOTES
Care Management Follow Up    Length of Stay (days): 5    Expected Discharge Date: 01/03/2023     Concerns to be Addressed:   Placement TCU    Patient plan of care discussed at interdisciplinary rounds: Yes    Anticipated Discharge Disposition:       Anticipated Discharge Services:    Anticipated Discharge DME:      Patient/family educated on Medicare website which has current facility and service quality ratings:    Education Provided on the Discharge Plan:    Patient/Family in Agreement with the Plan:      Referrals Placed by CM/SW:    Private pay costs discussed: Not applicable    Additional Information:  Social history:  Pt lives with adult daughter, no services and is independent at baseline. Home O2 2.5L at bedtime. Daughter said he was on hospice but now he may be on palliative care. Daughter wants pt to go to TCU for some strength. Transport TBD.    Spoke to pt today about TCU and he still wants to go to get some strength. Also called [facility] pt [facility] worker and left her a voice message to see how far she has gotten on finding an [facility], no answer LVM to call back.    [facility]RE[facility] worker - [phone]         Duyen Holt RN
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
## 2023-01-02 NOTE — PROGRESS NOTES
[facility]    Medicine Progress Note - Hospitalist Service    Date of Admission:  12/26/2022    Assessment & Plan   67-year-old male with past medical history of tobacco use disorder in remission, COPD, hepatitis C without cirrhosis, atrial fibrillation, seizure disorder, cervical spine surgery, chronic pain with opiate dependence who was brought to ED for evaluation of confusion and syncope and subsequently admitted for further management on 12/26/2022.     Acute on chronic hypoxic hypercarbic respiratory failure; likely multifactorial-COPD exacerbation, heart failure, A. fib, concern for pneumonia  --Patient responded to BiPAP, seems BiPAP dependent at this time  -- Continue chronic azithromycin Monday Wednesday Friday  -- Continue DuoNeb.  Incentive spirometry, flutter valve  -- Taper steroids slowly  -- Initially Vanco and Zosyn, currently on Zosyn, complete 7 days course  -- Pulmonary consulted and appreciated input    Syncope with acute metabolic encephalopathy; likely multifactorial-improved  -- Likely due to above as patient not restarted on BiPAP at night since discontinuing hospice and over medications with narcotics and benzodiazepines at home.   --Improving with improvement on above issues and with medications adjustment.  --CT head negative for acute intracranial etiology    Acute on chronic diastolic heart failure;  -- Echo showed EF 55 to 60% with no wall motion abnormality.  -- Patient responded to IV diuretics then transition to oral, continue  -- Monitor intake/output, weight, labs closely    Persistent atrial fibrillation;  -- Sinus bradycardia with heart rate 57 on admission EKG.  --Reported tachycardia with holding home amiodarone and Cardizem.  Currently PTA midodrine resumed, consider resuming Cardizem gradually  -- On Xarelto, continue    Chronic pain syndrome;  -- Appreciated pain team input.  Continue pain regimen per pain team    Depression with suicidal  "ideation;  -- Appreciated psychiatric input, patient not actively suicidal.  --Continue medications per psychiatry    YUNIEL on CKD likely due to heart failure;  -- Creatinine improving with diuretics.  Monitor vitals    Physical deconditioning; PT, OT       Diet: Combination Diet Moderate Consistent Carb (60 g CHO per Meal) Diet    DVT Prophylaxis: DOAC  Fine Catheter: Not present  Lines: None     Cardiac Monitoring: ACTIVE order. Indication: Drug overdose (24 hours)  Code Status: No CPR- Do NOT Intubate      Clinically Significant Risk Factors                        # Obesity: Estimated body mass index is 31.27 kg/m  as calculated from the following:    Height as of this encounter: 1.829 m (6' 0.01\").    Weight as of this encounter: 104.6 kg (230 lb 9.6 oz).          Disposition Plan      Expected Discharge Date: 01/03/2023    Discharge Delays: Other (Add Comment)    Discharge Comments: pending respiratory and cognitive improvement. Refusing BIPAP 12/31.  1:1 12/30. on BiPAP and 1:1 continues.          Chris MARTE MD  Hospitalist Service  [facility]  Securely message with [employer] (more info)  Text page via [facility] Paging/Directory   ______________________________________________________________________    Interval History   Patient is new to me.  Patient seen and examined.  Notes, labs, imaging report personally reviewed.  Patient reported breathing better with the BiPAP.  Denied chest pain.  Reported intermittent cough but mostly nonproductive.  Denied feeling fever or chills.  Reported ongoing chronic back pain but is stable.  Discussed with nursing staffs.    Physical Exam   Vital Signs: Temp: 98.8  F (37.1  C) Temp src: Oral BP: 133/84 Pulse: 75   Resp: 20 SpO2: 92 % O2 Device: Nasal cannula with humidification Oxygen Delivery: 3 LPM  Weight: 230 lbs 9.6 oz      General: Not in obvious distress.  HEENT: Normocephalic, supple neck  Chest: Decreased but clear to auscultation bilateral " anteriorly, occasional expiratory wheezing  Heart: S1S2 normal, irregular  Abdomen: Soft.  Obese, nontender. Bowel sounds- active.  Neuro: alert and awake, follows simple commands, moves all extremities        Medical Decision Making             Data     I have personally reviewed the following data over the past 24 hrs:    N/A  \   N/A   / N/A     140 101 19.6 /  168 (H)   3.7 27 1.03 \       Imaging results reviewed over the past 24 hrs:   No results found for this or any previous visit (from the past 24 hour(s)).

## 2023-01-02 NOTE — CONSULTS
INITIAL PULMONARY   1/2/2023      Admit Date: 12/26/2022  Hospital Day: 5   CODE: No CPR- Do NOT Intubate    Reason for Consult: Acute on chronic hypoxemic respiratory failure      Assessment/Plan:   Ki Pederson is a 67 year old male with a past medical history significant for tobacco smoking in remission, IVDU in remission, ongoing chewing tobacco dependence, hepatitis C without cirrhosis, COPD, colon polyps, TIA, HTN, atrial fibrillation with RVR, C-spine surgery, depression, RLS, seizure disorder, lumbar disc disease, atrial fibrillation with RVR and hemangiomas requiring surgical removal  admitted for syncope and encephalopathy. He had recently been enrolled in hospice but un-enrolled to undergo placement of a PPM. He has continued to have ongoing hypoxia off supplemental oxygen.    Recommend:  1. Acute on chronic hypoxemic, hypercarbic respiratory failure: Has PFT's consistent with very severe reversible obstructive disease with moderate reduction in his diffusion capacity indicating a likely asthma/COPD overlap syndrome presenting with encephalopathy likely secondary to hypoxia from a flare of his underlying lung disease coupled with non-compliance with his supplemental oxygen therapy.    Continue outpatient Azithromycin 250mg Q M W, Fr.    Continue prednisone at a slow taper over 2 weeks.    Continue scheduled duonebs.    Titrate supplemental oxygen to maintain saturations>88%.    Will talk to RT about home NIV as he previously had this and was discontinued due to his hospice status.    Continue as needed opiates.  2. HFpEF and A-Fib: Is on diuresis for this and additionally prescribed rate control agents for his A-Fib.    Continue telemetry monitoring.    Continue diuresis.    Amiodarone and diltiazem per primary team.  3. Syncope:Likely secondary to #1. Had a second episode while in the hospital and underwent repeat head imaging which was unrevealing. Is presently on fall precautions.  4. Depression:  Noted to have some ? Suicidal ideations but upon evaluation by psychiatry, patient noted to be more encephalopathic than actively suicidal. They recommended minimizing delirium inducing medications. Challenging to do given need for anxiolysis.      Thank you for involving us in the care of this patient. We will continue to follow.    Jaye Perales MD  Pulmonary and Critical Care  (p) [phone]        HPI:   CCx:Confusion, syncope    HPI:Mr. Pederson is a 67 year old gentleman with a past medical history significant for tobacco smoking in remission, IVDU in remission, ongoing chewing tobacco dependence, hepatitis C without cirrhosis, COPD, colon polyps, TIA, HTN, atrial fibrillation with RVR, C-spine surgery, depression, RLS, seizure disorder, lumbar disc disease, atrial fibrillation with RVR and hemangiomas requiring surgical removal  admitted for the aforementioned chief complaint.         ROS: Pertinent positives alluded to in the HPI. Remainder of 10 point ROS is negative.                                                                                                                                                       Medical/Surgical history:  1. Very severe COPD with oxygen dependence  2. Hepatitis C  3. Colonic polyps  4. A-fib  5. Depression  6. RLS  7. Seizure disorder    Allergies:  Reviewed in Epic    PTA medications:  Medications Prior to Admission   Medication Sig Dispense Refill Last Dose     albuterol (PROAIR HFA/PROVENTIL HFA/VENTOLIN HFA) 108 (90 Base) MCG/ACT inhaler Inhale 2 puffs into the lungs every 3 hours 18 g 11      amiodarone (PACERONE) 200 MG tablet Take 1 tablet (200 mg) by mouth daily 30 tablet 11 12/26/2022     azithromycin (ZITHROMAX) 250 MG tablet USE ONE TAB BY MOUTH ONCE DAILY ON Monday, Wednesday, Friday ONGOING 36 tablet 3 12/26/2022     citalopram (CELEXA) 40 MG tablet Take 1 tablet (40 mg) by mouth daily 60 tablet 3 12/26/2022     clonazePAM (KLONOPIN) 1 MG tablet Take 1-2  tablets (1-2 mg) by mouth nightly as needed for anxiety or sleep (Patient taking differently: Take 1-2 mg by mouth 2 times daily as needed for anxiety or sleep) 60 tablet 0      clonazePAM (KLONOPIN) 2 MG tablet Take 2 mg by mouth At Bedtime   12/25/2022     diltiazem ER COATED BEADS (CARDIZEM CD/CARTIA XT) 300 MG 24 hr capsule Take 1 capsule (300 mg) by mouth daily 30 capsule 3 12/26/2022     famotidine (PEPCID) 20 MG tablet Take 1 tablet (20 mg) by mouth daily 60 tablet 0 12/26/2022     furosemide (LASIX) 40 MG tablet Take 1 tablet (40 mg) by mouth daily 90 tablet 3 12/26/2022 at 80 mg     gabapentin (NEURONTIN) 300 MG capsule Take 2 capsules (600 mg) by mouth 3 times daily 540 capsule 3 12/26/2022 at x 2     guaiFENesin (MUCINEX) 600 MG 12 hr tablet Take 2 tablets (1,200 mg) by mouth daily 120 tablet 0 12/26/2022     ipratropium - albuterol 0.5 mg/2.5 mg/3 mL (DUONEB) 0.5-2.5 (3) MG/3ML neb solution Nebulize tid for 3 days and then tid prn for sob (Patient taking differently: Take 1 vial by nebulization 4 times daily Nebulize tid for 3 days and then tid prn for sob) 90 mL 3 12/26/2022 at x2     magnesium 250 MG tablet Take 1 tablet (250 mg) by mouth At Bedtime 90 tablet 3 12/25/2022     metoprolol tartrate (LOPRESSOR) 100 MG tablet Take 0.5 tablets (50 mg) by mouth 2 times daily 60 tablet 11 12/26/2022 at x1 am     naloxone (NARCAN) 4 MG/0.1ML nasal spray Spray 1 spray (4 mg) into one nostril alternating nostrils once as needed for opioid reversal every 2-3 minutes until assistance arrives 0.2 mL 0      oxyCODONE HCl (ROXICODONE) 20 MG TABS immediate release tablet Take 20 mg by mouth every 4 hours (while awake) Not to exceed 5 tabs per day 150 tablet 0 12/26/2022 at x 2     predniSONE (DELTASONE) 20 MG tablet Take 1 tablet (20 mg) by mouth daily 30 tablet 3 12/26/2022     rivaroxaban ANTICOAGULANT (XARELTO) 20 MG TABS tablet Take 1 tablet (20 mg) by mouth daily (with dinner) 90 tablet 3 12/25/2022       Family  "Hx:  Reviewed in Epic.    Social Hx:  Reviewed in Epic.    Exam/Data:   ROS: 10 point ROS obtained, pertinant positives alluded to in HPI    Vitals  BP (!) 140/82 (BP Location: Left arm)   Pulse 71   Temp 98.5  F (36.9  C) (Oral)   Resp 20   Ht 1.829 m (6' 0.01\")   Wt 104.6 kg (230 lb 9.6 oz)   SpO2 93%   BMI 31.27 kg/m       I/O last 3 completed shifts:  In: 1422 [P.O.:1380; I.V.:42]  Out: 2400 [Urine:2400]  Weight change: -4.264 kg (-9 lb 6.4 oz)  [unfilled]  EXAM:  Physical Exam  HENT:      Head: Atraumatic.   Cardiovascular:      Rate and Rhythm: Normal rate. Rhythm irregular.   Pulmonary:      Effort: Pulmonary effort is normal.      Comments: Diminished sounds at the bases BL.  Abdominal:      General: Abdomen is flat.   Skin:     General: Skin is warm.   Neurological:      Mental Status: He is alert.           Medications:       acetaminophen  650 mg Oral Q8H JASIEL     albuterol  2 puff Inhalation Q3H     amiodarone  200 mg Oral Daily     azithromycin  250 mg Oral Q Mon Wed Fri AM     citalopram  40 mg Oral Daily     clonazePAM  1 mg Oral At Bedtime     diclofenac  2 g Topical 4x Daily     [Held by provider] diltiazem ER COATED BEADS  120 mg Oral Daily     famotidine  20 mg Oral Daily     folic acid  1 mg Oral Daily     furosemide  20 mg Oral BID     gabapentin  400 mg Oral TID     guaiFENesin  1,200 mg Oral Daily     insulin aspart  1-3 Units Subcutaneous TID AC     insulin aspart  1-3 Units Subcutaneous At Bedtime     ipratropium - albuterol 0.5 mg/2.5 mg/3 mL  3 mL Nebulization 4x daily     lidocaine   Topical 4x Daily     multivitamin w/minerals  1 tablet Oral Daily     nicotine  1 patch Transdermal Daily     nicotine   Transdermal Q8H     piperacillin-tazobactam  3.375 g Intravenous Q8H     predniSONE  20 mg Oral Daily     rivaroxaban ANTICOAGULANT  20 mg Oral Daily with supper         DATA  All laboratory and radiology has been personally reviewed by myself today.  Recent Labs   Lab " 01/01/23  0520   WBC 7.1   HGB 10.0*   HCT 32.4*        Recent Labs   Lab 01/02/23  0505 01/01/23  0520 12/31/22  0451 12/27/22  0446 12/26/22  1454    142 134*   < > 140   CO2 27 32* 31*   < > 28   BUN 19.6 16.9 15.3   < > 22.2   ALKPHOS  --   --   --   --  58   ALT  --   --   --   --  24   AST  --   --   --   --  26    < > = values in this interval not displayed.       MICRO:        PFT DATA 7/1/19:  FEV1/FVC is 0.46 and is reduced.  FEV1 is 34% predicted and is reduced.  FVC is 57% predicted and is reduced.  There was improvement in spirometry after a single inhaled dose of bronchodilator.  TLC is 120% predicted and is normal.  RV is 277% predicted and is increased.  DLCO is 55% predicted and is reduced when it   is corrected for hemoglobin.  The flow volume loop shows obstructive morphology.     Impression:  Full Pulmonary Function Test is abnormal. Spirometry is consistent with very severe obstructive ventilatory defect.  Spirometry is consistent with reversibility.  There is no hyperinflation.  There is air-trapping.  Diffusion capacity when corrected for hemoglobin is moderately reduced.      IMAGING:   TTE 12/31/22:  Interpretation Summary     1. Normal left ventricular size and systolic performance with a visually estimated ejection fraction of 55-60%.  2. No significant valvular heart disease is identified on this study.  3. Normal right ventricular size and systolic performance.  4. There is mild left atrial enlargement.     When compared to the prior real-time echocardiogram dated 23 September 2022, there has been an interval improvement in left ventricular systolic performance with normalization of the ejection fraction.  ______________________________________________________________________________  Left ventricle:  Normal left ventricular size and systolic performance with a visually estimated ejection fraction of 55-60%. There is normal regional wall motion. Left ventricular wall  thickness is normal.     Assessment of LV Diastolic Function: The cumulative findings suggest impaired diastolic filling [The septal e' velocity is > 7 cm/s & lateral e' velocity is < 10 cm/s. The average E/e' is >14. TR velocity is > 2.8 m/s. Left atrial volume index is greater than 34 mL/mÂ ].     Assessment of left atrial pressure (LAP): The cumulative findings suggest moderately elevated left atrial pressure (the E/A is > 0.8 and <2.0 plus 2 or 3 of 3 of the following present: Average E/e' > 14, TRvel > 2.8 m/s, and/or LA vol. index > 34 ml/mÂ  ).     Right ventricle:  Normal right ventricular size and systolic performance.     Left atrium:  There is mild left atrial enlargement.     Right atrium:  The right atrium is of normal size.     IVC:  The IVC is mildly dilated.     Aortic valve:  The aortic valve is comprised of three cusps. No significant aortic stenosis or aortic insufficiency is detected on this study.     Mitral valve:  The mitral valve appears morphologically normal. There is trace mitral insufficiency.     Tricuspid valve:  The tricuspid valve is grossly morphologically normal. There is trace-mild tricuspid insufficiency.     Pulmonic valve:  The pulmonic valve is grossly morphologically normal.     Thoracic aorta:  The aortic root and proximal ascending aorta are of normal dimension.     Pericardium:  There is no significant pericardial effusion.    CXR 12/29/22:  Stable enlarged cardiac silhouette. Newly visualized opacity overlying the right costophrenic sulcus, could represent pneumonia or superimposed soft tissues. No definite pleural effusion or pneumothorax. Bones are unchanged. Cerclage wires overlying the neck are again noted.     CT Head w/o Contrast 12/28/22:  1.  No acute intracranial process.    Jaye Perales  Pulm/CC  [id number]

## 2023-01-02 NOTE — PROGRESS NOTES
Found pt with bipap off and SpO2 in the mid 80s. Placed pt on 3L NC and spO2 increased to mid 90s. Pt given duoneb tx. BS expiratory wheeze with increased aeration post neb. RT to continue to monitor.    Leydi Hewitt, RT

## 2023-01-03 NOTE — PROGRESS NOTES
Pt currently on RA. BS are diminished, with increased aeration post duoneb tx. Bipap on standby, only to use while sleeping. 10/5, 14, 28%. RT to continue to monitor.     Leydi Hewitt, RT

## 2023-01-03 NOTE — PLAN OF CARE
The pt is a/o x 4; can be forgetful at times.  Assist of 1 w/walker.  Per pt; he is oxygen dependent at 2.5 lpm/nc. He was on 3 lpm with saturations in the mid to high 90's. Pt weaned off oxygen;  oxygen sats in the 92-94% RA.  LS: diminished throughout. Cough is congested and non-productive.  Cardiac rhythm: NSR.    Klonopin given at HS for anxiety.     Problem: Pain Chronic (Persistent)  Goal: Optimal Pain Control and Function  1/2/2023 1838 by Yesica De Jesus, RN  Outcome: Progressing    He rates chronic back and hip pain at 2/10; declined tylenol and ointments.

## 2023-01-03 NOTE — PLAN OF CARE
Problem: Pain Chronic (Persistent)  Goal: Optimal Pain Control and Function  Outcome: Progressing  Intervention: Develop Pain Management Plan  Recent Flowsheet Documentation  Taken 1/3/2023 0336 by Elissa Suarez RN  Pain Management Interventions: medication (see MAR)  Intervention: Manage Persistent Pain  Recent Flowsheet Documentation  Taken 1/3/2023 0336 by Elissa Saurez, RN  Medication Review/Management: medications reviewed  Taken 1/3/2023 0023 by Elissa Suarez RN  Medication Review/Management: medications reviewed     Problem: Plan of Care - These are the overarching goals to be used throughout the patient stay.    Goal: Optimal Comfort and Wellbeing  Outcome: Progressing  Intervention: Monitor Pain and Promote Comfort  Recent Flowsheet Documentation  Taken 1/3/2023 0336 by Elissa Suarez RN  Pain Management Interventions: medication (see MAR)     Problem: Gas Exchange Impaired  Goal: Optimal Gas Exchange  Outcome: Progressing     Goal Outcome Evaluation:    VSS overnight,  pain controlled to about a 3 with scheduled and prn pain medication.  PT wore bipap some of the night and then removed it.  O2 sats in between 90-94 on room air. Pt. Struggled with sleeping overnight.

## 2023-01-03 NOTE — PROGRESS NOTES
Pt wore Bipap overnight.  Bipap 10/5, rate 14, 28%, large face mask with liquicel in place to prevent breakdown.  Pt came off  around 3:30, SPO2 on room air 97%.  RT will continue to monitor.

## 2023-01-03 NOTE — PROGRESS NOTES
[facility]  Palliative Care Daily Progress Note      Requesting Clinician / Team: Dr Fernandez  Reason for consult: Goals of Care (side effects from medications)     Summary/Recommendations:     Goals of care:  Stopped by to see pt who was finishing up with PT. He is alert and able to engage in some good discussion today. He is comfortable, sitting in the recliner, on room air. He recalls seeing the Cardiologist this AM and is aware that he does not need a pacemaker at this time. He is aware that TCU is being explored (thinks it is a NH: spent tie explaining the difference). Also reviewed that he had previously stated he wished he resided in a place where there were more people around (?[place] vs other). He affirms his goals are restorative and no longer wants to be on hospice. He also affirms that his dtr, then his son, are his health care agents. He does have a hx of chronic pain but manages with it. He also has anxiety, yet says he is coping with that too. He would welcome drawing supplies which palliative care [facility] will bring him. Appreciate Addiction Medicine, Psychiatry, and the Pain Team work with him, as we need a safe plan of care in the setting of restorative goals. Appreciate Pain Pharmacist discussion re: home medication safety, access to medications, less opioid tolerant, and high risk for adverse drug effects.      Advanced care planning  -Previous Healthcare Directive: Has a POLST on file  -Surrogate Medical Decision Maker: Dtr and son named as primary health care agents.  -CODE STATUS:DNR/DNI (dtr confirmed this)     Symptom management  Chronic Pain, hx of. Tremors and somnolence better than last week. Just had 5 mg Oxycodone or 7.5 mg OME in the last 24 hours.   -Appreciate Psychiatry evaluation and recommendations. Pt indicates he appreciates discussion re: his medications/interactions. He is open to considering some adjustments including  Klonopin.  -Appreciate Addiction Medicine and Acute Pain Team consultation while inpatient for advice on EVANGELISTA mgmt options with chronic pain ( buprenorphine or methadone).  -Acupuncture and Healing Touch.  -May need inpatient mental health care for safety and medication adjustments, titrations.      Psychosocial and support needs  -Appreciate Palliative Care LICSW seeing pt.  -Appreciate Spiritual Care connecting with pt.         Thank you for the opportunity to participate in the care of this patient and family.      During regular M-F work hours -- if you are not sure who specifically to contact -- please contact us by calling [phone].        Palliative Care Assessment     Information gathered today from:chart review, MD.  Ki Pederson is a 67 year old male with a history of hypertension, ASCVD, COPD, 4 L of oxygen at home, nicotine abuse, chronic pain/hx remote IVD use, atrial fibrillation, MI, TIA, and acute on systolic heart,, chronic hepatitis C, restless leg syndrome, peripheral neuropathy, spinal stenosis and CVA without any deficits. who presented to the ED on Dec. 26th  With syncope.  Admitted to the hospital with syncope.   Previous hospitalizations: Nov. 2022 ER drug overdose  Other specialities following this admission: Psychiatry  Per ER, recent changes: Per patient he had x1 episode of syncope while sitting.Per nurse/EMS reported patient took 8mg of Klonopin. Patient is on 2.5L of oxygen at home. On arrival patient was in a state of drowsiness and nurse had to give a sternal rub for patient to wake up. Per patient's ex wife, last several days states that he feels a copd flare coming on. Passed out in chair. Unresponsive. On 2L O2 at baseline, but was NOT on O2.  Family put O2 on, sats still in 80s, so family bumped up to 3L w O2 89%.  Called 911. Patient doesn't have control of meds. When he gets hypoxic, he says things that don't make sense.  Stated he took 8 klonopin between 8am and 3pm.   Ex-wife has meds in safe in HER room, he can't get meds.  Ex-wife counted pills and none of them are missing. Ex-wife states yest c/o chills yest. They started empiric doxy yest 1 dose for copd sx. Swelling in legs x2d, given 80mg lasix daily instead of usual 40mg.  3lb wt gain. No longer on hospice.  Appt 1/4/23 with [facility].      Today, the patient was seen for:  Goals of Care (side effects from medications)     Previous Palliative Care Encounters:  Saw Palliative Care in Sept. 2022, referred to Hospice.      Palliative Care Note from Wednesday:  Met with pt alone (along with sitter) today for a palliative care visit (he has seen palliative care 2x before, prior to enrolling in hospice). Reviewed the role of palliative care and the reason for the consult. Omar indicates he stopped [facility] ~1-2 months ago, when was advised to see Cardiology re: a PPM. He however did not follow up. Nor did he connect with Outpatient Palliative Care (appears to have an appt Jan., 2023. He would value a Cardiology evaluation). Today, is sitting on the edge of the bed. He is at times sleepy, with eyes closed. Also has shaking, which he said started after he had a fall 2 days ago (says has not had this before; does have a hx of RLS syndrome and some ETOH yet very little per month now; head CT ordered). He indicates that he is struggling with end of life decisions. Being on hospice was hard and created some family conflicts with family being asked to assist with his care, ?medications. Most recently he has moved in with his ex-wife and 2 other family (x 1 month). He wishes more family were around with him. He is unable to articulate what is most important to him at this time. He is open to more medical evaluation, medication review, and discussion re: goals.     Second Palliative Care Meeting last week  Spoke with giselle Long by telephone (she is pts 1st HCA on his 2019 valid health care directive) from [phone] (she has a  sick child at home and was unable to come in). She indicates that she used to be pts primary caregiver yet with nursing school and a new baby, her brother Raleigh was more involved. She however is now back involved as the primary contact, does believe she knows pts medications well. She confirms that pts goals are now restorative. She shares that her Mom was invovled for a while with pts medications. She is aware of Addiction Medicine working on a taper off of Klonopin (she said that he did not take this everyday; was taking as needed, however today, pt says he was taking 6 Klonopin at home and then per him stopped cold turn here; again reviewed with him, the important of getting an accurate history for safe dosing). Also reviewed the pain team is involved. Did share how important it is to understand the exact amounts of medications are taking at home, in case it is less or more, so we do not over or under prescribe. She indicates she had a good conversation with Hospitalist yesterday and feels this may have been reviewed. Explained to Omar that he is on a taper getting off of Klonopin.      She wishes that Omar could get discharged to a TCU then perhaps to a another type of location like assisted living (?wondered if a small supportive group home might be worth exploring). She does not think he can have independent living. He does not want a NH. She does believe it is going to take some time adjusting to his new restorative goals. Omar says he is open to TCU. He also is open to having a new living arrangement. Did not have indepth discussion with pt today as is still a little altered yet improving.     Summary of Palliative Encounter:  I  discussed the reason for Palliative Care Referral and our role in symptom management, patient family communication and understanding their choices for medical treatment and providing  guidance in making difficult decisions in the framework of focusing on patient comfort and  quality of life.    Reviewed current conditions and treatments including recent clinical decisions/changes/goals, coping, symptom mgmt, cardiology evaluation, hospice.         Prognosis, Goals, and/or Advance Care Planning were addressed today: Yes, w/dtr/1st HCA (confirmed DNR/DNI)    Mood, coping, and/or meaning in the context of serious illness were addressed today: Yes. A little anxious, but coping.      Functional Status:     Functional Status two weeks prior to hospitalization: , PPS:   40%- 1. Mainly in bed; 2. Unable to do most activity, extensive disease; 3. Mainly assistance; 4. Normal or reduced; 5. Full or drowsy +/- confusion. Uses a walker.     Functional status Current:  , PPS:   40%- 1. Mainly in bed; 2. Unable to do most activity, extensive disease; 3. Mainly assistance; 4. Normal or reduced; 5. Full or drowsy +/- confusion     Prognosis, Goals, & Planning:   Prognosis (quality & quantity): Previously was hospice (as long ago as 2 years ago). Does not want this any longer.   High risk of death within one year? Uncertain     Patient's decision making preferences: With son and dtr        Capacity evaluation:    Pt Omar is regaining capacity to make a decision regarding his care, however would advise important medical decisions best be made together with HCAs.             I have concerns about the patient/family's health literacy today:No           Patient has a completed Health Care Directive: Has done POLST       Code status:DNR/DNI     Social:     Relationships: . Lives now with ex-wife Dereck as well as ? Children Thais and Michael. Has 11 children and 72 grandchildren.     Hobbies: He loves to cook, yet is dangerous.     Spirituality: Is spiritual.     Alcohol: Reports very little      Key Palliative Symptom Data:  Breathing comfortable on RA. Some pain but manageable. Some anxiety yet manageable.        Review of Systems:     Besides above, an additional ROS system was not done today.            Medications:     I have reviewed this patient's medication profile and medications during this hospitalization.    Noted meds:  Oxycodone           Physical Exam:   Vitals were reviewed  Temp: 98.6  F (37  C) Temp src: Oral BP: 127/81 Pulse: 84   Resp: 20 SpO2: 92 % O2 Device: None (Room air) Oxygen Delivery: 3 LPM  Constitutional:   awake, alert, cooperative, no apparent distress, and appears stated age              TTS: I have personally spent a total of 25 minutes  today on the unit in review of medical record, consultation with the medical providers and assessment of patient, with more than 50% of this time spent in counseling, coordination of care, and discussion with pt, Palliative Care LICSW, and Pain Pharmacist re: pts goals of cre, discharge plans to TCU and ? Other residential decisions byond, Cardiology consultation/evaluation, symptom management (acupuncture), emotional support and development of plan of care.    DESHAWN Avila, FNP-BC, PMHNP-BC  Palliative Care Nurse Practitioner  [facility]  [phone]      During regular M-F work hours -- if you are not sure who specifically to contact -- please contact us by calling [phone].

## 2023-01-03 NOTE — PROGRESS NOTES
Began shift with patient on oxygen at 4 LPM via nasal cannula. Weaned oxygen to 2 LPM.    Neb treatments given as ordered and tolerated well.    Cristino Rangel, RT

## 2023-01-03 NOTE — PROGRESS NOTES
"[facility]    Medicine Progress Note - Hospitalist Service    Date of Admission:  12/26/2022    Assessment & Plan   67-year-old male with past medical history of tobacco use disorder in remission, COPD, hepatitis C without cirrhosis, atrial fibrillation, seizure disorder, cervical spine surgery, chronic pain with opiate dependence who was brought to ED for evaluation of confusion and syncope and subsequently admitted for further management on 12/26/2022.  Per previous chart, patient discharged himself from hospice and needed to have a pacemaker placement.    Acute on chronic hypoxic hypercarbic respiratory failure; likely multifactorial-COPD exacerbation, heart failure, A. fib, concern for pneumonia  --Patient responded to BiPAP, seems BiPAP dependent at this time  -- Continue chronic azithromycin Monday Wednesday Friday  -- Continue DuoNeb.  Incentive spirometry, flutter valve  -- Taper steroids slowly  -- Initially Vanco and Zosyn, currently on Zosyn, complete 7 days course  -- Appreciated pulmonary input    Syncope; likely multifactorial- likely due to above and over medications.  Patient does have history of A. fib and reported supposed to have PPM per patient  Previous hospitalist note also documents bradycardia and held amiodarone, Cardizem on admission then reported tachycardia and resumed amiodarone and recommending gradually resuming Cardizem.  Today discussed with patient and he reports \"I came off hospice for pacemaker placement\".  --Unable to find detail about PPM recommendation.  Did reviewed previous cardiologist notes, patient did have ablation on 9/23/2022.  --Cardiology consulted, personally discussed with Dr. Rothman who is going to evaluate patient later today  --CT head negative for acute intracranial etiology  -- Continue telemetry    Persistent atrial fibrillation;  -- Sinus bradycardia with heart rate 57 on admission EKG and held home amiodarone and Cardizem on " "admission. Per previous hospitalist notes, reported in hospital tachycardia and resumed home amiodarone and recommended gradually resume Cardizem  -- On Xarelto, continue.  -- Cardiology consulted, refer above    Acute on chronic systolic and diastolic heart failure;  -- Echo showed improved EF to 55 to 60% with no wall motion abnormality.  -- Patient responded to IV diuretics then transition to oral, continue.  -- Monitor intake/output, weight, labs closely    Chronic pain syndrome;  -- Appreciated pain team input.  Continue pain regimen per pain team.    Depression with suicidal ideation;  -- Appreciated psychiatric input, patient not actively suicidal.  --Continue medications per psychiatry.  Continue clinical monitoring    YUNIEL on CKD likely due to heart failure;  -- Creatinine improving with diuretics.  Monitor vitals    Physical deconditioning; PT, OT         Diet: Combination Diet Moderate Consistent Carb (60 g CHO per Meal) Diet    DVT Prophylaxis: DOAC  Fine Catheter: Not present  Lines: None     Cardiac Monitoring: ACTIVE order. Indication: Tachyarrhythmias, acute (48 hours)  Code Status: No CPR- Do NOT Intubate      Clinically Significant Risk Factors                        # Obesity: Estimated body mass index is 31.3 kg/m  as calculated from the following:    Height as of this encounter: 1.829 m (6' 0.01\").    Weight as of this encounter: 104.7 kg (230 lb 12.8 oz).          Disposition Plan     Expected Discharge Date: 01/03/2023    Discharge Delays: Other (Add Comment)    Discharge Comments: pending respiratory and cognitive improvement. Refusing BIPAP 12/31.  1:1 12/30. on BiPAP and 1:1 continues.          Chris MARTE MD  Hospitalist Service  [facility]  Securely message with [employer] (more info)  Text page via [facility] Paging/Directory   ______________________________________________________________________    Interval History   Patient seen and examined.  Notes, labs, imaging report " personally reviewed.  Patient lying in the bed, reported used BiPAP at night and reported that helps with his breathing.  Currently denied feeling short of breath, chest pain or dizziness.  Denied abdomen pain, nausea, vomiting.  Discussed with nursing staff at bedside.  Discussed with cardiologist.    Physical Exam   Vital Signs: Temp: 98.1  F (36.7  C) Temp src: Oral BP: (!) 155/95 Pulse: 75   Resp: 20 SpO2: 90 % O2 Device: None (Room air) Oxygen Delivery: 3 LPM  Weight: 230 lbs 12.8 oz      General: Not in obvious distress.  HEENT: Normocephalic, supple neck  Chest: Decreased but clear to auscultation bilateral anteriorly, no wheezing  Heart: S1S2 normal, irregular  Abdomen: Soft. NT, ND. Bowel sounds- active.  Extremities: No legs swelling  Neuro: alert and awake, grossly non-focal          Medical Decision Making             Data         Imaging results reviewed over the past 24 hrs:   No results found for this or any previous visit (from the past 24 hour(s)).

## 2023-01-03 NOTE — PROGRESS NOTES
Care Management Follow Up    Length of Stay (days): 6    Expected Discharge Date: 01/04/2023     Concerns to be Addressed:  Cardio consult, TCU placement     Patient plan of care discussed at interdisciplinary rounds: Yes    Anticipated Discharge Disposition:  TCU     Anticipated Discharge Services:    Anticipated Discharge DME:      Patient/family educated on Medicare website which has current facility and service quality ratings:    Education Provided on the Discharge Plan:    Patient/Family in Agreement with the Plan:      Referrals Placed by CM/SW:  TCu  Private pay costs discussed: Not applicable    Additional Information:  Social history:  Pt lives with adult daughter, no services and is independent at baseline. Home O2 2.5L at bedtime. Daughter said he was on hospice but now he may be on palliative care. Daughter wants pt to go to TCU for some strength. Transport TBD.     When I spoke to pt about hospice he told writer the was on hospice but then came off last fall in order to have a pacemaker. Spoke to pt today about TCU and he still wants to go to get some strength. [facility] to let her know that pt is in the hospital and that he will likely go to TCU at discharge and to get an update on how the search for long term placement is going for the pt. She told writer that she has found several [facility] that meet his criteria just waiting for the family to go and do a tour and accept a facility. She would like to be updated on pt discharge plan.    Pt has been off 1:1 for over 48 hours.    RNCM to follow for medical progression, recommendations, and final discharge plan..surya Arreola [facility] worker - [phone]    Duyen Holt RN

## 2023-01-03 NOTE — PLAN OF CARE
Goal Outcome Evaluation:    Pt aox4. Tele NSR. VSS. LS congested, pt sats >90 on RA, and 2-3L with sleep. Pain rated at 5 in back and hips, relieved with tylenol. Pt refused voltaren and lidocaine cream today.  and 144. Bed alarm on, call light within reach.        Heart Failure Care Map  GOALS TO BE MET BEFORE DISCHARGE:    1. Decrease congestion and/or edema with diuretic therapy to achieve near optimal volume status.     Dyspnea improved: Yes, satisfactory for discharge.   Edema improved: Yes, satisfactory for discharge.        Last 24 hour I/O:   Intake/Output Summary (Last 24 hours) at 1/3/2023 1051  Last data filed at 1/3/2023 0900  Gross per 24 hour   Intake 580 ml   Output --   Net 580 ml           Net I/O and Weights since admission:   12/04 1500 - 01/03 1459  In: 22638 [P.O.:36813; I.V.:42]  Out: 01746 [Urine:94095]  Net: -6363     Vitals:    12/26/22 1938 12/27/22 0345 12/28/22 0540 12/31/22 0316   Weight: 109.4 kg (241 lb 3.2 oz) 110.5 kg (243 lb 8 oz) 111.2 kg (245 lb 1.6 oz) 108.7 kg (239 lb 9.6 oz)    01/01/23 0439 01/02/23 0331 01/03/23 0336   Weight: 108.9 kg (240 lb) 104.6 kg (230 lb 9.6 oz) 104.7 kg (230 lb 12.8 oz)       2.  O2 sats > 90% on room air, or at prior home O2 therapy level.      Able to wean O2 this shift to keep sats above 90%?: Yes, satisfactory for discharge.    Does patient use Home O2? No          Current oxygenation status:   SpO2: 90 %     O2 Device: None (Room air),     3.  Tolerates ambulation and mobility near baseline.     Ambulation: Yes, satisfactory for discharge.   Times patient ambulated with staff this shift: Pt ambulates to bathroom, assist x1 with walker    Please review the Heart Failure Care Map for additional HF goal outcomes.    Cheryl Frank RN  1/3/2023

## 2023-01-03 NOTE — PROGRESS NOTES
"Addiction Medicine Progress Note    Patient is high risk for oxycodone misuse - and there have been lots of \"red flags\" to suggest misuse, diversion, and use of unprescribed opiates.      When chart reviewed on 12/29, our recommendation was to switch over from full opioid agonists to buprennorphine via microdosing.   However, he has not been using much oxycodone in the last few days, and not having opiate withdrawal.  Received 5 mg oxycodone x 2 on 12/31 and none on 1/1 and 1/2.  Got 5 mg at 0300 today.   Can start buprenorphine 12 hrs after last dose of oxycodone.      For the benzo use, would recommend a planned taper.  He has gotten 1 mg clonazepam q hs for last week.       Recommendations:   1.  discontinue oxycodone completely.  2.  Begin buprenorphine 1 mg after 3 pm today.  If tolerates without any opiate withdrawal, can then advance dose as follows:   1/4 2 mg bid  1/5 4 mg bid  1/6 4 mg bid  Hold at this dose, given severe COPD for several days.     Can then adjust upward if having uncontrolled pain, as long as he is tolerating it from respiratory standpoint.    3.  Reduce clonazepam to 0.5 mg q hs prn x 3 days, then discontinue.   6.  Use hydroxyzine prn for anxiety.      Neva Rangel MD  Addiction Medicine Service  [facility]  Page me (click here for Lillie Rangel)           "
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
## 2023-01-03 NOTE — CONSULTS
[facility] HEART CARE   [address], [place] [address]   [email or web address]   OFFICE: [phone]     CARDIOLOGY INPATIENT CONSULT NOTE     Impression and Plan     Assessment:  1. Syncope - per review of telemetry and prior notes, it appears as though on presentation he was hypotensive with a HR in the 50s and with chronic medications of klonipin and oxycodone. His blood pressure responded to IV fluid bolus. His rhythm and rate has been very stable while hospitalized for the past week. He did have one episode of reported syncope while in the hospital, on telemetry, and without significant bradycardia, pause, or arrhythmia. It is unlikely that his rhythm contributed to his syncope, rather more likely related to medication side effects and poor PO intake.  2. Persistent atrial fibrillation on rhythm control with amiodarone.  He is currently in a stable sinus rhythm.  He has a history of A. fib with RVR and there was previously discussions of AV node ablation with pacemaker implantation.  At this time with adequate rhythm control using amiodarone this is not indicated.  Furthermore, I do not see evidence of significant bradycardia that would justify pacemaker implantation for tachybradycardia syndrome either.  3. Chronic heart failure with preserved LVEF -currently compensated  4. Chronic kidney disease  5. Chronic respiratory failure with hypoxia and hypercapnia -on chronic home O2.  Previously on hospice for this condition.    Plan:    Resume Toprol tartrate at 25 mg twice a day.  Could increase this back to 50 mg twice a day if his heart rate and blood pressure allow.    Would discharge on a 30-day cardiac event monitor to monitor for evidence of bradycardia.    No indication for pacemaker at this time.  Discussed this with the patient in detail.  Based on the event monitor or return of A. fib with RVR to suggest tachybradycardia syndrome then could consider pacemaker  implantation in the future.    Will arrange for follow-up in afib clinic in 3-4 weeks and with me in 2 months.    No further inpatient cardiac evaluation recommended.    Primary Cardiologist: I can follow him on discharge.    History of Present Illness      Mr. Ki Pederson is a 67 year old male with known severe COPD, persistent atrial fibrillation, chronic heart failure with preserved ejection fraction, and CKD who was admitted on 12/26/2022 with syncope.  Is noted that he was hypotensive on presentation as well as with some mild altered mental status.  He is chronically on home oxygen but was not wearing it at the time.  He also has scheduled oxycodone for chronic pain.  He has a history of A. fib with rapid ventricular response requiring fairly high doses of AV rony blocking agents for heart rate control.  Due to his syncope and borderline low heart rates into the 50s with hypotension, his diltiazem and metoprolol are both being held.    Mr. Pederson feels well currently. His mental status has significantly cleared. He denies current cardiac symptoms.    Other than noted above, Mr. Pederson denies any chest pain/pressure/tightness, shortness of breath at rest or with exertion, light headedness/dizziness, pre-syncope, syncope, lower extremity swelling, palpitations, paroxysmal nocturnal dyspnea (PND), or orthopnea.    Review of Systems:  Further review of systems is otherwise negative/noncontributory (based on review of medical record (admission H&P) and 13 point review of systems reviewed. Pertinent positives noted).    Cardiac Diagnostics     ECG: Personally reviewed and interpreted: 12/26/22 - sinus bradycardia with HR 56 bpm.    Telemetry (personally reviewed): sinus rhythm. No arrhythmias.    Most recent:  Echocardiogram (results reviewed):   TTE 12/31/22  1. Normal left ventricular size and systolic performance with a visually estimated ejection fraction of 55-60%.  2. No significant valvular heart  disease is identified on this study.  3. Normal right ventricular size and systolic performance.  4. There is mild left atrial enlargement.     When compared to the prior real-time echocardiogram dated 23 September 2022, there has been an interval improvement in left ventricular systolic performance with normalization of the ejection fraction.      Medical History  Surgical History Family History Social History   Past Medical History:   Diagnosis Date     Anxiety      Atrial fibrillation (H)      CAD (coronary artery disease)      Cerebral infarction (H)      Chronic hepatitis C virus infection (H)      COPD (chronic obstructive pulmonary disease) (H)      Essential hypertension      Hemangioma     massive hemangioma; left hand     Hemangioma     Left hand     Hypertension      Intravenous drug abuse in remission (H)     Sober since ~2010     Lumbar spinal stenosis      Myocardial infarction (H)      Peripheral neuropathy      Restless leg syndrome      Stroke (H)      TIA (transient ischemic attack)      Past Surgical History:   Procedure Laterality Date     CERVICAL FUSION      C6 and C7     CERVICAL FUSION      C6-7     PICC TRIPLE LUMEN PLACEMENT  9/21/2022          Family History   Problem Relation Age of Onset     Coronary Artery Disease Mother      Diabetes Mother      Coronary Artery Disease Father      Chronic Obstructive Pulmonary Disease Brother      Heart Disease Brother      Chronic Obstructive Pulmonary Disease Brother      Heart Disease Brother      No Known Problems Brother      Heart Disease Sister      Chronic Obstructive Pulmonary Disease Sister      Chronic Obstructive Pulmonary Disease Sister      Heart Disease Sister      No Known Problems Sister      Chronic Obstructive Pulmonary Disease Sister      Heart Disease Sister      No Known Problems Sister      No Known Problems Sister            Social History     Socioeconomic History     Marital status:      Spouse name: Not on file      "Number of children: 10     Years of education: 15     Highest education level: Some college, no degree   Occupational History     Occupation: Disability   Tobacco Use     Smoking status: Former     Packs/day: 2.00     Types: Cigarettes     Quit date: 2018     Years since quittin.0     Smokeless tobacco: Current     Types: Chew     Tobacco comments:     No passive exposure   Vaping Use     Vaping Use: Never used   Substance and Sexual Activity     Alcohol use: Not Currently     Drug use: Not Currently     Types: IV     Comment: Sober from IV drug use since ~     Sexual activity: Not on file   Other Topics Concern     Parent/sibling w/ CABG, MI or angioplasty before 65F 55M? Not Asked   Social History Narrative    ** Merged History Encounter **          Social Determinants of Health     Financial Resource Strain: Not on file   Food Insecurity: Not on file   Transportation Needs: Not on file   Physical Activity: Not on file   Stress: Not on file   Social Connections: Not on file   Intimate Partner Violence: Not on file   Housing Stability: Not on file             Physical Examination   VITALS: BP (!) 155/95 (BP Location: Left arm)   Pulse 75   Temp 98.1  F (36.7  C) (Oral)   Resp 20   Ht 1.829 m (6' 0.01\")   Wt 104.7 kg (230 lb 12.8 oz)   SpO2 90%   BMI 31.30 kg/m    BMI: Body mass index is 31.3 kg/m .  Wt Readings from Last 3 Encounters:   23 104.7 kg (230 lb 12.8 oz)   22 99.8 kg (220 lb)   22 107 kg (236 lb)       Intake/Output Summary (Last 24 hours) at 1/3/2023 0936  Last data filed at 1/3/2023 0900  Gross per 24 hour   Intake 580 ml   Output --   Net 580 ml       General Appearance:  Alert, cooperative, no distress, appears stated age    Head:  Normocephalic, without obvious abnormality   Eyes:  PERRL, conjunctiva/corneas clear, EOM's intact   Ears:  Grossly normal external ear canals bilaterally   Nose: Nares normal, septum midline, no drainage   Neck: Supple   Lungs:   Clear to " auscultation bilaterally, respirations unlabored   Chest Wall:  No tenderness or deformity   Heart:  Regular rate and rhythm, S1, S2 normal,no murmur, rub or gallop   Abdomen:   Soft, non-tender   Extremities: Extremities normal, atraumatic, no cyanosis. no edema   Psychiatric: Normal affect, pleasant and cooperative    Neurologic: Alert and oriented X 3, Moves all 4 extremities            Imaging      Chest x-ray: IMPRESSION: Stable enlarged cardiac silhouette. Newly visualized opacity overlying the right costophrenic sulcus, could represent pneumonia or superimposed soft tissues. No definite pleural effusion or pneumothorax. Bones are unchanged. Cerclage wires   overlying the neck are again noted.       Lab Results    Chemistry/lipid CBC Cardiac Enzymes/BNP/TSH/INR   Recent Labs   Lab Test 03/03/22  1024   CHOL 241*   HDL 82   *   TRIG 101     Recent Labs   Lab Test 03/03/22  1024   *     Recent Labs   Lab Test 01/03/23  0805 01/02/23  0738 01/02/23  0505   NA  --   --  140   POTASSIUM  --   --  3.7   CHLORIDE  --   --  101   CO2  --   --  27   *   < > 96   BUN  --   --  19.6   CR  --   --  1.03   GFRESTIMATED  --   --  81   PALMER  --   --  8.5*    < > = values in this interval not displayed.     Recent Labs   Lab Test 01/02/23  0505 01/01/23  0520 12/31/22  0451   CR 1.03 1.07 0.96     Recent Labs   Lab Test 12/29/22  0435 12/18/19  0338   A1C 6.3* 6.4*          Recent Labs   Lab Test 01/01/23  0520   WBC 7.1   HGB 10.0*   HCT 32.4*   MCV 95        Recent Labs   Lab Test 01/01/23  0520 12/31/22  0451 12/30/22  0522   HGB 10.0* 10.1* 10.4*    Recent Labs   Lab Test 09/21/22  1722 09/21/22  1134 09/21/22  0505   TROPONINI 0.01 0.02 0.03     Recent Labs   Lab Test 12/26/22  1454 10/24/22  2148 09/21/22  0505 04/08/22  1432 03/18/20  2046   BNP  --   --  272* <10 11   NTBNPI 949* 699  --   --   --      Recent Labs   Lab Test 12/11/19  0519   TSH 0.09*     Recent Labs   Lab Test  12/26/22  1454 12/17/22  2248 03/18/20  2046   INR 1.14 0.87 1.23*           Current Inpatient Scheduled Medications   Scheduled Meds:    acetaminophen  650 mg Oral Q8H JASIEL     amiodarone  200 mg Oral Daily     azithromycin  250 mg Oral Q Mon Wed Fri AM     citalopram  40 mg Oral Daily     diclofenac  2 g Topical 4x Daily     [Held by provider] diltiazem ER COATED BEADS  120 mg Oral Daily     famotidine  20 mg Oral Daily     folic acid  1 mg Oral Daily     furosemide  40 mg Oral Daily     gabapentin  300 mg Oral TID     guaiFENesin  1,200 mg Oral Daily     insulin aspart  1-3 Units Subcutaneous TID AC     insulin aspart  1-3 Units Subcutaneous At Bedtime     ipratropium - albuterol 0.5 mg/2.5 mg/3 mL  3 mL Nebulization 4x daily     lidocaine   Topical 4x Daily     multivitamin w/minerals  1 tablet Oral Daily     nicotine  1 patch Transdermal Daily     nicotine   Transdermal Q8H     piperacillin-tazobactam  3.375 g Intravenous Q8H     predniSONE  20 mg Oral Daily     rivaroxaban ANTICOAGULANT  20 mg Oral Daily with supper     senna-docusate  1 tablet Oral BID     Continuous Infusions:    No current outpatient medications on file.          Medications Prior to Admission   Prior to Admission medications    Medication Sig Start Date End Date Taking? Authorizing Provider   albuterol (PROAIR HFA/PROVENTIL HFA/VENTOLIN HFA) 108 (90 Base) MCG/ACT inhaler Inhale 2 puffs into the lungs every 3 hours 11/23/22  Yes Gloria Sebastian MD   amiodarone (PACERONE) 200 MG tablet Take 1 tablet (200 mg) by mouth daily 11/23/22  Yes Baldemar Garcia DO   azithromycin (ZITHROMAX) 250 MG tablet USE ONE TAB BY MOUTH ONCE DAILY ON Monday, Wednesday, Friday ONGOING 10/28/22  Yes Gloria Sebastian MD   citalopram (CELEXA) 40 MG tablet Take 1 tablet (40 mg) by mouth daily 12/26/22  Yes Paola Rico MD   clonazePAM (KLONOPIN) 1 MG tablet Take 1-2 tablets (1-2 mg) by mouth nightly as needed for anxiety or sleep  Patient taking  differently: Take 1-2 mg by mouth 2 times daily as needed for anxiety or sleep 12/22/22  Yes Paola Rico MD   clonazePAM (KLONOPIN) 2 MG tablet Take 2 mg by mouth At Bedtime   Yes Unknown, Entered By History   diltiazem ER COATED BEADS (CARDIZEM CD/CARTIA XT) 300 MG 24 hr capsule Take 1 capsule (300 mg) by mouth daily 9/29/22  Yes Cristy Yañez MD   famotidine (PEPCID) 20 MG tablet Take 1 tablet (20 mg) by mouth daily 12/22/22  Yes Paola Rico MD   furosemide (LASIX) 40 MG tablet Take 1 tablet (40 mg) by mouth daily 11/2/22  Yes Baldemar Garcia DO   gabapentin (NEURONTIN) 300 MG capsule Take 2 capsules (600 mg) by mouth 3 times daily 12/23/21  Yes Gloria Sebastian MD   guaiFENesin (MUCINEX) 600 MG 12 hr tablet Take 2 tablets (1,200 mg) by mouth daily 12/26/22  Yes Paola Rico MD   ipratropium - albuterol 0.5 mg/2.5 mg/3 mL (DUONEB) 0.5-2.5 (3) MG/3ML neb solution Nebulize tid for 3 days and then tid prn for sob  Patient taking differently: Take 1 vial by nebulization 4 times daily Nebulize tid for 3 days and then tid prn for sob 10/27/22  Yes Jose Conklin MD   magnesium 250 MG tablet Take 1 tablet (250 mg) by mouth At Bedtime 2/23/22  Yes Gloria Sebastian MD   metoprolol tartrate (LOPRESSOR) 100 MG tablet Take 0.5 tablets (50 mg) by mouth 2 times daily 12/7/22  Yes Paola Rico MD   naloxone (NARCAN) 4 MG/0.1ML nasal spray Spray 1 spray (4 mg) into one nostril alternating nostrils once as needed for opioid reversal every 2-3 minutes until assistance arrives 2/9/22  Yes Gloria Sebastian MD   oxyCODONE HCl (ROXICODONE) 20 MG TABS immediate release tablet Take 20 mg by mouth every 4 hours (while awake) Not to exceed 5 tabs per day 12/26/22  Yes Paola Rico MD   predniSONE (DELTASONE) 20 MG tablet Take 1 tablet (20 mg) by mouth daily 12/7/22  Yes Paola Rico MD   rivaroxaban ANTICOAGULANT (XARELTO) 20 MG TABS tablet Take 1 tablet  (20 mg) by mouth daily (with dinner) 3/23/22  Yes Gloria Sebastian MD

## 2023-01-03 NOTE — PROGRESS NOTES
"PALLIATIVE CARE SOCIAL WORK Progress Note     PCSW and Palliative NP visit today with Pt for check in and support. Pt feeling good today, except for continued pain. He is not happy to hear that he has to go to TCU. He thinks this will just be a \"rest home\"and is not looking forward to it. Discussed that hope that continued PT/OT will help him get back home. Spent time in life review, provided listening and support.   SW provided Pt with pencils and color book to help with boredom.      Plan: PCSW continues to follow for support.     STACY Antoine, [facility]  Palliative Care Team  Team Pager: [phone]      "

## 2023-01-03 NOTE — PROGRESS NOTES
Contacted and left voicemail to [facility] DME regarding pt needing a bipap for discharge. Requested to contact back the P3 RT for more patient information and requirements for equipment.     Leydi Hewitt, RT

## 2023-01-04 NOTE — PLAN OF CARE
Problem: Pain Acute  Goal: Optimal Pain Control and Function  Outcome: Progressing     Problem: Pain Acute  Goal: Optimal Pain Control and Function  Outcome: Progressing  Patient rates pain 4-5. Patient started on Subaxone this evening. Unsure if patient is aware Oxycodone has been discontinued, however, he has not asked about it. Scheduled Tylenol given, patient refusing pain creams.     Problem: Plan of Care - These are the overarching goals to be used throughout the patient stay.    Goal: Plan of Care Review  Description: The Plan of Care Review/Shift note should be completed every shift.  The Outcome Evaluation is a brief statement about your assessment that the patient is improving, declining, or no change.  This information will be displayed automatically on your shift note.  Outcome: Progressing   Patient continues on IV antibiotics, lost IV access. New IV to be placed, will restart antibiotics when new IV placed.

## 2023-01-04 NOTE — PROGRESS NOTES
"[facility]    Medicine Progress Note - Hospitalist Service    Date of Admission:  12/26/2022    Assessment & Plan   67-year-old male with past medical history of tobacco use disorder in remission, COPD, hepatitis C without cirrhosis, atrial fibrillation, seizure disorder, cervical spine surgery, chronic pain with opiate dependence who was brought to ED for evaluation of confusion and syncope and subsequently admitted for further management on 12/26/2022.  Per previous chart, patient discharged himself from hospice and needed to have a pacemaker placement.    Acute on chronic hypoxic hypercarbic respiratory failure; likely multifactorial-COPD exacerbation, heart failure, A. fib, concern for pneumonia  --Patient responded to BiPAP, seems BiPAP dependent at this time  -- Continue chronic azithromycin Monday Wednesday Friday  -- Continue DuoNeb.  Incentive spirometry, flutter valve  -- Taper steroids slowly  -- Initially Vanco and Zosyn, currently on Zosyn, complete 7 days course  -- Appreciated pulmonary input    Syncope; likely multifactorial- likely due to above and over medications.  Patient does have history of A. fib and reported supposed to have PPM per patient  Previous hospitalist note also documents bradycardia and held amiodarone, Cardizem on admission then reported tachycardia and resumed amiodarone and recommending gradually resuming Cardizem.  Today discussed with patient and he reports \"I came off hospice for pacemaker placement\".  --Unable to find detail about PPM recommendation.  Did reviewed previous cardiologist notes, patient did have ablation on 9/23/2022.  --Personally discussed with Dr. Rothman, reported currently no indication for permanent pacemaker placement, appreciated input  --CT head negative for acute intracranial etiology  -- Continue telemetry    Persistent atrial fibrillation;  -- Sinus bradycardia with heart rate 57 on admission EKG and held home amiodarone and " "Cardizem on admission. Per previous hospitalist notes, reported in hospital tachycardia and resumed home amiodarone.  -- On Xarelto, continue.  -- Appreciated cardiology input, added metoprolol by cardiology.  Monitor vitals closely    Acute on chronic systolic and diastolic heart failure;  -- Echo showed improved EF to 55 to 60% with no wall motion abnormality.  -- Patient responded to IV diuretics then transition to oral, continue.  -- Monitor intake/output, weight, labs closely    Chronic pain syndrome;  Opiate dependence;  --Patient transition to Suboxone and weaning off clonazepam.  Management per addiction medicine provider and pain team.  Appreciated input    Depression with suicidal ideation;  -- Appreciated psychiatric input, patient not actively suicidal.  --Continue medications per psychiatry.  Continue clinical monitoring    YUNIEL on CKD likely due to heart failure;  -- Creatinine improving with diuretics.  Monitor vitals  -- Hypokalemia, replace per protocol    Physical deconditioning; PT, OT    History of hepatitis C; follow-up with GI as an outpatient       Diet: Combination Diet Moderate Consistent Carb (60 g CHO per Meal) Diet    DVT Prophylaxis: DOAC  Fine Catheter: Not present  Lines: None     Cardiac Monitoring: ACTIVE order. Indication: Tachyarrhythmias, acute (48 hours)  Code Status: No CPR- Do NOT Intubate      Clinically Significant Risk Factors        # Hypokalemia: Lowest K = 3.3 mmol/L in last 2 days, will replace as needed                 # Obesity: Estimated body mass index is 31.43 kg/m  as calculated from the following:    Height as of this encounter: 1.829 m (6' 0.01\").    Weight as of this encounter: 105.1 kg (231 lb 12.8 oz).          Disposition Plan      Expected Discharge Date: 01/05/2023    Discharge Delays: Other (Add Comment)  Placement - TCU    Discharge Comments: clinical progress, consult pending          Chris MARTE MD  Hospitalist Service  [facility]  Securely message with [employer] (more info)  Text page via [facility] Paging/Directory   ______________________________________________________________________    Interval History   Patient seen and examined.  Notes, labs, imaging report personally reviewed.  No acute issues reported overnight.  Patient reported breathing stable to improving.  Denied chest pain or dizziness.  Discussed with patient for possible TCU discharge when medically stable.  Discussed with pain team.  Discussed with nursing staffs.    Physical Exam   Vital Signs: Temp: 98.2  F (36.8  C) Temp src: Oral BP: 127/77 Pulse: 66   Resp: 18 SpO2: 95 % O2 Device: None (Room air) Oxygen Delivery: 1 LPM  Weight: 231 lbs 12.8 oz      General: Not in obvious distress.  HEENT: Normocephalic, supple neck  Chest: Decreased breath sound bilateral anteriorly, expiratory wheezing bilateral, unlabored breathing  Heart: S1S2 normal, no pedal edema  Abdomen: Soft. NT, ND. Bowel sounds- active.  Neuro: alert and awake, grossly non-focal          Medical Decision Making             Data     I have personally reviewed the following data over the past 24 hrs:    N/A  \   10.9 (L)   / N/A     138 101 15.4 /  115 (H)   3.3 (L) 26 1.12 \       Imaging results reviewed over the past 24 hrs:   No results found for this or any previous visit (from the past 24 hour(s)).

## 2023-01-04 NOTE — PROGRESS NOTES
[facility]  Palliative Care Daily Progress Note      Requesting Clinician / Team: Dr Fernandez  Reason for consult: Goals of Care (side effects from medications)     Summary/Recommendations:     Goals of care:  Stopped by to see pt who was sitting on the edge of the bed. He affirms his goals are restorative and no longer wants to be on hospice. He says he wants to be around as long as possible for his grandchildren. He is enjoying watching the snow and we discussed enjoying the outdoors (he grew up [place] miles from [facility]). Reviewed the role of the POLST and that his POLST from 4-2022 was done in the setting of hospice (comfort focused) and the need to change it to selective treatment, given his retorative goals (spoke with dtr re: this prior to seeing pt and she agreed, appreciated). Reviewed pts follow up (?TCU at [facility] if able, as well as follow up with Addiction Medicine and Palliative Care). Dtr did speak with Pain Pharmacist yesterday re: home medication/safety. Omar agrees with both outpatient clinics. He has started Suboxone and noted that he was to get the medication twice a day but is on his MAR once daily. RN is working on. Confirmed this with RN.      Advanced care planning  -Previous Healthcare Directive: Has a POLST on file. Made a new POLST today (pt has capacity for this, dtr consulted and agrees with redoing this). New POLST scanned to [employer] and 1 copy placed in paper chart plus original and a copy given to pt.   -Surrogate Medical Decision Maker: Dtr and son named as primary health care agents.  -CODE STATUS:DNR/DNI (dtr confirmed this)     Symptom management  Chronic Pain, hx of. Started Suboxone yesterday. Has some questions re: the dosing.   -Appreciate Addiction Medicine addressing Suboxone and Klonipin and Acute Pain Team while inpatient. Appreciate advice for pt and family re: outpatient follow up.  -Acupuncture and  Healing Touch.     Psychosocial and support needs  -Appreciate Palliative Care LICSW seeing pt.  -Appreciate Spiritual Care connecting with pt.    -Referral placed to Outpatient Palliative Care Clinic for psychosocial support. Unsure if will need or want to be in the Palliative Care Clinic for long yet believe haile, given his variable course on/off hospice (now off of hospice, not planning on hospice now), for a while.     Palliative Care will following peripherally (? see Friday), if still here at [facility], then may sign off.        Thank you for the opportunity to participate in the care of this patient and family.      During regular M-F work hours -- if you are not sure who specifically to contact -- please contact us by calling [phone].        Palliative Care Assessment     Information gathered today from:chart review, MD.  Ki Pederson is a 67 year old male with a history of hypertension, ASCVD, COPD, 4 L of oxygen at home, nicotine abuse, chronic pain/hx remote IVD use, atrial fibrillation, MI, TIA, and acute on systolic heart,, chronic hepatitis C, restless leg syndrome, peripheral neuropathy, spinal stenosis and CVA without any deficits. who presented to the ED on Dec. 26th  With syncope.  Admitted to the hospital with syncope.   Previous hospitalizations: Nov. 2022 ER drug overdose  Other specialities following this admission: Psychiatry  Per ER, recent changes: Per patient he had x1 episode of syncope while sitting.Per nurse/EMS reported patient took 8mg of Klonopin. Patient is on 2.5L of oxygen at home. On arrival patient was in a state of drowsiness and nurse had to give a sternal rub for patient to wake up. Per patient's ex wife, last several days states that he feels a copd flare coming on. Passed out in chair. Unresponsive. On 2L O2 at baseline, but was NOT on O2.  Family put O2 on, sats still in 80s, so family bumped up to 3L w O2 89%.  Called 911. Patient doesn't have control of meds.  When he gets hypoxic, he says things that don't make sense.  Stated he took 8 klonopin between 8am and 3pm.  Ex-wife has meds in safe in HER room, he can't get meds.  Ex-wife counted pills and none of them are missing. Ex-wife states yest c/o chills yest. They started empiric doxy yest 1 dose for copd sx. Swelling in legs x2d, given 80mg lasix daily instead of usual 40mg.  3lb wt gain. No longer on hospice.  Appt 1/4/23 with pall care.      Today, the patient was seen for:  Goals of Care (side effects from medications)     Previous Palliative Care Encounters:  Saw Palliative Care in Sept. 2022, referred to Hospice.      Palliative Care Note from Wednesday:  Met with pt alone (along with sitter) today for a palliative care visit (he has seen palliative care 2x before, prior to enrolling in hospice). Reviewed the role of palliative care and the reason for the consult. Omar indicates he stopped [facility] ~1-2 months ago, when was advised to see Cardiology re: a PPM. He however did not follow up. Nor did he connect with Outpatient Palliative Care (appears to have an appt Jan., 2023. He would value a Cardiology evaluation). Today, is sitting on the edge of the bed. He is at times sleepy, with eyes closed. Also has shaking, which he said started after he had a fall 2 days ago (says has not had this before; does have a hx of RLS syndrome and some ETOH yet very little per month now; head CT ordered). He indicates that he is struggling with end of life decisions. Being on hospice was hard and created some family conflicts with family being asked to assist with his care, ?medications. Most recently he has moved in with his ex-wife and 2 other family (x 1 month). He wishes more family were around with him. He is unable to articulate what is most important to him at this time. He is open to more medical evaluation, medication review, and discussion re: goals.      Second Palliative Care Meeting last week  Spoke with dtr  Ethan by telephone (she is pts 1st [facility] on his 2019 valid health care directive) from [phone] (she has a sick child at home and was unable to come in). She indicates that she used to be pts primary caregiver yet with nursing school and a new baby, her brother Raleigh was more involved. She however is now back involved as the primary contact, does believe she knows pts medications well. She confirms that pts goals are now restorative. She shares that her Mom was invovled for a while with pts medications. She is aware of Addiction Medicine working on a taper off of Klonopin (she said that he did not take this everyday; was taking as needed, however today, pt says he was taking 6 Klonopin at home and then per him stopped cold turn here; again reviewed with him, the important of getting an accurate history for safe dosing). Also reviewed the pain team is involved. Did share how important it is to understand the exact amounts of medications are taking at home, in case it is less or more, so we do not over or under prescribe. She indicates she had a good conversation with Hospitalist yesterday and feels this may have been reviewed. Explained to Omar that he is on a taper getting off of Klonopin.      She wishes that Omar could get discharged to a TCU then perhaps to a another type of location like assisted living (?wondered if a small supportive group home might be worth exploring). She does not think he can have independent living. He does not want a NH. She does believe it is going to take some time adjusting to his new restorative goals. Omar says he is open to TCU. He also is open to having a new living arrangement. Did not have indepth discussion with pt today as is still a little altered yet improving.    Palliative Care Note from yesterday:  He also affirms that his dtr, then his son, are his health care agents. He does have a hx of chronic pain but manages with it. He also has anxiety, yet says he is coping  with that too. He would welcome drawing supplies which palliative care [facility] will bring him. Appreciate Addiction Medicine, Psychiatry, and the Pain Team work with him, as we need a safe plan of care in the setting of restorative goals. Appreciate Pain Pharmacist discussion re: home medication safety, access to medications, less opioid tolerant, and high risk for adverse drug effects.      Summary of Palliative Encounter:  I  discussed the reason for Palliative Care Referral and our role in symptom management, patient family communication and understanding their choices for medical treatment and providing  guidance in making difficult decisions in the framework of focusing on patient comfort and quality of life.    Reviewed current conditions and treatments including recent clinical decisions/changes/goals, coping, symptom mgmt, cardiology evaluation, hospice.         Prognosis, Goals, and/or Advance Care Planning were addressed today: Yes, w/dtr/1st [facility] (confirmed DNR/DNI)    Mood, coping, and/or meaning in the context of serious illness were addressed today: Yes. A little anxious, but coping.      Functional Status:     Functional Status two weeks prior to hospitalization: , PPS:   40%- 1. Mainly in bed; 2. Unable to do most activity, extensive disease; 3. Mainly assistance; 4. Normal or reduced; 5. Full or drowsy +/- confusion. Uses a walker.     Functional status Current:  , PPS:   40%- 1. Mainly in bed; 2. Unable to do most activity, extensive disease; 3. Mainly assistance; 4. Normal or reduced; 5. Full or drowsy +/- confusion     Prognosis, Goals, & Planning:   Prognosis (quality & quantity): Previously was hospice (as long ago as 2 years ago). Does not want this any longer.   High risk of death within one year? Uncertain     Patient's decision making preferences: With son and dtr        Capacity evaluation:    Pt Omar is regaining capacity to make a decision regarding his care, however would advise important  medical decisions best be made together with HCAs.             I have concerns about the patient/family's health literacy today:No           Patient has a completed Health Care Directive: Has done POLST. Redid new POLST today, DNR/selective treatment.        Code status:DNR/DNI     Social:     Relationships: . Lives now with ex-wife Dereck as well as ? Children Thais and Michael. Has 11 children and 72 grandchildren.     Hobbies: He loves to cook, yet is dangerous.     Spirituality: Is spiritual.     Alcohol: Reports very little      Key Palliative Symptom Data:Generally doing well. Describes some pain at 5/10 (goal 3).        Review of Systems:     Besides above, an additional ROS system was not done.           Medications:     I have reviewed this patient's medication profile and medications during this hospitalization.    Noted meds:  Reviewed Buprenorphine on MAR and Addiction Medicine Note           Physical Exam:   Vitals were reviewed  Temp: 98.3  F (36.8  C) Temp src: Oral BP: 132/83 Pulse: 62   Resp: 20 SpO2: 96 % O2 Device: None (Room air) Oxygen Delivery: 1 LPM  Constitutional:   awake, alert, cooperative, no apparent distress, and appears stated age          TTS: I have personally spent a total of 35 minutes  today on the unit in review of medical record, consultation with the medical providers and assessment of patient, with more than 50% of this time spent in counseling, coordination of care, and discussion with pt and his dtr in conversations meeting re: goals of care, symptom management/suboxone, risks and benefits of management options, emotional support and development of plan of care (follow up with Palliative Care and Addiction Medicine).    DESHAWN Avila, FNP-BC, PMHNP-BC  Palliative Care Nurse Practitioner  [facility]  [phone]      During regular M-F work hours -- if you are not sure who specifically to contact -- please contact us by calling  [phone].

## 2023-01-04 NOTE — PROGRESS NOTES
CLINICAL NUTRITION SERVICES    Reviewed nutrition risk factors due to LOS. Pt is tolerating diet, eating well per nursing documentation. No nutrition issues identified at this time. RD will follow via rounds at this time, unless consulted. Pt had no diet questions at this time.
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
## 2023-01-04 NOTE — PROGRESS NOTES
RESPIRATORY CARE NOTE   Patient is on 2 lpm N/C, BS Coarse, exp wheezes, gave Duoneb treatment x2, BS post treatment increased aeration, coarse, patient perceives moderate improvement, patient tolerated well.     Ceasar Messer, RT

## 2023-01-04 NOTE — PLAN OF CARE
Problem: Pain Chronic (Persistent)  Goal: Optimal Pain Control and Function  Outcome: Progressing     Patient asked about the Suboxone and stated that the MD told him that it was going to be scheduled BID but on MAR was Q 24 hrs. Text paged Dr. Rangel who's note did say BID. She said that she asked the pain team pharmacist to enter order and that it was enter wrong and she would contact them to fix order.     Problem: Hypertension Acute  Goal: Blood Pressure Within Desired Range  Outcome: Progressing     BP this shift were 103/55 & 114/53    Problem: Gas Exchange Impaired  Goal: Optimal Gas Exchange  Outcome: Progressing     Patient is on 1 LPM, at home patient only uses NC @ NOC and uses 2.5 LPM.    Patients POCT BS checks were discontinued and so was his insulin order.

## 2023-01-04 NOTE — PLAN OF CARE
Assumed care 1500 to 0730. A&O x 4. Assist x 1. Tele is NSR. Denies pain with scheduled Suboxone and Tylenol. Urinal at bedside, up to toilet. Call light within reach, able to make needs known. Bed alarm on for safety.    Problem: Heart Failure  Goal: Effective Breathing Pattern During Sleep  Intervention: Monitor and Manage Obstructive Sleep Apnea  Recent Flowsheet Documentation  Taken 1/4/2023 1649 by PEPITO PAUL  Medication Review/Management: medications reviewed     Problem: Heart Failure  Goal: Optimal Functional Ability  Intervention: Optimize Functional Ability  Recent Flowsheet Documentation  Taken 1/4/2023 1649 by PEPITO PAUL  Activity Management: activity adjusted per tolerance

## 2023-01-04 NOTE — CONSULTS
"ACUPUNCTURIST TREATMENT NOTE    Name: Ki Pederson  :  1955  MRN:  [id number]    Acupuncture Treatment  Patient Type: Medical  Intervention Reason: Pain  Pain Location: low back  Pre-session Pain Ratin  Post-session Pain Ratin  Patient complaint:: Chronic low back pain  Initial insertions: Bl 23, Kaylan Joseph @ L4, 5, Gb 30  Number of needles inserted: 10  Number of needles removed: 10         \"Risks and benefits of acupuncture were discussed with patient. Consent for treatment was given. We thank you for the referral.\"     Cristian Walton    Date:  2023  Time:  4:27 PM    "

## 2023-01-04 NOTE — PROGRESS NOTES
Care Management Follow Up    Length of Stay (days): 7    Expected Discharge Date: 01/05/2023     Concerns to be Addressed:  Possible BiPAP; Cardio consult, TCU placement     Patient plan of care discussed at interdisciplinary rounds: Yes    Anticipated Discharge Disposition:  TCU     Anticipated Discharge Services:  Transitional care (TCU) is recommended for continued therapy and skilled nursing.  Anticipated Discharge DME:  Per therapy (if indicated).    Patient/family educated on Medicare website which has current facility and service quality ratings:  yes  Education Provided on the Discharge Plan:  Per team  Patient/Family in Agreement with the Plan:  Yes     Referrals Placed by CM/SW:  See previous notes.  Private pay costs discussed: Not applicable    Additional Information:  Social history:  Patient lives with adult daughter, no services and is independent at baseline. Home O2 2.5L at bedtime. Daughter said he was on hospice but now he may be on palliative care. Daughter wants pt to go to TCU for some strengthening  Patient had been on hospice but then came off last fall in order to have a pacemaker. Patient agrees to TCU as he still wants to go to get some more strength. Previous RN CM called Elba ([facility] worker) to let her know that patient is in the hospital and that he will likely go to TCU at discharge. She told RN WILFREDO that she has found several long term that meet his criteria just waiting for the family to go and do a tour and accept a facility. She would like to be updated on patient's discharge plan.  [facility] is following and will review financials.  Elba [facility] worker - [phone]  2:01 PM:  Received a call from [facility] who notes patient appears to be medically okay to come to their facility. They are currently checking his financial piece and will call CM back once this is complete.    TCU referrals pending:  Edin Cain at [facility] at  [facility]  [place] (reviewing)  [place]  [facility]  [facility]    Multiple facilities have declined.    Beba Hidalgo RN

## 2023-01-04 NOTE — PLAN OF CARE
Assumed care 1900 to 0730. A&O x 4, but forgetful. Assist x 1 with a walker. Tele is sinus bradycardia. C/O 3/10 back pain, scheduled Tylenol given (see MAR). Patient slept minimally. Call light within reach, able to make needs known. Bed alarm on for safety.    Problem: Heart Failure  Goal: Fluid and Electrolyte Balance  Outcome: Progressing     Problem: Heart Failure  Goal: Improved Oral Intake  Outcome: Progressing

## 2023-01-05 NOTE — CONSULTS
"ACUPUNCTURIST TREATMENT NOTE    Name: Ki Pederson  :  1955  MRN:  [id number]    Acupuncture Treatment  Patient Type: Medical  Intervention Reason: Pain  Pain Location: low back  Pre-session Pain Ratin  Post-session Pain Ratin  Patient complaint:: Chronic low back pain  Initial insertions: Bl 23, Kaylan Joseph @ L4, 5, (L) Gb 30, (L) QL motor point  Number of needles inserted: 11  Number of needles removed: 11         \"Risks and benefits of acupuncture were discussed with patient. Consent for treatment was given. We thank you for the referral.\"     Cristian Walton    Date:  2023  Time:  11:16 AM    "

## 2023-01-05 NOTE — PROGRESS NOTES
[facility]    Medicine Progress Note - Hospitalist Service    Date of Admission:  12/26/2022    Assessment & Plan   67-year-old male with past medical history of tobacco use disorder in remission, COPD, hepatitis C without cirrhosis, atrial fibrillation, seizure disorder, cervical spine surgery, chronic pain with opiate dependence who was brought to ED for evaluation of confusion and syncope and subsequently admitted for further management on 12/26/2022.  Per previous chart, patient discharged himself from hospice and needed to have a pacemaker placement.    Acute on chronic hypoxic hypercarbic respiratory failure; likely multifactorial-COPD exacerbation, heart failure, A. fib, concern for pneumonia  -- Continue chronic azithromycin Monday Wednesday Friday  -- Continue scheduled DuoNeb.  Incentive spirometry, flutter valve  -- Slow prednisone taper over 2 weeks per pulmonologist.  -- Initially Vanco and Zosyn, currently on Zosyn, complete 7 days course.  -- Patient responded to BiPAP.  Now patient on BiPAP at bedtime (previously qualified for BiPAP but patient enrolled in hospice then BiPAP machine taken by company per patient).  On 1/4/2023, personally discussed with pulmonologist, Dr. Perales and multiple respiratory therapist, reported given patient anticipating discharge to TCU, TCU facility should provide BiPAP during his stay at TCU and also they should be arranging BiPAP on discharge from TCU.  This was discussed with /care manager and reported they will work with TCU and if they cannot provide then reported they will work with respiratory therapist.  Addendum;  -- Discussed with pulmonologist, Dr. Randall, reported he received call from care manager about BiPAP, also reported care manager will update pulmonary team what TCU wants regarding BiPAP order and they will follow patient tomorrow    Syncope; likely multifactorial- likely due to above and over medications.  Patient  "does have history of A. fib and reported supposed to have PPM per patient  Previous hospitalist note also documents bradycardia and held amiodarone, Cardizem on admission then reported tachycardia and resumed amiodarone and recommending gradually resuming Cardizem.  Patient reports \"I came off hospice for pacemaker placement\".  -- I was unable to find detail about PPM recommendation.  Did reviewed previous cardiologist notes, patient did have ablation on 9/23/2022.  -- On 1/3/2023, personally discussed with Dr. Yeung, reported currently no indication for permanent pacemaker placement.  Recommended 30 days event monitor to monitor for evidence of bradycardia.  Reported they will arrange follow-up in A. fib clinic in 3 to 4 weeks and with Dr. Yeung in 2 months  --CT head negative for acute intracranial etiology    Persistent atrial fibrillation;  -- Sinus bradycardia with heart rate 57 on admission EKG-- held home amiodarone and Cardizem on admission. Per previous hospitalist notes, reported in hospital tachycardia and resumed home amiodarone.  -- On Xarelto, continue.  -- Appreciated cardiology input, they added metoprolol, decrease dose to half given bradycardia on 1/5.  Monitor vitals closely    Acute on chronic systolic and diastolic heart failure;  -- Echo showed improved EF to 55 to 60% with no wall motion abnormality.  -- Patient responded to IV diuretics then transition to oral, continue.  Also on 10 mEq potassium supplement.  -- Monitor intake/output, weight, labs closely    Chronic pain syndrome;  Opiate dependence;  --Patient transitioned to Suboxone and weaning off clonazepam.  Management per addiction medicine provider and pain team.  Appreciated input    Depression with suicidal ideation;  -- Appreciated psychiatric input, patient not actively suicidal.  --Continue medications per psychiatry.  Continue clinical monitoring    YUNIEL on CKD likely due to heart failure;  -- Creatinine improving with diuretics. " " Monitor vitals    Physical deconditioning; continue PT, OT    History of hepatitis C; follow-up with GI as an outpatient         Diet: Renal Diet (dialysis)    DVT Prophylaxis: DOAC  Fine Catheter: Not present  Lines: None     Cardiac Monitoring: ACTIVE order. Indication: Tachyarrhythmias, acute (48 hours)  Code Status: No CPR- Do NOT Intubate      Clinically Significant Risk Factors        # Hypokalemia: Lowest K = 3.3 mmol/L in last 2 days, will replace as needed                 # Obesity: Estimated body mass index is 31.13 kg/m  as calculated from the following:    Height as of this encounter: 1.829 m (6' 0.01\").    Weight as of this encounter: 104.1 kg (229 lb 9.6 oz).          Disposition Plan      Expected Discharge Date: 01/06/2023    Discharge Delays: Other (Add Comment)  Placement - TCU    Discharge Comments: Needs TCU.          Chris MARTE MD  Hospitalist Service  [facility]  Securely message with [email or web address] (more info)  Text page via [employer] Paging/Directory   ______________________________________________________________________    Interval History   Patient seen and examined.  Notes, labs, imaging report personally reviewed.  No acute issues reported overnight.  Patient did not use BiPAP last night.  Patient encouraged to use BiPAP at night.  Patient reported breathing better.  Denied short of breath or chest pain or dizziness.  Reported chronic back issues also improving.  Denied fever or chills.  Discussed with nursing staffs.  Discussed with care manager/.    Physical Exam   Vital Signs: Temp: 97.6  F (36.4  C) Temp src: Oral BP: 126/77 Pulse: 60   Resp: 18 SpO2: 90 % O2 Device: Nasal cannula Oxygen Delivery: 1.5 LPM  Weight: 229 lbs 9.6 oz      General: Not in obvious distress.  HEENT: Normocephalic, supple neck  Chest: Decreased but clear to auscultation bilateral anteriorly, occasional expiratory wheezing  Heart: S1S2 normal, no pedal edema  Abdomen: Soft. NT, ND. " Bowel sounds- active.  Neuro: alert and awake, grossly non-focal      Medical Decision Making             Data     I have personally reviewed the following data over the past 24 hrs:    N/A  \   N/A   / N/A     N/A N/A N/A /  N/A   3.7 N/A N/A \       Imaging results reviewed over the past 24 hrs:   No results found for this or any previous visit (from the past 24 hour(s)).
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
## 2023-01-05 NOTE — PLAN OF CARE
Problem: Pain Chronic (Persistent)  Goal: Optimal Pain Control and Function  Outcome: Progressing   Patient now on day 3 of Suboxone, tolerating well. Patient received acupuncture and reports that it was much more helpful today than it was yesterday.    Problem: Gas Exchange Impaired  Goal: Optimal Gas Exchange  Outcome: Progressing     Patient remains on O2. Today monitor tech called stating patients O2 sat was @ 88%, went to room and patient had removed his NC. Replaced canula and O2 recovered quickly to 94%.    Problem: Heart Failure  Goal: Stable Heart Rate and Rhythm  Outcome: Progressing   Heart Failure Care Map  GOALS TO BE MET BEFORE DISCHARGE:    1. Decrease congestion and/or edema with diuretic therapy to achieve near optimal volume status.     Dyspnea improved: Yes, satisfactory for discharge.   Edema improved: Yes, satisfactory for discharge.        Last 24 hour I/O:   Intake/Output Summary (Last 24 hours) at 1/5/2023 1456  Last data filed at 1/5/2023 0800  Gross per 24 hour   Intake 720 ml   Output --   Net 720 ml           Net I/O and Weights since admission:   12/06 1500 - 01/05 1459  In: 60782 [P.O.:62895; I.V.:42]  Out: 60257 [Urine:01275]  Net: -4323     Vitals:    12/26/22 1938 12/27/22 0345 12/28/22 0540 12/31/22 0316   Weight: 109.4 kg (241 lb 3.2 oz) 110.5 kg (243 lb 8 oz) 111.2 kg (245 lb 1.6 oz) 108.7 kg (239 lb 9.6 oz)    01/01/23 0439 01/02/23 0331 01/03/23 0336 01/04/23 0500   Weight: 108.9 kg (240 lb) 104.6 kg (230 lb 9.6 oz) 104.7 kg (230 lb 12.8 oz) 105.1 kg (231 lb 12.8 oz)    01/05/23 0457   Weight: 104.1 kg (229 lb 9.6 oz)       2.  O2 sats > 90% on room air, or at prior home O2 therapy level.      Able to wean O2 this shift to keep sats above 90%?: No, further care required to meet this goal. Please explain O2 drops when off oxygen   Does patient use Home O2? Yes-  2.5 @ NOC only          Current oxygenation status:   SpO2: 90 %     O2 Device: None (Room air), Oxygen Delivery: 1.5  LPM    3.  Tolerates ambulation and mobility near baseline.     Ambulation: Yes, satisfactory for discharge.   Times patient ambulated with staff this shift: 3    Please review the Heart Failure Care Map for additional HF goal outcomes.    Ana Fall RN  1/5/2023

## 2023-01-05 NOTE — PROGRESS NOTES
Pulmonary Progress Note  1/5/2023      Admit Date: 12/26/2022  CODE: No CPR- Do NOT Intubate    Reason for Consult: acute on chronic respiratory failure with hypoxemia requiring BiPAP.    Assessment/Plan:   67M w/ hx of tobacco use in remission, COPD (FEV1 34% in 2019), multiple other medical issues, admitted with syncope and encephalopathy. He was treated for COPD vs. Asthma exacerbation. He had significant improvement with NIPPV (BiPAP). Awaiting TCU placement.    Recommendations:  - titrate FiO2 for goal SpO2 88-92%, avoid hyperoxia  - encourage OOB PT/OT, push IS  - continue scheduled duonebs while inpatient  - continue BiPAP at night. Discussed importance of wearing it with him. The TCU will arrange for BiPAP after discharge from TCU, per previous notes and discussions with RT, care manager, etc.  Addendum: informed by care manager that the TCU is refusing to set him up with BiPAP and are insisting additional inpatient testing be done, including inpatient sleep study. I told care management that we don't do inpatient sleep testing. I asked CM to clarify what the TCU needs and why they need it, as we frequently do send patients to TCU's and they can continue receiving BiPAP there. We could do an overnight oximetry study but this is usually done off BiPAP as one of the qualifying tests. CM will clarify with TCU and get back to the pulmonary consult team tomorrow.   - continue three times per week azithromycin.  - prednisone for 4 more days, then stop (I updated order)  - will arrange for follow in pulmonary clinic.     Will continue to follow.       Isaac Randall MD (Avi)  [facility] Pulmonary & Critical Care  Pager [phone]  Clinic [phone]  Fax [phone]     Subjective/Interim Events:   Feels fine. On room air.  He likes wearing the BiPAP at night, but didn't wear it last night.      Medications:       acetaminophen  975 mg Oral Q8H JASIEL     amiodarone  200 mg Oral Daily      "azithromycin  250 mg Oral Q Mon Wed Fri AM     buprenorphine-naloxone  1 tablet Sublingual BID     citalopram  40 mg Oral Daily     diclofenac  2 g Topical 4x Daily     famotidine  20 mg Oral Daily     folic acid  1 mg Oral Daily     furosemide  40 mg Oral Daily     gabapentin  300 mg Oral TID     guaiFENesin  1,200 mg Oral Daily     ipratropium - albuterol 0.5 mg/2.5 mg/3 mL  3 mL Nebulization 4x daily     lidocaine   Topical 4x Daily     metoprolol tartrate  25 mg Oral BID     multivitamin w/minerals  1 tablet Oral Daily     nicotine  1 patch Transdermal Daily     nicotine   Transdermal Q8H     potassium chloride  10 mEq Oral Daily     predniSONE  20 mg Oral Daily     rivaroxaban ANTICOAGULANT  20 mg Oral Daily with supper     senna-docusate  1 tablet Oral BID         Exam/Data:   Vitals  /61 (BP Location: Left arm)   Pulse 58   Temp 97.8  F (36.6  C) (Oral)   Resp 20   Ht 1.829 m (6' 0.01\")   Wt 104.1 kg (229 lb 9.6 oz)   SpO2 90%   BMI 31.13 kg/m       I/O last 3 completed shifts:  In: 1080 [P.O.:1080]  Out: -   Weight change: -0.998 kg (-2 lb 3.2 oz)  [unfilled]  Resp: 20      EXAM:  Physical Exam  Gen: awake, alert, oriented, no distress  HEENT: NT, no ALESSANDRA  CV: RRR, no m/g/r  Resp: diminished, no wheezing rhonchi.   Abd: soft, nontender, BS+  Skin: no rashes or lesions  Ext: no edema  Neuro: PERRL, nonfocal exam    ROS:  A 10-system review was obtained and is negative with the exception of the symptoms noted above.    DATA:    PFT DATA   From 2019:  FEV1 1.24L, 34%  FVC 57%  +BD response  Ratio 0.46  %, 9.11L  DLco 59% edy for hgb      IMAGING:   XR Pelvis w Hip Left G/E 2 Views    Result Date: 12/27/2022  EXAM: XR PELVIS AND HIP LEFT 2 VIEWS LOCATION: [facility] DATE/TIME: 12/27/2022 2:15 AM INDICATION: fall on left hip COMPARISON: None.     IMPRESSION: Mild degenerative changes of the hips and lower lumbar spine.. No fracture or dislocation.    XR Chest Port 1 " View    Result Date: 12/26/2022  EXAM: XR CHEST PORT 1 VIEW LOCATION: [facility] DATE/TIME: 12/26/2022 4:09 PM INDICATION: Shortness of breath. COMPARISON: 12/17/2022     IMPRESSION: Heart size magnified in AP projection with normal vascularity. No focal consolidation, pneumothorax nor pleural effusion.    CT Head w/o Contrast    Result Date: 12/27/2022  EXAM: CT HEAD W/O CONTRAST LOCATION: [facility] DATE/TIME: 12/27/2022 1:57 AM INDICATION: fall, hit head, on xarelto COMPARISON: CT head 12/26/2022 TECHNIQUE: Routine CT Head without IV contrast. Multiplanar reformats. Dose reduction techniques were used. FINDINGS: INTRACRANIAL CONTENTS: No intracranial hemorrhage, extraaxial collection, or mass effect.  No CT evidence of acute infarct. Mild volume loss and presumed chronic small vessel ischemia are stable. VISUALIZED ORBITS/SINUSES/MASTOIDS: No intraorbital abnormality. No paranasal sinus mucosal disease. No middle ear or mastoid effusion. BONES/SOFT TISSUES: No acute abnormality.     IMPRESSION: 1.  No acute intracranial abnormality or significant change compared to the prior study.    CT Head w/o Contrast    Result Date: 12/26/2022  EXAM: CT HEAD W/O CONTRAST LOCATION: [facility] DATE/TIME: 12/26/2022 4:07 PM INDICATION: Altered mental status COMPARISON:  CT head 10/25/2022. TECHNIQUE: Routine CT Head without IV contrast. Multiplanar reformats. Dose reduction techniques were used. FINDINGS: INTRACRANIAL CONTENTS: No intracranial hemorrhage, extraaxial collection, or mass effect.  No CT evidence of acute infarct. Mild presumed chronic small vessel ischemic changes. Normal ventricles and sulci. Dolichoectasia of the basilar artery. VISUALIZED ORBITS/SINUSES/MASTOIDS: No intraorbital abnormality. No paranasal sinus mucosal disease. No middle ear or mastoid effusion. BONES/SOFT TISSUES: No acute abnormality.     IMPRESSION: 1.  No acute  intracranial process.

## 2023-01-05 NOTE — PROGRESS NOTES
Care Management Follow Up    Length of Stay (days): 8    Expected Discharge Date: 01/06/2023     Concerns to be Addressed:    Still looking at insurance auth  Through [facility]   Patient plan of care discussed at interdisciplinary rounds: Yes    Anticipated Discharge Disposition:  TCY     Anticipated Discharge Services:  TCU    Education Provided on the Discharge Plan:  Per Care Team   Patient/Family in Agreement with the Plan:  Yes    Referrals Placed by CM/SW:    Private pay costs discussed: Not applicable    Additional Information:  Chart reviewed.    Background:  Patient lives with adult daughter, no services and is independent at baseline. Home O2 2.5L at bedtime. Daughter said he was on hospice but now he may be on palliative care. Daughter wants pt to go to TCU for some strengthening.  Patient had been on hospice but then came off last fall in order to have a pacemaker. Patient agrees to TCU as he still wants to go to get some more strength.    Elba  told previous RN WILFREDO that she has found several [facility] that meet his criteria just waiting for the family to go and do a tour and accept a facility. She would like to be updated on patient's discharge plan.    [facility] worker - [phone]      RNCM called [facility] TCU this AM to get update on the running of insurance. Admissions coordinator told writer that financial person is about to run pts insurance, and that they will call back later today with update. Awaiting call back.      RNCM reached out to provider, pt will need bipap for TCU, and another day to monitor per addiction medicine.     4:00pm- Spoke with [facility] they are going to coordinate with writer in the morning, how to get Bipap ordered.     RNCM called resp therapy, to see where the ordering of Bipap was at. Resp therapy said that TCU would provide.       RNCN spoke with [facility], they do not know about ordering Bipap they are going to look into it, and get back to  writer. They said they could take pt with bipap if they know the settings. Awaiting call back.         CM will continue to follow plan of care, review recommendations, and assist with any discharge needs anticipated.          Yessica Cramer RN

## 2023-01-05 NOTE — PROGRESS NOTES
Pulmonary Progress Note  1/4/2023      Admit Date: 12/26/2022  Hospital Day: 7   CODE: No CPR- Do NOT Intubate    Reason for Consult: Acute on chronic hypoxemic respiratory failure      Interim Events:     Feels better after nebs.        Assessment/Plan:   Ki Pederson is a 67 year old male with a past medical history significant for tobacco smoking in remission, IVDU in remission, ongoing chewing tobacco dependence, hepatitis C without cirrhosis, COPD, colon polyps, TIA, HTN, atrial fibrillation with RVR, C-spine surgery, depression, RLS, seizure disorder, lumbar disc disease, atrial fibrillation with RVR and hemangiomas requiring surgical removal  admitted for syncope and encephalopathy. He had recently been enrolled in hospice but un-enrolled to undergo placement of a PPM. He has continued to have ongoing hypoxia off supplemental oxygen.     Recommend:  1. Acute on chronic hypoxemic, hypercarbic respiratory failure: Has PFT's consistent with very severe reversible obstructive disease with moderate reduction in his diffusion capacity indicating a likely asthma/COPD overlap syndrome presenting with encephalopathy likely secondary to hypoxia from a flare of his underlying lung disease coupled with non-compliance with his supplemental oxygen therapy.    Continue outpatient Azithromycin 250mg Q M W, Fr.    Continue prednisone at a slow taper over 2 weeks.    Continue scheduled duonebs.    Titrate supplemental oxygen to maintain saturations>88%.    Talked to RT about arranging NIV on discharge but will be sent to TCU and they would have to arrange this for him.    Continue as needed opiates.  2. HFpEF and A-Fib: Is on diuresis for this and additionally prescribed rate control agents for his A-Fib.    Continue telemetry monitoring.    Continue diuresis.    Amiodarone and diltiazem per primary team.  3. Syncope:Likely secondary to #1. Had a second episode while in the hospital and underwent repeat head imaging which  "was unrevealing. Is presently on fall precautions.  4. Depression: Noted to have some ? Suicidal ideations but upon evaluation by psychiatry, patient noted to be more encephalopathic than actively suicidal. They recommended minimizing delirium inducing medications. Challenging to do given need for anxiolysis.        Thank you for involving us in the care of this patient. We will continue to follow on a PRN basis.     Jaye Perales MD  Pulmonary and Critical Care  (P) [phone]       Medications:       acetaminophen  975 mg Oral Q8H JASIEL     amiodarone  200 mg Oral Daily     azithromycin  250 mg Oral Q Mon Wed Fri AM     buprenorphine-naloxone  1 tablet Sublingual BID     citalopram  40 mg Oral Daily     diclofenac  2 g Topical 4x Daily     famotidine  20 mg Oral Daily     folic acid  1 mg Oral Daily     furosemide  40 mg Oral Daily     gabapentin  300 mg Oral TID     guaiFENesin  1,200 mg Oral Daily     ipratropium - albuterol 0.5 mg/2.5 mg/3 mL  3 mL Nebulization 4x daily     lidocaine   Topical 4x Daily     metoprolol tartrate  25 mg Oral BID     multivitamin w/minerals  1 tablet Oral Daily     nicotine  1 patch Transdermal Daily     nicotine   Transdermal Q8H     piperacillin-tazobactam  3.375 g Intravenous Q8H     predniSONE  20 mg Oral Daily     rivaroxaban ANTICOAGULANT  20 mg Oral Daily with supper     senna-docusate  1 tablet Oral BID         Exam/Data:   Vitals  /67 (BP Location: Left arm)   Pulse 67   Temp 98.4  F (36.9  C) (Oral)   Resp 20   Ht 1.829 m (6' 0.01\")   Wt 105.1 kg (231 lb 12.8 oz)   SpO2 100%   BMI 31.43 kg/m       I/O last 3 completed shifts:  In: 1320 [P.O.:1320]  Out: -   Weight change: 0.454 kg (1 lb)    Resp: 20      EXAM:  Physical Exam  Constitutional:       Appearance: Normal appearance.   HENT:      Head: Atraumatic.      Mouth/Throat:      Mouth: Mucous membranes are dry.   Cardiovascular:      Rate and Rhythm: Normal rate and regular rhythm.      Heart sounds: " Normal heart sounds.   Pulmonary:      Effort: Pulmonary effort is normal.      Breath sounds: Normal breath sounds.   Abdominal:      General: Abdomen is flat.      Palpations: Abdomen is soft.   Skin:     General: Skin is warm.   Neurological:      Mental Status: He is alert.             DATA:    MICRO:  Date Source Organism   12/29 NP Swab Negative for Influenza A, B, RSV and SarsCoV2    Blood NGTD    Blood NGTD    NP Swab MRSA negative       Organism Antibiotic Antibiotic Start Date Antibiotic End Date   NGTD Azithromycin 12/26 12/26    Pip-Tazo 12/26 Ongoing    Vancomycin 12/26 1/1    Azithromycin 12/30 12/30    Azithromycin 1/2 1/2    Azithromycin 1/4 1/4         IMAGING:   XR Pelvis w Hip Left G/E 2 Views    Result Date: 12/27/2022  EXAM: XR PELVIS AND HIP LEFT 2 VIEWS LOCATION: [facility] DATE/TIME: 12/27/2022 2:15 AM INDICATION: fall on left hip COMPARISON: None.     IMPRESSION: Mild degenerative changes of the hips and lower lumbar spine.. No fracture or dislocation.    XR Chest Port 1 View    Result Date: 12/26/2022  EXAM: XR CHEST PORT 1 VIEW LOCATION: [facility] DATE/TIME: 12/26/2022 4:09 PM INDICATION: Shortness of breath. COMPARISON: 12/17/2022     IMPRESSION: Heart size magnified in AP projection with normal vascularity. No focal consolidation, pneumothorax nor pleural effusion.    CT Head w/o Contrast    Result Date: 12/27/2022  EXAM: CT HEAD W/O CONTRAST LOCATION: [facility] DATE/TIME: 12/27/2022 1:57 AM INDICATION: fall, hit head, on xarelto COMPARISON: CT head 12/26/2022 TECHNIQUE: Routine CT Head without IV contrast. Multiplanar reformats. Dose reduction techniques were used. FINDINGS: INTRACRANIAL CONTENTS: No intracranial hemorrhage, extraaxial collection, or mass effect.  No CT evidence of acute infarct. Mild volume loss and presumed chronic small vessel ischemia are stable. VISUALIZED ORBITS/SINUSES/MASTOIDS: No  intraorbital abnormality. No paranasal sinus mucosal disease. No middle ear or mastoid effusion. BONES/SOFT TISSUES: No acute abnormality.     IMPRESSION: 1.  No acute intracranial abnormality or significant change compared to the prior study.    CT Head w/o Contrast    Result Date: 12/26/2022  EXAM: CT HEAD W/O CONTRAST LOCATION: [facility] DATE/TIME: 12/26/2022 4:07 PM INDICATION: Altered mental status COMPARISON:  CT head 10/25/2022. TECHNIQUE: Routine CT Head without IV contrast. Multiplanar reformats. Dose reduction techniques were used. FINDINGS: INTRACRANIAL CONTENTS: No intracranial hemorrhage, extraaxial collection, or mass effect.  No CT evidence of acute infarct. Mild presumed chronic small vessel ischemic changes. Normal ventricles and sulci. Dolichoectasia of the basilar artery. VISUALIZED ORBITS/SINUSES/MASTOIDS: No intraorbital abnormality. No paranasal sinus mucosal disease. No middle ear or mastoid effusion. BONES/SOFT TISSUES: No acute abnormality.     IMPRESSION: 1.  No acute intracranial process.         Jaye Alstonvi  Pul/CC  [id number]

## 2023-01-06 NOTE — PROGRESS NOTES
RESPIRATORY CARE NOTE:    PT is currently on RA  Breath sounds expiratory wheeze with increased aeration post duoneb. Pt tolerated tx well. BiPAP currently on standby, only to use while sleeping.   RT will continue to follow.      Leydi Hewitt, RT  1/6/2023

## 2023-01-06 NOTE — DISCHARGE INSTRUCTIONS
Home care services have been arranged for you.  Agency: [facility]  Services: PT/OT/RN/TALIA   Phone: [phone]   Instructions: [facility] will contact you within 24 hours to arrange the first visit.          Pain Team Instructions   - You were prescribed Suboxone for pain. Stop all other opioid pain and Clonazepam. Take lower dose of Gabapentin 300 mg three times daily  - Acetaminophen (Tylenol) may be used as needed for pain. Do not exceed more than 3000 mg of Tylenol per day in a 24 hour period.   - Lidocaine ointment may be used up to 3-4 times daily on painful areas. If you do not want to use an ointment, lidocaine patches could be an alternative. You may wear up to 1-3 patches at a time. You should only use one lidocaine product, either a patch or an ointment, not both. Do not place lidocaine on red or open skin or over a healing incision. Avoid heat over lidocaine due to risk of burns. Patches may be used and on for 12 hours and then remove and have off for 12 hours. Lidocaine can be purchased over the counter. Aspercreme or Salon Pas are common Lidocaine brands over the counter.     - Store your medications in a safe and secure place.   - Only take medications prescribed to you and only take them as directed by your doctor. Taking more than the prescribed amount increases your risk for respiratory depression or death.  - Dispose of any unused medications in your home.  Over-the-counter medications and prescription drugs can pollute coker and be harmful to humans, fish, and other wildlife when disposed of improperly -- do not flush medications down the toilet or place in the trash.  Properly disposing of medicines is important to prevent abuse or poisoning and protect the environment. Prescription and over-the-counter medications are collected anonymously from residents for free at [facility] drop-off locations usually located at your local police department.   - Opioid pain medications can cause  addiction. If you have a history of chemical dependency of any type, you are at a higher risk of becoming addicted to pain medications.  Only take these prescribed medications to treat your pain when all other options have been tried. Take it for as short a time and as few doses as possible. Store your pain pills in a secure place, as they are frequently stolen and provide a dangerous opportunity for children or visitors in your house to start abusing these powerful medications. We will not replace any lost or stolen medicine.   -  Opioids are no longer recommended. You should seek out a drug take back program (see your local police department) to dispose of your extra medication.   - All opioids tend to cause constipation. Drink plenty of water and eat foods that have a lot of fiber, such as fruits, vegetables, prune juice, apple juice and high fiber cereal.  Take a laxative if you don't move your bowels at least every other day. Miralax , Milk of Magnesia, Colace , or Senna  can be used to keep you regular.  You will likely need to continue stool softeners and stimulants while taking opioids. Call your Primary Care Provider if it has been greater than 3 days since your last bowel movement.  - Follow up with Primary Care Provider, Addiction Medicine referral has been made please see them within two weeks for an appointment.     Intranasal naloxone   -You have been prescribed intranasal naloxone which is a medication that can temporarily reverse the effects of opioid pain medications if administered by a friend and family member while waiting for EMS to arrive.    - Nasal naloxone spray was prescribed for you as needed for opioid reversal. Naloxone is a special medication used to stop an overdose. Opioid pain medications or drugs such as heroin can slow breathing and cause overdose. Naloxone is to be given is an opioid overdose occurs when a person is unresponsive, not breathing, or struggling to breath.  Signs of  an opioid overdose include if a person does not wake up even if you shake them or call their name, slow or no breathing, blue, gray, or pale skin color, small pupils. Teach your family and friends how to respond to an overdose.   - Who should get Naloxone?  A person overdosing on opioids who is not responding to yelling or shaking, not breathing or struggling to breath.   - Overdose risk is greater when people take increased amounts of opioids, mix opioids with illicit drugs or alcohol, have changes in opioid tolerance (both an increase or decrease in the amount of opioid you are taking), if you are post surgical, have kidney, liver, or lung disease, sleep apnea, Obesity, Smoker, are over the age of 65, taking 2 or more different opioids, taking a combination of opioids and medications that depress the central nervous system (CNS).   - Central nervous system (CNS) depressant medications depress the overall functioning of the central nervous system to induce sedation, muscle relaxation, and drowsiness. CNS depressants include alcohol, which this the most widely used depressant, Barbiturates, Benzodiazepines, and Sleeping pills.   -This is a useful education tool you can view regarding opioid overdose prevention. http://prescribetoprevent.org/patient-education/videos/

## 2023-01-06 NOTE — PLAN OF CARE
Problem: Plan of Care - These are the overarching goals to be used throughout the patient stay.    Goal: Readiness for Transition of Care  Outcome: Met   Goal Outcome Evaluation:     Patient received discharge orders   Patient to make appointment for out patient sleep study for evaluation for BiPap.  Son is on his way to transport patient home.

## 2023-01-06 NOTE — PROGRESS NOTES
PULMONARY / CRITICAL CARE PROGRESS NOTE    Date / Time of Admission:  12/26/2022  2:48 PM    Assessment:     Ki Pederson is a 67 year old male with history of chronic respiratory failure on home O2 2 LPM, very severe COPD, HTN, CAD, a fib anticoagulated, hep C, back surgery.   Previously enrolled in hospice care.   Admitted with acute on chronic respiratory failure.   Patient was treated with NIPPV, steroids, bronchodilators and Abx.   Patient was initially going to be discharged to TCU, but he is now going home.     1. Acute on chronic hypoxic hypercapnic respiratory failure   Due to COPD exacerbation, treated with NIPPV, steroids and bronchodilators  O2 needs are back to home dose O2.   Patient was having difficulty tolerating BiPAP.   Patient states that he used BiPAP overnight while in hospice care.   Referral to sleep clinic.   2. COPD exacerbation   3. Acute bronchitis   4. HTN, CAD  5. Atrial fibrillation anticoagulated  6. Chronic pain     Advance Directives:  DNR    Plan:   1. O2 supplementation 2 LPM  2. Patient will benefit of NIPPV at night but in view of not tolerating pressure settings, recommend referral to sleep clinic  3. Schedule bronchodilators  4. Taper systemic steroids over the next week  5. Titrate BP meds and diuresis   6. DVT prophylaxis anticoagulated with xarelto  7. Minimize pain meds  8. Follow up in the pulmonary clinic , patient was previously followed with Dr. Rivera     Pulmonary service will sigh off  Please contact me if you have any questions.    Francis Bernstein  Pulmonary / Critical Care  01/06/2023  12:25 PM      Subjective:   HPI:  Ki Pederson is a 67 year old male with history of chronic respiratory failure on home O2 2 LPM, very severe COPD, HTN, CAD, a fib anticoagulated, hep C, back surgery.   Previously enrolled in hospice care.   Admitted with acute on chronic respiratory failure.   Patient was treated with NIPPV, steroids, bronchodilators and Abx.    Favorable hospital course.     Events overnight  - O2 supplementation back to 2 LPM  - Difficulty using BiPAP during the hospital , states that mask is too big, pressure too high.     Past Medical History:   Diagnosis Date     Anxiety      Atrial fibrillation (H)      CAD (coronary artery disease)      Cerebral infarction (H)      Chronic hepatitis C virus infection (H)      COPD (chronic obstructive pulmonary disease) (H)      Essential hypertension      Hemangioma     massive hemangioma; left hand     Hemangioma     Left hand     Hypertension      Intravenous drug abuse in remission (H)     Sober since ~2010     Lumbar spinal stenosis      Myocardial infarction (H)      Peripheral neuropathy      Restless leg syndrome      Stroke (H)      TIA (transient ischemic attack)      Allergies: Symbicort     MEDS:  Current Facility-Administered Medications   Medication     acetaminophen (TYLENOL) tablet 975 mg     albuterol (PROVENTIL HFA/VENTOLIN HFA) inhaler     alum & mag hydroxide-simethicone (MAALOX) suspension 30 mL     amiodarone (PACERONE) tablet 200 mg     azithromycin (ZITHROMAX) tablet 250 mg     bisacodyl (DULCOLAX) EC tablet 5 mg     bisacodyl (DULCOLAX) suppository 10 mg     buprenorphine-naloxone (SUBOXONE) 2-0.5 MG sublingual tablet 1 tablet     citalopram (celeXA) tablet 40 mg     clonazePAM (klonoPIN) tablet 0.5 mg     glucose gel 15-30 g    Or     dextrose 50 % injection 25-50 mL    Or     glucagon injection 1 mg     diclofenac (VOLTAREN) 1 % topical gel 2 g     famotidine (PEPCID) tablet 20 mg     flumazenil (ROMAZICON) injection 0.2 mg     folic acid (FOLVITE) tablet 1 mg     furosemide (LASIX) tablet 40 mg     gabapentin (NEURONTIN) capsule 300 mg     guaiFENesin (MUCINEX) 12 hr tablet 1,200 mg     OLANZapine zydis (zyPREXA) ODT tab 5-10 mg    Or     haloperidol lactate (HALDOL) injection 2.5-5 mg     hydrOXYzine (ATARAX) tablet 10 mg     ipratropium - albuterol 0.5 mg/2.5 mg/3 mL (DUONEB) neb  "solution 3 mL     lidocaine (XYLOCAINE) 5 % ointment     magnesium hydroxide (MILK OF MAGNESIA) suspension 30 mL     melatonin tablet 5 mg     metoprolol tartrate (LOPRESSOR) tablet 25 mg     multivitamin w/minerals (THERA-VIT-M) tablet 1 tablet     naloxone (NARCAN) injection 0.2 mg    Or     naloxone (NARCAN) injection 0.4 mg    Or     naloxone (NARCAN) injection 0.2 mg    Or     naloxone (NARCAN) injection 0.4 mg     nicotine (NICODERM CQ) 7 MG/24HR 24 hr patch 1 patch     nicotine Patch in Place     polyethylene glycol (MIRALAX) Packet 17 g     potassium chloride ER (KLOR-CON M) CR tablet 10 mEq     predniSONE (DELTASONE) tablet 10 mg     rivaroxaban ANTICOAGULANT (XARELTO) tablet 20 mg     senna-docusate (SENOKOT-S/PERICOLACE) 8.6-50 MG per tablet 1 tablet     senna-docusate (SENOKOT-S/PERICOLACE) 8.6-50 MG per tablet 1 tablet     Facility-Administered Medications Ordered in Other Encounters   Medication     azithromycin (ZITHROMAX) 500 mg in sodium chloride 0.9 % 250 mL intermittent infusion     Objective:   VITALS:  /75 (BP Location: Left arm)   Pulse 66   Temp 97.8  F (36.6  C) (Oral)   Resp 20   Ht 1.829 m (6' 0.01\")   Wt 104.9 kg (231 lb 3.2 oz)   SpO2 91%   BMI 31.35 kg/m    VENT:  Resp: 20    EXAM:   Gen: awake, alert, no distress  HEENT: pink conjunctiva, moist mucosa, Mallampati III/IV  Neck: no thyromegaly, masses or JVD  Lungs: discrete ronchi  CV: regular, no murmurs or gallops appreciated  Abdomen: soft, NT, BS wnl  Ext: no edema  Neuro: CN II-XII intact, non focal       Data Review:  Recent Labs   Lab 01/04/23  0742 01/04/23  0352 01/03/23  2115 01/03/23  1744 01/03/23  1141 01/03/23  0805   * 96 122* 151* 144* 101*      Latest Reference Range & Units 01/04/23 07:42   Sodium 136 - 145 mmol/L 138   Potassium 3.4 - 5.3 mmol/L 3.3 (L)   Chloride 98 - 107 mmol/L 101   Carbon Dioxide (CO2) 22 - 29 mmol/L 26   Urea Nitrogen 8.0 - 23.0 mg/dL 15.4   Creatinine 0.67 - 1.17 mg/dL 1.12 "   GFR Estimate >60 mL/min/1.73m2 73   Calcium 8.8 - 10.2 mg/dL 8.4 (L)   Anion Gap 7 - 15 mmol/L 11   Magnesium 1.7 - 2.3 mg/dL 1.9   Glucose 70 - 99 mg/dL 115 (H)     Echocardiogram 12/31/2022  Interpretation Summary     1. Normal left ventricular size and systolic performance with a visually  estimated ejection fraction of 55-60%.  2. No significant valvular heart disease is identified on this study.  3. Normal right ventricular size and systolic performance.  4. There is mild left atrial enlargement.     When compared to the prior real-time echocardiogram dated 23 September 2022,  there has been an interval improvement in left ventricular systolic  performance with normalization of the ejection fraction.    XR CHEST PORT 1 VIEW  LOCATION: [facility]  DATE/TIME: 12/29/2022 5:57 PM   INDICATION: fever  COMPARISON: Chest radiograph 12/26/2022.  IMPRESSION: Stable enlarged cardiac silhouette. Newly visualized opacity overlying the right costophrenic sulcus, could represent pneumonia or superimposed soft tissues. No definite pleural effusion or pneumothorax. Bones are unchanged. Cerclage wires   overlying the neck are again noted.    By:  Francis Bernstein MD, 01/06/2023  12:25 PM    Primary Care Physician:  Paola Rico

## 2023-01-06 NOTE — PROGRESS NOTES
SpO2> 90% on 2 lpm NC.  Patient received Duoneb as schedule last evening, BS diminished. Patient decline to use BIPAP overnight.

## 2023-01-06 NOTE — PLAN OF CARE
Occupational Therapy Discharge Summary    Reason for therapy discharge:    Discharged to home with home therapy.    Progress towards therapy goal(s). See goals on Care Plan in [facility] electronic health record for goal details.  Goals met    Therapy recommendation(s):    Continued therapy is recommended.  Rationale/Recommendations:  for progression of goals and ADL independence.  Continue home exercise program.

## 2023-01-06 NOTE — PLAN OF CARE
Assumed care 1500 to 0730. A&O x 4. Stand by assist. Tele is NSR. Denies pain. Urinal at bedside, up to toilet. Pt slept minimally. Call light within reach, able to make needs known. Bed alarm on for safety.    Problem: Heart Failure  Goal: Effective Breathing Pattern During Sleep  Intervention: Monitor and Manage Obstructive Sleep Apnea  Recent Flowsheet Documentation  Taken 1/6/2023 0400 by PEPITO PAUL  Medication Review/Management: medications reviewed     Problem: Heart Failure  Goal: Fluid and Electrolyte Balance  Outcome: Progressing

## 2023-01-06 NOTE — PROGRESS NOTES
PALLIATIVE CARE SOCIAL WORK Progress Note     PCSW supportive visit today with Pt. He was awake, alert, walking around his room. He was packing, as he says he is leaving today. Pt reports that he will now be going to his daughters house and says that he has been accepted into some type of program for independent housing and support. He is excited that next week he will be touring some apartments. Pt seemed very positive and says he is feeling pretty good. He shared some of his art work and coloring pages he has been working on over the past few days. 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  Wished Pt luck in his next steps.    Plan: PCSW remains available if further needs arise.     STACY Antoine, [facility]  Palliative Care Team  Team Pager: [phone]

## 2023-01-06 NOTE — DISCHARGE SUMMARY
[facility] MEDICINE  DISCHARGE SUMMARY  [facility]     Primary Care Physician: Paola Rico  Admission Date: 12/26/2022   Discharge Provider: Violette Pickard DO Discharge Date: 1/6/2023   Diet: cardiac   Code Status: Prior   Activity: as tolerated with assistance        Condition at Discharge: stable      REASON FOR PRESENTATION(See Admission Note for Details)   67-year-old male with opiate use disorder, tobacco use disorder, COPD with chronic home oxygen use, atrial fibrillation, DM2 presented with syncope, acute on chronic hypoxic and hypercapnic respiratory failure in the setting of COPD exacerbation, right-sided pneumonia, acute HFpEF.    PRINCIPAL & ACTIVE DISCHARGE DIAGNOSES     Principal Problem:    Acute on chronic respiratory failure with hypoxia and hypercapnia (H)  Active Problems:    Essential hypertension    Chronic pain disorder    Persistent atrial fibrillation (H)    At risk for delirium    Current moderate episode of major depressive disorder without prior episode (H)    Hyperkalemia    Chronic systolic heart failure (H)    Chronic respiratory failure with hypoxia and hypercapnia (H)    Stage 3a chronic kidney disease (H)    Bradycardia    Other emphysema (H)    Hypotension, unspecified hypotension type    COPD exacerbation (H)    Elevated brain natriuretic peptide (BNP) level    Altered mental status, unspecified altered mental status type      SIGNIFICANT FINDINGS (Imaging, labs):   IMPRESSION: Normal heart size and pulmonary vascularity. Lungs are clear. Postoperative changes lower cervical spine. Otherwise unremarkable. No acute findings.    ---------------------------------------------                                                                   IMPRESSION:  1.  No acute intracranial process.    ---------------------------------------------    IMPRESSION: Heart size magnified in AP projection with normal vascularity. No focal consolidation,  pneumothorax nor pleural effusion.    ---------------------------------------------    IMPRESSION:  1.  No acute intracranial abnormality or significant change compared to the prior study.    ---------------------------------------------    IMPRESSION: Mild degenerative changes of the hips and lower lumbar spine.. No fracture or dislocation.    ---------------------------------------------    IMPRESSION:  1.  No acute intracranial process.    ---------------------------------------------    IMPRESSION: Stable enlarged cardiac silhouette. Newly visualized opacity overlying the right costophrenic sulcus, could represent pneumonia or superimposed soft tissues. No definite pleural effusion or pneumothorax. Bones are unchanged. Cerclage wires   overlying the neck are again noted.    ---------------------------------------------    Interpretation Summary     1. Normal left ventricular size and systolic performance with a visually  estimated ejection fraction of 55-60%.  2. No significant valvular heart disease is identified on this study.  3. Normal right ventricular size and systolic performance.  4. There is mild left atrial enlargement.     When compared to the prior real-time echocardiogram dated 23 September 2022,  there has been an interval improvement in left ventricular systolic  performance with normalization of the ejection fraction.    PENDING LABS     [unfilled]      PROCEDURES ( this hospitalization only)          RECOMMENDATIONS TO OUTPATIENT PROVIDER FOR F/U VISIT     Follow-up with primary care within 1 week  Referral placed for sleep medicine for CPAP titration and reinitiation  Follow-up with pulmonology outpatient setting  Follow-up with cardiology within 2 weeks  Follow-up with addiction medicine for continued Suboxone treatment  Outpatient age-appropriate colon cancer screening    DISPOSITION     Home with home care    SUMMARY OF HOSPITAL COURSE:      Acute on chronic hypoxic and  hypercapnic respiratory failure, acute COPD exacerbation, right sided pneumonia  Patient chronically on 2 L oxygen at home and did present with worsening hypoxia on 12/26/2022.  Chest x-ray 12/26 without consolidations or pleural effusions but repeat chest x-ray 12/29 did show some new opacities overlying the right costophrenic sulcus concerning for pneumonia.  Patient did receive 7 days IV Zosyn for treatment of pneumonia.  Has now been tolerating room air during the daytime.  Should continue as needed DuoNebs as well as slow prednisone taper as outlined by pulmonology.  Although home BiPAP initially recommended, at time of discharge pulmonology stated that referral back to sleep medicine for further evaluation would be preferred, especially since patient did not like using BiPAP.     Syncope  Patient presented on 12/26/2022 following syncopal episode.  CT head 12/26 also without any acute abnormalities.  EKG at that time without any acute abnormalities and TTE 12/30 with ejection fraction 55 to 60%, mild left atrial enlargement.  Syncope was thought to be secondary to worsening hypoxia (oxygen was not on when he was found), hypotension, bradycardia, chronic opiate and benzodiazepine use.  30-day cardiac event monitor at time of discharge ordered by cardiology     Chronic pain, opiate dependence  Prior to admission patient with chronic oxycodone and benzodiazepine use.  Has been seen by addiction medicine and they did endorse more about red flags for misuse and possible diversion.  Opiates and benzodiazepines have been completely discontinued and patient was initiated on Suboxone with addiction medicine.      Acute on chronic HFpEF-resolved  Patient with significant edema at time of hospital admission.  proBNP only very mildly elevated at 900 and chest x-ray without pulmonary edema.  TTE 12/30 with ejection fraction of 55 to 60%.  Patient was diuresed with IV Lasix and is currently euvolemic.  Continue home  furosemide     YUNIEL on CKD 3-resolved  Baseline creatinine approximately 1.1, jourdan to 2 in the setting of acute heart failure.  Thought to be cardiorenal as did resolve with diuresis.     Atrial fibrillation  Patient's home diltiazem and amiodarone initially held at time of admission for bradycardia.  Pacemaker is in place.  Continue home amiodarone, metoprolol, rivaroxaban     Hepatitis C  Chronic without hepatic dysfunction.     Mood disorder  Patient did have some suicidal ideation on 12/26/2022 but no further issues with mood after that point.  Continue home citalopram.  Benzodiazepines discontinued altogether.     GERD  Famotidine 20 mg p.o. daily     Tobacco use disorder  Nicotine patch 7 mg daily     Hypokalemia  Potassium replacement protocol     Hyperkalemia-resolved     Normocytic anemia  Hemoglobin 10.5 with an MCV of 95.  Appears to be anemia of chronic disease.  Would recommend outpatient age-appropriate colon cancer screening    Discharge Medications with Med changes:        Review of your medicines      START taking      Dose / Directions   buprenorphine-naloxone 2-0.5 MG Subl sublingual tablet  Commonly known as: SUBOXONE  Used for: Chronic, continuous use of opioids, High risk medication use, Other chronic pain      Dose: 1 tablet  Place 1 tablet under the tongue 2 times daily  Quantity: 28 tablet  Refills: 0     diclofenac 1 % topical gel  Commonly known as: VOLTAREN  Used for: Chronic pain disorder      Dose: 2 g  Apply 2 g topically 4 times daily  Quantity: 50 g  Refills: 0     folic acid 1 MG tablet  Commonly known as: FOLVITE  Used for: Folate deficiency      Dose: 1 mg  Start taking on: January 7, 2023  Take 1 tablet (1 mg) by mouth daily for 20 days  Quantity: 20 tablet  Refills: 0     multivitamin w/minerals tablet  Used for: Folate deficiency      Dose: 1 tablet  Start taking on: January 7, 2023  Take 1 tablet by mouth daily  Refills: 0     nicotine 7 MG/24HR 24 hr patch  Commonly known as:  NICODERM CQ  Used for: Tobacco use disorder      Dose: 1 patch  Start taking on: January 7, 2023  Place 1 patch onto the skin daily for 28 days  Quantity: 28 patch  Refills: 0        CONTINUE these medicines which may have CHANGED, or have new prescriptions. If we are uncertain of the size of tablets/capsules you have at home, strength may be listed as something that might have changed.      Dose / Directions   gabapentin 300 MG capsule  Commonly known as: NEURONTIN  This may have changed: how much to take  Used for: Chronic pain disorder      Dose: 300 mg  Take 1 capsule (300 mg) by mouth 3 times daily for 30 days  Quantity: 90 capsule  Refills: 0     ipratropium - albuterol 0.5 mg/2.5 mg/3 mL 0.5-2.5 (3) MG/3ML neb solution  Commonly known as: DUONEB  This may have changed:     how much to take    how to take this    when to take this  Used for: Chronic obstructive pulmonary disease with acute exacerbation (H)      Nebulize tid for 3 days and then tid prn for sob  Quantity: 90 mL  Refills: 3     metoprolol tartrate 25 MG tablet  Commonly known as: LOPRESSOR  This may have changed:     medication strength    how much to take  Used for: Chronic systolic heart failure (H)      Dose: 25 mg  Take 1 tablet (25 mg) by mouth 2 times daily for 30 days  Quantity: 60 tablet  Refills: 0     * naloxone 4 MG/0.1ML nasal spray  Commonly known as: NARCAN  This may have changed: Another medication with the same name was added. Make sure you understand how and when to take each.  Used for: Chronic, continuous use of opioids      Dose: 4 mg  Spray 1 spray (4 mg) into one nostril alternating nostrils once as needed for opioid reversal every 2-3 minutes until assistance arrives  Quantity: 0.2 mL  Refills: 0     * naloxone 4 MG/0.1ML nasal spray  Commonly known as: NARCAN  This may have changed: You were already taking a medication with the same name, and this prescription was added. Make sure you understand how and when to take  each.  Used for: High risk medication use      Dose: 4 mg  Spray 1 spray (4 mg) into one nostril alternating nostrils as needed for opioid reversal every 2-3 minutes until assistance arrives  Quantity: 0.2 mL  Refills: 0     predniSONE 10 MG tablet  Commonly known as: DELTASONE  This may have changed:     medication strength    how much to take  Used for: COPD exacerbation (H)      Dose: 10 mg  Start taking on: January 7, 2023  Take 1 tablet (10 mg) by mouth daily for 3 days  Quantity: 3 tablet  Refills: 0         * This list has 2 medication(s) that are the same as other medications prescribed for you. Read the directions carefully, and ask your doctor or other care provider to review them with you.            CONTINUE these medicines which have NOT CHANGED      Dose / Directions   albuterol 108 (90 Base) MCG/ACT inhaler  Commonly known as: PROAIR HFA/PROVENTIL HFA/VENTOLIN HFA  Used for: Essential hypertension      Dose: 2 puff  Inhale 2 puffs into the lungs every 3 hours  Quantity: 18 g  Refills: 11     amiodarone 200 MG tablet  Commonly known as: PACERONE  Used for: Atrial fibrillation with RVR (H)      Dose: 200 mg  Take 1 tablet (200 mg) by mouth daily  Quantity: 30 tablet  Refills: 11     azithromycin 250 MG tablet  Commonly known as: ZITHROMAX  Indication: severe COPD with frequent exacerbations  Used for: Chronic obstructive pulmonary disease with acute exacerbation (H)      USE ONE TAB BY MOUTH ONCE DAILY ON Monday, Wednesday, Friday ONGOING  Quantity: 36 tablet  Refills: 3     citalopram 40 MG tablet  Commonly known as: celeXA  Used for: Recurrent major depressive disorder, in partial remission (H)      Dose: 40 mg  Take 1 tablet (40 mg) by mouth daily  Quantity: 60 tablet  Refills: 3     famotidine 20 MG tablet  Commonly known as: PEPCID  Used for: Dysphagia, unspecified type      Dose: 20 mg  Take 1 tablet (20 mg) by mouth daily  Quantity: 60 tablet  Refills: 0     furosemide 40 MG tablet  Commonly  known as: LASIX  Used for: Essential hypertension      Dose: 40 mg  Take 1 tablet (40 mg) by mouth daily  Quantity: 90 tablet  Refills: 3     guaiFENesin 600 MG 12 hr tablet  Commonly known as: MUCINEX  Used for: Chronic obstructive pulmonary disease with acute lower respiratory infection (H)      Dose: 1,200 mg  Take 2 tablets (1,200 mg) by mouth daily  Quantity: 120 tablet  Refills: 0     magnesium 250 MG tablet  Used for: Leg cramps      Dose: 1 tablet  Take 1 tablet (250 mg) by mouth At Bedtime  Quantity: 90 tablet  Refills: 3     rivaroxaban ANTICOAGULANT 20 MG Tabs tablet  Commonly known as: XARELTO  Used for: Encounter for medication refill      Dose: 20 mg  Take 1 tablet (20 mg) by mouth daily (with dinner)  Quantity: 90 tablet  Refills: 3        STOP taking    clonazePAM 1 MG tablet  Commonly known as: klonoPIN        clonazePAM 2 MG tablet  Commonly known as: klonoPIN        diltiazem ER COATED BEADS 300 MG 24 hr capsule  Commonly known as: CARDIZEM CD/CARTIA XT        oxyCODONE HCl 20 MG Tabs immediate release tablet  Commonly known as: ROXICODONE              Where to get your medicines      These medications were sent to [id number] [place] [address]    Phone: [phone]     buprenorphine-naloxone 2-0.5 MG Subl sublingual tablet    diclofenac 1 % topical gel    folic acid 1 MG tablet    gabapentin 300 MG capsule    metoprolol tartrate 25 MG tablet    naloxone 4 MG/0.1ML nasal spray    nicotine 7 MG/24HR 24 hr patch    predniSONE 10 MG tablet     Some of these will need a paper prescription and others can be bought over the counter. Ask your nurse if you have questions.    You don't need a prescription for these medications    multivitamin w/minerals tablet             Rationale for medication changes:    Clonazepam, oxycodone discontinued by addiction medicine    Diltiazem discontinued by cardiology        Consults   Cardiology, pulmonology,  addiction medicine      Immunizations given this encounter     [unfilled]       Anticoagulation Information      Recent INR results: No results for input(s): INR in the last 168 hours.  Warfarin doses (if applicable) or name of other anticoagulant: Rivaroxaban 20 mg p.o. daily      Discharge Orders     Discharge Procedure Orders   Follow-Up with Cardiology [facility]   Standing Status: Future   Referral Priority: Routine: Next available opening Referral Type: Consultation   Requested Specialty: Cardiovascular Disease   Number of Visits Requested: 1     Follow-Up with Cardiology   Standing Status: Future   Referral Priority: Routine: Next available opening Referral Type: Consultation   Requested Specialty: Cardiovascular Disease   Number of Visits Requested: 1     Follow-Up with Cardiology [facility] Heart Failure Discharge   Standing Status: Future   Referral Priority: Routine: Next available opening Referral Type: Consultation   Requested Specialty: Cardiovascular Disease   Number of Visits Requested: 1     Home Care Referral   Referral Priority: Routine: Next available opening Referral Type: Home Health Therapies & Aides   Number of Visits Requested: 1     Adult Sleep Eval & Management  Referral   Standing Status: Future   Referral Priority: Routine: Next available opening Referral Type: Consultation   Number of Visits Requested: 1     Reason for your hospital stay   Order Comments: Acute heart failure as well as severe COPD exacerbation and pneumonia     Follow-up and recommended labs and tests    Order Comments: Follow up with primary care provider, Paola Rico, within 7 days for hospital follow- up.  No follow up labs or test are needed.     Activity   Order Comments: Your activity upon discharge: activity as tolerated     Order Specific Question Answer Comments   Is discharge order? Yes      Monitor and record   Order Comments: Weigh yourself every morning     When to contact your care team  "  Order Comments: Contact your care team If symptoms get worse, If increased shortness of breath, If waking up at night with difficulty breathing, If unable to lie down for sleep due to symptoms, If weight gain of 2 pounds a day for 2 days in a row OR 5 pounds in 1 week., Increased swelling in your ankles or legs, and Dizziness or lightheadedness     Discharge Follow Up - with Primary Care clinic within 3-5 days (RN to schedule prior to d/c for HE/[facility] primary care     Add follow up information to the AVS prior to printing     Cardiac Event Monitor Adult Pediatric   Standing Status: Future Standing Exp. Date: 01/03/24   Order Comments: Apply on hospital discharge, if possible.     Order Specific Question Answer Comments   Patient should wear the monitor for? 30 days    Order completed for? Adult Cardiology    Schedule hook-up appt at [facility] [33]      Cane Order for DME - ONLY FOR DME   Order Comments: I, the undersigned, certify that the above prescribed supplies are medically necessary for this patient and is both reasonable and necessary in reference to accepted standards of medical and necessary in reference to accepted standards of medical practice in the treatment of this patient's condition and is not prescribed as a convenience.      Order Specific Question Answer Comments   DME Provider: [facility]    Start Date: 1/6/2023    Cane Type: Single Tip    Diagnosis: R26.89 - Impaired Gait and Mobility    Reminder: Patient can typically get 1 every 5 years      Diet   Order Comments: Follow this diet upon discharge: Orders Placed This Encounter      Low salt, low cholesterol     Order Specific Question Answer Comments   Is discharge order? Yes      Examination     Vital signs:  Temp: 97.8  F (36.6  C) Temp src: Oral BP: 120/75 Pulse: 66   Resp: 20 SpO2: 91 % O2 Device: None (Room air) Oxygen Delivery: 1.5 LPM Height: 182.9 cm (6' 0.01\") Weight: 104.9 kg (231 lb 3.2 oz)  Estimated body mass index is 31.35 " "kg/m  as calculated from the following:    Height as of this encounter: 1.829 m (6' 0.01\").    Weight as of this encounter: 104.9 kg (231 lb 3.2 oz).         Physical Examination:   General appearance - alert, well appearing and obese, and in no distress and oriented to person, place, and time  Mental status - alert, oriented to person, place, and time, normal mood, behavior, speech, dress, motor activity, and thought processes  HEENT - sclera anicteric, left eye normal, right eye normal, nares normal and patent  Respiratory - no tachypnea, retractions or cyanosis  Cardiac - normal rate, regular rhythm  Abdomen -soft and rotund  Neurological - alert, oriented, normal speech, no focal findings or movement disorder noted  Musculoskeletal - no joint tenderness, deformity or swelling, full range of motion without pain  Extremities - peripheral pulses normal, no pedal edema, no clubbing or cyanosis  Skin - normal coloration and turgor, no rashes, no suspicious skin lesions noted      Please see EMR for more detailed significant labs, imaging, consultant notes etc.    Time Spent on this Encounter   I, Violette Pickard DO, personally saw the patient today and spent greater than 30 minutes discharging this patient.    Violette Pickard DO    [facility]: Ph:[phone]    CC:Paola Rico    "

## 2023-01-06 NOTE — PROGRESS NOTES
Care Management Follow Up    Length of Stay (days): 9    Expected Discharge Date: 01/07/2023     Concerns to be Addressed:     Needs Bipap ordered   Patient plan of care discussed at interdisciplinary rounds: Yes    Anticipated Discharge Disposition:  Home with home PT/OT/RN/HHA, and home Bipap       Anticipated Discharge Services:   Home with home PT/OT/RN/HHA, and home Bipap    Anticipated Discharge DME:  Bipap    Education Provided on the Discharge Plan:  Per Care Team   Patient/Family in Agreement with the Plan:  Yes    Referrals Placed by CM/SW:    Private pay costs discussed: Not applicable    Additional Information:  Chart reviewed.    RNCM spoke with pt, pt feels he is strong enough to go back to daughter's home, instead of TCU. Pt has been moving around Ind in room.      RNCM spoke with pts daughter. Pt rather have pt come home, and keep looking for half-way then have pt go to [place] for TCU.  She would like home PT/OT/RN/HHA for home, and she is going to work on PCA services in the mean time.      [facility] can accept pt for home PT/OT/HHA/RN.      RNCM reached out to Pulmonologist to see about ordering Bipap awaiting response.       RNCM reached out to resp therapy to inform them that patient will be needing Bipap for home now instead of TCU, Resp could not hear writer well on Vocera, and is going to come talk to writer. Awaiting.      Sadie [facility] worker - [phone] was updated today, and emailed the change in dicharge info.     CM will continue to follow plan of care, review recommendations, and assist with any discharge needs anticipated.          Yessica Cramer RN         Care Management Discharge Note    Discharge Date: 01/07/2023       Discharge Disposition: Home Care (PT/OT/RN/HHA)    Discharge Services:  Home with home PT/OT/RN/HHA      Discharge Transportation: family or friend will provide    Private pay costs discussed: NA      Education Provided on the Discharge Plan:   Per Care Team   Persons Notified of Discharge Plans: Yes   Patient/Family in Agreement with the Plan:  Yes    Handoff Referral Completed: Yes    Additional Information:  Final discharge plan if for pt to return to daughter's home with [facility] home care following for home PT/OT/HHA/RN. Plan is for a referral to be made for OP Sleep Study, so pt can get the proper settings for Bipap. Pt has not been wearing Bipap for a couple nights, and states when he feels better he doesn't wear it.      Daughter can pick pt up pt at 3:00pm today, and plans on coming at that time. Has to work until then.    Provider, bedside, pt, and daughter notified.         Yessica Cramer RN
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
## 2023-01-06 NOTE — CONSULTS
Integrative Therapy Consult    Healing PresenceYes  Essential Oils: Topical (EO/Topical Oil)     Lavender Massage Oil - HC       Healing Music:       Breathwork:       Guided Imagery:       Acupressure: Hands on Li4     Oshibori:       Energy Therapy:       Healing Touch:       Reiki:       Qi Gong:     Massage: Hand, Foot      Targeted Massage:    Sleep Promotion:       Other Therapy:       Intervention Reason: Comfort, Well Being     Pre and Post Session Scores: Patient Desires Treatment: yes                             Delivery:         Referrals:      Radha Marin

## 2023-01-08 NOTE — PROGRESS NOTES
Assessment & Plan:      Problem List Items Addressed This Visit    None  Visit Diagnoses     Shortness of breath    -  Primary    Relevant Orders    XR Chest 2 Views (Completed)    Night sweats        Relevant Orders    UA macro with reflex to Microscopic and Culture - Clinc Collect (Completed)    Traumatic injury of head, initial encounter        Laceration of left eyebrow, initial encounter        Relevant Orders    TDAP VACCINE (Adacel, Boostrix)  [[id number]] (Completed)        Medical Decision Making  Patient with recent hospitalization for pneumonia, discharged on 12/31 presents with new head trauma to the left eyebrow 2 days ago.  Examination of the wound appears reassuring with no red flag signs concerning for intracranial hemorrhage or bone fracture.  Patient did note feeling slightly more short of breath today which is not totally atypical of his COPD, but he is also noted to be slightly warm to touch.  Rechecked urine and chest x-ray.  Urine analysis is negative for UTI.  Chest x-ray is negative for focal pneumonia.  Provided tetanus booster today.  Recommend cold compresses.  Discussed signs of worsening symptoms when to be seen immediately in the emergency room.  Continue with follow-up with primary care as previously instructed.     Subjective:      Ki Pederson is a 67 year old male with history of heart failure, chronic hepatitis, COPD, and chronic respiratory failure with hypoxia recently hospitalized for pneumonia now presents with trauma to the left forehead.  Event of injury occurred 2 days ago.  Patient is currently on blood thinners.  Patient denies headache, current vision changes, nausea, and vomiting.  Patient currently receives weekly azithromycin at baseline for prevention of COPD exacerbation.  He was not prescribed any other antibiotics since discharge.  He has follow-up with primary care in 1 month.     The following portions of the patient's history were reviewed and updated as  appropriate: allergies, current medications, and problem list.     Review of Systems  Pertinent items are noted in HPI.    Allergies  Allergies   Allergen Reactions     Symbicort Rash       Family History   Problem Relation Age of Onset     Coronary Artery Disease Mother      Diabetes Mother      Coronary Artery Disease Father      Chronic Obstructive Pulmonary Disease Brother      Heart Disease Brother      Chronic Obstructive Pulmonary Disease Brother      Heart Disease Brother      No Known Problems Brother      Heart Disease Sister      Chronic Obstructive Pulmonary Disease Sister      Chronic Obstructive Pulmonary Disease Sister      Heart Disease Sister      No Known Problems Sister      Chronic Obstructive Pulmonary Disease Sister      Heart Disease Sister      No Known Problems Sister      No Known Problems Sister        Social History     Tobacco Use     Smoking status: Former     Packs/day: 2.00     Types: Cigarettes     Quit date:      Years since quittin.0     Smokeless tobacco: Current     Types: Chew     Tobacco comments:     No passive exposure   Substance Use Topics     Alcohol use: Not Currently        Objective:      /80   Pulse 76   Temp 98  F (36.7  C)   Resp 20   SpO2 90%   General appearance - alert, well appearing, and in no distress and non-toxic  Eyes - pupils equal and reactive, extraocular eye movements intact  Ears - bilateral TM's and external ear canals normal  Skin - Superficial 1 cm laceration affecting the left eyebrow appears to be healing appropriately with immediate erythema, there is mild ecchymosis extending, no significant swelling; no obvious bony deformity; slight tenderness to palpation over the left zygomatic process in the left eyebrow     Lab & Imaging Results    Results for orders placed or performed in visit on 23   XR Chest 2 Views     Status: None    Narrative    EXAM: XR CHEST 2 VIEWS  LOCATION: [facility]  DATE/TIME:  1/8/2023 4:27 PM    INDICATION: recently discharged for pneumonia and hx of COPD; rule out pneumonia  COMPARISON: Chest x-ray 12/29/2022      Impression    IMPRESSION: Stable chronic interstitial prominence. No acute findings. No focal pneumonic consolidation or pleural effusion. Prominent left nipple shadow. Normal heart size. Pulmonary hyperinflation which may reflect COPD.   UA macro with reflex to Microscopic and Culture - Clinc Collect     Status: Normal    Specimen: Urine, Clean Catch   Result Value Ref Range    Color Urine Yellow Colorless, Straw, Light Yellow, Yellow    Appearance Urine Clear Clear    Glucose Urine Negative Negative mg/dL    Bilirubin Urine Negative Negative    Ketones Urine Negative Negative mg/dL    Specific Gravity Urine 1.015 1.005 - 1.030    Blood Urine Negative Negative    pH Urine 5.5 5.0 - 8.0    Protein Albumin Urine Negative Negative mg/dL    Urobilinogen Urine 0.2 0.2, 1.0 E.U./dL    Nitrite Urine Negative Negative    Leukocyte Esterase Urine Negative Negative    Narrative    Microscopic not indicated       I personally reviewed these results and discussed findings with the patient.    The use of Dragon/BiOM dictation services was used to construct the content of this note; any grammatical errors are non-intentional. Please contact the author directly if you are in need of any clarification.

## 2023-01-09 NOTE — TELEPHONE ENCOUNTER
FYI delay of care for home health nursing PT   start of care starts tomorrow 1/10/23 instead of 1/9/2023    [facility]  [phone] (Neal ,director)

## 2023-01-10 NOTE — TELEPHONE ENCOUNTER
Thank you for the update. He would benefit greatly from home PT, so hopefully they can get in contact with him or family soon.

## 2023-01-10 NOTE — TELEPHONE ENCOUNTER
Migdalia Allen calling to delay orders    -Patient didn't answer phone  -Patient didn't answer door today    -They don't know when they will start orders due to not hearing from the patient     looking to delay a little longer till he answers    Migdalia Allen  [phone]

## 2023-01-12 NOTE — TELEPHONE ENCOUNTER
FYI - Status Update    [facility]    Update: Tiffany spoke to daughter yesterday and made arrangements to see patient today at 3PM.    Tiffany shares that patient is not answering phone or door.  She has called patient several times and has reached out to daughter, left a message and has not heard response back.    She arrived today at 2:50 PM, she will be leaving patient's home now but wanted to provide update to provider.    Will attempt again tomorrow.     Does caller want a call/response back: Yes     Could we [facility] this [facility] to you [facility] or would you prefer to receive a phone call?:   Tiffany would prefer a phone call   Okay to leave a detailed message?: Yes at Other phone number:  [phone]

## 2023-01-13 NOTE — TELEPHONE ENCOUNTER
Alysa Alexandre RN on 1/13/2023 at 11:54 AM      Reason for Disposition    [1] Caller requesting NON-URGENT health information AND [2] PCP's office is the best resource    Protocols used: INFORMATION ONLY CALL-A-AH

## 2023-01-13 NOTE — PROGRESS NOTES
[facility] Care Coordination Contact    Phone call from grzegorz Chou to report that has taken member to visit [facility] today.  She spoke with [facility] and was told that only takes private pay but if member has EW can ask management about accepting him.  She requests for care coordinator to call her.      Care coordinator call [facility] [place]    [facility], she reports that received call from daughter and wants to confirmed that member is open to Elderly Waiver and inquires what his case mix was.  She reports that will discuss with management team if they are willing to accept him under Elderly Waiver.  She reports that will set up tour with member and also have their nurse do assessment to have idea of his needs.  She will contact care coordinator next week with update.      Prashanth Romero RN, PHN  [facility]  [phone]

## 2023-01-19 NOTE — TELEPHONE ENCOUNTER
Pt's daughter calling for a refill of suboxone. Daughter reports pt took last dose this morning.    Nurse Triage SBAR    Is this a 2nd Level Triage? YES, LICENSED PRACTITIONER REVIEW IS REQUIRED    Situation:   Refill request for suboxone    Background:   Prescribed in hospital    Assessment:   Urgent refill request for controlled substance    Protocol Recommended Disposition:   Discuss With PCP And Callback By Nurse Today    Recommendation:   Second level triage     Routed to provider    Does the patient meet one of the following criteria for ADS visit consideration? 16+ years old, with an [facility] PCP     TIP  Providers, please consider if this condition is appropriate for management at one of our Acute and Diagnostic Services sites.     If patient is a good candidate, please use dotphrase <dot>triageresponse and select Refer to ADS to document.    Reason for Disposition    Caller requesting a CONTROLLED substance prescription refill (e.g., narcotics, ADHD medicines)    Protocols used: MEDICATION REFILL AND RENEWAL CALL-A-OH

## 2023-01-19 NOTE — PROGRESS NOTES
Telemedicine Visit: The patient's condition can be safely assessed and treated via synchronous audio and visual telemedicine encounter.      Reason for Telemedicine Visit: covid19 precautions     Originating Site (Patient Location): Patient's home        Distant Location (provider location):  On-site    Consent:  The patient/guardian has verbally consented to: the potential risks and benefits of telemedicine (video visit) versus in person care; bill my insurance or make self-payment for services provided; and responsibility for payment of non-covered services.     Mode of Communication:  Video Conference via [employer]    As the provider I attest to compliance with applicable laws and regulations related to telemedicine.      Palliative Care Counseling Services - Initial Assessment    PLEASE NOTE:  THIS IS A MENTAL HEALTH NOTE.  OTHER PROVIDERS VIEWING THIS NOTE SHOULD USE THIS ONLY FOR UNDERSTANDING THE CONTEXT OF THE PATIENT S EXPERIENCE.  TOPICS DESCRIBED IN THIS NOTE SHOULD NOT BE REFERENCED TO THE PATIENT BY MEDICAL PROVIDERS      Ki Bauman is a 67 year old man with diagnosis of COPD, seen today for initial palliative care counseling assessment via [employer] video. Connection was moderate which led to sound cutting out a few times over the course of our visit.     Referred by: inpatient palliative care team during a recent hospitalization     Presenting Issues: Coping with illness    Preferred Name: Ki     Mental Status Exam: (List all that apply)      Appearance: Appropriate      Eye Contact: Good       Orientation: Yes, x4      Mood: Normal --mood appeared somewhat down, but it is difficult to fully assess this without knowing his baseline.       Affect: Appropriate      Thought Content: Clear         Thought Form: Logical      Psychomotor Behavior: Normal    Family:       Marital status: Single    Years : not asked    Years together: not asked     Name of spouse/partner: not asked       "Children: Daughter, Ethan. Ki  lives with her at this time but will be moving soon.  See daughter, Katlyn Pederson named in health care directive.       Parents: not asked      Siblings: not asked      Other:     Support system: Family    Living situation: House   Difficulty accessing and/or getting around living space: No   Other concerns: No in process of moving to a subsidized assisted living    Employment history:      Current employment status:  not working at htis time, did not ask if he recieves disability.          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 Yes and on file.  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  Most recently disenrolled from hospice earlier this month (see inpatient palliative care notes).   Describes having \"good days and bad days\" in terms of physical wellness.      Pain/Discomfort Issues:      Coping: \"my mood is up an down\"     Sleep:  Averages 3-4 hours -- notes he recently fell out of bed, and hit his foot resulting in a broken toe when trying to navigate his room at night; describes another incident where he fell and \"cracked my coconut\", but I am not sure how long ago that was.     Sexual Health/Intimacy: not asked     Mental Health History and Current Review of Symptoms " "(patient account):     Depression in past?: Yes has been on an antidepressant for about 30 years.  Managed by his primary physician.    Treatment in past?:  Yes medication  Treatment currently?:  Yes medication    Depression symptoms currently?:  - Anhedonia:  Yes-- some lack of interest, also unable to participate in activities he enjoys such as wood carving due to low energy, worries about dust exacerbating COPD symptoms  - Hopeless or down mood:  Yes-- \"up and down mood\"  Reports it is common for his mood to be lower in December, this is a long established pattern for him.  That said, he does not endorse feeling hopeless all of the time, but does describe a sense of not being sure what to hope for or where to find hope at this stage of his life.   - Sleep problems (too much or too little):  No  - Fatigue:  Yes  - Appetite (too much or too little):  No  - Excessively negative self perception:  No  - Trouble concentrating:  sometimes  - Motor slowness:  No  - Current and/or recent thoughts of suicide:  No    Suicide risk screen:  - Past thoughts of suicide?  No  - Past attempts to end own life?  No -- psychiatry note dated 12/28/22 regarding possible sucidial thoughts after he presented with altered mental status but seems it was attributed to delirium.   - If yes, how?   - Protective factors currently? family  - Safety plan needed currently? Not indicated at this time    Anxiety in past?:    Treatment in past?  No   Treatment currently?  No     Anxiety symptoms currently?  - Nervous, on edge:  Yes  - Sleep problems:  No  - Worrying a lot/hard to manage worries:  Yes  Specific recent areas of worry/fear/concern: illness, impact on family, living situation  - Tense, hard to relax:  No  - Feeling restless, hard to sit still:  No  - Irritable:  Yes  - Feeling of dread/ afraid that something awful may happen:  No  - How long?   - Impacts on daily life?             Grief vs Depression:  Endorses symptoms for: " "Grief    Psychological Trauma and/or Major Losses:   None disclosed today \"I'm good\"   Nightmares?    Not asked  Thought about when not wanting to think about? Not asked  Tried hard not to think about it? Not asked  Avoided situations that reminded you of it? Not asked  [place] constantly on guard, watchful, or easily startled? Not asked  Felt numb or detached from others, activities, or your surroundings? Not asked    Safety Screen:  History of being harmed or controlled by someone close to you?  Not asked   Being hurt or controlled by someone close to you?  Not asked  Worried will be harmed in future?  Not asekd  Worried will harm someone else in future?  Not asked     CD History:   Using alcohol actively? No    Amount per week:   Current concerns (self or other) about alcohol or drug use? No  Past concerns and/or CD treatment? No        Deloris/Spirituality:       Belong to a deloris community: No     Specify:        Identify with a particular Jew:       Identify as spiritual: Yes \"I'm idling right now\"       How find expression: ojibwe practices and rituals, nature     Hope:      What do you hope for:    \"chocolate\"       What gives you hope:    \"it's hard to find hope, sometimes impossible\"      Internal Resources (positive memories, sources of amairani): not explored today.     Perceived Needs: emotional support with finding hope in current situation    Resource needs/Referrals: None    Ki Pederson is a 67 year old man. They were referred by [facility] Inpatient Palliative Care Team for an initial palliative care clinical social work assessment and attend the interview today via [employer] video. They are diagnosed with COPD, chronic back pain, major depressive disorder    and in addition to their medical diagnosis also meets criteria for ongoing symptoms of major depressive disorder possibly exacerbated by changes in health and adjustment disorder with anxiety. Their symptoms include difficulty finding sense of " hope in current circumstances, diminished interest in activities, low mood, difficulty managing worries about illness, poor sleep, fatigue (unclear how much of this is attributable to illness and how much to mood), some difficulty concentrating . Ki reports that he has been treated for depression for about 30 years.  Takes citalopram which is managed by his primary MD  and the client has experienced functional impairment in sense of ability to find hope in his condition. It appears that contributing factors for their depression, adjustment difficulties include changes in health, changes in abilities.  Ki has been enrolled in hospice twice.  Most recently he dis enrolled after he was hospitalized at [facility].  Per notes it sounds like hospice was not able to provide level of support needed by family. Palliative care was very involved in goals of care conversations     Ki presents as a thoughtful, father, grandfather, Ojibwe man, facing COPD, chronic pain, depressive disorder, adjustment disorder with anxiety.  Through visit Ki gave brief answers to questions, did not volunteer information. This might have been due in part to patchy sound in the video visit.  Their relationships include his daughters, young grandchildren.  He currently lives iwht his daughter Ethan but is in the process of moving to an assisted living apartment . Other complicating situations in their life include recent decision to disenroll from hospice. They identify as a part of [facility] culture. Their medical condition has the potential to influence their mental health in the following ways: difficulty in knowing what to hope for, difficulty with symptom management  .   Other mental health diagnoses that were considered include: anxiety disorder, PTSD, delirium, substance abuse. The symptoms related to these possible diagnoses can currently be explained by: History of depressive disorder, currently managed with citalopram.  Ki appeared alert and oriented X3 in our visit, was able to track and respond to questions appropriately. Affect appeared flat, but I do not know his baseline.   It is medically necessary for this client to receive outpatient psychotherapy services at this time. If their current mental health symptoms go untreated it is likely that depressive disorder could worsen impacting family coping and ability to participate in medical care. My recommendations for services for this client include meaning centered psychotherapy, narrative therapy, psychotherapy. . The client's prognosis from a mental health perspective is good. .          Intervention: Initial palliative care counseling / clinical social work evaluation was conducted.  Palliative Care Counseling interventions available were discussed, including counseling related to serious illness, behavioral interventions for symptom management, consultation regarding goals of care/health care directive/POLST, and other interventions specific to the patient's situation or concerns.     Plan: Will ask schedulers to reach out to schedule a follow up visit.     DSM5 Diagnoses:   296.31 Major Depressive Disorder, Recurrent Episode, Mild _  Adjustment Disorders  309.24 (F43.22) With anxiety    Time Spent with Patient/Family: 30  (Start 3:30, end 4:00)    STACY Casillas, [facility]   Palliative Care    Pgr:[phone]  Ph: [phone]      DO NOT SEND ANY LETTERS

## 2023-01-19 NOTE — LETTER
1/19/2023         RE: Ki Pederson  [address] [address]        Dear Colleague,    Thank you for referring your patient, Ki Pederson, to the [facility]. Please see a copy of my visit note below.    Telemedicine Visit: The patient's condition can be safely assessed and treated via synchronous audio and visual telemedicine encounter.      Reason for Telemedicine Visit: covid19 precautions     Originating Site (Patient Location): Patient's home        Distant Location (provider location):  On-site    Consent:  The patient/guardian has verbally consented to: the potential risks and benefits of telemedicine (video visit) versus in person care; bill my insurance or make self-payment for services provided; and responsibility for payment of non-covered services.     Mode of Communication:  Video Conference via [employer]    As the provider I attest to compliance with applicable laws and regulations related to telemedicine.      Palliative Care Counseling Services - Initial Assessment    PLEASE NOTE:  THIS IS A MENTAL HEALTH NOTE.  OTHER PROVIDERS VIEWING THIS NOTE SHOULD USE THIS ONLY FOR UNDERSTANDING THE CONTEXT OF THE PATIENT S EXPERIENCE.  TOPICS DESCRIBED IN THIS NOTE SHOULD NOT BE REFERENCED TO THE PATIENT BY MEDICAL PROVIDERS      Ki Bauman is a 67 year old man with diagnosis of COPD, seen today for initial palliative care counseling assessment via [employer] video. Connection was moderate which led to sound cutting out a few times over the course of our visit.     Referred by: inpatient palliative care team during a recent hospitalization     Presenting Issues: Coping with illness    Preferred Name: Ki     Mental Status Exam: (List all that apply)      Appearance: Appropriate      Eye Contact: Good       Orientation: Yes, x4      Mood: Normal --mood appeared somewhat down, but it is difficult to fully assess this without knowing his baseline.       Affect:  "Appropriate      Thought Content: Clear         Thought Form: Logical      Psychomotor Behavior: Normal    Family:       Marital status: Single    Years : not asked    Years together: not asked     Name of spouse/partner: not asked      Children: Daughter, Shaista Emerson  lives with her at this time but will be moving soon.  See daughter, Katlyn Pederson named in health care directive.       Parents: not asked      Siblings: not asked      Other:     Support system: Family    Living situation: House   Difficulty accessing and/or getting around living space: No   Other concerns: No in process of moving to a subsidized assisted living    Employment history:      Current employment status:  not working at is time, did not ask if he recieves disability.          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 Yes and on file.  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  Most recently disenrolled from hospice earlier this month (see inpatient palliative care notes).   Describes having \"good days and bad days\" in terms of physical wellness.      Pain/Discomfort Issues:      Coping: \"my mood is up an down\"     Sleep:  Averages 3-4 hours -- notes he recently fell out of bed, and hit his foot resulting in a broken toe " "when trying to navigate his room at night; describes another incident where he fell and \"cracked my coconut\", but I am not sure how long ago that was.     Sexual Health/Intimacy: not asked     Mental Health History and Current Review of Symptoms (patient account):     Depression in past?: Yes has been on an antidepressant for about 30 years.  Managed by his primary physician.    Treatment in past?:  Yes medication  Treatment currently?:  Yes medication    Depression symptoms currently?:  - Anhedonia:  Yes-- some lack of interest, also unable to participate in activities he enjoys such as wood carving due to low energy, worries about dust exacerbating COPD symptoms  - Hopeless or down mood:  Yes-- \"up and down mood\"  Reports it is common for his mood to be lower in December, this is a long established pattern for him.  That said, he does not endorse feeling hopeless all of the time, but does describe a sense of not being sure what to hope for or where to find hope at this stage of his life.   - Sleep problems (too much or too little):  No  - Fatigue:  Yes  - Appetite (too much or too little):  No  - Excessively negative self perception:  No  - Trouble concentrating:  sometimes  - Motor slowness:  No  - Current and/or recent thoughts of suicide:  No    Suicide risk screen:  - Past thoughts of suicide?  No  - Past attempts to end own life?  No -- psychiatry note dated 12/28/22 regarding possible sucidial thoughts after he presented with altered mental status but seems it was attributed to delirium.   - If yes, how?   - Protective factors currently? family  - Safety plan needed currently? Not indicated at this time    Anxiety in past?:    Treatment in past?  No   Treatment currently?  No     Anxiety symptoms currently?  - Nervous, on edge:  Yes  - Sleep problems:  No  - Worrying a lot/hard to manage worries:  Yes  Specific recent areas of worry/fear/concern: illness, impact on family, living situation  - Tense, hard to " "relax:  No  - Feeling restless, hard to sit still:  No  - Irritable:  Yes  - Feeling of dread/ afraid that something awful may happen:  No  - How long?   - Impacts on daily life?             Grief vs Depression:  Endorses symptoms for: Grief    Psychological Trauma and/or Major Losses:   None disclosed today \"I'm good\"   Nightmares?    Not asked  Thought about when not wanting to think about? Not asked  Tried hard not to think about it? Not asked  Avoided situations that reminded you of it? Not asked  [place] constantly on guard, watchful, or easily startled? Not asked  Felt numb or detached from others, activities, or your surroundings? Not asked    Safety Screen:  History of being harmed or controlled by someone close to you?  Not asked   Being hurt or controlled by someone close to you?  Not asked  Worried will be harmed in future?  Not asekd  Worried will harm someone else in future?  Not asked     CD History:   Using alcohol actively? No    Amount per week:   Current concerns (self or other) about alcohol or drug use? No  Past concerns and/or CD treatment? No        Deloris/Spirituality:       Belong to a deloris community: No     Specify:        Identify with a particular Mandaen:       Identify as spiritual: Yes \"I'm idling right now\"       How find expression: ojibwe practices and rituals, nature     Hope:      What do you hope for:    \"chocolate\"       What gives you hope:    \"it's hard to find hope, sometimes impossible\"      Internal Resources (positive memories, sources of amairani): not explored today.     Perceived Needs: emotional support with finding hope in current situation    Resource needs/Referrals: None    Ki Pederson is a 67 year old man. They were referred by [facility] Inpatient Palliative Care Team for an initial palliative care clinical social work assessment and attend the interview today via [employer] video. They are diagnosed with COPD, chronic back pain, major depressive disorder    and in " addition to their medical diagnosis also meets criteria for ongoing symptoms of major depressive disorder possibly exacerbated by changes in health and adjustment disorder with anxiety. Their symptoms include difficulty finding sense of hope in current circumstances, diminished interest in activities, low mood, difficulty managing worries about illness, poor sleep, fatigue (unclear how much of this is attributable to illness and how much to mood), some difficulty concentrating . Ki reports that he has been treated for depression for about 30 years.  Takes citalopram which is managed by his primary MD  and the client has experienced functional impairment in sense of ability to find hope in his condition. It appears that contributing factors for their depression, adjustment difficulties include changes in health, changes in abilities.  Ki has been enrolled in hospice twice.  Most recently he dis enrolled after he was hospitalized at [facility].  Per notes it sounds like hospice was not able to provide level of support needed by family. Palliative care was very involved in goals of care conversations     Ki presents as a thoughtful, father, grandfather, Ojibwe man, facing COPD, chronic pain, depressive disorder, adjustment disorder with anxiety.  Through visit Ki gave brief answers to questions, did not volunteer information. This might have been due in part to patchy sound in the video visit.  Their relationships include his daughters, young grandchildren.  He currently lives iwht his daughter Ethan but is in the process of moving to an assisted living apartment . Other complicating situations in their life include recent decision to disenroll from hospice. They identify as a part of [facility] culture. Their medical condition has the potential to influence their mental health in the following ways: difficulty in knowing what to hope for, difficulty with symptom management  .   Other mental health  diagnoses that were considered include: anxiety disorder, PTSD, delirium, substance abuse. The symptoms related to these possible diagnoses can currently be explained by: History of depressive disorder, currently managed with citalopram. Ki appeared alert and oriented X3 in our visit, was able to track and respond to questions appropriately. Affect appeared flat, but I do not know his baseline.   It is medically necessary for this client to receive outpatient psychotherapy services at this time. If their current mental health symptoms go untreated it is likely that depressive disorder could worsen impacting family coping and ability to participate in medical care. My recommendations for services for this client include meaning centered psychotherapy, narrative therapy, psychotherapy. . The client's prognosis from a mental health perspective is good. .          Intervention: Initial palliative care counseling / clinical social work evaluation was conducted.  Palliative Care Counseling interventions available were discussed, including counseling related to serious illness, behavioral interventions for symptom management, consultation regarding goals of care/health care directive/POLST, and other interventions specific to the patient's situation or concerns.     Plan: Will ask schedulers to reach out to schedule a follow up visit.     DSM5 Diagnoses:   296.31 Major Depressive Disorder, Recurrent Episode, Mild _  Adjustment Disorders  309.24 (F43.22) With anxiety    Time Spent with Patient/Family: 30  (Start 3:30, end 4:00)    STACY Casillas, [facility]   Palliative Care    Pgr:[phone]  Ph: [phone]      DO NOT SEND ANY LETTERS          Again, thank you for allowing me to participate in the care of your patient.        Sincerely,        VANNESA Casillas

## 2023-01-19 NOTE — LETTER
1/19/2023         RE: Ki Pederson  [address] [address]        Dear Colleague,    Thank you for referring your patient, Ki Pederson, to the [facility]. Please see a copy of my visit note below.    Telemedicine Visit: The patient's condition can be safely assessed and treated via synchronous audio and visual telemedicine encounter.      Reason for Telemedicine Visit: covid19 precautions     Originating Site (Patient Location): Patient's home        Distant Location (provider location):  On-site    Consent:  The patient/guardian has verbally consented to: the potential risks and benefits of telemedicine (video visit) versus in person care; bill my insurance or make self-payment for services provided; and responsibility for payment of non-covered services.     Mode of Communication:  Video Conference via [employer]    As the provider I attest to compliance with applicable laws and regulations related to telemedicine.      Palliative Care Counseling Services - Initial Assessment    PLEASE NOTE:  THIS IS A MENTAL HEALTH NOTE.  OTHER PROVIDERS VIEWING THIS NOTE SHOULD USE THIS ONLY FOR UNDERSTANDING THE CONTEXT OF THE PATIENT S EXPERIENCE.  TOPICS DESCRIBED IN THIS NOTE SHOULD NOT BE REFERENCED TO THE PATIENT BY MEDICAL PROVIDERS      Ki Bauman is a 67 year old man with diagnosis of COPD, seen today for initial palliative care counseling assessment via [employer] video. Connection was moderate which led to sound cutting out a few times over the course of our visit.     Referred by: inpatient palliative care team during a recent hospitalization     Presenting Issues: Coping with illness    Preferred Name: Ki     Mental Status Exam: (List all that apply)      Appearance: Appropriate      Eye Contact: Good       Orientation: Yes, x4      Mood: Normal --mood appeared somewhat down, but it is difficult to fully assess this without knowing his baseline.       Affect:  "Appropriate      Thought Content: Clear         Thought Form: Logical      Psychomotor Behavior: Normal    Family:       Marital status: Single    Years : not asked    Years together: not asked     Name of spouse/partner: not asked      Children: Daughter, Shaista Emerson  lives with her at this time but will be moving soon.  See daughter, Katlyn Pederson named in health care directive.       Parents: not asked      Siblings: not asked      Other:     Support system: Family    Living situation: House   Difficulty accessing and/or getting around living space: No   Other concerns: No in process of moving to a subsidized assisted living    Employment history:      Current employment status:  not working at is time, did not ask if he recieves disability.          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 Yes and on file.  Was updated with Palliative Care Team at [facility] during his most recent hospitalization to instruct selective treatment for reversible conditions; no CPR or Intubation     Medical History/Issues (patient account): \"I have COPD, and a devenerative spine\" Has been on hospice twice.  Most recently disenrolled from hospice earlier this month (see inpatient palliative care notes).   Describes having \"good days and bad days\" in terms of physical wellness.      Pain/Discomfort Issues:      Coping: \"my mood is up an down\"     Sleep:  Averages 3-4 hours -- notes he recently fell out of bed, and hit his foot resulting in a broken toe " "when trying to navigate his room at night; describes another incident where he fell and \"cracked my coconut\", but I am not sure how long ago that was.     Sexual Health/Intimacy: not asked     Mental Health History and Current Review of Symptoms (patient account):     Depression in past?: Yes has been on an antidepressant for about 30 years.  Managed by his primary physician.    Treatment in past?:  Yes medication  Treatment currently?:  Yes medication    Depression symptoms currently?:  - Anhedonia:  Yes-- some lack of interest, also unable to participate in activities he enjoys such as wood carving due to low energy, worries about dust exacerbating COPD symptoms  - Hopeless or down mood:  Yes-- \"up and down mood\"  Reports it is common for his mood to be lower in December, this is a long established pattern for him.  That said, he does not endorse feeling hopeless all of the time, but does describe a sense of not being sure what to hope for or where to find hope at this stage of his life.   - Sleep problems (too much or too little):  No  - Fatigue:  Yes  - Appetite (too much or too little):  No  - Excessively negative self perception:  No  - Trouble concentrating:  sometimes  - Motor slowness:  No  - Current and/or recent thoughts of suicide:  No    Suicide risk screen:  - Past thoughts of suicide?  No  - Past attempts to end own life?  No -- psychiatry note dated 12/28/22 regarding possible sucidial thoughts after he presented with altered mental status but seems it was attributed to delirium.   - If yes, how?   - Protective factors currently? family  - Safety plan needed currently? Not indicated at this time    Anxiety in past?:    Treatment in past?  No   Treatment currently?  No     Anxiety symptoms currently?  - Nervous, on edge:  Yes  - Sleep problems:  No  - Worrying a lot/hard to manage worries:  Yes  Specific recent areas of worry/fear/concern: illness, impact on family, living situation  - Tense, hard to " "relax:  No  - Feeling restless, hard to sit still:  No  - Irritable:  Yes  - Feeling of dread/ afraid that something awful may happen:  No  - How long?   - Impacts on daily life?             Grief vs Depression:  Endorses symptoms for: Grief    Psychological Trauma and/or Major Losses:   None disclosed today \"I'm good\"   Nightmares?    Not asked  Thought about when not wanting to think about? Not asked  Tried hard not to think about it? Not asked  Avoided situations that reminded you of it? Not asked  [place] constantly on guard, watchful, or easily startled? Not asked  Felt numb or detached from others, activities, or your surroundings? Not asked    Safety Screen:  History of being harmed or controlled by someone close to you?  Not asked   Being hurt or controlled by someone close to you?  Not asked  Worried will be harmed in future?  Not asekd  Worried will harm someone else in future?  Not asked     CD History:   Using alcohol actively? No    Amount per week:   Current concerns (self or other) about alcohol or drug use? No  Past concerns and/or CD treatment? No        Deloris/Spirituality:       Belong to a deloris community: No     Specify:        Identify with a particular Faith:       Identify as spiritual: Yes \"I'm idling right now\"       How find expression: ojibwe practices and rituals, nature     Hope:      What do you hope for:    \"chocolate\"       What gives you hope:    \"it's hard to find hope, sometimes impossible\"      Internal Resources (positive memories, sources of amairani): not explored today.     Perceived Needs: emotional support with finding hope in current situation    Resource needs/Referrals: None    Ki Pederson is a 67 year old man. They were referred by [facility] Inpatient Palliative Care Team for an initial palliative care clinical social work assessment and attend the interview today via [employer] video. They are diagnosed with COPD, chronic back pain, major depressive disorder    and in " addition to their medical diagnosis also meets criteria for ongoing symptoms of major depressive disorder possibly exacerbated by changes in health and adjustment disorder with anxiety. Their symptoms include difficulty finding sense of hope in current circumstances, diminished interest in activities, low mood, difficulty managing worries about illness, poor sleep, fatigue (unclear how much of this is attributable to illness and how much to mood), some difficulty concentrating . Ki reports that he has been treated for depression for about 30 years.  Takes citalopram which is managed by his primary MD  and the client has experienced functional impairment in sense of ability to find hope in his condition. It appears that contributing factors for their depression, adjustment difficulties include changes in health, changes in abilities.  Ki has been enrolled in hospice twice.  Most recently he dis enrolled after he was hospitalized at [facility].  Per notes it sounds like hospice was not able to provide level of support needed by family. Palliative care was very involved in goals of care conversations     Ki presents as a thoughtful, father, grandfather, Ojibwe man, facing COPD, chronic pain, depressive disorder, adjustment disorder with anxiety.  Through visit Ki gave brief answers to questions, did not volunteer information. This might have been due in part to patchy sound in the video visit.  Their relationships include his daughters, young grandchildren.  He currently lives iwht his daughter Ethan but is in the process of moving to an assisted living apartment . Other complicating situations in their life include recent decision to disenroll from hospice. They identify as a part of [facility] culture. Their medical condition has the potential to influence their mental health in the following ways: difficulty in knowing what to hope for, difficulty with symptom management  .   Other mental health  diagnoses that were considered include: anxiety disorder, PTSD, delirium, substance abuse. The symptoms related to these possible diagnoses can currently be explained by: History of depressive disorder, currently managed with citalopram. Ki appeared alert and oriented X3 in our visit, was able to track and respond to questions appropriately. Affect appeared flat, but I do not know his baseline.   It is medically necessary for this client to receive outpatient psychotherapy services at this time. If their current mental health symptoms go untreated it is likely that depressive disorder could worsen impacting family coping and ability to participate in medical care. My recommendations for services for this client include meaning centered psychotherapy, narrative therapy, psychotherapy. . The client's prognosis from a mental health perspective is good. .          Intervention: Initial palliative care counseling / clinical social work evaluation was conducted.  Palliative Care Counseling interventions available were discussed, including counseling related to serious illness, behavioral interventions for symptom management, consultation regarding goals of care/health care directive/POLST, and other interventions specific to the patient's situation or concerns.     Plan: Will ask schedulers to reach out to schedule a follow up visit.     DSM5 Diagnoses:   296.31 Major Depressive Disorder, Recurrent Episode, Mild _  Adjustment Disorders  309.24 (F43.22) With anxiety    Time Spent with Patient/Family: 30  (Start 3:30, end 4:00)    STACY Casillas, [facility]   Palliative Care    Pgr:[phone]  Ph: [phone]      DO NOT SEND ANY LETTERS          Again, thank you for allowing me to participate in the care of your patient.        Sincerely,        VANNESA Casillas

## 2023-01-20 NOTE — TELEPHONE ENCOUNTER
Medication Question or Refill    Contacts       Type Contact Phone/Fax    01/20/2023 10:44 AM CST Phone (Incoming) Ethan Pederson (Emergency Contact) [phone]          What medication are you calling about (include dose and sig)?: Suboxone    Controlled Substance Agreement on file:   CSA -- Patient Level:     [Media Unavailable] Controlled Substance Agreement - Opioid - Scan on 12/7/2022  3:27 PM   [Media Unavailable] Controlled Substance Agreement - Opioid - Scan on 3/1/2022  4:48 PM   [Media Unavailable] Controlled Substance Agreement - Opioid - Scan on 12/23/2021  1:32 PM   [Media Unavailable] Controlled Substance Agreement - Opioid - Scan on 11/5/2021  3:35 PM   [Media Unavailable] Controlled Substance Agreement - Opioid - Scan on 11/4/2021 10:50 AM       Who prescribed the medication?: n/a    Do you need a refill? Yes: completely out    When did you use the medication last? 1/19/23    Patient offered an appointment? No    Do you have any questions or concerns?  Yes: Daughter states she spoke with Genie Shore this morning and provider was going to send prescription to pharmacy. Daughter calling to check on status. Pharmacy does not have prescription yet and patient miss his morning dose.    Preferred Pharmacy:  [id number] [place] [id number]  Phone: [phone] Fax: [phone]      Could we send this information to you in [facility] or would you prefer to receive a phone call?:   Patient would prefer a phone call   Okay to leave a detailed message?: Yes at Cell number on file:    Telephone Information:   Mobile [phone]   Mobile [phone]   Mobile Not on file.

## 2023-01-20 NOTE — TELEPHONE ENCOUNTER
Reviewed chart and . Chart states patient was discharged on suboxone 2 mg BID #28 sent.  states he picked up 8 mg BID # 28 on 1/6. Daughter confirmed he has been taking 8 mg BID. Rn confirmed with pharmacy that prescription received for 8 mg BID. Reviewed with Lillie Rangel MD who saw him while inpatient. Plan to reduce dose to 4 mg BID. Ki has appointment 1/23, and can reassess medication at appointment. Bridge refill sent for 2 mg 2 tablets BID #16.

## 2023-01-20 NOTE — TELEPHONE ENCOUNTER
I am unable to refill suboxone (do not have X-waiver). I am going to route this to the addiction medicine provider he has an appointment with on 1/23/23 for assistance.

## 2023-01-23 NOTE — TELEPHONE ENCOUNTER
Spoke with daughter, Ethan. She reports that they were unable to  suboxone prescription due to insurance not covering QID dosing. Also states that Ki will be moving into [facility] on 1/29 and then will meet with palliative team for pain management. Sending in a new prescription for 4 mg films BID

## 2023-01-31 NOTE — NURSING NOTE
Patient declined individual allergy and medication review by support staff because no changes since 1/24. PO

## 2023-01-31 NOTE — PROGRESS NOTES
Palliative Care:    Patient wanted to talk about possibility that Suboxone is causing N/V. He needs to talk with Addiction Medicine about this. He did not need to talk about goals of care today.     No charge.     DESHAWN Chavez

## 2023-02-02 NOTE — PROGRESS NOTES
[facility] Care Coordination Contact    Phone call to daughter, José Antonio for update after touring assisted living.  She reports that member is accepted in [facility] however still waiting to hear back regarding [facility] application.      Prashanth Romero RN, PHN  [facility]  [phone]

## 2023-02-03 NOTE — TELEPHONE ENCOUNTER
"Outpatient Medication Detail     Disp Refills Start End CAROL   gabapentin (NEURONTIN) 300 MG capsule 90 capsule 0 1/6/2023 2/5/2023 No   Sig - Route: Take 1 capsule (300 mg) by mouth 3 times daily for 30 days - Oral   Sent to pharmacy as: Gabapentin 300 MG Oral Capsule (NEURONTIN)   Class: E-Prescribe   Order: [id number]   E-Prescribing Status: Receipt confirmed by pharmacy (1/6/2023 11:50 AM CST       Outpatient Medication Detail     Disp Refills Start End CAROL   guaiFENesin (MUCINEX) 600 MG 12 hr tablet 120 tablet 0 12/26/2022  No   Sig - Route: Take 2 tablets (1,200 mg) by mouth daily - Oral   Sent to pharmacy as: guaiFENesin  MG Oral Tablet Extended Release 12 Hour (MUCINEX)   Class: E-Prescribe   Order: [id number]   E-Prescribing Status: Receipt confirmed by pharmacy (12/26/2022  9:40 AM CST)       Routing refill request to provider for review/approval because:  Drug not on the [facility] refill protocol for Mucinex and Gabapentin Rx     Last Written Prescription Date: 12/22/22;12/26/22; 1/6/23;1/6/23  Last Fill Quantity: 60,120,90,60,60  # refills: 0   Last office visit provider:  12/7/22     Requested Prescriptions   Pending Prescriptions Disp Refills     famotidine (PEPCID) 20 MG tablet 60 tablet 0     Sig: Take 1 tablet (20 mg) by mouth daily       H2 Blockers Protocol Passed - 2/2/2023  2:55 PM        Passed - Patient is age 12 or older        Passed - Recent (12 mo) or future (30 days) visit within the authorizing provider's specialty     Patient has had an office visit with the authorizing provider or a provider within the authorizing providers department within the previous 12 mos or has a future within next 30 days. See \"Patient Info\" tab in inbasket, or \"Choose Columns\" in Meds & Orders section of the refill encounter.              Passed - Medication is active on med list           guaiFENesin (MUCINEX) 600 MG 12 hr tablet 120 tablet 0     Sig: Take 2 tablets (1,200 mg) by mouth daily       There is no " "refill protocol information for this order        gabapentin (NEURONTIN) 300 MG capsule 90 capsule 0     Sig: Take 1 capsule (300 mg) by mouth 3 times daily       There is no refill protocol information for this order        metoprolol tartrate (LOPRESSOR) 25 MG tablet 60 tablet 0     Sig: Take 1 tablet (25 mg) by mouth 2 times daily       Beta-Blockers Protocol Passed - 2/2/2023  2:55 PM        Passed - Blood pressure under 140/90 in past 12 months     BP Readings from Last 3 Encounters:   01/08/23 137/80   01/06/23 120/75   12/18/22 123/78                 Passed - Patient is age 6 or older        Passed - Recent (12 mo) or future (30 days) visit within the authorizing provider's specialty     Patient has had an office visit with the authorizing provider or a provider within the authorizing providers department within the previous 12 mos or has a future within next 30 days. See \"Patient Info\" tab in inbasket, or \"Choose Columns\" in Meds & Orders section of the refill encounter.              Passed - Medication is active on med list           diclofenac (VOLTAREN) 1 % topical gel 50 g 0     Sig: Apply 2 g topically 4 times daily       Topical Steroids and Nonsteroidals Protocol Passed - 2/2/2023  2:55 PM        Passed - Patient is age 6 or older        Passed - Authorizing prescriber's most recent note related to this medication read.     If refill request is for ophthalmic use, please forward request to provider for approval.          Passed - High potency steroid not ordered        Passed - Recent (12 mo) or future (30 days) visit within the authorizing provider's specialty     Patient has had an office visit with the authorizing provider or a provider within the authorizing providers department within the previous 12 mos or has a future within next 30 days. See \"Patient Info\" tab in inbasket, or \"Choose Columns\" in Meds & Orders section of the refill encounter.              Passed - Medication is active on med " "list           folic acid (FOLVITE) 1 MG tablet 20 tablet 0     Sig: Take 1 tablet (1 mg) by mouth daily       Vitamin Supplements (Adult) Protocol Passed - 2/2/2023  2:55 PM        Passed - High dose Vitamin D not ordered        Passed - Recent (12 mo) or future (30 days) visit within the authorizing provider's specialty     Patient has had an office visit with the authorizing provider or a provider within the authorizing providers department within the previous 12 mos or has a future within next 30 days. See \"Patient Info\" tab in inbasket, or \"Choose Columns\" in Meds & Orders section of the refill encounter.              Passed - Medication is active on med list             MONICA ALATORRE RN 02/03/23 12:20 PM  "

## 2023-02-06 NOTE — TELEPHONE ENCOUNTER
Prescription for ondansetron sent to [facility] due to nausea with suboxone films. States he tolerated the tablets okay. Will change to tablets for his next refill.

## 2023-02-06 NOTE — TELEPHONE ENCOUNTER
Call placed to patient, no answer, left message to return call to to Nurse Triage. Encounter sent to Genie Shore NP   Her/She

## 2023-02-07 NOTE — CONFIDENTIAL NOTE
"    1) RN spoke with provider regarding background and clarification of this current situation.    2) Provider states that pt was supposed to have an initial intake on 1/23/23 but was a \"No Show.\" Provider called pt and he did not want the appointment. Provider has therefore never met the patient.     3) At that time it was thought that palliative care team would manage pt's buprenorphine HCl-naloxone HCl (SUBOXONE) 4-1 MG per film but they declined, preferring Addiction Medicine to manage.     4) Per provider pt initially was prescribed tablets which he tolerated better, but there was an insurance issue so he was switched to films. As pt has already picked up his month's supply of films, he will have to wait until next month (March) to potentially switch back to tablets. Provider prescribed ondansetron (ZOFRAN) 4 MG tablet to hopefully manage nausea/vomiting until that time.     5) Pt has rescheduled an initial intake appointment with provider for 2/10/23.    Bibiana Aguero RN on 2/7/2023 at 10:39 AM       "

## 2023-02-07 NOTE — CONFIDENTIAL NOTE
"    1) RN reviewed provider message: \"Prescription for ondansetron sent to [facility] due to nausea with suboxone films. States he tolerated the tablets okay. Will change to tablets for his next refill.\"      2) RN LVM for pt at [phone] informing him that some medications were sent to his [facility] pharmacy and that RN will also call pharmacy to see if pt was able to pick them up. RN requested that pt RN reach out via [facility] or [phone] with an update on how he is doing.     3) RN spoke with [facility] pharmacy staff who state that pt has not picked up the ondansetron (ZOFRAN) 4 MG tablet but that it is ready.      4) RN spoke with pt at home phone [phone] and relayed the above information.     5) Pt asked if it was normal to have nausea and vomiting. RN informed pt that it is a common side effect of many medications. Pt states that he is usually getting sick about 30 min after he eats. RN advised pt to take the ondansetron about 30 min to an hour before he is going to eat.     6) Pt asked if there were any alternatives to the buprenorphine HCl-naloxone HCl (SUBOXONE) 4-1 MG per film, stating that he tried to get off it but felt horrible. RN informed pt that he would need to wean off this medication gradually to stop it safely.     7) RN suggested the following plan: pt  the ondansetron and try taking it as advised until his appointment with provider on 2/10/23. RN advised that pt could then discuss alternative medications and/or weaning off the Suboxone at that time. Pt agreed to this plan.      8) Routing to provider as NOEMÍ.     Bibiana Aguero RN on 2/7/2023 at 9:47 AM    "

## 2023-02-20 NOTE — TELEPHONE ENCOUNTER
2/20/23: Reason for call:  Medication   If this is a refill request, has the caller requested the refill from the pharmacy already? Yes  Will the patient be using a [facility]? No  Name of the pharmacy and phone number for the current request: [facility] [phone]    Name of the medication requested: Suboxone    Other request: Pt stated that he need the refill ASAP. Stated that his daughter usually takes care of scheduling appts, but he wanted to talk to a nurse about this refill prior to scheduling.    Phone number to reach patient: [phone]  Best Time:  ASAP    Can we leave a detailed message on this number?  YES    Travel screening: Not Applicable

## 2023-02-20 NOTE — TELEPHONE ENCOUNTER
Patient missed his initial intake appt on 2/10. Spoke to patient. He didn't know about previous appt but he does want to see provider here. Daughter said it was her fault for him missing the appt due to transportation issues. Patient would like another appt and will have [facility] give him a call.  The patient is out of his Suboxone as of today. He was hoping to get a Bridge once he makes another appt.   He gets sick from the films and would like to be prescribed the pills again.     Marion Mckinney RN on 2/20/2023 at 1:12 PM

## 2023-02-21 NOTE — TELEPHONE ENCOUNTER
Patient scheduled for 3/3/23 for an iniitial intake. Bridge refill sent to [facility] pharmacy. LVM updating daughter.

## 2023-02-21 NOTE — TELEPHONE ENCOUNTER
Patient has an appointment now but it is on Thursday 2/23. He is in need of the bridge of suboxone. Does he need to be seen in person?  Thursday may not be the best day due to weather?     Marion Mckinney RN on 2/21/2023 at 1:34 PM

## 2023-02-21 NOTE — TELEPHONE ENCOUNTER
Called patient back and he claims he didn't get a call yesterday from [facility]. We are waiting for him to schedule an appointment in order for provider to bridge his Suboxone. Transferred patient to [facility] to set up an appt with provider and told him that as soon as that was done we could send in the prescription. Daughter is in school and is busy all day for a few more weeks so she asked that patient set up his own appt.     Marion Mckinney RN on 2/21/2023 at 1:15 PM

## 2023-02-21 NOTE — TELEPHONE ENCOUNTER
Provider really wants to see him in person, however will see him virtually. She has never seen this patient, so he needs a new patient slot, and he is on a return slot. She is going to need 60 minutes for this patient. Can we get him rescheduled to a 60 minute appointment, in person if possible as I do not think he will be able to do vitrual on computer and I will not see him via telephone. I have been providing bridge refills since he was discharged from hospital and this will be the last bridge refill provided, or he will be transferred to the  clinic . So he really needs to show up for his appointment.   Sent message to [facility] to reschedule and cancel his 2/23 appt. Will My chart his daughter as well. Provider will provide refill when new appt is made.     Marion Mckinney RN on 2/21/2023 at 3:53 PM

## 2023-02-22 NOTE — TELEPHONE ENCOUNTER
Spoke with pharmacy, spoke with Frankie, insurance won't cover 4 tablets a day. Pt will need new order.

## 2023-02-23 NOTE — TELEPHONE ENCOUNTER
Central Prior Authorization Team   Phone: [phone]      Prior Authorization Not Needed per Insurance - Rx order was changed to the 8-2mg subl tab (0.5 tab BID) = 1 tab/ day    Medication: buprenorphine-naloxone (SUBOXONE) 2-0.5mg sublingual tablet (4/day)  Insurance Company: [employer] Part D - Phone [phone] Fax [phone]  Expected CoPay:      Pharmacy Filling the Rx: [facility] #[id number] [place] [address] [[address]  Pharmacy Notified:    Patient Notified:

## 2023-03-03 NOTE — PATIENT INSTRUCTIONS
It was nice to meet you today Ki    Suboxone was increased today to 4 mg three times daily. You may take an additional dose if needed for pain    Schedule appointment with palliative
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
## 2023-03-06 NOTE — ED PROVIDER NOTES
EMERGENCY DEPARTMENT ENCOUNTER       ED Course & Medical Decision Making     4:15 PM I met the patient and performed my initial interview and exam.   6:54 PM I updated and reevaluated the patient.  7:03 PM I updated the patient. We discussed the plan for discharge and the patient is agreeable. Reviewed supportive cares, symptomatic treatment, outpatient follow up, and reasons to return to the Emergency Department. All questions and concerns were addressed. Patient to be discharged by ED RN.      Final Impression  68 year old male presents for evaluation of 2 weeks of ongoing daily nausea and vomiting.  On initial evaluation patient seen in triage and is dry heaving into a blue bag, though no significant stomach contents or vomitus is being regurgitated.  Patient denies having any abdominal pain whatsoever.  Patient's daughter is present with him providing additional history.  Patient is on chronic anticoagulation with Xarelto, but denies any hematemesis or hematochezia.  Denies any history of abdominal surgeries that he can recall.  Patient recently relocated to a senior living facility about a week ago, on further discussion with patient and daughter it sounds like this decision to move into an assisted living facility has been fairly nerve-racking for patient.  Patient is on famotidine chronically and sounds like.  Did consider opiate withdrawal as he had previously been on narcotics and was wishes to transition to buprenorphine, though it sounds like he was tapered off about 2 months ago and then started on buprenorphine, thus do not think patient is experiencing precipitated withdrawal from starting the buprenorphine/Suboxone.  CT head negative for intracranial bleeding or masses, thus do not suspect intracranial cause of his recurrent nausea and vomiting.  CT abdomen pelvis also fairly unremarkable.  CBC, BMP, LFTs, lipase normal.  Cardiac work-up also fairly negative with a negative troponin at 0.02, and  relatively normal BNP, and a nonischemic EKG.  Patient given a single dose of Zofran and had dramatic improvement in his symptoms.  At this juncture his work-up is fairly reassuring and symptoms are improving.  Patient's daughter brought up the question as to whether the recurrent nausea and vomiting can be a stress reaction to his recent living situation change which I think is absolutely a possibility, discussed this openly with them that I would put this near the top of my list for potential causes.  Does seem reasonable to treat with course of Zofran as needed, though may also benefit from follow-up with gastroenterology if his symptoms persist, may benefit from an upper endoscopy as it is unlikely has had gastric ulcers in the past.  Patient and daughter comfortable with plan of Zofran and follow-up with GI on an outpatient basis as needed, all questions answered, will discharge home    Prior to making a final disposition on this patient the results of patient's tests and other diagnostic studies were discussed with the patient. All questions were answered. Patient expressed understanding of the plan and was amenable to it.    Medical Decision Making    History:    Supplemental history from: Family Member/Significant Other    External Record(s) reviewed: Documented in chart, if applicable.    Work Up:    Chart documentation includes differential considered and any EKGs or imaging independently interpreted by provider, where specified.    In additional to work up documented, I considered the following work up: Documented in chart, if applicable.    External consultation:    Discussion of management with another provider: Documented in chart, if applicable    Complicating factors:    Care impacted by chronic illness: Anticoagulated State, Chronic Kidney Disease, Chronic Lung Disease, Heart Disease, Hypertension and Other: chronic hepatitis c    Care affected by social determinants of health: N/A    Disposition  considerations: Discharge. I prescribed additional prescription strength medication(s) as charted. See documentation for any additional details.      Medications   ondansetron (ZOFRAN) injection 4 mg (4 mg Intravenous $Given 3/6/23 1252)   iopamidol (ISOVUE-370) solution 100 mL (100 mLs Intravenous $Given 3/6/23 1710)       Discharge Medication List as of 3/6/2023  7:13 PM      START taking these medications    Details   ondansetron (ZOFRAN ODT) 4 MG ODT tab Take 1 tablet (4 mg) by mouth every 6 hours as needed for nausea or vomiting, Disp-12 tablet, R-0, E-Prescribe             Final Impression     1. Nausea and vomiting, unspecified vomiting type    2. Acute reaction to stress        Chief Complaint     Chief Complaint   Patient presents with     Nausea & Vomiting     Pt here for N/V x 2 weeks. BM today. No black stools. He's dry heaving every few minutes. Recently moved to assisted living.     HPI     Ki Pederson is a 68 year old male who presents for evaluation of nausea and vomiting.    Per the patient's daughter, the patient has had 2 weeks of daily episodic nausea and vomiting. States he has intermittent ~10 minute episodes more frequently after eating with some brief periods of improvement in between episodes. The patient states the emesis has been foamy lately and he has also been dry heaving more. He notes he has not been eating much. The patient denies any associated abdominal pain. No black/bloody stools, hematemesis, or any other complaints at this time. His last BM was today and was medium-sized, normal in quality. Patient denies additional medical concerns or complaints at this time.   Daughter notes the patient was moved into an assisted living ~1 week ago and has been unable to get in with his PCP, prompting his presentation today.    PMHx: Gastric ulcers, HTN, chronic hepatitis C, CKD 3, chronic heart failure, COPD, left hand hemangioma. The patient denies any history of abdominal surgeries.  He reports having gastric ulcers ~30 years ago but no issues since. His daughter reports he is on Xarelto and buprenorphine. Was switched from percocet to buprenorphine ~2 month ago after the opioids were causing some respiratory issues with patient's history COPD.  Daughter states patient was tapered off of percocet at that time. Patient is not currently taking prednisone.     I, Bibiana Millard am serving as a scribe to document services personally performed by Dr. Johnny Bhardwaj MD, based on my observation and the provider's statements to me. I, Dr. Johnny Bhardwaj MD attest that Bibiana Millard is acting in a scribe capacity, has observed my performance of the services and has documented them in accordance with my direction.    Past Medical History     Past Medical History:   Diagnosis Date     Anxiety      Atrial fibrillation (H)      CAD (coronary artery disease)      Cerebral infarction (H)      Chronic hepatitis C virus infection (H)      COPD (chronic obstructive pulmonary disease) (H)      Essential hypertension      Hemangioma      Hemangioma      Hypertension      Intravenous drug abuse in remission (H)      Lumbar spinal stenosis      Myocardial infarction (H)      Peripheral neuropathy      Restless leg syndrome      Stroke (H)      TIA (transient ischemic attack)      Past Surgical History:   Procedure Laterality Date     CERVICAL FUSION      C6 and C7     CERVICAL FUSION      C6-7     PICC TRIPLE LUMEN PLACEMENT  9/21/2022          Family History   Problem Relation Age of Onset     Coronary Artery Disease Mother      Diabetes Mother      Coronary Artery Disease Father      Chronic Obstructive Pulmonary Disease Brother      Heart Disease Brother      Chronic Obstructive Pulmonary Disease Brother      Heart Disease Brother      No Known Problems Brother      Heart Disease Sister      Chronic Obstructive Pulmonary Disease Sister      Chronic Obstructive Pulmonary Disease Sister      Heart Disease  Sister      No Known Problems Sister      Chronic Obstructive Pulmonary Disease Sister      Heart Disease Sister      No Known Problems Sister      No Known Problems Sister       Social History     Tobacco Use     Smoking status: Former     Packs/day: 2.00     Types: Cigarettes     Quit date: 2018     Years since quittin.1     Smokeless tobacco: Current     Types: Chew     Tobacco comments:     No passive exposure.  2 rolls (10 tins) per month   Vaping Use     Vaping Use: Never used   Substance Use Topics     Alcohol use: Not Currently     Comment: beer socail once in awhile     Drug use: Not Currently     Types: IV, Marijuana     Comment: Sober from IV drug use since ~     Allergies   Allergen Reactions     Symbicort Rash       Relevant past medical, surgical, family and social history as documented above, has been reviewed and discussed with patient. No changes or additions, unless otherwise noted in the HPI.    Current Medications     ondansetron (ZOFRAN ODT) 4 MG ODT tab  albuterol (PROAIR HFA/PROVENTIL HFA/VENTOLIN HFA) 108 (90 Base) MCG/ACT inhaler  amiodarone (PACERONE) 200 MG tablet  azithromycin (ZITHROMAX) 250 MG tablet  buprenorphine-naloxone (SUBOXONE) 8-2 MG SUBL sublingual tablet  citalopram (CELEXA) 40 MG tablet  diclofenac (VOLTAREN) 1 % topical gel  famotidine (PEPCID) 20 MG tablet  folic acid (FOLVITE) 1 MG tablet  furosemide (LASIX) 40 MG tablet  gabapentin (NEURONTIN) 300 MG capsule  guaiFENesin (MUCINEX) 600 MG 12 hr tablet  ipratropium - albuterol 0.5 mg/2.5 mg/3 mL (DUONEB) 0.5-2.5 (3) MG/3ML neb solution  magnesium 250 MG tablet  metoprolol tartrate (LOPRESSOR) 25 MG tablet  multivitamin w/minerals (THERA-VIT-M) tablet  naloxone (NARCAN) 4 MG/0.1ML nasal spray  naloxone (NARCAN) 4 MG/0.1ML nasal spray  ondansetron (ZOFRAN) 4 MG tablet  rivaroxaban ANTICOAGULANT (XARELTO) 20 MG TABS tablet  SENNA-docusate sodium (SENNA S) 8.6-50 MG tablet          Physical Exam     BP (!) 165/89    Pulse 81   Temp 97  F (36.1  C) (Temporal)   Resp 16   Wt 102.5 kg (226 lb)   SpO2 93%   BMI 30.64 kg/m    Constitutional: Awake, alert, dry heaving repeatedly into a bag.  Head: Normocephalic, atraumatic.  ENT: Mucous membranes moist.  Eyes: Conjunctiva normal.  Respiratory: Somewhat diminished breath sounds bilaterally consistent with COPD, respirations unlabored, no respiratory distress, no obvious crackles or wheezing  Cardiovascular: Regular rate and rhythm. Good peripheral perfusion.  GI: Abdomen soft, non-tender.  No guarding or rebound.  No palpable masses.  Musculoskeletal: Moves all 4 extremities equally.  Integument: Warm, dry.  Neurologic: Alert & oriented x 3. Normal speech. Grossly normal motor and sensory function. No focal deficits noted.  Psychiatric: Normal mood    Labs & Imaging     Imaging reviewed and independently interpreted as below;   CT head images reviewed, no intracranial bleeding  CT abd pelvis images reviewed, no signs of obstruction, no obvious masses    Results for orders placed or performed during the hospital encounter of 03/06/23   Head CT w/o contrast    Impression    IMPRESSION:  1.  No acute intracranial abnormality.  2.  Stable mild chronic age-related changes.   CT Abdomen Pelvis w Contrast    Impression    IMPRESSION:   1.  No visualized explanation for patient's symptoms.   Basic metabolic panel   Result Value Ref Range    Sodium 140 136 - 145 mmol/L    Potassium 4.1 3.5 - 5.0 mmol/L    Chloride 101 98 - 107 mmol/L    Carbon Dioxide (CO2) 25 22 - 31 mmol/L    Anion Gap 14 5 - 18 mmol/L    Urea Nitrogen 15 8 - 22 mg/dL    Creatinine 1.10 0.70 - 1.30 mg/dL    Calcium 9.0 8.5 - 10.5 mg/dL    Glucose 93 70 - 125 mg/dL    GFR Estimate 74 >60 mL/min/1.73m2   Result Value Ref Range    Lipase <9 0 - 52 U/L   Hepatic function panel   Result Value Ref Range    Bilirubin Total 0.5 0.0 - 1.0 mg/dL    Bilirubin Direct 0.2 <=0.5 mg/dL    Protein Total 7.8 6.0 - 8.0 g/dL    Albumin  4.3 3.5 - 5.0 g/dL    Alkaline Phosphatase 56 45 - 120 U/L    AST 32 0 - 40 U/L    ALT 41 0 - 45 U/L   Result Value Ref Range    Magnesium 1.9 1.8 - 2.6 mg/dL   B-Type Natriuretic Peptide ([facility] Only)   Result Value Ref Range     (H) 0 - 60 pg/mL   Troponin I (now)   Result Value Ref Range    Troponin I 0.02 0.00 - 0.29 ng/mL   CBC with platelets and differential   Result Value Ref Range    WBC Count 6.1 4.0 - 11.0 10e3/uL    RBC Count 4.90 4.40 - 5.90 10e6/uL    Hemoglobin 13.5 13.3 - 17.7 g/dL    Hematocrit 43.7 40.0 - 53.0 %    MCV 89 78 - 100 fL    MCH 27.6 26.5 - 33.0 pg    MCHC 30.9 (L) 31.5 - 36.5 g/dL    RDW 14.7 10.0 - 15.0 %    Platelet Count 267 150 - 450 10e3/uL    % Neutrophils 66 %    % Lymphocytes 21 %    % Monocytes 9 %    % Eosinophils 3 %    % Basophils 1 %    % Immature Granulocytes 0 %    NRBCs per 100 WBC 0 <1 /100    Absolute Neutrophils 4.0 1.6 - 8.3 10e3/uL    Absolute Lymphocytes 1.3 0.8 - 5.3 10e3/uL    Absolute Monocytes 0.6 0.0 - 1.3 10e3/uL    Absolute Eosinophils 0.2 0.0 - 0.7 10e3/uL    Absolute Basophils 0.0 0.0 - 0.2 10e3/uL    Absolute Immature Granulocytes 0.0 <=0.4 10e3/uL    Absolute NRBCs 0.0 10e3/uL   ECG 12-Lead with [facility] - [facility]   Result Value Ref Range    Systolic Blood Pressure  mmHg    Diastolic Blood Pressure  mmHg    Ventricular Rate 56 BPM    Atrial Rate 56 BPM    SC Interval 160 ms    QRS Duration 98 ms     ms    QTc 470 ms    P Axis 45 degrees    R AXIS 77 degrees    T Axis 78 degrees    Interpretation ECG       Sinus bradycardia  Otherwise normal ECG  When compared with ECG of 26-DEC-2022 15:10,  No significant change was found  Confirmed by SEE ED PROVIDER NOTE FOR, ECG INTERPRETATION ([id number]),  Pennie Henderson ([id number]) on 3/6/2023 5:21:52 PM         EKG     EKG reviewed and independently interpreted as below;  Sinus bradycardia, rate 56.  .  QRS 98.  QTc 470.  No STEMI.     Johnny Bhardwaj MD  03/06/23 1947

## 2023-03-06 NOTE — ED TRIAGE NOTES
Pt here for N/V x 2 weeks. BM today. No black stools. He's dry heaving every few minutes. Recently moved to assisted living.      Triage Assessment     Row Name 03/06/23 9937       Triage Assessment (Adult)    Airway WDL WDL       Respiratory WDL    Respiratory WDL WDL       Skin Circulation/Temperature WDL    Skin Circulation/Temperature WDL WDL       Cardiac WDL    Cardiac WDL WDL       Peripheral/Neurovascular WDL    Peripheral Neurovascular WDL WDL       Cognitive/Neuro/Behavioral WDL    Cognitive/Neuro/Behavioral WDL WDL

## 2023-03-06 NOTE — PROGRESS NOTES
[facility] Post-Appointment Chart -check      Medications ordered this visit were e-scribed.  Verified by order class [x] yes  [] no  buprenorphine-naloxone (SUBOXONE) 8-2 MG SUBL sublingual tablet; SENNA-docusate sodium (SENNA S) 8.6-50 MG tablet  List Medications:     Medication changes or discontinuations were communicated to patient's pharmacy: [] yes  [x] no    UA collected [x] yes  [] no  [] n/a-virtual     Outside referrals / labs, etc support staff to follow up: [] yes  [x] no    Future appointment was made: [] yes  [x] no  [] n/a Per Mone' wants 3/21/2023 @ 1:30 pm   Future Appointments 3/6/2023 - 9/2/2023      Date Visit Type Length Department Provider     3/21/2023  1:30 PM ADDICTION MED RETURN 30 min [facility] ADDICTION MEDICINE Genie Shore CNP              4/3/2023  9:00 AM NEW SLEEP 60 min [facility] Erika Aldrich APRN CNP              6/2/2023  2:20 PM RETURN CARDIOLOGY 30 min [facility] HEART CARE Julienne Lagunas MD    Location Instructions:     [address] [facility] [facility] [address] [facility] [address]                   Dictation completed at time of chart check: [x] yes  [] no    I have checked the documentation for today s encounters and the above information has been reviewed and completed.      Diamante Rod CMA on March 6, 2023 at 7:35 AM

## 2023-03-07 NOTE — PROGRESS NOTES
[facility] Care Coordination Contact  CC received notification of Emergency Room visit.  ER visit occurred on 3/6/23 at [facility] with Dx of nausea and vomiting.    CC contacted adult daughter Ethan and reviewed discharge summary.  Member has a follow-up appointment with PCP: Yes: scheduled on already seen in house doctor  Member has had a change in condition: No  New referrals placed: No  Home Visit Needed: No  Care plan reviewed and updated.  PCP notified of ED visit via EMR.    Prashanth Romero RN, PHN  [facility]  [phone]

## 2023-03-21 NOTE — NURSING NOTE
Is the patient currently in the state of [place]? YES    Visit mode:VIDEO    If the visit is dropped, the patient can be reconnected by: Declined- Will rejoin via Zoomingohart if needed    Will anyone else be joining the visit? No daughter Ethan will assist pt with visit  (If patient encounters technical issues they should call [phone])    How would you like to obtain your AVS? MyChart    Are changes needed to the allergy or medication list? NO    Rooming Documentation: Patient will complete qnrs in Zoomingohart    Reason for visit:  Follow Up    SURAJ Mesa
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
## 2023-03-23 NOTE — PROGRESS NOTES
[facility] Care Coordination Contact    3/21/23- Late entry:     Email from Petros Garcia  with [facility] to confirm that member will transfer out 4/1/23 to [facility] since is seeing their physician for PCP.      Phone call to William Goodrich, requested for CL tool input form.      Received form from facility.      Prashanth Romero RN, PHN  [facility]  [phone]

## 2023-03-28 NOTE — PROGRESS NOTES
Encounter opened due to Regulatory [employer] Update to close FVP Program.    Ba Chris  Care Management Specialist  [facility]  [phone]

## 2023-03-28 NOTE — PROGRESS NOTES
Encounter opened due to Regulatory [employer] L[employer] Update to open FVP Program.    Ba Chris  Care Management Specialist  [facility]  [phone]

## 2023-03-29 NOTE — PROGRESS NOTES
[facility] Care Coordination Contact    Called adult daughter Ethan to schedule annual HRA home visit. HRA has been scheduled for 3/29/23.     Prashanth Romero RN, PHN  [facility]  [phone]

## 2023-03-29 NOTE — PROGRESS NOTES
[facility] Care Coordination Contact    [facility] Home Visit Assessment     Home visit for Health Risk Assessment with Ki Pederson completed on March 29, 2023    Type of residence:: Assisted living  Current living arrangement:: I live in assisted living     Assessment completed with:: Patient, Children    Current Care Plan  Member currently receiving the following home care services: Skilled Nursing   Member currently receiving the following community resources: [facility]      Medication Review  Medication reconciliation completed in Epic: No  Medication set-up & administration: RN set up weekly.  Assisting Living staff administers medications.  Medication Risk Assessment Medication (1 or more, place referral to MTM): N/A: No risk factors identified  MTM Referral Placed: No: No risk factors idenified    Mental/Behavioral Health   Depression Screening:   PHQ-2 Total Score (Adult) - Positive if 3 or more points; Administer PHQ-9 if positive: 0       Mental health DX:: Yes   Mental health DX how managed:: Medication    Falls Assessment:   Fallen 2 or more times in the past year?: Yes   Any fall with injury in the past year?: No    ADL/IADL Dependencies:   Dependent ADLs:: Ambulation-walker, Bathing, Dressing, Grooming  Dependent IADLs:: Cleaning, Cooking, Laundry, Shopping, Meal Preparation, Medication Management, Money Management, Transportation    [facility] sponsored benefits: Shared information re: Silver Sneakers/gym memberships, ASA, Calcium +D.    PCA Assessment completed at visit: Not Applicable     Elderly Waiver Eligibility: Yes-will continue on EW    Care Plan & Recommendations:     See LTCC for detailed assessment information.    Follow-Up Plan: Member informed of future contact, plan to f/u with member with a 6 month telephone assessment.  Contact information shared with member and family, encouraged member to call with any questions or concerns at any time.    [facility]  continuum providers: Please see Snapshot and Care Management Flowsheets for Specific details of care plan.    This CC note routed to PCP.    Prashanth Romero RN, PHN  [facility]  [phone]

## 2023-04-03 PROBLEM — F11.20 OPIOID DEPENDENCE, UNCOMPLICATED (H): Status: ACTIVE | Noted: 2023-01-01

## 2023-04-03 NOTE — NURSING NOTE
Sleep study and return visit has been scheduled. AVS and sleep study packet/letter given to patient.       Caity Boothe [facility]

## 2023-04-03 NOTE — PROGRESS NOTES
Outpatient Sleep Medicine Consultation:      Name: Ki Pederson MRN# [id number]   Age: 68 year old YOB: 1955     Date of Consultation: April 3, 2023  Consultation is requested by: Violette Pickard DO  [address] [place] [address] Violette Pickard  Primary care provider: Paola Rico       Reason for Sleep Consult:     Ki Pederson is sent by Violette Pickard for a sleep consultation regarding patient recently discharged from the hospital for acute heart failure, severe COPD exacerbation, pneumonia treated with BiPAP.    Patient s Reason for visit  Ki Pederson main reason for visit: BiPap machine referral, sleep walking, staying asleep  Patient states problem(s) started: Years ago  Ki NAV Pederson's goals for this visit: Bipap machine           Assessment and Plan:     Summary Sleep Diagnoses:  Obstructive sleep apnea  Restless leg syndrome  Insomnia, sleep initiation, sleep maintenance  Nonrestorative sleep  Sleep deprivation    Comorbid Diagnoses:  Acute on chronic respiratory failure with hypoxia and hypercapnia  Arteriosclerotic cardiovascular disease (ASCVD)  Chronic pain disorder, on opioid medications  COPD  Hypertension  Chronic systolic heart failure  Lumbar disc degeneration, lumbar spinal stenosis  Major depression  Tobacco use disorder  Polysubstance abuse    Summary Recommendations:  Orders Placed This Encounter   Procedures     Comprehensive Sleep Study   1.  Recommend patient undergo formal in laboratory sleep study.  Patient treated with BiPAP during hospitalization for his acute on chronic hypoxic and hypercapnic respiratory failure, acute COPD exacerbation, right-sided pneumonia.  At time of discharge it was felt that heart failure was resolved.  2.  Patient to follow-up in clinic approximately 2 weeks after sleep study has been completed for us to evaluate sleep study results and collaboratively to develop a plan of care going forward.  3.  Patient to  evaluate his sleep hygiene practices for any methods of improvement he may identify for improvement to prevent any further sleep disruption  4.  Weight management for BMI to be kept under 30.0    Summary Counselin.  Discussed both central sleep apnea as well as obstructive sleep apnea pathophysiology  2.  Discussed implications of untreated sleep apnea relevant to his cardiovascular diagnoses  3.  Discussed all methods of treatment of sleep apnea including PAP therapy, mandibular advancement device, and surgical options.  PAP therapy would include CPAP, BiPAP    Medical Decision-making:   Educational materials provided in his after visit instructions    Total time spent reviewing medical records, history and physical examination, review of previous testing and interpretation as well as documentation on this date: 60 minutes                                                                                                                                                                                                                                                                                                                            CC: Violette Pickard          History of Present Illness:     Ki Pederson is a 68-year-old male who presents to the sleep medicine clinic in the hopes of obtaining a sleep study and treatment for his presumed obstructive sleep apnea after being discharged from the hospital for his respiratory failure, COPD exacerbation and pneumonia.  He comes on the advice of his treating physician,Violette Pickard.     Ki has multifactorial sleep issues.  His medical issues contribute to his sleep issues.  He notes that he only obtains 3 hours of sleep per day, he would like to obtain at least 9.  He falls asleep unintentionally multiple times per day.  He awakens with a headache, dry mouth, nasal congestion, suffers from symptoms of GERD.  Awakens with the need to eliminate multiple  times a day as well as during the night as well.  He states he suffers from restless leg syndrome.  Other symptoms that he suffers from during sleep includes recurring nightmares, somnambulism, kicking and punching, hypnagogic hallucinations.    Roy Sleepiness Scale: 18/24  Insomnia Severity Scale: 23/28                                               STOP-BANG score: 7/8    Plan is for patient to obtain an in laboratory polysomnogram.  Although it may take some time to obtain that due to normal scheduling delays, patient will be put on the list to call when patients cancel.  Also, patient encouraged to sleep in a recliner until his sleep study has been completed.  Patient not adverse to beginning therapy prior to his follow-up appointment.    Past Sleep Evaluations: None    SLEEP-WAKE SCHEDULE:     Work/School Days: Patient goes to school/work: No   Usually gets into bed at 10  Takes patient about Unsure to fall asleep  Has trouble falling asleep Every night nights per week  Wakes up in the middle of the night A lot times.  Wakes up due to Pain;Other  He has trouble falling back asleep 7 times a week.   It usually takes Unsure to get back to sleep  Patient is usually up at 9  Uses alarm: No    Weekends/Non-work Days/All Other Days:  Usually gets into bed at 10   Takes patient about Unaure to fall asleep  Patient is usually up at 9  Uses alarm: No    Sleep Need  Patient gets  Solid 3 hours sleep on average   Patient thinks he needs about At least 9 sleep    Ki B Dacia prefers to sleep in this position(s): Back;Head Elevated   Patient states they do the following activities in bed: Watch TV    Naps  Patient takes a purposeful nap 4 times a week and naps are usually 1-2 hours in duration  He feels better after a nap: No  He dozes off unintentionally Multiple times a day everyday days per week  Patient has had a driving accident or near-miss due to sleepiness/drowsiness: No      SLEEP  DISRUPTIONS:    Breathing/Snoring  Patient snores:Yes  Other people complain about his snoring: No  Patient has been told he stops breathing in his sleep:Yes  He has issues with the following: Morning headaches;Morning mouth dryness;Stuffy nose when you wake up;Heartburn or reflux at night;Getting up to urinate more than once    Movement:  Patient gets pain, discomfort, with an urge to move:  Yes  It happens when he is resting:  Yes  It happens more at night:  Yes  Patient has been told he kicks his legs at night:  Yes     Behaviors in Sleep:  Ki Pederson has experienced the following behaviors while sleeping: Recurring Nightmares;Sleepwalking;Kicking or punching;See or hear things that are not really there upon awakening or just falling asleep  He has experienced sudden muscle weakness during the day: Yes      Is there anything else you would like your sleep provider to know:        CAFFEINE AND OTHER SUBSTANCES:    Patient consumes caffeinated beverages per day:  2 cups of black coffee  Last caffeine use is usually: 11 am  List of any prescribed or over the counter stimulants that patient takes: None  List of any prescribed or over the counter sleep medication patient takes: None  List of previous sleep medications that patient has tried: Ambien, klonapin, trazadone, zanax  Patient drinks alcohol to help them sleep: No  Patient drinks alcohol near bedtime: No    Family History:  Patient has a family member been diagnosed with a sleep disorder: No            SCALES:    EPWORTH SLEEPINESS SCALE         3/27/2023     3:42 PM    [place] Sleepiness Scale ( JANET Pittman  [id number]<br>ESS - [place]/English - Final version - 21 Nov 07 - [facility] [place].)   Sitting and reading Moderate chance of dozing   Watching TV Moderate chance of dozing   Sitting, inactive in a public place (e.g. a theatre or a meeting) Moderate chance of dozing   As a passenger in a car for an hour without a break High chance of dozing   Lying  down to rest in the afternoon when circumstances permit High chance of dozing   Sitting and talking to someone Moderate chance of dozing   Sitting quietly after a lunch without alcohol Moderate chance of dozing   In a car, while stopped for a few minutes in traffic Moderate chance of dozing   [place] Score (MC) 18   [place] Score (Sleep) 18         INSOMNIA SEVERITY INDEX (LINDA)          3/27/2023     3:32 PM   Insomnia Severity Index (LINDA)   Difficulty falling asleep 2   Difficulty staying asleep 3   Problems waking up too early 2   How SATISFIED/DISSATISFIED are you with your CURRENT sleep pattern? 4   How NOTICEABLE to others do you think your sleep problem is in terms of impairing the quality of your life? 4   How WORRIED/DISTRESSED are you about your current sleep problem? 4   To what extent do you consider your sleep problem to INTERFERE with your daily functioning (e.g. daytime fatigue, mood, ability to function at work/daily chores, concentration, memory, mood, etc.) CURRENTLY? 4   LINDA Total Score 23       Guidelines for Scoring/Interpretation:  Total score categories:  0-7 = No clinically significant insomnia   8-14 = Subthreshold insomnia   15-21 = Clinical insomnia (moderate severity)  22-28 = Clinical insomnia (severe)  Used via courtesy of www.Mixifyth.va.gov with permission from Yousuf Hale PhD., [facility]      STOP BANG         3/27/2023     3:43 PM   STOP BANG Questionnaire (  2008, the American Society of Anesthesiologists, Inc. [employer]   1. Snoring - Do you snore loudly (louder than talking or loud enough to be heard through closed doors)? No   2. Tired - Do you often feel tired, fatigued, or sleepy during daytime? Yes   3. Observed - Has anyone observed you stop breathing during your sleep? Yes   4. Blood pressure - Do you have or are you being treated for high blood pressure? Yes   5. BMI - BMI more than 35 kg/m2? Yes   6. Age - Age over 50 yr old? Yes   7.  "Neck circumference - Neck circumference greater than 40 cm? Yes   8. Gender - Gender male? Yes   STOP BANG Score (MC): 6 (High risk of ABIGAIL)         GAD7        3/3/2023     1:00 PM   BUD-7    1. Feeling nervous, anxious, or on edge    2. Not being able to stop or control worrying    3. Worrying too much about different things    4. Trouble relaxing    5. Being so restless that it is hard to sit still    6. Becoming easily annoyed or irritable    7. Feeling afraid, as if something awful might happen    BUD-7 Total Score    If you checked any problems, how difficult have they made it for you to do your work, take care of things at home, or get along with other people?        Information is confidential and restricted. Go to Review Flowsheets to unlock data.         CAGE-AID         View : No data to display.                CAGE-AID reprinted with permission from the [employer] Journal, YVONNE Crespo. and CODI Holt, \"Conjoint screening questionnaires for alcohol and drug abuse\" [place] Medical Journal 94: 135-140, 1995.      PATIENT HEALTH QUESTIONNAIRE-9 (PHQ - 9)        3/3/2023     1:00 PM   PHQ-9 ([employer])   1.  Little interest or pleasure in doing things    2.  Feeling down, depressed, or hopeless    3.  Trouble falling or staying asleep, or sleeping too much    4.  Feeling tired or having little energy    5.  Poor appetite or overeating    6.  Feeling bad about yourself - or that you are a failure or have let yourself or your family down    7.  Trouble concentrating on things, such as reading the newspaper or watching television    8.  Moving or speaking so slowly that other people could have noticed. Or the opposite - being so fidgety or restless that you have been moving around a lot more than usual    9.  Thoughts that you would be better off dead, or of hurting yourself in some way    PHQ-9 Total Score    6.  Feeling bad about yourself    7.  Trouble concentrating    8.  Moving slowly or restless    9.  " Suicidal or self-harm thoughts        Information is confidential and restricted. Go to Review Flowsheets to unlock data.       Developed by DrsSawyer Fischer, Lacey Parrish, Gregg Brown and colleagues, with an educational petr from [employer] No permission required to reproduce, translate, display or distribute.        Allergies:    Allergies   Allergen Reactions     Symbicort Rash       Medications:    Current Outpatient Medications   Medication Sig Dispense Refill     albuterol (PROAIR HFA/PROVENTIL HFA/VENTOLIN HFA) 108 (90 Base) MCG/ACT inhaler Inhale 2 puffs into the lungs every 3 hours 18 g 11     amiodarone (PACERONE) 200 MG tablet Take 1 tablet (200 mg) by mouth daily 30 tablet 11     azithromycin (ZITHROMAX) 250 MG tablet USE ONE TAB BY MOUTH ONCE DAILY ON Monday, Wednesday, Friday ONGOING 36 tablet 3     buprenorphine-naloxone (SUBOXONE) 8-2 MG SUBL sublingual tablet Place 1 tablet under the tongue 2 times daily Give at 0800 and 2000. 42 tablet 0     citalopram (CELEXA) 40 MG tablet Take 1 tablet (40 mg) by mouth daily 60 tablet 3     diclofenac (VOLTAREN) 1 % topical gel Apply 2 g topically 4 times daily 50 g 0     famotidine (PEPCID) 20 MG tablet Take 1 tablet (20 mg) by mouth daily 60 tablet 3     folic acid (FOLVITE) 1 MG tablet Take 1 tablet (1 mg) by mouth daily 30 tablet 3     furosemide (LASIX) 40 MG tablet Take 1 tablet (40 mg) by mouth daily 90 tablet 3     gabapentin (NEURONTIN) 300 MG capsule Take 1 capsule (300 mg) by mouth 3 times daily 90 capsule 3     guaiFENesin (MUCINEX) 600 MG 12 hr tablet Take 2 tablets (1,200 mg) by mouth daily 120 tablet 0     ipratropium - albuterol 0.5 mg/2.5 mg/3 mL (DUONEB) 0.5-2.5 (3) MG/3ML neb solution Nebulize tid for 3 days and then tid prn for sob (Patient taking differently: Take 1 vial by nebulization 4 times daily Nebulize tid for 3 days and then tid prn for sob) 90 mL 3     magnesium 250 MG tablet Take 1 tablet (250 mg) by mouth At Bedtime  90 tablet 3     metoprolol tartrate (LOPRESSOR) 25 MG tablet Take 1 tablet (25 mg) by mouth 2 times daily 60 tablet 3     naloxone (NARCAN) 4 MG/0.1ML nasal spray Spray 1 spray (4 mg) into one nostril alternating nostrils as needed for opioid reversal every 2-3 minutes until assistance arrives 0.2 mL 0     naloxone (NARCAN) 4 MG/0.1ML nasal spray Spray 1 spray (4 mg) into one nostril alternating nostrils once as needed for opioid reversal every 2-3 minutes until assistance arrives 0.2 mL 0     ondansetron (ZOFRAN ODT) 4 MG ODT tab Take 1 tablet (4 mg) by mouth every 6 hours as needed for nausea or vomiting 12 tablet 0     ondansetron (ZOFRAN) 4 MG tablet Take 1 tablet (4 mg) by mouth every 6 hours as needed for nausea 10 tablet 0     rivaroxaban ANTICOAGULANT (XARELTO) 20 MG TABS tablet Take 1 tablet (20 mg) by mouth daily (with dinner) 90 tablet 3     SENNA-docusate sodium (SENNA S) 8.6-50 MG tablet Take 1 tablet by mouth 2 times daily as needed (constipation) 30 tablet 1       Problem List:  Patient Active Problem List    Diagnosis Date Noted     COPD exacerbation (H) 12/28/2022     Priority: Medium     Elevated brain natriuretic peptide (BNP) level 12/28/2022     Priority: Medium     Altered mental status, unspecified altered mental status type 12/28/2022     Priority: Medium     Acute on chronic respiratory failure with hypoxia and hypercapnia (H) 12/28/2022     Priority: Medium     Chronic respiratory failure with hypoxia and hypercapnia (H) 12/26/2022     Priority: Medium     Stage 3a chronic kidney disease (H) 12/26/2022     Priority: Medium     Bradycardia 12/26/2022     Priority: Medium     Other emphysema (H) 12/26/2022     Priority: Medium     Hypotension, unspecified hypotension type 12/26/2022     Priority: Medium     DNR (do not resuscitate) 10/24/2022     Priority: Medium     Chronic systolic heart failure (H) 10/05/2022     Priority: Medium     Hyperkalemia 09/21/2022     Priority: Medium     Anxiety  09/21/2022     Priority: Medium     Restless leg syndrome 09/21/2022     Priority: Medium     Lumbar spinal stenosis 09/21/2022     Priority: Medium     Peripheral neuropathy 09/21/2022     Priority: Medium     Current moderate episode of major depressive disorder without prior episode (H) 01/26/2022     Priority: Medium     Major depression      Priority: Medium     Created by Conversion         Restless Legs Syndrome      Priority: Medium     Created by Conversion         Persistent atrial fibrillation (H)      Priority: Medium     Dx around age 42 and reportedly had CV  Recurrence 12/2019  [id number] 2 (CVA, HTN) on Xarelto.          Essential hypertension      Priority: Medium     Created by Conversion  Replacement Utility updated for latest IMO load         At risk for delirium      Priority: Medium     Cognitive and behavioral changes      Priority: Medium     Episodes of formed visual hallucinations      Priority: Medium     Chest pain due to myocardial ischemia, unspecified ischemic chest pain type      Priority: Medium     Nicotine abuse 07/26/2019     Priority: Medium     Chronic obstructive pulmonary disease with acute lower respiratory infection (H) 05/15/2019     Priority: Medium     Chronic hepatitis C without hepatic coma (H) 02/08/2017     Priority: Medium     Genotype 3a.         Hemangioma      Priority: Medium     Created by Conversion  [facility] Annotation: Sep  2 2009  3:49PM - Sukhdev Manzano: hand  Replacement Utility updated for latest IMO load         Arteriosclerotic Cardiovascular Disease (ASCVD)      Priority: Medium              Tobacco abuse 05/08/2015     Priority: Medium     We'll write for tobacco replacement patch.         Polysubstance abuse (H) 02/09/2015     Priority: Medium     Patient reports alcohol use only; however, urine drug screen positive on   2.3.15 for benzos, opiates, marijuana, amphetamines.  Follow-up urine   testing strongly positive for methamphetamines.Patient  was counseled on   the importance of cessation, although patient has altered mental status at   this time.  Would revisit topic once patient improves clinically.  Did   discuss with daughter the importance of cessation.         Myopia of both eyes with astigmatism and presbyopia 2014     Priority: Medium     Senile nuclear sclerosis 2014     Priority: Medium     Lumbar Disc Degeneration      Priority: Medium     Created by Conversion         Chronic pain disorder 2013     Priority: Medium     Chronic back pain 2013     Priority: Medium     Spinal stenosis of lumbar region 2010     Priority: Medium     IMO Update 10/11            Past Medical/Surgical History:  Past Medical History:   Diagnosis Date     Anxiety      Atrial fibrillation (H)      CAD (coronary artery disease)      Cerebral infarction (H)      Chronic hepatitis C virus infection (H)      COPD (chronic obstructive pulmonary disease) (H)      Essential hypertension      Hemangioma     massive hemangioma; left hand     Hemangioma     Left hand     Hypertension      Intravenous drug abuse in remission (H)     Sober since ~     Lumbar spinal stenosis      Myocardial infarction (H)      Peripheral neuropathy      Restless leg syndrome      Stroke (H)      TIA (transient ischemic attack)      Past Surgical History:   Procedure Laterality Date     CERVICAL FUSION      C6 and C7     CERVICAL FUSION      C6-7     PICC TRIPLE LUMEN PLACEMENT  2022            Social History:  Social History     Socioeconomic History     Marital status:      Spouse name: Not on file     Number of children: 10     Years of education: 15     Highest education level: Some college, no degree   Occupational History     Occupation: Disability   Tobacco Use     Smoking status: Former     Packs/day: 2.00     Types: Cigarettes     Quit date: 2018     Years since quittin.2     Smokeless tobacco: Current     Types: Chew     Tobacco comments:      No passive exposure.  2 rolls (10 tins) per month   Vaping Use     Vaping status: Never Used     Passive vaping exposure: Yes   Substance and Sexual Activity     Alcohol use: Not Currently     Comment: beer socail once in awhile     Drug use: Not Currently     Types: IV, Marijuana     Comment: Sober from IV drug use since ~2015     Sexual activity: Not on file   Other Topics Concern     Parent/sibling w/ CABG, MI or angioplasty before 65F 55M? Not Asked   Social History Narrative    ** Merged History Encounter **          Social Determinants of Health     Financial Resource Strain: Not on file   Food Insecurity: Not on file   Transportation Needs: Not on file   Physical Activity: Not on file   Stress: Not on file   Social Connections: Not on file   Intimate Partner Violence: Not on file   Housing Stability: Not on file       Family History:  Family History   Problem Relation Age of Onset     Coronary Artery Disease Mother      Diabetes Mother      Coronary Artery Disease Father      Chronic Obstructive Pulmonary Disease Brother      Heart Disease Brother      Chronic Obstructive Pulmonary Disease Brother      Heart Disease Brother      No Known Problems Brother      Heart Disease Sister      Chronic Obstructive Pulmonary Disease Sister      Chronic Obstructive Pulmonary Disease Sister      Heart Disease Sister      No Known Problems Sister      Chronic Obstructive Pulmonary Disease Sister      Heart Disease Sister      No Known Problems Sister      No Known Problems Sister        Review of Systems:  A complete review of systems reviewed by me is negative with the exeption of what has been mentioned in the history of present illness.  In the last TWO WEEKS have you experienced any of the following symptoms?  Fevers: Yes  Night Sweats: No  Weight Gain: No  Pain at Night: Yes  Double Vision: No  Changes in Vision: No  Difficulty Breathing through Nose: Yes  Sore Throat in Morning: Yes  Dry Mouth in the Morning:  "Yes  Shortness of Breath Lying Flat: Yes  Awakening with Shortness of Breath: Yes  Increased Cough: Yes  Heart Racing at Night: Yes  Swelling in Feet or Legs: Yes  Diarrhea at Night: No  Heartburn at Night: No  Urinating More than Once at Night: Yes  Losing Control of Urine at Night: No  Joint Pains at Night: Yes  Headaches in Morning: No  Weakness in Arms or Legs: Yes  Depressed Mood: Yes  Anxiety: Yes     Physical Examination:  Vitals: /84   Pulse 62   Ht 1.836 m (6' 0.28\")   Wt 99.9 kg (220 lb 4 oz)   SpO2 97%   BMI 29.64 kg/m    BMI= Body mass index is 29.64 kg/m .       Physical Exam  Vitals and nursing note reviewed.   Constitutional:       Appearance: He is obese. He is ill-appearing.   HENT:      Head: Normocephalic and atraumatic.      Right Ear: External ear normal.      Left Ear: External ear normal.      Nose: Nose normal.      Mouth/Throat:      Mouth: Mucous membranes are moist.      Pharynx: Oropharynx is clear.   Eyes:      Conjunctiva/sclera: Conjunctivae normal.   Cardiovascular:      Rate and Rhythm: Normal rate.      Heart sounds: Murmur heard.   Pulmonary:      Effort: Pulmonary effort is normal.   Musculoskeletal:         General: Normal range of motion.      Cervical back: Normal range of motion and neck supple.   Skin:     General: Skin is warm and dry.      Capillary Refill: Capillary refill takes 2 to 3 seconds.   Neurological:      General: No focal deficit present.      Mental Status: He is alert and oriented to person, place, and time.   Psychiatric:         Mood and Affect: Mood normal.         Behavior: Behavior normal.         Thought Content: Thought content normal.         Judgment: Judgment normal.             Mallampati Class: III.  Tonsillar Stage: 1  hidden by pillars.         Data: All pertinent previous laboratory data reviewed     Recent Labs   Lab Test 03/07/23  0622 03/06/23  1631    140   POTASSIUM 4.3 4.1   CHLORIDE 100 101   CO2 28 25   ANIONGAP 13 14 "   * 93   BUN 13.9 15   CR 1.20* 1.10   PALMER 9.4 9.0       Recent Labs   Lab Test 23   WBC 4.7   RBC 4.86   HGB 13.3   HCT 45.8   MCV 94   MCH 27.4   MCHC 29.0*   RDW 15.1*          Recent Labs   Lab Test 23   PROTTOTAL 7.3   ALBUMIN 4.3   BILITOTAL 0.3   ALKPHOS 55   AST 38   ALT 35       TSH (uIU/mL)   Date Value   2023 2.14   2019 0.09 (L)       Amphetamines Urine (no units)   Date Value   2022 Screen Negative     Barbiturates Urine (no units)   Date Value   2022 Screen Negative     Barbituates Urine (no units)   Date Value   2022 Screen Negative     Cannabinoids Urine (no units)   Date Value   2022 Screen Negative     Cocaine Urine (no units)   Date Value   2022 Screen Negative   2022 Screen Negative     Opiates Urine (no units)   Date Value   2022 Screen Negative     PCP Urine (no units)   Date Value   2022 Screen Negative   2022 Screen Negative       No results found for: IRONSAT, [id number], BHARATH    pH Arterial (no units)   Date Value   2022 7.28 (L)   2022 7.25 (L)     pO2 Arterial (mm Hg)   Date Value   2022 119 (H)   2022 120 (H)     pCO2 Arterial (mm Hg)   Date Value   2022 63 (H)   2022 62 (H)     Bicarbonate Arterial (mmol/L)   Date Value   2022 30 (H)   2022 24     Base Excess/Deficit (+/-) (mmol/L)   Date Value   2022 3.2   2022 0.2       @LABRCNTIPR(phv:4,pco2v:4,po2v:4,hco3v:4,arpit:4,o2per:4)@    Echocardiology:   Narrative & Impression  [id number]  [id number]  [id number]  [id number]^JASMIN^CAROL^[place] [address]     Name: ASHWIN GUSTABO SR NAV.  MRN: [id number]  : 1955  Study Date: 2022 09:30 AM  Age: 67 yrs  Gender: Male  Patient Location: [facility]  Reason For Study: CHF  Ordering Physician: CAROL CASTELLANOS  Performed By: AT     BSA: 2.3 m2  Height: 72 in  Weight: 239 lb  HR:  96  BP: 130/73 mmHg  ______________________________________________________________________________  Procedure  Complete Echo Adult. Definity (NDC #27503-533) given intravenously.  ______________________________________________________________________________  Interpretation Summary     1. Normal left ventricular size and systolic performance with a visually  estimated ejection fraction of 55-60%.  2. No significant valvular heart disease is identified on this study.  3. Normal right ventricular size and systolic performance.  4. There is mild left atrial enlargement.     When compared to the prior real-time echocardiogram dated 23 September 2022,  there has been an interval improvement in left ventricular systolic  performance with normalization of the ejection fraction.  ______________________________________________________________________________  Left ventricle:  Normal left ventricular size and systolic performance with a visually  estimated ejection fraction of 55-60%. There is normal regional wall motion.  Left ventricular wall thickness is normal      Chest x-ray: XR Chest 2 Views 01/08/2023    Narrative  EXAM: XR CHEST 2 VIEWS  LOCATION: [facility]  DATE/TIME: 1/8/2023 4:27 PM    INDICATION: recently discharged for pneumonia and hx of COPD; rule out pneumonia  COMPARISON: Chest x-ray 12/29/2022    Impression  IMPRESSION: Stable chronic interstitial prominence. No acute findings. No focal pneumonic consolidation or pleural effusion. Prominent left nipple shadow. Normal heart size. Pulmonary hyperinflation which may reflect COPD.      Chest CT: No results found for this or any previous visit from the past 365 days.      PFT: Most Recent [facility] Pulmonary Function Testing  Narrative & Impression    FEV1/FVC is 0.46 and is reduced.  FEV1 is 34% predicted and is reduced.  FVC is 57% predicted and is reduced.  There was improvement in spirometry after a single inhaled dose of  bronchodilator.  TLC is 120% predicted and is normal.  RV is 277% predicted and is increased.  DLCO is 55% predicted and is reduced when it   is corrected for hemoglobin.  The flow volume loop shows obstructive morphology.     Impression:  Full Pulmonary Function Test is abnormal. Spirometry is consistent with very severe obstructive ventilatory defect.  Spirometry is consistent with reversibility.  There is no hyperinflation.  There is air-trapping.  Diffusion capacity when corrected for hemoglobin is moderately reduced.     Luis Rivera MD  Pulmonary and Critical Care Medicine  [facility]  Office [phone]         Exam Ended: 07/01/19  3:21 PM Last Resulted: 07/13/19  9:02 AM                 No results found for: 20001  No results found for: 20002  No results found for: 20003  No results found for: 20015  No results found for: 20016  No results found for: 20027  No results found for: 20028  No results found for: 20029  No results found for: 20079  No results found for: 20080  No results found for: 20081  No results found for: 20335  No results found for: 20105  No results found for: 20053  No results found for: 20054  No results found for: 20055      Erika Aldrich, DESHAWN CNP 4/3/2023   Sleep Medicine      This note was written with the assistance of the Dragon voice-dictation technology software. The final document, although reviewed, may contain errors. For corrections, please contact the office.

## 2023-04-03 NOTE — NURSING NOTE
"Chief Complaint   Patient presents with     Consult     Sleep study; referral for BIPAP       Initial /84   Pulse 62   Ht 1.836 m (6' 0.28\")   Wt 99.9 kg (220 lb 4 oz)   SpO2 97%   BMI 29.64 kg/m   Estimated body mass index is 29.64 kg/m  as calculated from the following:    Height as of this encounter: 1.836 m (6' 0.28\").    Weight as of this encounter: 99.9 kg (220 lb 4 oz).    Medication Reconciliation: complete    Neck circumference: 18.5 inches / 46 centimeters.    Caity Boothe IVETH  [facility]      "

## 2023-04-06 NOTE — TELEPHONE ENCOUNTER
Patient Suboxone was filled on 3/21 for 21 days which would have gotten him through to his appointment day on 4/11/23. Called facility and left voicemail whether patient was getting low and why? Or if it was just the pharmacy wanting a refill. Waiting for a response.        buprenorphine-naloxone (SUBOXONE) 8-2 MG SUBL sublingual tablet 42 tablet 0 3/21/2023  No   Sig - Route: Place 1 tablet under the tongue 2 times daily Give at 0800 and 2000. - Sublingual     Marion Mckinney RN on 4/6/2023 at 2:23 PM

## 2023-04-06 NOTE — TELEPHONE ENCOUNTER
Received call back from nurse at [facility]. She said they like to set up medications a week at a time. It makes it difficult at the last minute. Wondering if they can get a refill early. If you want to wait for appt that is ok too.   Staff manage his medications.     Date of Last Office Visit: 3/21/23  Date of Next Office Visit: 4/11/23  No shows since last visit: 0  Cancellations since last visit: 0    Medication requested: buprenorphine-naloxone (SUBOXONE) 8-2 MG SUBL sublingual tablet Date last ordered: 3/21/23 Qty: 42 Refills: 0     [place] of [place] ?: Yes  Medication last filled date: 3/21/23 Qty filled: 60  Other controlled substance on [place] ?: yes  If yes, is this a new medication?: no  I  Lapse in medication adherence greater than 5 days?: No  If yes, call patient and gather details: na  Medication refill request verified as identical to current order?: yes  Result of Last DAM, VPA, Li+ Level, CBC, or Carbamazepine Level (at or since last visit): N/A    Last visit treatment plan:  Plan to increase suboxone to 8 mg  BID to see if he has better pain control. Follow up in 2 weeks to see.     []Medication refilled per  Medication Refill in Ambulatory Care  policy.  [x]Medication unable to be refilled by RN due to criteria not met as indicated below:    []Eligibility - not seen in the last year   []Supervision - no future appointment   []Compliance - no shows, cancellations or lapse in therapy   []Verification - order discrepancy   [x]Controlled medication   []Medication not included in policy   []90-day supply request   []Other

## 2023-04-06 NOTE — PROGRESS NOTES
[facility] Care Coordination Contact    Emailed RS Tool for JOHAN Ibarra at [facility] to review and approve tool.      Prashanth Romero RN, PHN  [facility]  [phone]
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
## 2023-04-11 NOTE — NURSING NOTE
Is the patient currently in the state of [place]? YES    Visit mode:TELEPHONE    If the visit is dropped, the patient can be reconnected by: TELEPHONE VISIT: Phone number:[phone]    Will anyone else be joining the visit? NO      How would you like to obtain your AVS? MyChart    Are changes needed to the allergy or medication list? NO    Reason for visit: return    Patient declined individual allergy and medication review by support staff because pt said someone prepares meds for him and he doesn't remember all of the names.    Gail PARK

## 2023-04-12 NOTE — TELEPHONE ENCOUNTER
I am a care coordinator for [facility].  We contract with [facility] to provide care coordination for .  I completed their annual assessment recently.  Member stated a need pull-ups ans chux  due to incontinence. He also reports that needs a bathing bench for falls prevention.   I have submitted orders request for your approval and signature.  This item is covered by member's insurance.     If you approve this order, please complete, sign, and route back to me.  I will complete the order process through [facility].

## 2023-04-13 NOTE — LETTER
April 13, 2023    KI CHRISTOPHER  [address]  [place] [address]        Dear Ki:    At [facility], we re dedicated to improving your health and wellness. Enclosed is the Care Plan developed with you on 3/29/23. Please review the Care Plan carefully.    As a reminder, during your visit we talked about:  Ways to manage your physical and mental health  Using health care to maintain and improve your health   Your preventive care needs     Remember to contact your care coordinator if you:  Are hospitalized, or plan to be hospitalized   Have a fall    Have a change in your physical or mental health  Need help finding support or services    If you have questions, or don t agree with your Care Plan, call me at [phone]. You can also call me if your needs change. TTY users, call the [place] [employer] at (922) or 1-108.566.9691 (ogolqr-it-insihb relay service).    Sincerely,        Prashanth Romero RN  [phone]  [email or web address]    [id number] accepted    [id number] (07/2022)

## 2023-04-13 NOTE — PROGRESS NOTES
[facility] Care Coordination Contact    Received after visit chart from care coordinator.  Completed following tasks: Mailed copy of care plan to client.  Updated services in Database, Submitted referrals/auths for CL 24 hrs with [facility] $114.58 daily.   Mailed Safe Medication Disposal.    Mailed copy of CL tool to member, faxed copy to [facility] facility ([facility]) including Provider Signature Complete Care Plan letter.    Provider Signature - Full Care Plan:  Member indicates that they would like their POC shared with the following [facility] providers:  [facility].  Letter and full POC faxed to providers for signature.with a self-addressed return envelope.    Ba Chris  Care Management Specialist  [facility]  [phone]

## 2023-04-13 NOTE — PROGRESS NOTES
[facility] Care Coordination Contact    No longer active with [facility] community case management effective 4/1/23.  Reason for community disenrollment: Change Care System/PCC to [facility].     Ba Chris  Care Management Specialist  [facility]  [phone]

## 2023-04-25 NOTE — PROGRESS NOTES
Called [facility] to follow up member's shower chair, chux, and pull ups. JANUARY/[place] stated member  and supplies stopped shipping out. Deleted items on DME/Supplies on tracking list.    Ba Chris  Care Management Specialist  [facility]  [phone]

## 2023-05-09 NOTE — PATIENT INSTRUCTIONS
"      MY TREATMENT INFORMATION FOR SLEEP APNEA-  Ki Pederson    DOCTOR : DESHAWN Richards CNP    Am I having a sleep study at a sleep center?  --->Due to normal delays, you will be contacted within 2-4 weeks to schedule    Am I having a home sleep study?  --->Watch the video for the device you are using:    -/drop off device-   https://www.Intelligroup.com/watch?v=yGGFBdELGhk    -Disposable device sent out require phone/computer application-   https://www.Intelligroup.com/watch?v=BCce_vbiwxE      Frequently asked questions:  1. What is Obstructive Sleep Apnea (ABIGAIL)? ABIGAIL is the most common type of sleep apnea. Apnea means, \"without breath.\"  Apnea is most often caused by narrowing or collapse of the upper airway as muscles relax during sleep.   Almost everyone has occasional apneas. Most people with sleep apnea have had brief interruptions at night frequently for many years.  The severity of sleep apnea is related to how frequent and severe the events are.   2. What are the consequences of ABIGAIL? Symptoms include: feeling sleepy during the day, snoring loudly, gasping or stopping of breathing, trouble sleeping, and occasionally morning headaches or heartburn at night.  Sleepiness can be serious and even increase the risk of falling asleep while driving. Other health consequences may include development of high blood pressure and other cardiovascular disease in persons who are susceptible. Untreated ABIGAIL  can contribute to heart disease, stroke and diabetes.   3. What are the treatment options? In most situations, sleep apnea is a lifelong disease that must be managed with daily therapy. Medications are not effective for sleep apnea and surgery is generally not considered until other therapies have been tried. Your treatment is your choice . Continuous Positive Airway (CPAP) works right away and is the therapy that is effective in nearly everyone. An oral device to hold your jaw forward is usually the next most " reliable option. Other options include postioning devices (to keep you off your back), weight loss, and surgery including a tongue pacing device. There is more detail about some of these options below.  4. Are my sleep studies covered by insurance? Although we will request verification of coverage, we advise you also check in advance of the study to ensure there is coverage.    Important tips for those choosing CPAP and similar devices   Know your equipment:  CPAP is continuous positive airway pressure that prevents obstructive sleep apnea by keeping the throat from collapsing while you are sleeping. In most cases, the device is  smart  and can slowly self-adjusts if your throat collapses and keeps a record every day of how well you are treated-this information is available to you and your care team.  BPAP is bilevel positive airway pressure that keeps your throat open and also assists each breath with a pressure boost to maintain adequate breathing.  Special kinds of BPAP are used in patients who have inadequate breathing from lung or heart disease. In most cases, the device is  smart  and can slowly self-adjusts to assist breathing. Like CPAP, the device keeps a record of how well you are treated.  Your mask is your connection to the device. You get to choose what feels most comfortable and the staff will help to make sure if fits. Here: are some examples of the different masks that are available:       Key points to remember on your journey with sleep apnea:  Sleep study.  PAP devices often need to be adjusted during a sleep study to show that they are effective and adjusted right.  Good tips to remember: Try wearing just the mask during a quiet time during the day so your body adapts to wearing it. A humidifier is recommended for comfort in most cases to prevent drying of your nose and throat. Allergy medication from your provider may help you if you are having nasal congestion.  Getting settled-in. It takes  more than one night for most of us to get used to wearing a mask. Try wearing just the mask during a quiet time during the day so your body adapts to wearing it. A humidifier is recommended for comfort in most cases. Our team will work with you carefully on the first day and will be in contact within 4 days and again at 2 and 4 weeks for advice and remote device adjustments. Your therapy is evaluated by the device each day.   Use it every night. The more you are able to sleep naturally for 7-8 hours, the more likely you will have good sleep and to prevent health risks or symptoms from sleep apnea. Even if you use it 4 hours it helps. Occasionally all of us are unable to use a medical therapy, in sleep apnea, it is not dangerous to miss one night.   Communicate. Call our skilled team on the number provided on the first day if your visit for problems that make it difficult to wear the device. Over 2 out of 3 patients can learn to wear the device long-term with help from our team. Remember to call our team or your sleep providers if you are unable to wear the device as we may have other solutions for those who cannot adapt to mask CPAP therapy. It is recommended that you sleep your sleep provider within the first 3 months and yearly after that if you are not having problems.   Use it for your health. We encourage use of CPAP masks during daytime quiet periods to allow your face and brain to adapt to the sensation of CPAP so that it will be a more natural sensation to awaken to at night or during naps. This can be very useful during the first few weeks or months of adapting to CPAP though it does not help medically to wear CPAP during wakefulness and  should not be used as a strategy just to meet guidelines.  Take care of your equipment. Make sure you clean your mask and tubing using directions every day and that your filter and mask are replaced as recommended or if they are not working.     BESIDES CPAP, WHAT OTHER  THERAPIES ARE THERE?    Positioning Device  Positioning devices are generally used when sleep apnea is mild and only occurs on your back.This example shows a pillow that straps around the waist. It may be appropriate for those whose sleep study shows milder sleep apnea that occurs primarily when lying flat on one's back. Preliminary studies have shown benefit but effectiveness at home may need to be verified by a home sleep test. These devices are generally not covered by medical insurance.  Examples of devices that maintain sleeping on the back to prevent snoring and mild sleep apnea.    Belt type body positioner  http://GrubHub/    Electronic reminder  http://nightshifttherapy.com/            Oral Appliance  What is oral appliance therapy?  An oral appliance device fits on your teeth at night like a retainer used after having braces. The device is made by a specialized dentist and requires several visits over 1-2 months before a manufactured device is made to fit your teeth and is adjusted to prevent your sleep apnea. Once an oral device is working properly, snoring should be improved. A home sleep test may be recommended at that time if to determine whether the sleep apnea is adequately treated.       Some things to remember:  -Oral devices are often, but not always, covered by your medical insurance. Be sure to check with your insurance provider.   -If you are referred for oral therapy, you will be given a list of specialized dentists to consider or you may choose to visit the Web site of the American Academy of Dental Sleep Medicine  -Oral devices are less likely to work if you have severe sleep apnea or are extremely overweight.     More detailed information  An oral appliance is a small acrylic device that fits over the upper and lower teeth  (similar to a retainer or a mouth guard). This device slightly moves jaw forward, which moves the base of the tongue forward, opens the airway, improves breathing  for effective treat snoring and obstructive sleep apnea in perhaps 7 out of 10 people .  The best working devices are custom-made by a dental device  after a mold is made of the teeth 1, 2, 3.  When is an oral appliance indicated?  Oral appliance therapy is recommended as a first-line treatment for patients with primary snoring, mild sleep apnea, and for patients with moderate sleep apnea who prefer appliance therapy to use of CPAP4, 5. Severity of sleep apnea is determined by sleep testing and is based on the number of respiratory events per hour of sleep.   How successful is oral appliance therapy?  The success rate of oral appliance therapy in patients with mild sleep apnea is 75-80% while in patients with moderate sleep apnea it is 50-70%. The chance of success in patients with severe sleep apnea is 40-50%. The research also shows that oral appliances have a beneficial effect on the cardiovascular health of ABIGAIL patients at the same magnitude as CPAP therapy7.  Oral appliances should be a second-line treatment in cases of severe sleep apnea, but if not completely successful then a combination therapy utilizing CPAP plus oral appliance therapy may be effective. Oral appliances tend to be effective in a broad range of patients although studies show that the patients who have the highest success are females, younger patients, those with milder disease, and less severe obesity. 3, 6.   Finding a dentist that practices dental sleep medicine  Specific training is available through the American Academy of Dental Sleep Medicine for dentists interested in working in the field of sleep. To find a dentist who is educated in the field of sleep and the use of oral appliances, near you, visit the Web site of the American Academy of Dental Sleep Medicine.    References  1. Behzad et al. Objectively measured vs self-reported compliance during oral appliance therapy for sleep-disordered breathing. Chest 2013;  144(5): 5719-0718.  2. Mary et al. Objective measurement of compliance during oral appliance therapy for sleep-disordered breathing. Thorax 2013; 68(1): 91-96.  3. Vickie et al. Mandibular advancement devices in 620 men and women with ABIGAIL and snoring: tolerability and predictors of treatment success. Chest 2004; 125: 3265-1444.  4. Betty, et al. Oral appliances for snoring and ABIGAIL: a review. Sleep 2006; 29: 244-262.  5. Xuan et al. Oral appliance treatment for ABIGAIL: an update. J Clin Sleep Med 2014; 10(2): 215-227.  6. Sara et al. Predictors of OSAH treatment outcome. J Dent Res 2007; 86: 0719-1225.      Weight Loss:    Weight loss is a long-term strategy that may improve sleep apnea in some patients.    Weight management is a personal decision and the decision should be based on your interest and the potential benefits.  If you are interested in exploring weight loss strategies, the following discussion covers the impact on weight loss on sleep apnea and the approaches that may be successful.    Being overweight does not necessarily mean you will have health consequences.  Those who have BMI over 35 or over 27 with existing medical conditions carries greater risk.   Weight loss decreases severity of sleep apnea in most people with obesity. For those with mild obesity who have developed snoring with weight gain, even 15-30 pound weight loss can improve and occasionally eliminate sleep apnea.  Structured and life-long dietary and health habits are necessary to lose weight and keep healthier weight levels.     Though there may be significant health benefits from weight loss, long-term weight loss is very difficult to achieve- studies show success with dietary management in less than 10% of people. In addition, substantial weight loss may require years of dietary control and may be difficult if patients have severe obesity. In these cases, surgical management may be considered.  Finally, older  individuals who have tolerated obesity without health complications may be less likely to benefit from weight loss strategies.      [unfilled]    Surgery:    Surgery for obstructive sleep apnea is considered generally only when other therapies fail to work. Surgery may be discussed with you if you are having a difficult time tolerating CPAP and or when there is an abnormal structure that requires surgical correction.  Nose and throat surgeries often enlarge the airway to prevent collapse.  Most of these surgeries create pain for 1-2 weeks and up to half of the most common surgeries are not effective throughout life.  You should carefully discuss the benefits and drawbacks to surgery with your sleep provider and surgeon to determine if it is the best solution for you.   More information  Surgery for ABIGAIL is directed at areas that are responsible for narrowing or complete obstruction of the airway during sleep.  There are a wide range of procedures available to enlarge and/or stabilize the airway to prevent blockage of breathing in the three major areas where it can occur: the palate, tongue, and nasal regions.  Successful surgical treatment depends on the accurate identification of the factors responsible for obstructive sleep apnea in each person.  A personalized approach is required because there is no single treatment that works well for everyone.  Because of anatomic variation, consultation with an examination by a sleep surgeon is a critical first step in determining what surgical options are best for each patient.  In some cases, examination during sedation may be recommended in order to guide the selection of procedures.  Patients will be counseled about risks and benefits as well as the typical recovery course after surgery. Surgery is typically not a cure for a person s ABIGAIL.  However, surgery will often significantly improve one s ABIGAIL severity (termed  success rate ).  Even in the absence of a cure, surgery  will decrease the cardiovascular risk associated with OSA7; improve overall quality of life8 (sleepiness, functionality, sleep quality, etc).      Palate Procedures:  Patients with ABIGAIL often have narrowing of their airway in the region of their tonsils and uvula.  The goals of palate procedures are to widen the airway in this region as well as to help the tissues resist collapse.  Modern palate procedure techniques focus on tissue conservation and soft tissue rearrangement, rather than tissue removal.  Often the uvula is preserved in this procedure. Residual sleep apnea is common in patient after pharyngoplasty with an average reduction in sleep apnea events of 33%2.      Tongue Procedures:  ExamWhile patients are awake, the muscles that surround the throat are active and keep this region open for breathing. These muscles relax during sleep, allowing the tongue and other structures to collapse and block breathing.  There are several different tongue procedures available.  Selection of a tongue base procedure depends on characteristics seen on physical exam.  Generally, procedures are aimed at removing bulky tissues in this area or preventing the back of the tongue from falling back during sleep.  Success rates for tongue surgery range from 50-62%3.    Hypoglossal Nerve Stimulation:  Hypoglossal nerve stimulation has recently received approval from the [place] Food and Drug Administration for the treatment of obstructive sleep apnea.  This is based on research showing that the system was safe and effective in treating sleep apnea6.  Results showed that the median AHI score decreased 68%, from 29.3 to 9.0. This therapy uses an implant system that senses breathing patterns and delivers mild stimulation to airway muscles, which keeps the airway open during sleep.  The system consists of three fully implanted components: a small generator (similar in size to a pacemaker), a breathing sensor, and a stimulation lead.   Using a small handheld remote, a patient turns the therapy on before bed and off upon awakening.    Candidates for this device must be greater than 18 years of age, have moderate to severe ABIGAIL (AHI between 15-65), BMI less than 35, have tried CPAP/oral appliance for at least 8 weeks without success, and have appropriate upper airway anatomy (determined by a sleep endoscopy performed by Dr. Cristian Valdes).    Hypoglossal Nerve Stimulation Pathway:    The sleep surgeon s office will work with the patient through the insurance prior-authorization process (including communications and appeals).    Nasal Procedures:  Nasal obstruction can interfere with nasal breathing during the day and night.  Studies have shown that relief of nasal obstruction can improve the ability of some patients to tolerate positive airway pressure therapy for obstructive sleep apnea1.  Treatment options include medications such as nasal saline, topical corticosteroid and antihistamine sprays, and oral medications such as antihistamines or decongestants. Non-surgical treatments can include external nasal dilators for selected patients. If these are not successful by themselves, surgery can improve the nasal airway either alone or in combination with these other options.      Combination Procedures:  Combination of surgical procedures and other treatments may be recommended, particularly if patients have more than one area of narrowing or persistent positional disease.  The success rate of combination surgery ranges from 66-80%2,3.    References  Umberto SAUCEDO. The Role of the Nose in Snoring and Obstructive Sleep Apnoea: An Update.  Eur Arch Otorhinolaryngol. 2011; 268: 1365-73.   Tc SM; Car JA; Cynthia JR; Pallanch JF; Sharyn MB; Mor SG; Brisa PUGA. Surgical modifications of the upper airway for obstructive sleep apnea in adults: a systematic review and meta-analysis. SLEEP 2010;33(10):3687-1930. Dex GERONIMO. Hypopharyngeal surgery in  obstructive sleep apnea: an evidence-based medicine review.  Arch Otolaryngol Head Neck Surg. 2006 Feb;132(2):206-13.  Hua YH1, Tony Y, Gee ESTUARDO. The efficacy of anatomically based multilevel surgery for obstructive sleep apnea. Otolaryngol Head Neck Surg. 2003 Oct;129(4):327-35.  Dex GERONIMO, Goldberg A. Hypopharyngeal Surgery in Obstructive Sleep Apnea: An Evidence-Based Medicine Review. Arch Otolaryngol Head Neck Surg. 2006 Feb;132(2):206-13.  Silverio BRONSON et al. Upper-Airway Stimulation for Obstructive Sleep Apnea.  N Engl J Med. 2014 Jan 9;370(2):139-49.  Kelley Y et al. Increased Incidence of Cardiovascular Disease in Middle-aged Men with Obstructive Sleep Apnea. Am J Respir Crit Care Med; 2002 166: 159-165  Johnsonghada LOPEZ et al. Studying Life Effects and Effectiveness of Palatopharyngoplasty (SLEEP) study: Subjective Outcomes of Isolated Uvulopalatopharyngoplasty. Otolaryngol Head Neck Surg. 2011; 144: 623-631.        WHAT IF I ONLY HAVE SNORING?    Mandibular advancement devices, lateral sleep positioning, long-term weight loss and treatment of nasal allergies have been shown to improve snoring.  Exercising tongue muscles with a game (https://GamerDNA.Hooja/us/jeyson/soundly-reduce-snoring/ax0339039401) or stimulating the tongue during the day with a device (https://doi.org/10.3390/xav14739072) have improved snoring in some individuals.    Remember to Drive Safe... Drive Alive     Sleep health profoundly affects your health, mood, and your safety.  Thirty three percent of the population (one in three of us) is not getting enough sleep and many have a sleep disorder. Not getting enough sleep or having an untreated / undertreated sleep condition may make us sleepy without even knowing it. In fact, our driving could be dramatically impaired due to our sleep health. As your provider, here are some things I would like you to know about driving:     Here are some warning signs for impairment and dangerous drowsy driving:               -Having been awake more than 16 hours               -Looking tired               -Eyelid drooping              -Head nodding (it could be too late at this point)              -Driving for more than 30 minutes     Some things you could do to make the driving safer if you are experiencing some drowsiness:              -Stop driving and rest              -Call for transportation              -Make sure your sleep disorder is adequately treated     Some things that have been shown NOT to work when experiencing drowsiness while driving:              -Turning on the radio              -Opening windows              -Eating any  distracting  /  entertaining  foods (e.g., sunflower seeds, candy, or any other)              -Talking on the phone      Your decision may not only impact your life, but also the life of others. Please, remember to drive safe for yourself and all of us.

## 2023-05-17 NOTE — PROGRESS NOTES
Per Epic, member passed on 04/14/2023. Tracking of provider signature letter discontinued.    Christina Joaquin  Care Management Specialist  [facility]  [phone]

## 2024-10-13 NOTE — PROGRESS NOTES
RESPIRATORY CARE NOTE     Patient Name: Ki Pederson  Today's Date: 12/30/2022       Pt continues to receive duo neb. BS are coarse. Pt is on 6lpm of oxygen via oxymask, SpO2 is 92%. Post treatment there is no change .  RT will continue to monitor and assess.      show